# Patient Record
Sex: FEMALE | Race: WHITE | NOT HISPANIC OR LATINO | Employment: FULL TIME | URBAN - METROPOLITAN AREA
[De-identification: names, ages, dates, MRNs, and addresses within clinical notes are randomized per-mention and may not be internally consistent; named-entity substitution may affect disease eponyms.]

---

## 2018-05-22 ENCOUNTER — TELEPHONE (OUTPATIENT)
Dept: CARDIOLOGY CLINIC | Facility: CLINIC | Age: 68
End: 2018-05-22

## 2018-05-22 NOTE — TELEPHONE ENCOUNTER
Called patient while doing prep work, [facility] to get release of information prior to visit

## 2018-05-23 NOTE — TELEPHONE ENCOUNTER
We received records from PCP and scanned to [facility] holding records for appointment

## 2018-05-29 ENCOUNTER — OFFICE VISIT (OUTPATIENT)
Dept: CARDIOLOGY CLINIC | Facility: CLINIC | Age: 68
End: 2018-05-29
Payer: COMMERCIAL

## 2018-05-29 VITALS
SYSTOLIC BLOOD PRESSURE: 132 MMHG | HEIGHT: 66 IN | DIASTOLIC BLOOD PRESSURE: 80 MMHG | OXYGEN SATURATION: 96 % | WEIGHT: 238.8 LBS | BODY MASS INDEX: 38.38 KG/M2 | HEART RATE: 100 BPM

## 2018-05-29 DIAGNOSIS — I10 ESSENTIAL HYPERTENSION: ICD-10-CM

## 2018-05-29 DIAGNOSIS — I25.10 CORONARY ARTERY DISEASE INVOLVING NATIVE CORONARY ARTERY OF NATIVE HEART WITHOUT ANGINA PECTORIS: ICD-10-CM

## 2018-05-29 DIAGNOSIS — R06.02 SHORTNESS OF BREATH: Primary | ICD-10-CM

## 2018-05-29 DIAGNOSIS — Z95.1 S/P CABG X 3: ICD-10-CM

## 2018-05-29 DIAGNOSIS — M79.89 LEG SWELLING: ICD-10-CM

## 2018-05-29 DIAGNOSIS — E78.5 DYSLIPIDEMIA: ICD-10-CM

## 2018-05-29 PROCEDURE — 93000 ELECTROCARDIOGRAM COMPLETE: CPT | Performed by: INTERNAL MEDICINE

## 2018-05-29 PROCEDURE — 99204 OFFICE O/P NEW MOD 45 MIN: CPT | Performed by: INTERNAL MEDICINE

## 2018-05-29 RX ORDER — ATORVASTATIN CALCIUM 10 MG/1
1 TABLET, FILM COATED ORAL DAILY
Refills: 3 | COMMUNITY
Start: 2018-04-11 | End: 2019-05-20 | Stop reason: SDUPTHER

## 2018-05-29 RX ORDER — VALSARTAN AND HYDROCHLOROTHIAZIDE 160; 12.5 MG/1; MG/1
1 TABLET, FILM COATED ORAL DAILY
COMMUNITY
Start: 2008-01-07 | End: 2018-05-29 | Stop reason: ALTCHOICE

## 2018-05-29 RX ORDER — PREDNISONE 10 MG/1
10 TABLET ORAL DAILY
Refills: 3 | COMMUNITY
Start: 2018-04-24 | End: 2019-03-25

## 2018-05-29 RX ORDER — CITALOPRAM 20 MG/1
1 TABLET ORAL DAILY
Refills: 1 | COMMUNITY
Start: 2018-04-11 | End: 2019-05-20 | Stop reason: SDUPTHER

## 2018-05-29 RX ORDER — TRAMADOL HYDROCHLORIDE 50 MG/1
1 TABLET ORAL EVERY 6 HOURS PRN
Refills: 3 | COMMUNITY
Start: 2018-05-21 | End: 2019-03-25 | Stop reason: SDUPTHER

## 2018-05-29 RX ORDER — LISINOPRIL AND HYDROCHLOROTHIAZIDE 12.5; 1 MG/1; MG/1
1 TABLET ORAL DAILY
COMMUNITY
End: 2019-05-20

## 2018-05-29 RX ORDER — ASPIRIN 81 MG/1
81 TABLET ORAL DAILY
Qty: 30 TABLET | Refills: 5
Start: 2018-05-29

## 2018-05-29 NOTE — PROGRESS NOTES
Cardiology   [facility] 79 y o  female MRN: [id number]        Impression:  1  CAD s/p CABG x 3 in 11/17 - doing well  2  Hypertension - controlled  3  Dyslipidemia - on statin  4  Fatigue - unclear etiology  Recommendations:  1  Continue current medications  2  Check echocardiogram to evaluate LV systolic function  May have been slightly reduced at time of surgery  3  Check CMP, CBC, TSH, Lipid profile  4  Follow up in 3 months  HPI: [facility] is a 79y o  year old female with a history of CAD s/p CABG x 3 in November 2017 at [facility], Hypertension, Dyslipidemia, and morbid obesity s/p bariatric surgery, presents for establishment of a new cardiologist   Major complaint is fatigue  Is on chronic prednisone therapy for arthritis  Underwent knee replacement surgery prior to CABG  No chest pain, but some dyspnea on exertion  No palpitations  Review of Systems   Constitutional: Positive for fatigue  HENT: Negative  Eyes: Negative  Respiratory: Negative for chest tightness and shortness of breath  Cardiovascular: Negative for chest pain, palpitations and leg swelling  Gastrointestinal: Negative  Endocrine: Negative  Genitourinary: Negative  Musculoskeletal: Positive for arthralgias  Skin: Negative  Allergic/Immunologic: Negative  Neurological: Negative  Hematological: Negative  Psychiatric/Behavioral: Negative  All other systems reviewed and are negative  History reviewed  No pertinent past medical history  Past Surgical History:   Procedure Laterality Date    BARIATRIC SURGERY  2014    CARPAL TUNNEL RELEASE Bilateral 2002    CORONARY ARTERY BYPASS GRAFT  2017    FOOT SURGERY Right     bone surgery to straighten toe      HIP SURGERY Left 2015    REPLACEMENT TOTAL KNEE Right 04/2017    TOTAL HIP ARTHROPLASTY Right 2017     History   Alcohol Use    Yes     Comment: social      History   Drug Use No     History   Smoking Status  Former Smoker    Years: 7 00    Types: Cigarettes    Quit date: 2017   Smokeless Tobacco    Never Used     Family History   Problem Relation Age of Onset    Hypertension Mother     Heart attack Neg Hx     Stroke Neg Hx     Aneurysm Neg Hx     Clotting disorder Neg Hx     Arrhythmia Neg Hx     Hyperlipidemia Neg Hx      pt unsure        Allergies: Allergies   Allergen Reactions    Iodine Anaphylaxis     Reaction Date: [id number]; Medications:     Current Outpatient Prescriptions:     atorvastatin (LIPITOR) 10 mg tablet, Take 1 tablet by mouth daily, Disp: , Rfl: 3    citalopram (CeleXA) 20 mg tablet, Take 1 tablet by mouth daily, Disp: , Rfl: 1    lisinopril-hydrochlorothiazide (PRINZIDE,ZESTORETIC) 10-12 5 MG per tablet, Take 1 tablet by mouth daily, Disp: , Rfl:     predniSONE 10 mg tablet, Take 10 mg by mouth daily, Disp: , Rfl: 3    traMADol (ULTRAM) 50 mg tablet, Take 1 tablet by mouth every 6 (six) hours as needed, Disp: , Rfl: 3    aspirin (ECOTRIN LOW STRENGTH) 81 mg EC tablet, Take 1 tablet (81 mg total) by mouth daily, Disp: 30 tablet, Rfl: 5      Wt Readings from Last 3 Encounters:   05/29/18 108 kg (238 lb 12 8 oz)   03/06/08 98 kg (216 lb)   02/21/08 97 1 kg (214 lb)     Temp Readings from Last 3 Encounters:   03/06/08 97 5 °F (36 4 °C)   02/21/08 97 9 °F (36 6 °C)   01/07/08 97 7 °F (36 5 °C)     BP Readings from Last 3 Encounters:   05/29/18 132/80   03/06/08 130/90   02/21/08 (!) 160/110     Pulse Readings from Last 3 Encounters:   05/29/18 100   03/06/08 80   02/21/08 64         Physical Exam   Constitutional: She is oriented to person, place, and time  She appears well-developed  HENT:   Head: Atraumatic  Eyes: EOM are normal    Neck: Normal range of motion  Cardiovascular: Normal rate, regular rhythm and normal heart sounds  Exam reveals no gallop and no friction rub  No murmur heard    Pulmonary/Chest: Effort normal and breath sounds normal  No respiratory distress  She has no wheezes  She has no rales  Abdominal: Soft  Musculoskeletal: Normal range of motion  Neurological: She is alert and oriented to person, place, and time  Skin: Skin is warm and dry  Psychiatric: She has a normal mood and affect           Cardiac testing:   EKG reviewed personally: [id number]

## 2018-05-29 NOTE — PATIENT INSTRUCTIONS
Recommendations:  1  Continue current medications  2  Check echocardiogram to evaluate LV systolic function  May have been slightly reduced at time of surgery  3  Check CMP, CBC, TSH, Lipid profile  4  Follow up in 3 months

## 2018-08-08 ENCOUNTER — HOSPITAL ENCOUNTER (OUTPATIENT)
Dept: NON INVASIVE DIAGNOSTICS | Facility: CLINIC | Age: 68
Discharge: HOME | End: 2018-08-08
Payer: COMMERCIAL

## 2018-08-08 DIAGNOSIS — I25.10 CORONARY ARTERY DISEASE INVOLVING NATIVE CORONARY ARTERY OF NATIVE HEART WITHOUT ANGINA PECTORIS: ICD-10-CM

## 2018-08-08 PROCEDURE — 93306 TTE W/DOPPLER COMPLETE: CPT | Performed by: INTERNAL MEDICINE

## 2018-08-08 PROCEDURE — 93306 TTE W/DOPPLER COMPLETE: CPT

## 2018-09-25 ENCOUNTER — TELEPHONE (OUTPATIENT)
Dept: CARDIOLOGY CLINIC | Facility: CLINIC | Age: 68
End: 2018-09-25

## 2018-09-25 NOTE — TELEPHONE ENCOUNTER
Attempted to reach patient many times, but no successful, sent letter out      ----- Message from Maty Blair MD sent at 8/27/2018  8:28 AM EDT -----  Please call patient and let her know her echo looked good  Normal cardiac function  Thanks

## 2018-10-12 ENCOUNTER — TELEPHONE (OUTPATIENT)
Dept: PAIN MEDICINE | Facility: MEDICAL CENTER | Age: 68
End: 2018-10-12

## 2018-10-12 NOTE — TELEPHONE ENCOUNTER
Pt left message in voicemail at 4:44 yesterday afternoon, stating that she wanted to make an appointment with pain management  Attempted to call patient at the number provided, but had to leave a message for her to call back

## 2018-10-16 NOTE — TELEPHONE ENCOUNTER
Pt called back to schedule appt  Pt is self referring for chronic LBP  Verified we are par with her insurance via tax id #  Pt has not seen prior pain management and has not had any recent imaging  Pt is currently taking Tramadol and Prednisone, which are being prescribed by her Rheumatologist  Pt states that she wants to pursue other alternatives besides pain medication  CON scheduled with Dr Lico Henriquez on 10/31 at 10:40 AM  NP paperwork mailed to patient

## 2019-03-14 ENCOUNTER — TRANSCRIBE ORDERS (OUTPATIENT)
Dept: ADMINISTRATIVE | Facility: HOSPITAL | Age: 69
End: 2019-03-14

## 2019-03-14 ENCOUNTER — OFFICE VISIT (OUTPATIENT)
Dept: CARDIOLOGY CLINIC | Facility: CLINIC | Age: 69
End: 2019-03-14
Payer: COMMERCIAL

## 2019-03-14 VITALS
DIASTOLIC BLOOD PRESSURE: 80 MMHG | BODY MASS INDEX: 40.92 KG/M2 | RESPIRATION RATE: 18 BRPM | HEART RATE: 70 BPM | HEIGHT: 66 IN | SYSTOLIC BLOOD PRESSURE: 130 MMHG | WEIGHT: 254.6 LBS

## 2019-03-14 DIAGNOSIS — I10 ESSENTIAL HYPERTENSION: ICD-10-CM

## 2019-03-14 DIAGNOSIS — I25.10 CORONARY ARTERY DISEASE INVOLVING NATIVE CORONARY ARTERY OF NATIVE HEART WITHOUT ANGINA PECTORIS: Primary | ICD-10-CM

## 2019-03-14 DIAGNOSIS — R06.00 DYSPNEA ON EXERTION: ICD-10-CM

## 2019-03-14 DIAGNOSIS — E78.5 DYSLIPIDEMIA: ICD-10-CM

## 2019-03-14 PROBLEM — R06.09 DYSPNEA ON EXERTION: Status: ACTIVE | Noted: 2019-03-14

## 2019-03-14 PROCEDURE — 93000 ELECTROCARDIOGRAM COMPLETE: CPT | Performed by: INTERNAL MEDICINE

## 2019-03-14 PROCEDURE — 99214 OFFICE O/P EST MOD 30 MIN: CPT | Performed by: INTERNAL MEDICINE

## 2019-03-14 NOTE — PATIENT INSTRUCTIONS
The patient will be scheduled for chest x-ray, echocardiogram and nuclear stress test   She will continue on the same regimen of medications

## 2019-03-14 NOTE — ASSESSMENT & PLAN NOTE
Hyperlipidemia  Patient is taking atorvastatin at 10 mg daily  I will arrange for follow-up lipid profile

## 2019-03-14 NOTE — ASSESSMENT & PLAN NOTE
The patient has experienced increasing symptoms of dyspnea on exertion over the past several months  This is not associated with any chest pain  She does however have some leg edema  She denies any orthopnea  The patient will be scheduled for nuclear stress test and echocardiogram to assess left ventricular functions

## 2019-03-14 NOTE — PROGRESS NOTES
Assessment/Plan:    Coronary artery disease involving native coronary artery of native heart without angina pectoris  Coronary artery disease status post coronary bypass surgery x3 in 2017  Patient has no symptoms of angina but is complaining of dyspnea on exertion  I will be arranging for echocardiogram and nuclear stress test     Essential hypertension  Hypertension, adequately controlled  We will continue present medications  Dyslipidemia  Hyperlipidemia  Patient is taking atorvastatin at 10 mg daily  I will arrange for follow-up lipid profile  Dyspnea on exertion  The patient has experienced increasing symptoms of dyspnea on exertion over the past several months  This is not associated with any chest pain  She does however have some leg edema  She denies any orthopnea  The patient will be scheduled for nuclear stress test and echocardiogram to assess left ventricular functions  Diagnoses and all orders for this visit:    Coronary artery disease involving native coronary artery of native heart without angina pectoris  -     POCT ECG  -     POCT ECG  -     Lipid Panel with Direct LDL reflex; Future  -     Echo complete with contrast if indicated; Future  -     XR chest pa & lateral; Future  -     NM myocardial perfusion spect (rx stress); Future    Essential hypertension    Dyslipidemia  -     Lipid Panel with Direct LDL reflex; Future    Dyspnea on exertion  -     Echo complete with contrast if indicated; Future  -     XR chest pa & lateral; Future  -     NM myocardial perfusion spect (rx stress); Future          Subjective:  Dyspnea on exertion  Patient ID: Susie Faust is a 79 y o  female  The patient presented to this office for the purpose of cardiac evaluation and consultation  She has been concerned about symptoms of dyspnea on exertion and even at rest   She has noticed some leg swelling as well  She denies however any associated symptoms of chest pain    She denies any symptoms of palpitation dizziness or lightheadedness the patient had a history of coronary artery disease with symptoms of angina  In 2017 she underwent coronary bypass surgery x3  She has also been treated for hypertension and hyperlipidemia  She has psoriatic arthritis for which she has been on prednisone  Since bypass surgery the patient has gained about 25 lb  She also had hip and knee replacement in 2017 as well  The patient has the ambulation difficulties since her her orthopedic surgeries which may contribute to her limited stamina  The following portions of the patient's history were reviewed and updated as appropriate: allergies, current medications, past family history, past medical history, past social history, past surgical history and problem list     Review of Systems   Respiratory: Positive for shortness of breath  Negative for apnea, cough, chest tightness and wheezing  Cardiovascular: Positive for leg swelling  Negative for chest pain and palpitations  Gastrointestinal: Negative for abdominal pain  Neurological: Negative for dizziness, syncope and light-headedness  Objective:  Stable cardiac-wise at this point  /80 (BP Location: Right arm, Patient Position: Sitting)   Pulse 70   Resp 18   Ht 5' 6" (1 676 m)   Wt 115 kg (254 lb 9 6 oz)   BMI 41 09 kg/m²          Physical Exam   Constitutional: She is oriented to person, place, and time  She appears well-developed and well-nourished  No distress  HENT:   Head: Normocephalic and atraumatic  Neck: Normal range of motion  Neck supple  No JVD present  No thyromegaly present  Cardiovascular: Normal rate, regular rhythm, S1 normal and S2 normal  Exam reveals no gallop and no friction rub  Murmur heard  Systolic murmur is present with a grade of 1/6  Pulmonary/Chest: Effort normal  No respiratory distress  She has no wheezes  She has no rales  She exhibits no tenderness  Abdominal: Soft     Musculoskeletal: She exhibits edema (Mild)  Neurological: She is alert and oriented to person, place, and time  Skin: Skin is warm and dry  She is not diaphoretic  Psychiatric: She has a normal mood and affect  Vitals reviewed

## 2019-03-14 NOTE — ASSESSMENT & PLAN NOTE
Coronary artery disease status post coronary bypass surgery x3 in 2017  Patient has no symptoms of angina but is complaining of dyspnea on exertion    I will be arranging for echocardiogram and nuclear stress test

## 2019-03-14 NOTE — LETTER
March 14, 2019     Marquis Sanaz MD  [address] [facility] [address]    Patient: Nhan Nelson   YOB: 1950   Date of Visit: 3/14/2019       Dear Dr Yulisa Ward: Thank you for referring Nhan Nelson to me for evaluation  Below are my notes for this consultation  If you have questions, please do not hesitate to call me  I look forward to following your patient along with you  Sincerely,        Roselia Davis MD        CC: No Recipients  Roselia Davis MD  3/14/2019 10:46 AM  Sign at close encounter  Assessment/Plan:    Coronary artery disease involving native coronary artery of native heart without angina pectoris  Coronary artery disease status post coronary bypass surgery x3 in 2017  Patient has no symptoms of angina but is complaining of dyspnea on exertion  I will be arranging for echocardiogram and nuclear stress test     Essential hypertension  Hypertension, adequately controlled  We will continue present medications  Dyslipidemia  Hyperlipidemia  Patient is taking atorvastatin at 10 mg daily  I will arrange for follow-up lipid profile  Dyspnea on exertion  The patient has experienced increasing symptoms of dyspnea on exertion over the past several months  This is not associated with any chest pain  She does however have some leg edema  She denies any orthopnea  The patient will be scheduled for nuclear stress test and echocardiogram to assess left ventricular functions  Diagnoses and all orders for this visit:    Coronary artery disease involving native coronary artery of native heart without angina pectoris  -     POCT ECG  -     POCT ECG  -     Lipid Panel with Direct LDL reflex; Future  -     Echo complete with contrast if indicated; Future  -     XR chest pa & lateral; Future  -     NM myocardial perfusion spect (rx stress); Future    Essential hypertension    Dyslipidemia  -     Lipid Panel with Direct LDL reflex;  Future    Dyspnea on exertion  - Echo complete with contrast if indicated; Future  -     XR chest pa & lateral; Future  -     NM myocardial perfusion spect (rx stress); Future          Subjective:  Dyspnea on exertion  Patient ID: Josefina Soares is a 79 y o  female  The patient presented to this office for the purpose of cardiac evaluation and consultation  She has been concerned about symptoms of dyspnea on exertion and even at rest   She has noticed some leg swelling as well  She denies however any associated symptoms of chest pain  She denies any symptoms of palpitation dizziness or lightheadedness the patient had a history of coronary artery disease with symptoms of angina  In 2017 she underwent coronary bypass surgery x3  She has also been treated for hypertension and hyperlipidemia  She has psoriatic arthritis for which she has been on prednisone  Since bypass surgery the patient has gained about 25 lb  She also had hip and knee replacement in 2017 as well  The patient has the ambulation difficulties since her her orthopedic surgeries which may contribute to her limited stamina  The following portions of the patient's history were reviewed and updated as appropriate: allergies, current medications, past family history, past medical history, past social history, past surgical history and problem list     Review of Systems   Respiratory: Positive for shortness of breath  Negative for apnea, cough, chest tightness and wheezing  Cardiovascular: Positive for leg swelling  Negative for chest pain and palpitations  Gastrointestinal: Negative for abdominal pain  Neurological: Negative for dizziness, syncope and light-headedness  Objective:  Stable cardiac-wise at this point  /80 (BP Location: Right arm, Patient Position: Sitting)   Pulse 70   Resp 18   Ht 5' 6" (1 676 m)   Wt 115 kg (254 lb 9 6 oz)   BMI 41 09 kg/m²           Physical Exam   Constitutional: She is oriented to person, place, and time  She appears well-developed and well-nourished  No distress  HENT:   Head: Normocephalic and atraumatic  Neck: Normal range of motion  Neck supple  No JVD present  No thyromegaly present  Cardiovascular: Normal rate, regular rhythm, S1 normal and S2 normal  Exam reveals no gallop and no friction rub  Murmur heard  Systolic murmur is present with a grade of 1/6  Pulmonary/Chest: Effort normal  No respiratory distress  She has no wheezes  She has no rales  She exhibits no tenderness  Abdominal: Soft  Musculoskeletal: She exhibits edema (Mild)  Neurological: She is alert and oriented to person, place, and time  Skin: Skin is warm and dry  She is not diaphoretic  Psychiatric: She has a normal mood and affect  Vitals reviewed

## 2019-03-25 ENCOUNTER — OFFICE VISIT (OUTPATIENT)
Dept: RHEUMATOLOGY | Facility: CLINIC | Age: 69
End: 2019-03-25
Payer: COMMERCIAL

## 2019-03-25 VITALS
HEART RATE: 82 BPM | DIASTOLIC BLOOD PRESSURE: 87 MMHG | WEIGHT: 249.2 LBS | BODY MASS INDEX: 40.05 KG/M2 | SYSTOLIC BLOOD PRESSURE: 128 MMHG | HEIGHT: 66 IN

## 2019-03-25 DIAGNOSIS — L40.9 PSORIASIS: ICD-10-CM

## 2019-03-25 DIAGNOSIS — Z79.52 CURRENT CHRONIC USE OF SYSTEMIC STEROIDS: ICD-10-CM

## 2019-03-25 DIAGNOSIS — L40.50 PSORIATIC ARTHROPATHY (HCC): Primary | ICD-10-CM

## 2019-03-25 PROCEDURE — 99204 OFFICE O/P NEW MOD 45 MIN: CPT | Performed by: INTERNAL MEDICINE

## 2019-03-25 RX ORDER — PREDNISONE 2.5 MG
TABLET ORAL
Qty: 70 TABLET | Refills: 0 | Status: SHIPPED | OUTPATIENT
Start: 2019-03-25 | End: 2021-01-29 | Stop reason: CLARIF

## 2019-03-25 RX ORDER — TRAMADOL HYDROCHLORIDE 50 MG/1
50 TABLET ORAL EVERY 6 HOURS PRN
Qty: 56 TABLET | Refills: 1 | OUTPATIENT
Start: 2019-03-25 | End: 2021-01-29 | Stop reason: CLARIF

## 2019-03-25 NOTE — PROGRESS NOTES
Assessment and Plan:   Ms Andrzej Mckinney is a 17-year-old  female with history significant for psoriasis and possible psoriatic arthritis, who presents for further management  She is currently on prednisone 10 mg daily     - Juan Sylvester presents today for further evaluation of diffuse joint pains which have primarily affected her shoulders, hands, hips and knees, that has previously been diagnosed as psoriatic arthritis  She is currently on prednisone 10 mg daily which has been a stable dose for the past 5 years, as well as tramadol 200 mg daily  She reports with this regimen her joint pains are well controlled, but any time a taper is attempted she will experience recurrence of symptoms  Based on her physical examination today there is presence of soft tissue swelling noted at her right 2nd MCP, but no other discrete synovitis is noted  I advised her at this time due to the chronic use of steroids her joint exam may not be accurate, and it may be difficult to distinguish between arthritic pain arising from an inflammatory arthritis versus osteoarthritis  - To reestablish the diagnosis of psoriatic arthritis, I suggest she taper down on the prednisone to 7 5 mg daily for the next 2 weeks, and then 5 mg daily for the next 2 weeks, following which she should stop the steroids  I will plan to see her back in 6 weeks when she has been off steroids for at least 2 weeks and reassess her joint exam   In the interim she can continue tramadol as needed, but I advised her in the long term if this is a medication she wishes to continue, she will need to have this refilled by her primary care physician  It is possible with prior use of Humira and Enbrel, they may have been efficacious but the result was masked by the use of steroids  At her next office visit if medications need to be started for psoriatic arthritis, we can consider methotrexate which would also help with the psoriasis       - I requested she obtain the below mentioned labs and x-rays prior to her next office visit with me  I will plan to see her back in 6 weeks  Plan:  Diagnoses and all orders for this visit:    Psoriatic arthropathy ([address] )  -     predniSONE 2 5 mg tablet; Take 7 5 mg daily for 2 weeks, and then decrease to 5 mg daily for 2 weeks, and then stop  -     traMADol (ULTRAM) 50 mg tablet; Take 1 tablet (50 mg total) by mouth every 6 (six) hours as needed for moderate pain  -     CBC and differential; Future  -     Comprehensive metabolic panel; Future  -     C-reactive protein; Future  -     Sedimentation rate, automated; Future  -     Chronic Hepatitis Panel; Future  -     Uric acid; Future  -     RF Screen w/ Reflex to Titer; Future  -     Cyclic citrul peptide antibody, IgG; Future  -     Quantiferon TB Gold Plus; Future  -     XR hand 3+ vw right; Future  -     XR hand 3+ vw left; Future  -     XR foot 3+ vw right; Future  -     XR foot 3+ vw left; Future  -     XR spine lumbar minimum 4 views non injury; Future    Psoriasis    Current chronic use of systemic steroids      Activities as tolerated    Diet: low carb/low fat, more greens/vegetables, adequate hydration  Exercise: try to maintain a low impact exercise regimen as much as possible  Walk for 30 minutes a day for at least 3 days a week    Encouraged to maintain good sleep hygiene  Continue other medications as prescribed by PCP and other specialists        RTC in 6 weeks  HPI  Ms Michoacano Myers is a 44-year-old  female with history significant for psoriasis and possible psoriatic arthritis, who presents for further management  She is currently on prednisone 10 mg daily  Patient reports onset of diffuse joint pains more than 10 years ago, and states it has been gradually progressive over time  She reports pain affecting her shoulders, hands, hips and knees    She has undergone joint replacements of bilateral hips and the right knee, but states she continues to experience pain affecting her bilateral hips  More recently she has been dealing with pain affecting her low back region which has more or less been constant, but is aggravated with activities  She has been seen by multiple rheumatologists over the years including Dr Ming Lynne, Dr Keren Genao and another rheumatologist in [place] whose name she cannot recall  At her initial visit with Dr Ming Lynne there were concerns for psoriatic arthritis, and patient states over the years the multiple rheumatologists have agreed with the diagnosis  She was initially started on tramadol which she takes 200 mg once daily and prednisone 10 mg daily which she has been on constantly for the past 5 years  Apart from these medications she has also been on Humira and Enbrel, but states they were both discontinued due to lack of efficacy  She has not been on any additional DMARDs  She reports with a combination of tramadol and prednisone this significantly helps with her joint pains, and she is no longer experiencing joint pain on a daily basis  She notes occasional swelling affecting her hands  She experiences morning stiffness which primarily affects her low back and hands and lasts approximately 1 hour  On occasion she will try taking Tylenol which does not help  Over the years she has tried tapering down on the prednisone from 10 mg to 5 mg daily, but this results in a flare-up of her joint pains and stiffness sensation  In view of this she has been on prednisone for the past 5 years  She reports a diagnosis of psoriasis made in her teenage years, and states with the chronic use of steroids this has significantly helped with the skin rash  She reports an extensive history of psoriasis and possible psoriatic arthritis affecting her parents and 3 siblings  Her brother is on Enbrel, but she is unsure of which medications are being used by her other family members    She denies a history of inflammatory eye disease or inflammatory bowel disease  The following portions of the patient's history were reviewed and updated as appropriate: allergies, current medications, past family history, past medical history, past social history, past surgical history and problem list       Review of Systems  Constitutional: Negative for fevers, chills, night sweats, fatigue  Positive for weight gain  ENT/Mouth: Negative for hearing changes, ear pain, nasal congestion, sinus pain, hoarseness, sore throat, rhinorrhea, swallowing difficulty  Eyes: Negative for pain, redness, discharge, vision changes  Cardiovascular: Negative for chest pain, SOB, palpitations  Respiratory: Negative for cough, sputum, wheezing, dyspnea  Gastrointestinal: Negative for nausea, vomiting, diarrhea, constipation, pain, heartburn  Genitourinary: Negative for dysuria, urinary frequency, hematuria  Musculoskeletal: As per HPI  Skin: Negative for skin rash, color changes  Neuro: Negative for weakness, numbness, tingling, loss of consciousness  Psych: Negative for anxiety, depression  Positive for anxiety  Heme/Lymph: Negative for easy bruising, bleeding, lymphadenopathy  Past Medical History:   Diagnosis Date    CAD (coronary artery disease)     Dyslipidemia     Hyperlipidemia     Hypertension     SOB (shortness of breath)        Past Surgical History:   Procedure Laterality Date    BARIATRIC SURGERY  2014    CARPAL TUNNEL RELEASE Bilateral 2002    CORONARY ARTERY BYPASS GRAFT  2017    FOOT SURGERY Right     bone surgery to straighten toe      HIP SURGERY Left 2015    REPLACEMENT TOTAL KNEE Right 04/2017    TOTAL HIP ARTHROPLASTY Right 2017       Social History     Socioeconomic History    Marital status: /Civil Union     Spouse name: Not on file    Number of children: Not on file    Years of education: Not on file    Highest education level: Not on file   Occupational History    Not on file   Social Needs    Financial resource strain: Not on file    Food insecurity:     Worry: Not on file     Inability: Not on file    Transportation needs:     Medical: Not on file     Non-medical: Not on file   Tobacco Use    Smoking status: Former Smoker     Packs/day: 0 50     Years: 7 00     Pack years: 3 50     Types: Cigarettes     Last attempt to quit: 2017     Years since quittin 2    Smokeless tobacco: Never Used   Substance and Sexual Activity    Alcohol use: Yes     Comment: social     Drug use: No    Sexual activity: Not on file   Lifestyle    Physical activity:     Days per week: Not on file     Minutes per session: Not on file    Stress: Not on file   Relationships    Social connections:     Talks on phone: Not on file     Gets together: Not on file     Attends [employer] service: Not on file     Active member of club or organization: Not on file     Attends meetings of clubs or organizations: Not on file     Relationship status: Not on file    Intimate partner violence:     Fear of current or ex partner: Not on file     Emotionally abused: Not on file     Physically abused: Not on file     Forced sexual activity: Not on file   Other Topics Concern    Not on file   Social History Narrative    Not on file       Family History   Problem Relation Age of Onset    Hypertension Mother     Heart attack Neg Hx     Stroke Neg Hx     Aneurysm Neg Hx     Clotting disorder Neg Hx     Arrhythmia Neg Hx     Hyperlipidemia Neg Hx         pt unsure        Allergies   Allergen Reactions    Iodine Anaphylaxis     Reaction Date: ;        Current Outpatient Medications:     aspirin (ECOTRIN LOW STRENGTH) 81 mg EC tablet, Take 1 tablet (81 mg total) by mouth daily, Disp: 30 tablet, Rfl: 5    atorvastatin (LIPITOR) 10 mg tablet, Take 1 tablet by mouth daily, Disp: , Rfl: 3    citalopram (CeleXA) 20 mg tablet, Take 1 tablet by mouth daily, Disp: , Rfl: 1    lisinopril-hydrochlorothiazide (PRINZIDE,ZESTORETIC) 10-12 5 MG per tablet, Take 1 tablet by mouth daily, Disp: , Rfl:     traMADol (ULTRAM) 50 mg tablet, Take 1 tablet (50 mg total) by mouth every 6 (six) hours as needed for moderate pain, Disp: 56 tablet, Rfl: 1    predniSONE 2 5 mg tablet, Take 7 5 mg daily for 2 weeks, and then decrease to 5 mg daily for 2 weeks, and then stop , Disp: 70 tablet, Rfl: 0      Objective:    Vitals:    03/25/19 1305   BP: 128/87   BP Location: Left arm   Patient Position: Sitting   Cuff Size: Adult   Pulse: 82   Weight: 113 kg (249 lb 3 2 oz)   Height: 5' 6" (1 676 m)       Physical Exam  General: Well appearing, well nourished, in no distress  Oriented x 3, normal mood and affect  Ambulating without difficulty  Skin: Good turgor, no rash, unusual bruising or prominent lesions  Hair: Normal texture and distribution  Nails: Normal color, no deformities  No pitting noted  HEENT:  Head: Normocephalic, atraumatic  Eyes: Conjunctiva clear, sclera non-icteric, EOM intact  Nose: No external lesions, mucosa non-inflamed  Mouth: Mucous membranes moist, no mucosal lesions  Neck: Supple, thyroid non-enlarged and non-tender  No lymphadenopathy  Extremities: No amputations or deformities, cyanosis, edema  Musculoskeletal:   Hands - she has bony enlargement present at her PIP's bilaterally, no discrete soft tissue swelling present but she does have tenderness to palpation bilaterally  She has soft tissue swelling present at her right hand 2nd MCP, but there is no tenderness present  Her DIPs and MCPs are otherwise unremarkable  She is able to make full fists bilaterally  Wrists, elbows and shoulders - unremarkable  Hips - status post bilateral total hip replacement  Knees - right knee status post total knee replacement  Left knee is unremarkable  Ankles and feet - unremarkable with negative MTP squeeze test bilaterally  No enthesitis or dactylitis  Negative fibromyalgia tender points  Neurologic: Alert and oriented   No focal neurological deficits appreciated  Psychiatric: Normal mood and affect  KATIE Neville    Rheumatology

## 2019-04-03 ENCOUNTER — HOSPITAL ENCOUNTER (OUTPATIENT)
Dept: NON INVASIVE DIAGNOSTICS | Facility: CLINIC | Age: 69
Discharge: HOME | End: 2019-04-03

## 2019-04-03 DIAGNOSIS — I25.10 CORONARY ARTERY DISEASE INVOLVING NATIVE CORONARY ARTERY OF NATIVE HEART WITHOUT ANGINA PECTORIS: ICD-10-CM

## 2019-04-10 ENCOUNTER — HOSPITAL ENCOUNTER (OUTPATIENT)
Dept: NON INVASIVE DIAGNOSTICS | Facility: CLINIC | Age: 69
Discharge: HOME | End: 2019-04-10
Payer: COMMERCIAL

## 2019-04-10 PROCEDURE — A9502 TC99M TETROFOSMIN: HCPCS

## 2019-04-10 PROCEDURE — 78452 HT MUSCLE IMAGE SPECT MULT: CPT

## 2019-04-10 PROCEDURE — 93016 CV STRESS TEST SUPVJ ONLY: CPT | Performed by: INTERNAL MEDICINE

## 2019-04-10 PROCEDURE — 93018 CV STRESS TEST I&R ONLY: CPT | Performed by: INTERNAL MEDICINE

## 2019-04-10 PROCEDURE — 78452 HT MUSCLE IMAGE SPECT MULT: CPT | Performed by: INTERNAL MEDICINE

## 2019-04-10 PROCEDURE — 93017 CV STRESS TEST TRACING ONLY: CPT

## 2019-04-10 RX ADMIN — REGADENOSON 0.4 MG: 0.08 INJECTION, SOLUTION INTRAVENOUS at 13:30

## 2019-04-12 ENCOUNTER — TELEPHONE (OUTPATIENT)
Dept: CARDIOLOGY CLINIC | Facility: CLINIC | Age: 69
End: 2019-04-12

## 2019-04-12 LAB
CHEST PAIN STATEMENT: NORMAL
MAX DIASTOLIC BP: 78 MMHG
MAX HEART RATE: 123 BPM
MAX PREDICTED HEART RATE: 153 BPM
MAX. SYSTOLIC BP: 120 MMHG
PROTOCOL NAME: NORMAL
REASON FOR TERMINATION: NORMAL
TARGET HR FORMULA: NORMAL
TEST INDICATION: NORMAL
TIME IN EXERCISE PHASE: NORMAL

## 2019-04-15 ENCOUNTER — TELEPHONE (OUTPATIENT)
Dept: CARDIOLOGY CLINIC | Facility: CLINIC | Age: 69
End: 2019-04-15

## 2019-04-15 DIAGNOSIS — I25.10 CORONARY ARTERY DISEASE INVOLVING NATIVE CORONARY ARTERY OF NATIVE HEART WITHOUT ANGINA PECTORIS: Primary | ICD-10-CM

## 2019-04-16 ENCOUNTER — TELEPHONE (OUTPATIENT)
Dept: CARDIOLOGY CLINIC | Facility: CLINIC | Age: 69
End: 2019-04-16

## 2019-04-16 DIAGNOSIS — I25.10 CORONARY ARTERY DISEASE INVOLVING NATIVE CORONARY ARTERY OF NATIVE HEART WITHOUT ANGINA PECTORIS: Primary | ICD-10-CM

## 2019-04-17 ENCOUNTER — TELEPHONE (OUTPATIENT)
Dept: CARDIOLOGY CLINIC | Facility: CLINIC | Age: 69
End: 2019-04-17

## 2019-04-19 LAB
ALBUMIN SERPL-MCNC: 4.2 G/DL (ref 3.6–4.8)
ALBUMIN/GLOB SERPL: 1.4 {RATIO} (ref 1.2–2.2)
ALP SERPL-CCNC: 104 IU/L (ref 39–117)
ALT SERPL-CCNC: 32 IU/L (ref 0–32)
AST SERPL-CCNC: 29 IU/L (ref 0–40)
BASOPHILS # BLD AUTO: 0 X10E3/UL (ref 0–0.2)
BASOPHILS NFR BLD AUTO: 0 %
BILIRUB SERPL-MCNC: 0.4 MG/DL (ref 0–1.2)
BUN SERPL-MCNC: 23 MG/DL (ref 8–27)
BUN/CREAT SERPL: 22 (ref 12–28)
CALCIUM SERPL-MCNC: 9.4 MG/DL (ref 8.7–10.3)
CHLORIDE SERPL-SCNC: 100 MMOL/L (ref 96–106)
CO2 SERPL-SCNC: 23 MMOL/L (ref 20–29)
CREAT SERPL-MCNC: 1.03 MG/DL (ref 0.57–1)
EOSINOPHIL # BLD AUTO: 0.2 X10E3/UL (ref 0–0.4)
EOSINOPHIL NFR BLD AUTO: 3 %
ERYTHROCYTE [DISTWIDTH] IN BLOOD BY AUTOMATED COUNT: 14.8 % (ref 12.3–15.4)
GLOBULIN SER-MCNC: 2.9 G/DL (ref 1.5–4.5)
GLUCOSE SERPL-MCNC: 114 MG/DL (ref 65–99)
HCT VFR BLD AUTO: 39.3 % (ref 34–46.6)
HGB BLD-MCNC: 12.8 G/DL (ref 11.1–15.9)
IMM GRANULOCYTES # BLD: 0 X10E3/UL (ref 0–0.1)
IMM GRANULOCYTES NFR BLD: 0 %
LABCORP COMMENT: NORMAL
LYMPHOCYTES # BLD AUTO: 1.5 X10E3/UL (ref 0.7–3.1)
LYMPHOCYTES NFR BLD AUTO: 20 %
MCH RBC QN AUTO: 30.4 PG (ref 26.6–33)
MCHC RBC AUTO-ENTMCNC: 32.6 G/DL (ref 31.5–35.7)
MCV RBC AUTO: 93 FL (ref 79–97)
MONOCYTES # BLD AUTO: 0.7 X10E3/UL (ref 0.1–0.9)
MONOCYTES NFR BLD AUTO: 9 %
NEUTROPHILS # BLD AUTO: 5.3 X10E3/UL (ref 1.4–7)
NEUTROPHILS NFR BLD AUTO: 68 %
PLATELET # BLD AUTO: 290 X10E3/UL (ref 150–379)
POTASSIUM SERPL-SCNC: 4.7 MMOL/L (ref 3.5–5.2)
PROT SERPL-MCNC: 7.1 G/DL (ref 6–8.5)
RBC # BLD AUTO: 4.21 X10E6/UL (ref 3.77–5.28)
SL AMB EGFR AFRICAN AMERICAN: 65 ML/MIN/1.73
SL AMB EGFR NON AFRICAN AMERICAN: 56 ML/MIN/1.73
SODIUM SERPL-SCNC: 142 MMOL/L (ref 134–144)
WBC # BLD AUTO: 7.8 X10E3/UL (ref 3.4–10.8)

## 2019-04-19 RX ORDER — ASPIRIN 81 MG/1
324 TABLET, CHEWABLE ORAL ONCE
Status: CANCELLED | OUTPATIENT
Start: 2019-04-19 | End: 2019-04-19

## 2019-04-19 RX ORDER — SODIUM CHLORIDE 9 MG/ML
75 INJECTION, SOLUTION INTRAVENOUS CONTINUOUS
Status: CANCELLED | OUTPATIENT
Start: 2019-04-19

## 2019-04-22 ENCOUNTER — HOSPITAL ENCOUNTER (OUTPATIENT)
Dept: NON INVASIVE DIAGNOSTICS | Facility: HOSPITAL | Age: 69
Discharge: HOME | End: 2019-04-22
Payer: COMMERCIAL

## 2019-04-22 VITALS
SYSTOLIC BLOOD PRESSURE: 110 MMHG | WEIGHT: 244 LBS | DIASTOLIC BLOOD PRESSURE: 74 MMHG | BODY MASS INDEX: 39.21 KG/M2 | RESPIRATION RATE: 18 BRPM | TEMPERATURE: 97.8 F | HEART RATE: 89 BPM | HEIGHT: 66 IN | OXYGEN SATURATION: 95 %

## 2019-04-22 DIAGNOSIS — I25.10 CORONARY ARTERY DISEASE INVOLVING NATIVE CORONARY ARTERY OF NATIVE HEART WITHOUT ANGINA PECTORIS: ICD-10-CM

## 2019-04-22 LAB
ANION GAP SERPL CALCULATED.3IONS-SCNC: 11 MMOL/L (ref 4–13)
ATRIAL RATE: 85 BPM
BASOPHILS # BLD MANUAL: 0 THOUSAND/UL (ref 0–0.1)
BASOPHILS NFR MAR MANUAL: 0 % (ref 0–1)
BUN SERPL-MCNC: 26 MG/DL (ref 5–25)
CALCIUM SERPL-MCNC: 9.2 MG/DL (ref 8.3–10.1)
CHLORIDE SERPL-SCNC: 106 MMOL/L (ref 100–108)
CO2 SERPL-SCNC: 25 MMOL/L (ref 21–32)
CREAT SERPL-MCNC: 1.02 MG/DL (ref 0.6–1.3)
EOSINOPHIL # BLD MANUAL: 0 THOUSAND/UL (ref 0–0.4)
EOSINOPHIL NFR BLD MANUAL: 0 % (ref 0–6)
ERYTHROCYTE [DISTWIDTH] IN BLOOD BY AUTOMATED COUNT: 13.9 % (ref 11.6–15.1)
GFR SERPL CREATININE-BSD FRML MDRD: 57 ML/MIN/1.73SQ M
GLUCOSE P FAST SERPL-MCNC: 110 MG/DL (ref 65–99)
GLUCOSE SERPL-MCNC: 110 MG/DL (ref 65–140)
HCT VFR BLD AUTO: 39.4 % (ref 34.8–46.1)
HGB BLD-MCNC: 12.8 G/DL (ref 11.5–15.4)
INR PPP: 0.98 (ref 0.86–1.17)
LYMPHOCYTES # BLD AUTO: 0.59 THOUSAND/UL (ref 0.6–4.47)
LYMPHOCYTES # BLD AUTO: 4 % (ref 14–44)
MAGNESIUM SERPL-MCNC: 2 MG/DL (ref 1.6–2.6)
MCH RBC QN AUTO: 30.7 PG (ref 26.8–34.3)
MCHC RBC AUTO-ENTMCNC: 32.5 G/DL (ref 31.4–37.4)
MCV RBC AUTO: 95 FL (ref 82–98)
MONOCYTES # BLD AUTO: 1.03 THOUSAND/UL (ref 0–1.22)
MONOCYTES NFR BLD: 7 % (ref 4–12)
NEUTROPHILS # BLD MANUAL: 13.1 THOUSAND/UL (ref 1.85–7.62)
NEUTS BAND NFR BLD MANUAL: 3 % (ref 0–8)
NEUTS SEG NFR BLD AUTO: 86 % (ref 43–75)
NRBC BLD AUTO-RTO: 0 /100 WBCS
P AXIS: 67 DEGREES
PLATELET # BLD AUTO: 292 THOUSANDS/UL (ref 149–390)
PLATELET BLD QL SMEAR: ADEQUATE
PMV BLD AUTO: 10.2 FL (ref 8.9–12.7)
POTASSIUM SERPL-SCNC: 3.8 MMOL/L (ref 3.5–5.3)
PR INTERVAL: 174 MS
PROTHROMBIN TIME: 12.7 SECONDS (ref 11.8–14.2)
QRS AXIS: 9 DEGREES
QRSD INTERVAL: 96 MS
QT INTERVAL: 406 MS
QTC INTERVAL: 483 MS
RBC # BLD AUTO: 4.17 MILLION/UL (ref 3.81–5.12)
SODIUM SERPL-SCNC: 142 MMOL/L (ref 136–145)
T WAVE AXIS: 91 DEGREES
TOTAL CELLS COUNTED SPEC: 100
VENTRICULAR RATE: 85 BPM
WBC # BLD AUTO: 14.72 THOUSAND/UL (ref 4.31–10.16)

## 2019-04-22 PROCEDURE — 99152 MOD SED SAME PHYS/QHP 5/>YRS: CPT | Performed by: INTERNAL MEDICINE

## 2019-04-22 PROCEDURE — 93455 CORONARY ART/GRFT ANGIO S&I: CPT | Performed by: INTERNAL MEDICINE

## 2019-04-22 PROCEDURE — 99153 MOD SED SAME PHYS/QHP EA: CPT | Performed by: INTERNAL MEDICINE

## 2019-04-22 PROCEDURE — C1894 INTRO/SHEATH, NON-LASER: HCPCS | Performed by: INTERNAL MEDICINE

## 2019-04-22 PROCEDURE — 85027 COMPLETE CBC AUTOMATED: CPT | Performed by: NURSE PRACTITIONER

## 2019-04-22 PROCEDURE — 83735 ASSAY OF MAGNESIUM: CPT | Performed by: NURSE PRACTITIONER

## 2019-04-22 PROCEDURE — C1769 GUIDE WIRE: HCPCS | Performed by: INTERNAL MEDICINE

## 2019-04-22 PROCEDURE — C1760 CLOSURE DEV, VASC: HCPCS | Performed by: INTERNAL MEDICINE

## 2019-04-22 PROCEDURE — 85610 PROTHROMBIN TIME: CPT | Performed by: NURSE PRACTITIONER

## 2019-04-22 PROCEDURE — 80048 BASIC METABOLIC PNL TOTAL CA: CPT | Performed by: NURSE PRACTITIONER

## 2019-04-22 PROCEDURE — 93010 ELECTROCARDIOGRAM REPORT: CPT | Performed by: INTERNAL MEDICINE

## 2019-04-22 PROCEDURE — 85007 BL SMEAR W/DIFF WBC COUNT: CPT | Performed by: NURSE PRACTITIONER

## 2019-04-22 PROCEDURE — 93005 ELECTROCARDIOGRAM TRACING: CPT

## 2019-04-22 RX ORDER — SODIUM CHLORIDE 9 MG/ML
100 INJECTION, SOLUTION INTRAVENOUS CONTINUOUS
Status: DISPENSED | OUTPATIENT
Start: 2019-04-22 | End: 2019-04-22

## 2019-04-22 RX ORDER — FENTANYL CITRATE 50 UG/ML
INJECTION, SOLUTION INTRAMUSCULAR; INTRAVENOUS CODE/TRAUMA/SEDATION MEDICATION
Status: COMPLETED | OUTPATIENT
Start: 2019-04-22 | End: 2019-04-22

## 2019-04-22 RX ORDER — SODIUM CHLORIDE 9 MG/ML
75 INJECTION, SOLUTION INTRAVENOUS CONTINUOUS
Status: DISCONTINUED | OUTPATIENT
Start: 2019-04-22 | End: 2019-04-23 | Stop reason: HOSPADM

## 2019-04-22 RX ORDER — ERGOCALCIFEROL 1.25 MG/1
50000 CAPSULE ORAL WEEKLY
COMMUNITY
End: 2021-01-29 | Stop reason: ALTCHOICE

## 2019-04-22 RX ORDER — MIDAZOLAM HYDROCHLORIDE 1 MG/ML
INJECTION INTRAMUSCULAR; INTRAVENOUS CODE/TRAUMA/SEDATION MEDICATION
Status: COMPLETED | OUTPATIENT
Start: 2019-04-22 | End: 2019-04-22

## 2019-04-22 RX ORDER — LIDOCAINE HYDROCHLORIDE 10 MG/ML
INJECTION, SOLUTION INFILTRATION; PERINEURAL CODE/TRAUMA/SEDATION MEDICATION
Status: COMPLETED | OUTPATIENT
Start: 2019-04-22 | End: 2019-04-22

## 2019-04-22 RX ORDER — ASPIRIN 81 MG/1
324 TABLET, CHEWABLE ORAL ONCE
Status: COMPLETED | OUTPATIENT
Start: 2019-04-22 | End: 2019-04-22

## 2019-04-22 RX ADMIN — LIDOCAINE HYDROCHLORIDE ANHYDROUS 10 ML: 10 INJECTION, SOLUTION INFILTRATION at 13:05

## 2019-04-22 RX ADMIN — FENTANYL CITRATE 25 MCG: 50 INJECTION INTRAMUSCULAR; INTRAVENOUS at 13:09

## 2019-04-22 RX ADMIN — FENTANYL CITRATE 50 MCG: 50 INJECTION INTRAMUSCULAR; INTRAVENOUS at 13:00

## 2019-04-22 RX ADMIN — ASPIRIN 81 MG 324 MG: 81 TABLET ORAL at 10:47

## 2019-04-22 RX ADMIN — MIDAZOLAM HYDROCHLORIDE 2 MG: 1 INJECTION, SOLUTION INTRAMUSCULAR; INTRAVENOUS at 13:00

## 2019-04-22 RX ADMIN — IODIXANOL 120 ML: 270 INJECTION, SOLUTION INTRAVASCULAR at 13:21

## 2019-04-22 RX ADMIN — SODIUM CHLORIDE 75 ML/HR: 0.9 INJECTION, SOLUTION INTRAVENOUS at 10:50

## 2019-04-22 RX ADMIN — MIDAZOLAM HYDROCHLORIDE 1 MG: 1 INJECTION, SOLUTION INTRAMUSCULAR; INTRAVENOUS at 13:09

## 2019-04-24 LAB
ALBUMIN SERPL-MCNC: 4.3 G/DL (ref 3.6–4.8)
ALBUMIN/GLOB SERPL: 1.4 {RATIO} (ref 1.2–2.2)
ALP SERPL-CCNC: 103 IU/L (ref 39–117)
ALT SERPL-CCNC: 30 IU/L (ref 0–32)
AST SERPL-CCNC: 28 IU/L (ref 0–40)
BASOPHILS # BLD AUTO: 0 X10E3/UL (ref 0–0.2)
BASOPHILS NFR BLD AUTO: 0 %
BILIRUB SERPL-MCNC: 0.4 MG/DL (ref 0–1.2)
BUN SERPL-MCNC: 23 MG/DL (ref 8–27)
BUN/CREAT SERPL: 23 (ref 12–28)
CALCIUM SERPL-MCNC: 9.5 MG/DL (ref 8.7–10.3)
CCP IGA+IGG SERPL IA-ACNC: 7 UNITS (ref 0–19)
CHLORIDE SERPL-SCNC: 100 MMOL/L (ref 96–106)
CO2 SERPL-SCNC: 22 MMOL/L (ref 20–29)
CREAT SERPL-MCNC: 1.01 MG/DL (ref 0.57–1)
CRP SERPL-MCNC: 5.5 MG/L (ref 0–4.9)
EOSINOPHIL # BLD AUTO: 0.2 X10E3/UL (ref 0–0.4)
EOSINOPHIL NFR BLD AUTO: 3 %
ERYTHROCYTE [DISTWIDTH] IN BLOOD BY AUTOMATED COUNT: 14.8 % (ref 12.3–15.4)
ERYTHROCYTE [SEDIMENTATION RATE] IN BLOOD BY WESTERGREN METHOD: 18 MM/HR (ref 0–40)
GAMMA INTERFERON BACKGROUND BLD IA-ACNC: 0.02 IU/ML
GLOBULIN SER-MCNC: 3 G/DL (ref 1.5–4.5)
GLUCOSE SERPL-MCNC: 114 MG/DL (ref 65–99)
HAV IGM SERPL QL IA: NEGATIVE
HBV CORE IGM SERPL QL IA: NEGATIVE
HBV SURFACE AG SERPL QL IA: NEGATIVE
HCT VFR BLD AUTO: 39.3 % (ref 34–46.6)
HCV AB S/CO SERPL IA: <0.1 S/CO RATIO (ref 0–0.9)
HGB BLD-MCNC: 12.8 G/DL (ref 11.1–15.9)
IMM GRANULOCYTES # BLD: 0 X10E3/UL (ref 0–0.1)
IMM GRANULOCYTES NFR BLD: 0 %
LABCORP COMMENT: NORMAL
LYMPHOCYTES # BLD AUTO: 1.5 X10E3/UL (ref 0.7–3.1)
LYMPHOCYTES NFR BLD AUTO: 19 %
M TB IFN-G CD4+ T-CELLS BLD-ACNC: 0.01 IU/ML
M TB IFN-G CD4+ T-CELLS BLD-ACNC: 0.01 IU/ML
MCH RBC QN AUTO: 30.5 PG (ref 26.6–33)
MCHC RBC AUTO-ENTMCNC: 32.6 G/DL (ref 31.5–35.7)
MCV RBC AUTO: 94 FL (ref 79–97)
MITOGEN IGNF BLD-ACNC: >10 IU/ML
MONOCYTES # BLD AUTO: 0.7 X10E3/UL (ref 0.1–0.9)
MONOCYTES NFR BLD AUTO: 9 %
NEUTROPHILS # BLD AUTO: 5.3 X10E3/UL (ref 1.4–7)
NEUTROPHILS NFR BLD AUTO: 69 %
PLATELET # BLD AUTO: 290 X10E3/UL (ref 150–379)
POTASSIUM SERPL-SCNC: 5 MMOL/L (ref 3.5–5.2)
PROT SERPL-MCNC: 7.3 G/DL (ref 6–8.5)
QUANTIFERON INCUBATION COMMENT: NORMAL
QUANTIFERON-TB GOLD PLUS: NEGATIVE
RBC # BLD AUTO: 4.19 X10E6/UL (ref 3.77–5.28)
RHEUMATOID FACT SERPL-ACNC: <10 IU/ML (ref 0–13.9)
SERVICE CMNT-IMP: NORMAL
SL AMB EGFR AFRICAN AMERICAN: 67 ML/MIN/1.73
SL AMB EGFR NON AFRICAN AMERICAN: 58 ML/MIN/1.73
SODIUM SERPL-SCNC: 142 MMOL/L (ref 134–144)
URATE SERPL-MCNC: 6.1 MG/DL (ref 2.5–7.1)
WBC # BLD AUTO: 7.7 X10E3/UL (ref 3.4–10.8)

## 2019-05-20 ENCOUNTER — OFFICE VISIT (OUTPATIENT)
Dept: CARDIOLOGY CLINIC | Facility: CLINIC | Age: 69
End: 2019-05-20
Payer: COMMERCIAL

## 2019-05-20 VITALS
BODY MASS INDEX: 39.37 KG/M2 | HEIGHT: 66 IN | DIASTOLIC BLOOD PRESSURE: 70 MMHG | HEART RATE: 75 BPM | WEIGHT: 245 LBS | RESPIRATION RATE: 18 BRPM | SYSTOLIC BLOOD PRESSURE: 115 MMHG

## 2019-05-20 DIAGNOSIS — F41.9 ANXIETY: ICD-10-CM

## 2019-05-20 DIAGNOSIS — I25.10 CORONARY ARTERY DISEASE INVOLVING NATIVE CORONARY ARTERY OF NATIVE HEART WITHOUT ANGINA PECTORIS: Primary | ICD-10-CM

## 2019-05-20 DIAGNOSIS — E78.5 DYSLIPIDEMIA: ICD-10-CM

## 2019-05-20 DIAGNOSIS — I10 ESSENTIAL HYPERTENSION: ICD-10-CM

## 2019-05-20 PROCEDURE — 99214 OFFICE O/P EST MOD 30 MIN: CPT | Performed by: INTERNAL MEDICINE

## 2019-05-20 RX ORDER — POTASSIUM CHLORIDE 20 MEQ/1
20 TABLET, EXTENDED RELEASE ORAL 2 TIMES DAILY
Qty: 30 TABLET | Refills: 6 | Status: SHIPPED | OUTPATIENT
Start: 2019-05-20 | End: 2019-06-24 | Stop reason: SDUPTHER

## 2019-05-20 RX ORDER — FUROSEMIDE 40 MG/1
40 TABLET ORAL 2 TIMES DAILY
Qty: 30 TABLET | Refills: 6 | Status: SHIPPED | OUTPATIENT
Start: 2019-05-20 | End: 2019-06-24 | Stop reason: SDUPTHER

## 2019-05-20 RX ORDER — ATORVASTATIN CALCIUM 10 MG/1
10 TABLET, FILM COATED ORAL DAILY
Qty: 90 TABLET | Refills: 3 | Status: SHIPPED | OUTPATIENT
Start: 2019-05-20 | End: 2020-04-14

## 2019-05-20 RX ORDER — CITALOPRAM 20 MG/1
20 TABLET ORAL DAILY
Qty: 90 TABLET | Refills: 3 | Status: SHIPPED | OUTPATIENT
Start: 2019-05-20 | End: 2020-01-23 | Stop reason: SDUPTHER

## 2019-06-24 DIAGNOSIS — I25.10 CORONARY ARTERY DISEASE INVOLVING NATIVE CORONARY ARTERY OF NATIVE HEART WITHOUT ANGINA PECTORIS: ICD-10-CM

## 2019-06-24 RX ORDER — POTASSIUM CHLORIDE 20 MEQ/1
20 TABLET, EXTENDED RELEASE ORAL 2 TIMES DAILY
Qty: 180 TABLET | Refills: 3 | Status: SHIPPED | OUTPATIENT
Start: 2019-06-24 | End: 2021-01-29 | Stop reason: ALTCHOICE

## 2019-06-24 RX ORDER — FUROSEMIDE 40 MG/1
40 TABLET ORAL 2 TIMES DAILY
Qty: 180 TABLET | Refills: 3 | Status: SHIPPED | OUTPATIENT
Start: 2019-06-24 | End: 2020-07-24

## 2020-01-23 DIAGNOSIS — F41.9 ANXIETY: ICD-10-CM

## 2020-01-23 RX ORDER — CITALOPRAM 20 MG/1
20 TABLET ORAL DAILY
Qty: 90 TABLET | Refills: 3 | Status: SHIPPED | OUTPATIENT
Start: 2020-01-23 | End: 2020-11-20

## 2020-04-14 DIAGNOSIS — E78.5 DYSLIPIDEMIA: ICD-10-CM

## 2020-04-14 DIAGNOSIS — I25.10 CORONARY ARTERY DISEASE INVOLVING NATIVE CORONARY ARTERY OF NATIVE HEART WITHOUT ANGINA PECTORIS: ICD-10-CM

## 2020-04-14 RX ORDER — ATORVASTATIN CALCIUM 10 MG/1
10 TABLET, FILM COATED ORAL DAILY
Qty: 90 TABLET | Refills: 3 | Status: SHIPPED | OUTPATIENT
Start: 2020-04-14 | End: 2021-01-26

## 2020-07-23 DIAGNOSIS — I25.10 CORONARY ARTERY DISEASE INVOLVING NATIVE CORONARY ARTERY OF NATIVE HEART WITHOUT ANGINA PECTORIS: ICD-10-CM

## 2020-07-24 RX ORDER — FUROSEMIDE 40 MG/1
TABLET ORAL
Qty: 180 TABLET | Refills: 2 | Status: SHIPPED | OUTPATIENT
Start: 2020-07-24 | End: 2021-01-29 | Stop reason: ALTCHOICE

## 2020-11-18 ENCOUNTER — TELEPHONE (OUTPATIENT)
Dept: FAMILY MEDICINE CLINIC | Facility: CLINIC | Age: 70
End: 2020-11-18

## 2020-11-20 ENCOUNTER — OFFICE VISIT (OUTPATIENT)
Dept: FAMILY MEDICINE CLINIC | Facility: CLINIC | Age: 70
End: 2020-11-20
Payer: COMMERCIAL

## 2020-11-20 VITALS
TEMPERATURE: 97.2 F | RESPIRATION RATE: 16 BRPM | SYSTOLIC BLOOD PRESSURE: 110 MMHG | WEIGHT: 219 LBS | HEART RATE: 80 BPM | HEIGHT: 66 IN | BODY MASS INDEX: 35.2 KG/M2 | OXYGEN SATURATION: 98 % | DIASTOLIC BLOOD PRESSURE: 70 MMHG

## 2020-11-20 DIAGNOSIS — I25.10 CORONARY ARTERY DISEASE INVOLVING NATIVE CORONARY ARTERY OF NATIVE HEART WITHOUT ANGINA PECTORIS: ICD-10-CM

## 2020-11-20 DIAGNOSIS — E55.9 VITAMIN D DEFICIENCY: ICD-10-CM

## 2020-11-20 DIAGNOSIS — F32.A CHRONIC DEPRESSION: Primary | ICD-10-CM

## 2020-11-20 DIAGNOSIS — R53.83 OTHER FATIGUE: ICD-10-CM

## 2020-11-20 DIAGNOSIS — Z23 IMMUNIZATION DUE: ICD-10-CM

## 2020-11-20 DIAGNOSIS — Z78.0 POSTMENOPAUSAL: ICD-10-CM

## 2020-11-20 DIAGNOSIS — I10 ESSENTIAL HYPERTENSION: ICD-10-CM

## 2020-11-20 DIAGNOSIS — Z12.31 ENCOUNTER FOR SCREENING MAMMOGRAM FOR MALIGNANT NEOPLASM OF BREAST: ICD-10-CM

## 2020-11-20 DIAGNOSIS — E78.5 DYSLIPIDEMIA: ICD-10-CM

## 2020-11-20 PROCEDURE — 90732 PPSV23 VACC 2 YRS+ SUBQ/IM: CPT | Performed by: FAMILY MEDICINE

## 2020-11-20 PROCEDURE — 4040F PNEUMOC VAC/ADMIN/RCVD: CPT | Performed by: FAMILY MEDICINE

## 2020-11-20 PROCEDURE — 90662 IIV NO PRSV INCREASED AG IM: CPT | Performed by: FAMILY MEDICINE

## 2020-11-20 PROCEDURE — 3078F DIAST BP <80 MM HG: CPT | Performed by: FAMILY MEDICINE

## 2020-11-20 PROCEDURE — 3725F SCREEN DEPRESSION PERFORMED: CPT | Performed by: FAMILY MEDICINE

## 2020-11-20 PROCEDURE — 1036F TOBACCO NON-USER: CPT | Performed by: FAMILY MEDICINE

## 2020-11-20 PROCEDURE — 3008F BODY MASS INDEX DOCD: CPT | Performed by: FAMILY MEDICINE

## 2020-11-20 PROCEDURE — 90472 IMMUNIZATION ADMIN EACH ADD: CPT | Performed by: FAMILY MEDICINE

## 2020-11-20 PROCEDURE — 90471 IMMUNIZATION ADMIN: CPT | Performed by: FAMILY MEDICINE

## 2020-11-20 PROCEDURE — 3074F SYST BP LT 130 MM HG: CPT | Performed by: FAMILY MEDICINE

## 2020-11-20 PROCEDURE — 99214 OFFICE O/P EST MOD 30 MIN: CPT | Performed by: FAMILY MEDICINE

## 2020-11-20 PROCEDURE — 1160F RVW MEDS BY RX/DR IN RCRD: CPT | Performed by: FAMILY MEDICINE

## 2020-11-20 RX ORDER — TRAMADOL HYDROCHLORIDE 200 MG/1
TABLET, EXTENDED RELEASE ORAL
COMMUNITY
Start: 2020-09-04

## 2020-11-20 RX ORDER — CITALOPRAM 40 MG/1
40 TABLET ORAL DAILY
Qty: 90 TABLET | Refills: 3 | Status: SHIPPED | OUTPATIENT
Start: 2020-11-20 | End: 2021-01-29 | Stop reason: CLARIF

## 2020-11-20 RX ORDER — PREDNISONE 1 MG/1
TABLET ORAL
COMMUNITY
Start: 2020-09-27

## 2020-11-20 RX ORDER — CITALOPRAM 40 MG/1
40 TABLET ORAL DAILY
Qty: 90 TABLET | Refills: 1 | Status: SHIPPED | OUTPATIENT
Start: 2020-11-20 | End: 2020-11-20 | Stop reason: SDUPTHER

## 2020-11-20 RX ORDER — METOPROLOL SUCCINATE 50 MG/1
50 TABLET, EXTENDED RELEASE ORAL DAILY
COMMUNITY
End: 2021-02-08 | Stop reason: SDUPTHER

## 2020-12-22 ENCOUNTER — TELEPHONE (OUTPATIENT)
Dept: FAMILY MEDICINE CLINIC | Facility: CLINIC | Age: 70
End: 2020-12-22

## 2021-01-09 ENCOUNTER — IMMUNIZATIONS (OUTPATIENT)
Dept: FAMILY MEDICINE CLINIC | Facility: HOSPITAL | Age: 71
End: 2021-01-09

## 2021-01-09 DIAGNOSIS — Z23 ENCOUNTER FOR IMMUNIZATION: ICD-10-CM

## 2021-01-09 PROCEDURE — 0001A SARS-COV-2 / COVID-19 MRNA VACCINE (PFIZER-BIONTECH) 30 MCG: CPT

## 2021-01-09 PROCEDURE — 91300 SARS-COV-2 / COVID-19 MRNA VACCINE (PFIZER-BIONTECH) 30 MCG: CPT

## 2021-01-21 DIAGNOSIS — F32.A CHRONIC DEPRESSION: Primary | ICD-10-CM

## 2021-01-21 RX ORDER — CITALOPRAM 20 MG/1
20 TABLET ORAL DAILY
Qty: 30 TABLET | Refills: 6 | Status: SHIPPED | OUTPATIENT
Start: 2021-01-21 | End: 2021-02-25 | Stop reason: SDUPTHER

## 2021-01-25 DIAGNOSIS — I25.10 CORONARY ARTERY DISEASE INVOLVING NATIVE CORONARY ARTERY OF NATIVE HEART WITHOUT ANGINA PECTORIS: ICD-10-CM

## 2021-01-25 DIAGNOSIS — E78.5 DYSLIPIDEMIA: ICD-10-CM

## 2021-01-26 RX ORDER — ATORVASTATIN CALCIUM 10 MG/1
TABLET, FILM COATED ORAL
Qty: 90 TABLET | Refills: 3 | Status: SHIPPED | OUTPATIENT
Start: 2021-01-26 | End: 2021-02-08 | Stop reason: SDUPTHER

## 2021-01-29 ENCOUNTER — TELEMEDICINE (OUTPATIENT)
Dept: FAMILY MEDICINE CLINIC | Facility: CLINIC | Age: 71
End: 2021-01-29
Payer: COMMERCIAL

## 2021-01-29 VITALS — HEIGHT: 66 IN | BODY MASS INDEX: 35.2 KG/M2 | WEIGHT: 219 LBS

## 2021-01-29 DIAGNOSIS — F32.A CHRONIC DEPRESSION: ICD-10-CM

## 2021-01-29 DIAGNOSIS — R41.3 MEMORY IMPAIRMENT: ICD-10-CM

## 2021-01-29 DIAGNOSIS — R41.0 CONFUSION: Primary | ICD-10-CM

## 2021-01-29 DIAGNOSIS — R41.840 POOR CONCENTRATION: ICD-10-CM

## 2021-01-29 PROCEDURE — 99214 OFFICE O/P EST MOD 30 MIN: CPT | Performed by: FAMILY MEDICINE

## 2021-01-29 PROCEDURE — 1036F TOBACCO NON-USER: CPT | Performed by: FAMILY MEDICINE

## 2021-01-29 PROCEDURE — 1101F PT FALLS ASSESS-DOCD LE1/YR: CPT | Performed by: FAMILY MEDICINE

## 2021-01-29 PROCEDURE — 3008F BODY MASS INDEX DOCD: CPT | Performed by: FAMILY MEDICINE

## 2021-01-29 PROCEDURE — 3288F FALL RISK ASSESSMENT DOCD: CPT | Performed by: FAMILY MEDICINE

## 2021-01-29 PROCEDURE — 1160F RVW MEDS BY RX/DR IN RCRD: CPT | Performed by: FAMILY MEDICINE

## 2021-01-29 PROCEDURE — 3725F SCREEN DEPRESSION PERFORMED: CPT | Performed by: FAMILY MEDICINE

## 2021-01-29 RX ORDER — BUPROPION HYDROCHLORIDE 150 MG/1
150 TABLET ORAL EVERY MORNING
Qty: 30 TABLET | Refills: 5 | Status: SHIPPED | OUTPATIENT
Start: 2021-01-29 | End: 2022-07-28

## 2021-01-29 RX ORDER — TOFACITINIB 11 MG/1
TABLET, FILM COATED, EXTENDED RELEASE ORAL
COMMUNITY
Start: 2020-12-29 | End: 2022-07-28

## 2021-01-29 NOTE — ASSESSMENT & PLAN NOTE
Will and wellbutrin and get psych consult follow up 3 weeks start vit d 1-2000 units daily start flax seeds  for omega

## 2021-01-29 NOTE — PROGRESS NOTES
Virtual Regular Visit      Assessment/Plan:    Problem List Items Addressed This Visit        Other    Chronic depression     Will and wellbutrin and get psych consult follow up 3 weeks start vit d 1-2000 units daily start flax seeds  for omega         Relevant Medications    buPROPion (WELLBUTRIN XL) 150 mg 24 hr tablet    Other Relevant Orders    Ambulatory referral to Psychiatry    Poor concentration     Pt concerned about dementia minimnental status is nl may be related to depression         Memory impairment     Check labs minimental status done virtually is nl check labs  To neuro for eval         Relevant Orders    Ambulatory referral to Neurology      Other Visit Diagnoses     Confusion    -  Primary    Relevant Medications    buPROPion (WELLBUTRIN XL) 150 mg 24 hr tablet    Other Relevant Orders    Vitamin B12    Folate               Reason for visit is poor concentration  depression and memory problems  Chief Complaint   Patient presents with    Focus and memory issues    Virtual Regular Visit        Encounter provider Evelin Goddard MD    Provider located at [address] [place] [id number]  [phone]      Recent Visits  No visits were found meeting these conditions  Showing recent visits within past 7 days and meeting all other requirements     Today's Visits  Date Type Provider Dept   01/29/21 Telemedicine Evelin Goddard MD  [facility] today's visits and meeting all other requirements     Future Appointments  No visits were found meeting these conditions  Showing future appointments within next 150 days and meeting all other requirements        The patient was identified by name and date of birth  Christelle Prom was informed that this is a telemedicine visit and that the visit is being conducted through [facility] and patient was informed that this is a secure, HIPAA-compliant platform   She agrees to proceed     My office door was closed  No one else was in the room  She acknowledged consent and understanding of privacy and security of the video platform  The patient has agreed to participate and understands they can discontinue the visit at any time  Patient is aware this is a billable service  Subjective pt concerned about inability to focus or concentrate and having problems with memory for 1 yr now worsening  Pt cant remember deadlines appts conversations from one minute to the next  No dementia in the family  Pt feels her depression is "terribel" did nt tolerate  celexa at the 40 mg dose  So back on 20mg   Family says she cant sit still and cant finish  projects  It is affecting her at work pt thinks she has ADD   states she has always been there but worsened sinc eopen heart surgery      HPI     Past Medical History:   Diagnosis Date    CAD (coronary artery disease)     Dyslipidemia     Hyperlipidemia     Hypertension     SOB (shortness of breath)        Past Surgical History:   Procedure Laterality Date    BARIATRIC SURGERY  2014    BILE DUCT EXPLORATION      replacement per [address] Bilateral 2002    CORONARY ARTERY BYPASS GRAFT  2017    FOOT SURGERY Right     bone surgery to straighten toe      HIP SURGERY Left 2015    REPLACEMENT TOTAL KNEE Right 04/2017    TOTAL HIP ARTHROPLASTY Right 2017       Current Outpatient Medications   Medication Sig Dispense Refill    aspirin (ECOTRIN LOW STRENGTH) 81 mg EC tablet Take 1 tablet (81 mg total) by mouth daily 30 tablet 5    atorvastatin (LIPITOR) 10 mg tablet TAKE 1 TABLET BY MOUTH  DAILY 90 tablet 3    citalopram (CeleXA) 20 mg tablet Take 1 tablet (20 mg total) by mouth daily 30 tablet 6    metoprolol succinate (TOPROL-XL) 50 mg 24 hr tablet Take 50 mg by mouth daily      predniSONE 1 mg tablet Taking 3 mg daily      traMADol (ULTRAM-ER) 200 MG 24 hr tablet TK 1 T PO D PRN P      buPROPion [facility] XL) 150 mg 24 hr tablet Take 1 tablet (150 mg total) by mouth every morning 30 tablet 5    Xeljanz XR 11 MG TB24        No current facility-administered medications for this visit  Allergies   Allergen Reactions    Iodine Anaphylaxis     Reaction Date: [id number];     Shellfish-Derived Products Anaphylaxis       Review of Systems   Psychiatric/Behavioral: Positive for decreased concentration, dysphoric mood and sleep disturbance  The patient is nervous/anxious  Video Exam    Vitals:    01/29/21 1439   Weight: 99 3 kg (219 lb)   Height: 5' 6" (1 676 m)       Physical Exam  Constitutional:       General: She is not in acute distress  Appearance: Normal appearance  She is well-developed  She is not ill-appearing  Neck:      Musculoskeletal: Normal range of motion  Thyroid: No thyromegaly  Cardiovascular:      Rate and Rhythm: Normal rate  Pulmonary:      Effort: Pulmonary effort is normal  No respiratory distress  Breath sounds: Normal breath sounds  Neurological:      General: No focal deficit present  Mental Status: She is alert and oriented to person, place, and time  Mental status is at baseline  Psychiatric:         Behavior: Behavior normal          Thought Content: Thought content normal           I spent 30 minutes directly with the patient during this visit  minimental status done scored [facility] acknowledges that she has consented to an online visit or consultation  She understands that the online visit is based solely on information provided by her, and that, in the absence of a face-to-face physical evaluation by the physician, the diagnosis she receives is both limited and provisional in terms of accuracy and completeness  This is not intended to replace a full medical face-to-face evaluation by the physician  [facility] understands and accepts these terms

## 2021-02-03 ENCOUNTER — TELEPHONE (OUTPATIENT)
Dept: PSYCHIATRY | Facility: CLINIC | Age: 71
End: 2021-02-03

## 2021-02-03 ENCOUNTER — IMMUNIZATIONS (OUTPATIENT)
Dept: FAMILY MEDICINE CLINIC | Facility: HOSPITAL | Age: 71
End: 2021-02-03

## 2021-02-03 DIAGNOSIS — Z23 ENCOUNTER FOR IMMUNIZATION: Primary | ICD-10-CM

## 2021-02-03 PROCEDURE — 0002A SARS-COV-2 / COVID-19 MRNA VACCINE (PFIZER-BIONTECH) 30 MCG: CPT

## 2021-02-03 PROCEDURE — 91300 SARS-COV-2 / COVID-19 MRNA VACCINE (PFIZER-BIONTECH) 30 MCG: CPT

## 2021-02-03 NOTE — TELEPHONE ENCOUNTER
Spoke with patient about therapy options, [facility] [place]  Patient was advised to ask PCP if they have any therapists that work out of their office, or to contact insurance to see who else may be covered in her area  Patient was advised to call Psych if she needs to schedule for med management, as we have availability in the [place] office  Patient refused care at this time and will contact office to schedule if needed

## 2021-02-08 ENCOUNTER — OFFICE VISIT (OUTPATIENT)
Dept: CARDIOLOGY CLINIC | Facility: CLINIC | Age: 71
End: 2021-02-08
Payer: COMMERCIAL

## 2021-02-08 VITALS
SYSTOLIC BLOOD PRESSURE: 140 MMHG | WEIGHT: 222 LBS | HEART RATE: 82 BPM | HEIGHT: 66 IN | DIASTOLIC BLOOD PRESSURE: 90 MMHG | BODY MASS INDEX: 35.68 KG/M2

## 2021-02-08 DIAGNOSIS — E78.5 DYSLIPIDEMIA: ICD-10-CM

## 2021-02-08 DIAGNOSIS — Z95.1 S/P CABG X 3: ICD-10-CM

## 2021-02-08 DIAGNOSIS — R06.00 DYSPNEA ON EXERTION: ICD-10-CM

## 2021-02-08 DIAGNOSIS — I25.10 CORONARY ARTERY DISEASE INVOLVING NATIVE CORONARY ARTERY OF NATIVE HEART WITHOUT ANGINA PECTORIS: Primary | ICD-10-CM

## 2021-02-08 DIAGNOSIS — I10 ESSENTIAL HYPERTENSION: ICD-10-CM

## 2021-02-08 PROCEDURE — 1036F TOBACCO NON-USER: CPT | Performed by: INTERNAL MEDICINE

## 2021-02-08 PROCEDURE — 99214 OFFICE O/P EST MOD 30 MIN: CPT | Performed by: INTERNAL MEDICINE

## 2021-02-08 PROCEDURE — 1160F RVW MEDS BY RX/DR IN RCRD: CPT | Performed by: INTERNAL MEDICINE

## 2021-02-08 PROCEDURE — 3080F DIAST BP >= 90 MM HG: CPT | Performed by: INTERNAL MEDICINE

## 2021-02-08 PROCEDURE — 3077F SYST BP >= 140 MM HG: CPT | Performed by: INTERNAL MEDICINE

## 2021-02-08 PROCEDURE — 3008F BODY MASS INDEX DOCD: CPT | Performed by: INTERNAL MEDICINE

## 2021-02-08 RX ORDER — METOPROLOL SUCCINATE 50 MG/1
50 TABLET, EXTENDED RELEASE ORAL DAILY
Qty: 90 TABLET | Refills: 3 | Status: SHIPPED | OUTPATIENT
Start: 2021-02-08 | End: 2022-04-08

## 2021-02-08 RX ORDER — ATORVASTATIN CALCIUM 10 MG/1
10 TABLET, FILM COATED ORAL DAILY
Qty: 90 TABLET | Refills: 3 | Status: SHIPPED | OUTPATIENT
Start: 2021-02-08 | End: 2022-02-26 | Stop reason: SDUPTHER

## 2021-02-08 NOTE — ASSESSMENT & PLAN NOTE
Coronary artery disease, stable status post coronary bypass surgery  The patient no symptoms angina  There are no signs of heart failure  She does however experience some dyspnea exertion  We will continue present medications

## 2021-02-08 NOTE — PROGRESS NOTES
Assessment/Plan:    Coronary artery disease involving native coronary artery of native heart without angina pectoris    Coronary artery disease, stable status post coronary bypass surgery  The patient no symptoms angina  There are no signs of heart failure  She does however experience some dyspnea exertion  We will continue present medications  Essential hypertension    Hypertension, stable and adequately controlled  Dyspnea on exertion    Hyperlipidemia, stable  The patient will continue atorvastatin at 10 mg daily  Diagnoses and all orders for this visit:    Coronary artery disease involving native coronary artery of native heart without angina pectoris  -     atorvastatin (LIPITOR) 10 mg tablet; Take 1 tablet (10 mg total) by mouth daily  -     metoprolol succinate (TOPROL-XL) 50 mg 24 hr tablet; Take 1 tablet (50 mg total) by mouth daily    Essential hypertension  -     metoprolol succinate (TOPROL-XL) 50 mg 24 hr tablet; Take 1 tablet (50 mg total) by mouth daily    S/P CABG x 3    Dyspnea on exertion    Dyslipidemia  -     atorvastatin (LIPITOR) 10 mg tablet; Take 1 tablet (10 mg total) by mouth daily          Subjective:  Some dyspnea on exertion  Patient ID: Jocelyn Swartz is a 78 y o  female  The patient presented to this office for the purpose of cardiac follow-up  She has a known history of coronary artery disease status post coronary bypass surgery 3 in 2017  The patient has some dyspnea on exertion but denies any symptoms of chest pain  She has no significant leg edema  He denies any symptoms of palpitation, dizziness or syncope  The following portions of the patient's history were reviewed and updated as appropriate: allergies, current medications, past family history, past medical history, past social history, past surgical history and problem list     Review of Systems   Respiratory: Positive for shortness of breath (  Mild)   Negative for apnea, cough, chest tightness and wheezing  Cardiovascular: Negative for chest pain, palpitations and leg swelling  Gastrointestinal: Negative for abdominal pain  Neurological: Negative for dizziness and light-headedness  Psychiatric/Behavioral: Negative  Objective:  Stable cardiac-wise  /90 (BP Location: Left arm, Patient Position: Sitting, Cuff Size: Large)   Pulse 82   Ht 5' 6" (1 676 m)   Wt 101 kg (222 lb)   BMI 35 83 kg/m²          Physical Exam  Vitals signs reviewed  Constitutional:       General: She is not in acute distress  Appearance: She is well-developed  She is not diaphoretic  HENT:      Head: Normocephalic and atraumatic  Neck:      Musculoskeletal: Normal range of motion and neck supple  Thyroid: No thyromegaly  Vascular: No JVD  Cardiovascular:      Rate and Rhythm: Normal rate and regular rhythm  Heart sounds: S1 normal and S2 normal  No murmur  No friction rub  No gallop  Pulmonary:      Effort: Pulmonary effort is normal  No respiratory distress  Breath sounds: No wheezing or rales  Chest:      Chest wall: No tenderness  Abdominal:      Palpations: Abdomen is soft  Neurological:      Mental Status: She is oriented to person, place, and time     Psychiatric:         Mood and Affect: Mood normal          Behavior: Behavior normal

## 2021-02-08 NOTE — LETTER
February 8, 2021     Referral [address] [place] [id number]    Patient: Hari Irwin   YOB: 1950   Date of Visit: 2/8/2021       Dear Dr Banegas Field:    Thank you for referring Hari Irwin to me for evaluation  Below are my notes for this consultation  If you have questions, please do not hesitate to call me  I look forward to following your patient along with you  Sincerely,        Katherene Duverney, MD        CC: Mylo Felts, MD Katherene Duverney, MD  2/8/2021  3:16 PM  Sign when Signing Visit  Assessment/Plan:    Coronary artery disease involving native coronary artery of native heart without angina pectoris    Coronary artery disease, stable status post coronary bypass surgery  The patient no symptoms angina  There are no signs of heart failure  She does however experience some dyspnea exertion  We will continue present medications  Essential hypertension    Hypertension, stable and adequately controlled  Dyspnea on exertion    Hyperlipidemia, stable  The patient will continue atorvastatin at 10 mg daily  Diagnoses and all orders for this visit:    Coronary artery disease involving native coronary artery of native heart without angina pectoris  -     atorvastatin (LIPITOR) 10 mg tablet; Take 1 tablet (10 mg total) by mouth daily  -     metoprolol succinate (TOPROL-XL) 50 mg 24 hr tablet; Take 1 tablet (50 mg total) by mouth daily    Essential hypertension  -     metoprolol succinate (TOPROL-XL) 50 mg 24 hr tablet; Take 1 tablet (50 mg total) by mouth daily    S/P CABG x 3    Dyspnea on exertion    Dyslipidemia  -     atorvastatin (LIPITOR) 10 mg tablet; Take 1 tablet (10 mg total) by mouth daily          Subjective:  Some dyspnea on exertion  Patient ID: Hari Irwin is a 78 y o  female  The patient presented to this office for the purpose of cardiac follow-up  She has a known history of coronary artery disease status post coronary bypass surgery 3 in 2017    The patient has some dyspnea on exertion but denies any symptoms of chest pain  She has no significant leg edema  He denies any symptoms of palpitation, dizziness or syncope  The following portions of the patient's history were reviewed and updated as appropriate: allergies, current medications, past family history, past medical history, past social history, past surgical history and problem list     Review of Systems   Respiratory: Positive for shortness of breath (  Mild)  Negative for apnea, cough, chest tightness and wheezing  Cardiovascular: Negative for chest pain, palpitations and leg swelling  Gastrointestinal: Negative for abdominal pain  Neurological: Negative for dizziness and light-headedness  Psychiatric/Behavioral: Negative  Objective:  Stable cardiac-wise  /90 (BP Location: Left arm, Patient Position: Sitting, Cuff Size: Large)   Pulse 82   Ht 5' 6" (1 676 m)   Wt 101 kg (222 lb)   BMI 35 83 kg/m²          Physical Exam  Vitals signs reviewed  Constitutional:       General: She is not in acute distress  Appearance: She is well-developed  She is not diaphoretic  HENT:      Head: Normocephalic and atraumatic  Neck:      Musculoskeletal: Normal range of motion and neck supple  Thyroid: No thyromegaly  Vascular: No JVD  Cardiovascular:      Rate and Rhythm: Normal rate and regular rhythm  Heart sounds: S1 normal and S2 normal  No murmur  No friction rub  No gallop  Pulmonary:      Effort: Pulmonary effort is normal  No respiratory distress  Breath sounds: No wheezing or rales  Chest:      Chest wall: No tenderness  Abdominal:      Palpations: Abdomen is soft  Neurological:      Mental Status: She is oriented to person, place, and time     Psychiatric:         Mood and Affect: Mood normal          Behavior: Behavior normal

## 2021-02-25 DIAGNOSIS — F32.A CHRONIC DEPRESSION: ICD-10-CM

## 2021-02-25 DIAGNOSIS — E55.9 VITAMIN D DEFICIENCY: Primary | ICD-10-CM

## 2021-02-25 RX ORDER — ERGOCALCIFEROL 1.25 MG/1
50000 CAPSULE ORAL WEEKLY
Qty: 12 CAPSULE | Refills: 0 | Status: SHIPPED | OUTPATIENT
Start: 2021-02-25 | End: 2022-03-25 | Stop reason: SDUPTHER

## 2021-02-25 RX ORDER — CITALOPRAM 20 MG/1
20 TABLET ORAL DAILY
Qty: 90 TABLET | Refills: 2 | Status: SHIPPED | OUTPATIENT
Start: 2021-02-25 | End: 2021-10-18

## 2021-03-06 LAB
25(OH)D3+25(OH)D2 SERPL-MCNC: 21.3 NG/ML (ref 30–100)
ALBUMIN SERPL-MCNC: 4.1 G/DL (ref 3.8–4.8)
ALBUMIN/GLOB SERPL: 1.3 {RATIO} (ref 1.2–2.2)
ALP SERPL-CCNC: 100 IU/L (ref 39–117)
ALT SERPL-CCNC: 14 IU/L (ref 0–32)
AST SERPL-CCNC: 16 IU/L (ref 0–40)
BASOPHILS # BLD AUTO: 0 X10E3/UL (ref 0–0.2)
BASOPHILS NFR BLD AUTO: 0 %
BILIRUB SERPL-MCNC: 0.4 MG/DL (ref 0–1.2)
BUN SERPL-MCNC: 16 MG/DL (ref 8–27)
BUN/CREAT SERPL: 20 (ref 12–28)
CALCIUM SERPL-MCNC: 9.2 MG/DL (ref 8.7–10.3)
CHLORIDE SERPL-SCNC: 104 MMOL/L (ref 96–106)
CHOLEST SERPL-MCNC: 190 MG/DL (ref 100–199)
CO2 SERPL-SCNC: 24 MMOL/L (ref 20–29)
CREAT SERPL-MCNC: 0.82 MG/DL (ref 0.57–1)
EOSINOPHIL # BLD AUTO: 0.2 X10E3/UL (ref 0–0.4)
EOSINOPHIL NFR BLD AUTO: 4 %
ERYTHROCYTE [DISTWIDTH] IN BLOOD BY AUTOMATED COUNT: 12.9 % (ref 11.7–15.4)
FOLATE SERPL-MCNC: 14 NG/ML
GLOBULIN SER-MCNC: 3.1 G/DL (ref 1.5–4.5)
GLUCOSE SERPL-MCNC: 96 MG/DL (ref 65–99)
HCT VFR BLD AUTO: 37.7 % (ref 34–46.6)
HDLC SERPL-MCNC: 67 MG/DL
HGB BLD-MCNC: 12.7 G/DL (ref 11.1–15.9)
IMM GRANULOCYTES # BLD: 0 X10E3/UL (ref 0–0.1)
IMM GRANULOCYTES NFR BLD: 0 %
LDLC SERPL CALC-MCNC: 98 MG/DL (ref 0–99)
LYMPHOCYTES # BLD AUTO: 1.3 X10E3/UL (ref 0.7–3.1)
LYMPHOCYTES NFR BLD AUTO: 19 %
MCH RBC QN AUTO: 31.4 PG (ref 26.6–33)
MCHC RBC AUTO-ENTMCNC: 33.7 G/DL (ref 31.5–35.7)
MCV RBC AUTO: 93 FL (ref 79–97)
MONOCYTES # BLD AUTO: 0.4 X10E3/UL (ref 0.1–0.9)
MONOCYTES NFR BLD AUTO: 6 %
NEUTROPHILS # BLD AUTO: 4.8 X10E3/UL (ref 1.4–7)
NEUTROPHILS NFR BLD AUTO: 71 %
PLATELET # BLD AUTO: 253 X10E3/UL (ref 150–450)
POTASSIUM SERPL-SCNC: 4.5 MMOL/L (ref 3.5–5.2)
PROT SERPL-MCNC: 7.2 G/DL (ref 6–8.5)
RBC # BLD AUTO: 4.05 X10E6/UL (ref 3.77–5.28)
SL AMB EGFR AFRICAN AMERICAN: 85 ML/MIN/1.73
SL AMB EGFR NON AFRICAN AMERICAN: 74 ML/MIN/1.73
SL AMB VLDL CHOLESTEROL CALC: 25 MG/DL (ref 5–40)
SODIUM SERPL-SCNC: 141 MMOL/L (ref 134–144)
TRIGL SERPL-MCNC: 148 MG/DL (ref 0–149)
TSH SERPL DL<=0.005 MIU/L-ACNC: 1.62 UIU/ML (ref 0.45–4.5)
VIT B12 SERPL-MCNC: 348 PG/ML (ref 232–1245)
WBC # BLD AUTO: 6.7 X10E3/UL (ref 3.4–10.8)

## 2021-03-08 DIAGNOSIS — E55.9 VITAMIN D DEFICIENCY: Primary | ICD-10-CM

## 2021-04-26 ENCOUNTER — TELEPHONE (OUTPATIENT)
Dept: BARIATRICS | Facility: CLINIC | Age: 71
End: 2021-04-26

## 2021-05-08 DIAGNOSIS — E55.9 VITAMIN D DEFICIENCY: ICD-10-CM

## 2021-05-08 RX ORDER — ERGOCALCIFEROL 1.25 MG/1
CAPSULE ORAL
Qty: 12 CAPSULE | Refills: 0 | OUTPATIENT
Start: 2021-05-08

## 2021-05-13 PROBLEM — Z48.815 ENCOUNTER FOR SURGICAL AFTERCARE FOLLOWING SURGERY OF DIGESTIVE SYSTEM: Status: ACTIVE | Noted: 2021-05-13

## 2021-05-13 PROBLEM — K91.2 POSTSURGICAL MALABSORPTION: Status: ACTIVE | Noted: 2021-05-13

## 2021-05-24 ENCOUNTER — TELEPHONE (OUTPATIENT)
Dept: FAMILY MEDICINE CLINIC | Facility: CLINIC | Age: 71
End: 2021-05-24

## 2021-05-24 NOTE — TELEPHONE ENCOUNTER
Just FYI:  Regarding Vitamin D 50,000 units, will be done with them this Wed  IS going for repeat labs

## 2021-05-25 ENCOUNTER — TELEPHONE (OUTPATIENT)
Dept: NEUROLOGY | Facility: CLINIC | Age: 71
End: 2021-05-25

## 2021-05-25 NOTE — TELEPHONE ENCOUNTER
[id number] patient to offer an appointment based upon referral  Advised to callback to schedule

## 2021-06-09 ENCOUNTER — TELEPHONE (OUTPATIENT)
Dept: CARDIOLOGY CLINIC | Facility: CLINIC | Age: 71
End: 2021-06-09

## 2021-06-09 NOTE — TELEPHONE ENCOUNTER
When I called the patient back, she then said she has an ov scheduled with a family practice physician in [place], Dr Joaquin Dc, who will prescribe a weight loss med called phendimetrazine  Starts with that drug, then will switch to Phentermine  Xidianne Peña said she would need an approval to start those meds due to cardiac history, so won't keep the appt with Dr Josué Chavez if you think it's unsafe to take these meds  She asked me to bring these meds to your attention  Please advise

## 2021-06-09 NOTE — TELEPHONE ENCOUNTER
Juan Sylvester called, with a question about a new weight loss drug  She saw it advertised on tv  Brand name of med is Wygovy  It is a once a week injectable  She said she has really been struggling with her weight, which has caused problems with her joints, etc   She thinks she could use this extra help for weight loss  She would ask a PCP to prescribe, but would not do so unless ok with you due to cardiac history  Please advise

## 2021-06-09 NOTE — TELEPHONE ENCOUNTER
Not familiar with med  Consider referral to [facility] loss clinic at [facility]

## 2021-06-15 NOTE — TELEPHONE ENCOUNTER
I spoke with Lissett Hicks today and gave her the response from Dr Rick Fermin  She verbalized understanding

## 2021-06-23 ENCOUNTER — TELEPHONE (OUTPATIENT)
Dept: FAMILY MEDICINE CLINIC | Facility: CLINIC | Age: 71
End: 2021-06-23

## 2021-06-23 NOTE — TELEPHONE ENCOUNTER
There is a new weight loss medication she is very interested in starting called "Wegovy injection"  It was just approved early June by FDA & she's hoping for your "support & approval" to start it

## 2021-07-08 ENCOUNTER — OFFICE VISIT (OUTPATIENT)
Dept: BARIATRICS | Facility: CLINIC | Age: 71
End: 2021-07-08
Payer: COMMERCIAL

## 2021-07-08 VITALS
HEART RATE: 84 BPM | TEMPERATURE: 98.4 F | WEIGHT: 225.6 LBS | HEIGHT: 64 IN | RESPIRATION RATE: 12 BRPM | BODY MASS INDEX: 38.51 KG/M2 | SYSTOLIC BLOOD PRESSURE: 110 MMHG | DIASTOLIC BLOOD PRESSURE: 70 MMHG

## 2021-07-08 DIAGNOSIS — I10 ESSENTIAL HYPERTENSION: ICD-10-CM

## 2021-07-08 DIAGNOSIS — Z98.84 S/P LAPAROSCOPIC SLEEVE GASTRECTOMY: Primary | ICD-10-CM

## 2021-07-08 DIAGNOSIS — K91.2 POSTSURGICAL MALABSORPTION: ICD-10-CM

## 2021-07-08 DIAGNOSIS — F32.A CHRONIC DEPRESSION: ICD-10-CM

## 2021-07-08 DIAGNOSIS — E78.5 DYSLIPIDEMIA: ICD-10-CM

## 2021-07-08 PROCEDURE — 3008F BODY MASS INDEX DOCD: CPT | Performed by: SURGERY

## 2021-07-08 PROCEDURE — 99204 OFFICE O/P NEW MOD 45 MIN: CPT | Performed by: SURGERY

## 2021-07-08 PROCEDURE — 3074F SYST BP LT 130 MM HG: CPT | Performed by: SURGERY

## 2021-07-08 PROCEDURE — 1036F TOBACCO NON-USER: CPT | Performed by: SURGERY

## 2021-07-08 PROCEDURE — 3078F DIAST BP <80 MM HG: CPT | Performed by: SURGERY

## 2021-07-08 RX ORDER — FUROSEMIDE 40 MG/1
40 TABLET ORAL DAILY
COMMUNITY
End: 2021-09-01

## 2021-07-08 NOTE — LETTER
July 8, 2021     Yvette Garcia MD  [address]  [address] [facility]  [id number] [place] [address]    Patient: Marvin Murphy   YOB: 1950   Date of Visit: 7/8/2021       Dear Dr Olga Wood: Thank you for referring Marvin Murphy to me for evaluation  Below are my notes for this consultation  If you have questions, please do not hesitate to call me on my cell phone at [phone]  I look forward to following your patient along with you  Sincerely,    Jodie Rojas MD, [address], [facility]  Metabolic & Bariatric Surgery Director  [facility]   7/8/2021  10:28 AM      CC: No Recipients  Tyra Mcgee MD  7/8/2021 10:27 AM  Sign when Signing Visit      [address] [place] [address]  73 y o  female MRN: [id number]  Unit/Bed#:  Encounter: [id number]      HPI:  Marvin Murphy is a very pleasant 73 y o  female who presents s/p robotic SG with ROBBIE by Dr Pratibha Martinez at [facility] on 12/31/2012  Originally 240# with angelic of 175# and currently 225#  She had health related interventions after the sleeve gastrectomy including a CABG, bilateral hip replacements and a knee replacement  She has had difficulty with mobilization since  She also attributes some weight regain to mindless eating or grazing throughout the day  Here today to discuss weight loss options  She is a  for [employer]  Body mass index is 38 72 kg/m²  ++Suffers from HLD, depression, anxiety, HTN, RA  S/p robotic SG, CABG, b/l THR, R TKR, resection of biliary tumor with choledocho-J in 1986 at [facility]    Visit type: consultation     Symptoms:       Review of Systems   Constitutional: Negative  Respiratory: Negative  Cardiovascular: Negative  Gastrointestinal: Negative  Musculoskeletal: Negative  Neurological: Negative  All other systems reviewed and are negative        Historical Information   Past Medical History:   Diagnosis Date    CAD (coronary artery disease)     Dyslipidemia     Hyperlipidemia     Hypertension     Obesity (BMI 30-39  9)     SOB (shortness of breath)      Past Surgical History:   Procedure Laterality Date    BARIATRIC SURGERY  2014    BILE DUCT EXPLORATION      replacement per Steffanie    CARPAL TUNNEL RELEASE Bilateral 2002    CORONARY ARTERY BYPASS GRAFT  2017    FOOT SURGERY Right     bone surgery to straighten toe   HIP SURGERY Left 2015    REPLACEMENT TOTAL KNEE Right 2017    SLEEVE GASTROPLASTY      TOTAL HIP ARTHROPLASTY Right 2017     Social History   Social History     Substance and Sexual Activity   Alcohol Use Yes    Comment: social      Social History     Substance and Sexual Activity   Drug Use No     Social History     Tobacco Use   Smoking Status Former Smoker    Packs/day: 0 50    Years: 7 00    Pack years: 3 50    Types: Cigarettes    Quit date:     Years since quittin 5   Smokeless Tobacco Never Used     Family History: non-contributory    Meds/Allergies   all medications and allergies reviewed  Allergies   Allergen Reactions    Iodine - Food Allergy Anaphylaxis     Reaction Date: ;     Shellfish-Derived Products - Food Allergy Anaphylaxis       Objective       Current Vitals:   /70   Pulse 84   Temp 98 4 °F (36 9 °C)   Resp 12   Ht 5' 4" (1 626 m)   Wt 102 kg (225 lb 9 6 oz)   BMI 38 72 kg/m²       Physical Exam  Vitals reviewed  Constitutional:       Appearance: She is well-developed  HENT:      Head: Normocephalic  Eyes:      Extraocular Movements: Extraocular movements intact  Cardiovascular:      Rate and Rhythm: Normal rate  Pulmonary:      Effort: Pulmonary effort is normal    Abdominal:      General: There is no distension  Musculoskeletal:         General: Normal range of motion  Cervical back: Normal range of motion  Neurological:      Mental Status: She is alert and oriented to person, place, and time     Psychiatric:         Mood and Affect: Mood normal          Behavior: Behavior normal          Thought Content: Thought content normal          Judgment: Judgment normal          Lab Results: I have personally reviewed pertinent lab results  Imaging: I have personally reviewed pertinent reports  EKG, Pathology, and Other Studies: I have personally reviewed pertinent reports  Assessment/PLAN:    73 y o  yo female who presents s/p robotic SG with ROBBIE by Dr Rosa Elena Peter at [facility] on 12/31/2012  Originally 240# with angelic of 175# and currently 225#  She had health related interventions after the sleeve gastrectomy including a CABG, bilateral hip replacements and a knee replacement  She has had difficulty with mobilization since  She also attributes some weight regain to mindless eating or grazing throughout the day as she is constantly hungry  She is interested in the Medical weight management     -She will obtain an UGI to assess her sleeve anatomy  -Recommended increasing her hydration throughout the day and increasing her physical therapy and physical activity  - She will proceed with transfer evals with RD & LCSW    I have spent over 45 minutes with her face to face in the office today discussing her options  Over 50% of this was coordinating care  She was given the opportunity to ask questions and I have answered all of them  I have discussed and educated the patient with regards to the components of our multidisciplinary program and the importance of compliance and follow up in the post operative period  The patient was also instructed with regards to the importance of behavior modification, nutritional counseling, support meeting attendance and lifestyle changes that are important to ensure success         Jennifer Licea MD, FACS, [facility]  7/8/2021  10:20 AM

## 2021-07-08 NOTE — PROGRESS NOTES
BARIATRIC TRANSFER CONSULT - BARIATRIC SURGERY    Garcia Bernabe 73 y o  female MRN: [id number]  Unit/Bed#:  Encounter: [id number]      HPI:  Garcia Bernabe is a very pleasant 73 y o  female who presents s/p robotic SG with ROBBIE by Dr Adam Leon at [facility] on 12/31/2012  Originally 240# with angelic of 175# and currently 225#  She had health related interventions after the sleeve gastrectomy including a CABG, bilateral hip replacements and a knee replacement  She has had difficulty with mobilization since  She also attributes some weight regain to mindless eating or grazing throughout the day  Here today to discuss weight loss options  She is a  for [employer]  Body mass index is 38 72 kg/m²  ++Suffers from HLD, depression, anxiety, HTN, RA  S/p robotic SG, CABG, b/l THR, R TKR, resection of biliary tumor with choledocho-J in 1986 at [place]    Visit type: consultation     Symptoms:       Review of Systems   Constitutional: Negative  Respiratory: Negative  Cardiovascular: Negative  Gastrointestinal: Negative  Musculoskeletal: Negative  Neurological: Negative  All other systems reviewed and are negative  Historical Information   Past Medical History:   Diagnosis Date    CAD (coronary artery disease)     Dyslipidemia     Hyperlipidemia     Hypertension     Obesity (BMI 30-39  9)     SOB (shortness of breath)      Past Surgical History:   Procedure Laterality Date    BARIATRIC SURGERY  2014    BILE DUCT EXPLORATION      replacement per English    CARPAL TUNNEL RELEASE Bilateral 2002    CORONARY ARTERY BYPASS GRAFT  2017    FOOT SURGERY Right     bone surgery to straighten toe      HIP SURGERY Left 2015    REPLACEMENT TOTAL KNEE Right 04/2017    SLEEVE GASTROPLASTY      TOTAL HIP ARTHROPLASTY Right 2017     Social History   Social History     Substance and Sexual Activity   Alcohol Use Yes    Comment: social      Social History     Substance and Sexual Activity   Drug Use No     Social History     Tobacco Use   Smoking Status Former Smoker    Packs/day: 0 50    Years: 7 00    Pack years: 3 50    Types: Cigarettes    Quit date:     Years since quittin 5   Smokeless Tobacco Never Used     Family History: non-contributory    Meds/Allergies   all medications and allergies reviewed  Allergies   Allergen Reactions    Iodine - Food Allergy Anaphylaxis     Reaction Date: ;     Shellfish-Derived Products - Food Allergy Anaphylaxis       Objective       Current Vitals:   /70   Pulse 84   Temp 98 4 °F (36 9 °C)   Resp 12   Ht 5' 4" (1 626 m)   Wt 102 kg (225 lb 9 6 oz)   BMI 38 72 kg/m²       Physical Exam  Vitals reviewed  Constitutional:       Appearance: She is well-developed  HENT:      Head: Normocephalic  Eyes:      Extraocular Movements: Extraocular movements intact  Cardiovascular:      Rate and Rhythm: Normal rate  Pulmonary:      Effort: Pulmonary effort is normal    Abdominal:      General: There is no distension  Musculoskeletal:         General: Normal range of motion  Cervical back: Normal range of motion  Neurological:      Mental Status: She is alert and oriented to person, place, and time  Psychiatric:         Mood and Affect: Mood normal          Behavior: Behavior normal          Thought Content: Thought content normal          Judgment: Judgment normal          Lab Results: I have personally reviewed pertinent lab results  Imaging: I have personally reviewed pertinent reports  EKG, Pathology, and Other Studies: I have personally reviewed pertinent reports  Assessment/PLAN:    73 y o  yo female who presents s/p robotic SG with ROBBIE by Dr Shannan Flanagan at [facility] on 2012  Originally 240# with angelic of 175# and currently 225#  She had health related interventions after the sleeve gastrectomy including a CABG, bilateral hip replacements and a knee replacement   She has had difficulty with mobilization since  She also attributes some weight regain to mindless eating or grazing throughout the day as she is constantly hungry  She is interested in the Medical weight management     -She will obtain an UGI to assess her sleeve anatomy  -Recommended increasing her hydration throughout the day and increasing her physical therapy and physical activity  - She will proceed with transfer evals with RD & LCSW    I have spent over 45 minutes with her face to face in the office today discussing her options  Over 50% of this was coordinating care  She was given the opportunity to ask questions and I have answered all of them  I have discussed and educated the patient with regards to the components of our multidisciplinary program and the importance of compliance and follow up in the post operative period  The patient was also instructed with regards to the importance of behavior modification, nutritional counseling, support meeting attendance and lifestyle changes that are important to ensure success         Kenya Alberto MD, FACS, [facility]  7/8/2021  10:20 AM

## 2021-08-05 ENCOUNTER — OFFICE VISIT (OUTPATIENT)
Dept: BARIATRICS | Facility: CLINIC | Age: 71
End: 2021-08-05
Payer: COMMERCIAL

## 2021-08-05 VITALS
BODY MASS INDEX: 38.24 KG/M2 | WEIGHT: 224 LBS | SYSTOLIC BLOOD PRESSURE: 126 MMHG | DIASTOLIC BLOOD PRESSURE: 82 MMHG | HEART RATE: 88 BPM | TEMPERATURE: 96 F | HEIGHT: 64 IN

## 2021-08-05 DIAGNOSIS — K91.2 POSTSURGICAL MALABSORPTION: ICD-10-CM

## 2021-08-05 DIAGNOSIS — E66.9 OBESITY, CLASS II, BMI 35-39.9: Primary | ICD-10-CM

## 2021-08-05 DIAGNOSIS — I10 ESSENTIAL HYPERTENSION: ICD-10-CM

## 2021-08-05 DIAGNOSIS — E78.5 DYSLIPIDEMIA: ICD-10-CM

## 2021-08-05 DIAGNOSIS — F32.A CHRONIC DEPRESSION: ICD-10-CM

## 2021-08-05 DIAGNOSIS — Z91.89 AT RISK FOR SLEEP APNEA: ICD-10-CM

## 2021-08-05 DIAGNOSIS — Z98.84 STATUS POST BARIATRIC SURGERY: ICD-10-CM

## 2021-08-05 PROCEDURE — 99214 OFFICE O/P EST MOD 30 MIN: CPT | Performed by: PHYSICIAN ASSISTANT

## 2021-08-05 PROCEDURE — 3079F DIAST BP 80-89 MM HG: CPT | Performed by: PHYSICIAN ASSISTANT

## 2021-08-05 PROCEDURE — 1160F RVW MEDS BY RX/DR IN RCRD: CPT | Performed by: PHYSICIAN ASSISTANT

## 2021-08-05 PROCEDURE — 3074F SYST BP LT 130 MM HG: CPT | Performed by: PHYSICIAN ASSISTANT

## 2021-08-05 PROCEDURE — 1036F TOBACCO NON-USER: CPT | Performed by: PHYSICIAN ASSISTANT

## 2021-08-05 PROCEDURE — 3008F BODY MASS INDEX DOCD: CPT | Performed by: PHYSICIAN ASSISTANT

## 2021-08-05 NOTE — ASSESSMENT & PLAN NOTE
s/p robotic SG with ROBBIE by Dr Sandoval Jha at [facility] on 12/31/2012   Originally 240 lbs with angelic of 175 lbs

## 2021-08-05 NOTE — PROGRESS NOTES
Assessment/Plan:    Obesity, Class II, BMI 35-39 9  -Discussed options of HealthyCORE-Intensive Lifestyle Intervention Program, Very Low Calorie Diet-VLCD and Conservative Program and the role of weight loss medications   -Initial weight loss goal of 5-10% weight loss for improved health  -Screening labs  Recommend checking lab coverage before having labs drawn   -lipid, tsh, cmp reviewed from 21 all within acceptable limits  - STOP BANG-  -Patient is interested in pursuing Conservative Program  -not a phentermine candidate due to CAD  -not a saxenda/wegovy candidate due to pancreatitis hx  -used wellbutrin in past with no weight loss   -metformin vs topamax-will await labs     Goals:  Food log (ie ) www myfitnesspal com,sparkpeople  com,loseit com,calorieking  com,etc  baritastic-weigh and measure  No sugary beverages  At least 64oz of water daily  -measure creamer and only 1 diet soda per day   Increase physical activity by 10 minutes daily  Gradually increase physical activity to a goal of 5 days per week for 30 minutes of MODERATE intensity PLUS 2 days per week of FULL BODY resistance training-walk or upper body exercises 4 days a week for 15-20 minutes -resistant band hand out given   0358-9416 calories   Get labs done for metformin       Status post bariatric surgery  s/p robotic SG with ROBBIE by Dr Charla Yoder at [facility] on 2012   Originally 240 lbs with angelic of 175 lbs     Dyslipidemia  Taking lipitor  -should improve with weight loss, dietary, and lifestyle changes  -continue management with prescribing provider      Chronic depression  Taking celexa   -continue management with prescribing provider      Essential hypertension  Taking toprol  -should improve with weight loss, dietary, and lifestyle changes  -continue management with prescribing provider      Postsurgical malabsorption  Needs vitamin labs     At risk for sleep apnea  STOP BAN/8-will discuss at next appt   - Sleep medicine referral placed OR Patient declined sleep medicine referral  - will recheck in 3-4 months after weight loss has occured  -Discussed risks of untreated sleep apnea such as sudden cardiac death by arrhythmia, uncontrolled hypertension, difficulty with weight loss, decreased quality sleep, increased insulin resistance, and stroke  -Should improve with dietary and lifestyle changes            Follow up in approximately 2 months with Non-Surgical Physician/Advanced Practitioner  Diagnoses and all orders for this visit:    Obesity, Class II, BMI 35-39 9  -     HEMOGLOBIN A1C W/ EAG ESTIMATION; Future  -     Insulin, fasting; Future    Status post bariatric surgery  -     HEMOGLOBIN A1C W/ EAG ESTIMATION; Future  -     Insulin, fasting; Future    Dyslipidemia  -     HEMOGLOBIN A1C W/ EAG ESTIMATION; Future  -     Insulin, fasting; Future    Chronic depression  -     HEMOGLOBIN A1C W/ EAG ESTIMATION; Future  -     Insulin, fasting; Future    Essential hypertension  -     HEMOGLOBIN A1C W/ EAG ESTIMATION; Future  -     Insulin, fasting; Future    Postsurgical malabsorption  -     HEMOGLOBIN A1C W/ EAG ESTIMATION; Future  -     Insulin, fasting; Future    At risk for sleep apnea          Subjective:   Chief Complaint   Patient presents with    Consult     MWM Consult        Patient ID: Marii Umanzor  is a 73 y o  female with excess weight/obesity here to pursue weight managment  Patient is a post op weight regain patient  HPI  Patient started to regain in 2-19 after hip and knee replacement and CABG  Patient has not seen surgical rd/sw  Currently doing nothing for weight loss  Not doing exercise due to back pain- a few days per week does try and walk for 15-45 minutes  Not food logging  1 water bottle, 1-2 cans of diet soda, 1 cup of coffee with splenda and creamer  3-4 cups of skim milk per week  3-4 glasses of wine per month  Doesn't 30/60 rule       Colonoscopy-Completed 12/06/2012      The following portions of the patient's history were reviewed and updated as appropriate: allergies, current medications, past family history, past medical history, past social history, past surgical history and problem list     Review of Systems   HENT: Negative for sore throat  Respiratory: Negative for cough and shortness of breath  Cardiovascular: Negative for chest pain and palpitations  Gastrointestinal: Negative for abdominal pain, constipation, diarrhea, nausea and vomiting  Denies GERD   Endocrine: Positive for heat intolerance  Negative for cold intolerance  Genitourinary: Negative for dysuria  Musculoskeletal: Positive for back pain  Negative for arthralgias  Skin: Negative for rash  Neurological: Negative for headaches  Psychiatric/Behavioral: Negative for suicidal ideas (denies HI)  + Depression and anxiety-controlled        Objective:    /82 (BP Location: Left arm, Patient Position: Sitting, Cuff Size: Standard)   Pulse 88   Temp (!) 96 °F (35 6 °C) (Tympanic)   Ht 5' 4" (1 626 m)   Wt 102 kg (224 lb)   BMI 38 45 kg/m²      Physical Exam  Vitals and nursing note reviewed  Constitutional   General appearance: Abnormal   well developed and morbidly obese  Eyes No conjunctival pallor  Pulmonary   Respiratory effort: No increased work of breathing or signs of respiratory distress  Abdomen   Abdomen: Abnormal   The abdomen was obese      Musculoskeletal   Gait and station: Normal     Psychiatric   Orientation to person, place and time: Normal     Affect: appropriate

## 2021-08-05 NOTE — ASSESSMENT & PLAN NOTE
STOP BAN/8-will discuss at next appt   - Sleep medicine referral placed OR Patient declined sleep medicine referral  - will recheck in 3-4 months after weight loss has occured  -Discussed risks of untreated sleep apnea such as sudden cardiac death by arrhythmia, uncontrolled hypertension, difficulty with weight loss, decreased quality sleep, increased insulin resistance, and stroke    -Should improve with dietary and lifestyle changes

## 2021-08-05 NOTE — ASSESSMENT & PLAN NOTE
-Discussed options of HealthyCORE-Intensive Lifestyle Intervention Program, Very Low Calorie Diet-VLCD and Conservative Program and the role of weight loss medications   -Initial weight loss goal of 5-10% weight loss for improved health  -Screening labs  Recommend checking lab coverage before having labs drawn   -lipid, tsh, cmp reviewed from 2/24/21 all within acceptable limits  - STOP BANG-4/8  -Patient is interested in pursuing Conservative Program  -not a phentermine candidate due to CAD  -not a saxenda/wegovy candidate due to pancreatitis hx  -used wellbutrin in past with no weight loss   -metformin vs topamax-will await labs     Goals:  Food log (ie ) www myfitnesspal com,sparkpeople  com,loseit com,calorieking  com,etc  baritastic-weigh and measure  No sugary beverages  At least 64oz of water daily  -measure creamer and only 1 diet soda per day   Increase physical activity by 10 minutes daily   Gradually increase physical activity to a goal of 5 days per week for 30 minutes of MODERATE intensity PLUS 2 days per week of FULL BODY resistance training-walk or upper body exercises 4 days a week for 15-20 minutes -resistant band hand out given   4034-0650 calories   Get labs done for metformin

## 2021-09-01 DIAGNOSIS — I10 ESSENTIAL HYPERTENSION: Primary | ICD-10-CM

## 2021-09-01 RX ORDER — FUROSEMIDE 40 MG/1
TABLET ORAL
Qty: 180 TABLET | Refills: 2 | Status: SHIPPED | OUTPATIENT
Start: 2021-09-01 | End: 2022-04-08 | Stop reason: SDUPTHER

## 2021-10-11 ENCOUNTER — OFFICE VISIT (OUTPATIENT)
Dept: OBGYN CLINIC | Facility: CLINIC | Age: 71
End: 2021-10-11
Payer: COMMERCIAL

## 2021-10-11 ENCOUNTER — APPOINTMENT (OUTPATIENT)
Dept: RADIOLOGY | Facility: AMBULARY SURGERY CENTER | Age: 71
End: 2021-10-11
Payer: COMMERCIAL

## 2021-10-11 VITALS
WEIGHT: 225 LBS | SYSTOLIC BLOOD PRESSURE: 139 MMHG | BODY MASS INDEX: 38.41 KG/M2 | HEIGHT: 64 IN | HEART RATE: 81 BPM | DIASTOLIC BLOOD PRESSURE: 70 MMHG

## 2021-10-11 DIAGNOSIS — M54.50 LOW BACK PAIN, UNSPECIFIED BACK PAIN LATERALITY, UNSPECIFIED CHRONICITY, UNSPECIFIED WHETHER SCIATICA PRESENT: ICD-10-CM

## 2021-10-11 DIAGNOSIS — M47.816 FACET HYPERTROPHY OF LUMBAR REGION: ICD-10-CM

## 2021-10-11 DIAGNOSIS — G89.29 CHRONIC BILATERAL LOW BACK PAIN WITHOUT SCIATICA: ICD-10-CM

## 2021-10-11 DIAGNOSIS — M54.50 CHRONIC BILATERAL LOW BACK PAIN WITHOUT SCIATICA: ICD-10-CM

## 2021-10-11 DIAGNOSIS — M51.36 LUMBAR DEGENERATIVE DISC DISEASE: Primary | ICD-10-CM

## 2021-10-11 PROCEDURE — 99204 OFFICE O/P NEW MOD 45 MIN: CPT | Performed by: PHYSICAL MEDICINE & REHABILITATION

## 2021-10-11 PROCEDURE — 3078F DIAST BP <80 MM HG: CPT | Performed by: PHYSICAL MEDICINE & REHABILITATION

## 2021-10-11 PROCEDURE — 3075F SYST BP GE 130 - 139MM HG: CPT | Performed by: PHYSICAL MEDICINE & REHABILITATION

## 2021-10-11 PROCEDURE — 3008F BODY MASS INDEX DOCD: CPT | Performed by: PHYSICAL MEDICINE & REHABILITATION

## 2021-10-11 PROCEDURE — 1160F RVW MEDS BY RX/DR IN RCRD: CPT | Performed by: PHYSICAL MEDICINE & REHABILITATION

## 2021-10-11 PROCEDURE — 1036F TOBACCO NON-USER: CPT | Performed by: PHYSICAL MEDICINE & REHABILITATION

## 2021-10-11 PROCEDURE — 72100 X-RAY EXAM L-S SPINE 2/3 VWS: CPT

## 2021-10-15 DIAGNOSIS — F32.A CHRONIC DEPRESSION: ICD-10-CM

## 2021-10-18 RX ORDER — CITALOPRAM 20 MG/1
TABLET ORAL
Qty: 90 TABLET | Refills: 3 | Status: SHIPPED | OUTPATIENT
Start: 2021-10-18

## 2021-10-26 ENCOUNTER — EVALUATION (OUTPATIENT)
Dept: PHYSICAL THERAPY | Facility: CLINIC | Age: 71
End: 2021-10-26
Payer: COMMERCIAL

## 2021-10-26 DIAGNOSIS — M54.50 CHRONIC BILATERAL LOW BACK PAIN WITHOUT SCIATICA: ICD-10-CM

## 2021-10-26 DIAGNOSIS — M51.36 LUMBAR DEGENERATIVE DISC DISEASE: ICD-10-CM

## 2021-10-26 DIAGNOSIS — M47.816 FACET HYPERTROPHY OF LUMBAR REGION: ICD-10-CM

## 2021-10-26 DIAGNOSIS — G89.29 CHRONIC BILATERAL LOW BACK PAIN WITHOUT SCIATICA: ICD-10-CM

## 2021-10-26 PROCEDURE — 97161 PT EVAL LOW COMPLEX 20 MIN: CPT | Performed by: PHYSICAL THERAPIST

## 2021-10-26 PROCEDURE — 97112 NEUROMUSCULAR REEDUCATION: CPT | Performed by: PHYSICAL THERAPIST

## 2021-11-02 ENCOUNTER — OFFICE VISIT (OUTPATIENT)
Dept: PHYSICAL THERAPY | Facility: CLINIC | Age: 71
End: 2021-11-02
Payer: COMMERCIAL

## 2021-11-02 DIAGNOSIS — M51.36 LUMBAR DEGENERATIVE DISC DISEASE: ICD-10-CM

## 2021-11-02 DIAGNOSIS — G89.29 CHRONIC BILATERAL LOW BACK PAIN WITHOUT SCIATICA: Primary | ICD-10-CM

## 2021-11-02 DIAGNOSIS — M47.816 FACET HYPERTROPHY OF LUMBAR REGION: ICD-10-CM

## 2021-11-02 DIAGNOSIS — M54.50 CHRONIC BILATERAL LOW BACK PAIN WITHOUT SCIATICA: Primary | ICD-10-CM

## 2021-11-02 PROCEDURE — 97110 THERAPEUTIC EXERCISES: CPT

## 2021-11-02 PROCEDURE — 97112 NEUROMUSCULAR REEDUCATION: CPT

## 2021-11-04 ENCOUNTER — OFFICE VISIT (OUTPATIENT)
Dept: PHYSICAL THERAPY | Facility: CLINIC | Age: 71
End: 2021-11-04
Payer: COMMERCIAL

## 2021-11-04 DIAGNOSIS — G89.29 CHRONIC BILATERAL LOW BACK PAIN WITHOUT SCIATICA: Primary | ICD-10-CM

## 2021-11-04 DIAGNOSIS — M54.50 CHRONIC BILATERAL LOW BACK PAIN WITHOUT SCIATICA: Primary | ICD-10-CM

## 2021-11-04 DIAGNOSIS — M51.36 LUMBAR DEGENERATIVE DISC DISEASE: ICD-10-CM

## 2021-11-04 DIAGNOSIS — M47.816 FACET HYPERTROPHY OF LUMBAR REGION: ICD-10-CM

## 2021-11-04 PROCEDURE — 97110 THERAPEUTIC EXERCISES: CPT

## 2021-11-04 PROCEDURE — 97112 NEUROMUSCULAR REEDUCATION: CPT

## 2021-11-09 ENCOUNTER — APPOINTMENT (OUTPATIENT)
Dept: PHYSICAL THERAPY | Facility: CLINIC | Age: 71
End: 2021-11-09
Payer: COMMERCIAL

## 2021-11-11 ENCOUNTER — APPOINTMENT (OUTPATIENT)
Dept: PHYSICAL THERAPY | Facility: CLINIC | Age: 71
End: 2021-11-11
Payer: COMMERCIAL

## 2021-11-16 ENCOUNTER — OFFICE VISIT (OUTPATIENT)
Dept: PHYSICAL THERAPY | Facility: CLINIC | Age: 71
End: 2021-11-16
Payer: COMMERCIAL

## 2021-11-16 DIAGNOSIS — M47.816 FACET HYPERTROPHY OF LUMBAR REGION: ICD-10-CM

## 2021-11-16 DIAGNOSIS — G89.29 CHRONIC BILATERAL LOW BACK PAIN WITHOUT SCIATICA: ICD-10-CM

## 2021-11-16 DIAGNOSIS — M54.50 CHRONIC BILATERAL LOW BACK PAIN WITHOUT SCIATICA: ICD-10-CM

## 2021-11-16 DIAGNOSIS — M51.36 LUMBAR DEGENERATIVE DISC DISEASE: Primary | ICD-10-CM

## 2021-11-16 PROCEDURE — 97110 THERAPEUTIC EXERCISES: CPT | Performed by: PHYSICAL THERAPIST

## 2021-11-18 ENCOUNTER — EVALUATION (OUTPATIENT)
Dept: PHYSICAL THERAPY | Facility: CLINIC | Age: 71
End: 2021-11-18
Payer: COMMERCIAL

## 2021-11-18 DIAGNOSIS — M51.36 LUMBAR DEGENERATIVE DISC DISEASE: Primary | ICD-10-CM

## 2021-11-18 PROCEDURE — 97110 THERAPEUTIC EXERCISES: CPT | Performed by: PHYSICAL THERAPIST

## 2021-11-23 ENCOUNTER — APPOINTMENT (OUTPATIENT)
Dept: PHYSICAL THERAPY | Facility: CLINIC | Age: 71
End: 2021-11-23
Payer: COMMERCIAL

## 2021-11-30 ENCOUNTER — APPOINTMENT (OUTPATIENT)
Dept: PHYSICAL THERAPY | Facility: CLINIC | Age: 71
End: 2021-11-30
Payer: COMMERCIAL

## 2021-12-21 ENCOUNTER — IMMUNIZATIONS (OUTPATIENT)
Dept: FAMILY MEDICINE CLINIC | Facility: HOSPITAL | Age: 71
End: 2021-12-21

## 2021-12-21 DIAGNOSIS — Z23 ENCOUNTER FOR IMMUNIZATION: Primary | ICD-10-CM

## 2021-12-21 PROCEDURE — 0001A COVID-19 PFIZER VACC 0.3 ML: CPT

## 2021-12-21 PROCEDURE — 91300 COVID-19 PFIZER VACC 0.3 ML: CPT

## 2022-01-24 ENCOUNTER — TELEPHONE (OUTPATIENT)
Dept: OBGYN CLINIC | Facility: OTHER | Age: 72
End: 2022-01-24

## 2022-01-24 NOTE — TELEPHONE ENCOUNTER
Patient called in she was looking to get a second opinion, she has hip replacements and last one she believes was back in 2017  He is going to work on getting her records collected and obtained so we can review them    She asked for a female doctor, I so thought Dr Ritu Wing, she then said no cause she can only go to [facility]       She will keep in touch about the records    C/b # [id number]

## 2022-02-01 NOTE — TELEPHONE ENCOUNTER
Patient called back  She forgot that she was supposed to work on getting her records  Patient will have her records faxed to the [facility] office

## 2022-02-02 NOTE — TELEPHONE ENCOUNTER
Patient calls in stating that she is having her records faxed  There will be 3 surgeries  She said they were done at [facility]  She is wondering if someone can keep an eye out for the fax  She sates she is going to call back to see if we received the records

## 2022-02-02 NOTE — TELEPHONE ENCOUNTER
FYI if we get the records  Providers that may see patient for second opinion, Dr Tavon Rowland, Dr Liza Reynoso, maybe Dr Smooth Pichardo does not do revisions

## 2022-02-03 NOTE — TELEPHONE ENCOUNTER
Patient calling back to check on the status of the records, I did not see anything in Media  She is looking for a call back once received       # [phone]

## 2022-02-08 NOTE — TELEPHONE ENCOUNTER
Patient called to f/u on receipt of medical records  Nothing in media and patient will call again to ask if sent    Thank you

## 2022-02-10 NOTE — TELEPHONE ENCOUNTER
Sx Records rcvd for Rght Hip, left Hip & rght knee - scanned to chart    From the notes - the pt only wants to come to [place] location, will Dr Cheryl Bañuelos see pt

## 2022-02-26 DIAGNOSIS — I25.10 CORONARY ARTERY DISEASE INVOLVING NATIVE CORONARY ARTERY OF NATIVE HEART WITHOUT ANGINA PECTORIS: ICD-10-CM

## 2022-02-26 DIAGNOSIS — E78.5 DYSLIPIDEMIA: ICD-10-CM

## 2022-02-27 RX ORDER — ATORVASTATIN CALCIUM 10 MG/1
10 TABLET, FILM COATED ORAL DAILY
Qty: 90 TABLET | Refills: 0 | Status: SHIPPED | OUTPATIENT
Start: 2022-02-27 | End: 2022-02-28

## 2022-02-28 DIAGNOSIS — I25.10 CORONARY ARTERY DISEASE INVOLVING NATIVE CORONARY ARTERY OF NATIVE HEART WITHOUT ANGINA PECTORIS: ICD-10-CM

## 2022-02-28 DIAGNOSIS — E78.5 DYSLIPIDEMIA: ICD-10-CM

## 2022-02-28 RX ORDER — ATORVASTATIN CALCIUM 10 MG/1
10 TABLET, FILM COATED ORAL DAILY
Qty: 90 TABLET | Refills: 2 | Status: SHIPPED | OUTPATIENT
Start: 2022-02-28 | End: 2022-04-08 | Stop reason: SDUPTHER

## 2022-03-14 ENCOUNTER — OFFICE VISIT (OUTPATIENT)
Dept: FAMILY MEDICINE CLINIC | Facility: CLINIC | Age: 72
End: 2022-03-14
Payer: COMMERCIAL

## 2022-03-14 VITALS
TEMPERATURE: 97.1 F | HEART RATE: 93 BPM | WEIGHT: 225 LBS | SYSTOLIC BLOOD PRESSURE: 120 MMHG | RESPIRATION RATE: 16 BRPM | BODY MASS INDEX: 38.41 KG/M2 | HEIGHT: 64 IN | DIASTOLIC BLOOD PRESSURE: 70 MMHG | OXYGEN SATURATION: 96 %

## 2022-03-14 DIAGNOSIS — Z00.00 ANNUAL PHYSICAL EXAM: ICD-10-CM

## 2022-03-14 DIAGNOSIS — I10 ESSENTIAL HYPERTENSION: ICD-10-CM

## 2022-03-14 DIAGNOSIS — Z23 IMMUNIZATION DUE: ICD-10-CM

## 2022-03-14 DIAGNOSIS — Z12.31 BREAST CANCER SCREENING BY MAMMOGRAM: Primary | ICD-10-CM

## 2022-03-14 DIAGNOSIS — B37.2 CANDIDA INFECTION OF FLEXURAL SKIN: ICD-10-CM

## 2022-03-14 DIAGNOSIS — I25.10 CORONARY ARTERY DISEASE INVOLVING NATIVE CORONARY ARTERY OF NATIVE HEART WITHOUT ANGINA PECTORIS: ICD-10-CM

## 2022-03-14 DIAGNOSIS — F32.A CHRONIC DEPRESSION: Chronic | ICD-10-CM

## 2022-03-14 DIAGNOSIS — E55.9 VITAMIN D DEFICIENCY: ICD-10-CM

## 2022-03-14 DIAGNOSIS — Z78.0 POSTMENOPAUSAL: ICD-10-CM

## 2022-03-14 DIAGNOSIS — Z12.11 COLON CANCER SCREENING: ICD-10-CM

## 2022-03-14 DIAGNOSIS — E66.01 CLASS 2 SEVERE OBESITY DUE TO EXCESS CALORIES WITH SERIOUS COMORBIDITY AND BODY MASS INDEX (BMI) OF 38.0 TO 38.9 IN ADULT (HCC): ICD-10-CM

## 2022-03-14 PROBLEM — E66.812 CLASS 2 SEVERE OBESITY DUE TO EXCESS CALORIES WITH SERIOUS COMORBIDITY AND BODY MASS INDEX (BMI) OF 38.0 TO 38.9 IN ADULT (HCC): Status: ACTIVE | Noted: 2022-03-14

## 2022-03-14 PROBLEM — Z48.815 ENCOUNTER FOR SURGICAL AFTERCARE FOLLOWING SURGERY OF DIGESTIVE SYSTEM: Status: RESOLVED | Noted: 2021-05-13 | Resolved: 2022-03-14

## 2022-03-14 PROBLEM — R41.840 POOR CONCENTRATION: Status: RESOLVED | Noted: 2021-01-29 | Resolved: 2022-03-14

## 2022-03-14 PROBLEM — R41.3 MEMORY IMPAIRMENT: Status: RESOLVED | Noted: 2021-01-29 | Resolved: 2022-03-14

## 2022-03-14 PROCEDURE — 99397 PER PM REEVAL EST PAT 65+ YR: CPT | Performed by: FAMILY MEDICINE

## 2022-03-14 PROCEDURE — 3725F SCREEN DEPRESSION PERFORMED: CPT | Performed by: FAMILY MEDICINE

## 2022-03-14 PROCEDURE — 1160F RVW MEDS BY RX/DR IN RCRD: CPT | Performed by: FAMILY MEDICINE

## 2022-03-14 PROCEDURE — 3074F SYST BP LT 130 MM HG: CPT | Performed by: FAMILY MEDICINE

## 2022-03-14 PROCEDURE — 90662 IIV NO PRSV INCREASED AG IM: CPT | Performed by: FAMILY MEDICINE

## 2022-03-14 PROCEDURE — 3008F BODY MASS INDEX DOCD: CPT | Performed by: FAMILY MEDICINE

## 2022-03-14 PROCEDURE — 90471 IMMUNIZATION ADMIN: CPT | Performed by: FAMILY MEDICINE

## 2022-03-14 PROCEDURE — 1036F TOBACCO NON-USER: CPT | Performed by: FAMILY MEDICINE

## 2022-03-14 PROCEDURE — 3078F DIAST BP <80 MM HG: CPT | Performed by: FAMILY MEDICINE

## 2022-03-14 RX ORDER — NYSTATIN 100000 U/G
CREAM TOPICAL 2 TIMES DAILY
Qty: 30 G | Refills: 0 | Status: SHIPPED | OUTPATIENT
Start: 2022-03-14 | End: 2022-04-05 | Stop reason: SDUPTHER

## 2022-03-14 RX ORDER — NYSTATIN 100000 [USP'U]/G
POWDER TOPICAL 2 TIMES DAILY
Qty: 60 G | Refills: 0 | Status: SHIPPED | OUTPATIENT
Start: 2022-03-14 | End: 2022-04-05 | Stop reason: SDUPTHER

## 2022-03-14 NOTE — PROGRESS NOTES
Assessment/Plan:         Problem List Items Addressed This Visit        Cardiovascular and Mediastinum    Coronary artery disease involving native coronary artery of native heart without angina pectoris     No chest pain saw cardiologist 6 months ago         Essential hypertension     Well controlled on current therapy continue with current medications and will reassess next visit              Musculoskeletal and Integument    Candida infection of flexural skin     Will use both nystatin cream and powder         Relevant Medications    nystatin (MYCOSTATIN) powder    nystatin (MYCOSTATIN) cream       Other    Chronic depression (Chronic)     Ok on citalopram alone         Vitamin D deficiency     Check level         Class 2 severe obesity due to excess calories with serious comorbidity and body mass index (BMI) of 38 0 to 38 9 in adult [facility])     Discussion on diet and exercise guidelines for weight loss and  health reviewed with pt              Other Visit Diagnoses     Breast cancer screening by mammogram    -  Primary    Relevant Orders    Mammo screening bilateral w 3d & cad    Mammo screening bilateral w 3d & cad    Immunization due        Relevant Orders    influenza vaccine, high-dose, PF 0 7 mL (FLUZONE HIGH-DOSE)    Postmenopausal        Relevant Orders    DXA bone density spine hip and pelvis    Colon cancer screening        Relevant Orders    Ambulatory referral for colonoscopy    Annual physical exam        Relevant Orders    CBC and differential    Comprehensive metabolic panel    Lipid panel    Vitamin D 25 hydroxy    Urinalysis with microscopic            Subjective: pt here for annual physical and interval visit has bad painful itchy rash under breasts and groin pt needs labs and mammo a nd colonoscopy     Patient ID: Kimberley Livingston is a 73 y o  female      HPI    The following portions of the patient's history were reviewed and updated as appropriate:   Past Medical History:  She has a past medical history of CAD (coronary artery disease), Dyslipidemia, Hyperlipidemia, Hypertension, Obesity (BMI 30-39 9), and SOB (shortness of breath)  ,  _______________________________________________________________________  Medical Problems:  does not have any pertinent problems on file ,  _______________________________________________________________________  Past Surgical History:   has a past surgical history that includes Coronary artery bypass graft (2017); Bariatric Surgery (2014); Hip surgery (Left, 2015); Replacement total knee (Right, 04/2017); Total hip arthroplasty (Right, 2017); Foot surgery (Right); Carpal tunnel release (Bilateral, 2002); Bile duct exploration; and Sleeve Gastroplasty  ,  _______________________________________________________________________  Family History:  family history includes Diabetes in her mother; Heart disease in her father; Hypertension in her mother ,  _______________________________________________________________________  Social History:   reports that she quit smoking about 5 years ago  Her smoking use included cigarettes  She has a 3 50 pack-year smoking history  She has never used smokeless tobacco  She reports current alcohol use  She reports that she does not use drugs  ,  _______________________________________________________________________  Allergies:  is allergic to iodine - food allergy and shellfish-derived products - food allergy     _______________________________________________________________________  Current Outpatient Medications   Medication Sig Dispense Refill    aspirin (ECOTRIN LOW STRENGTH) 81 mg EC tablet Take 1 tablet (81 mg total) by mouth daily 30 tablet 5    atorvastatin (LIPITOR) 10 mg tablet Take 1 tablet (10 mg total) by mouth daily 90 tablet 2    citalopram (CeleXA) 20 mg tablet TAKE 1 TABLET BY MOUTH  DAILY 90 tablet 3    furosemide (LASIX) 40 mg tablet TAKE ONE TABLET BY MOUTH TWICE A  tablet 2    metoprolol succinate (TOPROL-XL) 50 mg 24 hr tablet Take 1 tablet (50 mg total) by mouth daily 90 tablet 3    predniSONE 1 mg tablet Taking 3 mg daily      traMADol (ULTRAM-ER) 200 MG 24 hr tablet TK 1 T PO D PRN P      buPROPion (WELLBUTRIN XL) 150 mg 24 hr tablet Take 1 tablet (150 mg total) by mouth every morning (Patient not taking: Reported on 7/8/2021) 30 tablet 5    ergocalciferol (VITAMIN D2) 50,000 units Take 1 capsule (50,000 Units total) by mouth once a week (Patient not taking: Reported on 7/8/2021) 12 capsule 0    nystatin (MYCOSTATIN) cream Apply topically 2 (two) times a day 30 g 0    nystatin (MYCOSTATIN) powder Apply topically 2 (two) times a day 60 g 0    Xeljanz XR 11 MG TB24  (Patient not taking: Reported on 7/8/2021)       No current facility-administered medications for this visit      _______________________________________________________________________  Review of Systems   Constitutional: Negative for appetite change, chills, fatigue and fever  Respiratory: Negative for cough, chest tightness and shortness of breath  Cardiovascular: Negative for chest pain, palpitations and leg swelling  Gastrointestinal: Negative for abdominal pain, constipation, diarrhea, nausea and vomiting  Genitourinary: Negative for difficulty urinating and frequency  Musculoskeletal: Negative for arthralgias, back pain and neck pain  Skin: Positive for rash (itchy and burning under breasts and groin)  Neurological: Negative for dizziness, weakness, light-headedness, numbness and headaches  Hematological: Does not bruise/bleed easily  Psychiatric/Behavioral: Negative for dysphoric mood and sleep disturbance  The patient is not nervous/anxious            Objective:  Vitals:    03/14/22 1627   BP: 120/70   BP Location: Left arm   Patient Position: Sitting   Cuff Size: Standard   Pulse: 93   Resp: 16   Temp: (!) 97 1 °F (36 2 °C)   TempSrc: Temporal   SpO2: 96%   Weight: 102 kg (225 lb)   Height: 5' 4" (1 626 m)     Body mass index is 38 62 kg/m²  Physical Exam  Vitals reviewed  Constitutional:       General: She is not in acute distress  Appearance: Normal appearance  She is well-developed  She is obese  HENT:      Head: Normocephalic  Right Ear: Tympanic membrane, ear canal and external ear normal       Left Ear: Tympanic membrane, ear canal and external ear normal       Nose: Nose normal       Mouth/Throat:      Pharynx: No oropharyngeal exudate  Eyes:      General: Lids are normal       Extraocular Movements: Extraocular movements intact  Conjunctiva/sclera: Conjunctivae normal       Pupils: Pupils are equal, round, and reactive to light  Neck:      Thyroid: No thyromegaly  Vascular: No carotid bruit  Cardiovascular:      Rate and Rhythm: Normal rate and regular rhythm  Pulses: Normal pulses  Heart sounds: Normal heart sounds  No murmur heard  No friction rub  Pulmonary:      Effort: Pulmonary effort is normal  No respiratory distress  Breath sounds: Normal breath sounds  No stridor  No wheezing or rales  Chest:   Breasts: Breasts are symmetrical       Right: Normal  No swelling, bleeding, inverted nipple, mass, nipple discharge, skin change or tenderness  Left: Normal  No swelling, bleeding, inverted nipple, mass, nipple discharge, skin change or tenderness  Abdominal:      General: Bowel sounds are normal  There is no distension  Palpations: Abdomen is soft  There is no mass  Tenderness: There is no abdominal tenderness  There is no guarding  Hernia: No hernia is present  Musculoskeletal:         General: Normal range of motion  Cervical back: Full passive range of motion without pain, normal range of motion and neck supple  Lymphadenopathy:      Cervical: No cervical adenopathy  Skin:     General: Skin is warm and dry  Findings: Rash (red undr breasts and grpin with satellite lesions) present        Comments: Nl appearing moles   Neurological: General: No focal deficit present  Mental Status: She is alert and oriented to person, place, and time  Mental status is at baseline  Cranial Nerves: No cranial nerve deficit  Sensory: No sensory deficit  Motor: No abnormal muscle tone  Coordination: Coordination normal       Gait: Gait normal       Deep Tendon Reflexes: Reflexes normal  Babinski sign absent on the right side  Psychiatric:         Mood and Affect: Mood normal          Speech: Speech normal          Behavior: Behavior normal          Thought Content: Thought content normal          Judgment: Judgment normal        BMI Counseling: Body mass index is 38 62 kg/m²  The BMI is above normal  Nutrition recommendations include 3-5 servings of fruits/vegetables daily, reducing fast food intake, consuming healthier snacks, decreasing soda and/or juice intake, moderation in carbohydrate intake and increasing intake of lean protein  Exercise recommendations include exercising 3-5 times per week and strength training exercises

## 2022-03-22 LAB
25(OH)D3+25(OH)D2 SERPL-MCNC: 23.9 NG/ML (ref 30–100)
ALBUMIN SERPL-MCNC: 4 G/DL (ref 3.8–4.8)
ALBUMIN/GLOB SERPL: 1.3 {RATIO} (ref 1.2–2.2)
ALP SERPL-CCNC: 105 IU/L (ref 44–121)
ALT SERPL-CCNC: 18 IU/L (ref 0–32)
APPEARANCE UR: CLEAR
AST SERPL-CCNC: 20 IU/L (ref 0–40)
BACTERIA URNS QL MICRO: NORMAL
BASOPHILS # BLD AUTO: 0 X10E3/UL (ref 0–0.2)
BASOPHILS NFR BLD AUTO: 0 %
BILIRUB SERPL-MCNC: 0.5 MG/DL (ref 0–1.2)
BILIRUB UR QL STRIP: NEGATIVE
BUN SERPL-MCNC: 23 MG/DL (ref 8–27)
BUN/CREAT SERPL: 26 (ref 12–28)
CALCIUM SERPL-MCNC: 9 MG/DL (ref 8.7–10.3)
CASTS URNS QL MICRO: NORMAL /LPF
CHLORIDE SERPL-SCNC: 101 MMOL/L (ref 96–106)
CHOLEST SERPL-MCNC: 178 MG/DL (ref 100–199)
CO2 SERPL-SCNC: 24 MMOL/L (ref 20–29)
COLOR UR: YELLOW
CREAT SERPL-MCNC: 0.88 MG/DL (ref 0.57–1)
EGFR: 71 ML/MIN/1.73
EOSINOPHIL # BLD AUTO: 0.2 X10E3/UL (ref 0–0.4)
EOSINOPHIL NFR BLD AUTO: 4 %
EPI CELLS #/AREA URNS HPF: NORMAL /HPF (ref 0–10)
ERYTHROCYTE [DISTWIDTH] IN BLOOD BY AUTOMATED COUNT: 12.8 % (ref 11.7–15.4)
GLOBULIN SER-MCNC: 3.2 G/DL (ref 1.5–4.5)
GLUCOSE SERPL-MCNC: 93 MG/DL (ref 65–99)
GLUCOSE UR QL: NEGATIVE
HCT VFR BLD AUTO: 38.2 % (ref 34–46.6)
HDLC SERPL-MCNC: 54 MG/DL
HGB BLD-MCNC: 12.8 G/DL (ref 11.1–15.9)
HGB UR QL STRIP: NEGATIVE
IMM GRANULOCYTES # BLD: 0 X10E3/UL (ref 0–0.1)
IMM GRANULOCYTES NFR BLD: 0 %
KETONES UR QL STRIP: NEGATIVE
LDLC SERPL CALC-MCNC: 100 MG/DL (ref 0–99)
LEUKOCYTE ESTERASE UR QL STRIP: ABNORMAL
LYMPHOCYTES # BLD AUTO: 1.4 X10E3/UL (ref 0.7–3.1)
LYMPHOCYTES NFR BLD AUTO: 23 %
MCH RBC QN AUTO: 30.9 PG (ref 26.6–33)
MCHC RBC AUTO-ENTMCNC: 33.5 G/DL (ref 31.5–35.7)
MCV RBC AUTO: 92 FL (ref 79–97)
MICRO URNS: ABNORMAL
MONOCYTES # BLD AUTO: 0.5 X10E3/UL (ref 0.1–0.9)
MONOCYTES NFR BLD AUTO: 7 %
NEUTROPHILS # BLD AUTO: 4 X10E3/UL (ref 1.4–7)
NEUTROPHILS NFR BLD AUTO: 66 %
NITRITE UR QL STRIP: POSITIVE
PH UR STRIP: 5.5 [PH] (ref 5–7.5)
PLATELET # BLD AUTO: 264 X10E3/UL (ref 150–450)
POTASSIUM SERPL-SCNC: 4.4 MMOL/L (ref 3.5–5.2)
PROT SERPL-MCNC: 7.2 G/DL (ref 6–8.5)
PROT UR QL STRIP: NEGATIVE
RBC # BLD AUTO: 4.14 X10E6/UL (ref 3.77–5.28)
RBC #/AREA URNS HPF: NORMAL /HPF (ref 0–2)
SL AMB VLDL CHOLESTEROL CALC: 24 MG/DL (ref 5–40)
SODIUM SERPL-SCNC: 140 MMOL/L (ref 134–144)
SP GR UR: 1.02 (ref 1–1.03)
TRIGL SERPL-MCNC: 139 MG/DL (ref 0–149)
UROBILINOGEN UR STRIP-ACNC: 0.2 MG/DL (ref 0.2–1)
WBC # BLD AUTO: 6.1 X10E3/UL (ref 3.4–10.8)
WBC #/AREA URNS HPF: NORMAL /HPF (ref 0–5)

## 2022-03-25 DIAGNOSIS — E55.9 VITAMIN D DEFICIENCY: ICD-10-CM

## 2022-03-25 RX ORDER — ERGOCALCIFEROL 1.25 MG/1
50000 CAPSULE ORAL WEEKLY
Qty: 12 CAPSULE | Refills: 0 | Status: SHIPPED | OUTPATIENT
Start: 2022-03-25 | End: 2022-04-04

## 2022-03-29 ENCOUNTER — TELEPHONE (OUTPATIENT)
Dept: OBGYN CLINIC | Facility: HOSPITAL | Age: 72
End: 2022-03-29

## 2022-03-29 NOTE — TELEPHONE ENCOUNTER
Patient called asking if she can get an order to have  a low back injection        C/B #[phone] or [phone]

## 2022-04-04 DIAGNOSIS — E55.9 VITAMIN D DEFICIENCY: ICD-10-CM

## 2022-04-04 RX ORDER — ERGOCALCIFEROL 1.25 MG/1
CAPSULE ORAL
Qty: 12 CAPSULE | Refills: 0 | Status: SHIPPED | OUTPATIENT
Start: 2022-04-04 | End: 2022-07-28

## 2022-04-05 DIAGNOSIS — B37.2 CANDIDA INFECTION OF FLEXURAL SKIN: ICD-10-CM

## 2022-04-05 RX ORDER — NYSTATIN 100000 [USP'U]/G
POWDER TOPICAL 2 TIMES DAILY
Qty: 60 G | Refills: 0 | Status: SHIPPED | OUTPATIENT
Start: 2022-04-05 | End: 2022-04-08 | Stop reason: SDUPTHER

## 2022-04-05 RX ORDER — NYSTATIN 100000 U/G
CREAM TOPICAL 2 TIMES DAILY
Qty: 30 G | Refills: 0 | Status: SHIPPED | OUTPATIENT
Start: 2022-04-05 | End: 2022-04-08 | Stop reason: SDUPTHER

## 2022-04-07 ENCOUNTER — TELEPHONE (OUTPATIENT)
Dept: OTHER | Facility: OTHER | Age: 72
End: 2022-04-07

## 2022-04-07 NOTE — TELEPHONE ENCOUNTER
Pt calling about refill for    - nystatin (MYCOSTATIN) powder   - nystatin (MYCOSTATIN) cream  Stated was sent to wrong place patient would like medication to be sent to   [facility] [id number], [address],[place] [address]

## 2022-04-08 ENCOUNTER — CLINICAL SUPPORT (OUTPATIENT)
Dept: FAMILY MEDICINE CLINIC | Facility: CLINIC | Age: 72
End: 2022-04-08

## 2022-04-08 ENCOUNTER — TELEPHONE (OUTPATIENT)
Dept: FAMILY MEDICINE CLINIC | Facility: CLINIC | Age: 72
End: 2022-04-08

## 2022-04-08 DIAGNOSIS — I25.10 CORONARY ARTERY DISEASE INVOLVING NATIVE CORONARY ARTERY OF NATIVE HEART WITHOUT ANGINA PECTORIS: ICD-10-CM

## 2022-04-08 DIAGNOSIS — B37.2 CANDIDA INFECTION OF FLEXURAL SKIN: ICD-10-CM

## 2022-04-08 DIAGNOSIS — Z11.52 ENCOUNTER FOR SCREENING FOR COVID-19: Primary | ICD-10-CM

## 2022-04-08 DIAGNOSIS — I10 ESSENTIAL HYPERTENSION: ICD-10-CM

## 2022-04-08 DIAGNOSIS — E78.5 DYSLIPIDEMIA: ICD-10-CM

## 2022-04-08 LAB — SARS-COV-2 RNA RESP QL NAA+PROBE: NEGATIVE

## 2022-04-08 PROCEDURE — U0003 INFECTIOUS AGENT DETECTION BY NUCLEIC ACID (DNA OR RNA); SEVERE ACUTE RESPIRATORY SYNDROME CORONAVIRUS 2 (SARS-COV-2) (CORONAVIRUS DISEASE [COVID-19]), AMPLIFIED PROBE TECHNIQUE, MAKING USE OF HIGH THROUGHPUT TECHNOLOGIES AS DESCRIBED BY CMS-2020-01-R: HCPCS | Performed by: FAMILY MEDICINE

## 2022-04-08 PROCEDURE — U0005 INFEC AGEN DETEC AMPLI PROBE: HCPCS | Performed by: FAMILY MEDICINE

## 2022-04-08 RX ORDER — NYSTATIN 100000 [USP'U]/G
POWDER TOPICAL 2 TIMES DAILY
Qty: 60 G | Refills: 0 | Status: SHIPPED | OUTPATIENT
Start: 2022-04-08

## 2022-04-08 RX ORDER — METOPROLOL SUCCINATE 50 MG/1
50 TABLET, EXTENDED RELEASE ORAL DAILY
Qty: 90 TABLET | Refills: 1 | Status: SHIPPED | OUTPATIENT
Start: 2022-04-08

## 2022-04-08 RX ORDER — NYSTATIN 100000 U/G
CREAM TOPICAL 2 TIMES DAILY
Qty: 30 G | Refills: 0 | Status: SHIPPED | OUTPATIENT
Start: 2022-04-08

## 2022-04-08 RX ORDER — ATORVASTATIN CALCIUM 10 MG/1
10 TABLET, FILM COATED ORAL DAILY
Qty: 90 TABLET | Refills: 1 | Status: SHIPPED | OUTPATIENT
Start: 2022-04-08 | End: 2022-06-08 | Stop reason: SDUPTHER

## 2022-04-08 RX ORDER — FUROSEMIDE 40 MG/1
40 TABLET ORAL 2 TIMES DAILY
Qty: 180 TABLET | Refills: 0 | Status: SHIPPED | OUTPATIENT
Start: 2022-04-08 | End: 2022-07-26

## 2022-06-08 DIAGNOSIS — E78.5 DYSLIPIDEMIA: ICD-10-CM

## 2022-06-08 DIAGNOSIS — I25.10 CORONARY ARTERY DISEASE INVOLVING NATIVE CORONARY ARTERY OF NATIVE HEART WITHOUT ANGINA PECTORIS: ICD-10-CM

## 2022-06-09 RX ORDER — ATORVASTATIN CALCIUM 10 MG/1
10 TABLET, FILM COATED ORAL DAILY
Qty: 90 TABLET | Refills: 1 | Status: SHIPPED | OUTPATIENT
Start: 2022-06-09

## 2022-07-26 DIAGNOSIS — I10 ESSENTIAL HYPERTENSION: ICD-10-CM

## 2022-07-26 RX ORDER — FUROSEMIDE 40 MG/1
40 TABLET ORAL 2 TIMES DAILY
Qty: 180 TABLET | Refills: 0 | Status: SHIPPED | OUTPATIENT
Start: 2022-07-26 | End: 2022-10-18

## 2022-07-28 ENCOUNTER — APPOINTMENT (OUTPATIENT)
Dept: RADIOLOGY | Facility: CLINIC | Age: 72
End: 2022-07-28
Payer: COMMERCIAL

## 2022-07-28 ENCOUNTER — OFFICE VISIT (OUTPATIENT)
Dept: OBGYN CLINIC | Facility: CLINIC | Age: 72
End: 2022-07-28
Payer: COMMERCIAL

## 2022-07-28 VITALS
WEIGHT: 222.2 LBS | DIASTOLIC BLOOD PRESSURE: 80 MMHG | HEART RATE: 76 BPM | HEIGHT: 65 IN | SYSTOLIC BLOOD PRESSURE: 133 MMHG | BODY MASS INDEX: 37.02 KG/M2

## 2022-07-28 DIAGNOSIS — M25.562 LEFT KNEE PAIN, UNSPECIFIED CHRONICITY: ICD-10-CM

## 2022-07-28 DIAGNOSIS — Z88.3: ICD-10-CM

## 2022-07-28 DIAGNOSIS — M17.12 PRIMARY OSTEOARTHRITIS OF LEFT KNEE: Primary | ICD-10-CM

## 2022-07-28 DIAGNOSIS — R29.898 LEG WEAKNESS, BILATERAL: ICD-10-CM

## 2022-07-28 DIAGNOSIS — Z01.89 ENCOUNTER FOR OTHER SPECIFIED SPECIAL EXAMINATIONS: ICD-10-CM

## 2022-07-28 DIAGNOSIS — R26.9 GAIT ABNORMALITY: ICD-10-CM

## 2022-07-28 PROCEDURE — 20610 DRAIN/INJ JOINT/BURSA W/O US: CPT | Performed by: ORTHOPAEDIC SURGERY

## 2022-07-28 PROCEDURE — 3075F SYST BP GE 130 - 139MM HG: CPT | Performed by: ORTHOPAEDIC SURGERY

## 2022-07-28 PROCEDURE — 73562 X-RAY EXAM OF KNEE 3: CPT

## 2022-07-28 PROCEDURE — 1160F RVW MEDS BY RX/DR IN RCRD: CPT | Performed by: ORTHOPAEDIC SURGERY

## 2022-07-28 PROCEDURE — 73560 X-RAY EXAM OF KNEE 1 OR 2: CPT

## 2022-07-28 PROCEDURE — 3079F DIAST BP 80-89 MM HG: CPT | Performed by: ORTHOPAEDIC SURGERY

## 2022-07-28 PROCEDURE — 99204 OFFICE O/P NEW MOD 45 MIN: CPT | Performed by: ORTHOPAEDIC SURGERY

## 2022-07-28 RX ORDER — BUPIVACAINE HYDROCHLORIDE 5 MG/ML
6 INJECTION, SOLUTION EPIDURAL; INTRACAUDAL
Status: COMPLETED | OUTPATIENT
Start: 2022-07-28 | End: 2022-07-28

## 2022-07-28 RX ORDER — TRIAMCINOLONE ACETONIDE 40 MG/ML
40 INJECTION, SUSPENSION INTRA-ARTICULAR; INTRAMUSCULAR
Status: COMPLETED | OUTPATIENT
Start: 2022-07-28 | End: 2022-07-28

## 2022-07-28 RX ADMIN — BUPIVACAINE HYDROCHLORIDE 6 ML: 5 INJECTION, SOLUTION EPIDURAL; INTRACAUDAL at 10:28

## 2022-07-28 RX ADMIN — TRIAMCINOLONE ACETONIDE 40 MG: 40 INJECTION, SUSPENSION INTRA-ARTICULAR; INTRAMUSCULAR at 10:28

## 2022-07-28 NOTE — PROGRESS NOTES
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     3  Left knee pain, unspecified chronicity  XR knee 3 vw left non injury   4  Gait abnormality  Ambulatory Referral to Physical Therapy   5  Leg weakness, bilateral  Ambulatory Referral to Physical Therapy     Scribe Attestation    I,:  Jonel Valdez am acting as a scribe while in the presence of the attending physician :       I,:  Emerita Patiño, DO personally performed the services described in this documentation    as scribed in my presence :         Lupe Negro upon examination and review the x-rays of the left knee demonstrates signs and symptoms consistent with severe tricompartmental osteoarthritis  I do believe that the osteoarthritis of her knee is contributing to her pain and overall loss of function of the left lower extremity  I did remark that the degenerative changes does result in the musculature surrounding the knee to work harder to move the knee to his range of motion  I did note that she could be also experiencing fatigue that can be attributed to her degenerative scoliosis in her lumbar spine  I did note that the pain response of the arthritis can also contribute to weakness into the lower extremity  With this in mind, I do find it to be reasonable to offer her a steroid injection of the knee  She was amenable to undergoing steroid injection today  She does have a severe allergy to povidone iodine that does result in anaphylaxis  With that in mind, alcohol was only used for antisepsis  She tolerated the injection well with no complications  Post injection instructions were provided  She was also provided a prescription to physical therapy to focus on left lower extremity strengthening as well as gait training    Note that she could undergo a steroid injection every 3 months if this does provide her with symptomatic relief  There is a likelihood that she will undergo a total knee arthroplasty in the future  However, I would like to exhaust all non operative measures treatment this point  Evgeny Yen verbalized understanding and  was amenable to treatment plan presented to her today  I will see her back in 3 months for repeat clinical evaluation  Large joint arthrocentesis: L knee  Universal Protocol:  Consent: Verbal consent not obtained  Risks and benefits: risks, benefits and alternatives were discussed  Consent given by: patient  Time out: Immediately prior to procedure a "time out" was called to verify the correct patient, procedure, equipment, support staff and site/side marked as required  Timeout called at: 7/28/2022 10:27 AM   Patient understanding: patient states understanding of the procedure being performed  Site marked: the operative site was marked  Radiology Images displayed and confirmed  If images not available, report reviewed: imaging studies available  Patient identity confirmed: verbally with patient    Supporting Documentation  Indications: pain   Procedure Details  Location: knee - L knee  Preparation: Patient was prepped and draped in the usual sterile fashion  Needle size: 20 G  Ultrasound guidance: no  Approach: anterolateral  Medications administered: 40 mg triamcinolone acetonide 40 mg/mL; 6 mL bupivacaine (PF) 0 5 %    Patient tolerance: patient tolerated the procedure well with no immediate complications  Dressing:  Sterile dressing applied            Subjective:  New patient left knee    Patient ID: Osmar Espana is a pleasant 81 y o  female presenting today for initial evaluation of her left knee  She does have an extensive surgical history of a left total hip arthroplasty performed on 12/8/2014 by Dr Felisha Rivera  She also underwent a right total knee arthroplasty on 4/11/2017 and a subsequent right total hip arthroplasty on 9/12/2017 all performed by Dr Felisha Rivera   She also underwent open heart surgery in November 2017  She states currently that she is not experiencing any significant pain  However, is complaining of weakness into her left lower extremity  She also remarks on lower extremity weakness into the right side as well  She is unsure of the origin of this weakness if it is associated with the arthritis of her knee or the degenerative scoliosis of her lumbar spine  She did partake in physical therapy in November and December of 2021 for her back and states that this did not provide her with symptomatic relief in regards to the weakness into her lower extremities  She describes sensations of tension and mild sensation loss into the lower extremities when she lays down at night  She states that her greatest complaint is ascending and descending stairs or traversing any landscape where the elevation changes  However, denies any gross instability  She does not remark on any swelling of the knee or the lower extremities that are consistent with her previous cardiac history  Today she denies any distal paresthesias  Review of Systems   Constitutional: Negative for chills, fever and unexpected weight change  HENT: Negative for hearing loss, nosebleeds and sore throat  Eyes: Negative for pain, redness and visual disturbance  Respiratory: Negative for cough, shortness of breath and wheezing  Cardiovascular: Negative for chest pain, palpitations and leg swelling  Gastrointestinal: Negative for abdominal pain, nausea and vomiting  Endocrine: Negative for polydipsia and polyuria  Genitourinary: Negative for dysuria and hematuria  Musculoskeletal: Positive for arthralgias and myalgias  See HPI   Skin: Negative for rash and wound  Neurological: Negative for dizziness, numbness and headaches  Psychiatric/Behavioral: Negative for decreased concentration and suicidal ideas  The patient is not nervous/anxious            Past Medical History:   Diagnosis Date    CAD (coronary artery disease)     Dyslipidemia     Hyperlipidemia     Hypertension     Obesity (BMI 30-39  9)     SOB (shortness of breath)        Past Surgical History:   Procedure Laterality Date    BARIATRIC SURGERY  2014    BILE DUCT EXPLORATION      replacement per [place]    CARPAL TUNNEL RELEASE Bilateral 2002    CORONARY ARTERY BYPASS GRAFT  2017    FOOT SURGERY Right     bone surgery to straighten toe      HIP SURGERY Left 2015    REPLACEMENT TOTAL KNEE Right 2017    SLEEVE GASTROPLASTY      TOTAL HIP ARTHROPLASTY Right 2017       Family History   Problem Relation Age of Onset    Hypertension Mother     Diabetes Mother     Heart disease Father         CABG x5    Heart attack Neg Hx     Stroke Neg Hx     Aneurysm Neg Hx     Clotting disorder Neg Hx     Arrhythmia Neg Hx     Hyperlipidemia Neg Hx         pt unsure     Thyroid disease Neg Hx        Social History     Occupational History    Not on file   Tobacco Use    Smoking status: Former Smoker     Packs/day: 0 50     Years: 7 00     Pack years: 3 50     Types: Cigarettes     Quit date:      Years since quittin 5    Smokeless tobacco: Never Used   Vaping Use    Vaping Use: Never used   Substance and Sexual Activity    Alcohol use: Yes     Comment: social     Drug use: No    Sexual activity: Not Currently         Current Outpatient Medications:     aspirin (ECOTRIN LOW STRENGTH) 81 mg EC tablet, Take 1 tablet (81 mg total) by mouth daily, Disp: 30 tablet, Rfl: 5    atorvastatin (LIPITOR) 10 mg tablet, Take 1 tablet (10 mg total) by mouth daily, Disp: 90 tablet, Rfl: 1    citalopram (CeleXA) 20 mg tablet, TAKE 1 TABLET BY MOUTH  DAILY, Disp: 90 tablet, Rfl: 3    furosemide (LASIX) 40 mg tablet, TAKE 1 TABLET (40 MG TOTAL) BY MOUTH 2 (TWO) TIMES A DAY, Disp: 180 tablet, Rfl: 0    metoprolol succinate (TOPROL-XL) 50 mg 24 hr tablet, Take 1 tablet (50 mg total) by mouth daily, Disp: 90 tablet, Rfl: 1    nystatin (MYCOSTATIN) cream, Apply topically 2 (two) times a day, Disp: 30 g, Rfl: 0    nystatin (MYCOSTATIN) powder, Apply topically 2 (two) times a day, Disp: 60 g, Rfl: 0    predniSONE 1 mg tablet, Taking 3 mg daily, Disp: , Rfl:     traMADol (ULTRAM-ER) 200 MG 24 hr tablet, TK 1 T PO D PRN P, Disp: , Rfl:     Allergies   Allergen Reactions    Iodine - Food Allergy Anaphylaxis     Reaction Date: [id number];     Povidone Iodine Anaphylaxis    Shellfish-Derived Products - Food Allergy Anaphylaxis       Objective:  Vitals:    07/28/22 0913   BP: 133/80   Pulse: 76       Body mass index is 37 55 kg/m²  Left Knee Exam     Tenderness   The patient is experiencing no tenderness  Range of Motion   Extension: 0   Flexion: 120     Tests   Varus: negative Valgus: negative  Drawer:  Anterior - negative     Posterior - negative    Other   Erythema: absent  Scars: absent  Sensation: normal  Pulse: present  Swelling: none  Effusion: no effusion present    Comments:  No pitting edema  varicosities     Hip flexion: 5/5  Knee Extension: 5/5  Knee flexion: 5/5          Observations   Left Knee   Negative for effusion  Physical Exam  Vitals and nursing note reviewed  HENT:      Head: Normocephalic and atraumatic  Eyes:      General:         Right eye: No discharge  Left eye: No discharge  Extraocular Movements: Extraocular movements intact  Conjunctiva/sclera: Conjunctivae normal    Cardiovascular:      Rate and Rhythm: Normal rate  Pulmonary:      Effort: Pulmonary effort is normal  No respiratory distress  Musculoskeletal:      Cervical back: Normal range of motion and neck supple  Left knee: No effusion  Comments: See ortho exam   Skin:     General: Skin is warm and dry  Neurological:      Mental Status: She is alert and oriented to person, place, and time  I have personally reviewed pertinent films in PACS       X-rays of the left knee demonstrates severe tricompartmental osteoarthritis most severe at the medial and lateral comparments  Moderate to severe patellofemoral compartmental osteoarthritis  Subchondral sclerosis and osteophyte formation for all 3 compartments  No acute fractures   No obvious lytic to blastic lesions

## 2022-09-04 DIAGNOSIS — F32.A CHRONIC DEPRESSION: ICD-10-CM

## 2022-09-06 RX ORDER — CITALOPRAM 20 MG/1
TABLET ORAL
Qty: 90 TABLET | Refills: 3 | Status: SHIPPED | OUTPATIENT
Start: 2022-09-06

## 2022-09-27 DIAGNOSIS — I25.10 CORONARY ARTERY DISEASE INVOLVING NATIVE CORONARY ARTERY OF NATIVE HEART WITHOUT ANGINA PECTORIS: ICD-10-CM

## 2022-09-27 DIAGNOSIS — I10 ESSENTIAL HYPERTENSION: ICD-10-CM

## 2022-09-27 RX ORDER — METOPROLOL SUCCINATE 50 MG/1
50 TABLET, EXTENDED RELEASE ORAL DAILY
Qty: 90 TABLET | Refills: 1 | Status: SHIPPED | OUTPATIENT
Start: 2022-09-27

## 2022-10-18 DIAGNOSIS — I10 ESSENTIAL HYPERTENSION: ICD-10-CM

## 2022-10-18 RX ORDER — FUROSEMIDE 40 MG/1
40 TABLET ORAL 2 TIMES DAILY
Qty: 180 TABLET | Refills: 0 | Status: SHIPPED | OUTPATIENT
Start: 2022-10-18

## 2022-10-19 DIAGNOSIS — I25.10 CORONARY ARTERY DISEASE INVOLVING NATIVE CORONARY ARTERY OF NATIVE HEART WITHOUT ANGINA PECTORIS: ICD-10-CM

## 2022-10-19 DIAGNOSIS — E78.5 DYSLIPIDEMIA: ICD-10-CM

## 2022-10-20 RX ORDER — ATORVASTATIN CALCIUM 10 MG/1
TABLET, FILM COATED ORAL
Qty: 90 TABLET | Refills: 3 | Status: SHIPPED | OUTPATIENT
Start: 2022-10-20

## 2022-10-20 NOTE — TELEPHONE ENCOUNTER
Requested medication(s) are due for refill today: Yes  Patient has already received a courtesy refill: No  Other reason request has been forwarded to provider: Lew beckham

## 2023-02-23 ENCOUNTER — APPOINTMENT (OUTPATIENT)
Dept: RADIOLOGY | Facility: CLINIC | Age: 73
End: 2023-02-23

## 2023-02-23 DIAGNOSIS — M79.674 PAIN IN TOE OF RIGHT FOOT: ICD-10-CM

## 2023-02-23 DIAGNOSIS — M79.675 PAIN IN TOE OF LEFT FOOT: ICD-10-CM

## 2023-03-22 ENCOUNTER — OFFICE VISIT (OUTPATIENT)
Dept: FAMILY MEDICINE CLINIC | Facility: CLINIC | Age: 73
End: 2023-03-22

## 2023-03-22 VITALS
WEIGHT: 221.2 LBS | SYSTOLIC BLOOD PRESSURE: 126 MMHG | DIASTOLIC BLOOD PRESSURE: 74 MMHG | TEMPERATURE: 97.7 F | OXYGEN SATURATION: 96 % | BODY MASS INDEX: 37.38 KG/M2

## 2023-03-22 DIAGNOSIS — M25.511 ACUTE PAIN OF RIGHT SHOULDER: Primary | ICD-10-CM

## 2023-03-22 DIAGNOSIS — B37.2 CANDIDA INFECTION OF FLEXURAL SKIN: ICD-10-CM

## 2023-03-22 RX ORDER — NYSTATIN 100000 U/G
CREAM TOPICAL 2 TIMES DAILY
Qty: 30 G | Refills: 0 | Status: SHIPPED | OUTPATIENT
Start: 2023-03-22

## 2023-03-22 RX ORDER — NYSTATIN 100000 [USP'U]/G
POWDER TOPICAL 2 TIMES DAILY
Qty: 60 G | Refills: 0 | Status: SHIPPED | OUTPATIENT
Start: 2023-03-22

## 2023-03-22 NOTE — ASSESSMENT & PLAN NOTE
S/p fall 2 weeks ago, has increased pain and reduced ROM  X-ray ordered and recommend ice/heat 15-20 min as tolerated, educated on shoulder exercises and physical therapy referral  She will f/u for PT and continue with tramadol 200 mg ER as prescribed as she would prefer not to take additional pain medication

## 2023-03-22 NOTE — PROGRESS NOTES
Name: Jemima Robbins      : 1950      MRN: [id number]  Encounter Provider: JOCELYN Recinos  Encounter Date: 3/22/2023   Encounter department: [address] [place] [address] MEDICINE    Assessment & Plan     1  Acute pain of right shoulder  Assessment & Plan:  S/p fall 2 weeks ago, has increased pain and reduced ROM  X-ray ordered and recommend ice/heat 15-20 min as tolerated, educated on shoulder exercises and physical therapy referral  She will f/u for PT and continue with tramadol 200 mg ER as prescribed as she would prefer not to take additional pain medication  Orders:  -     XR shoulder 2+ vw right; Future; Expected date: 2023    2  Candida infection of flexural skin  Assessment & Plan:  Continue nystatin cream and powder as prescribed  Orders:  -     nystatin (MYCOSTATIN) cream; Apply topically 2 (two) times a day  -     nystatin (MYCOSTATIN) powder; Apply topically 2 (two) times a day    BMI Counseling: Body mass index is 37 38 kg/m²  The BMI is above normal  Nutrition recommendations include consuming healthier snacks  Exercise recommendations include exercising 3-5 times per week  Rationale for BMI follow-up plan is due to patient being overweight or obese  Falls Plan of Care: balance, strength, and gait training instructions were provided  Subjective      Carterjed Rogers a few weeks ago and hurt her R shoulder  She has been having increased pain on movement  She has been using Hemp cream without success she is also taking daily tramadol 200 mg for arthritic pain  Has crunching sound on movement  BP elevated on arrival 144/77 - recheck 126/74    Requesting refill for antifungal cream and powder    She is due for routine physical and screenings she will schedule f/u appointment at a later date to complete as she is on her way to work  Review of Systems   Constitutional: Negative for chills, fatigue and fever  HENT: Negative for ear pain and sore throat      Eyes: Negative for pain and visual disturbance  Respiratory: Negative for cough and shortness of breath  Cardiovascular: Negative for chest pain and palpitations  Gastrointestinal: Negative for abdominal pain, diarrhea and vomiting  Genitourinary: Negative for dysuria and hematuria  Musculoskeletal: Positive for arthralgias and gait problem (ambulates with cane)  Negative for back pain, joint swelling, myalgias, neck pain and neck stiffness  Skin: Positive for rash  Negative for color change  Allergic/Immunologic: Negative for environmental allergies  Neurological: Negative for dizziness, seizures, syncope, light-headedness and headaches  Psychiatric/Behavioral: Negative for agitation, decreased concentration, self-injury, sleep disturbance and suicidal ideas  The patient is not nervous/anxious  All other systems reviewed and are negative        Current Outpatient Medications on File Prior to Visit   Medication Sig   • aspirin (ECOTRIN LOW STRENGTH) 81 mg EC tablet Take 1 tablet (81 mg total) by mouth daily   • atorvastatin (LIPITOR) 10 mg tablet TAKE 1 TABLET BY MOUTH  DAILY   • citalopram (CeleXA) 20 mg tablet TAKE 1 TABLET BY MOUTH  DAILY   • furosemide (LASIX) 40 mg tablet TAKE 1 TABLET (40 MG TOTAL) BY MOUTH 2 (TWO) TIMES A DAY (Patient taking differently: Take 40 mg by mouth daily)   • metoprolol succinate (TOPROL-XL) 50 mg 24 hr tablet TAKE 1 TABLET (50 MG TOTAL) BY MOUTH DAILY   • predniSONE 1 mg tablet Taking 3 mg daily   • traMADol (ULTRAM-ER) 200 MG 24 hr tablet TK 1 T PO D PRN P   • [DISCONTINUED] nystatin (MYCOSTATIN) cream Apply topically 2 (two) times a day   • [DISCONTINUED] nystatin (MYCOSTATIN) powder Apply topically 2 (two) times a day       Objective     /74 (BP Location: Right arm, Patient Position: Sitting, Cuff Size: Standard)   Temp 97 7 °F (36 5 °C) (Tympanic)   Wt 100 kg (221 lb 3 2 oz)   SpO2 96%   BMI 37 38 kg/m²     Physical Exam  Vitals and nursing note reviewed  Constitutional:       General: She is not in acute distress  Appearance: Normal appearance  She is obese  She is not ill-appearing or toxic-appearing  HENT:      Head: Normocephalic and atraumatic  Right Ear: Tympanic membrane, ear canal and external ear normal  There is no impacted cerumen  Left Ear: Tympanic membrane, ear canal and external ear normal  There is no impacted cerumen  Nose: Nose normal  No congestion or rhinorrhea  Mouth/Throat:      Mouth: Mucous membranes are moist       Pharynx: No oropharyngeal exudate or posterior oropharyngeal erythema  Eyes:      General:         Right eye: No discharge  Left eye: No discharge  Extraocular Movements: Extraocular movements intact  Conjunctiva/sclera: Conjunctivae normal       Pupils: Pupils are equal, round, and reactive to light  Neck:      Vascular: No carotid bruit  Cardiovascular:      Rate and Rhythm: Normal rate and regular rhythm  Pulses: Normal pulses  Heart sounds: Normal heart sounds  No murmur heard  Pulmonary:      Effort: Pulmonary effort is normal  No respiratory distress  Breath sounds: Normal breath sounds  No wheezing  Chest:      Chest wall: No tenderness  Abdominal:      General: Bowel sounds are normal  There is no distension  Palpations: Abdomen is soft  There is no mass  Tenderness: There is no abdominal tenderness  There is no right CVA tenderness or left CVA tenderness  Musculoskeletal:         General: No swelling or tenderness  Right shoulder: Bony tenderness and crepitus present  No swelling, deformity, effusion or laceration  Decreased range of motion  Normal strength  Normal pulse  Left shoulder: Normal       Cervical back: Normal range of motion  No rigidity or tenderness  Right lower leg: No edema  Left lower leg: No edema  Lymphadenopathy:      Cervical: No cervical adenopathy  Skin:     General: Skin is warm  Capillary Refill: Capillary refill takes less than 2 seconds  Coloration: Skin is not jaundiced  Findings: Rash present  No bruising or erythema  Rash is macular and scaling  Neurological:      General: No focal deficit present  Mental Status: She is alert and oriented to person, place, and time  Cranial Nerves: No cranial nerve deficit  Sensory: No sensory deficit  Coordination: Coordination normal    Psychiatric:         Mood and Affect: Mood normal          Behavior: Behavior normal          Thought Content:  Thought content normal        JOCELYN Sahu

## 2023-03-23 ENCOUNTER — HOSPITAL ENCOUNTER (OUTPATIENT)
Dept: RADIOLOGY | Facility: HOSPITAL | Age: 73
Discharge: HOME | End: 2023-03-23

## 2023-03-23 DIAGNOSIS — M25.511 ACUTE PAIN OF RIGHT SHOULDER: ICD-10-CM

## 2023-03-27 ENCOUNTER — TELEPHONE (OUTPATIENT)
Dept: FAMILY MEDICINE CLINIC | Facility: CLINIC | Age: 73
End: 2023-03-27

## 2023-03-27 NOTE — TELEPHONE ENCOUNTER
Spoke to patient and let her know we did not get results but I will be calling radiology to have them read

## 2023-07-07 ENCOUNTER — OFFICE VISIT (OUTPATIENT)
Dept: FAMILY MEDICINE CLINIC | Facility: CLINIC | Age: 73
End: 2023-07-07
Payer: COMMERCIAL

## 2023-07-07 VITALS
BODY MASS INDEX: 35.32 KG/M2 | HEIGHT: 65 IN | OXYGEN SATURATION: 94 % | HEART RATE: 87 BPM | SYSTOLIC BLOOD PRESSURE: 122 MMHG | WEIGHT: 212 LBS | DIASTOLIC BLOOD PRESSURE: 70 MMHG | TEMPERATURE: 98.4 F

## 2023-07-07 DIAGNOSIS — I25.10 CORONARY ARTERY DISEASE INVOLVING NATIVE CORONARY ARTERY OF NATIVE HEART WITHOUT ANGINA PECTORIS: ICD-10-CM

## 2023-07-07 DIAGNOSIS — I10 ESSENTIAL HYPERTENSION: Primary | ICD-10-CM

## 2023-07-07 DIAGNOSIS — R53.83 OTHER FATIGUE: ICD-10-CM

## 2023-07-07 DIAGNOSIS — M05.79 RHEUMATOID ARTHRITIS INVOLVING MULTIPLE SITES WITH POSITIVE RHEUMATOID FACTOR (HCC): ICD-10-CM

## 2023-07-07 DIAGNOSIS — Z12.31 ENCOUNTER FOR SCREENING MAMMOGRAM FOR MALIGNANT NEOPLASM OF BREAST: ICD-10-CM

## 2023-07-07 DIAGNOSIS — E78.5 DYSLIPIDEMIA: ICD-10-CM

## 2023-07-07 DIAGNOSIS — E55.9 VITAMIN D DEFICIENCY: ICD-10-CM

## 2023-07-07 DIAGNOSIS — Z78.0 POSTMENOPAUSAL: ICD-10-CM

## 2023-07-07 DIAGNOSIS — F32.A CHRONIC DEPRESSION: Chronic | ICD-10-CM

## 2023-07-07 DIAGNOSIS — E66.01 CLASS 2 SEVERE OBESITY DUE TO EXCESS CALORIES WITH SERIOUS COMORBIDITY AND BODY MASS INDEX (BMI) OF 35.0 TO 35.9 IN ADULT (HCC): ICD-10-CM

## 2023-07-07 DIAGNOSIS — Z00.00 ANNUAL PHYSICAL EXAM: ICD-10-CM

## 2023-07-07 DIAGNOSIS — N63.25 MASS OVERLAPPING MULTIPLE QUADRANTS OF LEFT BREAST: ICD-10-CM

## 2023-07-07 DIAGNOSIS — Z12.11 COLON CANCER SCREENING: ICD-10-CM

## 2023-07-07 PROCEDURE — 99397 PER PM REEVAL EST PAT 65+ YR: CPT | Performed by: FAMILY MEDICINE

## 2023-07-07 PROCEDURE — 99214 OFFICE O/P EST MOD 30 MIN: CPT | Performed by: FAMILY MEDICINE

## 2023-07-07 NOTE — PROGRESS NOTES
V Name: Delmar Stapleton      : 1950      MRN: [id number]  Encounter Provider: Everett Davis MD  Encounter Date: 2023   Encounter department: [facility] MEDICINE    Assessment & Plan     1. Essential hypertension  Assessment & Plan:  Well controlled on current therapy continue with current medications and will reassess next visit        2. Coronary artery disease involving native coronary artery of native heart without angina pectoris  Assessment & Plan:  Pt has not seen cardiologist since  encouraged to follow up  Chest pain free    Orders:  -     Ambulatory Referral to Cardiology; Future    3. Dyslipidemia    4. Chronic depression    5. Class 2 severe obesity due to excess calories with serious comorbidity and body mass index (BMI) of 35.0 to 35.9 in adult [facility])  Assessment & Plan:  Discussion on diet and exercise guidelines for weight loss and  health reviewed with pt         6. Colon cancer screening  Assessment & Plan:  Send for colonscopy    Orders:  -     Ambulatory Referral to Gastroenterology; Future    7. Encounter for screening mammogram for malignant neoplasm of breast  Assessment & Plan:  Send again for mammo      8. Postmenopausal  Assessment & Plan:  Check dexa    Orders:  -     DXA bone density spine hip and pelvis; Future; Expected date: 2023    9. Vitamin D deficiency  Assessment & Plan:  Check level    Orders:  -     Vitamin D 25 hydroxy; Future    10. Annual physical exam  Assessment & Plan:  Check routine labs      Orders:  -     CBC and differential; Future  -     Comprehensive metabolic panel; Future; Expected date: 2023  -     Lipid panel; Future; Expected date: 2023  -     Urinalysis with microscopic    11. Rheumatoid arthritis involving multiple sites with positive rheumatoid factor (HCC)  Assessment & Plan:  Sees rheumatology      12.  Other fatigue  Assessment & Plan:  Fatigue when she wakes up and until about 1-2 hours later then feels ok sleeps well     Orders:  -     TSH, 3rd generation; Future    13. Mass overlapping multiple quadrants of left breast  Assessment & Plan:  Diagnostic mammo and  Breast US    Orders:  -     Mammo diagnostic bilateral w cad; Future; Expected date: 07/07/2023  -     US breast left limited (diagnostic); Future; Expected date: 07/07/2023  -     US breast right limited (diagnostic); Future; Expected date: 07/07/2023           Subjective      HPI Patient here for annual physical/interval visit    Review of Systems   Constitutional: Negative for appetite change, chills, fatigue and fever. Respiratory: Negative for cough, chest tightness and shortness of breath. Cardiovascular: Negative for chest pain, palpitations and leg swelling. Gastrointestinal: Negative for abdominal pain, constipation, diarrhea, nausea and vomiting. Genitourinary: Negative for difficulty urinating and frequency. Musculoskeletal: Negative for arthralgias, back pain, gait problem and neck pain. Skin: Negative for rash. Neurological: Negative for dizziness, weakness, light-headedness, numbness and headaches. Hematological: Does not bruise/bleed easily. Psychiatric/Behavioral: Negative for dysphoric mood and sleep disturbance. The patient is not nervous/anxious.         Current Outpatient Medications on File Prior to Visit   Medication Sig   • Apremilast 30 MG TABS Take 30 mg by mouth 2 (two) times a day   • aspirin (ECOTRIN LOW STRENGTH) 81 mg EC tablet Take 1 tablet (81 mg total) by mouth daily   • atorvastatin (LIPITOR) 10 mg tablet TAKE 1 TABLET BY MOUTH  DAILY   • citalopram (CeleXA) 20 mg tablet TAKE 1 TABLET BY MOUTH  DAILY   • furosemide (LASIX) 40 mg tablet TAKE 1 TABLET (40 MG TOTAL) BY MOUTH 2 (TWO) TIMES A DAY (Patient taking differently: Take 40 mg by mouth daily)   • metoprolol succinate (TOPROL-XL) 50 mg 24 hr tablet TAKE 1 TABLET (50 MG TOTAL) BY MOUTH DAILY   • nystatin (MYCOSTATIN) cream Apply topically 2 (two) times a day   • nystatin (MYCOSTATIN) powder Apply topically 2 (two) times a day   • predniSONE 1 mg tablet Taking 3 mg daily   • traMADol (ULTRAM-ER) 200 MG 24 hr tablet TK 1 T PO D PRN P       Objective     /70 (BP Location: Left arm, Patient Position: Sitting, Cuff Size: Large)   Pulse 87   Temp 98.4 °F (36.9 °C)   Ht 5' 5" (1.651 m)   Wt 96.2 kg (212 lb)   SpO2 94%   BMI 35.28 kg/m²     Physical Exam  Vitals reviewed. Constitutional:       General: She is not in acute distress. Appearance: Normal appearance. She is well-developed. HENT:      Head: Normocephalic. Right Ear: Tympanic membrane, ear canal and external ear normal.      Left Ear: Tympanic membrane, ear canal and external ear normal.      Nose: Nose normal.      Mouth/Throat:      Pharynx: No oropharyngeal exudate. Eyes:      General: Lids are normal.      Extraocular Movements: Extraocular movements intact. Conjunctiva/sclera: Conjunctivae normal.      Pupils: Pupils are equal, round, and reactive to light. Neck:      Thyroid: No thyromegaly. Vascular: No carotid bruit. Cardiovascular:      Rate and Rhythm: Normal rate and regular rhythm. Pulses: Normal pulses. Heart sounds: Normal heart sounds. No murmur heard. No friction rub. Pulmonary:      Effort: Pulmonary effort is normal. No respiratory distress. Breath sounds: Normal breath sounds. No stridor. No wheezing or rales. Chest:   Breasts:     Breasts are symmetrical.      Right: Normal. No swelling, bleeding, inverted nipple, mass, nipple discharge, skin change or tenderness. Left: Normal. No swelling, bleeding, inverted nipple, mass, nipple discharge, skin change or tenderness. Comments: 3-4 cm mass left inferior mid to outer breast   Abdominal:      General: Bowel sounds are normal. There is no distension. Palpations: Abdomen is soft. There is no mass. Tenderness: There is no abdominal tenderness. There is no guarding. Hernia: No hernia is present. Musculoskeletal:         General: Normal range of motion. Cervical back: Full passive range of motion without pain, normal range of motion and neck supple. Lymphadenopathy:      Cervical: No cervical adenopathy. Skin:     General: Skin is warm and dry. Findings: No rash. Comments: Nl appearing moles   Neurological:      General: No focal deficit present. Mental Status: She is alert and oriented to person, place, and time. Mental status is at baseline. Cranial Nerves: No cranial nerve deficit. Sensory: No sensory deficit. Motor: No abnormal muscle tone. Coordination: Coordination normal.      Gait: Gait normal.      Deep Tendon Reflexes: Reflexes normal. Babinski sign absent on the right side. Psychiatric:         Mood and Affect: Mood normal.         Speech: Speech normal.         Behavior: Behavior normal.         Thought Content:  Thought content normal.         Judgment: Judgment normal.       Juanis Sepulveda MD

## 2023-08-05 DIAGNOSIS — F32.A CHRONIC DEPRESSION: ICD-10-CM

## 2023-08-07 RX ORDER — CITALOPRAM 20 MG/1
TABLET ORAL
Qty: 90 TABLET | Refills: 3 | Status: SHIPPED | OUTPATIENT
Start: 2023-08-07

## 2023-08-29 ENCOUNTER — OFFICE VISIT (OUTPATIENT)
Dept: CARDIOLOGY CLINIC | Facility: CLINIC | Age: 73
End: 2023-08-29
Payer: COMMERCIAL

## 2023-08-29 VITALS
WEIGHT: 200 LBS | OXYGEN SATURATION: 96 % | BODY MASS INDEX: 33.32 KG/M2 | HEIGHT: 65 IN | SYSTOLIC BLOOD PRESSURE: 124 MMHG | DIASTOLIC BLOOD PRESSURE: 76 MMHG | HEART RATE: 79 BPM

## 2023-08-29 DIAGNOSIS — I10 ESSENTIAL HYPERTENSION: ICD-10-CM

## 2023-08-29 DIAGNOSIS — R00.2 PALPITATION: ICD-10-CM

## 2023-08-29 DIAGNOSIS — E78.5 DYSLIPIDEMIA: ICD-10-CM

## 2023-08-29 DIAGNOSIS — Z95.1 S/P CABG X 3: ICD-10-CM

## 2023-08-29 DIAGNOSIS — I25.10 CORONARY ARTERY DISEASE INVOLVING NATIVE CORONARY ARTERY OF NATIVE HEART WITHOUT ANGINA PECTORIS: Primary | ICD-10-CM

## 2023-08-29 PROCEDURE — 93000 ELECTROCARDIOGRAM COMPLETE: CPT | Performed by: INTERNAL MEDICINE

## 2023-08-29 PROCEDURE — 99213 OFFICE O/P EST LOW 20 MIN: CPT | Performed by: INTERNAL MEDICINE

## 2023-08-29 RX ORDER — METOPROLOL SUCCINATE 50 MG/1
50 TABLET, EXTENDED RELEASE ORAL DAILY
Qty: 90 TABLET | Refills: 3 | Status: SHIPPED | OUTPATIENT
Start: 2023-08-29

## 2023-08-29 RX ORDER — ATORVASTATIN CALCIUM 10 MG/1
10 TABLET, FILM COATED ORAL DAILY
Qty: 90 TABLET | Refills: 3 | Status: SHIPPED | OUTPATIENT
Start: 2023-08-29

## 2023-08-29 NOTE — LETTER
August 29, 2023     [place] [place], [address]    Patient: Yonatan Horvath   YOB: 1950   Date of Visit: 8/29/2023       Dear Dr. Giancarlo Pena: Thank you for referring Yonatan Horvath to me for evaluation. Below are my notes for this consultation. If you have questions, please do not hesitate to call me. I look forward to following your patient along with you. Sincerely,        Al Sheehan MD        CC: No Recipients    Al Sheehan MD  8/29/2023 11:26 AM  Sign when Signing Visit  Assessment/Plan:    Coronary artery disease involving native coronary artery of native heart without angina pectoris  Coronary artery disease, stable. No symptoms of angina. No signs of heart failure. Essential hypertension  Hypertension, stable and adequately controlled. Dyslipidemia  Hyperlipidemia, stable. The patient is due for follow-up lab. Palpitation  The patient describes symptoms of mild palpitation not associated with any symptoms of dizziness or syncope. Diagnoses and all orders for this visit:    Coronary artery disease involving native coronary artery of native heart without angina pectoris  -     POCT ECG  -     Echo complete w/ contrast if indicated; Future  -     Stress test only, exercise; Future  -     metoprolol succinate (TOPROL-XL) 50 mg 24 hr tablet; Take 1 tablet (50 mg total) by mouth daily  -     atorvastatin (LIPITOR) 10 mg tablet; Take 1 tablet (10 mg total) by mouth daily    Essential hypertension  -     metoprolol succinate (TOPROL-XL) 50 mg 24 hr tablet; Take 1 tablet (50 mg total) by mouth daily    Dyslipidemia  -     atorvastatin (LIPITOR) 10 mg tablet; Take 1 tablet (10 mg total) by mouth daily    S/P CABG x 3  -     Echo complete w/ contrast if indicated; Future  -     Stress test only, exercise; Future    Palpitation  -     Stress test only, exercise; Future         Subjective: Feels well.     Patient ID: Yonatan Horvath is a 72 y.o. female. The patient presented to this office for the purpose of cardiac follow-up. She is known to have a history of coronary artery disease status post coronary bypass surgery x3 in 2019. The patient is also known to have a history of hypertension and hyperlipidemia. The patient is feeling well. She denies any symptoms of chest pain, shortness of breath, dizziness or lightheadedness. She does experience occasional symptoms of palpitation without associating symptoms of syncope or near syncope. She has no leg edema. The following portions of the patient's history were reviewed and updated as appropriate: allergies, current medications, past family history, past medical history, past social history, past surgical history and problem list.    Review of Systems   Respiratory: Negative for apnea, cough, chest tightness, shortness of breath and wheezing. Cardiovascular: Negative for chest pain, palpitations and leg swelling. Gastrointestinal: Negative for abdominal pain. Neurological: Negative for dizziness and light-headedness. Psychiatric/Behavioral: Negative. Objective: Stable cardiac wise. /76 (BP Location: Left arm, Patient Position: Sitting, Cuff Size: Standard)   Pulse 79   Ht 5' 5" (1.651 m)   Wt 90.7 kg (200 lb)   SpO2 96%   BMI 33.28 kg/m²         Physical Exam  Vitals reviewed. Constitutional:       General: She is not in acute distress. Appearance: She is well-developed. She is not diaphoretic. HENT:      Head: Normocephalic and atraumatic. Neck:      Thyroid: No thyromegaly. Vascular: No JVD. Cardiovascular:      Rate and Rhythm: Normal rate and regular rhythm. Heart sounds: S1 normal and S2 normal. No murmur heard. No friction rub. No gallop. Pulmonary:      Effort: Pulmonary effort is normal. No respiratory distress. Breath sounds: No wheezing or rales. Chest:      Chest wall: No tenderness.    Abdominal: Palpations: Abdomen is soft. Musculoskeletal:      Cervical back: Normal range of motion and neck supple. Right lower leg: No edema. Left lower leg: No edema. Skin:     General: Skin is warm and dry. Neurological:      Mental Status: She is oriented to person, place, and time.    Psychiatric:         Mood and Affect: Mood normal.         Behavior: Behavior normal.

## 2023-08-29 NOTE — PROGRESS NOTES
Assessment/Plan:    Coronary artery disease involving native coronary artery of native heart without angina pectoris  Coronary artery disease, stable. No symptoms of angina. No signs of heart failure. Essential hypertension  Hypertension, stable and adequately controlled. Dyslipidemia  Hyperlipidemia, stable. The patient is due for follow-up lab. Palpitation  The patient describes symptoms of mild palpitation not associated with any symptoms of dizziness or syncope. Diagnoses and all orders for this visit:    Coronary artery disease involving native coronary artery of native heart without angina pectoris  -     POCT ECG  -     Echo complete w/ contrast if indicated; Future  -     Stress test only, exercise; Future  -     metoprolol succinate (TOPROL-XL) 50 mg 24 hr tablet; Take 1 tablet (50 mg total) by mouth daily  -     atorvastatin (LIPITOR) 10 mg tablet; Take 1 tablet (10 mg total) by mouth daily    Essential hypertension  -     metoprolol succinate (TOPROL-XL) 50 mg 24 hr tablet; Take 1 tablet (50 mg total) by mouth daily    Dyslipidemia  -     atorvastatin (LIPITOR) 10 mg tablet; Take 1 tablet (10 mg total) by mouth daily    S/P CABG x 3  -     Echo complete w/ contrast if indicated; Future  -     Stress test only, exercise; Future    Palpitation  -     Stress test only, exercise; Future          Subjective: Feels well. Patient ID: Bethany Jiang is a 72 y.o. female. The patient presented to this office for the purpose of cardiac follow-up. She is known to have a history of coronary artery disease status post coronary bypass surgery x3 in 2019. The patient is also known to have a history of hypertension and hyperlipidemia. The patient is feeling well. She denies any symptoms of chest pain, shortness of breath, dizziness or lightheadedness. She does experience occasional symptoms of palpitation without associating symptoms of syncope or near syncope. She has no leg edema.       The following portions of the patient's history were reviewed and updated as appropriate: allergies, current medications, past family history, past medical history, past social history, past surgical history and problem list.    Review of Systems   Respiratory: Negative for apnea, cough, chest tightness, shortness of breath and wheezing. Cardiovascular: Negative for chest pain, palpitations and leg swelling. Gastrointestinal: Negative for abdominal pain. Neurological: Negative for dizziness and light-headedness. Psychiatric/Behavioral: Negative. Objective: Stable cardiac wise. /76 (BP Location: Left arm, Patient Position: Sitting, Cuff Size: Standard)   Pulse 79   Ht 5' 5" (1.651 m)   Wt 90.7 kg (200 lb)   SpO2 96%   BMI 33.28 kg/m²          Physical Exam  Vitals reviewed. Constitutional:       General: She is not in acute distress. Appearance: She is well-developed. She is not diaphoretic. HENT:      Head: Normocephalic and atraumatic. Neck:      Thyroid: No thyromegaly. Vascular: No JVD. Cardiovascular:      Rate and Rhythm: Normal rate and regular rhythm. Heart sounds: S1 normal and S2 normal. No murmur heard. No friction rub. No gallop. Pulmonary:      Effort: Pulmonary effort is normal. No respiratory distress. Breath sounds: No wheezing or rales. Chest:      Chest wall: No tenderness. Abdominal:      Palpations: Abdomen is soft. Musculoskeletal:      Cervical back: Normal range of motion and neck supple. Right lower leg: No edema. Left lower leg: No edema. Skin:     General: Skin is warm and dry. Neurological:      Mental Status: She is oriented to person, place, and time.    Psychiatric:         Mood and Affect: Mood normal.         Behavior: Behavior normal.

## 2023-08-29 NOTE — ASSESSMENT & PLAN NOTE
The patient describes symptoms of mild palpitation not associated with any symptoms of dizziness or syncope.

## 2023-08-29 NOTE — PATIENT INSTRUCTIONS
The patient will be scheduled for regular exercise stress test and echocardiogram.  She will continue present regimen of medications.

## 2023-08-30 ENCOUNTER — HOSPITAL ENCOUNTER (OUTPATIENT)
Dept: MAMMOGRAPHY | Facility: CLINIC | Age: 73
Discharge: HOME | End: 2023-08-30
Payer: COMMERCIAL

## 2023-08-30 ENCOUNTER — HOSPITAL ENCOUNTER (OUTPATIENT)
Dept: ULTRASOUND IMAGING | Facility: CLINIC | Age: 73
Discharge: HOME | End: 2023-08-30
Payer: COMMERCIAL

## 2023-08-30 VITALS — HEIGHT: 60 IN | BODY MASS INDEX: 39.27 KG/M2 | WEIGHT: 200 LBS

## 2023-08-30 DIAGNOSIS — N63.25 MASS OVERLAPPING MULTIPLE QUADRANTS OF LEFT BREAST: ICD-10-CM

## 2023-08-30 DIAGNOSIS — N63.25 BREAST LUMP ON LEFT SIDE AT 6 O'CLOCK POSITION: ICD-10-CM

## 2023-08-30 PROCEDURE — G0279 TOMOSYNTHESIS, MAMMO: HCPCS

## 2023-08-30 PROCEDURE — 77066 DX MAMMO INCL CAD BI: CPT

## 2023-08-30 PROCEDURE — 76642 ULTRASOUND BREAST LIMITED: CPT

## 2023-08-30 NOTE — PROGRESS NOTES
Met with patient and Dr. Oleksandr Hammer  regarding recommendation for;    _____ RIGHT ___X___LEFT      __X___Ultrasound guided  ______Stereotactic breast biopsy. __X___Verbalized understanding.       Blood thinners:  No: _____ Yes: ___X___ What: aspirin 81 mg                Biopsy teaching sheet given:  Yes: ___X___ No: ________    Pt given contact information and adv to call with any questions/needs

## 2023-09-05 PROBLEM — Z12.11 COLON CANCER SCREENING: Status: RESOLVED | Noted: 2023-07-07 | Resolved: 2023-09-05

## 2023-09-19 ENCOUNTER — HOSPITAL ENCOUNTER (OUTPATIENT)
Dept: MAMMOGRAPHY | Facility: CLINIC | Age: 73
Discharge: HOME | End: 2023-09-19

## 2023-09-19 ENCOUNTER — HOSPITAL ENCOUNTER (OUTPATIENT)
Dept: ULTRASOUND IMAGING | Facility: CLINIC | Age: 73
Discharge: HOME | End: 2023-09-19
Admitting: RADIOLOGY
Payer: COMMERCIAL

## 2023-09-19 VITALS — SYSTOLIC BLOOD PRESSURE: 127 MMHG | DIASTOLIC BLOOD PRESSURE: 75 MMHG | HEART RATE: 58 BPM

## 2023-09-19 DIAGNOSIS — I10 ESSENTIAL HYPERTENSION: ICD-10-CM

## 2023-09-19 DIAGNOSIS — R92.8 ABNORMAL MAMMOGRAM: ICD-10-CM

## 2023-09-19 PROCEDURE — 88360 TUMOR IMMUNOHISTOCHEM/MANUAL: CPT | Performed by: PATHOLOGY

## 2023-09-19 PROCEDURE — A4648 IMPLANTABLE TISSUE MARKER: HCPCS

## 2023-09-19 PROCEDURE — 88342 IMHCHEM/IMCYTCHM 1ST ANTB: CPT | Performed by: PATHOLOGY

## 2023-09-19 PROCEDURE — 88305 TISSUE EXAM BY PATHOLOGIST: CPT | Performed by: PATHOLOGY

## 2023-09-19 PROCEDURE — 88341 IMHCHEM/IMCYTCHM EA ADD ANTB: CPT | Performed by: PATHOLOGY

## 2023-09-19 PROCEDURE — 19083 BX BREAST 1ST LESION US IMAG: CPT

## 2023-09-19 RX ORDER — LIDOCAINE HYDROCHLORIDE 10 MG/ML
5 INJECTION, SOLUTION EPIDURAL; INFILTRATION; INTRACAUDAL; PERINEURAL ONCE
Status: COMPLETED | OUTPATIENT
Start: 2023-09-19 | End: 2023-09-19

## 2023-09-19 RX ORDER — FUROSEMIDE 40 MG/1
40 TABLET ORAL 2 TIMES DAILY
Qty: 180 TABLET | Refills: 0 | Status: SHIPPED | OUTPATIENT
Start: 2023-09-19

## 2023-09-19 RX ADMIN — LIDOCAINE HYDROCHLORIDE 5 ML: 10 INJECTION, SOLUTION EPIDURAL; INFILTRATION; INTRACAUDAL; PERINEURAL at 09:26

## 2023-09-19 NOTE — PROGRESS NOTES
Ice pack given:    __x___yes _____no    Discharge instructions signed by patient:    _____yes __x___no    Discharge instructions given to patient:    __x___yes _____no    Discharged via:    __x___ambulatory    _____wheelchair    _____stretcher    Stable on discharge:    ___x__yes ____no  Patient would like results over the phone. Ok to leave a message.

## 2023-09-19 NOTE — PROGRESS NOTES
Procedure type:    __x___ultrasound guided _____stereotactic    Breast:    __x___Left _____Right    Location: 4 o'clock 2 cmfn     Needle: 12 gauge shanice    # of passes: 3    Clip: VERENICE      Performed by: Dr. Pratibha Jones held for 5 minutes by: Corey Madsen     Steri Strips:    __x___yes _____no    Band aid:    __x___yes_____no    Tape and guaze:    _____yes ___x__no    Tolerated procedure:    ___x__yes _____no

## 2023-09-21 PROCEDURE — 88305 TISSUE EXAM BY PATHOLOGIST: CPT | Performed by: PATHOLOGY

## 2023-09-21 PROCEDURE — 88341 IMHCHEM/IMCYTCHM EA ADD ANTB: CPT | Performed by: PATHOLOGY

## 2023-09-21 PROCEDURE — 88342 IMHCHEM/IMCYTCHM 1ST ANTB: CPT | Performed by: PATHOLOGY

## 2023-09-22 ENCOUNTER — DOCUMENTATION (OUTPATIENT)
Dept: HEMATOLOGY ONCOLOGY | Facility: CLINIC | Age: 73
End: 2023-09-22

## 2023-09-22 ENCOUNTER — TELEPHONE (OUTPATIENT)
Dept: MAMMOGRAPHY | Facility: CLINIC | Age: 73
End: 2023-09-22

## 2023-09-22 NOTE — PROGRESS NOTES
Post procedure call completed    Bleeding: _____yes __X___no, pt denies    Pain: _____yes ___X___no, pt denies , used ice pack as directed    Redness/Swelling: ______yes ___X___no, pt denies    Band aid removed: ___X__yes ____no     Steri-Strips intact: ___X___yes _____no (discussed with patient to remove steri strips on 9/24 if they have not come off on their own)    Pt with no questions at this time, adv will call when results available, adv to call with any questions or concerns, has name/# for contact

## 2023-09-22 NOTE — PROGRESS NOTES
Intake received/ Chart reviewed for services completed outside of [facility]    Pathology completed: Yes, completed on 9-    Imaging completed: [address] [place] [address] done on 8-  All records needed are in patients chart. No records retrieval needed at this time.

## 2023-09-22 NOTE — TELEPHONE ENCOUNTER
[facility] Referral    Diagnosis: Invasive Mammary cancer    Is this diagnosis cancer (Y/N):Yes    Biopsy Date: 9/19/2023    Does the patient have another biopsy pending: No  If so, when:    Preferred provider: Dr. David Irizarry    Preferred location: [facility]    Any requests for dates/times: first available    Any additional information:  Pt has VERENICE  placed, please call patient cell phone to make appt, [phone].     Please advise when appointment is made

## 2023-09-25 ENCOUNTER — TELEPHONE (OUTPATIENT)
Age: 73
End: 2023-09-25

## 2023-09-25 ENCOUNTER — PATIENT OUTREACH (OUTPATIENT)
Dept: HEMATOLOGY ONCOLOGY | Facility: CLINIC | Age: 73
End: 2023-09-25

## 2023-09-25 DIAGNOSIS — Z17.0 MALIGNANT NEOPLASM OF LEFT BREAST IN FEMALE, ESTROGEN RECEPTOR POSITIVE, UNSPECIFIED SITE OF BREAST: Primary | ICD-10-CM

## 2023-09-25 DIAGNOSIS — C50.919 MALIGNANT NEOPLASM OF FEMALE BREAST, UNSPECIFIED ESTROGEN RECEPTOR STATUS, UNSPECIFIED LATERALITY, UNSPECIFIED SITE OF BREAST (HCC): Primary | ICD-10-CM

## 2023-09-25 DIAGNOSIS — C50.912 MALIGNANT NEOPLASM OF LEFT BREAST IN FEMALE, ESTROGEN RECEPTOR POSITIVE, UNSPECIFIED SITE OF BREAST: Primary | ICD-10-CM

## 2023-09-25 NOTE — TELEPHONE ENCOUNTER
Atul Mcnamara from the [facility]. Feroz Marcum had biopsy and it was recommended by radiologist that patient will need Breast MRI. Will Dr. Lori Barros order this?     Please call Huang Steele and let her know [phone]

## 2023-09-26 ENCOUNTER — PATIENT OUTREACH (OUTPATIENT)
Dept: HEMATOLOGY ONCOLOGY | Facility: CLINIC | Age: 73
End: 2023-09-26

## 2023-09-26 ENCOUNTER — TELEPHONE (OUTPATIENT)
Dept: HEMATOLOGY ONCOLOGY | Facility: CLINIC | Age: 73
End: 2023-09-26

## 2023-09-26 ENCOUNTER — PATIENT OUTREACH (OUTPATIENT)
Dept: CASE MANAGEMENT | Facility: OTHER | Age: 73
End: 2023-09-26

## 2023-09-26 NOTE — TELEPHONE ENCOUNTER
Appointment Confirmation   Who are you speaking with? Patient   If it is not the patient, are they listed on an active communication consent form? N/A   Which provider is the appointment scheduled with? Dr. Theodora Jerome   When is the appointment scheduled? Please list date and time 10/4/23 @ 3pom   At which location is the appointment scheduled to take place? [facility]   Did caller verbalize understanding of appointment details?  yes

## 2023-09-26 NOTE — PROGRESS NOTES
OSW received SW referral. Pt has her consult with Dr. Roge Dawn on 10/4. OSW will place outreach TC after her consult to complete the distress thermometer and psychosocial assessment.

## 2023-09-28 ENCOUNTER — PATIENT OUTREACH (OUTPATIENT)
Dept: HEMATOLOGY ONCOLOGY | Facility: CLINIC | Age: 73
End: 2023-09-28

## 2023-09-28 ENCOUNTER — HOSPITAL ENCOUNTER (OUTPATIENT)
Dept: MRI IMAGING | Facility: HOSPITAL | Age: 73
End: 2023-09-28
Payer: COMMERCIAL

## 2023-09-28 DIAGNOSIS — C50.919 MALIGNANT NEOPLASM OF FEMALE BREAST, UNSPECIFIED ESTROGEN RECEPTOR STATUS, UNSPECIFIED LATERALITY, UNSPECIFIED SITE OF BREAST (HCC): ICD-10-CM

## 2023-09-28 PROCEDURE — G1004 CDSM NDSC: HCPCS

## 2023-09-28 PROCEDURE — C8908 MRI W/O FOL W/CONT, BREAST,: HCPCS

## 2023-09-28 PROCEDURE — A9585 GADOBUTROL INJECTION: HCPCS | Performed by: FAMILY MEDICINE

## 2023-09-28 PROCEDURE — C8937 CAD BREAST MRI: HCPCS

## 2023-09-28 PROCEDURE — 77049 MRI BREAST C-+ W/CAD BI: CPT

## 2023-09-28 RX ORDER — GADOBUTROL 604.72 MG/ML
9 INJECTION INTRAVENOUS
Status: COMPLETED | OUTPATIENT
Start: 2023-09-28 | End: 2023-09-28

## 2023-09-28 RX ADMIN — GADOBUTROL 9 ML: 604.72 INJECTION INTRAVENOUS at 14:19

## 2023-09-28 NOTE — PROGRESS NOTES
Breast Oncology Nurse Navigator    Called patient for initial outreach from nurse navigator. Introduced myself and my role. Briefly discussed diagnosis. Patient states she is coming to terms with the diagnosis but does not yet know what questions to ask. Patient has breast MRI this afternoon. Explained the positioning for the procedure and what to expect. Confirmed upcoming appointment with Dr Jennifer Blandon for next week, including directions once on the [employer]. She will be accompanied by her  or her daughter. Patient lives with her supportive  and sister-in-law. 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 Patient also works full time. She has a large support system that includes her three children, family and close friends. Denies issues with transportation. Referral already placed to OSW. Patient denies any known family history of cancer. Offered genetic testing. Patient will discuss with her family. She will call me if she is interested. No referral placed to oncology genetics at this time. Patient has psoriatic arthritis and scoliosis. She denies pain at this time, stating she is well controlled on medication. Discussed the [address] [place] [address] Road and some of the programs and services offered, including virtual options. Patient has my contact information and knows she can reach out with questions. General assessment completed.

## 2023-09-29 PROBLEM — Z17.0 MALIGNANT NEOPLASM OF LOWER-OUTER QUADRANT OF LEFT BREAST OF FEMALE, ESTROGEN RECEPTOR POSITIVE: Status: ACTIVE | Noted: 2023-09-29

## 2023-09-29 PROBLEM — Z12.31 ENCOUNTER FOR SCREENING MAMMOGRAM FOR MALIGNANT NEOPLASM OF BREAST: Status: RESOLVED | Noted: 2023-07-07 | Resolved: 2023-09-29

## 2023-09-29 PROBLEM — C50.512 MALIGNANT NEOPLASM OF LOWER-OUTER QUADRANT OF LEFT BREAST OF FEMALE, ESTROGEN RECEPTOR POSITIVE: Status: ACTIVE | Noted: 2023-09-29

## 2023-09-29 PROBLEM — Z00.00 ANNUAL PHYSICAL EXAM: Status: RESOLVED | Noted: 2023-07-07 | Resolved: 2023-09-29

## 2023-10-01 ENCOUNTER — APPOINTMENT (OUTPATIENT)
Dept: LAB | Facility: CLINIC | Age: 73
End: 2023-10-01
Payer: COMMERCIAL

## 2023-10-01 DIAGNOSIS — E55.9 VITAMIN D DEFICIENCY: ICD-10-CM

## 2023-10-01 DIAGNOSIS — R53.83 OTHER FATIGUE: ICD-10-CM

## 2023-10-01 DIAGNOSIS — Z00.00 ANNUAL PHYSICAL EXAM: ICD-10-CM

## 2023-10-01 LAB
25(OH)D3 SERPL-MCNC: 22.4 NG/ML (ref 30–100)
ALBUMIN SERPL BCP-MCNC: 3.8 G/DL (ref 3.5–5)
ALP SERPL-CCNC: 80 U/L (ref 34–104)
ALT SERPL W P-5'-P-CCNC: 19 U/L (ref 7–52)
ANION GAP SERPL CALCULATED.3IONS-SCNC: 4 MMOL/L
AST SERPL W P-5'-P-CCNC: 19 U/L (ref 13–39)
BASOPHILS # BLD AUTO: 0.02 THOUSANDS/ÂΜL (ref 0–0.1)
BASOPHILS NFR BLD AUTO: 0 % (ref 0–1)
BILIRUB SERPL-MCNC: 0.71 MG/DL (ref 0.2–1)
BUN SERPL-MCNC: 23 MG/DL (ref 5–25)
CALCIUM SERPL-MCNC: 8.8 MG/DL (ref 8.4–10.2)
CHLORIDE SERPL-SCNC: 104 MMOL/L (ref 96–108)
CHOLEST SERPL-MCNC: 193 MG/DL
CO2 SERPL-SCNC: 32 MMOL/L (ref 21–32)
CREAT SERPL-MCNC: 0.9 MG/DL (ref 0.6–1.3)
EOSINOPHIL # BLD AUTO: 0.35 THOUSAND/ÂΜL (ref 0–0.61)
EOSINOPHIL NFR BLD AUTO: 6 % (ref 0–6)
ERYTHROCYTE [DISTWIDTH] IN BLOOD BY AUTOMATED COUNT: 14.2 % (ref 11.6–15.1)
GFR SERPL CREATININE-BSD FRML MDRD: 64 ML/MIN/1.73SQ M
GLUCOSE P FAST SERPL-MCNC: 87 MG/DL (ref 65–99)
HCT VFR BLD AUTO: 38.8 % (ref 34.8–46.1)
HDLC SERPL-MCNC: 58 MG/DL
HGB BLD-MCNC: 12.2 G/DL (ref 11.5–15.4)
IMM GRANULOCYTES # BLD AUTO: 0.01 THOUSAND/UL (ref 0–0.2)
IMM GRANULOCYTES NFR BLD AUTO: 0 % (ref 0–2)
LDLC SERPL CALC-MCNC: 105 MG/DL (ref 0–100)
LYMPHOCYTES # BLD AUTO: 1.51 THOUSANDS/ÂΜL (ref 0.6–4.47)
LYMPHOCYTES NFR BLD AUTO: 27 % (ref 14–44)
MCH RBC QN AUTO: 31.9 PG (ref 26.8–34.3)
MCHC RBC AUTO-ENTMCNC: 31.4 G/DL (ref 31.4–37.4)
MCV RBC AUTO: 102 FL (ref 82–98)
MONOCYTES # BLD AUTO: 0.59 THOUSAND/ÂΜL (ref 0.17–1.22)
MONOCYTES NFR BLD AUTO: 10 % (ref 4–12)
NEUTROPHILS # BLD AUTO: 3.18 THOUSANDS/ÂΜL (ref 1.85–7.62)
NEUTS SEG NFR BLD AUTO: 57 % (ref 43–75)
NONHDLC SERPL-MCNC: 135 MG/DL
NRBC BLD AUTO-RTO: 0 /100 WBCS
PLATELET # BLD AUTO: 225 THOUSANDS/UL (ref 149–390)
PMV BLD AUTO: 10.9 FL (ref 8.9–12.7)
POTASSIUM SERPL-SCNC: 4.4 MMOL/L (ref 3.5–5.3)
PROT SERPL-MCNC: 7.4 G/DL (ref 6.4–8.4)
RBC # BLD AUTO: 3.82 MILLION/UL (ref 3.81–5.12)
SODIUM SERPL-SCNC: 140 MMOL/L (ref 135–147)
TRIGL SERPL-MCNC: 149 MG/DL
TSH SERPL DL<=0.05 MIU/L-ACNC: 2.53 UIU/ML (ref 0.45–4.5)
WBC # BLD AUTO: 5.66 THOUSAND/UL (ref 4.31–10.16)

## 2023-10-01 PROCEDURE — 85025 COMPLETE CBC W/AUTO DIFF WBC: CPT

## 2023-10-01 PROCEDURE — 82306 VITAMIN D 25 HYDROXY: CPT

## 2023-10-01 PROCEDURE — 84443 ASSAY THYROID STIM HORMONE: CPT

## 2023-10-01 PROCEDURE — 80053 COMPREHEN METABOLIC PANEL: CPT

## 2023-10-01 PROCEDURE — 80061 LIPID PANEL: CPT

## 2023-10-01 PROCEDURE — 36415 COLL VENOUS BLD VENIPUNCTURE: CPT

## 2023-10-03 ENCOUNTER — TELEPHONE (OUTPATIENT)
Age: 73
End: 2023-10-03

## 2023-10-03 PROBLEM — N63.25 MASS OVERLAPPING MULTIPLE QUADRANTS OF LEFT BREAST: Status: RESOLVED | Noted: 2023-07-07 | Resolved: 2023-10-03

## 2023-10-03 NOTE — TELEPHONE ENCOUNTER
Patient returned call and message relayed as noted in chart. Patient verbalized understanding, and had no further questions.

## 2023-10-04 ENCOUNTER — CONSULT (OUTPATIENT)
Dept: SURGICAL ONCOLOGY | Facility: CLINIC | Age: 73
End: 2023-10-04
Payer: COMMERCIAL

## 2023-10-04 VITALS
BODY MASS INDEX: 42.01 KG/M2 | DIASTOLIC BLOOD PRESSURE: 78 MMHG | RESPIRATION RATE: 18 BRPM | HEART RATE: 75 BPM | TEMPERATURE: 96.3 F | SYSTOLIC BLOOD PRESSURE: 130 MMHG | WEIGHT: 214 LBS | OXYGEN SATURATION: 96 % | HEIGHT: 60 IN

## 2023-10-04 DIAGNOSIS — C50.512 MALIGNANT NEOPLASM OF LOWER-OUTER QUADRANT OF LEFT BREAST OF FEMALE, ESTROGEN RECEPTOR POSITIVE: Primary | ICD-10-CM

## 2023-10-04 DIAGNOSIS — Z17.0 MALIGNANT NEOPLASM OF LOWER-OUTER QUADRANT OF LEFT BREAST OF FEMALE, ESTROGEN RECEPTOR POSITIVE: Primary | ICD-10-CM

## 2023-10-04 PROCEDURE — 99245 OFF/OP CONSLTJ NEW/EST HI 55: CPT | Performed by: SURGERY

## 2023-10-04 PROCEDURE — 99205 OFFICE O/P NEW HI 60 MIN: CPT | Performed by: SURGERY

## 2023-10-04 NOTE — PROGRESS NOTES
Surgical Oncology Consult Note       [address] [facility]  CANCER [facility] SURGICAL ONCOLOGY [place]  [address] [place] [address]    Daphney Aleman  1950  [id number]  [address] [facility] SURGICAL ONCOLOGY [facility] [address]      Chief Complaint:     Chief Complaint   Patient presents with   • Consult   • Breast Cancer     New Diagnosis       Assessment and Plan:   Assessment/Plan   Patient presents with a new diagnosis of left breast invasive ductal carcinoma measuring between 3.1 and 4.5 cm. This is a unifocal lesion grade 2 strongly ER/MS positive and HER2 negative. Her axillary ultrasound was negative. The patient is undecided regarding breast conserving surgery versus unilateral or bilateral mastectomy. Recommended she see a plastic surgeon to talk about various options regarding reconstruction. The patient is interested in genetic testing. We will see her back following her consult with plastic surgery for definitive surgical planning. Oncology History:     Oncology History   Malignant neoplasm of lower-outer quadrant of left breast of female, estrogen receptor positive ([address])   9/19/2023 Biopsy    Left breast ultrasound-guided biopsy  4 o'clock, 2 cm from nipple (VERENICE)  Invasive breast carcinoma of no special type (ductal NST)  Grade 2  ER 90, MS 90, HER2 1+\  Lymphovascular invasion not definitively identified    Concordant. Malignancy appears unifocal; however, oval circumscribed mass in the upper outer left breast is not accounted for. Bilateral breast MRI is recommended. Biopsy-proven carcinoma measured 3.1 cm on US. Left axilla US negative. Right breast clear. 9/28/2023 Observation    Bilateral breast MRI: Unifocal carcinoma in the lower outer left breast with possible skin involvement.  There are no suspicious enhancing masses or suspicious areas of non-mass enhancement in right breast. There is no axillary or internal mammary adenopathy. History of Present Illness: This is a 28-year-old woman who appreciated a mass in the lower outer quadrant of her left breast.  She had a diagnostic work-up included a mammogram and ultrasound which demonstrated a unifocal lesion at the 4 o'clock position 2 cm from the nipple. This was biopsied and shown to be invasive ductal carcinoma grade 2 ER 90% ME 90% HER2/shannan 1+/negative. This was concordant. There was a questionable circumscribed mass in the upper outer quadrant not accounted for an MRI was recommended. The MRI was performed which demonstrated a unifocal mass measuring approximate 4.5 cm with questionable skin involvement but no adenopathy. Her axillary ultrasound also demonstrated no adenopathy. Patient has no family history of breast cancer. She presents now with her family for an opinion regarding further management. Review of Systems:   Review of Systems   Constitutional: Negative for chills and fever. HENT: Negative for ear pain and sore throat. Eyes: Negative for pain and visual disturbance. Respiratory: Negative for cough and shortness of breath. Cardiovascular: Negative for chest pain and palpitations. Gastrointestinal: Negative for abdominal pain and vomiting. Genitourinary: Negative for dysuria and hematuria. Musculoskeletal: Negative for arthralgias and back pain. Skin: Negative for color change and rash. Neurological: Negative for seizures and syncope. All other systems reviewed and are negative.       Past Medical History:      Patient Active Problem List   Diagnosis   • Coronary artery disease involving native coronary artery of native heart without angina pectoris   • Essential hypertension   • Dyslipidemia   • S/P CABG x 3   • Dyspnea on exertion   • Chronic depression   • Vitamin D deficiency   • Postsurgical malabsorption   • Status post bariatric surgery   • At risk for sleep apnea   • Chronic bilateral low back pain without sciatica   • Candida infection of flexural skin   • Class 2 severe obesity due to excess calories with serious comorbidity and body mass index (BMI) of 35.0 to 35.9 in adult    • Acute pain of right shoulder   • Postmenopausal   • Rheumatoid arthritis involving multiple sites with positive rheumatoid factor (HCC)   • Other fatigue   • Palpitation   • Malignant neoplasm of lower-outer quadrant of left breast of female, estrogen receptor positive ([address])        Past Medical History:   Diagnosis Date   • CAD (coronary artery disease)    • Dyslipidemia    • Hyperlipidemia    • Hypertension    • Obesity (BMI 30-39. 9)    • SOB (shortness of breath)         Past Surgical History:   Procedure Laterality Date   • BARIATRIC SURGERY     • BILE DUCT EXPLORATION      replacement per Steffanie   • CARPAL TUNNEL RELEASE Bilateral    • CORONARY ARTERY BYPASS GRAFT     • FOOT SURGERY Right     bone surgery to straighten toe.    • HIP SURGERY Left    • REPLACEMENT TOTAL KNEE Right 2017   • SLEEVE GASTROPLASTY     • TOTAL HIP ARTHROPLASTY Right 2017   • US GUIDED BREAST BIOPSY LEFT COMPLETE Left 2023        Family History   Problem Relation Age of Onset   • Hypertension Mother    • Diabetes Mother    • Heart disease Father         CABG x5   • No Known Problems Sister    • No Known Problems Sister    • No Known Problems Son    • No Known Problems Daughter    • No Known Problems Daughter         Social History     Socioeconomic History   • Marital status: /Civil Union     Spouse name: Not on file   • Number of children: Not on file   • Years of education: Not on file   • Highest education level: Not on file   Occupational History   • Not on file   Tobacco Use   • Smoking status: Former     Packs/day: 0.50     Years: 7.00     Total pack years: 3.50     Types: Cigarettes     Quit date:      Years since quittin.7   • Smokeless tobacco: Never   Vaping Use   • Vaping Use: Never used Substance and Sexual Activity   • Alcohol use: Yes     Comment: social    • Drug use: No   • Sexual activity: Not Currently   Other Topics Concern   • Not on file   Social History Narrative   • Not on file     Social Determinants of Health     Financial Resource Strain: Not on file   Food Insecurity: Not on file   Transportation Needs: Not on file   Physical Activity: Not on file   Stress: Not on file   Social Connections: Not on file   Intimate Partner Violence: Not on file   Housing Stability: Not on file        Current Outpatient Medications:   •  Apremilast 30 MG TABS, Take 30 mg by mouth 2 (two) times a day, Disp: , Rfl:   •  aspirin (ECOTRIN LOW STRENGTH) 81 mg EC tablet, Take 1 tablet (81 mg total) by mouth daily, Disp: 30 tablet, Rfl: 5  •  atorvastatin (LIPITOR) 10 mg tablet, Take 1 tablet (10 mg total) by mouth daily, Disp: 90 tablet, Rfl: 3  •  furosemide (LASIX) 40 mg tablet, TAKE 1 TABLET (40 MG TOTAL) BY MOUTH 2 (TWO) TIMES A DAY, Disp: 180 tablet, Rfl: 0  •  metoprolol succinate (TOPROL-XL) 50 mg 24 hr tablet, Take 1 tablet (50 mg total) by mouth daily, Disp: 90 tablet, Rfl: 3  •  nystatin (MYCOSTATIN) cream, Apply topically 2 (two) times a day (Patient taking differently: Apply topically 2 (two) times a day As needed), Disp: 30 g, Rfl: 0  •  nystatin (MYCOSTATIN) powder, Apply topically 2 (two) times a day (Patient taking differently: Apply topically 2 (two) times a day As needed), Disp: 60 g, Rfl: 0  •  predniSONE 1 mg tablet, Taking 3 mg daily, Disp: , Rfl:   •  traMADol (ULTRAM-ER) 200 MG 24 hr tablet, daily Takes daily for arthritis pain., Disp: , Rfl:   •  citalopram (CeleXA) 20 mg tablet, TAKE 1 TABLET BY MOUTH  DAILY (Patient not taking: Reported on 10/4/2023), Disp: 90 tablet, Rfl: 3     Allergies   Allergen Reactions   • Iodine - Food Allergy Anaphylaxis     Reaction Date: [id number];    • Povidone Iodine Anaphylaxis   • Shellfish-Derived Products - Food Allergy Anaphylaxis       Physical Exam:     Vitals:    10/04/23 1518   BP: 130/78   Pulse: 75   Resp: 18   Temp: (!) 96.3 °F (35.7 °C)   SpO2: 96%     Physical Exam  Vitals reviewed. Constitutional:       Appearance: She is well-developed. HENT:      Head: Normocephalic and atraumatic. Eyes:      Pupils: Pupils are equal, round, and reactive to light. Neck:      Thyroid: No thyromegaly. Vascular: No JVD. Trachea: No tracheal deviation. Cardiovascular:      Rate and Rhythm: Normal rate and regular rhythm. Heart sounds: Normal heart sounds. No murmur heard. No friction rub. No gallop. Pulmonary:      Effort: Pulmonary effort is normal. No respiratory distress. Breath sounds: Normal breath sounds. No wheezing or rales. Chest:          Comments: The right breast was examined in the sitting and supine position. There are no worrisome skin changes, tenderness, inverted nipple, nipple discharge, swelling, bleeding or evidence of a mass in any quadrant. Conrad survey demonstrated no evidence of any clinically suspicious axillary, pectoral or paraclavicular lymph nodes. Examination of the left breast demonstrates a dominant mass in the lower outer quadrant. It is mobile it is consistent with approximately 4 to 5 cm in size. Even the large size of the patient's breast I do think she could undergo a lumpectomy with curative intent. There is no clear involvement of the skin though there may be areas where could be potentially close to the skin on examination. Abdominal:      General: There is no distension. Palpations: Abdomen is soft. There is no hepatomegaly or mass. Tenderness: There is no abdominal tenderness. There is no guarding or rebound. Musculoskeletal:         General: No tenderness. Normal range of motion. Cervical back: Normal range of motion and neck supple. Lymphadenopathy:      Cervical: No cervical adenopathy. Skin:     General: Skin is warm and dry.       Findings: No erythema or rash. Neurological:      Mental Status: She is alert and oriented to person, place, and time. Cranial Nerves: No cranial nerve deficit. Psychiatric:         Behavior: Behavior normal.         Results:   I reviewed the patient's mammogram and MRI. This is a unifocal lesion. Based on her clinical exam I think she could accommodate a generous lumpectomy. Discussion/Summary:   The patient and I as well as her family talked about breast conserving surgery to include radiation and antihormonal therapy plus or minus chemotherapy. I did explain that the adjuvant systemic therapies would be the same regardless of breast conserving surgery versus mastectomy plus or minus reconstruction. We talked about bilateral versus unilateral mastectomy talked about various types of reconstruction. The patient is undecided whether she wants breast conserving surgery, unilateral mastectomy or bilateral mastectomy. She is interested in genetic testing. I recommended she see a plastic surgeon at consider all of the options that we discussed. We also reviewed the technique of sentinel lymph node biopsy and possible axillary dissection which would be the same regardless of breast surgery choice. Patient questions were answered to their satisfaction. We will see her back following plastic surgery consult. Advance Care Planning/Advance Directives:  I discussed the disease status, treatment plans and follow-up with the patient.

## 2023-10-05 ENCOUNTER — TELEPHONE (OUTPATIENT)
Dept: GENETICS | Facility: CLINIC | Age: 73
End: 2023-10-05

## 2023-10-05 ENCOUNTER — TELEPHONE (OUTPATIENT)
Dept: PLASTIC SURGERY | Facility: CLINIC | Age: 73
End: 2023-10-05

## 2023-10-05 ENCOUNTER — TELEPHONE (OUTPATIENT)
Dept: HEMATOLOGY ONCOLOGY | Facility: CLINIC | Age: 73
End: 2023-10-05

## 2023-10-05 NOTE — TELEPHONE ENCOUNTER
Spoke to the patient. Explained that once our surgery scheduler spoke to the plastic surgeon's office, the patient would receive a phone call from them directly to coordinate a consult. Informed patient that she would get a phone call to schedule her follow up appointment with Dr. Lopez Medora by early next at the latest. The patient verbalized understanding and was appreciative of the phone call.

## 2023-10-05 NOTE — TELEPHONE ENCOUNTER
Call Transfer   Who are you speaking with? Patient   If it is not the patient, are they listed on an active communication consent form? N/A   Who is the patients HemOnc/SurgOnc provider? Dr. Tomás Diaz   What is the reason for this call? Patient calling in wanting the number for the plastic surgeon's office. Person/Department that the call was transferred to? Time that call was transferred? Emelyn @ 10:47AM   Your call will be transferred now. If you receive a voicemail, please leave a detailed message and a member of the team will return your call as soon as possible. Did you relay this information to the caller?   Yes

## 2023-10-06 ENCOUNTER — TELEPHONE (OUTPATIENT)
Dept: GENETICS | Facility: CLINIC | Age: 73
End: 2023-10-06

## 2023-10-06 NOTE — TELEPHONE ENCOUNTER
I called the patient attempting to initiate STAT genetic testing process. Patient stated that she is unable to speak at the moment, but will call my direct number when she is ready to discuss testing.

## 2023-10-06 NOTE — TELEPHONE ENCOUNTER
Patient called back to schedule appt with Dr. Mike Ramirez on 10/9 at 4, made pt aware of time. Am unable to schedule (dont have access) made juan manuel aware.

## 2023-10-09 ENCOUNTER — CONSULT (OUTPATIENT)
Dept: PLASTIC SURGERY | Facility: CLINIC | Age: 73
End: 2023-10-09
Payer: COMMERCIAL

## 2023-10-09 ENCOUNTER — PATIENT OUTREACH (OUTPATIENT)
Dept: CASE MANAGEMENT | Facility: OTHER | Age: 73
End: 2023-10-09

## 2023-10-09 VITALS
WEIGHT: 214 LBS | SYSTOLIC BLOOD PRESSURE: 126 MMHG | BODY MASS INDEX: 42.01 KG/M2 | HEART RATE: 68 BPM | DIASTOLIC BLOOD PRESSURE: 81 MMHG | HEIGHT: 60 IN | TEMPERATURE: 98 F

## 2023-10-09 DIAGNOSIS — C50.512 MALIGNANT NEOPLASM OF LOWER-OUTER QUADRANT OF LEFT BREAST OF FEMALE, ESTROGEN RECEPTOR POSITIVE: ICD-10-CM

## 2023-10-09 DIAGNOSIS — E66.9 CLASS 2 OBESITY WITH BODY MASS INDEX (BMI) OF 39.0 TO 39.9 IN ADULT, UNSPECIFIED OBESITY TYPE, UNSPECIFIED WHETHER SERIOUS COMORBIDITY PRESENT: Primary | ICD-10-CM

## 2023-10-09 DIAGNOSIS — Z17.0 MALIGNANT NEOPLASM OF LOWER-OUTER QUADRANT OF LEFT BREAST OF FEMALE, ESTROGEN RECEPTOR POSITIVE: ICD-10-CM

## 2023-10-09 PROCEDURE — 99245 OFF/OP CONSLTJ NEW/EST HI 55: CPT | Performed by: STUDENT IN AN ORGANIZED HEALTH CARE EDUCATION/TRAINING PROGRAM

## 2023-10-09 PROCEDURE — 99205 OFFICE O/P NEW HI 60 MIN: CPT | Performed by: STUDENT IN AN ORGANIZED HEALTH CARE EDUCATION/TRAINING PROGRAM

## 2023-10-09 NOTE — PROGRESS NOTES
Biopsychosocial and Barriers Assessment    Cancer Diagnosis: Breast  Home/Cell Phone: [phone]  Emergency Contact: [phone]  Marital Status:   Interpretation concerns, speaks another language, preferred language: English  Cultural concerns: none  Ability to read or write: yes    Caregiver/Support: Strong support from children, family and friends  Children: 2 daughters and 1 son  Child/Elder care: Yes-cares for ESTUARDO    Housing: Two story  Home Setup: 8 steps to enter  Lives With: spouse and ESTUARDO  Daily Living Activities: independent  Durable Medical Equipment: none  Ambulation: independent    Preferred Pharmacy: [facility] co-pays with insurance: [email or web address]  High co-pays with medication coverage: none  No medication coverage: n/a    Primary Care Provider: Dr. Guillermina Ruvalcaba  Hx of [facility] [address]: none  Hx of Short term rehab: none  Mental Health Hx: worry/anxiety, depression-takes medication  Substance Abuse Hx: none  Employment: [employer] Status/Location: n/a  Ability to pay bills: yes  POA/LW/AD: not addressed  Transportation Plan/Concerns: Self      What do you know about your Cancer Diagnosis    What has your doctor told you about your cancer diagnosis: Breat Cancer. What has your doctor told you about your cancer treatment: Pt has an appointment with plastics later today and states that she will decide today regarding a mastectomy vs a lumpectomy. What specific concerns do you have about your diagnosis and treatment: Pt is expectedly overwhelmed at times with her dx. Have you been made aware of any hair loss associated with treatment: Not at this time. Additional Comments:  OSW placed outreach TC to pt this morning. Pt is a very pleasant woman with a dx of breast cancer. She meets with plastics today and will make a decision what type of surgery she would like to have.  Pts 2 daughters will be accompanying her to the appointment. Pt states she is well supported by her family and friends. She works full time and states she has [employer]. She states she has cancer coverage, which is very beneficial. Pt completed a Dt, where she scored a 8/10. She reports feeling fatigued, memory/concentration, worry/anxiety and depression. She expressed that she takes medication, which is helpful. She also states that she has Ativan as needed. She is eager to have a plan in place and is hopeful she doesn't have to wait too long for a surgery date. Pt expressed feeling like her mind is scattered. OSW provided reassurance that this is completely normal and she was glad to hear this. OSW educated on the [facility] and the cancer hope network. She states that she was provided with the [facility] information and was also interested in the cancer hope network. OSW will place in the mail today. OSW offered to reach out in a month and check in on her and she was agreeable. OSW provided contact information and encouraged her to call with any needs.

## 2023-10-10 ENCOUNTER — TELEPHONE (OUTPATIENT)
Dept: SURGICAL ONCOLOGY | Facility: CLINIC | Age: 73
End: 2023-10-10

## 2023-10-10 ENCOUNTER — TELEPHONE (OUTPATIENT)
Dept: HEMATOLOGY ONCOLOGY | Facility: CLINIC | Age: 73
End: 2023-10-10

## 2023-10-10 ENCOUNTER — TELEPHONE (OUTPATIENT)
Dept: GENETICS | Facility: CLINIC | Age: 73
End: 2023-10-10

## 2023-10-10 NOTE — TELEPHONE ENCOUNTER
I spoke with Carolyn Davis to schedule their consultation with Cancer Risk and Genetics. Scheduling Outcome: Patient is scheduled for an appointment on 10/12/23  at 9 with [facility]    Personal/Family History Related to Appointment:     Personal History of Cancer: Patient reports personal history of Breast CA    Family History of Cancer: Patient reports no family history of cancer    Is patient of [address] [place] [address],  [address]?: No    History of Genetic Testing:  Personal History of Genetic Testing: Patient report no personal history of Genetic Testing. Family History of Genetic Testing: Patient reports that no family members have had Genetic Testing. Progeny:  Is patient able to complete our family history questionnaire on a computer?:  Yes    Patient's preferred e-mail address: [email or web address]

## 2023-10-10 NOTE — TELEPHONE ENCOUNTER
Called the patient to discuss her surgery planning. The patient reports that she met with Dr. Emmy Duran yesterday and wants to move forward with bilateral reconstruction. The patient states, however, that she wants one mastectomy. Explained that the next available surgery date with both surgeons is Monday, 11/20 at the [address] The patient was provided with the address for that hospital location. She was scheduled for a pre-op appointment with Dr. Paige Brothers on 11/06 at 11:00 and a post-op on 12/04 at 1:30; the patient verbalized understanding of appointment details. All additional surgery details will be reviewed at the patient's pre-op visit. The patient denies any questions at this time.

## 2023-10-10 NOTE — PROGRESS NOTES
Plastic Surgery Consult    Reason for visit: left breast cancer, presents for reconstructive discussion    HPI on 10/10/23  Patient is a 73 y/o female who presents with left breast invasive ductal carcinoma. After discussion with Dr Theodora Jerome, patient has opted for unilateral left breast mastectomy with SLNB and is awaiting genetic testing. She is interested in reconstructive options. She states that she is interested in faster recovery and less complex reconstructive surgery and mentioned that she is more interested in implant reconstruction rather than autologous tissue reconstruction. Of note, patient has history of bariatric surgery, CABG and has significantly elevated BMI of 41.7. She currently wears DD and ideally would like to be around  "C" cup after completion of reconstruction. Patient states that she is sensitive to anesthesia. ROS: 12 pt ROS negative, except as otherwise noted in HPI    PMH: heart disease, hypoglycemia  Past Medical History:   Diagnosis Date   • CAD (coronary artery disease)    • Dyslipidemia    • Hyperlipidemia    • Hypertension    • Obesity (BMI 30-39. 9)    • SOB (shortness of breath)      Family History   Problem Relation Age of Onset   • Hypertension Mother    • Diabetes Mother    • Heart disease Father         CABG x5   • No Known Problems Sister    • No Known Problems Sister    • No Known Problems Son    • No Known Problems Daughter    • No Known Problems Daughter        Past Surgical History:   Procedure Laterality Date   • BARIATRIC SURGERY  2014   • BILE DUCT EXPLORATION      replacement per Steffanie   • CARPAL TUNNEL RELEASE Bilateral 2002   • CORONARY ARTERY BYPASS GRAFT  2017   • FOOT SURGERY Right     bone surgery to straighten toe.    • HIP SURGERY Left 2015   • REPLACEMENT TOTAL KNEE Right 04/2017   • SLEEVE GASTROPLASTY     • TOTAL HIP ARTHROPLASTY Right 2017   • US GUIDED BREAST BIOPSY LEFT COMPLETE Left 9/19/2023     SocHx: no tobacco, +social ETOH  Meds: currently not on baby aspirin, Prednisone (3 mg/day, but has stopped for past 2 weeks)  Allergies   Allergen Reactions   • Iodine - Food Allergy Anaphylaxis     Reaction Date: [id number];    • Povidone Iodine Anaphylaxis   • Shellfish-Derived Products - Food Allergy Anaphylaxis         PE:  Vitals:    10/09/23 1307   BP: 126/81   Pulse: 68   Temp: 98 °F (36.7 °C)       General: NC/AT, breathing comfortably on RA  Neuro: CN II-XII grossly intact, symmetric reflexes  HEENT: PERRLA, EOMI, external ears normal, no lesions or deformities, neck supple, trachea midline  Respiratory: CTAB, normal respiratory effort  Cardio: RRR, normal S1, S2, no murmur, rubs, gallops  GI: soft, non-tender, non-distended  Extremities/MSK: normal alignment, mobility, gait, no edema  Skin: no rashes, lesions, subcutaneous nodules    BMI 41.79  Breast exam:  Bilateral large ptotic breast  Tender mass in the left breast, no nipple retraction, discharge  Right breast soft, nontender    Measurements:    L  SN-N  40 cm    N-IMF  15 cm  BW  13.5 cm    A/P: 71 y/o female with invasive ductal carcinoma of the left breast, genetic testing pending, with plan for left breast mastectomy with SLNB by Dr Pratik Ware.    -I discussed in-depth the various reconstructive options, both implant and autologous reconstructive options. I discussed that both of these methods would require multiple surgeries. -A detailed conversation was had regarding the patient’s options for breast reconstruction.  Five main points, which are explained to all breast reconstruction patients, were discussed.     1. Breast reconstruction is an optional process.  In addition, breast reconstruction can be performed in an immediate and delayed fashion.  Downy if a patient does not opt for reconstruction now, it can performed at a later time.   2. Breast reconstruction is a multi-stage process which involves multiple surgeries spaced several months apart.  The entire process can take over one year. Scar Maria patient can stop within this process and/or resume it again at any time. 3. The major goal of breast reconstruction is to have the patient look normal in clothing.  When naked, there will always be scars and other stigmata of the breast reconstruction process. 4. Asymmetries are often present during the reconstruction process.  Several operations may be needed, including surgery to the non-cancerous breast, to achieve satisfactory results. 5. No matter the reconstructive method, there are ways that the reconstruction can fail and a secondary reconstructive plan would need to be created.     For each of the reconstruction method involving expander/implant recon and autologous tissue (CARLOS ENRIQUE, latissimus dorsi), the risks, benefits, alternatives, scarring, and recovery time were discussed in great detail.  Specific risks detailed included bleeding, infection, hematoma, seroma, scarring, pain, wound healing complications, flap loss, fat necrosis, capsular contracture, need for implant removal, donor site complications, bulge, hernia, umbilical necrosis, need for urgent reoperation, and need for dressing changes were discussed.     -After discussion of all surgical options, patient would like to pursue expander/implant reconstruction and NOT autologous free flap or pedicled flap reconstruction.   -I discussed that the first-step of the reconstructive process would be to place immediate tissue expander at the time of mastectomy (this would be placed in the prepectoral plane) to expand and preserve the skin envelope. This is beneficial because this preserves the skin envelope in it's expanded state if the patient should need radiation therapy. I discussed that radiation as well as chemotherapy increases the risk of all complications (infection, seroma, abscess, cap con, implant extrusion, delayed wound healing, wound dehiscence) and can prolong the reconstructive process.  After the patient is expanded to her desired size, patient can subsequently undergo implant exchange for permanent prothesis. However, if radiation is needed, it will prolong reconstructive process. If implant reconstruction were to fail, some form of autologous breast reconstruction could be performed (CARLOS ENRIQUE, latissimus flaps). Patient acknowledged.   -I discussed the procedure (including use of drains and admission overnight) and risks of tissue expander placement including infection, bleeding, scarring, leakage, rupture, capsular contracture, animation deformity, need for further surgery. Patient acknowledged.  -I also discussed asymmetric breast reconstruction. The goal of asymmetric breast reconstruction is to match volume with the native contralateral side in a bra. Out of a bra, there will be notable differences as one side has an implant and the other native side will droop naturally. Once the left breast is reconstructed, the right side can undergo reduction to improve symmetry. Patient acknowledged.  -I discussed that, at any time, the reconstructive process can be paused, stopped, and continued based on patient's desire.  -All questions answered, concerns addressed, photos taken. -Genetic testing pending  -Patient to see Dr Meka Espinoza finalizing surgical oncologic plan  -I instructed patient to make follow-up appointment with me after Dr Harjeet Elizabeth finalizes surgical oncologic plan at next visit.     -Of note, patient has history of prednisone (3 mg/day) but has stopped it for 2 weeks, will verify to remain off if possible due to wound healing issues. She has history of coronary artery disease, needs cardiac clearance. Informed her that her elevated BMI of 41 significantly increases her risk of complications. Patient acknowledged.     -Spent 65 minutes in consultation with patient.  Greater than 50% of the total time was spent obtaining history, evaluation, performing exam, discussion of management options including post-operative care, answering patient's questions and concerns, chart reviewing, and documentation        Aicha Angelo MD   [facility] Plastic and Reconstructive Surgery   [facility]   Office: [phone]

## 2023-10-10 NOTE — TELEPHONE ENCOUNTER
I introduced myself to Robb Millan and let her know that her breast surgeon reached out to the cancer risk and genetics program on her behalf. I reviewed the following with Robb Millan:    • While the majority of cancer occurs by chance, approximately 5-10% of breast cancer has an underlying genetic cause. Genetic testing is available which can determine if there is an underlying genetic cause to your cancer. Understanding if there is an underlying genetic cause can:  o Provide your surgeon with additional information to help with surgical decisions, treatment decisions and eligibility for clinical trials. o It can determine if you have an increased risk for any additional cancers. o Help family members understand their cancer risk. • We work closely with the [facility] breast surgeons and are reaching out to see if you have interest in genetic testing. The reason we are reaching out at this time is that this result may help your surgeon determine the appropriate type of surgery (i.e. lumpectomy vs mastectomy). This test is not a requirement but can take 5-10 days to complete so we would like to start the process as soon as possible so the results are ready for your appointment with your surgeon. • If you are interested in genetic testing, I can collect your family history and initiate genetic testing for you. •   Patient elected to pursue testing     • Diagnosis Details:  o Invasive Ductal Carcinoma  o Left  o ER/CO positive HER2 negative  • Personal History:  o Do you have a personal history of any other cancer? No  - If yes type/age of diagnosis:   • Family history:   o Do you have Ashkenazi [employer]ism ancestry? No  - If yes, maternal, paternal, or both?    o Do you have any children? Yes  - How many sons? 1  - How many daughters? 2  - Do any of your children have a history of cancer?  No  - If yes type/age of diagnosis:   Daughter - Pre Cancerous cells of the Breast age 55    o Do you have any brothers or sisters? Yes  - How many brothers? 1  - How many sisters? 2  - Are they from the same parents? Yes  - If no how maternal/paternal half-siblings:  - Do any of your brothers or sisters have a history of cancer? No  - If yes who and the type/age of diagnosis:           Do you have nieces or nephews? Yes  - Do any of them have a history of cancer? No  - If yes type/age of diagnosis:    o Does your mother have a history of cancer? No  - If yes, cancer type and age of diagnosis:   - Is your mother still living? No  - Age/Age of death: 80    o Thinking about your mother's family (aunts, uncles, cousins, grandparents) is there anyone with a history of cancer? No  - If yes, list relationship, cancer type and age of diagnosis:    o Does your father have a history of cancer? No  - If yes, cancer type and age of diagnosis:  - Is your father still living? No  - Age/age of death: 80    o Thinking about your father's family (aunts, uncles, cousins, grandparents) is there anyone with a history of cancer? Yes  - If yes, list relationship, cancer type and age of diagnosis:   Paternal Uncle - Unknown Cancer ()  Paternal Cousin - Unknown Cancer ()     • Types of Results - Positive, Negative, VUS  o Positive result- may explain personal diagnosis/family history. Can give surgeon information on treatment plan, inform future screening/management or tell a person about other possible risks. Positive results can initiate testing for other family members who may be at risk (children, siblings, etc)  o Negative result- does not give an explanation. Surgical/treatment plan will be based on clinical presentation and will be part of discussion with surgeon. Negative result cannot be passed down to children, but they are still at elevated risk. o Uncertain result- common, but treated like a negative result clinically. 90% are downgraded over time.      • [facility]s billing policy   o Most insurance plans cover the cost of genetic testing. The out-of-pocket cost varies due to the differences in deductibles, co-payments and co-insurance defined by individual plans but 90% of people pay $100 or less for a genetic test     A blood kit will be mailed to you overnight. Please take the blood kit along with packet of paperwork to any [facility]'s lab to have your blood drawn. We have genetic counselors available, if you have any additional questions or would like to speak with them we can schedule you a 15 minute appointment. Plan:  A blood kit was mailed out on 10/10/2023 along with information on genetic testing and the lab's billing policy. Genetic Testing Preformed: [employer] Breast Cancer STAT Panel (9 genes): HUYEN, BRCA1, BRCA2, CDH1, CHEK2, PALB2, PTEN, STK11, and TP53 with reflex to [employer] Common Hereditary Cancers Panel+RNA (47 genes): APC, HUYEN, AXIN2, BARD1, BMPR1A, BRCA1, BRCA2, BRIP1, CDH1, CDK4, CDKN2A, CHEK2, CTNNA1, DICER1, EPCAM, GREM1, HOXB13, KIT, MEN1, MLH1, MSH2, MSH3, MSH6, MUTYH, NBN, NF1, NTHL1, PALB2, PDGFRA, PMS2, POLD1, POLE, PTEN, RAD50, RAD51C, RAD51D, SDHA, SDHB, SDHC, SDHD, SMAD4, SMARCA4, STK11, TP53, TSC1, TSC2, VHL    Result Call Information:  I confirmed the patient's home number on file as the best number to call with results  I confirmed with the patient that we can leave a voicemail on the provided numbers    Initial results will take approximately 5-12 days to return     Additional results may take up to 2-3 weeks to complete once test is started. Patient does not have any further questions, declined meeting with genetic counselor    When your results are ready, someone from the genetics team will call you, review the results, and contact your breast surgeon. You will be contacted with any type of result- positive, negative, or uncertain.

## 2023-10-12 ENCOUNTER — OFFICE VISIT (OUTPATIENT)
Dept: FAMILY MEDICINE CLINIC | Facility: CLINIC | Age: 73
End: 2023-10-12
Payer: COMMERCIAL

## 2023-10-12 ENCOUNTER — TELEPHONE (OUTPATIENT)
Age: 73
End: 2023-10-12

## 2023-10-12 VITALS
HEART RATE: 74 BPM | WEIGHT: 215 LBS | TEMPERATURE: 98.4 F | SYSTOLIC BLOOD PRESSURE: 130 MMHG | RESPIRATION RATE: 16 BRPM | OXYGEN SATURATION: 97 % | BODY MASS INDEX: 42.21 KG/M2 | HEIGHT: 60 IN | DIASTOLIC BLOOD PRESSURE: 80 MMHG

## 2023-10-12 DIAGNOSIS — F41.9 ANXIETY: ICD-10-CM

## 2023-10-12 DIAGNOSIS — R19.4 CHANGE IN BOWEL HABITS: Primary | ICD-10-CM

## 2023-10-12 PROCEDURE — 99213 OFFICE O/P EST LOW 20 MIN: CPT | Performed by: FAMILY MEDICINE

## 2023-10-12 RX ORDER — LORAZEPAM 0.5 MG/1
0.5 TABLET ORAL EVERY 8 HOURS PRN
Qty: 20 TABLET | Refills: 0 | Status: SHIPPED | OUTPATIENT
Start: 2023-10-12

## 2023-10-12 NOTE — PROGRESS NOTES
Assessment/Plan:         Problem List Items Addressed This Visit        Other    Change in bowel habits - Primary     Patient has had it for months and getting worse. No abdominal pain, nausea, or fever. Had labs recently and were ok. Could be IBS vs colitis. Will refer to Gastroenterology for evaluation. Patient can try pepto or imodium as needed. Call if worsens. Relevant Orders    Ambulatory Referral to Gastroenterology    Anxiety     Has had increased symptoms due to recent diagnosis of Breast Cancer. Will give ativan for patient to take as needed. Relevant Medications    LORazepam (Ativan) 0.5 mg tablet           Subjective:      Patient ID: Stephanie Richard is a 72 y.o. female. Patient here for loose bowel movements for past few months and getting worse. No fever. No nausea or vomiting. No abdominal pain. Gets watery bms several times per day. Not taking any OTC medications. Not related to certain foods or meals. Hasn't had colonoscopy since 2012,         The following portions of the patient's history were reviewed and updated as appropriate:   Past Medical History:  She has a past medical history of CAD (coronary artery disease), Dyslipidemia, Hyperlipidemia, Hypertension, Obesity (BMI 30-39.9), and SOB (shortness of breath). ,  _______________________________________________________________________  Medical Problems:  does not have any pertinent problems on file.,  _______________________________________________________________________  Past Surgical History:   has a past surgical history that includes Coronary artery bypass graft (2017); Bariatric Surgery (2014); Hip surgery (Left, 2015); Replacement total knee (Right, 04/2017); Total hip arthroplasty (Right, 2017); Foot surgery (Right); Carpal tunnel release (Bilateral, 2002);  Bile duct exploration; Sleeve Gastroplasty; and US guided breast biopsy left complete (Left, 9/19/2023). ,  _______________________________________________________________________  Family History:  family history includes Diabetes in her mother; Heart disease in her father; Hypertension in her mother; No Known Problems in her daughter, daughter, sister, sister, and son.,  _______________________________________________________________________  Social History:   reports that she quit smoking about 6 years ago. Her smoking use included cigarettes. She has a 3.50 pack-year smoking history. She has never used smokeless tobacco. She reports current alcohol use. She reports that she does not use drugs. ,  _______________________________________________________________________  Allergies:  is allergic to iodine - food allergy, povidone iodine, and shellfish-derived products - food allergy. .  _______________________________________________________________________  Current Outpatient Medications   Medication Sig Dispense Refill   • aspirin (ECOTRIN LOW STRENGTH) 81 mg EC tablet Take 1 tablet (81 mg total) by mouth daily 30 tablet 5   • atorvastatin (LIPITOR) 10 mg tablet Take 1 tablet (10 mg total) by mouth daily 90 tablet 3   • citalopram (CeleXA) 20 mg tablet TAKE 1 TABLET BY MOUTH  DAILY 90 tablet 3   • furosemide (LASIX) 40 mg tablet TAKE 1 TABLET (40 MG TOTAL) BY MOUTH 2 (TWO) TIMES A  tablet 0   • LORazepam (Ativan) 0.5 mg tablet Take 1 tablet (0.5 mg total) by mouth every 8 (eight) hours as needed for anxiety 20 tablet 0   • metoprolol succinate (TOPROL-XL) 50 mg 24 hr tablet Take 1 tablet (50 mg total) by mouth daily 90 tablet 3   • nystatin (MYCOSTATIN) cream Apply topically 2 (two) times a day (Patient taking differently: Apply topically 2 (two) times a day As needed) 30 g 0   • nystatin (MYCOSTATIN) powder Apply topically 2 (two) times a day (Patient taking differently: Apply topically 2 (two) times a day As needed) 60 g 0   • traMADol (ULTRAM-ER) 200 MG 24 hr tablet daily Takes daily for arthritis pain.     • Apremilast 30 MG TABS Take 30 mg by mouth 2 (two) times a day (Patient not taking: Reported on 10/12/2023)     • predniSONE 1 mg tablet Taking 3 mg daily (Patient not taking: Reported on 10/12/2023)       No current facility-administered medications for this visit.     _______________________________________________________________________  Review of Systems   Constitutional:  Negative for fatigue and unexpected weight change. Respiratory:  Negative for cough, chest tightness and shortness of breath. Cardiovascular:  Negative for chest pain and palpitations. Gastrointestinal:  Positive for diarrhea. Negative for abdominal pain, constipation, nausea and vomiting. Genitourinary:  Negative for difficulty urinating. Musculoskeletal:  Negative for arthralgias. Skin:  Negative for rash. Neurological:  Negative for dizziness and headaches. Objective:  Vitals:    10/12/23 1102   BP: 130/80   BP Location: Left arm   Patient Position: Sitting   Cuff Size: Large   Pulse: 74   Resp: 16   Temp: 98.4 °F (36.9 °C)   TempSrc: Tympanic   SpO2: 97%   Weight: 97.5 kg (215 lb)   Height: 5' (1.524 m)     Body mass index is 41.99 kg/m². Physical Exam  Vitals and nursing note reviewed. Constitutional:       Appearance: Normal appearance. She is well-developed. She is obese. HENT:      Head: Normocephalic and atraumatic. Cardiovascular:      Rate and Rhythm: Normal rate and regular rhythm. Heart sounds: Normal heart sounds. No murmur heard. Pulmonary:      Effort: Pulmonary effort is normal. No respiratory distress. Breath sounds: Normal breath sounds. No wheezing. Abdominal:      General: Abdomen is flat. Palpations: Abdomen is soft. Tenderness: There is no abdominal tenderness. Musculoskeletal:      Cervical back: Normal range of motion and neck supple. Right lower leg: No edema. Left lower leg: No edema.    Lymphadenopathy:      Cervical: No cervical adenopathy. Neurological:      Mental Status: She is alert and oriented to person, place, and time. Psychiatric:         Mood and Affect: Mood normal.         Behavior: Behavior normal.         Thought Content:  Thought content normal.         Judgment: Judgment normal.

## 2023-10-12 NOTE — TELEPHONE ENCOUNTER
Patient called stating she came in today to see Chelita Dunn. she states that she is supposed to have a colonoscopy done. Would like to know if she should have this done ASAP. She states she does not see she was not given any orders. She would like clarification from Chelita Dunn if she is supposed to have the colonoscopy, or if she is just supposed to go to [place] (she is scheduled for an appointment on 11/7/23). Please advise.

## 2023-10-12 NOTE — ASSESSMENT & PLAN NOTE
Has had increased symptoms due to recent diagnosis of Breast Cancer. Will give ativan for patient to take as needed.

## 2023-10-12 NOTE — TELEPHONE ENCOUNTER
I told her that she would need to see Gastroenterology in the office first and they will schedule her for colonoscopy. We are not allowed to schedule colonoscopies directly.  Patients have to see Gastroenterology first.

## 2023-10-12 NOTE — ASSESSMENT & PLAN NOTE
Patient has had it for months and getting worse. No abdominal pain, nausea, or fever. Had labs recently and were ok. Could be IBS vs colitis. Will refer to Gastroenterology for evaluation. Patient can try pepto or imodium as needed. Call if worsens.

## 2023-10-12 NOTE — TELEPHONE ENCOUNTER
Spoke with patient and is aware in regards of your note. Patient stated she can't see doctor Kelly until Nov 7 and only wants to see him. I told her if she only wants to see him and that's the only apt they have she is going to just have to keep the apt.

## 2023-10-13 ENCOUNTER — TELEPHONE (OUTPATIENT)
Dept: GENETICS | Facility: CLINIC | Age: 73
End: 2023-10-13

## 2023-10-13 NOTE — TELEPHONE ENCOUNTER
I called and spoke to the patient as a reminder to have her sample collected for genetic testing. Patient states she will try to have this done today. I advised the patient to call with any questions or concerns.

## 2023-10-16 ENCOUNTER — TELEPHONE (OUTPATIENT)
Dept: PLASTIC SURGERY | Facility: CLINIC | Age: 73
End: 2023-10-16

## 2023-10-16 NOTE — PROGRESS NOTES
[facility] - Outpatient Consultation  [facility] 72 y.o. female MRN: [id number]  Encounter: [id number]          ASSESSMENT AND PLAN:      74-year-old with history of recent diagnosis of breast cancer with upcoming surgery, CABG who presents for change in bowel habits. Loose stools with incontinence  Reports symptoms over the several few months. She has been having associated incontinence overnight. BMs more often loose. No blood in the stool. Weight stable. No abdominal pain. She took Imodium and now has not had a bowel movement. Concern for underlying microscopic colitis, appears less likely inflammatory, doubt infectious.    -Schedule colonoscopy, recommend biopsies for microscopic colitis.   -Discussed the risks of procedure including bleeding, infection and perforation.  - MiraLAX bowel prep. -Recommend starting fiber supplement such as Metamucil or Benefiber 1 tablespoon daily.  - Advise she can use Imodium as needed when leaving the house for special events. Advised to use this sparingly due to side effect of constipation.    -Advised patient to reach out if diarrhea reoccurs to consider obtaining stool studies. 2. History of gastric sleeve  Patient with gastric sleeve in 2014. She denies any reflux symptoms however has been taking Tums recently at bedtime due to late night eating.    -Recommend EGD along with colonoscopy in the setting of gastric sleeve with no EGD to assess for Marcum's esophagus.  -Continue to avoid late night eating and can use Tums or Pepcid as needed. Patient was recommended to follow up after procedures. Patient was recommended to reach out via [employer] with any questions or concerns in the meantime.    ______________________________________________________________________    HPI:      [facility] is a pleasant 72 y.o. female with CAD, hypertension, rheumatoid arthritis, history of CABG x3, recent diagnosis of breast cancer who presents the office as a new patient for change in bowel habits. Patient reports over the past for few months she has been having loose stools with incontinence. She reports having around 3-4 loose bowel movements a day. Denies any blood. She reports waking up with stool incontinence. She reports sometimes is a small amount however also reports it can be up to 1 cup. Ocassionally formed. No specific association with eating. She reports her weight, appetite are normal.  She has been on chronic low-dose prednisone and tramadol for many years. She reports recently stopping these which significantly worsened her bowel movements. When she restarted they improved. She took an Imodium a few days ago and has not had a bowel movement since. She has no associated abdominal pain. Denies any chronic reflux symptoms. She reports she has recently started to need to use tums due to late night eating. History of gastric sleeve in 2014. No significant GI family history. Previous colonoscopy in 2012 was normal and repeat recommended in 10 years. REVIEW OF SYSTEMS:    CONSTITUTIONAL: Denies any fever, chills, rigors, and weight loss. HEENT: No earache or tinnitus. Denies hearing loss or visual disturbances. CARDIOVASCULAR: No chest pain or palpitations. RESPIRATORY: Denies any cough, hemoptysis, shortness of breath or dyspnea on exertion. GASTROINTESTINAL: As noted in the History of Present Illness. GENITOURINARY: No problems with urination. Denies any hematuria or dysuria. NEUROLOGIC: No dizziness or vertigo, denies headaches. MUSCULOSKELETAL: Denies any muscle or joint pain. SKIN: Denies skin rashes or itching. ENDOCRINE: Denies excessive thirst. Denies intolerance to heat or cold. PSYCHOSOCIAL: Denies depression or anxiety. Denies any recent memory loss.        Historical Information   Past Medical History:   Diagnosis Date    Breast cancer ([facility]) 09/2023    CAD (coronary artery disease) Dyslipidemia     Hyperlipidemia     Hypertension     Obesity (BMI 30-39. 9)     SOB (shortness of breath)      Past Surgical History:   Procedure Laterality Date    BARIATRIC SURGERY  2014    BILE DUCT EXPLORATION      replacement per [place]    CARPAL TUNNEL RELEASE Bilateral 2002    CORONARY ARTERY BYPASS GRAFT  2017    FOOT SURGERY Right     bone surgery to straighten toe. HIP SURGERY Left 2015    REPLACEMENT TOTAL KNEE Right 2017    SLEEVE GASTROPLASTY      TOTAL HIP ARTHROPLASTY Right 2017    US GUIDED BREAST BIOPSY LEFT COMPLETE Left 2023     Social History   Social History     Substance and Sexual Activity   Alcohol Use Yes    Comment: social      Social History     Substance and Sexual Activity   Drug Use No     Social History     Tobacco Use   Smoking Status Former    Packs/day: 0.50    Years: 7.00    Total pack years: 3.50    Types: Cigarettes    Quit date:     Years since quittin.7   Smokeless Tobacco Never     Family History   Problem Relation Age of Onset    Hypertension Mother     Diabetes Mother     Heart disease Father         CABG x5    No Known Problems Sister     No Known Problems Sister     No Known Problems Son     No Known Problems Daughter     No Known Problems Daughter        Meds/Allergies       Current Outpatient Medications:     Apremilast 30 MG TABS    aspirin (ECOTRIN LOW STRENGTH) 81 mg EC tablet    atorvastatin (LIPITOR) 10 mg tablet    citalopram (CeleXA) 20 mg tablet    furosemide (LASIX) 40 mg tablet    LORazepam (Ativan) 0.5 mg tablet    metoprolol succinate (TOPROL-XL) 50 mg 24 hr tablet    nystatin (MYCOSTATIN) cream    nystatin (MYCOSTATIN) powder    traMADol (ULTRAM-ER) 200 MG 24 hr tablet    predniSONE 1 mg tablet    Allergies   Allergen Reactions    Iodine - Food Allergy Anaphylaxis     Reaction Date: ;      Povidone Iodine Anaphylaxis    Shellfish-Derived Products - Food Allergy Anaphylaxis           Objective     Blood pressure 138/78, pulse 67, height 5' 6" (1.676 m), weight 98 kg (216 lb), SpO2 97 %. Body mass index is 34.86 kg/m². PHYSICAL EXAM:      General Appearance:   Alert, cooperative, no distress   HEENT:   Normocephalic, atraumatic, anicteric. Neck:  Supple, symmetrical, trachea midline   Lungs:   Clear to auscultation bilaterally; no rales, rhonchi or wheezing; respirations unlabored    Heart[de-identified]   Regular rate and rhythm; no murmur, rub, or gallop. Abdomen:   Soft, non-tender, non-distended; normal bowel sounds; no masses, no organomegaly. Benign abdomen. Genitalia:   Deferred    Rectal:   Deferred    Extremities:  No cyanosis, clubbing or edema    Pulses:  2+ and symmetric    Skin:  No jaundice, rashes, or lesions    Lymph nodes:  No palpable cervical lymphadenopathy        Lab Results:   No visits with results within 1 Day(s) from this visit.    Latest known visit with results is:   Appointment on 10/01/2023   Component Date Value    WBC 10/01/2023 5.66     RBC 10/01/2023 3.82     Hemoglobin 10/01/2023 12.2     Hematocrit 10/01/2023 38.8     MCV 10/01/2023 102 (H)     MCH 10/01/2023 31.9     MCHC 10/01/2023 31.4     RDW 10/01/2023 14.2     MPV 10/01/2023 10.9     Platelets 11/69/4074 225     nRBC 10/01/2023 0     Neutrophils Relative 10/01/2023 57     Immat GRANS % 10/01/2023 0     Lymphocytes Relative 10/01/2023 27     Monocytes Relative 10/01/2023 10     Eosinophils Relative 10/01/2023 6     Basophils Relative 10/01/2023 0     Neutrophils Absolute 10/01/2023 3.18     Immature Grans Absolute 10/01/2023 0.01     Lymphocytes Absolute 10/01/2023 1.51     Monocytes Absolute 10/01/2023 0.59     Eosinophils Absolute 10/01/2023 0.35     Basophils Absolute 10/01/2023 0.02     Sodium 10/01/2023 140     Potassium 10/01/2023 4.4     Chloride 10/01/2023 104     CO2 10/01/2023 32     ANION GAP 10/01/2023 4     BUN 10/01/2023 23     Creatinine 10/01/2023 0.90     Glucose, Fasting 10/01/2023 87     Calcium 10/01/2023 8.8     AST 10/01/2023 19 ALT 10/01/2023 19     Alkaline Phosphatase 10/01/2023 80     Total Protein 10/01/2023 7.4     Albumin 10/01/2023 3.8     Total Bilirubin 10/01/2023 0.71     eGFR 10/01/2023 64     Cholesterol 10/01/2023 193     Triglycerides 10/01/2023 149     HDL, Direct 10/01/2023 58     LDL Calculated 10/01/2023 105 (H)     Non-HDL-Chol (CHOL-HDL) 10/01/2023 135     Vit D, 25-Hydroxy 10/01/2023 22.4 (L)     TSH 3RD GENERATON 10/01/2023 2.534          Radiology Results:   MRI breast bilateral w and wo contrast w cad    Result Date: 10/2/2023  Narrative: DIAGNOSIS: Malignant neoplasm of female breast, unspecified estrogen receptor status, unspecified laterality, unspecified site of breast ([address]) TECHNIQUE: MRI of both breasts was performed in axial planes utilizing a combination of localizer, axial T2 STIR, Axial T1 FSPGR in phase, and axial dynamic 3D fat suppressed gradient-echo T1 sequences (VIBRANT) before and after contrast administration at multiple time points. Subtraction images were generated. This exam was read in conjunction with newMentor software. MEDICATIONS ADMINISTERED: Gadobutrol injection (SINGLE-DOSE) SOLN 9 mL, Total Given: 9 mL (1 dose) Intravenous contrast was administered at 2cc/sec, without documented contrast reaction. COMPARISONS: 8/30/2023 and 9/19/2023. RELEVANT HISTORY: Family Breast Cancer History: No known family history of breast cancer. Family Medical History: No known relevant family medical history. Personal History: No known relevant hormone history. No known relevant surgical history. No known relevant medical history. RISK ASSESSMENT: 5 Year Tyrer-Cuzick: 0.73 % 10 Year Tyrer-Cuzick: 1.57 % Lifetime Tyrer-Cuzick: 2.31 %  TISSUE DENSITY: FGT: The breasts are almost entirely fatty. BPE: The background parenchymal enhancement is minimal. INDICATION: Lynn Santos is a 72 y.o. female presenting for recently diagnosed left breast cancer.  FINDINGS: LEFT MASS [A]: There is a 4.5 cm x 3 cm x 2.3 cm irregularly shaped mass with irregular margins seen in the lower inner quadrant of the left breast at 4 o'clock in the middle depth, 2 cm from the nipple. This is the biopsy-proven carcinoma. Enhancement abuts the skin surface. There is skin thickening in this region. See screening captures. Oval circumscribed mass in the upper outer breast demonstrates increased T2 signal intensity and plateau kinetics. This likely represents a benign entity. RIGHT There are no suspicious enhancing masses or suspicious areas of non-mass enhancement. There is no axillary or internal mammary adenopathy. OTHER There is a moderate-sized hiatal hernia. Impression:  Unifocal carcinoma in the lower outer left breast with possible skin involvement. Moderate-sized hiatal hernia. Clinical management is recommended. ASSESSMENT/BI-RADS CATEGORY: Left: 6 - Known Biopsy-Proven Malignancy Right: 1 - Negative Overall: 6 - Known Biopsy-Proven Malignancy RECOMMENDATION:      - Surgical consultation for the left breast.      - Continue annual screening mammography for the right breast. Workstation ID: [id number]     US guided breast biopsy left complete, Mammo post biopsy left    Addendum Date: 9/22/2023 Addendum:   ADDENDUM: PATHOLOGY RESULTS: Final Diagnosis A. Left breast, 4:00, 2 cm from nipple (ultrasound-guided 12-gauge marquee core biopsy, 3 passes): -Invasive breast carcinoma of no special type (ductal NST/invasive ductal carcinoma). --Tumor present in 3 of 3 tissue cores with largest measurable size of 15 mm. --mBR Grade:  Grade 2 of 3 -Duct formation:      Score 3 of 3 -Nuclear grade:       Score 2 of 3 -Mitotic rate:            Score 1 of 3 -In situ component:  Favor focal DCIS, approximately 10% of tumor, grade 1 of 3 -Lymph-vascular invasion:  Not definitively identified   Comment:  Block A1 forwarded for ER/GA/HER2 analysis with the results to be given in a supplemental report.  In review of the patient’s recent imaging, the left malignancy appears unifocal; however, oval circumscribed mass in the upper outer left breast is not accounted for. Bilateral breast MRI is recommended. The biopsy-proven carcinoma measured 3.1 cm on ultrasound. Ultrasound of the left axilla was performed on 8/30/2023 and showed no suspicious adenopathy. Recent imaging of the contralateral right breast dated 8/30/2023 was reviewed and shows no suspicious findings. The findings and recommendations were discussed with the patient on 9/22/2023 at 1:10 PM by Dr. Keith Bartholomew via telephone. RECOMMENDATION:      - Surgical Consultation for the left breast.      - Breast MRI for both breasts. END ADDE    Result Date: 9/22/2023  Narrative: DIAGNOSIS: Abnormal mammogram INDICATION: Libby Morocho is a 72 y.o. female presenting for ultrasound-guided core needle biopsy. Prior to the procedure, previous imaging was reviewed. The procedure was explained to the patient in detail. All questions were answered. Written and verbal informed consent was obtained. FINDINGS: LEFT MASS [A]: Images of the left breast mass in the lower inner quadrant at 4 o'clock in the middle portion, 2 cm from the nipple were obtained. The patient was placed supine on the biopsy table. A core needle biopsy was performed under ultrasound guidance using a lateral approach. The skin was prepped in the usual fashion. Anesthesia was administered using lidocaine 1%. A 12 G device was advanced in multiple passes. Preoperative ultrasound-guided Estella  radiofrequency reflector localization was performed. A Estella  radiofrequency reflector was inserted into the target area. Operation of the reflector was confirmed using the  check device. Post-placement ultrasound and digital mammographic imaging demonstrate the radiofrequency reflector was at the site. The patient tolerated the procedure well. There were no immediate complications.      Impression:  Technically successful ultrasound-guided core needle biopsy of a hypoechoic mass in the 4 o'clock position of the left breast, 2 cm from the nipple with preoperative ultrasound-guided Estella  radiofrequency reflector localization.  RECOMMENDATION:      - Waiting for pathology for the left breast. Workstation ID: [id number]

## 2023-10-17 ENCOUNTER — APPOINTMENT (OUTPATIENT)
Dept: LAB | Facility: CLINIC | Age: 73
End: 2023-10-17
Payer: COMMERCIAL

## 2023-10-17 ENCOUNTER — CONSULT (OUTPATIENT)
Dept: GASTROENTEROLOGY | Facility: AMBULARY SURGERY CENTER | Age: 73
End: 2023-10-17
Payer: COMMERCIAL

## 2023-10-17 VITALS
DIASTOLIC BLOOD PRESSURE: 78 MMHG | HEIGHT: 66 IN | WEIGHT: 216 LBS | OXYGEN SATURATION: 97 % | BODY MASS INDEX: 34.72 KG/M2 | SYSTOLIC BLOOD PRESSURE: 138 MMHG | HEART RATE: 67 BPM

## 2023-10-17 DIAGNOSIS — R19.4 CHANGE IN BOWEL HABITS: ICD-10-CM

## 2023-10-17 DIAGNOSIS — Z90.3 H/O GASTRIC SLEEVE: ICD-10-CM

## 2023-10-17 DIAGNOSIS — R19.7 DIARRHEA, UNSPECIFIED TYPE: Primary | ICD-10-CM

## 2023-10-17 DIAGNOSIS — C50.512 MALIGNANT NEOPLASM OF LOWER-OUTER QUADRANT OF LEFT FEMALE BREAST, UNSPECIFIED ESTROGEN RECEPTOR STATUS (HCC): ICD-10-CM

## 2023-10-17 LAB
BACTERIA UR QL AUTO: ABNORMAL /HPF
BILIRUB UR QL STRIP: NEGATIVE
CLARITY UR: ABNORMAL
COLOR UR: YELLOW
GLUCOSE UR STRIP-MCNC: NEGATIVE MG/DL
HGB UR QL STRIP.AUTO: NEGATIVE
KETONES UR STRIP-MCNC: ABNORMAL MG/DL
LEUKOCYTE ESTERASE UR QL STRIP: ABNORMAL
MUCOUS THREADS UR QL AUTO: ABNORMAL
NITRITE UR QL STRIP: POSITIVE
NON-SQ EPI CELLS URNS QL MICRO: ABNORMAL /HPF
PH UR STRIP.AUTO: 5.5 [PH]
PROT UR STRIP-MCNC: ABNORMAL MG/DL
RBC #/AREA URNS AUTO: ABNORMAL /HPF
SP GR UR STRIP.AUTO: 1.03 (ref 1–1.03)
UROBILINOGEN UR STRIP-ACNC: <2 MG/DL
WBC #/AREA URNS AUTO: ABNORMAL /HPF

## 2023-10-17 PROCEDURE — 99204 OFFICE O/P NEW MOD 45 MIN: CPT | Performed by: PHYSICIAN ASSISTANT

## 2023-10-17 PROCEDURE — 36415 COLL VENOUS BLD VENIPUNCTURE: CPT

## 2023-10-17 NOTE — PATIENT INSTRUCTIONS
I recommend starting a fiber supplement such as Metamucil, Benefiber 1 tablespoon daily.         Scheduled date of EGD/colonoscopy (as of today):10/30/2023  Physician performing EGD/colonoscopy:    Location of EGD/colonoscopy: [address] [place] [address],[address]   Desired bowel prep reviewed with patient: Miralax/Dulcolax   Instructions reviewed with patient by: jenny   Clearances: none

## 2023-10-18 ENCOUNTER — ANESTHESIA (OUTPATIENT)
Dept: ANESTHESIOLOGY | Facility: HOSPITAL | Age: 73
End: 2023-10-18

## 2023-10-18 ENCOUNTER — ANESTHESIA EVENT (OUTPATIENT)
Dept: ANESTHESIOLOGY | Facility: HOSPITAL | Age: 73
End: 2023-10-18

## 2023-10-18 DIAGNOSIS — N39.0 URINARY TRACT INFECTION WITHOUT HEMATURIA, SITE UNSPECIFIED: Primary | ICD-10-CM

## 2023-10-18 DIAGNOSIS — C50.512 MALIGNANT NEOPLASM OF LOWER-OUTER QUADRANT OF LEFT BREAST OF FEMALE, ESTROGEN RECEPTOR POSITIVE: Primary | ICD-10-CM

## 2023-10-18 DIAGNOSIS — Z17.0 MALIGNANT NEOPLASM OF LOWER-OUTER QUADRANT OF LEFT BREAST OF FEMALE, ESTROGEN RECEPTOR POSITIVE: Primary | ICD-10-CM

## 2023-10-18 RX ORDER — NITROFURANTOIN 25; 75 MG/1; MG/1
100 CAPSULE ORAL 2 TIMES DAILY
Qty: 14 CAPSULE | Refills: 0 | Status: SHIPPED | OUTPATIENT
Start: 2023-10-18 | End: 2023-10-25

## 2023-10-19 LAB — MISCELLANEOUS LAB TEST RESULT: NORMAL

## 2023-10-20 ENCOUNTER — TELEPHONE (OUTPATIENT)
Dept: ANESTHESIOLOGY | Facility: CLINIC | Age: 73
End: 2023-10-20

## 2023-10-25 ENCOUNTER — TELEPHONE (OUTPATIENT)
Dept: GENETICS | Facility: CLINIC | Age: 73
End: 2023-10-25

## 2023-10-25 NOTE — TELEPHONE ENCOUNTER
Stat Genetic Test Result    Summary:    I left a message with Sangita Davidson to review the result of her STAT genetic test.  I encouraged her to call our office back at [phone] to discuss this result further. Negative - No Clinically Significant Variants Detected      Test Performed: [facility] Breast Cancer STAT Panel (9 genes): HUYEN, BRCA1, BRCA2, CDH1, CHEK2, PALB2, PTEN, STK11, and TP53      Assessment:     Negative   A negative result significantly reduces the likelihood that Sangita Davidson has a hereditary cancer syndrome related to the high-risk breast cancer genes listed above. This result does not have surgical implications and surgical options should be discussed with her healthcare provider. Additional Testing: The [facility] Common Hereditary Cancers Panel+RNA (47 genes): APC, HUYEN, AXIN2, BARD1, BMPR1A, BRCA1, BRCA2, BRIP1, CDH1, CDK4, CDKN2A, CHEK2, CTNNA1, DICER1, EPCAM, GREM1, HOXB13, KIT, MEN1, MLH1, MSH2, MSH3, MSH6, MUTYH, NBN, NF1, NTHL1, PALB2, PDGFRA, PMS2, POLD1, POLE, PTEN, RAD50, RAD51C, RAD51D, SDHA, SDHB, SDHC, SDHD, SMAD4, SMARCA4, STK11, TP53, TSC1, TSC2, VHL test is pending. We will contact Sangita Davidson once results are available. Additional recommendations for surveillance/medical management will be made upon final genetic test result. [facility] surgeon Dr. Martita Seals was made aware of this test result.

## 2023-10-26 ENCOUNTER — TELEPHONE (OUTPATIENT)
Age: 73
End: 2023-10-26

## 2023-10-30 ENCOUNTER — ANESTHESIA EVENT (OUTPATIENT)
Dept: GASTROENTEROLOGY | Facility: AMBULARY SURGERY CENTER | Age: 73
End: 2023-10-30

## 2023-10-30 ENCOUNTER — ANESTHESIA (OUTPATIENT)
Dept: GASTROENTEROLOGY | Facility: AMBULARY SURGERY CENTER | Age: 73
End: 2023-10-30

## 2023-10-30 ENCOUNTER — HOSPITAL ENCOUNTER (OUTPATIENT)
Dept: GASTROENTEROLOGY | Facility: AMBULARY SURGERY CENTER | Age: 73
Setting detail: OUTPATIENT SURGERY
Discharge: HOME | End: 2023-10-30
Payer: COMMERCIAL

## 2023-10-30 VITALS
TEMPERATURE: 97.3 F | SYSTOLIC BLOOD PRESSURE: 130 MMHG | DIASTOLIC BLOOD PRESSURE: 68 MMHG | HEIGHT: 66 IN | WEIGHT: 209 LBS | BODY MASS INDEX: 33.59 KG/M2 | HEART RATE: 63 BPM | RESPIRATION RATE: 20 BRPM | OXYGEN SATURATION: 97 %

## 2023-10-30 DIAGNOSIS — Z90.3 H/O GASTRIC SLEEVE: ICD-10-CM

## 2023-10-30 DIAGNOSIS — R19.7 DIARRHEA, UNSPECIFIED TYPE: ICD-10-CM

## 2023-10-30 PROCEDURE — 88305 TISSUE EXAM BY PATHOLOGIST: CPT | Performed by: PATHOLOGY

## 2023-10-30 PROCEDURE — 45380 COLONOSCOPY AND BIOPSY: CPT | Performed by: INTERNAL MEDICINE

## 2023-10-30 PROCEDURE — 43239 EGD BIOPSY SINGLE/MULTIPLE: CPT | Performed by: INTERNAL MEDICINE

## 2023-10-30 RX ORDER — SODIUM CHLORIDE, SODIUM LACTATE, POTASSIUM CHLORIDE, CALCIUM CHLORIDE 600; 310; 30; 20 MG/100ML; MG/100ML; MG/100ML; MG/100ML
INJECTION, SOLUTION INTRAVENOUS CONTINUOUS PRN
Status: DISCONTINUED | OUTPATIENT
Start: 2023-10-30 | End: 2023-10-30

## 2023-10-30 RX ORDER — PROPOFOL 10 MG/ML
INJECTION, EMULSION INTRAVENOUS AS NEEDED
Status: DISCONTINUED | OUTPATIENT
Start: 2023-10-30 | End: 2023-10-30

## 2023-10-30 RX ORDER — FENTANYL CITRATE 50 UG/ML
INJECTION, SOLUTION INTRAMUSCULAR; INTRAVENOUS AS NEEDED
Status: DISCONTINUED | OUTPATIENT
Start: 2023-10-30 | End: 2023-10-30

## 2023-10-30 RX ADMIN — PROPOFOL 50 MG: 10 INJECTION, EMULSION INTRAVENOUS at 11:18

## 2023-10-30 RX ADMIN — PROPOFOL 100 MG: 10 INJECTION, EMULSION INTRAVENOUS at 11:06

## 2023-10-30 RX ADMIN — PROPOFOL 50 MG: 10 INJECTION, EMULSION INTRAVENOUS at 11:09

## 2023-10-30 RX ADMIN — FENTANYL CITRATE 50 MCG: 50 INJECTION INTRAMUSCULAR; INTRAVENOUS at 11:06

## 2023-10-30 RX ADMIN — FENTANYL CITRATE 50 MCG: 50 INJECTION INTRAMUSCULAR; INTRAVENOUS at 11:09

## 2023-10-30 RX ADMIN — PROPOFOL 30 MG: 10 INJECTION, EMULSION INTRAVENOUS at 11:22

## 2023-10-30 RX ADMIN — SODIUM CHLORIDE, SODIUM LACTATE, POTASSIUM CHLORIDE, AND CALCIUM CHLORIDE: .6; .31; .03; .02 INJECTION, SOLUTION INTRAVENOUS at 11:03

## 2023-10-30 RX ADMIN — PROPOFOL 50 MG: 10 INJECTION, EMULSION INTRAVENOUS at 11:16

## 2023-10-30 RX ADMIN — PROPOFOL 50 MG: 10 INJECTION, EMULSION INTRAVENOUS at 11:12

## 2023-10-30 NOTE — H&P
History and Physical -  [facility]  Refugio Seaman 72 y.o. female MRN: [id number]    HPI: Refugio Seaman is a 72y.o. year old female who presents with hx of sleeve gastrectomy, diarrhea. Review of Systems    Historical Information   Past Medical History:   Diagnosis Date    Breast cancer ([address]) 2023    CAD (coronary artery disease)     Dyslipidemia     Hyperlipidemia     Hypertension     Obesity (BMI 30-39. 9)     SOB (shortness of breath)      Past Surgical History:   Procedure Laterality Date    BARIATRIC SURGERY  2014    BILE DUCT EXPLORATION      replacement per [place]    CARPAL TUNNEL RELEASE Bilateral 2002    CORONARY ARTERY BYPASS GRAFT  2017    FOOT SURGERY Right     bone surgery to straighten toe. HIP SURGERY Left 2015    REPLACEMENT TOTAL KNEE Right 2017    SLEEVE GASTROPLASTY      TONSILLECTOMY      TOTAL HIP ARTHROPLASTY Right 2017    US GUIDED BREAST BIOPSY LEFT COMPLETE Left 2023     Social History   Social History     Substance and Sexual Activity   Alcohol Use Yes    Comment: social      Social History     Substance and Sexual Activity   Drug Use No     Social History     Tobacco Use   Smoking Status Former    Packs/day: 0.50    Years: 7.00    Total pack years: 3.50    Types: Cigarettes    Quit date:     Years since quittin.8   Smokeless Tobacco Never     Family History   Problem Relation Age of Onset    Hypertension Mother     Diabetes Mother     Heart disease Father         CABG x5    No Known Problems Sister     No Known Problems Sister     No Known Problems Son     No Known Problems Daughter     No Known Problems Daughter        Meds/Allergies     (Not in a hospital admission)      Allergies   Allergen Reactions    Iodine - Food Allergy Anaphylaxis     Reaction Date: 2008;      Povidone Iodine Anaphylaxis    Shellfish-Derived Products - Food Allergy Anaphylaxis       Objective     /77   Pulse 72   Temp (!) 97.4 °F (36.3 °C) (Temporal) Resp 16   Ht 5' 6" (1.676 m)   Wt 94.8 kg (209 lb)   SpO2 97%   BMI 33.73 kg/m²       PHYSICAL EXAM    Gen: NAD  CV: RRR  CHEST: Clear  ABD: soft, NT/ND  EXT: no edema  Neuro: AAO      ASSESSMENT/PLAN:  This is a 72y.o. year old female here for hx of sleeve gastrectomy, diarrhea.      PLAN:   Procedure: egd/colonoscopy

## 2023-10-30 NOTE — ANESTHESIA POSTPROCEDURE EVALUATION
Post-Op Assessment Note    CV Status:  Stable  Pain Score: 0    Pain management: adequate     Mental Status:  Alert and awake   Hydration Status:  Euvolemic   PONV Controlled:  Controlled   Airway Patency:  Patent      Post Op Vitals Reviewed: Yes      Staff: Anesthesiologist, CRNA         There were no known notable events for this encounter.     BP 96/66 (10/30/23 1138)    Temp (!) 97.3 °F (36.3 °C) (10/30/23 1138)    Pulse 73 (10/30/23 1138)   Resp (!) 10 (10/30/23 1138)    SpO2 96 % (10/30/23 1138)

## 2023-10-30 NOTE — ANESTHESIA PREPROCEDURE EVALUATION
Procedure:  COLONOSCOPY  EGD    Relevant Problems   CARDIO   (+) Coronary artery disease involving native coronary artery of native heart without angina pectoris   (+) Dyspnea on exertion   (+) Essential hypertension      GYN   (+) Malignant neoplasm of lower-outer quadrant of left breast of female, estrogen receptor positive ([address])      MUSCULOSKELETAL   (+) Chronic bilateral low back pain without sciatica   (+) Rheumatoid arthritis involving multiple sites with positive rheumatoid factor (HCC)      NEURO/PSYCH   (+) Anxiety   (+) Chronic bilateral low back pain without sciatica   (+) Chronic depression      PULMONARY   (+) Dyspnea on exertion        Physical Exam    Airway    Mallampati score: II  TM Distance: >3 FB  Neck ROM: full     Dental   No notable dental hx     Cardiovascular  Rhythm: regular, Rate: normal    Pulmonary   Breath sounds clear to auscultation    Other Findings        Anesthesia Plan  ASA Score- 3     Anesthesia Type- IV sedation with anesthesia with ASA Monitors. Additional Monitors:     Airway Plan:            Plan Factors-Exercise tolerance (METS): >4 METS. Chart reviewed. Existing labs reviewed. Patient summary reviewed. Patient is not a current smoker. Obstructive sleep apnea risk education given perioperatively. Induction- intravenous. Postoperative Plan-     Informed Consent- Anesthetic plan and risks discussed with patient. I personally reviewed this patient with the CRNA. Discussed and agreed on the Anesthesia Plan with the CRNA. Zahira Hein

## 2023-10-31 ENCOUNTER — TELEPHONE (OUTPATIENT)
Dept: GENETICS | Facility: CLINIC | Age: 73
End: 2023-10-31

## 2023-10-31 NOTE — TELEPHONE ENCOUNTER
Genetic Test Result Note:    Summary:    Result Disclosure: Today I left a voicemail for Yulissa Riley reviewing the results of her genetic test for hereditary cancer. She underwent genetic testing through our program on 10/10/2023 due to her recent diagnosis of breast cancer. I encouraged her to call [phone] to discuss this result further. A copy of this consult note and test result will be shared with the patient. Her results will also be sent to her breast surgeon Jignesh Spann MD.    A separate report of her STAT genetic test results were disclosed to her on 10/25/2023. This result includes new genes and does not change her initial genetic test result. Test Performed: [facility] Breast Cancer STAT Panel (9 genes): HUYEN, BRCA1, BRCA2, CDH1, CHEK2, PALB2, PTEN, STK11, and TP53 with reflex to [facility] Common Hereditary Cancers Panel+RNA (47 genes): APC, HUYEN, AXIN2, BARD1, BMPR1A, BRCA1, BRCA2, BRIP1, CDH1, CDK4, CDKN2A, CHEK2, CTNNA1, DICER1, EPCAM, GREM1, HOXB13, KIT, MEN1, MLH1, MSH2, MSH3, MSH6, MUTYH, NBN, NF1, NTHL1, PALB2, PDGFRA, PMS2, POLD1, POLE, PTEN, RAD50, RAD51C, RAD51D, SDHA, SDHB, SDHC, SDHD, SMAD4, SMARCA4, STK11, TP53, TSC1, TSC2, VHL     Result: Negative - No Clinically Significant Variants Detected     Assessment:   A negative result significantly reduces the likelihood that Yulissa Riley has a hereditary cancer syndrome. However, this testing is unable to completely rule out the presence of hereditary cancer. It remains possible that:  There is a variant in an area of a gene which was not tested or there is a variant not detectable due to technical limitations of this test.     There is a variant in another gene that was not included in this test or in a gene not known to be linked to cancer or tumors. A family member has a genetic variant that the patient did not inherit. The cancer in the family is sporadic and is related to non-hereditary factors.      Risks and Testing for Family Members:  Yulissa Riley was made aware that all of her first-degree relatives are at increased risk to develop breast cancer based on her recent diagnosis. We recommend that her first-degree relatives make their healthcare providers aware of the family history and discuss their options for screening and risk-reduction. At this time we do not recommend testing for Sangita Davidson 's children based on her negative test result. Sangita Hahns children still need to consider the history of cancer on the other side of their family when determining their risks. If Sangita Davidsno has any affected family members with a cancer diagnosis, especially at a young age, they may still consider genetic testing. Relatives who wish to pursue genetic testing can reach out to the [facility] at [phone] to schedule an appointment or visit [email or web address] to identify a local genetic counselor. Plan:     Negative Result: This result does not have surgical implications and surgical options should be discussed with her healthcare provider.  [facility] surgeon, Dr. Martita Seals will be made aware of her results

## 2023-11-01 ENCOUNTER — TELEPHONE (OUTPATIENT)
Age: 73
End: 2023-11-01

## 2023-11-01 NOTE — TELEPHONE ENCOUNTER
Called patient and went straight to voicemail, left a detailed message of renato doctor  - valdemar davis LV[facility]

## 2023-11-01 NOTE — TELEPHONE ENCOUNTER
Patient called requesting a referral for a second opinion for breast cancer surgery please call back patient at [phone].   Enrique Miller

## 2023-11-02 PROCEDURE — 88305 TISSUE EXAM BY PATHOLOGIST: CPT | Performed by: PATHOLOGY

## 2023-11-03 ENCOUNTER — PATIENT OUTREACH (OUTPATIENT)
Dept: HEMATOLOGY ONCOLOGY | Facility: CLINIC | Age: 73
End: 2023-11-03

## 2023-11-03 ENCOUNTER — PATIENT OUTREACH (OUTPATIENT)
Dept: CASE MANAGEMENT | Facility: OTHER | Age: 73
End: 2023-11-03

## 2023-11-03 NOTE — PROGRESS NOTES
Breast Oncology Nurse Navigator    Received a message from Edna to call patient, as she has some questions. Called patient on her work phone number as instructed. Patient states she has had a few questions over the past few days. She is now considering getting a second opinion for breast cancer surgery. She called [facility] but then decided she did not want to travel to [place] for the consultation. She is considering [facility]. Asking who Dr Pratik Ware recommends. Patient scheduled to see Dr Pratik Ware on 11/6/23. Advised to discuss with Dr Pratik Ware at that appointment. Reviewed all appointments for that date, including addresses and times. Also gave her address and instructions for her upcoming echo and stress test on 11/10/23. Patient knows to reach out with further questions.

## 2023-11-03 NOTE — PROGRESS NOTES
OSW received a VM from pt this afternoon. OSW returned the TC. Pt has a few questions and could not remember who she spoke to in the past. OSW asked if it was Tri Root, her nurse navigator. She stated yes. OSW offered to send a message to Tri Root and ask her to call the patient. She was appreciative.

## 2023-11-06 ENCOUNTER — OFFICE VISIT (OUTPATIENT)
Dept: PLASTIC SURGERY | Facility: CLINIC | Age: 73
End: 2023-11-06
Payer: COMMERCIAL

## 2023-11-06 ENCOUNTER — OFFICE VISIT (OUTPATIENT)
Dept: SURGICAL ONCOLOGY | Facility: CLINIC | Age: 73
End: 2023-11-06
Payer: COMMERCIAL

## 2023-11-06 ENCOUNTER — CONSULT (OUTPATIENT)
Dept: ANESTHESIOLOGY | Facility: CLINIC | Age: 73
End: 2023-11-06
Payer: COMMERCIAL

## 2023-11-06 VITALS
SYSTOLIC BLOOD PRESSURE: 124 MMHG | BODY MASS INDEX: 34.23 KG/M2 | OXYGEN SATURATION: 94 % | DIASTOLIC BLOOD PRESSURE: 76 MMHG | RESPIRATION RATE: 15 BRPM | HEART RATE: 71 BPM | TEMPERATURE: 97.8 F | HEIGHT: 66 IN | WEIGHT: 213 LBS

## 2023-11-06 VITALS
HEIGHT: 66 IN | BODY MASS INDEX: 34.23 KG/M2 | HEART RATE: 76 BPM | SYSTOLIC BLOOD PRESSURE: 126 MMHG | WEIGHT: 213 LBS | TEMPERATURE: 97.2 F | DIASTOLIC BLOOD PRESSURE: 82 MMHG

## 2023-11-06 VITALS
RESPIRATION RATE: 20 BRPM | TEMPERATURE: 97.6 F | HEART RATE: 74 BPM | SYSTOLIC BLOOD PRESSURE: 118 MMHG | DIASTOLIC BLOOD PRESSURE: 70 MMHG | OXYGEN SATURATION: 96 %

## 2023-11-06 DIAGNOSIS — M05.79 RHEUMATOID ARTHRITIS INVOLVING MULTIPLE SITES WITH POSITIVE RHEUMATOID FACTOR (HCC): ICD-10-CM

## 2023-11-06 DIAGNOSIS — C50.512 MALIGNANT NEOPLASM OF LOWER-OUTER QUADRANT OF LEFT BREAST OF FEMALE, ESTROGEN RECEPTOR POSITIVE: Primary | ICD-10-CM

## 2023-11-06 DIAGNOSIS — Z01.818 PRE-OP EXAMINATION: Primary | ICD-10-CM

## 2023-11-06 DIAGNOSIS — C50.512 MALIGNANT NEOPLASM OF LOWER-OUTER QUADRANT OF LEFT BREAST OF FEMALE, ESTROGEN RECEPTOR POSITIVE: ICD-10-CM

## 2023-11-06 DIAGNOSIS — E66.01 CLASS 2 SEVERE OBESITY DUE TO EXCESS CALORIES WITH SERIOUS COMORBIDITY AND BODY MASS INDEX (BMI) OF 35.0 TO 35.9 IN ADULT: ICD-10-CM

## 2023-11-06 DIAGNOSIS — Z95.1 S/P CABG X 3: ICD-10-CM

## 2023-11-06 DIAGNOSIS — F41.9 ANXIETY: ICD-10-CM

## 2023-11-06 DIAGNOSIS — Z17.0 MALIGNANT NEOPLASM OF LOWER-OUTER QUADRANT OF LEFT BREAST OF FEMALE, ESTROGEN RECEPTOR POSITIVE: ICD-10-CM

## 2023-11-06 DIAGNOSIS — I10 ESSENTIAL HYPERTENSION: ICD-10-CM

## 2023-11-06 DIAGNOSIS — Z98.84 STATUS POST BARIATRIC SURGERY: ICD-10-CM

## 2023-11-06 DIAGNOSIS — Z01.89 ENCOUNTER FOR GERIATRIC ASSESSMENT: ICD-10-CM

## 2023-11-06 DIAGNOSIS — F32.A CHRONIC DEPRESSION: Chronic | ICD-10-CM

## 2023-11-06 DIAGNOSIS — I25.10 CORONARY ARTERY DISEASE INVOLVING NATIVE CORONARY ARTERY OF NATIVE HEART WITHOUT ANGINA PECTORIS: Primary | ICD-10-CM

## 2023-11-06 DIAGNOSIS — Z17.0 MALIGNANT NEOPLASM OF LOWER-OUTER QUADRANT OF LEFT BREAST OF FEMALE, ESTROGEN RECEPTOR POSITIVE: Primary | ICD-10-CM

## 2023-11-06 PROCEDURE — 99215 OFFICE O/P EST HI 40 MIN: CPT | Performed by: SURGERY

## 2023-11-06 PROCEDURE — 99243 OFF/OP CNSLTJ NEW/EST LOW 30: CPT | Performed by: NURSE PRACTITIONER

## 2023-11-06 PROCEDURE — 99215 OFFICE O/P EST HI 40 MIN: CPT | Performed by: STUDENT IN AN ORGANIZED HEALTH CARE EDUCATION/TRAINING PROGRAM

## 2023-11-06 RX ORDER — MULTIVITAMIN
1 TABLET ORAL DAILY
COMMUNITY

## 2023-11-06 RX ORDER — CEFAZOLIN SODIUM 2 G/50ML
2000 SOLUTION INTRAVENOUS ONCE
OUTPATIENT
Start: 2023-11-20 | End: 2023-11-06

## 2023-11-06 NOTE — PROGRESS NOTES
Surgical Oncology Follow Up       [address] [facility] SURGICAL ONCOLOGY [place] [address] [address]  [facility] [address]    Sherlean Dakins  1950  [id number]  [address] [facility] SURGICAL ONCOLOGY [facility] [address]    Chief Complaint   Patient presents with    Follow-up          Assessment & Plan:   Patient presents for follow-up visit. She has met with Dr. Lillian Keene and has decided she would like a unilateral left mastectomy with reconstruction. Her MRI demonstrates the tumor is close or has some skin thickening over this. This is mostly at the 5 to 6 o'clock position closer to the nipple areolar complex. I would recommend removing the skin in this area to minimize the chance of a positive margin. Her axillary ultrasound was negative. We would perform a sentinel lymph node biopsy and possible axillary dissection. Went through the process of informed consent. All questions were answered to the patient's satisfaction. Cancer History:     Oncology History   Malignant neoplasm of lower-outer quadrant of left breast of female, estrogen receptor positive ([address])   9/19/2023 Biopsy    Left breast ultrasound-guided biopsy  4 o'clock, 2 cm from nipple (VERENICE)  Invasive breast carcinoma of no special type (ductal NST)  Grade 2  ER 90, OR 90, HER2 1+\  Lymphovascular invasion not definitively identified    Concordant. Malignancy appears unifocal; however, oval circumscribed mass in the upper outer left breast is not accounted for. Bilateral breast MRI is recommended. Biopsy-proven carcinoma measured 3.1 cm on US. Left axilla US negative. Right breast clear. 9/28/2023 Observation    Bilateral breast MRI: Unifocal carcinoma in the lower outer left breast with possible skin involvement.  There are no suspicious enhancing masses or suspicious areas of non-mass enhancement in right breast. There is no axillary or internal mammary adenopathy. 10/17/2023 Genetic Testing    A total of 47 genes were evaluated, including: HUYEN, BRCA1, BRCA2, CDH1, CHEK2, PALB2, PTEN, STK11, TP53  Negative result. No pathogenic sequence variants identified  [employer]           Interval History:   Patient is met with Dr. Liberty Hawley and wants unilateral mastectomy. Review of Systems:   Review of Systems   Constitutional:  Negative for chills and fever. HENT:  Negative for ear pain and sore throat. Eyes:  Negative for pain and visual disturbance. Respiratory:  Negative for cough and shortness of breath. Cardiovascular:  Negative for chest pain and palpitations. Gastrointestinal:  Negative for abdominal pain and vomiting. Genitourinary:  Negative for dysuria and hematuria. Musculoskeletal:  Negative for arthralgias and back pain. Skin:  Negative for color change and rash. Neurological:  Negative for seizures and syncope. All other systems reviewed and are negative.       Past Medical History     Patient Active Problem List   Diagnosis    Coronary artery disease involving native coronary artery of native heart without angina pectoris    Essential hypertension    Dyslipidemia    S/P CABG x 3    Dyspnea on exertion    Chronic depression    Vitamin D deficiency    Postsurgical malabsorption    Status post bariatric surgery    At risk for sleep apnea    Chronic bilateral low back pain without sciatica    Candida infection of flexural skin    Class 2 severe obesity due to excess calories with serious comorbidity and body mass index (BMI) of 35.0 to 35.9 in adult     Acute pain of right shoulder    Postmenopausal    Rheumatoid arthritis involving multiple sites with positive rheumatoid factor (HCC)    Other fatigue    Palpitation    Malignant neoplasm of lower-outer quadrant of left breast of female, estrogen receptor positive ([address])    Change in bowel habits    Anxiety     Past Medical History:   Diagnosis Date    Breast cancer ([address]) 09/2023    CAD (coronary artery disease)     Dyslipidemia     Hyperlipidemia     Hypertension     Obesity (BMI 30-39. 9)     SOB (shortness of breath)      Past Surgical History:   Procedure Laterality Date    BARIATRIC SURGERY  2014    BILE DUCT EXPLORATION      replacement per Steffanie    CARPAL TUNNEL RELEASE Bilateral 2002    CORONARY ARTERY BYPASS GRAFT  2017    FOOT SURGERY Right     bone surgery to straighten toe.     HIP SURGERY Left 2015    REPLACEMENT TOTAL KNEE Right 2017    SLEEVE GASTROPLASTY      TONSILLECTOMY      TOTAL HIP ARTHROPLASTY Right 2017    US GUIDED BREAST BIOPSY LEFT COMPLETE Left 2023     Family History   Problem Relation Age of Onset    Hypertension Mother     Diabetes Mother     Heart disease Father         CABG x5    No Known Problems Sister     No Known Problems Sister     No Known Problems Son     No Known Problems Daughter     No Known Problems Daughter      Social History     Socioeconomic History    Marital status: /Civil Union     Spouse name: Not on file    Number of children: Not on file    Years of education: Not on file    Highest education level: Not on file   Occupational History    Not on file   Tobacco Use    Smoking status: Former     Packs/day: 0.50     Years: 7.00     Total pack years: 3.50     Types: Cigarettes     Quit date:      Years since quittin.8    Smokeless tobacco: Never   Vaping Use    Vaping Use: Never used   Substance and Sexual Activity    Alcohol use: Yes     Comment: social     Drug use: No    Sexual activity: Not Currently   Other Topics Concern    Not on file   Social History Narrative    Not on file     Social Determinants of Health     Financial Resource Strain: Not on file   Food Insecurity: Not on file   Transportation Needs: Not on file   Physical Activity: Not on file   Stress: Not on file   Social Connections: Not on file   Intimate Partner Violence: Not on file   Housing Stability: Not on file       Current Outpatient Medications: Apremilast 30 MG TABS, Take 30 mg by mouth 2 (two) times a day, Disp: , Rfl:     atorvastatin (LIPITOR) 10 mg tablet, Take 1 tablet (10 mg total) by mouth daily, Disp: 90 tablet, Rfl: 3    furosemide (LASIX) 40 mg tablet, TAKE 1 TABLET (40 MG TOTAL) BY MOUTH 2 (TWO) TIMES A DAY, Disp: 180 tablet, Rfl: 0    LORazepam (Ativan) 0.5 mg tablet, Take 1 tablet (0.5 mg total) by mouth every 8 (eight) hours as needed for anxiety, Disp: 20 tablet, Rfl: 0    metoprolol succinate (TOPROL-XL) 50 mg 24 hr tablet, Take 1 tablet (50 mg total) by mouth daily, Disp: 90 tablet, Rfl: 3    nystatin (MYCOSTATIN) cream, Apply topically 2 (two) times a day (Patient taking differently: Apply topically 2 (two) times a day As needed), Disp: 30 g, Rfl: 0    nystatin (MYCOSTATIN) powder, Apply topically 2 (two) times a day (Patient taking differently: Apply topically 2 (two) times a day As needed), Disp: 60 g, Rfl: 0    traMADol (ULTRAM-ER) 200 MG 24 hr tablet, daily Takes daily for arthritis pain., Disp: , Rfl:     aspirin (ECOTRIN LOW STRENGTH) 81 mg EC tablet, Take 1 tablet (81 mg total) by mouth daily (Patient not taking: Reported on 11/6/2023), Disp: 30 tablet, Rfl: 5  Allergies   Allergen Reactions    Iodine - Food Allergy Anaphylaxis     Reaction Date: [id number]; Povidone Iodine Anaphylaxis    Shellfish-Derived Products - Food Allergy Anaphylaxis       Physical Exam:     Vitals:    11/06/23 1109   BP: 124/76   Pulse: 71   Resp: 15   Temp: 97.8 °F (36.6 °C)   SpO2: 94%     Physical Exam  Vitals reviewed. Constitutional:       Appearance: She is well-developed. HENT:      Head: Normocephalic and atraumatic. Eyes:      Pupils: Pupils are equal, round, and reactive to light. Neck:      Thyroid: No thyromegaly. Vascular: No JVD. Trachea: No tracheal deviation. Cardiovascular:      Rate and Rhythm: Normal rate and regular rhythm. Heart sounds: Normal heart sounds. No murmur heard. No friction rub. No gallop. Pulmonary:      Effort: Pulmonary effort is normal. No respiratory distress. Breath sounds: Normal breath sounds. No wheezing or rales. Chest:          Comments: Examination of the left breast demonstrates a dominant mass as outlined on the diagram.  The skin appears to be normal except as it approaches the areolar region where it seems to be somewhat tethered but not thickened. It is a palpable mass under this as outlined in my previous diagram as well as this 1. Abdominal:      General: There is no distension. Palpations: Abdomen is soft. There is no hepatomegaly or mass. Tenderness: There is no abdominal tenderness. There is no guarding or rebound. Musculoskeletal:         General: No tenderness. Normal range of motion. Cervical back: Normal range of motion and neck supple. Lymphadenopathy:      Cervical: No cervical adenopathy. Skin:     General: Skin is warm and dry. Findings: No erythema or rash. Neurological:      Mental Status: She is alert and oriented to person, place, and time. Cranial Nerves: No cranial nerve deficit. Psychiatric:         Behavior: Behavior normal.           Results & Discussion:   The patient prefers a unilateral mastectomy. She would need a sentinel lymph node biopsy and possible axillary dissection. We reviewed the surgery as well as the reconstruction process. We reviewed potential adjuvant therapies including radiation therapy antihormonal therapy as well as chemotherapy. All questions were answered the patient's satisfaction. The patient and I underwent the process of consent for [facility] directed mastectomy, intraoperative lymphatic mapping with blue and radioactive dye, sentinel lymph node biopsy, possible axillary dissection.   The complications outlined on the consent including relatively minor problems (wound infection, wound healing problems, hematomas, scarring, chronic discomfort/pain), moderate problems (injury to nerves or blood vessels, allergic reactions) and major complications (extensive blood loss requiring transfusions or possible addtional surgeries, cardiac arrest, stroke and possible death). We also reviewed specific complications as outlined on the consent form including small cancer recurrence, arm swelling need for adjuvant therapies or surgeries. All questions were answered to the patient's satisfaction. We will coordinate the surgery at our next mutual convience. Advance Care Planning/Advance Directives:  I discussed the disease status, treatment plans and follow-up with the patient.

## 2023-11-06 NOTE — PROGRESS NOTES
[address] [facility])  CONSULT: GERIATRIC SURGERY   Assessment/Plan:  Mariah Guajardo is a 44-year-old female who was referred to [facility] for presurgery optimization  Consult concerns include:  Patient requires surgical optimization for mastectomy with immediate breast reconstruction with Dr. Johanne Medel & Dr. Coles Sat on 11/20/23. She is scheduled on 11.20.23  No problem-specific Assessment & Plan notes found for this encounter.     LAST ANESTHESIA   DIFFICULT TO WAKE   Was confused and delirious after CABG for up to 5 days   Recommend inpatient delirium precautions post-op        Problem List Items Addressed This Visit          Cardiovascular and Mediastinum    Coronary artery disease involving native coronary artery of native heart without angina pectoris - Primary    Essential hypertension  STABLE   118/70  Stress test on Friday 11.10.23- await results   Await final CC        Musculoskeletal and Integument    Rheumatoid arthritis   STABLE   Diagnosed 3 years ago   Prednione on hold   Tamadol for pain   No concerns        Other    Chronic depression (Chronic)  Anxiety  Stable today   Depression screen today level 1   No active concerns         S/P CABG x 3  CP went to ER was admitted and had CABG   NOV 2019  Stable since  Follows CARDS   Needs stress test 11.10.23  Needs final CC         Status post bariatric surgery    Class 2 severe obesity due to excess calories with serious comorbidity and body mass index (BMI) of 35.0 to 35.9 in adult   History of gastric bypass  Stable  No concerns       Malignant neoplasm of lower-outer quadrant of left breast of female, estrogen receptor positive ([address])  Seen for surgical optimization  Seen for geriatric assessment  METS 9.25  PAT's reviewed- stable   Needs CHEST XRAY   NEEDS stress test   Needs final CC   Started on best protocol    At risk for post-op LAURO - no   At risk for post-op SSI - no   BEST   Breathing- instructed to exercise lungs prior to surgery via IS  Eat- discussed increasing protein intake prior to surgery   Sleep/stress- encouraged 8-10 sleep @ night, stress reduction, avoid sick contacts and handwashing   Train- encouraged to remain active                Encounter for geriatric assessment  Recommend delirium precautions on admit   HX of post-op delirium     High risk for post-op delirium RT age, inpatient surgery  Mini cognitive screen level 4- no active concerns   Delirium precautions on admit    High risk for post- op pneumonia RT age, inpatient surgery  Incentive spirometer taught to the patient and given  Instructed to start lung exercises tonight  Non smoker     High fall risk RT age, inpatient surgery  Fall precautions on admit  Standard PT eval after surgery for safe discharge  Today lower extremity exercises were taught to patient for muscle strengthening and balance  Exercise routine developed, patient instructed to start tonight, all to be done sitting down only    Pre- frail, Frail RT age, inpatient surgery  Nutritional supplements- increase protein prior to surgery   On admission aspiration precautions, skin precautions, delirium precautions, fall precautions    Be your BEST pathway   Breath, eat, sleep/stress relief, and tracking exercise           Subjective:      Patient ID: Jv Durant is a 72 y.o. female who was referred to [facility] for presurgery geriatric screening secondary to advanced age, having surgery,  and receiving anesthesia. Patient explains on her most recent mammogram they identified an area of concern. She is now electing for:  Patient requires surgical optimization for mastectomy with immediate breast reconstruction with Dr. Tomás Diaz & Dr. Ebenezer Falcon on 11/20/23. I met patient in [facility]. She arrived with her daughter. She walked independently. No walker and/or no cane use. Gait was steady. Lives at home. Independent with all ADLs. Her METS is 9. Admits to being good health today. 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 METS.  9 She does have a cardiac history. She had CABG in 2019 she follows with cardiology. She is due for stress test this Friday. And then final cardiac clearance. Await results for any further needs    As always we discussed having your BEST surgery, and BEST recovery. Surgery goals reviewed today. Breathing exercises   Patient was encouraged to begin lung exercises today. This could be accomplished through deep breathing and cough exercises. Patient was taught how to use an incentive spirometer. Return demonstration provided. Eating/nutrition   Encouraged patient to increase oral protein intake prior to surgery. This can be accomplished by consuming chicken, fish, tuna fish, cottage cheese, cheese, eggs, [place] yogurt, and protein shakes as needed. I encouraged use of protein shakes such ENLIVE. I also recommended making your own protein shakes with protein powder. Sleep/Stress management  Patient was encouraged to rest their body prior to surgery. Encouraged attempting to get 8 hours of sleep at night. Avoid stress. Avoid sick contacts. Encouraged to find a relaxing hobby such as reading, meditation, listening to music. Training exercises  Patient was encouraged to remain active as possible. Today bilateral lower extremity generic exercises were taught for muscle strengthening and balance. All exercises to be done sitting down. The following portions of the patient's history were reviewed and updated as appropriate: allergies, current medications, past family history, past medical history, past social history, past surgical history, and problem list.    Review of Systems   Constitutional:  Negative for chills and fever. HENT:  Negative for sore throat. Gastrointestinal:  Negative for diarrhea, nausea and vomiting. Genitourinary:  Negative for difficulty urinating and dyspareunia. Skin:  Negative for color change, pallor, rash and wound.    Hematological:  Negative for adenopathy. Does not bruise/bleed easily. Psychiatric/Behavioral:  Negative for agitation, dysphoric mood and hallucinations.           Objective:      /70 (BP Location: Right arm, Patient Position: Sitting)   Pulse 74   Temp 97.6 °F (36.4 °C)   Resp 20   SpO2 96%          Physical Exam

## 2023-11-06 NOTE — PROGRESS NOTES
See Geriatric Assessment below. .. Cognitive Assessment:   CAM:   TUG <15 sec: Yes  Falls (last 6 months):  4-5 - trips, no injuries or hospital visits with any  Hand  score: 18.67 kgs  -Attempt 1: 18  -Attempt 2: 18  -Attempt 3: 20  Onesimo Total Score:  20  PHQ- 9 Depression Scale: 1  Nutrition Assessment Score: 12  METS: 9.25  Health goals:  -What are your overall health goals? Increase mobility, balance, strength    -What brings you strength? Friends, family    -What activities are important to you?  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

## 2023-11-08 ENCOUNTER — PATIENT OUTREACH (OUTPATIENT)
Dept: HEMATOLOGY ONCOLOGY | Facility: CLINIC | Age: 73
End: 2023-11-08

## 2023-11-08 NOTE — PROGRESS NOTES
Plastic Surgery Consult     Reason for visit: left breast cancer, presents for reconstructive discussion and preop    HPI on 11/6/23  Patient presents for preop for discussion for left breast reconstruction. Her BMI was erroneously entered at least visit to be 41.7, but is actually 34.3. She currently wears DD and would like to be around "C" cup after completion of reconstruction. Of note, she does have significant history of bariatric surgery and CABG. She also has history of rheumatoid arthritis for which she takes tramadol, but has been off of all steroids. She will require cardiac clearance. BMI 41.79  Breast exam:  Bilateral large ptotic breast  Tender mass in the left breast, no nipple retraction, discharge  Right breast soft, nontender     Measurements:                          L  SN-N               40 cm                N-IMF              15 cm  BW                  13.5 cm    We discussed indepth again the procedure for immediate left breast reconstruction with tissue expander placement with ADM. Discussed drains and postoperative admission overnight for pain control. Discussed that radiation and chemotherapy can prolong the reconstructive process and at any time, the reconstructive process can be stopped, paused, and resumed. Patient acknowledged. Again reiterated that asymmetric breast reconstruction is challenging and that the goal is to match volume with the contralateral side when in a bra. Discussed that patient is quite large on her right breast and will require a breast reduction at the time of implant exchange for permanent implant for symmetry. Patient acknowledged. All questions answered, concerns addressed. Will order exparel  Cardiac clearance pending  Will coordinate marking  Implants and ADM ordered  -Spent 40 minutes in consultation with patient.  Greater than 50% of the total time was spent obtaining history, evaluation, performing exam, discussion of management options including post-operative care, answering patient's questions and concerns, chart reviewing, and documentation      Damian Mason MD   [facility] Plastic and Reconstructive Surgery   [facility], [address]   Office: [phone]       HPI on 10/10/23  Patient is a 73 y/o female who presents with left breast invasive ductal carcinoma. After discussion with Dr Roz Esposito, patient has opted for unilateral left breast mastectomy with SLNB and is awaiting genetic testing. She is interested in reconstructive options. She states that she is interested in faster recovery and less complex reconstructive surgery and mentioned that she is more interested in implant reconstruction rather than autologous tissue reconstruction. Of note, patient has history of bariatric surgery, CABG and has significantly elevated BMI of 41.7. She currently wears DD and ideally would like to be around  "C" cup after completion of reconstruction. Patient states that she is sensitive to anesthesia. ROS: 12 pt ROS negative, except as otherwise noted in HPI     PMH: heart disease, hypoglycemia  Medical History        Past Medical History:   Diagnosis Date    CAD (coronary artery disease)      Dyslipidemia      Hyperlipidemia      Hypertension      Obesity (BMI 30-39. 9)      SOB (shortness of breath)           Family History         Family History   Problem Relation Age of Onset    Hypertension Mother      Diabetes Mother      Heart disease Father           CABG x5    No Known Problems Sister      No Known Problems Sister      No Known Problems Son      No Known Problems Daughter      No Known Problems Daughter              Surgical History         Past Surgical History:   Procedure Laterality Date    BARIATRIC SURGERY   2014    BILE DUCT EXPLORATION         replacement per Steffanie    CARPAL TUNNEL RELEASE Bilateral 2002    CORONARY ARTERY BYPASS GRAFT   2017    FOOT SURGERY Right       bone surgery to straighten toe. HIP SURGERY Left 2015    REPLACEMENT TOTAL KNEE Right 04/2017    SLEEVE GASTROPLASTY        TOTAL HIP ARTHROPLASTY Right 2017    US GUIDED BREAST BIOPSY LEFT COMPLETE Left 9/19/2023         SocHx: no tobacco, +social ETOH  Meds: currently not on baby aspirin, Prednisone (3 mg/day, but has stopped for past 2 weeks)        Allergies   Allergen Reactions    Iodine - Food Allergy Anaphylaxis       Reaction Date: [id number];      Povidone Iodine Anaphylaxis    Shellfish-Derived Products - Food Allergy Anaphylaxis                PE:  Vitals:     10/09/23 1307   BP: 126/81   Pulse: 68   Temp: 98 °F (36.7 °C)         General: NC/AT, breathing comfortably on RA  Neuro: CN II-XII grossly intact, symmetric reflexes  HEENT: PERRLA, EOMI, external ears normal, no lesions or deformities, neck supple, trachea midline  Respiratory: CTAB, normal respiratory effort  Cardio: RRR, normal S1, S2, no murmur, rubs, gallops  GI: soft, non-tender, non-distended  Extremities/MSK: normal alignment, mobility, gait, no edema  Skin: no rashes, lesions, subcutaneous nodules     BMI 41.79  Breast exam:  Bilateral large ptotic breast  Tender mass in the left breast, no nipple retraction, discharge  Right breast soft, nontender     Measurements:                          L  SN-N               40 cm                N-IMF              15 cm  BW                  13.5 cm

## 2023-11-08 NOTE — PROGRESS NOTES
Oncology Care Coordinator attempted to outreach patient to assess any questions or concerns regarding scheduling (Medical Oncology Consult). A voicemail was left stating a reason for my call and my contact information was provided . I requested a return call at patient's earliest convenience.

## 2023-11-09 ENCOUNTER — PATIENT OUTREACH (OUTPATIENT)
Dept: HEMATOLOGY ONCOLOGY | Facility: CLINIC | Age: 73
End: 2023-11-09

## 2023-11-09 ENCOUNTER — PATIENT OUTREACH (OUTPATIENT)
Dept: CASE MANAGEMENT | Facility: OTHER | Age: 73
End: 2023-11-09

## 2023-11-09 DIAGNOSIS — Z17.0 MALIGNANT NEOPLASM OF LOWER-OUTER QUADRANT OF LEFT BREAST OF FEMALE, ESTROGEN RECEPTOR POSITIVE: Primary | ICD-10-CM

## 2023-11-09 DIAGNOSIS — C50.512 MALIGNANT NEOPLASM OF LOWER-OUTER QUADRANT OF LEFT BREAST OF FEMALE, ESTROGEN RECEPTOR POSITIVE: Primary | ICD-10-CM

## 2023-11-09 NOTE — PROGRESS NOTES
Oncology Care Coordinator reached out to patient at the direction of provider staff to schedule medical oncology. Physician - Dr Irma Somers scheduled - 12-18-23 @ 3:20  Location - [facility]    I did outreach Pt two times to schedule Medical Oncology Consult so I went ahead and scheduled her appointment and left a message to call and confirm with me the date and time.

## 2023-11-09 NOTE — PROGRESS NOTES
OSW received  referral for [facility]. Pt is scheduled for her surgery on 11/20. OSW will continue to follow for acceptance of services.

## 2023-11-10 ENCOUNTER — HOSPITAL ENCOUNTER (OUTPATIENT)
Dept: NON INVASIVE DIAGNOSTICS | Facility: CLINIC | Age: 73
Discharge: HOME | End: 2023-11-10
Payer: COMMERCIAL

## 2023-11-10 ENCOUNTER — PATIENT OUTREACH (OUTPATIENT)
Dept: HEMATOLOGY ONCOLOGY | Facility: CLINIC | Age: 73
End: 2023-11-10

## 2023-11-10 VITALS — BODY MASS INDEX: 34.23 KG/M2 | WEIGHT: 213 LBS | HEIGHT: 66 IN

## 2023-11-10 VITALS
WEIGHT: 213 LBS | HEART RATE: 76 BPM | HEIGHT: 66 IN | BODY MASS INDEX: 34.23 KG/M2 | DIASTOLIC BLOOD PRESSURE: 62 MMHG | SYSTOLIC BLOOD PRESSURE: 126 MMHG

## 2023-11-10 DIAGNOSIS — R00.2 PALPITATION: ICD-10-CM

## 2023-11-10 DIAGNOSIS — I25.10 CORONARY ARTERY DISEASE INVOLVING NATIVE CORONARY ARTERY OF NATIVE HEART WITHOUT ANGINA PECTORIS: ICD-10-CM

## 2023-11-10 DIAGNOSIS — Z95.1 S/P CABG X 3: ICD-10-CM

## 2023-11-10 LAB
AORTIC ROOT: 3.1 CM
APICAL FOUR CHAMBER EJECTION FRACTION: 59 %
ASCENDING AORTA: 4 CM
E WAVE DECELERATION TIME: 189 MS
E/A RATIO: 0.49
FRACTIONAL SHORTENING: 39 (ref 28–44)
INTERVENTRICULAR SEPTUM IN DIASTOLE (PARASTERNAL SHORT AXIS VIEW): 1 CM
INTERVENTRICULAR SEPTUM: 1 CM (ref 0.6–1.1)
LAAS-AP2: 18.2 CM2
LAAS-AP4: 17 CM2
LEFT ATRIUM SIZE: 3.3 CM
LEFT ATRIUM VOLUME (MOD BIPLANE): 54 ML
LEFT ATRIUM VOLUME INDEX (MOD BIPLANE): 26.3 ML/M2
LEFT INTERNAL DIMENSION IN SYSTOLE: 2.3 CM (ref 2.1–4)
LEFT VENTRICULAR INTERNAL DIMENSION IN DIASTOLE: 3.8 CM (ref 3.5–6)
LEFT VENTRICULAR POSTERIOR WALL IN END DIASTOLE: 1 CM
LEFT VENTRICULAR STROKE VOLUME: 45 ML
LVSV (TEICH): 45 ML
MAX HR PERCENT: 87 %
MAX HR: 130 BPM
MV E'TISSUE VEL-LAT: 10 CM/S
MV E'TISSUE VEL-SEP: 6 CM/S
MV PEAK A VEL: 0.99 M/S
MV PEAK E VEL: 49 CM/S
MV STENOSIS PRESSURE HALF TIME: 55 MS
MV VALVE AREA P 1/2 METHOD: 4
RATE PRESSURE PRODUCT: NORMAL
RIGHT ATRIUM AREA SYSTOLE A4C: 13.4 CM2
SL CV LEFT ATRIUM LENGTH A2C: 5.6 CM
SL CV LV EF: 60
SL CV PED ECHO LEFT VENTRICLE DIASTOLIC VOLUME (MOD BIPLANE) 2D: 64 ML
SL CV PED ECHO LEFT VENTRICLE SYSTOLIC VOLUME (MOD BIPLANE) 2D: 18 ML
SL CV STRESS RECOVERY BP: NORMAL MMHG
SL CV STRESS RECOVERY HR: 82 BPM
SL CV STRESS RECOVERY O2 SAT: 100 %
SL CV STRESS STAGE REACHED: 1
STRESS ANGINA INDEX: 0
STRESS BASELINE BP: NORMAL MMHG
STRESS BASELINE HR: 79 BPM
STRESS DUKE TREADMILL SCORE: 3
STRESS O2 SAT REST: 98 %
STRESS PEAK HR: 130 BPM
STRESS POST ESTIMATED WORKLOAD: 4.6 METS
STRESS POST EXERCISE DUR MIN: 3 MIN
STRESS POST EXERCISE DUR SEC: 26 SEC
STRESS POST O2 SAT PEAK: 96 %
STRESS POST PEAK BP: 162 MMHG
STRESS ST ELEVATION: 0 MM
TR MAX PG: 17 MMHG
TR PEAK VELOCITY: 2.1 M/S
TRICUSPID ANNULAR PLANE SYSTOLIC EXCURSION: 1.7 CM
TRICUSPID VALVE PEAK REGURGITATION VELOCITY: 2.06 M/S

## 2023-11-10 PROCEDURE — 93016 CV STRESS TEST SUPVJ ONLY: CPT | Performed by: INTERNAL MEDICINE

## 2023-11-10 PROCEDURE — 93017 CV STRESS TEST TRACING ONLY: CPT

## 2023-11-10 PROCEDURE — 93306 TTE W/DOPPLER COMPLETE: CPT | Performed by: INTERNAL MEDICINE

## 2023-11-10 PROCEDURE — 93018 CV STRESS TEST I&R ONLY: CPT | Performed by: INTERNAL MEDICINE

## 2023-11-10 PROCEDURE — 93306 TTE W/DOPPLER COMPLETE: CPT

## 2023-11-10 NOTE — PROGRESS NOTES
I did outreach the Pt to verify that she received my message on 11-9-23. Pt did verify the date and time of her Medical Oncology Consult on 12/18/23 @ 3:20 with Dr. Princess Walker.  Pt had no further questions or concerns at this time. I did encourage her to call if any were to arise.

## 2023-11-13 LAB
CHEST PAIN STATEMENT: NORMAL
MAX DIASTOLIC BP: 80 MMHG
MAX PREDICTED HEART RATE: 149 BPM
PROTOCOL NAME: NORMAL
REASON FOR TERMINATION: NORMAL
STRESS POST EXERCISE DUR MIN: 3 MIN
STRESS POST EXERCISE DUR SEC: 26 SEC
STRESS POST PEAK HR: 130 BPM
STRESS POST PEAK SYSTOLIC BP: 204 MMHG
TARGET HR FORMULA: NORMAL
TEST INDICATION: NORMAL

## 2023-11-15 ENCOUNTER — HOME HEALTH ADMISSION (OUTPATIENT)
Dept: HOME HEALTH SERVICES | Facility: HOME HEALTHCARE | Age: 73
End: 2023-11-15
Payer: COMMERCIAL

## 2023-11-15 ENCOUNTER — HOSPITAL ENCOUNTER (OUTPATIENT)
Dept: RADIOLOGY | Facility: HOSPITAL | Age: 73
Discharge: HOME | End: 2023-11-15
Payer: COMMERCIAL

## 2023-11-15 DIAGNOSIS — Z17.0 MALIGNANT NEOPLASM OF LOWER-OUTER QUADRANT OF LEFT BREAST OF FEMALE, ESTROGEN RECEPTOR POSITIVE: ICD-10-CM

## 2023-11-15 DIAGNOSIS — C50.512 MALIGNANT NEOPLASM OF LOWER-OUTER QUADRANT OF LEFT BREAST OF FEMALE, ESTROGEN RECEPTOR POSITIVE: ICD-10-CM

## 2023-11-15 PROCEDURE — 71046 X-RAY EXAM CHEST 2 VIEWS: CPT

## 2023-11-16 ENCOUNTER — PATIENT OUTREACH (OUTPATIENT)
Dept: CASE MANAGEMENT | Facility: OTHER | Age: 73
End: 2023-11-16

## 2023-11-16 NOTE — PROGRESS NOTES
OSW placed outreach call to [facility] to verify acceptance of patient. Pt has been accepted and SOC will be on 11/22. Email sent to team informing above information.

## 2023-11-17 ENCOUNTER — PATIENT OUTREACH (OUTPATIENT)
Dept: HEMATOLOGY ONCOLOGY | Facility: CLINIC | Age: 73
End: 2023-11-17

## 2023-11-17 NOTE — PROGRESS NOTES
Pt did call me and needed to verify upcoming appointments. Pt is scheduled for Post Op with  Dr Brandon Lawton on 12/4/23 @ 1:30 and Dr Mark Gonzalez on 12/18/23 @ 3:20    Pt is aware and has no further questions or concerns at this time. I did encourage her to call if any were to arise.

## 2023-11-17 NOTE — PRE-PROCEDURE INSTRUCTIONS
Pre-Surgery Instructions:   Medication Instructions    Apremilast 30 MG TABS Hold day of surgery. atorvastatin (LIPITOR) 10 mg tablet Take day of surgery. furosemide (LASIX) 40 mg tablet Hold day of surgery. LORazepam (Ativan) 0.5 mg tablet Uses PRN- OK to take day of surgery    metoprolol succinate (TOPROL-XL) 50 mg 24 hr tablet Take day of surgery. Multiple Vitamin (multivitamin) tablet Stop taking 3 days prior to surgery. nystatin (MYCOSTATIN) cream Hold day of surgery. traMADol (ULTRAM-ER) 200 MG 24 hr tablet Uses PRN- OK to take day of surgery   Medication instructions for day surgery reviewed. Please use only a sip of water to take your instructed medications. Avoid all over the counter vitamins, supplements and NSAIDS for one week prior to surgery per anesthesia guidelines. Tylenol is ok to take as needed. You will receive a call one business day prior to surgery with an arrival time and hospital directions. If your surgery is scheduled on a Monday, the hospital will be calling you on the Friday prior to your surgery. If you have not heard from anyone by 8pm, please call the hospital supervisor through the hospital  at [phone]. [facility] Every [phone]). Do not eat or drink anything after midnight the night before your surgery, including candy, mints, lifesavers, or chewing gum. Do not drink alcohol 24hrs before your surgery. Try not to smoke at least 24hrs before your surgery. Follow the pre surgery showering instructions as listed in the [facility] Surgical Experience Booklet” or otherwise provided by your surgeon's office. Do not use a blade to shave the surgical area 1 week before surgery. It is okay to use a clean electric clippers up to 24 hours before surgery. Do not apply any lotions, creams, including makeup, cologne, deodorant, or perfumes after showering on the day of your surgery. Do not use dry shampoo, hair spray, hair gel, or any type of hair products.      No contact lenses, eye make-up, or artificial eyelashes. Remove nail polish, including gel polish, and any artificial, gel, or acrylic nails if possible. Remove all jewelry including rings and body piercing jewelry. Wear causal clothing that is easy to take on and off. Consider your type of surgery. Keep any valuables, jewelry, piercings at home. Please bring any specially ordered equipment (sling, braces) if indicated. Arrange for a responsible person to drive you to and from the hospital on the day of your surgery. Visitor Guidelines discussed. Call the surgeon's office with any new illnesses, exposures, or additional questions prior to surgery. Please reference your [facility] Surgical Experience Booklet” for additional information to prepare for your upcoming surgery.

## 2023-11-20 ENCOUNTER — HOSPITAL ENCOUNTER (OUTPATIENT)
Facility: HOSPITAL | Age: 73
Setting detail: OUTPATIENT SURGERY
Discharge: HOME | End: 2023-11-22
Attending: SURGERY | Admitting: STUDENT IN AN ORGANIZED HEALTH CARE EDUCATION/TRAINING PROGRAM
Payer: COMMERCIAL

## 2023-11-20 ENCOUNTER — ANESTHESIA (OUTPATIENT)
Dept: PERIOP | Facility: HOSPITAL | Age: 73
End: 2023-11-20
Payer: COMMERCIAL

## 2023-11-20 ENCOUNTER — ANESTHESIA EVENT (OUTPATIENT)
Dept: PERIOP | Facility: HOSPITAL | Age: 73
End: 2023-11-20
Payer: COMMERCIAL

## 2023-11-20 ENCOUNTER — HOSPITAL ENCOUNTER (OUTPATIENT)
Dept: NUCLEAR MEDICINE | Facility: HOSPITAL | Age: 73
Discharge: HOME | End: 2023-11-20
Attending: SURGERY
Payer: COMMERCIAL

## 2023-11-20 DIAGNOSIS — Z42.1 ADMISSION FOR BREAST RECONSTRUCTION FOLLOWING MASTECTOMY: ICD-10-CM

## 2023-11-20 DIAGNOSIS — C50.512 MALIGNANT NEOPLASM OF LOWER-OUTER QUADRANT OF LEFT BREAST OF FEMALE, ESTROGEN RECEPTOR POSITIVE: Primary | ICD-10-CM

## 2023-11-20 DIAGNOSIS — Z17.0 MALIGNANT NEOPLASM OF LOWER-OUTER QUADRANT OF LEFT BREAST OF FEMALE, ESTROGEN RECEPTOR POSITIVE: ICD-10-CM

## 2023-11-20 DIAGNOSIS — C50.512 MALIGNANT NEOPLASM OF LOWER-OUTER QUADRANT OF LEFT BREAST OF FEMALE, ESTROGEN RECEPTOR POSITIVE: ICD-10-CM

## 2023-11-20 DIAGNOSIS — Z17.0 MALIGNANT NEOPLASM OF LOWER-OUTER QUADRANT OF LEFT BREAST OF FEMALE, ESTROGEN RECEPTOR POSITIVE: Primary | ICD-10-CM

## 2023-11-20 LAB
PLATELET # BLD AUTO: 201 THOUSANDS/UL (ref 149–390)
PMV BLD AUTO: 10.5 FL (ref 8.9–12.7)

## 2023-11-20 PROCEDURE — 85049 AUTOMATED PLATELET COUNT: CPT

## 2023-11-20 PROCEDURE — 88307 TISSUE EXAM BY PATHOLOGIST: CPT | Performed by: PATHOLOGY

## 2023-11-20 PROCEDURE — 99024 POSTOP FOLLOW-UP VISIT: CPT | Performed by: PHYSICIAN ASSISTANT

## 2023-11-20 PROCEDURE — 38900 IO MAP OF SENT LYMPH NODE: CPT | Performed by: SURGERY

## 2023-11-20 PROCEDURE — 38792 RA TRACER ID OF SENTINL NODE: CPT | Performed by: SURGERY

## 2023-11-20 PROCEDURE — 15777 ACELLULAR DERM MATRIX IMPLT: CPT | Performed by: STUDENT IN AN ORGANIZED HEALTH CARE EDUCATION/TRAINING PROGRAM

## 2023-11-20 PROCEDURE — 19307 MAST MOD RAD: CPT

## 2023-11-20 PROCEDURE — 19357 TISS XPNDR PLMT BRST RCNSTJ: CPT | Performed by: STUDENT IN AN ORGANIZED HEALTH CARE EDUCATION/TRAINING PROGRAM

## 2023-11-20 PROCEDURE — 88333 PATH CONSLTJ SURG CYTO XM 1: CPT | Performed by: PATHOLOGY

## 2023-11-20 PROCEDURE — 99024 POSTOP FOLLOW-UP VISIT: CPT | Performed by: STUDENT IN AN ORGANIZED HEALTH CARE EDUCATION/TRAINING PROGRAM

## 2023-11-20 PROCEDURE — C1789 PROSTHESIS, BREAST, IMP: HCPCS | Performed by: SURGERY

## 2023-11-20 PROCEDURE — A9541 TC99M SULFUR COLLOID: HCPCS

## 2023-11-20 PROCEDURE — 14302 TIS TRNFR ADDL 30 SQ CM: CPT | Performed by: STUDENT IN AN ORGANIZED HEALTH CARE EDUCATION/TRAINING PROGRAM

## 2023-11-20 PROCEDURE — 90662 IIV NO PRSV INCREASED AG IM: CPT | Performed by: SURGERY

## 2023-11-20 PROCEDURE — 14301 TIS TRNFR ANY 30.1-60 SQ CM: CPT | Performed by: STUDENT IN AN ORGANIZED HEALTH CARE EDUCATION/TRAINING PROGRAM

## 2023-11-20 PROCEDURE — 38792 RA TRACER ID OF SENTINL NODE: CPT

## 2023-11-20 PROCEDURE — 88342 IMHCHEM/IMCYTCHM 1ST ANTB: CPT | Performed by: PATHOLOGY

## 2023-11-20 PROCEDURE — 90471 IMMUNIZATION ADMIN: CPT | Performed by: SURGERY

## 2023-11-20 PROCEDURE — 88341 IMHCHEM/IMCYTCHM EA ADD ANTB: CPT | Performed by: PATHOLOGY

## 2023-11-20 PROCEDURE — C9290 INJ, BUPIVACAINE LIPOSOME: HCPCS | Performed by: STUDENT IN AN ORGANIZED HEALTH CARE EDUCATION/TRAINING PROGRAM

## 2023-11-20 PROCEDURE — 19307 MAST MOD RAD: CPT | Performed by: SURGERY

## 2023-11-20 DEVICE — IMPLANTABLE DEVICE: Type: IMPLANTABLE DEVICE | Site: BREAST | Status: FUNCTIONAL

## 2023-11-20 DEVICE — SILTEX TALL HEIGHT TISSUE EXPANDER STYLE 9300, 650CC
Type: IMPLANTABLE DEVICE | Site: BREAST | Status: FUNCTIONAL
Brand: MENTOR CPX 4 BREAST TISSUE EXPANDER WITH SUTURE TABS

## 2023-11-20 RX ORDER — ACETAMINOPHEN 500 MG
500 TABLET ORAL EVERY 4 HOURS PRN
Qty: 30 TABLET | Refills: 2 | Status: SHIPPED | OUTPATIENT
Start: 2023-11-20

## 2023-11-20 RX ORDER — METOPROLOL SUCCINATE 50 MG/1
50 TABLET, EXTENDED RELEASE ORAL DAILY
Status: DISCONTINUED | OUTPATIENT
Start: 2023-11-21 | End: 2023-11-22 | Stop reason: HOSPADM

## 2023-11-20 RX ORDER — ONDANSETRON 2 MG/ML
INJECTION INTRAMUSCULAR; INTRAVENOUS AS NEEDED
Status: DISCONTINUED | OUTPATIENT
Start: 2023-11-20 | End: 2023-11-20

## 2023-11-20 RX ORDER — DEXAMETHASONE SODIUM PHOSPHATE 10 MG/ML
INJECTION, SOLUTION INTRAMUSCULAR; INTRAVENOUS AS NEEDED
Status: DISCONTINUED | OUTPATIENT
Start: 2023-11-20 | End: 2023-11-20

## 2023-11-20 RX ORDER — FUROSEMIDE 40 MG/1
40 TABLET ORAL 2 TIMES DAILY
Status: DISCONTINUED | OUTPATIENT
Start: 2023-11-20 | End: 2023-11-22 | Stop reason: HOSPADM

## 2023-11-20 RX ORDER — HYDROMORPHONE HCL/PF 1 MG/ML
0.5 SYRINGE (ML) INJECTION
Status: DISCONTINUED | OUTPATIENT
Start: 2023-11-20 | End: 2023-11-20 | Stop reason: HOSPADM

## 2023-11-20 RX ORDER — ONDANSETRON 2 MG/ML
4 INJECTION INTRAMUSCULAR; INTRAVENOUS EVERY 6 HOURS PRN
Status: DISCONTINUED | OUTPATIENT
Start: 2023-11-20 | End: 2023-11-20

## 2023-11-20 RX ORDER — CEFAZOLIN SODIUM 2 G/50ML
2000 SOLUTION INTRAVENOUS ONCE
Status: COMPLETED | OUTPATIENT
Start: 2023-11-20 | End: 2023-11-20

## 2023-11-20 RX ORDER — OXYCODONE HYDROCHLORIDE 5 MG/1
5 TABLET ORAL EVERY 4 HOURS PRN
Qty: 30 TABLET | Refills: 0 | Status: SHIPPED | OUTPATIENT
Start: 2023-11-20 | End: 2023-11-30

## 2023-11-20 RX ORDER — SENNOSIDES 8.6 MG
1 TABLET ORAL DAILY
Status: DISCONTINUED | OUTPATIENT
Start: 2023-11-20 | End: 2023-11-22 | Stop reason: HOSPADM

## 2023-11-20 RX ORDER — ATORVASTATIN CALCIUM 10 MG/1
10 TABLET, FILM COATED ORAL DAILY
Status: DISCONTINUED | OUTPATIENT
Start: 2023-11-21 | End: 2023-11-22 | Stop reason: HOSPADM

## 2023-11-20 RX ORDER — CALCIUM CARBONATE 500 MG/1
1000 TABLET, CHEWABLE ORAL DAILY PRN
Status: DISCONTINUED | OUTPATIENT
Start: 2023-11-20 | End: 2023-11-21

## 2023-11-20 RX ORDER — PROMETHAZINE HYDROCHLORIDE 25 MG/ML
25 INJECTION, SOLUTION INTRAMUSCULAR; INTRAVENOUS EVERY 6 HOURS PRN
Status: DISCONTINUED | OUTPATIENT
Start: 2023-11-20 | End: 2023-11-22 | Stop reason: HOSPADM

## 2023-11-20 RX ORDER — OXYCODONE HYDROCHLORIDE 5 MG/1
5 TABLET ORAL EVERY 6 HOURS PRN
Status: DISCONTINUED | OUTPATIENT
Start: 2023-11-20 | End: 2023-11-22 | Stop reason: HOSPADM

## 2023-11-20 RX ORDER — FENTANYL CITRATE 50 UG/ML
INJECTION, SOLUTION INTRAMUSCULAR; INTRAVENOUS AS NEEDED
Status: DISCONTINUED | OUTPATIENT
Start: 2023-11-20 | End: 2023-11-20

## 2023-11-20 RX ORDER — LIDOCAINE HYDROCHLORIDE 10 MG/ML
INJECTION, SOLUTION EPIDURAL; INFILTRATION; INTRACAUDAL; PERINEURAL AS NEEDED
Status: DISCONTINUED | OUTPATIENT
Start: 2023-11-20 | End: 2023-11-20

## 2023-11-20 RX ORDER — GABAPENTIN 300 MG/1
300 CAPSULE ORAL ONCE
Status: COMPLETED | OUTPATIENT
Start: 2023-11-20 | End: 2023-11-20

## 2023-11-20 RX ORDER — ONDANSETRON 4 MG/1
4 TABLET, FILM COATED ORAL EVERY 8 HOURS PRN
Qty: 20 TABLET | Refills: 0 | Status: SHIPPED | OUTPATIENT
Start: 2023-11-20

## 2023-11-20 RX ORDER — OXYCODONE HYDROCHLORIDE 10 MG/1
10 TABLET ORAL EVERY 6 HOURS PRN
Status: DISCONTINUED | OUTPATIENT
Start: 2023-11-20 | End: 2023-11-22 | Stop reason: HOSPADM

## 2023-11-20 RX ORDER — LORAZEPAM 0.5 MG/1
0.5 TABLET ORAL EVERY 8 HOURS PRN
Status: DISCONTINUED | OUTPATIENT
Start: 2023-11-20 | End: 2023-11-22 | Stop reason: HOSPADM

## 2023-11-20 RX ORDER — METHOCARBAMOL 500 MG/1
500 TABLET, FILM COATED ORAL 3 TIMES DAILY PRN
Qty: 15 TABLET | Refills: 1 | Status: SHIPPED | OUTPATIENT
Start: 2023-11-20

## 2023-11-20 RX ORDER — CEFADROXIL 500 MG/1
500 CAPSULE ORAL EVERY 12 HOURS SCHEDULED
Qty: 28 CAPSULE | Refills: 0 | Status: SHIPPED | OUTPATIENT
Start: 2023-11-20 | End: 2023-12-04

## 2023-11-20 RX ORDER — ACETAMINOPHEN 325 MG/1
975 TABLET ORAL ONCE
Status: COMPLETED | OUTPATIENT
Start: 2023-11-20 | End: 2023-11-20

## 2023-11-20 RX ORDER — HEPARIN SODIUM 5000 [USP'U]/ML
5000 INJECTION, SOLUTION INTRAVENOUS; SUBCUTANEOUS EVERY 8 HOURS SCHEDULED
Status: DISCONTINUED | OUTPATIENT
Start: 2023-11-21 | End: 2023-11-22 | Stop reason: HOSPADM

## 2023-11-20 RX ORDER — HYDROMORPHONE HCL/PF 1 MG/ML
SYRINGE (ML) INJECTION AS NEEDED
Status: DISCONTINUED | OUTPATIENT
Start: 2023-11-20 | End: 2023-11-20

## 2023-11-20 RX ORDER — SODIUM CHLORIDE, SODIUM LACTATE, POTASSIUM CHLORIDE, CALCIUM CHLORIDE 600; 310; 30; 20 MG/100ML; MG/100ML; MG/100ML; MG/100ML
125 INJECTION, SOLUTION INTRAVENOUS CONTINUOUS
Status: DISCONTINUED | OUTPATIENT
Start: 2023-11-20 | End: 2023-11-21

## 2023-11-20 RX ORDER — CEFAZOLIN SODIUM 1 G/50ML
1000 SOLUTION INTRAVENOUS EVERY 8 HOURS
Status: COMPLETED | OUTPATIENT
Start: 2023-11-20 | End: 2023-11-21

## 2023-11-20 RX ORDER — ONDANSETRON 2 MG/ML
4 INJECTION INTRAMUSCULAR; INTRAVENOUS ONCE AS NEEDED
Status: DISCONTINUED | OUTPATIENT
Start: 2023-11-20 | End: 2023-11-20 | Stop reason: HOSPADM

## 2023-11-20 RX ORDER — HYDROMORPHONE HCL/PF 1 MG/ML
0.5 SYRINGE (ML) INJECTION EVERY 4 HOURS PRN
Status: DISCONTINUED | OUTPATIENT
Start: 2023-11-20 | End: 2023-11-22 | Stop reason: HOSPADM

## 2023-11-20 RX ORDER — DOCUSATE SODIUM 100 MG/1
100 CAPSULE, LIQUID FILLED ORAL 2 TIMES DAILY PRN
Qty: 20 CAPSULE | Refills: 2 | Status: SHIPPED | OUTPATIENT
Start: 2023-11-20

## 2023-11-20 RX ORDER — ROCURONIUM BROMIDE 10 MG/ML
INJECTION, SOLUTION INTRAVENOUS AS NEEDED
Status: DISCONTINUED | OUTPATIENT
Start: 2023-11-20 | End: 2023-11-20

## 2023-11-20 RX ORDER — PROPOFOL 10 MG/ML
INJECTION, EMULSION INTRAVENOUS AS NEEDED
Status: DISCONTINUED | OUTPATIENT
Start: 2023-11-20 | End: 2023-11-20

## 2023-11-20 RX ORDER — ACETAMINOPHEN 325 MG/1
650 TABLET ORAL EVERY 8 HOURS SCHEDULED
Status: DISCONTINUED | OUTPATIENT
Start: 2023-11-20 | End: 2023-11-22 | Stop reason: HOSPADM

## 2023-11-20 RX ORDER — MAGNESIUM HYDROXIDE 1200 MG/15ML
LIQUID ORAL AS NEEDED
Status: DISCONTINUED | OUTPATIENT
Start: 2023-11-20 | End: 2023-11-20 | Stop reason: HOSPADM

## 2023-11-20 RX ADMIN — ROCURONIUM BROMIDE 10 MG: 10 INJECTION, SOLUTION INTRAVENOUS at 13:35

## 2023-11-20 RX ADMIN — GABAPENTIN 300 MG: 300 CAPSULE ORAL at 10:40

## 2023-11-20 RX ADMIN — ROCURONIUM BROMIDE 50 MG: 10 INJECTION, SOLUTION INTRAVENOUS at 11:59

## 2023-11-20 RX ADMIN — FUROSEMIDE 40 MG: 40 TABLET ORAL at 17:03

## 2023-11-20 RX ADMIN — ROCURONIUM BROMIDE 10 MG: 10 INJECTION, SOLUTION INTRAVENOUS at 12:40

## 2023-11-20 RX ADMIN — SODIUM CHLORIDE, SODIUM LACTATE, POTASSIUM CHLORIDE, AND CALCIUM CHLORIDE: .6; .31; .03; .02 INJECTION, SOLUTION INTRAVENOUS at 13:37

## 2023-11-20 RX ADMIN — PROPOFOL 150 MG: 10 INJECTION, EMULSION INTRAVENOUS at 11:59

## 2023-11-20 RX ADMIN — FENTANYL CITRATE 100 MCG: 50 INJECTION INTRAMUSCULAR; INTRAVENOUS at 11:59

## 2023-11-20 RX ADMIN — ACETAMINOPHEN 975 MG: 325 TABLET, FILM COATED ORAL at 10:41

## 2023-11-20 RX ADMIN — HYDROMORPHONE HYDROCHLORIDE 0.5 MG: 1 INJECTION, SOLUTION INTRAMUSCULAR; INTRAVENOUS; SUBCUTANEOUS at 13:44

## 2023-11-20 RX ADMIN — B-COMPLEX W/ C & FOLIC ACID TAB 1 TABLET: TAB at 17:03

## 2023-11-20 RX ADMIN — ACETAMINOPHEN 650 MG: 325 TABLET, FILM COATED ORAL at 17:03

## 2023-11-20 RX ADMIN — ONDANSETRON 4 MG: 2 INJECTION INTRAMUSCULAR; INTRAVENOUS at 11:59

## 2023-11-20 RX ADMIN — CEFAZOLIN SODIUM 1000 MG: 1 SOLUTION INTRAVENOUS at 20:05

## 2023-11-20 RX ADMIN — SENNOSIDES 8.6 MG: 8.6 TABLET, FILM COATED ORAL at 17:03

## 2023-11-20 RX ADMIN — LIDOCAINE HYDROCHLORIDE 50 MG: 10 INJECTION, SOLUTION EPIDURAL; INFILTRATION; INTRACAUDAL; PERINEURAL at 11:59

## 2023-11-20 RX ADMIN — CALCIUM CARBONATE (ANTACID) CHEW TAB 500 MG 1000 MG: 500 CHEW TAB at 20:54

## 2023-11-20 RX ADMIN — INFLUENZA A VIRUS A/VICTORIA/4897/2022 IVR-238 (H1N1) ANTIGEN (FORMALDEHYDE INACTIVATED), INFLUENZA A VIRUS A/DARWIN/9/2021 SAN-010 (H3N2) ANTIGEN (FORMALDEHYDE INACTIVATED), INFLUENZA B VIRUS B/PHUKET/3073/2013 ANTIGEN (FORMALDEHYDE INACTIVATED), AND INFLUENZA B VIRUS B/MICHIGAN/01/2021 ANTIGEN (FORMALDEHYDE INACTIVATED) 0.7 ML: 60; 60; 60; 60 INJECTION, SUSPENSION INTRAMUSCULAR at 17:03

## 2023-11-20 RX ADMIN — DEXAMETHASONE SODIUM PHOSPHATE 10 MG: 10 INJECTION, SOLUTION INTRAMUSCULAR; INTRAVENOUS at 11:59

## 2023-11-20 RX ADMIN — CEFAZOLIN SODIUM 2000 MG: 2 SOLUTION INTRAVENOUS at 12:05

## 2023-11-20 RX ADMIN — SUGAMMADEX 200 MG: 100 INJECTION, SOLUTION INTRAVENOUS at 15:18

## 2023-11-20 RX ADMIN — SODIUM CHLORIDE, SODIUM LACTATE, POTASSIUM CHLORIDE, AND CALCIUM CHLORIDE: .6; .31; .03; .02 INJECTION, SOLUTION INTRAVENOUS at 11:30

## 2023-11-20 RX ADMIN — OXYCODONE HYDROCHLORIDE 10 MG: 10 TABLET ORAL at 20:39

## 2023-11-20 NOTE — ANESTHESIA POSTPROCEDURE EVALUATION
Post-Op Assessment Note    CV Status:  Stable  Pain Score: 0    Pain management: adequate       Mental Status:  Alert and awake   Hydration Status:  Euvolemic   PONV Controlled:  Controlled   Airway Patency:  Patent     Post Op Vitals Reviewed: Yes    No anethesia notable event occurred.     Staff: Anesthesiologist, CRNA               BP   145/75   Temp   98.3   Pulse  71   Resp   16   SpO2   98

## 2023-11-20 NOTE — ANESTHESIA PREPROCEDURE EVALUATION
Procedure:  LEFT BREAST VERENICE  DIRECTED MASTECTOMY (Left: Breast)  LEFT LYMPHATIC MAPPING WITH BLUE AND RADIOACTIVE DYES, SENTINEL LYMPH NODE BIOPSY (Left: Axilla)  POSSIBLE AXILLARY DISSECTION (Left: Axilla)  IMMEDIATE LEFT BREAST RECONSTRUCTION WITH TISSUE EXPANDER AND ADM (Left: Breast)    Relevant Problems   CARDIO   (+) Coronary artery disease involving native coronary artery of native heart without angina pectoris   (+) Dyspnea on exertion   (+) Essential hypertension      GYN   (+) Malignant neoplasm of lower-outer quadrant of left breast of female, estrogen receptor positive (HCC)      MUSCULOSKELETAL   (+) Chronic bilateral low back pain without sciatica   (+) Rheumatoid arthritis involving multiple sites with positive rheumatoid factor (HCC)      NEURO/PSYCH   (+) Anxiety   (+) Chronic bilateral low back pain without sciatica   (+) Chronic depression      PULMONARY   (+) Dyspnea on exertion        Physical Exam    Airway    Mallampati score: II  TM Distance: >3 FB  Neck ROM: full     Dental   No notable dental hx     Cardiovascular      Pulmonary      Other Findings  post-pubertal.      Anesthesia Plan  ASA Score- 3     Anesthesia Type- general with ASA Monitors. Additional Monitors:     Airway Plan: ETT. Plan Factors-    Chart reviewed. EKG reviewed. Existing labs reviewed. Patient summary reviewed. Patient is not a current smoker. Induction- intravenous. Postoperative Plan- Plan for postoperative opioid use. Informed Consent- Anesthetic plan and risks discussed with patient. I personally reviewed this patient with the CRNA. Discussed and agreed on the Anesthesia Plan with the CRNA. Raheem Speaker

## 2023-11-20 NOTE — DISCHARGE INSTR - AVS FIRST PAGE
Discharge instructions    -Take tylenol 500 mg as scheduled for baseline mild to moderate pain control. For severe breakthrough pain, can use oxycodone.  -Do not drive or operate heavy machinery when taking narcotic pain medicine (oxycodone) as it can cause drowsiness.  -For muscle spasms can take robaxin as needed, up to three-times-per day. -Do not get incisions wet for 3 days. After 3 days, can remove all dressings and shower with drains. Be careful with drains, do not let them hang and pull on skin.  -No submerging incisions (no baths, pools, hottubs). Pat dry incision and dress with gauze.  -Wear supportive bra at all times. Compression sports bra is okay. No underwire.  -No strenuous activity, no heavy lifting (nothing over 5 lbs), no reaching above head, no hard pushing or pulling with arms. -Record and empty drains at least 3 times per day, more if bulb is filled more quickly. Bring record log to clinic as this determines when drains can be removed. -Take all medicines as prescribed.   -Take antibiotics as prescribed until completion. -STOP taking tramadol, START taking oxycodone. DO NOT TAKE BOTH because both are narcotics. -Resume regular diet and home medications  -Call Plastic Surgery office to schedule post-op follow in 7 days.

## 2023-11-20 NOTE — OP NOTE
OPERATIVE REPORT  PATIENT NAME: Awa Arshad    :  1950  MRN: [id number]  Pt Location: [facility] OR ROOM 04    SURGERY DATE: 2023    Surgeon(s) and Role:  Panel 1:     * Diana Landrum MD - Primary     * Charisse Grant MD - Co-surgeon  Panel 2:     * Charisse Grant MD - Primary     * Michael Easton MD - Assisting    Preop Diagnosis:  Malignant neoplasm of lower-outer quadrant of left breast of female, estrogen receptor positive  [C50.512, Z17.0]    Post-Op Diagnosis Codes:     * Malignant neoplasm of lower-outer quadrant of left breast of female, estrogen receptor positive  [C50.512, Z17.0]    Procedure(s):  Immediate left breast reconstruction with submuscular tissue expander with acellular dermal matrix (flexHD)  Local Denzel flap, adjacent tissue transfer to left chest (15x7 cm, total 105 cm2)  Left chest intercostal nerve block with Exparel     Specimen(s):  ID Type Source Tests Collected by Time Destination   1 : left axillary sentinel lymp node for touch prep Tissue Lymph Node, [place] TISSUE EXAM Diana Landrum MD 2023 12:46 PM    2 : Short superior, long lateral, medium medial Tissue Breast, Left TISSUE EXAM Diana Landrum MD 2023  1:06 PM        Estimated Blood Loss:   Minimal    Drains:  Closed/Suction Drain Left; Anterior LUQ Bulb 15 Fr. (Active)   Number of days: 0       Closed/Suction Drain Left LLQ Bulb 15 Fr. (Active)   Number of days: 0       Urethral Catheter Asept;Latex (Active)   Number of days: 0       Anesthesia Type:   General    Operative Indications:  Malignant neoplasm of lower-outer quadrant of left breast of female, estrogen receptor positive  [C50.512, Z17.0]      Operative Findings:  [place] Textured expander 650 cc. Lot: [id number].  Model: [id number]  FlexHD Serial No: [id number]  Exparel 20 cc  Intraoperatively filled to 150 cc NS of possible 650 cc expander    Complications:   None    Procedure and Technique:  Patient was already intubated in the operating room as she had undergone left breast mastectomy by Dr Hayden Choudhury. Upon his completion of the procedure, the patient's chest was re- prepped and draped in the normal sterile fashion. A time-out was performed for my portion of the case. I first began by irrigating the chest operative fields and obtaining hemostasis. Next, I performed a left chest intercostal nerve block with exparel (20 cc). Next, the pectoralis major was released at the origin and the subpectoral plane was developed to fit the the breast footprint. FlexHD acellular dermal matrix was prepared and irrigated with antibiotic solution and then sutured to the preoperatively marked inframammary fold with 0-vicyrl sutures in interrupted fashion to create the inferior sling of the subpectoral pocket. It was noted that there was tightness on the closure due to lack of skin of the lateral pocket and a Denzel flap was raised and advanced cranially and the IMF was recreated and secured with 0-vicryl sutures. Next, two 15 Micron[place] drains were placed in the pocket (anterior drain was superficial, posterior drain submuscular) and secured with 3-0 nylon sutures. Next, the pockets were irrigated with Iricept and double antibiotic solution. The pocket was measured and utilizing minimal touch technique, 650 cc textured round mentor tissue expanders (BW14) was  placed into the left chest pocket and secured with 0-vicryl along the tabs. Prior to placement of expander, hemostasis was achieved with electrocautery. After the implant was secured, the FlexHD was sutured to the distal edge of the pectoralis major with running 3-0 stratafix to secure the subpectoral pocket. The pockets were irrigated again with antibiotic solution and hemostasis with electrocautery. Layered closure was performed with 3-0 vicryl for the subcutaneous tissue and dermis and 3-0 stratafix for the skin. Prineo and skin glue and tape were applied overlying the incision.       The left chest expander was filled to 150 cc intraoperatively. This concluded the procedure. Patient tolerated the procedure well without complications. At the end of the case, all sponge, needle, and instrument counts were correct. Patient was awakened from anesthesia and taken to the PACU in stable condition. I was present for the entire procedure, A qualified resident physician was available and A physician assistant was required during the procedure for retraction, tissue handling, dissection and suturing        Patient Disposition:  PACU      I was present for the entire procedure.     Patient Disposition:  PACU     SIGNATURE: Ludwin Costa MD  DATE: November 20, 2023  TIME: 3:22 PM

## 2023-11-20 NOTE — PLAN OF CARE
Problem: PAIN - ADULT  Goal: Verbalizes/displays adequate comfort level or baseline comfort level  Description: Interventions:  - Encourage patient to monitor pain and request assistance  - Assess pain using appropriate pain scale  - Administer analgesics based on type and severity of pain and evaluate response  - Implement non-pharmacological measures as appropriate and evaluate response  - Consider cultural and social influences on pain and pain management  - Notify physician/advanced practitioner if interventions unsuccessful or patient reports new pain  Outcome: Progressing     Problem: INFECTION - ADULT  Goal: Absence or prevention of progression during hospitalization  Description: INTERVENTIONS:  - Assess and monitor for signs and symptoms of infection  - Monitor lab/diagnostic results  - Monitor all insertion sites, i.e. indwelling lines, tubes, and drains  - Monitor endotracheal if appropriate and nasal secretions for changes in amount and color  - [place] appropriate cooling/warming therapies per order  - Administer medications as ordered  - Instruct and encourage patient and family to use good hand hygiene technique  - Identify and instruct in appropriate isolation precautions for identified infection/condition  Outcome: Progressing  Goal: Absence of fever/infection during neutropenic period  Description: INTERVENTIONS:  - Monitor WBC    Outcome: Progressing     Problem: SAFETY ADULT  Goal: Patient will remain free of falls  Description: INTERVENTIONS:  - Educate patient/family on patient safety including physical limitations  - Instruct patient to call for assistance with activity   - Consult OT/PT to assist with strengthening/mobility   - Keep Call bell within reach  - Keep bed low and locked with side rails adjusted as appropriate  - Keep care items and personal belongings within reach  - Initiate and maintain comfort rounds  - Make Fall Risk Sign visible to staff  - Offer Toileting every 2 Hours, in advance of need  - Initiate/Maintain alarm  - Obtain necessary fall risk management equipment  - Apply yellow socks and bracelet for high fall risk patients  - Consider moving patient to room near nurses station  Outcome: Progressing  Goal: Maintain or return to baseline ADL function  Description: INTERVENTIONS:  -  Assess patient's ability to carry out ADLs; assess patient's baseline for ADL function and identify physical deficits which impact ability to perform ADLs (bathing, care of mouth/teeth, toileting, grooming, dressing, etc.)  - Assess/evaluate cause of self-care deficits   - Assess range of motion  - Assess patient's mobility; develop plan if impaired  - Assess patient's need for assistive devices and provide as appropriate  - Encourage maximum independence but intervene and supervise when necessary  - Involve family in performance of ADLs  - Assess for home care needs following discharge   - Consider OT consult to assist with ADL evaluation and planning for discharge  - Provide patient education as appropriate  Outcome: Progressing  Goal: Maintains/Returns to pre admission functional level  Description: INTERVENTIONS:  - Perform AM-PAC 6 Click Basic Mobility/ Daily Activity assessment daily.  - Set and communicate daily mobility goal to care team and patient/family/caregiver. - Collaborate with rehabilitation services on mobility goals if consulted  - Perform Range of Motion 3 times a day. - Reposition patient every 3 hours.   - Dangle patient 3 times a day  - Stand patient 3 times a day  - Ambulate patient 3 times a day  - Out of bed to chair 3 times a day   - Out of bed for meals 3 times a day  - Out of bed for toileting  - Record patient progress and toleration of activity level   Outcome: Progressing     Problem: DISCHARGE PLANNING  Goal: Discharge to home or other facility with appropriate resources  Description: INTERVENTIONS:  - Identify barriers to discharge w/patient and caregiver  - Arrange for needed discharge resources and transportation as appropriate  - Identify discharge learning needs (meds, wound care, etc.)  - Arrange for interpretive services to assist at discharge as needed  - Refer to Case Management Department for coordinating discharge planning if the patient needs post-hospital services based on physician/advanced practitioner order or complex needs related to functional status, cognitive ability, or social support system  Outcome: Progressing     Problem: Knowledge Deficit  Goal: Patient/family/caregiver demonstrates understanding of disease process, treatment plan, medications, and discharge instructions  Description: Complete learning assessment and assess knowledge base.   Interventions:  - Provide teaching at level of understanding  - Provide teaching via preferred learning methods  Outcome: Progressing

## 2023-11-20 NOTE — H&P
Plastic Surgery Attending    H&P reviewed, no new changes. Millicent Moya MD   [facility]astic [facility] Re[facility]   [facility] [address]   Office: [phone]        Plastic Surgery Consult     Reason for visit: left breast cancer, presents for reconstructive discussion and preop     HPI on 11/6/23  Patient presents for preop for discussion for left breast reconstruction. Her BMI was erroneously entered at least visit to be 41.7, but is actually 34.3. She currently wears DD and would like to be around "C" cup after completion of reconstruction. Of note, she does have significant history of bariatric surgery and CABG. She also has history of rheumatoid arthritis for which she takes tramadol, but has been off of all steroids. She will require cardiac clearance. BMI 34.7  Breast exam:  Bilateral large ptotic breast  Tender mass in the left breast, no nipple retraction, discharge  Right breast soft, nontender     Measurements:                          L  SN-N               40 cm                N-IMF              15 cm  BW                  13.5 cm     We discussed indepth again the procedure for immediate left breast reconstruction with tissue expander placement with ADM. Discussed drains and postoperative admission overnight for pain control. Discussed that radiation and chemotherapy can prolong the reconstructive process and at any time, the reconstructive process can be stopped, paused, and resumed. Patient acknowledged. Again reiterated that asymmetric breast reconstruction is challenging and that the goal is to match volume with the contralateral side when in a bra. Discussed that patient is quite large on her right breast and will require a breast reduction at the time of implant exchange for permanent implant for symmetry. Patient acknowledged. All questions answered, concerns addressed.    Will order exparel  Cardiac clearance pending  Will coordinate marking  Implants and ADM ordered  -Spent 40 minutes in consultation with patient. Greater than 50% of the total time was spent obtaining history, evaluation, performing exam, discussion of management options including post-operative care, answering patient's questions and concerns, chart reviewing, and documentation        García Langley MD   [address] [address] and Reconstructive Surgery   [facility], [address] [facility]   Office: [phone]        HPI on 10/10/23  Patient is a 73 y/o female who presents with left breast invasive ductal carcinoma. After discussion with Dr Linnea Taveras, patient has opted for unilateral left breast mastectomy with SLNB and is awaiting genetic testing. She is interested in reconstructive options. She states that she is interested in faster recovery and less complex reconstructive surgery and mentioned that she is more interested in implant reconstruction rather than autologous tissue reconstruction. Of note, patient has history of bariatric surgery, CABG and has significantly elevated BMI of 41.7. She currently wears DD and ideally would like to be around  "C" cup after completion of reconstruction. Patient states that she is sensitive to anesthesia. ROS: 12 pt ROS negative, except as otherwise noted in HPI     PMH: heart disease, hypoglycemia  Medical History           Past Medical History:   Diagnosis Date    CAD (coronary artery disease)      Dyslipidemia      Hyperlipidemia      Hypertension      Obesity (BMI 30-39. 9)      SOB (shortness of breath)           Family History             Family History   Problem Relation Age of Onset    Hypertension Mother      Diabetes Mother      Heart disease Father           CABG x5    No Known Problems Sister      No Known Problems Sister      No Known Problems Son      No Known Problems Daughter      No Known Problems Daughter                  Surgical History              Past Surgical History:   Procedure Laterality Date    BARIATRIC SURGERY   2014    BILE DUCT EXPLORATION         replacement per Steffanie    CARPAL TUNNEL RELEASE Bilateral 2002    CORONARY ARTERY BYPASS GRAFT   2017    FOOT SURGERY Right       bone surgery to straighten toe. HIP SURGERY Left 2015    REPLACEMENT TOTAL KNEE Right 04/2017    SLEEVE GASTROPLASTY        TOTAL HIP ARTHROPLASTY Right 2017    US GUIDED BREAST BIOPSY LEFT COMPLETE Left 9/19/2023         SocHx: no tobacco, +social ETOH  Meds: currently not on baby aspirin, Prednisone (3 mg/day, but has stopped for past 2 weeks)            Allergies   Allergen Reactions    Iodine - Food Allergy Anaphylaxis       Reaction Date: [id number];      Povidone Iodine Anaphylaxis    Shellfish-Derived Products - Food Allergy Anaphylaxis                  PE:  Vitals:     10/09/23 1307   BP: 126/81   Pulse: 68   Temp: 98 °F (36.7 °C)         General: NC/AT, breathing comfortably on RA  Neuro: CN II-XII grossly intact, symmetric reflexes  HEENT: PERRLA, EOMI, external ears normal, no lesions or deformities, neck supple, trachea midline  Respiratory: CTAB, normal respiratory effort  Cardio: RRR, normal S1, S2, no murmur, rubs, gallops  GI: soft, non-tender, non-distended  Extremities/MSK: normal alignment, mobility, gait, no edema  Skin: no rashes, lesions, subcutaneous nodules     BMI 34.7  Breast exam:  Bilateral large ptotic breast  Tender mass in the left breast, no nipple retraction, discharge  Right breast soft, nontender     Measurements:                          L  SN-N               40 cm                N-IMF              15 cm  BW                  13.5 cm

## 2023-11-20 NOTE — OP NOTE
OPERATIVE REPORT  PATIENT NAME: Demian Poon    :  1950  MRN: [id number]  Pt Location: [facility] OR ROOM 04    SURGERY DATE: 2023    Surgeon(s) and Role:  Panel 1:     * Bernabe Prince MD - Primary     * Chana Bence, MD - Co-surgeon  Panel 2:     * Chana Bence, MD - Primary     * Dwayne Diaz MD - Assisting    Preop Diagnosis:  Malignant neoplasm of lower-outer quadrant of left breast of female, estrogen receptor positive  [C50.512, Z17.0]    Post-Op Diagnosis Codes:     * Malignant neoplasm of lower-outer quadrant of left breast of female, estrogen receptor positive  [C50.512, Z17.0]    Procedure(s):  Left - LEFT BREAST VERENICE  DIRECTED MASTECTOMY  Left - LEFT LYMPHATIC MAPPING WITH BLUE AND RADIOACTIVE DYES. SENTINEL LYMPH NODE BIOPSY  Left - POSSIBLE AXILLARY DISSECTION  Left - IMMEDIATE LEFT BREAST RECONSTRUCTION WITH TISSUE EXPANDER AND ADM    Specimen(s):  ID Type Source Tests Collected by Time Destination   1 : left axillary sentinel lymp node for touch prep Tissue Lymph Node, [place] TISSUE EXAM Bernabe Prince MD 2023 1246    2 : Short superior, long lateral, medium medial Tissue Breast, Left TISSUE EXAM Bernabe Prince MD 2023 1306        Estimated Blood Loss:   Minimal    Drains:  Urethral Catheter Asept;Latex (Active)   Number of days: 0       Anesthesia Type:   General    Operative Indications:  Malignant neoplasm of lower-outer quadrant of left breast of female, estrogen receptor positive  [C50.512, Z17.0]      Operative Findings:  SLN blue and radioactive, negative on touch prep. VERENICE clip 1 cm deep to resected margin. Complications:   None    Procedure and Technique:  The patient was brought to the operation room and placed under general anesthesia. Attention was paid to ensure appropriate padding and correct positioning. The Left  breast was injected intradermally with 0.3cc of methylene blue followed by technetium and then prepped and draped in a sterile fashion.   I initiated a time-out, identifying the patient, the correct side and the above procedure. All parties agreed with the time out. The planned incision was then injected with 0.25% Marcaine and epinephrine for local anesthesia. The incision was sharply incised. Thin flaps were then elevated using electrocautery starting superiorly and then continuing medially, inferiorly and finally laterally. The tail of Fabian Friar was then dissected away from the axilla proper leaving the breast tethered to the underlying musculature. The VERENICE clip was identified about 1 cm deep. Anteriorly skin was covering the mass. The sentinel lymph node was identified and removed through the mastectomy incision. The node was blue and radioactive and grossly normal.  There were no other radioactive, blue or enlarged lymph nodes in the axilla. The sentinel lymph node was sent to pathology and there was no microscopic evidence of metastasis. The breast was then removed from the muscles in a sub-facial plane. The breast was oriented with sutures (short=superior; medium=medial; long=lateral). The breast was sent to pathology in formalin for permanent pathologic evaluation. Meticulous hemostasis was maintained with electrocautery. The wound was extensively irrigated and packed with a single lap sponge. Dr. Shaina Escoto entered the room to initiate reconstruction. The counts were correct x2. I was present for the entire procedure., A qualified resident physician was not available. , and A physician assistant was required during the procedure for retraction, tissue handling, dissection and suturing. Patient Disposition:  PACU     [employer] Node Biopsy for Breast Cancer - Left  Operation performed with curative intent. Yes   Tracer(s) used to identify sentinel nodes in the upfront surgery (non-neoadjuvant) setting (select all that apply).  Dye and Radioactive tracer   Tracer(s) used to identify sentinel nodes in the neoadjuvant setting (select all that apply). N/A   All nodes (colored or non-colored) present at the end of a dye-filled lymphatic channel were removed. Yes   All significantly radioactive nodes were removed. Yes   All palpably suspicious nodes were removed. Yes   Biopsy-proven positive nodes marked with clips prior to chemotherapy were identified and removed.  N/A            SIGNATURE: Mary Ellen Santiago MD  DATE: November 20, 2023  TIME: 1:28 PM

## 2023-11-21 ENCOUNTER — TELEPHONE (OUTPATIENT)
Age: 73
End: 2023-11-21

## 2023-11-21 LAB
ANION GAP SERPL CALCULATED.3IONS-SCNC: 6 MMOL/L
BASOPHILS # BLD AUTO: 0.01 THOUSANDS/ÂΜL (ref 0–0.1)
BASOPHILS NFR BLD AUTO: 0 % (ref 0–1)
BUN SERPL-MCNC: 16 MG/DL (ref 5–25)
CALCIUM SERPL-MCNC: 8.4 MG/DL (ref 8.4–10.2)
CHLORIDE SERPL-SCNC: 104 MMOL/L (ref 96–108)
CO2 SERPL-SCNC: 27 MMOL/L (ref 21–32)
CREAT SERPL-MCNC: 0.86 MG/DL (ref 0.6–1.3)
EOSINOPHIL # BLD AUTO: 0 THOUSAND/ÂΜL (ref 0–0.61)
EOSINOPHIL NFR BLD AUTO: 0 % (ref 0–6)
ERYTHROCYTE [DISTWIDTH] IN BLOOD BY AUTOMATED COUNT: 13.2 % (ref 11.6–15.1)
GFR SERPL CREATININE-BSD FRML MDRD: 68 ML/MIN/1.73SQ M
GLUCOSE P FAST SERPL-MCNC: 135 MG/DL (ref 65–99)
GLUCOSE SERPL-MCNC: 135 MG/DL (ref 65–140)
HCT VFR BLD AUTO: 33.9 % (ref 34.8–46.1)
HGB BLD-MCNC: 10.7 G/DL (ref 11.5–15.4)
IMM GRANULOCYTES # BLD AUTO: 0.05 THOUSAND/UL (ref 0–0.2)
IMM GRANULOCYTES NFR BLD AUTO: 1 % (ref 0–2)
LYMPHOCYTES # BLD AUTO: 0.84 THOUSANDS/ÂΜL (ref 0.6–4.47)
LYMPHOCYTES NFR BLD AUTO: 8 % (ref 14–44)
MAGNESIUM SERPL-MCNC: 2 MG/DL (ref 1.9–2.7)
MCH RBC QN AUTO: 31.1 PG (ref 26.8–34.3)
MCHC RBC AUTO-ENTMCNC: 31.6 G/DL (ref 31.4–37.4)
MCV RBC AUTO: 99 FL (ref 82–98)
MONOCYTES # BLD AUTO: 0.72 THOUSAND/ÂΜL (ref 0.17–1.22)
MONOCYTES NFR BLD AUTO: 7 % (ref 4–12)
NEUTROPHILS # BLD AUTO: 8.56 THOUSANDS/ÂΜL (ref 1.85–7.62)
NEUTS SEG NFR BLD AUTO: 84 % (ref 43–75)
NRBC BLD AUTO-RTO: 0 /100 WBCS
PHOSPHATE SERPL-MCNC: 2.8 MG/DL (ref 2.3–4.1)
PLATELET # BLD AUTO: 197 THOUSANDS/UL (ref 149–390)
PMV BLD AUTO: 10.7 FL (ref 8.9–12.7)
POTASSIUM SERPL-SCNC: 3.9 MMOL/L (ref 3.5–5.3)
RBC # BLD AUTO: 3.44 MILLION/UL (ref 3.81–5.12)
SODIUM SERPL-SCNC: 137 MMOL/L (ref 135–147)
WBC # BLD AUTO: 10.18 THOUSAND/UL (ref 4.31–10.16)

## 2023-11-21 PROCEDURE — 80048 BASIC METABOLIC PNL TOTAL CA: CPT | Performed by: SURGERY

## 2023-11-21 PROCEDURE — 84100 ASSAY OF PHOSPHORUS: CPT | Performed by: SURGERY

## 2023-11-21 PROCEDURE — 83735 ASSAY OF MAGNESIUM: CPT | Performed by: SURGERY

## 2023-11-21 PROCEDURE — 99024 POSTOP FOLLOW-UP VISIT: CPT | Performed by: PHYSICIAN ASSISTANT

## 2023-11-21 PROCEDURE — 85025 COMPLETE CBC W/AUTO DIFF WBC: CPT | Performed by: SURGERY

## 2023-11-21 RX ORDER — CEPHALEXIN 250 MG/1
500 CAPSULE ORAL EVERY 6 HOURS SCHEDULED
Status: DISCONTINUED | OUTPATIENT
Start: 2023-11-21 | End: 2023-11-22 | Stop reason: HOSPADM

## 2023-11-21 RX ORDER — CALCIUM CARBONATE 500 MG/1
1000 TABLET, CHEWABLE ORAL 3 TIMES DAILY PRN
Status: DISCONTINUED | OUTPATIENT
Start: 2023-11-21 | End: 2023-11-22 | Stop reason: HOSPADM

## 2023-11-21 RX ADMIN — LORAZEPAM 0.5 MG: 0.5 TABLET ORAL at 23:06

## 2023-11-21 RX ADMIN — CEPHALEXIN 500 MG: 250 CAPSULE ORAL at 23:06

## 2023-11-21 RX ADMIN — HYDROMORPHONE HYDROCHLORIDE 0.5 MG: 1 INJECTION, SOLUTION INTRAMUSCULAR; INTRAVENOUS; SUBCUTANEOUS at 01:12

## 2023-11-21 RX ADMIN — ACETAMINOPHEN 650 MG: 325 TABLET, FILM COATED ORAL at 05:03

## 2023-11-21 RX ADMIN — CALCIUM CARBONATE (ANTACID) CHEW TAB 500 MG 1000 MG: 500 CHEW TAB at 09:39

## 2023-11-21 RX ADMIN — SODIUM CHLORIDE, SODIUM LACTATE, POTASSIUM CHLORIDE, AND CALCIUM CHLORIDE 125 ML/HR: .6; .31; .03; .02 INJECTION, SOLUTION INTRAVENOUS at 01:12

## 2023-11-21 RX ADMIN — CEPHALEXIN 500 MG: 250 CAPSULE ORAL at 18:28

## 2023-11-21 RX ADMIN — OXYCODONE HYDROCHLORIDE 5 MG: 5 TABLET ORAL at 16:50

## 2023-11-21 RX ADMIN — HEPARIN SODIUM 5000 UNITS: 5000 INJECTION INTRAVENOUS; SUBCUTANEOUS at 21:34

## 2023-11-21 RX ADMIN — OXYCODONE HYDROCHLORIDE 5 MG: 5 TABLET ORAL at 10:27

## 2023-11-21 RX ADMIN — B-COMPLEX W/ C & FOLIC ACID TAB 1 TABLET: TAB at 10:20

## 2023-11-21 RX ADMIN — CALCIUM CARBONATE (ANTACID) CHEW TAB 500 MG 1000 MG: 500 CHEW TAB at 16:50

## 2023-11-21 RX ADMIN — OXYCODONE HYDROCHLORIDE 5 MG: 5 TABLET ORAL at 23:06

## 2023-11-21 RX ADMIN — FUROSEMIDE 40 MG: 40 TABLET ORAL at 18:28

## 2023-11-21 RX ADMIN — METOPROLOL SUCCINATE 50 MG: 50 TABLET, EXTENDED RELEASE ORAL at 10:21

## 2023-11-21 RX ADMIN — ACETAMINOPHEN 650 MG: 325 TABLET, FILM COATED ORAL at 14:32

## 2023-11-21 RX ADMIN — CEPHALEXIN 500 MG: 250 CAPSULE ORAL at 14:30

## 2023-11-21 RX ADMIN — ACETAMINOPHEN 650 MG: 325 TABLET, FILM COATED ORAL at 21:34

## 2023-11-21 RX ADMIN — ATORVASTATIN CALCIUM 10 MG: 10 TABLET, FILM COATED ORAL at 10:21

## 2023-11-21 RX ADMIN — HEPARIN SODIUM 5000 UNITS: 5000 INJECTION INTRAVENOUS; SUBCUTANEOUS at 14:32

## 2023-11-21 RX ADMIN — ASPIRIN 81 MG: 81 TABLET, COATED ORAL at 10:20

## 2023-11-21 RX ADMIN — FUROSEMIDE 40 MG: 40 TABLET ORAL at 10:21

## 2023-11-21 RX ADMIN — SENNOSIDES 8.6 MG: 8.6 TABLET, FILM COATED ORAL at 10:20

## 2023-11-21 RX ADMIN — CEFAZOLIN SODIUM 1000 MG: 1 SOLUTION INTRAVENOUS at 04:45

## 2023-11-21 RX ADMIN — HEPARIN SODIUM 5000 UNITS: 5000 INJECTION INTRAVENOUS; SUBCUTANEOUS at 05:03

## 2023-11-21 NOTE — PROGRESS NOTES
Patient:    MRN:  [id number]    [place] Request ID:  [id number]    Level of care reserved:  5 Conor Phelan    Partner Reserved:  [facility]UM[facility], [place],  [facility] [phone]    Clinical needs requested:    Geography searched:  [phone]    Start of Service:    Request sent:  10:18am EST on 11/21/2023 by Caridad Choudhary    Partner reserved:  10:23am EST on 11/21/2023 by Caridad Choudhary    Choice list shared:  10:22am EST on 11/21/2023 by Caridad Choudhary

## 2023-11-21 NOTE — PROGRESS NOTES
Post op Check      S: Doing well. Pain well controlled. No acute complaints. O: /73   Pulse 73   Temp 97.9 °F (36.6 °C)   Resp 16   Ht 5' 6" (1.676 m)   Wt 97.6 kg (215 lb 3.2 oz)   SpO2 97%   BMI 34.73 kg/m²     Physical Exam:  General: AAOx3, no acute distress  Heart: regular rate  Lungs: normal work of breathing, no acute respiratory distress  Skin: warm, well perfused  - left breast incision CDI. No ecchymosis.  SUMIT drain x2 with dark s/s drainage  Extremities: no tenderness or edema      Assessment:  72 y.o. female POD#0 s/p Procedure(s) (LRB):  LEFT BREAST VERENICE  DIRECTED MASTECTOMY (Left)  LEFT LYMPHATIC MAPPING WITH BLUE AND RADIOACTIVE DYES, SENTINEL LYMPH NODE BIOPSY (Left)  POSSIBLE AXILLARY DISSECTION (Left)  IMMEDIATE LEFT BREAST RECONSTRUCTION WITH TISSUE EXPANDER AND ADM (Left)  AVSS        Plan:  - diet as tolerated  - prn pain control, antiemetic regimen  - dvt ppx  - encourage oob, ambulation  - encourage deep breathing, IS  - likely d/c tomorrow am  - VNA: per care mgmt note on 11/16, pt accepted with [facility] and scheduled for visit on 11/22      Robert Duran PA-C  11/20/2023 8:10 PM

## 2023-11-21 NOTE — CASE MANAGEMENT
Case Management Assessment & Discharge Planning Note    Patient name Vicente Bacon  Location /-56 MRN [id number]  : 1950 Date 2023       Current Admission Date: 2023  Current Admission Diagnosis:Malignant neoplasm of lower-outer quadrant of left breast of female, estrogen receptor positive    Patient Active Problem List    Diagnosis Date Noted    Encounter for geriatric assessment 2023    Change in bowel habits 10/12/2023    Anxiety 10/12/2023    Malignant neoplasm of lower-outer quadrant of left breast of female, estrogen receptor positive ([address]) 2023    Palpitation 2023    Postmenopausal 2023    Rheumatoid arthritis involving multiple sites with positive rheumatoid factor ([address]) 2023    Other fatigue 2023    Acute pain of right shoulder 2023    Candida infection of flexural skin 2022    Class 2 severe obesity due to excess calories with serious comorbidity and body mass index (BMI) of 35.0 to 35.9 in adult  2022    Chronic bilateral low back pain without sciatica 10/11/2021    Status post bariatric surgery 2021    At risk for sleep apnea 2021    Postsurgical malabsorption 2021    Chronic depression 2020    Vitamin D deficiency 2020    Dyspnea on exertion 2019    Coronary artery disease involving native coronary artery of native heart without angina pectoris 2018    Essential hypertension 2018    Dyslipidemia 2018    S/P CABG x 3 2018      LOS (days): 0  Geometric Mean LOS (GMLOS) (days):   Days to GMLOS:     OBJECTIVE:              Current admission status: Outpatient Surgery       Preferred Pharmacy:   [facility] ([facility]) [address]  [address] [place] [address]  Phone: [phone] Fax: [address] [facility] [address]  Phone: [phone] Fax: [phone]    [address] [facility], [address]  [address] [facility] [place]  Phone: [phone] Fax: [phone]    Primary Care Provider: Atul Dos Santos MD    Primary Insurance: [facility][note 3]  Secondary Insurance:     ASSESSMENT:  [place] Proxies    There are no active Health Care Proxies on file. Advance Directives  Does patient have a [address]?: No  Was patient offered paperwork?: Yes (declined)  Does patient currently have a Health Care decision maker?: Yes, please see Health Care Proxy section  Does patient have Advance Directives?: No  Was patient offered paperwork?: Yes (declined)  Primary Contact: , Yolie Laurent         Readmission Root Cause  30 Day Readmission: No    Patient Information  Admitted from[de-identified] Home  Mental Status: Alert  During Assessment patient was accompanied by: Not accompanied during assessment  Assessment information provided by[de-identified] Patient  Primary Caregiver: Self  Support Systems: Self, Spouse/significant other  [place] of Residence: [address] do you live in?: [facility] entry access options.  Select all that apply.: Stairs  Number of steps to enter home.: 6  Do the steps have railings?: Yes  Type of Current Residence: Bi-level  Living Arrangements: Lives w/ Spouse/significant other    Activities of Daily Living Prior to Admission  Functional Status: Independent  Completes ADLs independently?: Yes  Ambulates independently?: Yes  Does patient use assisted devices?: Yes  Assisted Devices (DME) used: Straight Cane  Does patient currently own DME?: Yes  What DME does the patient currently own?: Straight Cane  Does patient have a history of Outpatient Therapy (PT/OT)?: Yes  Does the patient have a history of Short-Term Rehab?: Yes  Does patient have a history of HHC?: Yes  Does patient currently have [facility]?: No         Patient Information Continued  Income Source: Employed  Does patient have prescription coverage?: Yes  Does patient receive dialysis treatments?: No  Does patient have a history of substance abuse?: No  Does patient have a history of Mental Health Diagnosis?: Yes (Anxiety)  Is patient receiving treatment for mental health?: Yes  Has patient received inpatient treatment related to mental health in the last 2 years?: No         Means of Transportation  Means of Transport to [facility][de-identified] [address]: Low Risk  (11/21/2023)    Housing Stability Vital Sign     Unable to Pay for Housing in the Last Year: No     Number of [address] in the Last Year: 1     Unstable Housing in the Last Year: No   Food Insecurity: No Food Insecurity (11/21/2023)    Hunger Vital Sign     Worried About Running Out of Food in the Last Year: Never true     Ran Out of Food in the Last Year: Never true   Transportation Needs: No Transportation Needs (11/21/2023)    PRAPARE - Transportation     Lack of Transportation (Medical): No     Lack of Transportation (Non-Medical):  No   Utilities: Not At Risk (11/21/2023)    [facility] Utilities     Threatened with loss of utilities: No       DISCHARGE DETAILS:    Discharge planning discussed with[de-identified] Pt  Freedom of Choice: Yes  Comments - Freedom of Choice: Pt agreeable to home health  CM contacted family/caregiver?: No- see comments (Pt is alert and oriented)  Were Treatment Team discharge recommendations reviewed with patient/caregiver?: Yes  Did patient/caregiver verbalize understanding of patient care needs?: Yes  Were patient/caregiver advised of the risks associated with not following Treatment Team discharge recommendations?: Yes         Requested [address] [facility]         Is the patient interested in [facility] [address] at discharge?: Yes  [facility] requested[de-identified] Nursing, Occupational Therapy, Physical [address] [facility] Name[de-identified] [place] Provider[de-identified] PCP  Home Health Services Needed[de-identified] Wound/Ostomy Care, Strengthening/Theraputic Exercises to Improve Function, Evaluate Functional Status and Safety, Gait/ADL Training, Post-Op Care and Assessment, Other (comment) (NADIR drain care)  Homebound Criteria Met[de-identified] Uses an Assist Device (i.e. cane, walker, etc)  Supporting Clincal Findings[de-identified] Limited Endurance    DME Referral Provided  Referral made for DME?: No    Other Referral/Resources/Interventions Provided:  Interventions: [facility]         Treatment Team Recommendation: Home with [facility]  Discharge Destination Plan[de-identified] Home with [address] [place] [address] at Discharge : Family                                      Additional Comments: Met with pt to discuss role of CM and any needs prior to discharge. Pt resides w/ spouse in Bi-level home w/ 6 [address]. Indp PTA. Pt owns/uses a cane. Pt drives self and is employed. Pt prefers to use [employer]. Hx STR, OP PT, & HH but unable to recall names of agencies. Pt has anxiety for which she is treated with meds. No IP psych stays in past 2 years. No hx DA.  or dtr will transport at discharge. Home Health was recommended to care for nadir drains and PT/OT and arranged with [facility].

## 2023-11-21 NOTE — PROGRESS NOTES
Progress Note - Sofiya Michael 72 y.o. female MRN: [id number]    Unit/Bed#: -01 Encounter: [id number]      Assessment/Plan:  POD #1 s/p left mastectomy with SLNB and immediate reconstruction with TE  -OOB, ambulate  -IS  -mary PO  -pt with anxiety   -drain 210cc, serousang  -VSS, afeb  -will start oral keflex for infection prevention post TE placement  -case management for VNA drain care  -plan for d/c tomorrow  -discussed with Dr. Laurie Gordon  -lipitor    HTN  -toprol    Subjective:   I feel wiped out. Objective:     Vitals: Blood pressure 101/62, pulse 65, temperature 98.1 °F (36.7 °C), resp. rate 16, height 5' 6" (1.676 m), weight 102 kg (223 lb 15.8 oz), SpO2 95 %. ,Body mass index is 36.15 kg/m². Intake/Output Summary (Last 24 hours) at 11/21/2023 0857  Last data filed at 11/21/2023 7776  Gross per 24 hour   Intake 1220 ml   Output 310 ml   Net 910 ml       Physical Exam: General appearance: alert and oriented, in no acute distress  Lungs: clear to auscultation bilaterally  Heart: regular rate and rhythm, S1, S2 normal, no murmur, click, rub or gallop  Abdomen: soft, non-tender; bowel sounds normal; no masses,  no organomegaly  Left breast flaps are viable, incision is clean, dry and intact. Drain is serousang. Invasive Devices       Peripheral Intravenous Line  Duration             Peripheral IV 11/20/23 Dorsal (posterior); Right Hand <1 day              Drain  Duration             Closed/Suction Drain Left LLQ Bulb 15 Fr. <1 day    Closed/Suction Drain Left; Anterior LUQ Bulb 15 Fr. <1 day                    Lab, Imaging and other studies: I have personally reviewed pertinent reports.     VTE Pharmacologic Prophylaxis: Heparin  VTE Mechanical Prophylaxis: sequential compression device

## 2023-11-21 NOTE — PLAN OF CARE
Problem: PAIN - ADULT  Goal: Verbalizes/displays adequate comfort level or baseline comfort level  Description: Interventions:  - Encourage patient to monitor pain and request assistance  - Assess pain using appropriate pain scale  - Administer analgesics based on type and severity of pain and evaluate response  - Implement non-pharmacological measures as appropriate and evaluate response  - Consider cultural and social influences on pain and pain management  - Notify physician/advanced practitioner if interventions unsuccessful or patient reports new pain  Outcome: Progressing     Problem: INFECTION - ADULT  Goal: Absence or prevention of progression during hospitalization  Description: INTERVENTIONS:  - Assess and monitor for signs and symptoms of infection  - Monitor lab/diagnostic results  - Monitor all insertion sites, i.e. indwelling lines, tubes, and drains  - Monitor endotracheal if appropriate and nasal secretions for changes in amount and color  - [place] appropriate cooling/warming therapies per order  - Administer medications as ordered  - Instruct and encourage patient and family to use good hand hygiene technique  - Identify and instruct in appropriate isolation precautions for identified infection/condition  Outcome: Progressing  Goal: Absence of fever/infection during neutropenic period  Description: INTERVENTIONS:  - Monitor WBC    Outcome: Progressing     Problem: SAFETY ADULT  Goal: Patient will remain free of falls  Description: INTERVENTIONS:  - Educate patient/family on patient safety including physical limitations  - Instruct patient to call for assistance with activity   - Consult OT/PT to assist with strengthening/mobility   - Keep Call bell within reach  - Keep bed low and locked with side rails adjusted as appropriate  - Keep care items and personal belongings within reach  - Initiate and maintain comfort rounds  - Make Fall Risk Sign visible to staff  - Offer Toileting every  Hours, in advance of need  - Initiate/Maintain alarm  - Obtain necessary fall risk management equipment:   - Apply yellow socks and bracelet for high fall risk patients  - Consider moving patient to room near nurses station  Outcome: Progressing  Goal: Maintain or return to baseline ADL function  Description: INTERVENTIONS:  -  Assess patient's ability to carry out ADLs; assess patient's baseline for ADL function and identify physical deficits which impact ability to perform ADLs (bathing, care of mouth/teeth, toileting, grooming, dressing, etc.)  - Assess/evaluate cause of self-care deficits   - Assess range of motion  - Assess patient's mobility; develop plan if impaired  - Assess patient's need for assistive devices and provide as appropriate  - Encourage maximum independence but intervene and supervise when necessary  - Involve family in performance of ADLs  - Assess for home care needs following discharge   - Consider OT consult to assist with ADL evaluation and planning for discharge  - Provide patient education as appropriate  Outcome: Progressing  Goal: Maintains/Returns to pre admission functional level  Description: INTERVENTIONS:  - Perform AM-PAC 6 Click Basic Mobility/ Daily Activity assessment daily.  - Set and communicate daily mobility goal to care team and patient/family/caregiver. - Collaborate with rehabilitation services on mobility goals if consulted  - Perform Range of Motion  times a day. - Reposition patient every  hours.   - Dangle patient  times a day  - Stand patient  times a day  - Ambulate patient  times a day  - Out of bed to chair  times a day   - Out of bed for meals times a day  - Out of bed for toileting  - Record patient progress and toleration of activity level   Outcome: Progressing     Problem: DISCHARGE PLANNING  Goal: Discharge to home or other facility with appropriate resources  Description: INTERVENTIONS:  - Identify barriers to discharge w/patient and caregiver  - Arrange for needed discharge resources and transportation as appropriate  - Identify discharge learning needs (meds, wound care, etc.)  - Arrange for interpretive services to assist at discharge as needed  - Refer to Case Management Department for coordinating discharge planning if the patient needs post-hospital services based on physician/advanced practitioner order or complex needs related to functional status, cognitive ability, or social support system  Outcome: Progressing     Problem: Knowledge Deficit  Goal: Patient/family/caregiver demonstrates understanding of disease process, treatment plan, medications, and discharge instructions  Description: Complete learning assessment and assess knowledge base.   Interventions:  - Provide teaching at level of understanding  - Provide teaching via preferred learning methods  Outcome: Progressing

## 2023-11-21 NOTE — TELEPHONE ENCOUNTER
Pt daughter called in to state she does not think her mom is ready to go home due to her being in a lot of pain. PT daughter does not feel mom is ready and pt is very uncomfortable. Daughter wanted to make provider aware.

## 2023-11-21 NOTE — PLAN OF CARE
Problem: PAIN - ADULT  Goal: Verbalizes/displays adequate comfort level or baseline comfort level  Description: Interventions:  - Encourage patient to monitor pain and request assistance  - Assess pain using appropriate pain scale  - Administer analgesics based on type and severity of pain and evaluate response  - Implement non-pharmacological measures as appropriate and evaluate response  - Consider cultural and social influences on pain and pain management  - Notify physician/advanced practitioner if interventions unsuccessful or patient reports new pain  Outcome: Progressing     Problem: INFECTION - ADULT  Goal: Absence or prevention of progression during hospitalization  Description: INTERVENTIONS:  - Assess and monitor for signs and symptoms of infection  - Monitor lab/diagnostic results  - Monitor all insertion sites, i.e. indwelling lines, tubes, and drains  - Monitor endotracheal if appropriate and nasal secretions for changes in amount and color  - [place] appropriate cooling/warming therapies per order  - Administer medications as ordered  - Instruct and encourage patient and family to use good hand hygiene technique  - Identify and instruct in appropriate isolation precautions for identified infection/condition  Outcome: Progressing     Problem: DISCHARGE PLANNING  Goal: Discharge to home or other facility with appropriate resources  Description: INTERVENTIONS:  - Identify barriers to discharge w/patient and caregiver  - Arrange for needed discharge resources and transportation as appropriate  - Identify discharge learning needs (meds, wound care, etc.)  - Arrange for interpretive services to assist at discharge as needed  - Refer to Case Management Department for coordinating discharge planning if the patient needs post-hospital services based on physician/advanced practitioner order or complex needs related to functional status, cognitive ability, or social support system  Outcome: Progressing

## 2023-11-22 ENCOUNTER — DOCUMENTATION (OUTPATIENT)
Dept: HEMATOLOGY ONCOLOGY | Facility: CLINIC | Age: 73
End: 2023-11-22

## 2023-11-22 VITALS
HEIGHT: 66 IN | OXYGEN SATURATION: 94 % | DIASTOLIC BLOOD PRESSURE: 63 MMHG | WEIGHT: 231.48 LBS | RESPIRATION RATE: 16 BRPM | HEART RATE: 70 BPM | BODY MASS INDEX: 37.2 KG/M2 | TEMPERATURE: 98.1 F | SYSTOLIC BLOOD PRESSURE: 118 MMHG

## 2023-11-22 LAB
ANION GAP SERPL CALCULATED.3IONS-SCNC: 6 MMOL/L
BASOPHILS # BLD AUTO: 0.02 THOUSANDS/ÂΜL (ref 0–0.1)
BASOPHILS NFR BLD AUTO: 0 % (ref 0–1)
BUN SERPL-MCNC: 20 MG/DL (ref 5–25)
CALCIUM SERPL-MCNC: 8 MG/DL (ref 8.4–10.2)
CHLORIDE SERPL-SCNC: 108 MMOL/L (ref 96–108)
CO2 SERPL-SCNC: 28 MMOL/L (ref 21–32)
CREAT SERPL-MCNC: 1.01 MG/DL (ref 0.6–1.3)
EOSINOPHIL # BLD AUTO: 0.11 THOUSAND/ÂΜL (ref 0–0.61)
EOSINOPHIL NFR BLD AUTO: 1 % (ref 0–6)
ERYTHROCYTE [DISTWIDTH] IN BLOOD BY AUTOMATED COUNT: 13.4 % (ref 11.6–15.1)
GFR SERPL CREATININE-BSD FRML MDRD: 56 ML/MIN/1.73SQ M
GLUCOSE SERPL-MCNC: 108 MG/DL (ref 65–140)
HCT VFR BLD AUTO: 30.7 % (ref 34.8–46.1)
HGB BLD-MCNC: 9.6 G/DL (ref 11.5–15.4)
IMM GRANULOCYTES # BLD AUTO: 0.03 THOUSAND/UL (ref 0–0.2)
IMM GRANULOCYTES NFR BLD AUTO: 0 % (ref 0–2)
LYMPHOCYTES # BLD AUTO: 2.37 THOUSANDS/ÂΜL (ref 0.6–4.47)
LYMPHOCYTES NFR BLD AUTO: 27 % (ref 14–44)
MCH RBC QN AUTO: 31.1 PG (ref 26.8–34.3)
MCHC RBC AUTO-ENTMCNC: 31.3 G/DL (ref 31.4–37.4)
MCV RBC AUTO: 99 FL (ref 82–98)
MONOCYTES # BLD AUTO: 0.78 THOUSAND/ÂΜL (ref 0.17–1.22)
MONOCYTES NFR BLD AUTO: 9 % (ref 4–12)
NEUTROPHILS # BLD AUTO: 5.6 THOUSANDS/ÂΜL (ref 1.85–7.62)
NEUTS SEG NFR BLD AUTO: 63 % (ref 43–75)
NRBC BLD AUTO-RTO: 0 /100 WBCS
PLATELET # BLD AUTO: 198 THOUSANDS/UL (ref 149–390)
PMV BLD AUTO: 10.6 FL (ref 8.9–12.7)
POTASSIUM SERPL-SCNC: 3.4 MMOL/L (ref 3.5–5.3)
RBC # BLD AUTO: 3.09 MILLION/UL (ref 3.81–5.12)
SODIUM SERPL-SCNC: 142 MMOL/L (ref 135–147)
WBC # BLD AUTO: 8.91 THOUSAND/UL (ref 4.31–10.16)

## 2023-11-22 PROCEDURE — 99024 POSTOP FOLLOW-UP VISIT: CPT | Performed by: PHYSICIAN ASSISTANT

## 2023-11-22 PROCEDURE — 80048 BASIC METABOLIC PNL TOTAL CA: CPT | Performed by: SURGERY

## 2023-11-22 PROCEDURE — 85025 COMPLETE CBC W/AUTO DIFF WBC: CPT | Performed by: SURGERY

## 2023-11-22 RX ADMIN — ACETAMINOPHEN 650 MG: 325 TABLET, FILM COATED ORAL at 06:15

## 2023-11-22 RX ADMIN — SENNOSIDES 8.6 MG: 8.6 TABLET, FILM COATED ORAL at 09:36

## 2023-11-22 RX ADMIN — ATORVASTATIN CALCIUM 10 MG: 10 TABLET, FILM COATED ORAL at 09:36

## 2023-11-22 RX ADMIN — ASPIRIN 81 MG: 81 TABLET, COATED ORAL at 09:36

## 2023-11-22 RX ADMIN — B-COMPLEX W/ C & FOLIC ACID TAB 1 TABLET: TAB at 09:36

## 2023-11-22 RX ADMIN — CEPHALEXIN 500 MG: 250 CAPSULE ORAL at 06:15

## 2023-11-22 RX ADMIN — FUROSEMIDE 40 MG: 40 TABLET ORAL at 09:36

## 2023-11-22 RX ADMIN — HEPARIN SODIUM 5000 UNITS: 5000 INJECTION INTRAVENOUS; SUBCUTANEOUS at 06:15

## 2023-11-22 RX ADMIN — OXYCODONE HYDROCHLORIDE 5 MG: 5 TABLET ORAL at 10:44

## 2023-11-22 RX ADMIN — METOPROLOL SUCCINATE 50 MG: 50 TABLET, EXTENDED RELEASE ORAL at 09:36

## 2023-11-22 NOTE — PLAN OF CARE
Problem: PAIN - ADULT  Goal: Verbalizes/displays adequate comfort level or baseline comfort level  Description: Interventions:  - Encourage patient to monitor pain and request assistance  - Assess pain using appropriate pain scale  - Administer analgesics based on type and severity of pain and evaluate response  - Implement non-pharmacological measures as appropriate and evaluate response  - Consider cultural and social influences on pain and pain management  - Notify physician/advanced practitioner if interventions unsuccessful or patient reports new pain  Outcome: Progressing     Problem: INFECTION - ADULT  Goal: Absence or prevention of progression during hospitalization  Description: INTERVENTIONS:  - Assess and monitor for signs and symptoms of infection  - Monitor lab/diagnostic results  - Monitor all insertion sites, i.e. indwelling lines, tubes, and drains  - Monitor endotracheal if appropriate and nasal secretions for changes in amount and color  - [place] appropriate cooling/warming therapies per order  - Administer medications as ordered  - Instruct and encourage patient and family to use good hand hygiene technique  - Identify and instruct in appropriate isolation precautions for identified infection/condition  Outcome: Progressing  Goal: Absence of fever/infection during neutropenic period  Description: INTERVENTIONS:  - Monitor WBC    Outcome: Progressing     Problem: SAFETY ADULT  Goal: Maintain or return to baseline ADL function  Description: INTERVENTIONS:  -  Assess patient's ability to carry out ADLs; assess patient's baseline for ADL function and identify physical deficits which impact ability to perform ADLs (bathing, care of mouth/teeth, toileting, grooming, dressing, etc.)  - Assess/evaluate cause of self-care deficits   - Assess range of motion  - Assess patient's mobility; develop plan if impaired  - Assess patient's need for assistive devices and provide as appropriate  - Encourage maximum independence but intervene and supervise when necessary  - Involve family in performance of ADLs  - Assess for home care needs following discharge   - Consider OT consult to assist with ADL evaluation and planning for discharge  - Provide patient education as appropriate  Outcome: Progressing     Problem: DISCHARGE PLANNING  Goal: Discharge to home or other facility with appropriate resources  Description: INTERVENTIONS:  - Identify barriers to discharge w/patient and caregiver  - Arrange for needed discharge resources and transportation as appropriate  - Identify discharge learning needs (meds, wound care, etc.)  - Arrange for interpretive services to assist at discharge as needed  - Refer to Case Management Department for coordinating discharge planning if the patient needs post-hospital services based on physician/advanced practitioner order or complex needs related to functional status, cognitive ability, or social support system  Outcome: Progressing     Problem: Knowledge Deficit  Goal: Patient/family/caregiver demonstrates understanding of disease process, treatment plan, medications, and discharge instructions  Description: Complete learning assessment and assess knowledge base.   Interventions:  - Provide teaching at level of understanding  - Provide teaching via preferred learning methods  Outcome: Progressing

## 2023-11-22 NOTE — PROGRESS NOTES
Progress Note - Awa Arshad 72 y.o. female MRN: [id number]    Unit/Bed#: -01 Encounter: [id number]      Assessment/Plan:  POD # 2 s/p left mastectomy with SLNB and immediate reconstruction with TE  -OOB, ambulate  -IS  -mary PO  -pt with anxiety   -Drain 110, output noted. Cont drain on discharge  -VSS, afeb  -will start oral keflex for infection prevention post TE placement  -case management for [facility] drain care  -plan for d/c today  -discussed with Dr. Moris Arora  -lipitor    HTN  -toprol    Subjective:   I feel wiped out. Objective:     Vitals: Blood pressure 115/63, pulse 70, temperature (!) 97 °F (36.1 °C), resp. rate 16, height 5' 6" (1.676 m), weight 105 kg (231 lb 7.7 oz), SpO2 93 %. ,Body mass index is 37.36 kg/m². Intake/Output Summary (Last 24 hours) at 11/22/2023 0813  Last data filed at 11/22/2023 0001  Gross per 24 hour   Intake 270 ml   Output 710 ml   Net -440 ml         Physical Exam:  General Appearance: NAD, cooperative, alert  Eyes: Anicteric, conjunctiva clear  HENT:  Normocephalic, atraumatic, normal mucosa. external ears normal, external nose normal, no drainage  Left breast flaps are viable, incision is c/d/i  Neck:    Supple, symmetrical, trachea midline  Resp:  Clear to auscultation bilaterally; no rales, rhonchi or wheezing; respirations unlabored   CV:  S1 S2, Regular rate and rhythm; no murmur, rub, or gallop. GI:  Soft, non-tender, non-distended  Musculoskeletal: No cyanosis, clubbing or edema. Normal ROM. Skin:   No jaundice, rashes, or lesions       Invasive Devices       Peripheral Intravenous Line  Duration             Peripheral IV 11/20/23 Dorsal (posterior); Right Hand 1 day              Drain  Duration             Closed/Suction Drain Left LLQ Bulb 15 Fr. 1 day    Closed/Suction Drain Left; Anterior LUQ Bulb 15 Fr. 1 day                    Lab, Imaging and other studies: I have personally reviewed pertinent reports.     VTE Pharmacologic Prophylaxis: Heparin  VTE Mechanical Prophylaxis: sequential compression device

## 2023-11-23 ENCOUNTER — HOME CARE VISIT (OUTPATIENT)
Dept: HOME HEALTH SERVICES | Facility: HOME HEALTHCARE | Age: 73
End: 2023-11-23
Payer: COMMERCIAL

## 2023-11-23 VITALS
SYSTOLIC BLOOD PRESSURE: 110 MMHG | DIASTOLIC BLOOD PRESSURE: 78 MMHG | RESPIRATION RATE: 18 BRPM | TEMPERATURE: 97.1 F | HEART RATE: 70 BPM | OXYGEN SATURATION: 95 %

## 2023-11-23 PROCEDURE — G0299 HHS/HOSPICE OF RN EA 15 MIN: HCPCS

## 2023-11-23 PROCEDURE — 400013 VN SOC

## 2023-11-23 PROCEDURE — 10330081 VN NO-PAY CLAIM PROCEDURE

## 2023-11-27 ENCOUNTER — PATIENT OUTREACH (OUTPATIENT)
Dept: CASE MANAGEMENT | Facility: OTHER | Age: 73
End: 2023-11-27

## 2023-11-27 NOTE — PROGRESS NOTES
OSW placed outreach TC to pt this afternoon. She states that her surgery went well. She states that every day she feels different, as well as a little bit better. She expressed that she has spasms of pain, but if she is resting she is comfortable. She has been getting up periodically to move around. She is feeling very fatigued. We spoke about her surgery and how it will take time for her to heal and get some of her energy back. She has her post op appointment with Dr. Marion Das on 12/4. She shared that her  is keeping a close eye on her, as well as her family have been visiting frequently. She has had [facility] in one time to complete her intake. They will be returning tomorrow. She will be out of work until [employer]. She is hoping that she will be retiring in the near future. She thanked this writer for checking in on her. OSW offered to outreach in another month and she was agreeable.

## 2023-11-28 ENCOUNTER — HOME CARE VISIT (OUTPATIENT)
Dept: HOME HEALTH SERVICES | Facility: HOME HEALTHCARE | Age: 73
End: 2023-11-28
Payer: COMMERCIAL

## 2023-11-28 VITALS
DIASTOLIC BLOOD PRESSURE: 72 MMHG | SYSTOLIC BLOOD PRESSURE: 110 MMHG | TEMPERATURE: 98.4 F | HEART RATE: 80 BPM | RESPIRATION RATE: 18 BRPM

## 2023-11-28 PROCEDURE — G0299 HHS/HOSPICE OF RN EA 15 MIN: HCPCS

## 2023-11-28 PROCEDURE — 88333 PATH CONSLTJ SURG CYTO XM 1: CPT | Performed by: PATHOLOGY

## 2023-11-28 PROCEDURE — 88307 TISSUE EXAM BY PATHOLOGIST: CPT | Performed by: PATHOLOGY

## 2023-11-28 PROCEDURE — 88341 IMHCHEM/IMCYTCHM EA ADD ANTB: CPT | Performed by: PATHOLOGY

## 2023-11-28 PROCEDURE — 88342 IMHCHEM/IMCYTCHM 1ST ANTB: CPT | Performed by: PATHOLOGY

## 2023-11-28 NOTE — DISCHARGE SUMMARY
Discharge Summary - Kristine Cooper 72 y.o. female MRN: [id number]    Unit/Bed#: -01 Encounter: [id number]    Admission Date:     Admitting Diagnosis: Malignant neoplasm of lower-outer quadrant of left breast of female, estrogen receptor positive  [C50.512, Z17.0]    HPI: Patient is a 81yo male who has hx of breast invasive ductal carcinoma who presented to the hospital for mastectomy and then reconstruction with plastic surgery. Procedures Performed: L breast manuel  directed mastectomy, LN biopsy and axillary dissection. L breast recon with submuscular tissue expander with acellular dermal matrix, local judy flap, adjacent tissue transfer to L chest, nerve block      Summary of Hospital Course: Patient was admitted overnight for pain control. She did well POD 1. POD 2, her pain was controlled. She was given drain teaching and discharge instructions and discharged home. Significant Findings, Care, Treatment and Services Provided:     Complications: none    Discharge Diagnosis: as above    Medical Problems       Resolved Problems  Date Reviewed: 10/12/2023   None         Condition at Discharge: good         Discharge instructions/Information to patient and family:   See after visit summary for information provided to patient and family. Provisions for Follow-Up Care:  See after visit summary for information related to follow-up care and any pertinent home health orders. PCP: Snehal Umanzor MD    Disposition: Home    Planned Readmission: No      Discharge Statement   I spent 15 minutes discharging the patient. This time was spent on the day of discharge. I had direct contact with the patient on the day of discharge. Additional documentation is required if more than 30 minutes were spent on discharge. Discharge Medications:  See after visit summary for reconciled discharge medications provided to patient and family.

## 2023-11-29 ENCOUNTER — OFFICE VISIT (OUTPATIENT)
Dept: PLASTIC SURGERY | Facility: CLINIC | Age: 73
End: 2023-11-29

## 2023-11-29 ENCOUNTER — HOME CARE VISIT (OUTPATIENT)
Dept: HOME HEALTH SERVICES | Facility: HOME HEALTHCARE | Age: 73
End: 2023-11-29
Payer: COMMERCIAL

## 2023-11-29 DIAGNOSIS — Z17.0 MALIGNANT NEOPLASM OF LOWER-OUTER QUADRANT OF LEFT BREAST OF FEMALE, ESTROGEN RECEPTOR POSITIVE: Primary | ICD-10-CM

## 2023-11-29 DIAGNOSIS — C50.512 MALIGNANT NEOPLASM OF LOWER-OUTER QUADRANT OF LEFT BREAST OF FEMALE, ESTROGEN RECEPTOR POSITIVE: Primary | ICD-10-CM

## 2023-11-29 PROCEDURE — 99024 POSTOP FOLLOW-UP VISIT: CPT | Performed by: SURGERY

## 2023-11-30 ENCOUNTER — HOME CARE VISIT (OUTPATIENT)
Dept: HOME HEALTH SERVICES | Facility: HOME HEALTHCARE | Age: 73
End: 2023-11-30
Payer: COMMERCIAL

## 2023-11-30 NOTE — PROGRESS NOTES
Assessment/Plan:     Status post left breast reconstruction with tissue expander placement and ADM with Dr. Gee Richards on 11/20/2023 in conjunction with Dr. Yuki Urena. Please see HPI. Patient returns to the office today for a first post operative check and drain check. Patient did not have her drain logs today but reports that both drains have had greater than 30cc daily. Patient will continue to record drain output and return to the office in 1 week for a drain check and likely first TE fill. Chemo and radiation have not yet been determined. Diagnoses and all orders for this visit:    Malignant neoplasm of lower-outer quadrant of left breast of female, estrogen receptor positive ([address])          Subjective:     Patient ID: Yariel Parikh is a 72 y.o. female. HPI    Patient reports no issues or concerns postoperatively. Review of Systems    See HPI     Objective:     Physical Exam      Incision is clean, dry and intact. TE is palpable and in place. Drains are serous.

## 2023-12-04 ENCOUNTER — OFFICE VISIT (OUTPATIENT)
Dept: SURGICAL ONCOLOGY | Facility: CLINIC | Age: 73
End: 2023-12-04

## 2023-12-04 ENCOUNTER — TELEPHONE (OUTPATIENT)
Dept: HEMATOLOGY ONCOLOGY | Facility: CLINIC | Age: 73
End: 2023-12-04

## 2023-12-04 VITALS
OXYGEN SATURATION: 95 % | WEIGHT: 213 LBS | SYSTOLIC BLOOD PRESSURE: 106 MMHG | HEART RATE: 69 BPM | BODY MASS INDEX: 34.23 KG/M2 | DIASTOLIC BLOOD PRESSURE: 68 MMHG | HEIGHT: 66 IN | TEMPERATURE: 97.6 F | RESPIRATION RATE: 15 BRPM

## 2023-12-04 DIAGNOSIS — Z17.0 MALIGNANT NEOPLASM OF LOWER-OUTER QUADRANT OF LEFT BREAST OF FEMALE, ESTROGEN RECEPTOR POSITIVE: ICD-10-CM

## 2023-12-04 DIAGNOSIS — C50.512 MALIGNANT NEOPLASM OF LOWER-OUTER QUADRANT OF LEFT BREAST OF FEMALE, ESTROGEN RECEPTOR POSITIVE: ICD-10-CM

## 2023-12-04 DIAGNOSIS — Z90.12 STATUS POST LEFT MASTECTOMY: ICD-10-CM

## 2023-12-04 DIAGNOSIS — Z71.89 OTHER SPECIFIED COUNSELING: Primary | ICD-10-CM

## 2023-12-04 PROCEDURE — 99024 POSTOP FOLLOW-UP VISIT: CPT | Performed by: SURGERY

## 2023-12-04 NOTE — TELEPHONE ENCOUNTER
Called patient and left VM regarding scheduling 3 mo FU with Jori Quinones.  Advised to call hope line to schedule

## 2023-12-04 NOTE — PROGRESS NOTES
Surgical Oncology Breast Post-Op       [address] [facility] [place]  [address] [facility] [place] [address]    [place]  1950  [id number]  [address] [facility]Y [facility] [address]    Chief Complaint:   Clare Nieto is seen for a post-operative visit of her recent breast surgery. Oncology History:     Oncology History   Malignant neoplasm of lower-outer quadrant of left breast of female, estrogen receptor positive ([address])   9/19/2023 Biopsy    Left breast ultrasound-guided biopsy  4 o'clock, 2 cm from nipple (VERENICE)  Invasive breast carcinoma of no special type (ductal NST)  Grade 2  ER 90, DE 90, HER2 1+\  Lymphovascular invasion not definitively identified    Concordant. Malignancy appears unifocal; however, oval circumscribed mass in the upper outer left breast is not accounted for. Bilateral breast MRI is recommended. Biopsy-proven carcinoma measured 3.1 cm on US. Left axilla US negative. Right breast clear. 9/28/2023 Observation    Bilateral breast MRI: Unifocal carcinoma in the lower outer left breast with possible skin involvement. There are no suspicious enhancing masses or suspicious areas of non-mass enhancement in right breast. There is no axillary or internal mammary adenopathy. 10/17/2023 Genetic Testing    A total of 47 genes were evaluated, including: HUYEN, BRCA1, BRCA2, CDH1, CHEK2, PALB2, PTEN, STK11, TP53  Negative result. No pathogenic sequence variants identified  [employer]     11/20/2023 Surgery    Left VERENICE  directed mastectomy with SLN biopsy   Invasive carcinoma of no special type (ductal)  Grade 2  3.6 cm   Margins negative  0/1 Lymph node  Anatomic Stage IIA  Prognostic Stage IA    Immediate reconstruction with tissue expander and ADM (Dr. Darcy George)         Assessment & Plan:   Assessment/Plan    Patient presents for postoperative visit.   Her pathology for left breast cancer demonstrated that she has a well-healed mastectomy incision she has not tissue expander in. Her margins were negative for tumor was approximately 31 mm which was smaller than anticipated based on her MRI. She is ER/WV positive HER2 negative. Her genetics was negative she has a consult with Dr. Josue Kitchen on the 18th. Her wounds are healing well she will follow-up with Dr. Anahi Vizcaino.  We will see her back in 3 months. She is agreeable to see our nurse practitioner with the understanding that I would be available if there are any unanswered questions or concerns. History of Present Illness:   See Oncology History    Interval History:   See Assessment & Plan  Patient has no complaints referable to her surgery. Review of Systems:   Review of Systems   All other systems reviewed and are negative.       Past Medical History:     Patient Active Problem List   Diagnosis    Coronary artery disease involving native coronary artery of native heart without angina pectoris    Essential hypertension    Dyslipidemia    S/P CABG x 3    Dyspnea on exertion    Chronic depression    Vitamin D deficiency    Postsurgical malabsorption    Status post bariatric surgery    At risk for sleep apnea    Chronic bilateral low back pain without sciatica    Candida infection of flexural skin    Class 2 severe obesity due to excess calories with serious comorbidity and body mass index (BMI) of 35.0 to 35.9 in adult     Acute pain of right shoulder    Postmenopausal    Rheumatoid arthritis involving multiple sites with positive rheumatoid factor (HCC)    Other fatigue    Palpitation    Malignant neoplasm of lower-outer quadrant of left breast of female, estrogen receptor positive ([address])    Change in bowel habits    Anxiety    Encounter for geriatric assessment     Past Medical History:   Diagnosis Date    Anxiety     Breast cancer ([address]) 09/2023    CAD (coronary artery disease)     Dyslipidemia     Hiatal hernia     Hyperlipidemia Hypertension     Obesity (BMI 30-39. 9)     Psoriatic arthritis ([address])     SOB (shortness of breath)      Past Surgical History:   Procedure Laterality Date    BARIATRIC SURGERY  2014    BILE DUCT EXPLORATION      replacement per Gordon    CARPAL TUNNEL RELEASE Bilateral 2002    CORONARY ARTERY BYPASS GRAFT  2017    FOOT SURGERY Right     bone surgery to straighten toe. HIP SURGERY Left 2015    LYMPH NODE BIOPSY Left 11/20/2023    Procedure: LEFT LYMPHATIC MAPPING WITH BLUE AND RADIOACTIVE DYES, SENTINEL LYMPH NODE BIOPSY;  Surgeon: Jose A Vickers MD;  Location: [facility];  Service: Surgical Oncology    LYMPH NODE DISSECTION Left 11/20/2023    Procedure: POSSIBLE AXILLARY DISSECTION;  Surgeon: Jose A Vickers MD;  Location: [facility];  Service: Surgical Oncology    VT TISSUE EXPANDER PLACEMENT BREAST RECONSTRUCTION Left 11/20/2023    Procedure: IMMEDIATE LEFT BREAST RECONSTRUCTION WITH TISSUE EXPANDER AND ADM;   Surgeon: Enmanuel Das MD;  Location: [facility];  Service: Plastics    REPLACEMENT TOTAL KNEE Right 04/2017    SIMPLE MASTECTOMY Left 11/20/2023    Procedure: LEFT BREAST VERENICE  DIRECTED MASTECTOMY;  Surgeon: Jose A Vickers MD;  Location: [facility];  Service: Surgical Oncology    SLEEVE GASTROPLASTY      TONSILLECTOMY      TOTAL HIP ARTHROPLASTY Right 2017    US GUIDED BREAST BIOPSY LEFT COMPLETE Left 09/19/2023     Family History   Problem Relation Age of Onset    Hypertension Mother     Diabetes Mother     Heart disease Father         CABG x5    No Known Problems Sister     No Known Problems Sister     No Known Problems Son     No Known Problems Daughter     No Known Problems Daughter      Social History     Socioeconomic History    Marital status: /Civil Union     Spouse name: Not on file    Number of children: Not on file    Years of education: Not on file    Highest education level: Not on file   Occupational History    Not on file   Tobacco Use    Smoking status: Former     Packs/day: 0.50 Years: 7.00     Total pack years: 3.50     Types: Cigarettes     Quit date: 2017     Years since quittin.9    Smokeless tobacco: Never   Vaping Use    Vaping Use: Never used   Substance and Sexual Activity    Alcohol use: Yes     Comment: social     Drug use: No    Sexual activity: Not Currently   Other Topics Concern    Not on file   Social History Narrative    Not on file     Social Determinants of Health     Financial Resource Strain: Not on file   Food Insecurity: No Food Insecurity (2023)    Hunger Vital Sign     Worried About Running Out of Food in the Last Year: Never true     Ran Out of Food in the Last Year: Never true   Transportation Needs: No Transportation Needs (2023)    PRAPARE - Transportation     Lack of Transportation (Medical): No     Lack of Transportation (Non-Medical):  No   Physical Activity: Not on file   Stress: Not on file   Social Connections: Not on file   Intimate Partner Violence: Not on file   Housing Stability: Low Risk  (2023)    Housing Stability Vital Sign     Unable to Pay for Housing in the Last Year: No     Number of Places Lived in the Last Year: 1     Unstable Housing in the Last Year: No       Current Outpatient Medications:     acetaminophen (TYLENOL) 500 mg tablet, Take 1 tablet (500 mg total) by mouth every 4 (four) hours as needed for mild pain or moderate pain, Disp: 30 tablet, Rfl: 2    Apremilast 30 MG TABS, Take 30 mg by mouth 2 (two) times a day, Disp: , Rfl:     atorvastatin (LIPITOR) 10 mg tablet, Take 1 tablet (10 mg total) by mouth daily, Disp: 90 tablet, Rfl: 3    cefadroxil (DURICEF) 500 mg capsule, Take 1 capsule (500 mg total) by mouth every 12 (twelve) hours for 14 days, Disp: 28 capsule, Rfl: 0    docusate sodium (COLACE) 100 mg capsule, Take 1 capsule (100 mg total) by mouth 2 (two) times a day as needed for constipation, Disp: 20 capsule, Rfl: 2    furosemide (LASIX) 40 mg tablet, TAKE 1 TABLET (40 MG TOTAL) BY MOUTH 2 (TWO) TIMES A DAY (Patient taking differently: Take 40 mg by mouth 2 (two) times a day Once daily), Disp: 180 tablet, Rfl: 0    LORazepam (Ativan) 0.5 mg tablet, Take 1 tablet (0.5 mg total) by mouth every 8 (eight) hours as needed for anxiety, Disp: 20 tablet, Rfl: 0    methocarbamol (ROBAXIN) 500 mg tablet, Take 1 tablet (500 mg total) by mouth 3 (three) times a day as needed for muscle spasms, Disp: 15 tablet, Rfl: 1    metoprolol succinate (TOPROL-XL) 50 mg 24 hr tablet, Take 1 tablet (50 mg total) by mouth daily, Disp: 90 tablet, Rfl: 3    Multiple Vitamin (multivitamin) tablet, Take 1 tablet by mouth daily, Disp: , Rfl:     nystatin (MYCOSTATIN) cream, Apply topically 2 (two) times a day (Patient taking differently: Apply topically 2 (two) times a day As needed), Disp: 30 g, Rfl: 0    nystatin (MYCOSTATIN) powder, Apply topically 2 (two) times a day (Patient taking differently: Apply topically 2 (two) times a day As needed), Disp: 60 g, Rfl: 0    ondansetron (ZOFRAN) 4 mg tablet, Take 1 tablet (4 mg total) by mouth every 8 (eight) hours as needed for nausea or vomiting, Disp: 20 tablet, Rfl: 0    traMADol (ULTRAM-ER) 200 MG 24 hr tablet, Take 200 mg by mouth daily. Indications: Pain, Disp: , Rfl:   Allergies   Allergen Reactions    Iodine - Food Allergy Anaphylaxis     Reaction Date: [id number]; Povidone Iodine Anaphylaxis    Shellfish-Derived Products - Food Allergy Anaphylaxis       Physical Exams:     Vitals:    12/04/23 1332   BP: 106/68   Pulse: 69   Resp: 15   Temp: 97.6 °F (36.4 °C)   SpO2: 95%     Physical Exam  Chest:      Comments: Examination of the left mastectomy site demonstrates possibly some very mild erythema. She sees Dr. Papo Zayas on Wednesday. Her drain is functional and putting out small amounts of serous fluid. Results & Discussion:   Patient has an appointment already scheduled with medical oncology. Will see her back in 3 months as outlined above. She will follow-up with Dr. Papo Zayas as well. Questions were answered to the patient's satisfaction. I reviewed her pathology with her in detail. All questions were answered to her satisfaction.

## 2023-12-06 ENCOUNTER — OFFICE VISIT (OUTPATIENT)
Dept: PLASTIC SURGERY | Facility: CLINIC | Age: 73
End: 2023-12-06

## 2023-12-06 DIAGNOSIS — L03.90 CELLULITIS, UNSPECIFIED CELLULITIS SITE: Primary | ICD-10-CM

## 2023-12-06 PROCEDURE — 99024 POSTOP FOLLOW-UP VISIT: CPT | Performed by: STUDENT IN AN ORGANIZED HEALTH CARE EDUCATION/TRAINING PROGRAM

## 2023-12-06 RX ORDER — CEFADROXIL 500 MG/1
500 CAPSULE ORAL EVERY 12 HOURS SCHEDULED
Qty: 14 CAPSULE | Refills: 0 | Status: SHIPPED | OUTPATIENT
Start: 2023-12-06 | End: 2023-12-13

## 2023-12-06 NOTE — PROGRESS NOTES
PRS Note    Follow-up 2 weeks s/p immediate left breast reconstruction with submuscular tissue expander with acellular dermal matrix    Doing well. Patient was intraoperatively filled to 150 cc and has not had any further fills as waiting for diminished drain output for drain removal.     Incision is well healed. Patient has been doing well. No issues. Her drain output in SUMIT #1 is low enough for removal.    JP1 removed. Left chest expander filled with 250 cc, now filled to 400cc of possible 650 cc expander. Plan:  -Filled today in clinic to total of 400 cc of possible 650 cc expander. This will help diminish dead space within breast pocket and hopefully allow for removal of JP2 next week. -Adam Hinton removed without issues. -Refilled antibiotic, duricef  -Patient has scheduled follow-up for next Wed with Connie. At that time, will assess for likely JP2 drain removal at that time. Hold on fills at that time as patient may be happy with size and expander is relatively expanded. Patient to followup with PA one week after on a day that Dr Anahi Vizcaino is present to assess size.     Josie Grijalva MD   [facility]   [facility] [place]   Office: [phone]

## 2023-12-07 ENCOUNTER — HOME CARE VISIT (OUTPATIENT)
Dept: HOME HEALTH SERVICES | Facility: HOME HEALTHCARE | Age: 73
End: 2023-12-07
Payer: COMMERCIAL

## 2023-12-07 VITALS
RESPIRATION RATE: 18 BRPM | DIASTOLIC BLOOD PRESSURE: 64 MMHG | OXYGEN SATURATION: 97 % | TEMPERATURE: 96 F | HEART RATE: 66 BPM | SYSTOLIC BLOOD PRESSURE: 94 MMHG

## 2023-12-07 DIAGNOSIS — M79.89 LEG SWELLING: Primary | ICD-10-CM

## 2023-12-07 PROCEDURE — G0299 HHS/HOSPICE OF RN EA 15 MIN: HCPCS

## 2023-12-07 RX ORDER — FUROSEMIDE 40 MG/1
40 TABLET ORAL DAILY
Qty: 180 TABLET | Refills: 0 | Status: SHIPPED | OUTPATIENT
Start: 2023-12-07

## 2023-12-11 ENCOUNTER — OFFICE VISIT (OUTPATIENT)
Dept: PLASTIC SURGERY | Facility: CLINIC | Age: 73
End: 2023-12-11

## 2023-12-11 DIAGNOSIS — C50.512 MALIGNANT NEOPLASM OF LOWER-OUTER QUADRANT OF LEFT BREAST OF FEMALE, ESTROGEN RECEPTOR POSITIVE: Primary | ICD-10-CM

## 2023-12-11 DIAGNOSIS — Z17.0 MALIGNANT NEOPLASM OF LOWER-OUTER QUADRANT OF LEFT BREAST OF FEMALE, ESTROGEN RECEPTOR POSITIVE: Primary | ICD-10-CM

## 2023-12-11 NOTE — PROGRESS NOTES
Assessment/Plan:     Diagnoses and all orders for this visit:    Malignant neoplasm of lower-outer quadrant of left breast of female, estrogen receptor positive ([address])  See HPI. Drain was removed given no drainage present. Using pressure as much drainage was expressed as possible. TE was filled with 180cc for a total volume of 580cc, of a possible 650cc. An ace wrap was then applied. I recommended wearing the wrap until we see her back in 3 days. Given volume of drainage at that time we can further expander her vs washout in OR & new drain placement. Dr. Alexandrea Armendariz also saw the pt & is agreement with the plan. Subjective:      Patient ID: Martina Huff is a 72 y.o. female. HPI    Pt presents for a post-op visit, stating for the past 2 days she has been leaking around the drain. She underwent left breast mastectomy with immediate reconstruction with submuscular tissue expander with acellular dermal matrix, local Denzel flap, adjacent tissue transfer to left chest, left chest intercostal nerve block with Exparel on 11/20 by Jaki Robb. She states 2 days ago she noticed her shirt was wet & there was minimal to no drainage in the bulb.      Patient Active Problem List   Diagnosis    Coronary artery disease involving native coronary artery of native heart without angina pectoris    Essential hypertension    Dyslipidemia    S/P CABG x 3    Dyspnea on exertion    Chronic depression    Vitamin D deficiency    Postsurgical malabsorption    Status post bariatric surgery    At risk for sleep apnea    Chronic bilateral low back pain without sciatica    Candida infection of flexural skin    Class 2 severe obesity due to excess calories with serious comorbidity and body mass index (BMI) of 35.0 to 35.9 in adult     Acute pain of right shoulder    Postmenopausal    Rheumatoid arthritis involving multiple sites with positive rheumatoid factor (HCC)    Other fatigue    Palpitation    Malignant neoplasm of lower-outer quadrant of left breast of female, estrogen receptor positive ([address])    Change in bowel habits    Anxiety    Encounter for geriatric assessment     Allergies   Allergen Reactions    Iodine - Food Allergy Anaphylaxis     Reaction Date: [id number]; Povidone Iodine Anaphylaxis    Shellfish-Derived Products - Food Allergy Anaphylaxis     Current Outpatient Medications on File Prior to Visit   Medication Sig    acetaminophen (TYLENOL) 500 mg tablet Take 1 tablet (500 mg total) by mouth every 4 (four) hours as needed for mild pain or moderate pain    Apremilast 30 MG TABS Take 30 mg by mouth 2 (two) times a day    atorvastatin (LIPITOR) 10 mg tablet Take 1 tablet (10 mg total) by mouth daily    cefadroxil (DURICEF) 500 mg capsule Take 1 capsule (500 mg total) by mouth every 12 (twelve) hours for 7 days    docusate sodium (COLACE) 100 mg capsule Take 1 capsule (100 mg total) by mouth 2 (two) times a day as needed for constipation    furosemide (LASIX) 40 mg tablet Take 1 tablet (40 mg total) by mouth daily    LORazepam (Ativan) 0.5 mg tablet Take 1 tablet (0.5 mg total) by mouth every 8 (eight) hours as needed for anxiety    methocarbamol (ROBAXIN) 500 mg tablet Take 1 tablet (500 mg total) by mouth 3 (three) times a day as needed for muscle spasms    metoprolol succinate (TOPROL-XL) 50 mg 24 hr tablet Take 1 tablet (50 mg total) by mouth daily    Multiple Vitamin (multivitamin) tablet Take 1 tablet by mouth daily    nystatin (MYCOSTATIN) cream Apply topically 2 (two) times a day (Patient taking differently: Apply topically 2 (two) times a day As needed)    nystatin (MYCOSTATIN) powder Apply topically 2 (two) times a day (Patient taking differently: Apply topically 2 (two) times a day As needed)    ondansetron (ZOFRAN) 4 mg tablet Take 1 tablet (4 mg total) by mouth every 8 (eight) hours as needed for nausea or vomiting    traMADol (ULTRAM-ER) 200 MG 24 hr tablet Take 200 mg by mouth daily.  Indications: Pain     No current facility-administered medications on file prior to visit. Family History   Problem Relation Age of Onset    Hypertension Mother     Diabetes Mother     Heart disease Father         CABG x5    No Known Problems Sister     No Known Problems Sister     No Known Problems Son     No Known Problems Daughter     No Known Problems Daughter      Past Medical History:   Diagnosis Date    Anxiety     Breast cancer ([address]) 2023    CAD (coronary artery disease)     Dyslipidemia     Hiatal hernia     Hyperlipidemia     Hypertension     Obesity (BMI 30-39. 9)     Psoriatic arthritis (HCC)     SOB (shortness of breath)      Social History     Socioeconomic History    Marital status: /Civil Union     Spouse name: Not on file    Number of children: Not on file    Years of education: Not on file    Highest education level: Not on file   Occupational History    Not on file   Tobacco Use    Smoking status: Former     Packs/day: 0.50     Years: 7.00     Total pack years: 3.50     Types: Cigarettes     Quit date:      Years since quittin.9    Smokeless tobacco: Never   Vaping Use    Vaping Use: Never used   Substance and Sexual Activity    Alcohol use: Yes     Comment: social     Drug use: No    Sexual activity: Not Currently   Other Topics Concern    Not on file   Social History Narrative    Not on file     Social Determinants of Health     Financial Resource Strain: Not on file   Food Insecurity: No Food Insecurity (2023)    Hunger Vital Sign     Worried About Running Out of Food in the Last Year: Never true     Ran Out of Food in the Last Year: Never true   Transportation Needs: No Transportation Needs (2023)    PRAPARE - Transportation     Lack of Transportation (Medical): No     Lack of Transportation (Non-Medical):  No   Physical Activity: Not on file   Stress: Not on file   Social Connections: Not on file   Intimate Partner Violence: Not on file   Housing Stability: Low Risk  (2023)    Housing Stability Vital Sign     Unable to Pay for Housing in the Last Year: No     Number of Places Lived in the Last Year: 1     Unstable Housing in the Last Year: No     Past Surgical History:   Procedure Laterality Date    BARIATRIC SURGERY  2014    BILE DUCT EXPLORATION      replacement per [address] [facility] [address] Bilateral 2002    CORONARY ARTERY BYPASS GRAFT  2017    FOOT SURGERY Right     bone surgery to straighten toe. HIP SURGERY Left 2015    LYMPH NODE BIOPSY Left 11/20/2023    Procedure: LEFT LYMPHATIC MAPPING WITH BLUE AND RADIOACTIVE DYES, SENTINEL LYMPH NODE BIOPSY;  Surgeon: Santy Holt MD;  Location:  MAIN OR;  Service: Surgical Oncology    LYMPH NODE DISSECTION Left 11/20/2023    Procedure: POSSIBLE AXILLARY DISSECTION;  Surgeon: Santy Holt MD;  Location: [facility] OR;  Service: Surgical Oncology    NV TISSUE EXPANDER PLACEMENT BREAST RECONSTRUCTION Left 11/20/2023    Procedure: IMMEDIATE LEFT BREAST RECONSTRUCTION WITH TISSUE EXPANDER AND ADM; Surgeon: Lenore Sue MD;  Location:  MAIN OR;  Service: Plastics    REPLACEMENT TOTAL KNEE Right 04/2017    SIMPLE MASTECTOMY Left 11/20/2023    Procedure: LEFT BREAST VERENICE  DIRECTED MASTECTOMY;  Surgeon: Santy Holt MD;  Location:  MAIN OR;  Service: Surgical Oncology    SLEEVE GASTROPLASTY      TONSILLECTOMY      TOTAL HIP ARTHROPLASTY Right 2017    US GUIDED BREAST BIOPSY LEFT COMPLETE Left 09/19/2023         Review of Systems   All other systems reviewed and are negative. Objective: There were no vitals taken for this visit. Physical Exam  Constitutional:       Appearance: Normal appearance. She is well-developed. HENT:      Head: Normocephalic and atraumatic. Eyes:      Conjunctiva/sclera: Conjunctivae normal.   Pulmonary:      Effort: Pulmonary effort is normal.      Comments: Left breast incision is C/D/I. No drainage in the bulb. Drain removed. Serous drainage present.  Pressure was applied to express as much drainage as possible. ABD applied. TE was then filled with 180cc. Ace wrap applied. Musculoskeletal:         General: Normal range of motion. Cervical back: Normal range of motion. Skin:     General: Skin is warm and dry. Neurological:      Mental Status: She is alert and oriented to person, place, and time.    Psychiatric:         Mood and Affect: Mood normal.         Behavior: Behavior normal.

## 2023-12-12 ENCOUNTER — TELEPHONE (OUTPATIENT)
Dept: SURGICAL ONCOLOGY | Facility: CLINIC | Age: 73
End: 2023-12-12

## 2023-12-12 NOTE — TELEPHONE ENCOUNTER
I called and spoke with patient to schedule three month follow up appointment. Patient agreed to 3/6/24 with JOCELYN Mckinney.

## 2023-12-15 ENCOUNTER — OFFICE VISIT (OUTPATIENT)
Dept: PLASTIC SURGERY | Facility: CLINIC | Age: 73
End: 2023-12-15

## 2023-12-15 DIAGNOSIS — C50.512 MALIGNANT NEOPLASM OF LOWER-OUTER QUADRANT OF LEFT BREAST OF FEMALE, ESTROGEN RECEPTOR POSITIVE: Primary | ICD-10-CM

## 2023-12-15 DIAGNOSIS — Z17.0 MALIGNANT NEOPLASM OF LOWER-OUTER QUADRANT OF LEFT BREAST OF FEMALE, ESTROGEN RECEPTOR POSITIVE: Primary | ICD-10-CM

## 2023-12-15 PROCEDURE — 99024 POSTOP FOLLOW-UP VISIT: CPT | Performed by: STUDENT IN AN ORGANIZED HEALTH CARE EDUCATION/TRAINING PROGRAM

## 2023-12-15 NOTE — PROGRESS NOTES
PRS Note    Patient is s/p immediate left breast reconstruction with submuscular tissue expander with acellular dermal matrix on 11/20    She had her last drain removed last week as it was non-functioning and draining from around the cutaneous entry site. She was also expanded with 180 cc to total of 580 cc of possible 650 cc expander. She currently is filled to 580 cc of possible 650 cc expander. She has not noted any further drainage from the prior drain site. She has not noted any increase in size of the breast, no erythema, or pain. All incisions are c/d/i    Patient does not need radiation.     Plan:  -Patient is scheduled to see oncology next Tuesday to determine if she will need chemotherapy.  -Patient has scheduled followup with us next Thurday for wound check, and depending on if patient will require chemotherapy, will determine next step for breast reconstruction.  -I instructed patient to be cautious in her monitoring of her left chest. If it becomes more swollen, erythematous, has fevers, or more pain, to immediately contact plastic surgery as this could mean accumulation of seroma within the breast pocket and this would need to be treated sooner rather than later, possibly operatively  -PA that sees her that day can contact Dr Max Mitchell to notify him if any issues with wound and whether patient will require chemotherapy.  -Patient to follow-up in plastics clinic next Thursday    Millicent Moya, [address] [facility]   [facility], [address]   Office: [phone]

## 2023-12-16 NOTE — PROGRESS NOTES
Pretty Aguila  1950  [address] [facility] [place]  [address] [facility]  [place] [id number]    Chief Complaint   Patient presents with    Consult     Assessment/plan:   1.  Stage IIA (pT2 pN0(sn) cM0) left breast invasive ductal carcinoma.  Grade 2.  ER positive (%), IL positive (%), HER2 negative (score 1+) by IHC.  Diagnosed November 2023.  2.  BRCA negative     Pretty Aguila is 73-year-old postmenopausal female who initially noted a mass in the lower outer quadrant of her left breast.  She underwent diagnostic mammogram and ultrasound which demonstrated a unifocal lesion at the 4 o'clock position 2 cm from the nipple.  This was biopsied and shown to be invasive ductal carcinoma, grade 2, ER 90%, IL 90%, HER2/shannan negative (1+).  She underwent an MRI which demonstrated unifocal mass measuring approximately 4.5 cm with questionable slight skin involvement and no adenopathy.  Her axillary ultrasound demonstrated no adenopathy. She underwent left mastectomy with SLN biopsy.  Final pathology was consistent with an invasive carcinoma, negative margins, 0/1 lymph node, grade 2.  She underwent immediate left breast reconstruction with submuscular tissue expander with acellular dermal matrix on 11/20/2023.  Her final stage is IIA (pT2 pN0 cM0) left breast invasive ductal carcinoma, grade 2, ER positive, IL positive HER2 negative.    I met with the patient and reviewed her diagnosis, prognosis and treatment options.  Recommendations consistent with NCCN guidelines.  Patient with favorable risk breast cancer.  Will send Oncotype DX recurrence score due to size of tumor to assess candidacy of chemotherapy.  We discussed adjuvant endocrine therapy with anastrozole if Oncotype DX recurrence score is low.  She will continue to follow with breast surgery.  She will start treatment with anastrozole after her Oncotype resulted and follow-up in 3 months.    We reviewed the  side effects of aromatase inhibitors including, but not limited to hot flashes, vaginal dryness, mood swings, weight gain, difficulty sleeping, decreased sexual interest, arthralgias/myalgias and worsening of osteopenia/osteoporosis.  She will obtain a baseline DEXA scan.  I recommended she take calcium and vitamin D on a regular basis.  Patient signed informed consent after all of her questions were answered to her satisfaction.  She had a teaching session with RN.    3. Psoriatric arthritis/osteoarthritis/fibromyalgia   Management per PCP    Oncology history:   Primary Diagnosis:  1.  Stage IIA (pT2 pN0(sn) cM0) left breast invasive ductal carcinoma.  Grade 2.  ER positive (%), NC positive (%), HER2 negative (score 1+) by IHC.  Diagnosed November 2023.  2.  BRCA negative      Previous Hematologic/ Oncologic Treatment:   S/p left mastectomy with SLN biopsy     Current Hematologic/ Oncologic Treatment:    Plan endocrine therapy with anastrozole.    Disease Status:      Test Results:  Pathology:  9/19/2023: Left breast ultrasound-guided biopsy  4 o'clock, 2 cm from nipple (VERENICE)  Invasive breast carcinoma of no special type (ductal NST)  Grade 2  ER 90, NC 90, HER2 1+\  Lymphovascular invasion not definitively identified    11/20/2023: Left VERENICE  directed mastectomy with SLN biopsy   Invasive carcinoma of no special type (ductal)  Grade 2  3.6 cm   Margins negative  0/1 Lymph node  Anatomic Stage IIA  Prognostic Stage IA     Radiology:  8/30/2023: Left diagnostic mammogram:  LEFT  A) MASS  Mammo diagnostic bilateral w 3d & cad: There is an irregularly shaped mass with spiculated margins seen in the lower inner quadrant of the left breast in the middle depth. The mass correlates with the palpable mass reported by the patient.   Oval circumscribed mass in the upper outer left breast, posterior third is noted.  US breast left limited (diagnostic): There is a 27 mm x 23 mm x 31 mm irregularly shaped,  heterogeneous mass with indistinct margins seen in the left breast at 4 o'clock, 2 cm from the nipple. The mass correlates with the palpable mass reported by the patient.  No left axillary adenopathy is identified.  Note correlate for the oval circumscribed mass in the upper outer left breast is identified.  Right  Mammo diagnostic bilateral w 3d & cad  There are no suspicious masses, grouped microcalcifications or areas of unexplained architectural distortion. The skin and nipple areolar complex are unremarkable.     9/28/2023: MRI breast bilateral:  LEFT  MASS [A]: There is a 4.5 cm x 3 cm x 2.3 cm irregularly shaped mass with irregular margins seen in the lower inner quadrant of the left breast at 4 o'clock in the middle depth, 2 cm from the nipple.  This is the biopsy-proven carcinoma.  Enhancement abuts the skin surface.  There is skin thickening in this region.  See screening captures.   Oval circumscribed mass in the upper outer breast demonstrates increased T2 signal intensity and plateau kinetics.  This likely represents a benign entity.   RIGHT  There are no suspicious enhancing masses or suspicious areas of non-mass enhancement. There is no axillary or internal mammary adenopathy.    11/15/2023: CXR: Clear lungs.    Laboratory:  11/22/2023: WBC: 8.91, H/H 9.6/30.7, MCV: 99, platelets: 198.  Creatinine: 1.01    History of present illness:   Pretty Aguila is 73-year-old postmenopausal female who initially noted a mass in the lower outer quadrant of her left breast.  She underwent diagnostic mammogram and ultrasound which demonstrated a unifocal lesion at the 4 o'clock position 2 cm from the nipple.  This was biopsied and shown to be invasive ductal carcinoma, grade 2, ER 90%, RI 90%, HER2/shannan negative (1+).  She underwent an MRI which demonstrated unifocal mass measuring approximately 4.5 cm with questionable slight skin involvement and no adenopathy.  Her axillary ultrasound demonstrated no adenopathy.  She  was evaluated by Dr. Gabriel from breast surgery.  She underwent left mastectomy with SLN biopsy.  Final pathology was consistent with an invasive carcinoma, negative margins, 0/1 lymph node, grade 2.  She underwent immediate left breast reconstruction with submuscular tissue expander with acellular dermal matrix on 11/20/2023.  Patient presents for initial oncology evaluation.    She is following with plastic surgery for seroma after breast reconstruction with tissue expander.  She has been referred to IR for aspiration and drain placement and possible culture of fluid. She notes chronic back pain due to spinal canal stenosis and is to see orthopedics.     She has no family history of breast cancer.  Menarche: 17 year old   Menopause: early 60's   Age at first pregnancy: 1972  OCPs: denies  HRT: denies    Review of systems:   Review of Systems   Constitutional:  Positive for fatigue. Negative for chills and fever.   Eyes:  Negative for pain and visual disturbance.   Respiratory:  Negative for cough and shortness of breath.    Cardiovascular:  Negative for chest pain and palpitations.   Gastrointestinal:  Negative for abdominal pain and vomiting.   Genitourinary:  Negative for dysuria and hematuria.   Musculoskeletal:  Positive for back pain. Negative for arthralgias.   Skin:  Negative for color change and rash.   Neurological:  Negative for seizures and syncope.   All other systems reviewed and are negative.      Patient Active Problem List   Diagnosis    Coronary artery disease involving native coronary artery of native heart without angina pectoris    Essential hypertension    Dyslipidemia    S/P CABG x 3    Dyspnea on exertion    Chronic depression    Vitamin D deficiency    Postsurgical malabsorption    Status post bariatric surgery    At risk for sleep apnea    Chronic bilateral low back pain without sciatica    Candida infection of flexural skin    Class 2 severe obesity due to excess calories with serious  comorbidity and body mass index (BMI) of 35.0 to 35.9 in adult     Acute pain of right shoulder    Postmenopausal    Rheumatoid arthritis involving multiple sites with positive rheumatoid factor (HCC)    Other fatigue    Palpitation    Malignant neoplasm of lower-outer quadrant of left breast of female, estrogen receptor positive (HCC)    Change in bowel habits    Anxiety    Encounter for geriatric assessment     Past Medical History:   Diagnosis Date    Anxiety     Breast cancer (HCC) 09/2023    CAD (coronary artery disease)     Dyslipidemia     Hiatal hernia     Hyperlipidemia     Hypertension     Obesity (BMI 30-39.9)     Psoriatic arthritis (HCC)     SOB (shortness of breath)      Past Surgical History:   Procedure Laterality Date    BARIATRIC SURGERY  2014    BILE DUCT EXPLORATION      replacement per Steffanie    CARPAL TUNNEL RELEASE Bilateral 2002    CORONARY ARTERY BYPASS GRAFT  2017    FOOT SURGERY Right     bone surgery to straighten toe.    HIP SURGERY Left 2015    IR DRAINAGE TUBE PLACEMENT  12/19/2023    LYMPH NODE BIOPSY Left 11/20/2023    Procedure: LEFT LYMPHATIC MAPPING WITH BLUE AND RADIOACTIVE DYES, SENTINEL LYMPH NODE BIOPSY;  Surgeon: Adrien Gabriel MD;  Location:  MAIN OR;  Service: Surgical Oncology    LYMPH NODE DISSECTION Left 11/20/2023    Procedure: POSSIBLE AXILLARY DISSECTION;  Surgeon: Adrien Gabriel MD;  Location: [facility] MAIN OR;  Service: Surgical Oncology    TX TISSUE EXPANDER PLACEMENT BREAST RECONSTRUCTION Left 11/20/2023    Procedure: IMMEDIATE LEFT BREAST RECONSTRUCTION WITH TISSUE EXPANDER AND ADM;  Surgeon: Molina Jimenez MD;  Location: [facility] MAIN OR;  Service: Plastics    REPLACEMENT TOTAL KNEE Right 04/2017    SIMPLE MASTECTOMY Left 11/20/2023    Procedure: LEFT BREAST VERENICE  DIRECTED MASTECTOMY;  Surgeon: Adrien Gabriel MD;  Location:  MAIN OR;  Service: Surgical Oncology    SLEEVE GASTROPLASTY      TONSILLECTOMY      TOTAL HIP ARTHROPLASTY Right 2017    US GUIDED BREAST BIOPSY LEFT  COMPLETE Left 2023     Family History   Problem Relation Age of Onset    Hypertension Mother     Diabetes Mother     Heart disease Father         CABG x5    No Known Problems Sister     No Known Problems Sister     No Known Problems Son     No Known Problems Daughter     No Known Problems Daughter      Social History     Socioeconomic History    Marital status: /Civil Union     Spouse name: Not on file    Number of children: Not on file    Years of education: Not on file    Highest education level: Not on file   Occupational History    Not on file   Tobacco Use    Smoking status: Former     Current packs/day: 0.00     Average packs/day: 0.5 packs/day for 7.0 years (3.5 ttl pk-yrs)     Types: Cigarettes     Start date:      Quit date: 2017     Years since quittin.9    Smokeless tobacco: Never   Vaping Use    Vaping status: Never Used   Substance and Sexual Activity    Alcohol use: Yes     Comment: social     Drug use: No    Sexual activity: Not Currently   Other Topics Concern    Not on file   Social History Narrative    Not on file     Social Determinants of Health     Financial Resource Strain: Not on file   Food Insecurity: No Food Insecurity (2023)    Hunger Vital Sign     Worried About Running Out of Food in the Last Year: Never true     Ran Out of Food in the Last Year: Never true   Transportation Needs: No Transportation Needs (2023)    PRAPARE - Transportation     Lack of Transportation (Medical): No     Lack of Transportation (Non-Medical): No   Physical Activity: Not on file   Stress: Not on file   Social Connections: Not on file   Intimate Partner Violence: Not on file   Housing Stability: Low Risk  (2023)    Housing Stability Vital Sign     Unable to Pay for Housing in the Last Year: No     Number of Places Lived in the Last Year: 1     Unstable Housing in the Last Year: No       Current Outpatient Medications:     acetaminophen (TYLENOL) 500 mg tablet, Take 1 tablet  (500 mg total) by mouth every 4 (four) hours as needed for mild pain or moderate pain, Disp: 30 tablet, Rfl: 2    Apremilast 30 MG TABS, Take 30 mg by mouth 2 (two) times a day, Disp: , Rfl:     atorvastatin (LIPITOR) 10 mg tablet, Take 1 tablet (10 mg total) by mouth daily, Disp: 90 tablet, Rfl: 3    docusate sodium (COLACE) 100 mg capsule, Take 1 capsule (100 mg total) by mouth 2 (two) times a day as needed for constipation, Disp: 20 capsule, Rfl: 2    furosemide (LASIX) 40 mg tablet, Take 1 tablet (40 mg total) by mouth daily, Disp: 180 tablet, Rfl: 0    LORazepam (Ativan) 0.5 mg tablet, Take 1 tablet (0.5 mg total) by mouth every 8 (eight) hours as needed for anxiety, Disp: 20 tablet, Rfl: 0    methocarbamol (ROBAXIN) 500 mg tablet, Take 1 tablet (500 mg total) by mouth 3 (three) times a day as needed for muscle spasms, Disp: 15 tablet, Rfl: 1    metoprolol succinate (TOPROL-XL) 50 mg 24 hr tablet, Take 1 tablet (50 mg total) by mouth daily, Disp: 90 tablet, Rfl: 3    Multiple Vitamin (multivitamin) tablet, Take 1 tablet by mouth daily, Disp: , Rfl:     nystatin (MYCOSTATIN) cream, Apply topically 2 (two) times a day (Patient taking differently: Apply topically 2 (two) times a day As needed), Disp: 30 g, Rfl: 0    nystatin (MYCOSTATIN) powder, Apply topically 2 (two) times a day (Patient taking differently: Apply topically 2 (two) times a day As needed), Disp: 60 g, Rfl: 0    ondansetron (ZOFRAN) 4 mg tablet, Take 1 tablet (4 mg total) by mouth every 8 (eight) hours as needed for nausea or vomiting, Disp: 20 tablet, Rfl: 0    traMADol (ULTRAM-ER) 200 MG 24 hr tablet, Take 200 mg by mouth daily. Indications: Pain, Disp: , Rfl:     anastrozole (ARIMIDEX) 1 mg tablet, Take 1 tablet (1 mg total) by mouth daily, Disp: 90 tablet, Rfl: 1  Allergies   Allergen Reactions    Iodine - Food Allergy Anaphylaxis     Reaction Date: 07Jan2008;     Povidone Iodine Anaphylaxis    Shellfish-Derived Products - Food Allergy  Anaphylaxis     Vitals:    12/18/23 1503   BP: 114/62   Pulse: 83   Resp: 18   Temp: (!) 97.3 °F (36.3 °C)   SpO2: 96%       Wt Readings from Last 3 Encounters:   12/18/23 93.4 kg (206 lb)   12/04/23 96.6 kg (213 lb)   11/22/23 105 kg (231 lb 7.7 oz)     Recommend status: ECOG PS:  Physical Exam  Vitals reviewed.   Constitutional:       General: She is not in acute distress.     Appearance: Normal appearance.   HENT:      Head: Normocephalic and atraumatic.      Mouth/Throat:      Mouth: Mucous membranes are moist.   Eyes:      Extraocular Movements: Extraocular movements intact.      Conjunctiva/sclera: Conjunctivae normal.   Cardiovascular:      Rate and Rhythm: Normal rate.   Pulmonary:      Effort: Pulmonary effort is normal. No respiratory distress.      Breath sounds: No wheezing, rhonchi or rales.   Chest:          Comments: Left breast: S/p mastectomy, reconstruction with tissue expander.  No palpable chest wall lesions.  No palpable supra/infraclavicular send axillary LAD.    Right breast: Negative exam  Abdominal:      General: Abdomen is flat. There is no distension.      Palpations: Abdomen is soft.   Musculoskeletal:      Cervical back: Normal range of motion and neck supple.      Right lower leg: No edema.      Left lower leg: No edema.   Skin:     General: Skin is warm.      Coloration: Skin is not pale.   Neurological:      Mental Status: She is alert and oriented to person, place, and time. Mental status is at baseline.      Cranial Nerves: No cranial nerve deficit.   Psychiatric:         Thought Content: Thought content normal.       Labs:  10/1/2023: WBC 5.6, H/H: 12.2/38.9, platelets: 225  11/22/2023: WBC: 8.91, H/H: 9.6/30.7, platelets: 198    Return in 3 months (on 3/18/2024) for Office Visit.

## 2023-12-18 ENCOUNTER — TELEPHONE (OUTPATIENT)
Age: 73
End: 2023-12-18

## 2023-12-18 ENCOUNTER — OFFICE VISIT (OUTPATIENT)
Dept: PLASTIC SURGERY | Facility: CLINIC | Age: 73
End: 2023-12-18

## 2023-12-18 ENCOUNTER — CONSULT (OUTPATIENT)
Dept: HEMATOLOGY ONCOLOGY | Facility: CLINIC | Age: 73
End: 2023-12-18
Payer: COMMERCIAL

## 2023-12-18 VITALS
BODY MASS INDEX: 33.11 KG/M2 | RESPIRATION RATE: 18 BRPM | WEIGHT: 206 LBS | OXYGEN SATURATION: 96 % | TEMPERATURE: 97.3 F | HEIGHT: 66 IN | HEART RATE: 83 BPM | DIASTOLIC BLOOD PRESSURE: 62 MMHG | SYSTOLIC BLOOD PRESSURE: 114 MMHG

## 2023-12-18 DIAGNOSIS — Z17.0 MALIGNANT NEOPLASM OF LOWER-OUTER QUADRANT OF LEFT BREAST OF FEMALE, ESTROGEN RECEPTOR POSITIVE: Primary | ICD-10-CM

## 2023-12-18 DIAGNOSIS — C50.512 MALIGNANT NEOPLASM OF LOWER-OUTER QUADRANT OF LEFT BREAST OF FEMALE, ESTROGEN RECEPTOR POSITIVE: Primary | ICD-10-CM

## 2023-12-18 DIAGNOSIS — Z17.0 MALIGNANT NEOPLASM OF LOWER-OUTER QUADRANT OF LEFT BREAST OF FEMALE, ESTROGEN RECEPTOR POSITIVE: ICD-10-CM

## 2023-12-18 DIAGNOSIS — C50.512 MALIGNANT NEOPLASM OF LOWER-OUTER QUADRANT OF LEFT BREAST OF FEMALE, ESTROGEN RECEPTOR POSITIVE: ICD-10-CM

## 2023-12-18 PROCEDURE — 99245 OFF/OP CONSLTJ NEW/EST HI 55: CPT | Performed by: INTERNAL MEDICINE

## 2023-12-18 PROCEDURE — 99024 POSTOP FOLLOW-UP VISIT: CPT | Performed by: STUDENT IN AN ORGANIZED HEALTH CARE EDUCATION/TRAINING PROGRAM

## 2023-12-18 NOTE — TELEPHONE ENCOUNTER
Lvm to inform patient that the IR order was placed and if she is not contacted within 48 hours to give our office a call for us to follow.

## 2023-12-18 NOTE — TELEPHONE ENCOUNTER
Left side swelling and uncomfortable Provider told pt to let him aware if this happens because he would like her to be seen. PT is going to office now.

## 2023-12-18 NOTE — PROGRESS NOTES
PRS Note     Patient is s/p immediate left breast reconstruction with submuscular tissue expander with acellular dermal matrix on 11/20    She was last seen on Friday and was instructed to follow-up sooner if she felt that her left chest was significantly more swollen. She presents today due to increased swelling.     Of note, she had her last drain removed last week as it was non-functioning and draining from around the cutaneous entry site. She was also expanded with 180 cc to total of 580 cc of possible 650 cc expander.      She currently is filled to 580 cc of possible 650 cc expander. The expander is located in the submuscular plane.     She has not noted any further drainage from the prior drain site.     All incisions c/d/i  No cellulitis or erythema  Palpable fluid wave     Patient does not need radiation.     A/P: Patient has seroma after immediate left breast reconstruction with tissue expander and ADM in submuscular pocket.   -I informed her that I will consult IR to aspirate and place drain as well as possible culture if fluid looks suspicious.  -I instructed her to continue compression and to complete her course of antibiotics.  -I instructed her to contact me if she has increasing pain, fever, chills, nausea, vomiting, wound dehiscence, or drainage from the incision site.  -Patient is scheduled to see oncology today to determine if she will need chemotherapy. I instructed her to inform me if she would need chemotherapy as well as start date.       Molina Jimenez MD   [facility]   [address] [place] [address], [address]   [place] [address]   Office: [phone]

## 2023-12-19 ENCOUNTER — TELEPHONE (OUTPATIENT)
Age: 73
End: 2023-12-19

## 2023-12-19 ENCOUNTER — HOSPITAL ENCOUNTER (OUTPATIENT)
Dept: INTERVENTIONAL RADIOLOGY/VASCULAR | Facility: HOSPITAL | Age: 73
Discharge: HOME | End: 2023-12-19
Admitting: RADIOLOGY
Payer: COMMERCIAL

## 2023-12-19 VITALS
HEART RATE: 65 BPM | RESPIRATION RATE: 17 BRPM | SYSTOLIC BLOOD PRESSURE: 105 MMHG | DIASTOLIC BLOOD PRESSURE: 65 MMHG | OXYGEN SATURATION: 96 %

## 2023-12-19 DIAGNOSIS — C50.912 MALIGNANT NEOPLASM OF LEFT BREAST IN FEMALE, ESTROGEN RECEPTOR POSITIVE, UNSPECIFIED SITE OF BREAST: Primary | ICD-10-CM

## 2023-12-19 DIAGNOSIS — C50.512 MALIGNANT NEOPLASM OF LOWER-OUTER QUADRANT OF LEFT BREAST OF FEMALE, ESTROGEN RECEPTOR POSITIVE: ICD-10-CM

## 2023-12-19 DIAGNOSIS — Z17.0 MALIGNANT NEOPLASM OF LEFT BREAST IN FEMALE, ESTROGEN RECEPTOR POSITIVE, UNSPECIFIED SITE OF BREAST: Primary | ICD-10-CM

## 2023-12-19 DIAGNOSIS — Z17.0 MALIGNANT NEOPLASM OF LOWER-OUTER QUADRANT OF LEFT BREAST OF FEMALE, ESTROGEN RECEPTOR POSITIVE: ICD-10-CM

## 2023-12-19 PROCEDURE — 10030 IMG GID FLU COLL DRG SFT TIS: CPT | Performed by: RADIOLOGY

## 2023-12-19 PROCEDURE — 10030 IMG GID FLU COLL DRG SFT TIS: CPT

## 2023-12-19 PROCEDURE — 87075 CULTR BACTERIA EXCEPT BLOOD: CPT | Performed by: STUDENT IN AN ORGANIZED HEALTH CARE EDUCATION/TRAINING PROGRAM

## 2023-12-19 PROCEDURE — 87077 CULTURE AEROBIC IDENTIFY: CPT | Performed by: STUDENT IN AN ORGANIZED HEALTH CARE EDUCATION/TRAINING PROGRAM

## 2023-12-19 PROCEDURE — 87205 SMEAR GRAM STAIN: CPT | Performed by: STUDENT IN AN ORGANIZED HEALTH CARE EDUCATION/TRAINING PROGRAM

## 2023-12-19 PROCEDURE — C1729 CATH, DRAINAGE: HCPCS

## 2023-12-19 PROCEDURE — C1769 GUIDE WIRE: HCPCS

## 2023-12-19 PROCEDURE — 87070 CULTURE OTHR SPECIMN AEROBIC: CPT | Performed by: STUDENT IN AN ORGANIZED HEALTH CARE EDUCATION/TRAINING PROGRAM

## 2023-12-19 RX ORDER — ANASTROZOLE 1 MG/1
1 TABLET ORAL DAILY
Qty: 90 TABLET | Refills: 1 | Status: SHIPPED | OUTPATIENT
Start: 2023-12-19

## 2023-12-19 RX ORDER — LIDOCAINE HYDROCHLORIDE 10 MG/ML
INJECTION, SOLUTION EPIDURAL; INFILTRATION; INTRACAUDAL; PERINEURAL AS NEEDED
Status: COMPLETED | OUTPATIENT
Start: 2023-12-19 | End: 2023-12-19

## 2023-12-19 RX ADMIN — LIDOCAINE HYDROCHLORIDE 10 ML: 10 INJECTION, SOLUTION EPIDURAL; INFILTRATION; INTRACAUDAL at 15:23

## 2023-12-19 NOTE — BRIEF OP NOTE (RAD/CATH)
INTERVENTIONAL RADIOLOGY PROCEDURE NOTE    Date: 12/19/2023    Procedure:   Procedure Summary       Date: 12/19/23 Room / Location: [facility] Interventional Radiology    Anesthesia Start:  Anesthesia Stop:     Procedure: IR DRAINAGE TUBE PLACEMENT Diagnosis:       Malignant neoplasm of lower-outer quadrant of left breast of female, estrogen receptor positive       (breast seroma after tissue expander placement)    Scheduled Providers:  Responsible Provider:     Anesthesia Type: Not recorded ASA Status: Not recorded            Preoperative diagnosis:   1. Malignant neoplasm of lower-outer quadrant of left breast of female, estrogen receptor positive (HCC)         Postoperative diagnosis: Same.    Surgeon: Tye Woods MD     Assistant: None. No qualified resident was available.    Blood loss: Minimal    Specimens: Cloudy yellow fluid sent for cultures.    Findings:  Successful placement of an 8 Occitan Matthews-Choi drainage catheter to a left breast seroma surrounding the tissue expander.    300 mL of fluid drained.    Complications: None immediate.    Anesthesia: local

## 2023-12-19 NOTE — DISCHARGE INSTRUCTIONS
"     TUBE CARE INSTRUCTIONS    Care after your procedure:    Resume your normal diet. Small sips of flat soda will help with nausea.    1.     2. The drainage bag/bulb may be emptied as necessary. Keep a record of the amount of fluid you drain from your tube. This should be done with clean technique as well.     3. A fresh dressing should be applied daily over the tube insertion site.     4. As the tube is secured to the skin with only a suture,try not to pull on your tube. Tub baths are not permitted. Showers are permitted if the patient's skin entry site is prevented from getting wet. Similarly, washcloth \"baths\" are acceptable.     Contact Interventional Radiology at [phone] (ARMENTA PATIENTS: Contact Interventional Radiology at [phone]) (SHEILA PATIENTS: Contact Interventional Radiology at [phone]) if:    1. Leakage or large amounts of liquid around the catheter.    2. Fever of 101 degrees lasting several hours without other obvious cause (such as sore throat, flu, etc).    3. Persistent nausea or vomiting.    4. Diminished drainage, which may be associated with pressure or pain. Or when the     drainage from your tube is less than 10mls for 48 hours.    5. Catheter pulled back or falls out.      "

## 2023-12-19 NOTE — SEDATION DOCUMENTATION
Pt tolerated breast seroma aspiration and drainage tube placement. 400ml clear yellow. Vitals stable   
yellow

## 2023-12-20 ENCOUNTER — HOSPITAL ENCOUNTER (OUTPATIENT)
Dept: RADIOLOGY | Facility: MEDICAL CENTER | Age: 73
Discharge: HOME | End: 2023-12-20
Payer: COMMERCIAL

## 2023-12-20 DIAGNOSIS — Z78.0 POSTMENOPAUSAL: ICD-10-CM

## 2023-12-20 DIAGNOSIS — Z13.820 SCREENING FOR OSTEOPOROSIS: ICD-10-CM

## 2023-12-20 PROCEDURE — 77080 DXA BONE DENSITY AXIAL: CPT

## 2023-12-22 LAB
BACTERIA SPEC ANAEROBE CULT: ABNORMAL
BACTERIA SPEC ANAEROBE CULT: ABNORMAL

## 2023-12-23 ENCOUNTER — TELEMEDICINE (OUTPATIENT)
Dept: INTERVENTIONAL RADIOLOGY/VASCULAR | Facility: CLINIC | Age: 73
End: 2023-12-23
Payer: COMMERCIAL

## 2023-12-23 DIAGNOSIS — N61.1 BREAST ABSCESS: Primary | ICD-10-CM

## 2023-12-23 LAB
BACTERIA SPEC BFLD CULT: NORMAL
GRAM STN SPEC: NORMAL
GRAM STN SPEC: NORMAL

## 2023-12-23 PROCEDURE — 99213 OFFICE O/P EST LOW 20 MIN: CPT | Performed by: RADIOLOGY

## 2023-12-23 NOTE — PROGRESS NOTES
Interventional Radiology Follow Up    Pretty Aguila  1950    Assessment/Plan   There are no diagnoses linked to this encounter.    Interval History: Patient underwent a left breast abscess drain placement on 12/19 at [facility] for 300 cc of cloudy fluid.  Her SUMIT bulb is no longer staying under suction.  It sucks air immediately.  I explained to the patient that there is likely a leak either around the tube site or at the incision site.  I asked her to place the tube back to bulb drainage without suction which will be gravity drainage and we can see her this coming week.  She should be scheduled to return in 1 week after her tube placement so should already be scheduled to return on 12/27.    Patient Active Problem List   Diagnosis    Coronary artery disease involving native coronary artery of native heart without angina pectoris    Essential hypertension    Dyslipidemia    S/P CABG x 3    Dyspnea on exertion    Chronic depression    Vitamin D deficiency    Postsurgical malabsorption    Status post bariatric surgery    At risk for sleep apnea    Chronic bilateral low back pain without sciatica    Candida infection of flexural skin    Class 2 severe obesity due to excess calories with serious comorbidity and body mass index (BMI) of 35.0 to 35.9 in adult     Acute pain of right shoulder    Postmenopausal    Rheumatoid arthritis involving multiple sites with positive rheumatoid factor (HCC)    Other fatigue    Palpitation    Malignant neoplasm of lower-outer quadrant of left breast of female, estrogen receptor positive (HCC)    Change in bowel habits    Anxiety    Encounter for geriatric assessment     Past Medical History:   Diagnosis Date    Anxiety     Breast cancer (HCC) 09/2023    CAD (coronary artery disease)     Dyslipidemia     Hiatal hernia     Hyperlipidemia     Hypertension     Obesity (BMI 30-39.9)     Psoriatic arthritis (HCC)     SOB (shortness of  breath)      Social History     Socioeconomic History    Marital status: /Civil Union     Spouse name: Not on file    Number of children: Not on file    Years of education: Not on file    Highest education level: Not on file   Occupational History    Not on file   Tobacco Use    Smoking status: Former     Current packs/day: 0.00     Average packs/day: 0.5 packs/day for 7.0 years (3.5 ttl pk-yrs)     Types: Cigarettes     Start date:      Quit date: 2017     Years since quittin.9    Smokeless tobacco: Never   Vaping Use    Vaping status: Never Used   Substance and Sexual Activity    Alcohol use: Yes     Comment: social     Drug use: No    Sexual activity: Not Currently   Other Topics Concern    Not on file   Social History Narrative    Not on file     Social Determinants of Health     Financial Resource Strain: Not on file   Food Insecurity: No Food Insecurity (2023)    Hunger Vital Sign     Worried About Running Out of Food in the Last Year: Never true     Ran Out of Food in the Last Year: Never true   Transportation Needs: No Transportation Needs (2023)    PRAPARE - Transportation     Lack of Transportation (Medical): No     Lack of Transportation (Non-Medical): No   Physical Activity: Not on file   Stress: Not on file   Social Connections: Not on file   Intimate Partner Violence: Not on file   Housing Stability: Low Risk  (2023)    Housing Stability Vital Sign     Unable to Pay for Housing in the Last Year: No     Number of Places Lived in the Last Year: 1     Unstable Housing in the Last Year: No      Family History   Problem Relation Age of Onset    Hypertension Mother     Diabetes Mother     Heart disease Father         CABG x5    No Known Problems Sister     No Known Problems Sister     No Known Problems Son     No Known Problems Daughter     No Known Problems Daughter      Past Surgical History:   Procedure Laterality Date    BARIATRIC SURGERY  2014    BILE DUCT EXPLORATION       replacement per [place]    CARPAL TUNNEL RELEASE Bilateral 2002    CORONARY ARTERY BYPASS GRAFT  2017    FOOT SURGERY Right     bone surgery to straighten toe.    HIP SURGERY Left 2015    IR DRAINAGE TUBE PLACEMENT  12/19/2023    LYMPH NODE BIOPSY Left 11/20/2023    Procedure: LEFT LYMPHATIC MAPPING WITH BLUE AND RADIOACTIVE DYES, SENTINEL LYMPH NODE BIOPSY;  Surgeon: Adrien Gabriel MD;  Location: [facility];  Service: Surgical Oncology    LYMPH NODE DISSECTION Left 11/20/2023    Procedure: POSSIBLE AXILLARY DISSECTION;  Surgeon: Adrien Gabriel MD;  Location: [facility];  Service: Surgical Oncology    KS TISSUE EXPANDER PLACEMENT BREAST RECONSTRUCTION Left 11/20/2023    Procedure: IMMEDIATE LEFT BREAST RECONSTRUCTION WITH TISSUE EXPANDER AND ADM;  Surgeon: Molina Jimenez MD;  Location: [facility];  Service: Plastics    REPLACEMENT TOTAL KNEE Right 04/2017    SIMPLE MASTECTOMY Left 11/20/2023    Procedure: LEFT BREAST VERENICE  DIRECTED MASTECTOMY;  Surgeon: Adrien Gabriel MD;  Location: [facility];  Service: Surgical Oncology    SLEEVE GASTROPLASTY      TONSILLECTOMY      TOTAL HIP ARTHROPLASTY Right 2017    US GUIDED BREAST BIOPSY LEFT COMPLETE Left 09/19/2023       Current Outpatient Medications:     acetaminophen (TYLENOL) 500 mg tablet, Take 1 tablet (500 mg total) by mouth every 4 (four) hours as needed for mild pain or moderate pain, Disp: 30 tablet, Rfl: 2    anastrozole (ARIMIDEX) 1 mg tablet, Take 1 tablet (1 mg total) by mouth daily, Disp: 90 tablet, Rfl: 1    Apremilast 30 MG TABS, Take 30 mg by mouth 2 (two) times a day, Disp: , Rfl:     atorvastatin (LIPITOR) 10 mg tablet, Take 1 tablet (10 mg total) by mouth daily, Disp: 90 tablet, Rfl: 3    docusate sodium (COLACE) 100 mg capsule, Take 1 capsule (100 mg total) by mouth 2 (two) times a day as needed for constipation, Disp: 20 capsule, Rfl: 2    furosemide (LASIX) 40 mg tablet, Take 1 tablet (40 mg total) by mouth daily, Disp: 180 tablet, Rfl: 0    LORazepam  (Ativan) 0.5 mg tablet, Take 1 tablet (0.5 mg total) by mouth every 8 (eight) hours as needed for anxiety, Disp: 20 tablet, Rfl: 0    methocarbamol (ROBAXIN) 500 mg tablet, Take 1 tablet (500 mg total) by mouth 3 (three) times a day as needed for muscle spasms, Disp: 15 tablet, Rfl: 1    metoprolol succinate (TOPROL-XL) 50 mg 24 hr tablet, Take 1 tablet (50 mg total) by mouth daily, Disp: 90 tablet, Rfl: 3    Multiple Vitamin (multivitamin) tablet, Take 1 tablet by mouth daily, Disp: , Rfl:     nystatin (MYCOSTATIN) cream, Apply topically 2 (two) times a day (Patient taking differently: Apply topically 2 (two) times a day As needed), Disp: 30 g, Rfl: 0    nystatin (MYCOSTATIN) powder, Apply topically 2 (two) times a day (Patient taking differently: Apply topically 2 (two) times a day As needed), Disp: 60 g, Rfl: 0    ondansetron (ZOFRAN) 4 mg tablet, Take 1 tablet (4 mg total) by mouth every 8 (eight) hours as needed for nausea or vomiting, Disp: 20 tablet, Rfl: 0    traMADol (ULTRAM-ER) 200 MG 24 hr tablet, Take 200 mg by mouth daily. Indications: Pain, Disp: , Rfl:   Allergies   Allergen Reactions    Iodine - Food Allergy Anaphylaxis     Reaction Date: [id number];     Povidone Iodine Anaphylaxis    Shellfish-Derived Products - Food Allergy Anaphylaxis       Labs:  Lab Results   Component Value Date    SODIUM 142 11/22/2023    SODIUM 140 03/21/2022    K 3.4 (L) 11/22/2023    K 4.4 03/21/2022     11/22/2023     03/21/2022    CO2 28 11/22/2023    CO2 24 03/21/2022    AGAP 6 11/22/2023    CREATININE 1.01 11/22/2023    BUN 20 11/22/2023    BUN 23 03/21/2022    GLUC 108 11/22/2023    GLUC 93 03/21/2022    CALCIUM 8.0 (L) 11/22/2023    AST 19 10/01/2023    AST 20 03/21/2022    ALT 19 10/01/2023    ALT 18 03/21/2022    ALKPHOS 80 10/01/2023    TP 7.4 10/01/2023    TP 7.2 03/21/2022    TBILI 0.71 10/01/2023    TBILI 0.5 03/21/2022    EGFR 56 11/22/2023     Lab Results   Component Value Date    WBC 8.91  11/22/2023    HGB 9.6 (L) 11/22/2023    HCT 30.7 (L) 11/22/2023    MCV 99 (H) 11/22/2023     11/22/2023       Imaging: DXA bone density spine hip and pelvis    Result Date: 12/22/2023  Narrative: DXA SCAN CLINICAL HISTORY: 73 years postmenopausal female. OTHER RISK FACTORS: Rheumatoid arthritis, gastric bypass surgery, limited mobility, Lasix therapy, Arimidex therapy. PHARMACOLOGIC THERAPY FOR OSTEOPOROSIS:  None. TECHNIQUE: Bone densitometry was performed using a HoloStylefie Horizon A bone densitometer.  Regions of interest appear properly placed. COMPARISON: There are no prior DXA studies performed on this unit for comparison. RESULTS: LUMBAR SPINE Level: L1-L4 : BMD: 1.134 gm/cm2 T-score: 0.8 HIPS: Not measured because of  prior bilateral arthroplasties. LEFT  FOREARM: 33% RADIUS BMD: 0.635 gm/cm2 T-score: -1.0     Impression: 1. Normal bone density. 2.  The future fracture risk, as calculated by the [employer]/WHO fracture risk assessment tool (FRAX), cannot be determined since FRAX requires valid hip BMD measurements. 3.  The current NOF guidelines recommend treating patients with a T-score of -2.5 or less in the lumbar spine or hips, or in post-menopausal women and men over the age of 50 with low bone mass (osteopenia) and a FRAX 10 year risk score of >3% for hip fracture and/or >20% for major osteoporotic fracture. 4.  The NOF recommends follow-up DXA in 1-2 years after initiating therapy for osteoporosis and every 2 years thereafter. More frequent evaluation is appropriate for patients with conditions associated with rapid bone loss, such as glucocorticoid therapy. The interval between DXA screenings may be longer for individuals without major risk factors and initial T-score in the normal or upper low bone mass range. WHO CLASSIFICATION: Normal (a T-score of -1.0 or higher) Low bone mineral density (a T-score of less than -1.0 but higher than -2.5) Osteoporosis (a T-score of -2.5 or less)  Severe osteoporosis (a T-score of -2.5 or less with a fragility fracture) Workstation performed: [id number]     IR drainage tube placement    Result Date: 12/19/2023  Narrative: IR ultrasound guided abscess drainage PROCEDURE SUMMARY: 1.  Sonographic evaluation of the left breast 2.  Placement of a 8 Kuwaiti Matthews-Choi pigtail drainage catheter under ultrasound guidance CLINICAL HISTORY: C50.512: Malignant neoplasm of lower-outer quadrant of left female breast Z17.0: Estrogen receptor positive status (ER+) Patient developed a fluid collection surrounding the left breast tissue expander after surgical drain removal a week ago. COMPLICATIONS: No immediate complications CONTRAST: None FLUOROSCOPY TIME/DOSE: None 0 mGy Number of Images: Ultrasound images ANESTHESIA/SEDATION Level of anesthesia: Local Anesthesia administered by: Not applicable Duration of anesthesia/sedation: 0 TECHNIQUE: The risks, benefits and alternatives of the procedure were fully discussed. All questions were answered. Informed consent for the procedure was obtained and time-out was performed prior to the procedure. The patient was placed prone on the table.  Sonographic evaluation of the site was performed.  Multiple ultrasound images were saved.  The site was prepared and draped using all elements of maximal sterile barrier technique including sterile gloves, sterile gown, cap, mask, large sterile sheet, hand hygiene and cutaneous antisepsis with 2% chlorhexidine. Local anesthesia was administered.  Under real-time sonographic guidance, a 5 Kuwaiti Yueh needle catheter was advanced percutaneously into the fluid collection.  Ultrasound images were saved.  Small amount of fluid  was aspirated and sent for cultures.  A wire was placed through the needle and the needle was removed.   After tract dilatation, an 8 Kuwaiti Matthews-Choi catheter was advanced over the wire until the loop was formed within the fluid collection, confirmed by ultrasound.  The catheter was then locked in place. The catheter was sutured to the skin. The tube was then connected to gravity bag, and dressed sterilely. FINDINGS: Targeted ultrasound evaluation of the left breast demonstrated fluid surrounding a tissue expander. Interval ultrasound demonstrated needle tip within the fluid. Completion ultrasound demonstrated fluid evacuation after aspiration of 300 mL of slightly cloudy yellow fluid.     Impression: Technically successful placement of an 8 Telugu Matthews-Choi pigtail drainage catheter into a fluid collection surrounding the left breast tissue expander. Specimen  sent for cultures. PLAN: Patient was instructed not to flush the catheter and leave it for SUMIT suction drainage. She is to return to interventional radiology in 1 week for catheter check and possible removal. Workstation performed: [id number]       Review of Systems:  Review of Systems    Physical Exam:  Physical Exam    Discussion/Summary:    Total time spent today was 25 minutes. Greater than 50% of total time was spent face to face with the patient and / or family counseling and / or coordination of care.    Virtual Brief Visit    This Visit is being completed by telephone. The Patient is located at Home and in the following state in which I hold an active license [place]    The patient was identified by name and date of birth. Pretty Aguila was informed that this is a telemedicine visit and that the visit is being conducted through Telephone.  My office door was closed. No one else was in the room.  She acknowledged consent and understanding of privacy and security of the video platform. The patient has agreed to participate and understands they can discontinue the visit at any time.    Patient is aware this is a billable service.       Assessment/Plan:    Problem List Items Addressed This Visit    None      Recent Visits  No visits were found meeting these conditions.  Showing recent visits within past 7 days and  meeting all other requirements  Today's Visits  Date Type Provider Dept   12/23/23 Telemedicine MD Abdullahi Urias   Showing today's visits and meeting all other requirements  Future Appointments  No visits were found meeting these conditions.  Showing future appointments within next 150 days and meeting all other requirements         Visit Time  Total Visit Duration: 25 minutes

## 2023-12-27 ENCOUNTER — PATIENT OUTREACH (OUTPATIENT)
Dept: CASE MANAGEMENT | Facility: OTHER | Age: 73
End: 2023-12-27

## 2023-12-27 ENCOUNTER — HOSPITAL ENCOUNTER (OUTPATIENT)
Dept: RADIOLOGY | Facility: HOSPITAL | Age: 73
Discharge: HOME | End: 2023-12-27
Attending: RADIOLOGY
Payer: COMMERCIAL

## 2023-12-27 DIAGNOSIS — Z17.0 MALIGNANT NEOPLASM OF LOWER-OUTER QUADRANT OF LEFT BREAST OF FEMALE, ESTROGEN RECEPTOR POSITIVE: ICD-10-CM

## 2023-12-27 DIAGNOSIS — C50.512 MALIGNANT NEOPLASM OF LOWER-OUTER QUADRANT OF LEFT BREAST OF FEMALE, ESTROGEN RECEPTOR POSITIVE: ICD-10-CM

## 2023-12-27 PROCEDURE — 76642 ULTRASOUND BREAST LIMITED: CPT | Performed by: RADIOLOGY

## 2023-12-27 NOTE — DISCHARGE INSTRUCTIONS
Drainage Tube Removal    Your drainage tube was removed today.    What you need know at home:   Keep a clean dry dressing at the tube site until the small opening closes. It will take twenty four to forty eight hours. Keep the site dry until it heals. A small amount of drainage on your dressing is normal. Resume your normal diet. Small sips of flat soda will help with any nausea.     Contact Interventional Radiology for any of the following:    You have pain, fever greater than 101, shaking chills.  If you have increased redness or swelling at the site.   I the drainage from your site does not stop.  If the site drains pus or has a bad odor.     Contact Interventional Radiology at [phone]   (KATHI PATIENTS: Contact Interventional Radiology at [phone]) ([facility] PATIENTS: Contact Interventional Radiology at [phone]) if:

## 2023-12-27 NOTE — SEDATION DOCUMENTATION
Patient arrived to IR today for a left breast seroma drain check. However, drain noted to be dislodged from the patient already. Ultrasound of left breast taken by Dr. Avitia and dry dressing applied to site. Patient educated and discharged safely from IR.    Strong peripheral pulses/Capillary refill less/equal to 2 seconds

## 2023-12-27 NOTE — PROGRESS NOTES
OSW placed outreach TC to pt this afternoon. She states that she was having an issue with her drain. She states that it wasn't working properly and it dislodged itself. She states that she was unaware that it had come out. She states that she had an appointment and thankfully when they checked the fluid there was very little, therefore she was able to keep it out. She is very happy that she was able to have it removed. She states that she has an appointment with Dr. Jimenez tomorrow.  OSW asked how her holiday was. She has some family already and more will come tomorrow. She shared that the whole family will then get together, which is very nice to have everyone together. She will be hosting. OSW expressed that it wonderful that they can all get together.  Overall she is doing well. OSW offered to outreach in another month and she was appreciative.

## 2023-12-28 ENCOUNTER — OFFICE VISIT (OUTPATIENT)
Dept: PLASTIC SURGERY | Facility: CLINIC | Age: 73
End: 2023-12-28

## 2023-12-28 DIAGNOSIS — Z17.0 MALIGNANT NEOPLASM OF LOWER-OUTER QUADRANT OF LEFT BREAST OF FEMALE, ESTROGEN RECEPTOR POSITIVE: Primary | ICD-10-CM

## 2023-12-28 DIAGNOSIS — C50.512 MALIGNANT NEOPLASM OF LOWER-OUTER QUADRANT OF LEFT BREAST OF FEMALE, ESTROGEN RECEPTOR POSITIVE: Primary | ICD-10-CM

## 2023-12-28 NOTE — PROGRESS NOTES
Assessment/Plan:     Status post left breast reconstruction with tissue expander placement and ADM with Dr. Jimenez on 11/20/2023 in conjunction with Dr. Gabriel.  Patient's postoperative course was complicated by an infected seroma with IR drain placement, now resolved with drain removed.  Please see HPI.     Patient returns to the office today for routine postoperative check.  She currently has 560 cc in her 650 cc capacity submuscular tissue expander.  Patient requests no further fills.  Of note, patient will not require chemo or radiation.    We will plan for tissue expander to implant exchange and right breast reduction in approximately 6 weeks post last fill (12/11/23).  Patient will return to the office approximately 2 weeks prior for surgical planning.  Dr. Jimenez did discuss the need for free nipple grafting on the right; patient states she may no elect for nipple reconstruction on the right as she does not have a nipple on the left.      There are no diagnoses linked to this encounter.      Subjective:     Patient ID: Pretty Aguila is a 73 y.o. female.    HPI    Patient reports no issues or concerns. No further fever, chills, nausea, vomiting.     Review of Systems    See HPI     Objective:     Physical Exam      All incisions are clean, dry and fully healed.  TE is palpable and in place.

## 2023-12-29 ENCOUNTER — LAB REQUISITION (OUTPATIENT)
Dept: LAB | Facility: HOSPITAL | Age: 73
End: 2023-12-29

## 2023-12-29 ENCOUNTER — PREP FOR PROCEDURE (OUTPATIENT)
Dept: PLASTIC SURGERY | Facility: CLINIC | Age: 73
End: 2023-12-29

## 2023-12-29 DIAGNOSIS — C50.912 MALIGNANT NEOPLASM OF UNSPECIFIED SITE OF LEFT FEMALE BREAST (HCC): ICD-10-CM

## 2023-12-29 DIAGNOSIS — R92.8 OTHER ABNORMAL AND INCONCLUSIVE FINDINGS ON DIAGNOSTIC IMAGING OF BREAST: ICD-10-CM

## 2023-12-29 DIAGNOSIS — Z85.3 PERSONAL HISTORY OF BREAST CANCER: ICD-10-CM

## 2023-12-29 DIAGNOSIS — Z17.0 MALIGNANT NEOPLASM OF LOWER-OUTER QUADRANT OF LEFT BREAST OF FEMALE, ESTROGEN RECEPTOR POSITIVE: Primary | ICD-10-CM

## 2023-12-29 DIAGNOSIS — C50.512 MALIGNANT NEOPLASM OF LOWER-OUTER QUADRANT OF LEFT BREAST OF FEMALE, ESTROGEN RECEPTOR POSITIVE: Primary | ICD-10-CM

## 2024-01-04 LAB — SCAN RESULT: NORMAL

## 2024-01-05 ENCOUNTER — HOSPITAL ENCOUNTER (OUTPATIENT)
Dept: RADIOLOGY | Facility: HOSPITAL | Age: 74
Discharge: HOME | End: 2024-01-05
Attending: ORTHOPAEDIC SURGERY
Payer: COMMERCIAL

## 2024-01-05 ENCOUNTER — OFFICE VISIT (OUTPATIENT)
Dept: OBGYN CLINIC | Facility: HOSPITAL | Age: 74
End: 2024-01-05
Payer: COMMERCIAL

## 2024-01-05 VITALS
WEIGHT: 205.91 LBS | HEART RATE: 79 BPM | HEIGHT: 66 IN | DIASTOLIC BLOOD PRESSURE: 65 MMHG | SYSTOLIC BLOOD PRESSURE: 98 MMHG | BODY MASS INDEX: 33.09 KG/M2

## 2024-01-05 DIAGNOSIS — R52 PAIN: ICD-10-CM

## 2024-01-05 DIAGNOSIS — M48.062 SPINAL STENOSIS OF LUMBAR REGION WITH NEUROGENIC CLAUDICATION: Primary | ICD-10-CM

## 2024-01-05 PROCEDURE — 99214 OFFICE O/P EST MOD 30 MIN: CPT | Performed by: ORTHOPAEDIC SURGERY

## 2024-01-05 PROCEDURE — 72110 X-RAY EXAM L-2 SPINE 4/>VWS: CPT

## 2024-01-05 NOTE — PROGRESS NOTES
Assessment & Plan/Medical Decision Makin y.o. female with Neurogenic Claudication and imaging findings most notable for lumbar spondylosis  and degenerative scoliosis        The clinical, physical and imaging findings were reviewed with the patient.  Pretty  has a constellation of findings consistent with Lumbar Myofascial Pain and  Neurogenic Claudication in the setting of lumbar degenerative disease and degenerative lumbar scoliosis.      Fortunately patient remains neurologically intact and functional. Physical exam showing mild pain in the low back but no significant weakness.  We discussed the treatment options including physical therapy, at home exercises, activity modifications, chiropractic medicine, oral medications, interventional spine procedures.  At this time recommend continued conservative treatments.    Referral placed for COMPREHENSIVE SPINE PHYSICAL THERAPY to work on core strengthening, lumbar ROM, strengthening, and stretching exercises. and Referral to PAIN MANAGEMENT for evaluation and treatment. Discussed potential role of steroid injection at or near the source of pain to provide targeted relief.     Patient instructed to return to office/ER sooner if symptoms are not improving, getting worse, or new worrisome/neurologic symptoms arise.  Patient will follow up as needed for re-evaluation after further conservative treatments.       Subjective:      Chief Complaint: Back Pain    HPI:  Pretty Aguila is a 73 y.o. female presenting for initial visit with chief complaint of low back pain and bilateral leg weakness.  Pain began several years ago and has worsened in the last few months.     Describes pain as dull in nature.  Located in the low back.  positive numbness . negative burning.  Back pain 90%, Leg pain 10%.  Equal bilaterally. Pain is worse with walking and improves with rest.  She gets some relief when pushing a shopping cart    Denies any meera trauma. Denies fever or chills, no  night sweats. Denies any bladder or bowel changes.      Conservative therapy includes the following:   Medications: Tylenol    Injections: none     Physical Therapy: none currently but previously  Chiropractic Medicine: Attempted years ago  Accupunture/Massage Therapy: has not attempted   These therapeutic modalities were ineffective at providing sustained pain relief/functional improvement.     Nicotine dependent: none  Occupation: Office work  Living situation: Lives with family   ADLs: patient is able to perform     Objective:     Family History   Problem Relation Age of Onset    Hypertension Mother     Diabetes Mother     Heart disease Father         CABG x5    No Known Problems Sister     No Known Problems Sister     No Known Problems Son     No Known Problems Daughter     No Known Problems Daughter        Past Medical History:   Diagnosis Date    Anxiety     Breast cancer (HCC) 09/2023    CAD (coronary artery disease)     Dyslipidemia     Hiatal hernia     Hyperlipidemia     Hypertension     Obesity (BMI 30-39.9)     Psoriatic arthritis (HCC)     SOB (shortness of breath)        Current Outpatient Medications   Medication Sig Dispense Refill    acetaminophen (TYLENOL) 500 mg tablet Take 1 tablet (500 mg total) by mouth every 4 (four) hours as needed for mild pain or moderate pain 30 tablet 2    anastrozole (ARIMIDEX) 1 mg tablet Take 1 tablet (1 mg total) by mouth daily 90 tablet 1    Apremilast 30 MG TABS Take 30 mg by mouth 2 (two) times a day      atorvastatin (LIPITOR) 10 mg tablet Take 1 tablet (10 mg total) by mouth daily 90 tablet 3    docusate sodium (COLACE) 100 mg capsule Take 1 capsule (100 mg total) by mouth 2 (two) times a day as needed for constipation 20 capsule 2    furosemide (LASIX) 40 mg tablet Take 1 tablet (40 mg total) by mouth daily 180 tablet 0    LORazepam (Ativan) 0.5 mg tablet Take 1 tablet (0.5 mg total) by mouth every 8 (eight) hours as needed for anxiety 20 tablet 0     methocarbamol (ROBAXIN) 500 mg tablet Take 1 tablet (500 mg total) by mouth 3 (three) times a day as needed for muscle spasms 15 tablet 1    metoprolol succinate (TOPROL-XL) 50 mg 24 hr tablet Take 1 tablet (50 mg total) by mouth daily 90 tablet 3    Multiple Vitamin (multivitamin) tablet Take 1 tablet by mouth daily      nystatin (MYCOSTATIN) cream Apply topically 2 (two) times a day (Patient taking differently: Apply topically 2 (two) times a day As needed) 30 g 0    nystatin (MYCOSTATIN) powder Apply topically 2 (two) times a day (Patient taking differently: Apply topically 2 (two) times a day As needed) 60 g 0    ondansetron (ZOFRAN) 4 mg tablet Take 1 tablet (4 mg total) by mouth every 8 (eight) hours as needed for nausea or vomiting 20 tablet 0    traMADol (ULTRAM-ER) 200 MG 24 hr tablet Take 200 mg by mouth daily. Indications: Pain       No current facility-administered medications for this visit.       Past Surgical History:   Procedure Laterality Date    BARIATRIC SURGERY  2014    BILE DUCT EXPLORATION      replacement per Steffanie    CARPAL TUNNEL RELEASE Bilateral 2002    CORONARY ARTERY BYPASS GRAFT  2017    FOOT SURGERY Right     bone surgery to straighten toe.    HIP SURGERY Left 2015    IR DRAINAGE TUBE PLACEMENT  12/19/2023    LYMPH NODE BIOPSY Left 11/20/2023    Procedure: LEFT LYMPHATIC MAPPING WITH BLUE AND RADIOACTIVE DYES, SENTINEL LYMPH NODE BIOPSY;  Surgeon: Adrien Gabriel MD;  Location:  MAIN OR;  Service: Surgical Oncology    LYMPH NODE DISSECTION Left 11/20/2023    Procedure: POSSIBLE AXILLARY DISSECTION;  Surgeon: Adrien Gabriel MD;  Location:  MAIN OR;  Service: Surgical Oncology    MI TISSUE EXPANDER PLACEMENT BREAST RECONSTRUCTION Left 11/20/2023    Procedure: IMMEDIATE LEFT BREAST RECONSTRUCTION WITH TISSUE EXPANDER AND ADM;  Surgeon: Molina Jimenez MD;  Location:  MAIN OR;  Service: Plastics    REPLACEMENT TOTAL KNEE Right 04/2017    SIMPLE MASTECTOMY Left 11/20/2023    Procedure: LEFT  BREAST VERENICE  DIRECTED MASTECTOMY;  Surgeon: Adrien Gabriel MD;  Location:  MAIN OR;  Service: Surgical Oncology    SLEEVE GASTROPLASTY      TONSILLECTOMY      TOTAL HIP ARTHROPLASTY Right 2017    US GUIDED BREAST BIOPSY LEFT COMPLETE Left 2023       Social History     Socioeconomic History    Marital status: /Civil Union     Spouse name: Not on file    Number of children: Not on file    Years of education: Not on file    Highest education level: Not on file   Occupational History    Not on file   Tobacco Use    Smoking status: Former     Current packs/day: 0.00     Average packs/day: 0.5 packs/day for 7.0 years (3.5 ttl pk-yrs)     Types: Cigarettes     Start date:      Quit date:      Years since quittin.0    Smokeless tobacco: Never   Vaping Use    Vaping status: Never Used   Substance and Sexual Activity    Alcohol use: Yes     Comment: social     Drug use: No    Sexual activity: Not Currently   Other Topics Concern    Not on file   Social History Narrative    Not on file     Social Determinants of Health     Financial Resource Strain: Not on file   Food Insecurity: No Food Insecurity (2023)    Hunger Vital Sign     Worried About Running Out of Food in the Last Year: Never true     Ran Out of Food in the Last Year: Never true   Transportation Needs: No Transportation Needs (2023)    PRAPARE - Transportation     Lack of Transportation (Medical): No     Lack of Transportation (Non-Medical): No   Physical Activity: Not on file   Stress: Not on file   Social Connections: Not on file   Intimate Partner Violence: Not on file   Housing Stability: Low Risk  (2023)    Housing Stability Vital Sign     Unable to Pay for Housing in the Last Year: No     Number of Places Lived in the Last Year: 1     Unstable Housing in the Last Year: No       Allergies   Allergen Reactions    Iodine - Food Allergy Anaphylaxis     Reaction Date: 2008;     Povidone Iodine Anaphylaxis     Shellfish-Derived Products - Food Allergy Anaphylaxis       Review of Systems  General- denies fever/chills  HEENT- denies hearing loss or sore throat  Eyes- denies eye pain or visual disturbances, denies red eyes  Respiratory- denies cough or SOB  Cardio- denies chest pain or palpitations  GI- denies abdominal pain  Endocrine- denies urinary frequency  Urinary- denies pain with urination  Musculoskeletal- Negative except noted above  Skin- denies rashes or wounds  Neurological- denies dizziness or headache  Psychiatric- denies anxiety or difficulty concentrating    Physical Exam  There were no vitals taken for this visit.    General/Constitutional: No apparent distress: well-nourished and well developed.  Lymphatic: No appreciable lymphadenopathy  Respiratory: Non-labored breathing  Vascular: No edema, swelling or tenderness, except as noted in detailed exam.  Integumentary: No impressive skin lesions present, except as noted in detailed exam.  Psych: Normal mood and affect, oriented to person, place and time.  MSK: normal other than stated in HPI and exam  Gait & balance: no evidence of myelopathic gait, ambulates with a Cane    Lumbar spine range of motion:  -Forward flexion to 50  -Extension to neutral  -Lateral bend 15 right, 15 left  -Rotation 10 right, 10 left  There is mild tenderness with palpation along lumbar paraspinal musculature, no midline tenderness     Neurologic:    Lower Extremity Motor Function    Right  Left    Iliopsoas  5/5  5/5    Quadriceps 5/5 5/5   Tibialis anterior  5/5  5/5    EHL  5/5  5/5    Gastroc. muscle  5/5  5/5    Heel rise  5/5  5/5    Toe rise  5/5  5/5      Sensory: light touch is intact to bilateral upper and lower extremities     Reflexes:    Right Left   Patellar 1+ 1+   Achilles 1+ 1+   Babinski neg neg     Other tests:  Straight Leg Raise: negative  Ha SI: negative  ROCIO SI: negative  Greater troch: no tenderness  Internal/external hip ROM: intact, no pain  "  Flexion/extension knee ROM: intact, no pain   Vascular: WWP extremities, 2+DP bilateral      Diagnostic Tests   IMAGING: I have personally reviewed the images and these are my findings:  Lumbar Spine X-rays from 1/5/24: multi level lumbar spondylosis with loss of disc height, osteophyte formation and facet hypertrophy, no apparent spondylolisthesis, no appreciated lytic/blastic lesions, no obvious instability, and degenerative lumbar scoliosis with a right sided 20 degree curve.      Electronic Medical Records were reviewed including Lumbar MRI from 10/17/22 demonstrating multilevel spondylosis, spinal stenosis at L4-5, and foraminal stenosis at right L4-5, left L3-4, and bilateral L5-S1.    Procedures, if performed today     None performed       Portions of the record may have been created with voice recognition software.  Occasional wrong word or \"sound a like\" substitutions may have occurred due to the inherent limitations of voice recognition software.  Read the chart carefully and recognize, using context, where substitutions have occurred.    "

## 2024-01-08 NOTE — PRE-PROCEDURE INSTRUCTIONS
Pre-Surgery Instructions:   Medication Instructions    acetaminophen (TYLENOL) 500 mg tablet Uses PRN- OK to take day of surgery    anastrozole (ARIMIDEX) 1 mg tablet Instructions provided by MD    Apremilast 30 MG TABS Take day of surgery.    atorvastatin (LIPITOR) 10 mg tablet Take night before surgery    furosemide (LASIX) 40 mg tablet Hold day of surgery.    LORazepam (Ativan) 0.5 mg tablet Uses PRN- OK to take day of surgery    methocarbamol (ROBAXIN) 500 mg tablet Uses PRN- OK to take day of surgery    metoprolol succinate (TOPROL-XL) 50 mg 24 hr tablet Take day of surgery.    Multiple Vitamin (multivitamin) tablet Hold day of surgery.    ondansetron (ZOFRAN) 4 mg tablet Uses PRN- OK to take day of surgery    traMADol (ULTRAM-ER) 200 MG 24 hr tablet Uses PRN- OK to take day of surgery    Medication instructions for day surgery reviewed. Please use only a sip of water to take your instructed medications. Avoid all over the counter vitamins, supplements and NSAIDS for one week prior to surgery per anesthesia guidelines. Tylenol is ok to take as needed.     You will receive a call one business day prior to surgery with an arrival time and hospital directions. If your surgery is scheduled on a Monday, the hospital will be calling you on the Friday prior to your surgery. If you have not heard from anyone by 8pm, please call the hospital supervisor through the hospital  at [phone]. (Samy [phone]).    Do not eat or drink anything after midnight the night before your surgery, including candy, mints, lifesavers, or chewing gum. Do not drink alcohol 24hrs before your surgery. Try not to smoke at least 24hrs before your surgery.       Follow the pre surgery showering instructions as listed in the “My Surgical Experience Booklet” or otherwise provided by your surgeon's office. Do not use a blade to shave the surgical area 1 week before surgery. It is okay to use a clean electric clippers up to 24  hours before surgery. Do not apply any lotions, creams, including makeup, cologne, deodorant, or perfumes after showering on the day of your surgery. Do not use dry shampoo, hair spray, hair gel, or any type of hair products.     No contact lenses, eye make-up, or artificial eyelashes. Remove nail polish, including gel polish, and any artificial, gel, or acrylic nails if possible. Remove all jewelry including rings and body piercing jewelry.     Wear causal clothing that is easy to take on and off. Consider your type of surgery.    Keep any valuables, jewelry, piercings at home. Please bring any specially ordered equipment (sling, braces) if indicated.    Arrange for a responsible person to drive you to and from the hospital on the day of your surgery. Visitor Guidelines discussed.     Call the surgeon's office with any new illnesses, exposures, or additional questions prior to surgery.    Please reference your “My Surgical Experience Booklet” for additional information to prepare for your upcoming surgery.

## 2024-01-09 ENCOUNTER — TELEPHONE (OUTPATIENT)
Dept: HEMATOLOGY ONCOLOGY | Facility: CLINIC | Age: 74
End: 2024-01-09

## 2024-01-09 ENCOUNTER — TELEPHONE (OUTPATIENT)
Age: 74
End: 2024-01-09

## 2024-01-09 DIAGNOSIS — J01.00 ACUTE MAXILLARY SINUSITIS, RECURRENCE NOT SPECIFIED: Primary | ICD-10-CM

## 2024-01-09 NOTE — TELEPHONE ENCOUNTER
Called and reviewed patient's Oncotype DX results with the patient. Patient made aware that her score was low, and that she would benefit from the Anastrozole. Patient made aware that Dr. Williamson recommends patient start her Anastrozole today. Patient verbally understood.

## 2024-01-09 NOTE — TELEPHONE ENCOUNTER
Juan from Plastic surgery, Dr. Jimenez's office called to see if Pretty is scheduled for her clearance. Added Pre-op Clearance notes onto existing appointment on 1/11. Call was transferred to Korin at the office to see if we received the pre-op clearance forms that he sent over.

## 2024-01-09 NOTE — TELEPHONE ENCOUNTER
"Patient has a dry cough for about 1 week.  She has no other sxs.  Feels like she is constantly clearing her throat, describes it as a \"tickle in her throat\".  She is nervous because she is having her final breast cancer surgery on 01/16/24 and she does not want it to turn into anything that will delay her surgery.    She is not taking anything OTC.    "

## 2024-01-09 NOTE — TELEPHONE ENCOUNTER
Patient reports a possible sinus infection, she is unable to get in to see her PCP prior to her surgery on 1/16/24. She is asking if the doctor can prescribe her the antibiotic that he prescribed for her in the past. She would like to relieve her sinus infection prior to it worsening before her upcoming surgery. Please send script to the [facility] and call patient with an update. Thank you!

## 2024-01-10 RX ORDER — CEFADROXIL 500 MG/1
500 CAPSULE ORAL EVERY 12 HOURS SCHEDULED
Qty: 14 CAPSULE | Refills: 0 | Status: SHIPPED | OUTPATIENT
Start: 2024-01-10 | End: 2024-01-14

## 2024-01-10 NOTE — TELEPHONE ENCOUNTER
Patient called once more, advised that Dr. Jimenez is not in and will not address till he is back in the office tomorrow. Patient asking is one of the other providers can prescribe the antibiotic, she is afraid that her sinus infection will get to her lungs and cause her to have to reschedule her surgery on 1/16. And she has an appt with Dr. Jimenez tomorrow.

## 2024-01-11 ENCOUNTER — OFFICE VISIT (OUTPATIENT)
Dept: FAMILY MEDICINE CLINIC | Facility: CLINIC | Age: 74
End: 2024-01-11
Payer: COMMERCIAL

## 2024-01-11 ENCOUNTER — TELEPHONE (OUTPATIENT)
Age: 74
End: 2024-01-11

## 2024-01-11 ENCOUNTER — OFFICE VISIT (OUTPATIENT)
Dept: PLASTIC SURGERY | Facility: CLINIC | Age: 74
End: 2024-01-11
Payer: COMMERCIAL

## 2024-01-11 VITALS
SYSTOLIC BLOOD PRESSURE: 110 MMHG | HEIGHT: 66 IN | TEMPERATURE: 97.1 F | BODY MASS INDEX: 33.11 KG/M2 | WEIGHT: 206 LBS | HEART RATE: 93 BPM | DIASTOLIC BLOOD PRESSURE: 76 MMHG | OXYGEN SATURATION: 94 %

## 2024-01-11 VITALS
BODY MASS INDEX: 32.95 KG/M2 | SYSTOLIC BLOOD PRESSURE: 100 MMHG | WEIGHT: 205 LBS | TEMPERATURE: 98.2 F | HEIGHT: 66 IN | HEART RATE: 81 BPM | DIASTOLIC BLOOD PRESSURE: 61 MMHG

## 2024-01-11 DIAGNOSIS — I10 ESSENTIAL HYPERTENSION: ICD-10-CM

## 2024-01-11 DIAGNOSIS — M05.79 RHEUMATOID ARTHRITIS INVOLVING MULTIPLE SITES WITH POSITIVE RHEUMATOID FACTOR (HCC): ICD-10-CM

## 2024-01-11 DIAGNOSIS — C50.512 MALIGNANT NEOPLASM OF LOWER-OUTER QUADRANT OF LEFT BREAST OF FEMALE, ESTROGEN RECEPTOR POSITIVE: Primary | ICD-10-CM

## 2024-01-11 DIAGNOSIS — J06.9 ACUTE URI: ICD-10-CM

## 2024-01-11 DIAGNOSIS — I25.10 CORONARY ARTERY DISEASE INVOLVING NATIVE CORONARY ARTERY OF NATIVE HEART WITHOUT ANGINA PECTORIS: ICD-10-CM

## 2024-01-11 DIAGNOSIS — Z17.0 MALIGNANT NEOPLASM OF LOWER-OUTER QUADRANT OF LEFT BREAST OF FEMALE, ESTROGEN RECEPTOR POSITIVE: ICD-10-CM

## 2024-01-11 DIAGNOSIS — C50.512 MALIGNANT NEOPLASM OF LOWER-OUTER QUADRANT OF LEFT BREAST OF FEMALE, ESTROGEN RECEPTOR POSITIVE: ICD-10-CM

## 2024-01-11 DIAGNOSIS — E78.5 DYSLIPIDEMIA: ICD-10-CM

## 2024-01-11 DIAGNOSIS — Z17.0 MALIGNANT NEOPLASM OF LOWER-OUTER QUADRANT OF LEFT BREAST OF FEMALE, ESTROGEN RECEPTOR POSITIVE: Primary | ICD-10-CM

## 2024-01-11 DIAGNOSIS — Z01.818 PREOPERATIVE EXAMINATION: Primary | ICD-10-CM

## 2024-01-11 DIAGNOSIS — F41.9 ANXIETY: ICD-10-CM

## 2024-01-11 PROBLEM — E66.01 CLASS 2 SEVERE OBESITY DUE TO EXCESS CALORIES WITH SERIOUS COMORBIDITY AND BODY MASS INDEX (BMI) OF 35.0 TO 35.9 IN ADULT: Status: RESOLVED | Noted: 2022-03-14 | Resolved: 2024-01-11

## 2024-01-11 PROBLEM — E66.812 CLASS 2 SEVERE OBESITY DUE TO EXCESS CALORIES WITH SERIOUS COMORBIDITY AND BODY MASS INDEX (BMI) OF 35.0 TO 35.9 IN ADULT (HCC): Status: RESOLVED | Noted: 2022-03-14 | Resolved: 2024-01-11

## 2024-01-11 PROBLEM — R06.09 DYSPNEA ON EXERTION: Status: RESOLVED | Noted: 2019-03-14 | Resolved: 2024-01-11

## 2024-01-11 PROCEDURE — 99244 OFF/OP CNSLTJ NEW/EST MOD 40: CPT | Performed by: FAMILY MEDICINE

## 2024-01-11 PROCEDURE — 99215 OFFICE O/P EST HI 40 MIN: CPT | Performed by: STUDENT IN AN ORGANIZED HEALTH CARE EDUCATION/TRAINING PROGRAM

## 2024-01-11 RX ORDER — LORAZEPAM 0.5 MG/1
0.5 TABLET ORAL EVERY 8 HOURS PRN
Qty: 20 TABLET | Refills: 0 | Status: SHIPPED | OUTPATIENT
Start: 2024-01-11

## 2024-01-11 NOTE — TELEPHONE ENCOUNTER
Patient called asking  for how long she will need to stay in the hospital during after her breast surgery RIGHT BREAST REDUCTION AND LEFT BREAST EXPANDER EXCHANGE FOR PERMANENT IMPLANT. REVISION OF RECONSTRUCTED LEFT BREAST as per office I informed her that she can go home the same day .   Patient understood does not have more questions for now .

## 2024-01-11 NOTE — ASSESSMENT & PLAN NOTE
No recent chest pain or shortness of breath. Had stress test and echocardiogram in November 2023 and were ok.

## 2024-01-11 NOTE — PROGRESS NOTES
Assessment/Plan:         Problem List Items Addressed This Visit        Respiratory    Acute URI     Patient has had it for past few days and on antibiotic now. Doing better. Patient to call if worsens.             Cardiovascular and Mediastinum    Essential hypertension     Blood pressure good. Continue current medications.          Coronary artery disease involving native coronary artery of native heart without angina pectoris     No recent chest pain or shortness of breath. Had stress test and echocardiogram in November 2023 and were ok.             Musculoskeletal and Integument    Rheumatoid arthritis involving multiple sites with positive rheumatoid factor (HCC)     Stable and patient sees Rheumatology.             Other    Preoperative examination - Primary     CPE done. Patient had stress test and echocardiogram in November 2023 and were ok. Blood pressure ok today. Medications, allergies, and social history reviewed with patient. Patient has mild URI but improving with antibiotics. Patient is medically cleared for upcoming surgery.          Malignant neoplasm of lower-outer quadrant of left breast of female, estrogen receptor positive (HCC)     Stable. Patient for revision of reconstructed left breast on 1/16/24 by Dr Jimenez.         Dyslipidemia      in October 2023. Continue atorvastatin 10 mg daily at bedtime.          Anxiety    Relevant Medications    LORazepam (Ativan) 0.5 mg tablet         Subjective:      Patient ID: Pretty Aguila is a 73 y.o. female.    Patient here for preoperative exam for revision of reconstructed left breast on 1/16 by Dr Jimenez. Patient has had cough and congestion for past few days and was given antibiotic. Taking cefadroxil twice a day. No fever or chills. No aches. No sore throat or headache. No nausea, vomiting, or diarrhea. Had stress test and echocardiogram in November and were ok.     Cough  Pertinent negatives include no chest pain, chills, ear pain, fever,  headaches, postnasal drip, rash, rhinorrhea, sore throat, shortness of breath or wheezing.       The following portions of the patient's history were reviewed and updated as appropriate:   Past Medical History:  She has a past medical history of Anxiety, Breast cancer (HCC) (09/2023), CAD (coronary artery disease), Dyslipidemia, Hiatal hernia, Hyperlipidemia, Hypertension, Obesity (BMI 30-39.9), Psoriatic arthritis (HCC), and SOB (shortness of breath).,  _______________________________________________________________________  Medical Problems:  does not have any pertinent problems on file.,  _______________________________________________________________________  Past Surgical History:   has a past surgical history that includes Coronary artery bypass graft (2017); Bariatric Surgery (2014); Hip surgery (Left, 2015); Replacement total knee (Right, 04/2017); Total hip arthroplasty (Right, 2017); Foot surgery (Right); Carpal tunnel release (Bilateral, 2002); Bile duct exploration; Sleeve Gastroplasty; US guided breast biopsy left complete (Left, 09/19/2023); Tonsillectomy; pr tissue expander placement breast reconstruction (Left, 11/20/2023); Simple mastectomy (Left, 11/20/2023); Lymph node biopsy (Left, 11/20/2023); Lymph node dissection (Left, 11/20/2023); and IR drainage tube placement (12/19/2023).,  _______________________________________________________________________  Family History:  family history includes Diabetes in her mother; Heart disease in her father; Hypertension in her mother; No Known Problems in her daughter, daughter, sister, sister, and son.,  _______________________________________________________________________  Social History:   reports that she quit smoking about 7 years ago. Her smoking use included cigarettes. She started smoking about 14 years ago. She has a 3.5 pack-year smoking history. She has never used smokeless tobacco. She reports current alcohol use. She reports that she does not  use drugs.,  _______________________________________________________________________  Allergies:  is allergic to iodine - food allergy, povidone iodine, and shellfish-derived products - food allergy..  _______________________________________________________________________  Current Outpatient Medications   Medication Sig Dispense Refill   • acetaminophen (TYLENOL) 500 mg tablet Take 1 tablet (500 mg total) by mouth every 4 (four) hours as needed for mild pain or moderate pain 30 tablet 2   • anastrozole (ARIMIDEX) 1 mg tablet Take 1 tablet (1 mg total) by mouth daily 90 tablet 1   • Apremilast 30 MG TABS Take 30 mg by mouth 2 (two) times a day     • atorvastatin (LIPITOR) 10 mg tablet Take 1 tablet (10 mg total) by mouth daily 90 tablet 3   • cefadroxil (DURICEF) 500 mg capsule Take 1 capsule (500 mg total) by mouth every 12 (twelve) hours for 7 days 14 capsule 0   • docusate sodium (COLACE) 100 mg capsule Take 1 capsule (100 mg total) by mouth 2 (two) times a day as needed for constipation 20 capsule 2   • furosemide (LASIX) 40 mg tablet Take 1 tablet (40 mg total) by mouth daily 180 tablet 0   • LORazepam (Ativan) 0.5 mg tablet Take 1 tablet (0.5 mg total) by mouth every 8 (eight) hours as needed for anxiety 20 tablet 0   • methocarbamol (ROBAXIN) 500 mg tablet Take 1 tablet (500 mg total) by mouth 3 (three) times a day as needed for muscle spasms 15 tablet 1   • metoprolol succinate (TOPROL-XL) 50 mg 24 hr tablet Take 1 tablet (50 mg total) by mouth daily 90 tablet 3   • Multiple Vitamin (multivitamin) tablet Take 1 tablet by mouth daily     • nystatin (MYCOSTATIN) cream Apply topically 2 (two) times a day (Patient taking differently: Apply topically 2 (two) times a day As needed) 30 g 0   • nystatin (MYCOSTATIN) powder Apply topically 2 (two) times a day (Patient taking differently: Apply topically 2 (two) times a day As needed) 60 g 0   • traMADol (ULTRAM-ER) 200 MG 24 hr tablet Take 200 mg by mouth daily.  "Indications: Pain     • ondansetron (ZOFRAN) 4 mg tablet Take 1 tablet (4 mg total) by mouth every 8 (eight) hours as needed for nausea or vomiting (Patient not taking: Reported on 1/11/2024) 20 tablet 0     No current facility-administered medications for this visit.     _______________________________________________________________________  Review of Systems   Constitutional:  Negative for chills, fatigue, fever and unexpected weight change.   HENT:  Positive for congestion. Negative for ear pain, postnasal drip, rhinorrhea, sinus pressure, sinus pain, sore throat and trouble swallowing.    Respiratory:  Positive for cough. Negative for chest tightness, shortness of breath and wheezing.    Cardiovascular:  Negative for chest pain and palpitations.   Gastrointestinal:  Negative for abdominal pain, constipation, diarrhea, nausea and vomiting.   Genitourinary:  Negative for difficulty urinating.   Musculoskeletal:  Negative for arthralgias.   Skin:  Negative for rash.   Neurological:  Negative for dizziness and headaches.         Objective:  Vitals:    01/11/24 1531 01/11/24 1554   BP: 112/90 110/76   BP Location: Left arm Left arm   Patient Position: Sitting Sitting   Cuff Size: Standard Standard   Pulse: 93    Temp: (!) 97.1 °F (36.2 °C)    TempSrc: Tympanic    SpO2: 94%    Weight: 93.4 kg (206 lb)    Height: 5' 6\" (1.676 m)      Body mass index is 33.25 kg/m².     Physical Exam  Vitals and nursing note reviewed.   Constitutional:       General: She is not in acute distress.     Appearance: Normal appearance. She is well-developed. She is not ill-appearing or toxic-appearing.   HENT:      Head: Normocephalic and atraumatic.      Right Ear: Tympanic membrane and ear canal normal.      Left Ear: Tympanic membrane and ear canal normal.      Nose: Congestion present. No rhinorrhea.      Mouth/Throat:      Mouth: Mucous membranes are moist.      Pharynx: Oropharynx is clear. No oropharyngeal exudate or posterior " oropharyngeal erythema.   Cardiovascular:      Rate and Rhythm: Normal rate and regular rhythm.      Heart sounds: Normal heart sounds. No murmur heard.  Pulmonary:      Effort: Pulmonary effort is normal. No respiratory distress.      Breath sounds: Normal breath sounds. No wheezing.   Musculoskeletal:      Cervical back: Normal range of motion and neck supple.      Right lower leg: No edema.      Left lower leg: No edema.   Lymphadenopathy:      Cervical: No cervical adenopathy.   Neurological:      Mental Status: She is alert and oriented to person, place, and time.   Psychiatric:         Mood and Affect: Mood normal.         Behavior: Behavior normal.         Thought Content: Thought content normal.         Judgment: Judgment normal.

## 2024-01-11 NOTE — PROGRESS NOTES
PRS Note    Patient presents for discussion and next steps for breast reconstruction.    Patient underwent immediate left breast reconstruction on 11/20/23 whose course was complicated by seroma that resolved with IR drainage. She has been expanded to 560 cc of possible 650 cc expander. She does not want further fills.    She does not require radiation or chemotherapy.    Vitals:    01/11/24 1123   BP: 100/61   Pulse: 81   Temp: 98.2 °F (36.8 °C)     BE 33.1    Right breast large severely ptotic breast with grade III ptosis  Left chest expander intact expanded to 580 cc of possible 650 cc expander  Incision well healed    No history of chemotherapy or radiation    BW 14-15    Breast measurements:      R  SN-N  40 cm  N-IMF  17 cm    Plan:  -Discussed right breast reduction with left chest tissue expander exchange for permanent implant with revision of reconstructed breast. Patient would like to be smaller than her left chest expander is currently. She would like to be significantly smaller on the right native breast.   -I discussed the risks of breat reduction including but not limited to infection, bleeding scarring, changes in nipple sensation, fat necrosis. I discussed high risk of free nipple graft due to her SN-N distance, I discussed that she would lose nipple sensation.   -I discussed the risks of expander exchange for permanent implant including infection, bleeding, scarring, implant malposition, need for further surgery, implant leak, rupture, capsular contracture. Patient acknowledged.  -All questions answered, concerns addressed.  -Consent obtained, implants ordered  -Prescriptions sent to pharmacy  -Medical clearance obtained  -Spent 45 minutes in consultation with patient. Greater than 50% of the total time was spent obtaining history, evaluation, performing exam, discussion of management options including post-operative care, answering patient's questions and concerns, chart reviewing, and  documentation    Molina Jimenez MD   [facility]   [address], [address]   [place] [address]   Office: [phone]

## 2024-01-11 NOTE — ASSESSMENT & PLAN NOTE
Patient has had it for past few days and on antibiotic now. Doing better. Patient to call if worsens.

## 2024-01-11 NOTE — ASSESSMENT & PLAN NOTE
CPE done. Patient had stress test and echocardiogram in November 2023 and were ok. Blood pressure ok today. Medications, allergies, and social history reviewed with patient. Patient has mild URI but improving with antibiotics. Patient is medically cleared for upcoming surgery.

## 2024-01-12 ENCOUNTER — ANESTHESIA EVENT (OUTPATIENT)
Dept: PERIOP | Facility: HOSPITAL | Age: 74
End: 2024-01-12
Payer: COMMERCIAL

## 2024-01-12 NOTE — TELEPHONE ENCOUNTER
Patient called back and said she has concerns about her procedure coming up since it is done at outpatient. She feels she should be admitted at [facility] one night to make sure everything is fine and would like to speak with Dr. Jimenez.    I warm transferred to office staff for better advise.

## 2024-01-14 RX ORDER — ONDANSETRON 4 MG/1
4 TABLET, FILM COATED ORAL EVERY 8 HOURS PRN
Qty: 20 TABLET | Refills: 0 | Status: SHIPPED | OUTPATIENT
Start: 2024-01-14

## 2024-01-14 RX ORDER — NALOXONE HYDROCHLORIDE 4 MG/.1ML
SPRAY NASAL
Qty: 1 EACH | Refills: 1 | Status: SHIPPED | OUTPATIENT
Start: 2024-01-14 | End: 2025-01-13

## 2024-01-14 RX ORDER — CEFADROXIL 500 MG/1
500 CAPSULE ORAL EVERY 12 HOURS SCHEDULED
Qty: 20 CAPSULE | Refills: 0 | Status: SHIPPED | OUTPATIENT
Start: 2024-01-14 | End: 2024-01-17

## 2024-01-14 RX ORDER — DOCUSATE SODIUM 100 MG/1
100 CAPSULE, LIQUID FILLED ORAL 2 TIMES DAILY PRN
Qty: 20 CAPSULE | Refills: 1 | Status: SHIPPED | OUTPATIENT
Start: 2024-01-14

## 2024-01-14 RX ORDER — OXYCODONE HYDROCHLORIDE 5 MG/1
5 TABLET ORAL EVERY 4 HOURS PRN
Qty: 30 TABLET | Refills: 0 | Status: SHIPPED | OUTPATIENT
Start: 2024-01-14

## 2024-01-14 RX ORDER — ACETAMINOPHEN 500 MG
500 TABLET ORAL EVERY 4 HOURS PRN
Qty: 30 TABLET | Refills: 2 | Status: SHIPPED | OUTPATIENT
Start: 2024-01-14 | End: 2024-01-17

## 2024-01-15 PROCEDURE — NC001 PR NO CHARGE: Performed by: PHYSICIAN ASSISTANT

## 2024-01-15 NOTE — H&P
H&P - Plastic Surgery   Pretty Aguila 73 y.o. female MRN: [id number]  Unit/Bed#:  Encounter: [id number]           Procedure: RIGHT BREAST REDUCTION AND LEFT BREAST EXPANDER EXCHANGE FOR PERMANENT IMPLANT. REVISION OF RECONSTRUCTED LEFT BREAST. (Bilateral: Breast)   Anesthesia type: General   Diagnosis:      Malignant neoplasm of lower-outer quadrant of left breast of female, estrogen receptor positive  [C50.512, Z17.0]      Personal history of breast cancer           HPI:   Pretty Aguila is a 73 y.o. year old female who is status post left breast reconstruction with tissue expander placement and ADM with Dr. Jimenez on 11/20/2023 in conjunction with Dr. Gabriel.  Patient's postoperative course was complicated by an infected seroma with IR drain placement, now resolved with drain removed.  Please see HPI.      Patient returns to the office today for routine postoperative check.  She currently has 560 cc in her 650 cc capacity submuscular tissue expander.  Patient requests no further fills.  Of note, patient will not require chemo or radiation.     We will plan for tissue expander to implant exchange and right breast reduction        REVIEW OF SYSTEMS    GENERAL/CONSTITUTIONAL: The patient denies fever, fatigue, weakness, weight gain or weight loss.  HEAD, EYES, EARS, NOSE AND THROAT: Eyes - The patient denies pain, redness, loss of vision, double or blurred vision and denies wearing glasses. The patient denies ringing in the ears, nosebleeds sinusitis, post nasal drip. Also denies frequent sore throats, hoarseness, painful swallowing.  CARDIOVASCULAR: The patient denies chest pain, irregular heartbeats, palpitations, shortness of breath, heart murmurs, high blood pressure, cramps in his legs with walking, pain in his feet or toes at night or varicose veins.  RESPIRATORY: The patient denies chronic cough, wheezing or night sweats.  GASTROINTESTINAL: The patient denies decreased appetite, nausea, vomiting, diarrhea,  constipation, blood in the stools.  GENITOURINARY: The patient denies difficult urination, pain or burning with urination, blood in the urine.  MUSCULOSKELETAL: The patient denies arm, thigh or calf cramps. No joint or muscle pain. No muscle weakness or tenderness. No joint swelling, neck pain, back pain or major orthopedic injuries.  SKIN AND BREASTS: see hpi The patient denies easy bruising, skin redness, skin rash, hives.  NEUROLOGIC: The patient denies headache, dizziness, fainting, memory loss.  PSYCHIATRIC: The patient denies depression anxiety.  ENDOCRINE: The patient denies intolerance to hot or cold temperature, flushing, fingernail changes, increased thirst, increased salt intake or decreased sexual desire.  HEMATOLOGIC/LYMPHATIC: The patient denies anemia, bleeding tendency or clotting tendency.  ALLERGIC/IMMUNOLOGIC: The patient denies rhinitis, asthma, skin sensitivity, latex allergies or sensitivity.      Historical Information   Past Medical History:   Diagnosis Date    Anxiety     Breast cancer (HCC) 09/2023    CAD (coronary artery disease)     Dyslipidemia     Hiatal hernia     Hyperlipidemia     Hypertension     Obesity (BMI 30-39.9)     Psoriatic arthritis (HCC)     SOB (shortness of breath)      Past Surgical History:   Procedure Laterality Date    BARIATRIC SURGERY  2014    BILE DUCT EXPLORATION      replacement per Steffanie    CARPAL TUNNEL RELEASE Bilateral 2002    CORONARY ARTERY BYPASS GRAFT  2017    FOOT SURGERY Right     bone surgery to straighten toe.    HIP SURGERY Left 2015    IR DRAINAGE TUBE PLACEMENT  12/19/2023    LYMPH NODE BIOPSY Left 11/20/2023    Procedure: LEFT LYMPHATIC MAPPING WITH BLUE AND RADIOACTIVE DYES, SENTINEL LYMPH NODE BIOPSY;  Surgeon: Adrien Gabriel MD;  Location:  MAIN OR;  Service: Surgical Oncology    LYMPH NODE DISSECTION Left 11/20/2023    Procedure: POSSIBLE AXILLARY DISSECTION;  Surgeon: Adrien Gabriel MD;  Location:  MAIN OR;  Service: Surgical Oncology    [place]  TISSUE EXPANDER PLACEMENT BREAST RECONSTRUCTION Left 2023    Procedure: IMMEDIATE LEFT BREAST RECONSTRUCTION WITH TISSUE EXPANDER AND ADM;  Surgeon: Molina Jimenez MD;  Location: [facility] MAIN OR;  Service: Plastics    REPLACEMENT TOTAL KNEE Right 2017    SIMPLE MASTECTOMY Left 2023    Procedure: LEFT BREAST VERENICE  DIRECTED MASTECTOMY;  Surgeon: Adrien Gabriel MD;  Location: [facility] MAIN OR;  Service: Surgical Oncology    SLEEVE GASTROPLASTY      TONSILLECTOMY      TOTAL HIP ARTHROPLASTY Right     US GUIDED BREAST BIOPSY LEFT COMPLETE Left 2023     Social History   Social History     Substance and Sexual Activity   Alcohol Use Yes    Comment: social      Social History     Substance and Sexual Activity   Drug Use No     Social History     Tobacco Use   Smoking Status Former    Current packs/day: 0.00    Average packs/day: 0.5 packs/day for 7.0 years (3.5 ttl pk-yrs)    Types: Cigarettes    Start date:     Quit date:     Years since quittin.0   Smokeless Tobacco Never     Family History:   Family History   Problem Relation Age of Onset    Hypertension Mother     Diabetes Mother     Heart disease Father         CABG x5    No Known Problems Sister     No Known Problems Sister     No Known Problems Son     No Known Problems Daughter     No Known Problems Daughter        Meds/Allergies     Current Facility-Administered Medications:     [START ON 2024] lidocaine (PF) (XYLOCAINE-MPF) 1 % 50 mL, EPINEPHrine PF (ADRENALIN) 2 mg in sodium chloride 0.9 % 1,000 mL OR irrigation, , Irrigation, Once, Molina Jimenez MD    Current Outpatient Medications:     anastrozole (ARIMIDEX) 1 mg tablet, Take 1 tablet (1 mg total) by mouth daily, Disp: 90 tablet, Rfl: 1    Apremilast 30 MG TABS, Take 30 mg by mouth 2 (two) times a day, Disp: , Rfl:     atorvastatin (LIPITOR) 10 mg tablet, Take 1 tablet (10 mg total) by mouth daily, Disp: 90 tablet, Rfl: 3    furosemide (LASIX) 40 mg tablet, Take 1 tablet (40  mg total) by mouth daily, Disp: 180 tablet, Rfl: 0    methocarbamol (ROBAXIN) 500 mg tablet, Take 1 tablet (500 mg total) by mouth 3 (three) times a day as needed for muscle spasms, Disp: 15 tablet, Rfl: 1    metoprolol succinate (TOPROL-XL) 50 mg 24 hr tablet, Take 1 tablet (50 mg total) by mouth daily, Disp: 90 tablet, Rfl: 3    Multiple Vitamin (multivitamin) tablet, Take 1 tablet by mouth daily, Disp: , Rfl:     traMADol (ULTRAM-ER) 200 MG 24 hr tablet, Take 200 mg by mouth daily. Indications: Pain, Disp: , Rfl:     acetaminophen (TYLENOL) 500 mg tablet, Take 1 tablet (500 mg total) by mouth every 4 (four) hours as needed for mild pain or moderate pain, Disp: 30 tablet, Rfl: 2    cefadroxil (DURICEF) 500 mg capsule, Take 1 capsule (500 mg total) by mouth every 12 (twelve) hours for 10 days, Disp: 20 capsule, Rfl: 0    docusate sodium (COLACE) 100 mg capsule, Take 1 capsule (100 mg total) by mouth 2 (two) times a day as needed for constipation, Disp: 20 capsule, Rfl: 1    LORazepam (Ativan) 0.5 mg tablet, Take 1 tablet (0.5 mg total) by mouth every 8 (eight) hours as needed for anxiety, Disp: 20 tablet, Rfl: 0    naloxone (NARCAN) 4 mg/0.1 mL nasal spray, Administer 1 spray into a nostril. If no response after 2-3 minutes, give another dose in the other nostril using a new spray., Disp: 1 each, Rfl: 1    nystatin (MYCOSTATIN) cream, Apply topically 2 (two) times a day (Patient taking differently: Apply topically 2 (two) times a day As needed), Disp: 30 g, Rfl: 0    nystatin (MYCOSTATIN) powder, Apply topically 2 (two) times a day (Patient taking differently: Apply topically 2 (two) times a day As needed), Disp: 60 g, Rfl: 0    ondansetron (ZOFRAN) 4 mg tablet, Take 1 tablet (4 mg total) by mouth every 8 (eight) hours as needed for nausea or vomiting, Disp: 20 tablet, Rfl: 0    oxyCODONE (Roxicodone) 5 immediate release tablet, Take 1 tablet (5 mg total) by mouth every 4 (four) hours as needed for severe pain Max  "Daily Amount: 30 mg, Disp: 30 tablet, Rfl: 0       Objective   BP: 100/61   Pulse: 81   Temp: 98.2 °F (36.8 °C)      BE 33.1  General appearance: alert and oriented, in no acute distress  Head: Normocephalic, without obvious abnormality, atraumatic  Eyes: positive findings: conjunctiva: clear  Lungs: clear to auscultation bilaterally  Breasts: Right breast large severely ptotic breast with grade III ptosis  Heart: regular rate and rhythm  Abdomen: soft, non-tender; bowel sounds normal; no masses,  no organomegaly  Extremities: extremities normal, warm and well-perfused; no cyanosis, clubbing, or edema  Skin: Skin color, texture, turgor normal. No rashes or lesions  Neurologic: Grossly normal  Left chest expander intact expanded to 580 cc of possible 650 cc expander  Incision well healed     No history of chemotherapy or radiation     BW 14-15     Breast measurements:                             R  SN-N               40 cm  N-IMF              17 cm  Lab Results:   Lab Results   Component Value Date    WBC 8.91 11/22/2023    HGB 9.6 (L) 11/22/2023    HCT 30.7 (L) 11/22/2023    MCV 99 (H) 11/22/2023     11/22/2023          No results found for: \"TISSUECULT\", \"WOUNDCULT\"      Imaging Studies:   No results found.    EKG, Pathology, and Other Studies:   Lab Results   Component Value Date/Time    FINALDX  11/20/2023 12:46 PM     A. Lymph Node, [place], left axillary sentinel lymp node for touch prep, lymphadenectomy:    -Single lymph node, negative for metastatic carcinoma by leveled H& E and Pankeratin (AE1/AE3) stained slides (0/1).      B. Breast, Left, Short superior, long lateral, medium medial, mastectomy  -Invasive mammary carcinoma, NOS, 2.5  x 3.6  x 2.5 cm   The tumor arise in background of dense fibrotic stroma  -Few foci of ductal carcinoma in situ , intermediate nuclear grade, cribriform, solid  < 10% of total tumor  Margins for Invasive carcinoma      -The nearest surgical margin ( skin margin) is " negative for tumor by 1 mm.     -The posterior margin is negative for tumor by 15 mm  -Microcalcification associated with tumor  -Changes consistent with previous biopsy site   -Benign overlying skin with Seborrheic keratosis         No intake or output data in the 24 hours ending 01/15/24 2153    Invasive Devices       None                   VTE Prophylaxis: Sequential compression device (Venodyne)

## 2024-01-16 ENCOUNTER — ANESTHESIA (OUTPATIENT)
Dept: PERIOP | Facility: HOSPITAL | Age: 74
End: 2024-01-16
Payer: COMMERCIAL

## 2024-01-16 ENCOUNTER — HOSPITAL ENCOUNTER (OUTPATIENT)
Facility: HOSPITAL | Age: 74
Setting detail: OUTPATIENT SURGERY
Discharge: HOME | End: 2024-01-17
Attending: STUDENT IN AN ORGANIZED HEALTH CARE EDUCATION/TRAINING PROGRAM | Admitting: STUDENT IN AN ORGANIZED HEALTH CARE EDUCATION/TRAINING PROGRAM
Payer: COMMERCIAL

## 2024-01-16 DIAGNOSIS — C50.512 MALIGNANT NEOPLASM OF LOWER-OUTER QUADRANT OF LEFT BREAST OF FEMALE, ESTROGEN RECEPTOR POSITIVE: ICD-10-CM

## 2024-01-16 DIAGNOSIS — Z17.0 MALIGNANT NEOPLASM OF LOWER-OUTER QUADRANT OF LEFT BREAST OF FEMALE, ESTROGEN RECEPTOR POSITIVE: ICD-10-CM

## 2024-01-16 DIAGNOSIS — Z85.3 PERSONAL HISTORY OF BREAST CANCER: ICD-10-CM

## 2024-01-16 LAB
ANION GAP SERPL CALCULATED.3IONS-SCNC: 8 MMOL/L
BASOPHILS # BLD AUTO: 0.02 THOUSANDS/ÂΜL (ref 0–0.1)
BASOPHILS NFR BLD AUTO: 0 % (ref 0–1)
BUN SERPL-MCNC: 20 MG/DL (ref 5–25)
CALCIUM SERPL-MCNC: 8.7 MG/DL (ref 8.4–10.2)
CHLORIDE SERPL-SCNC: 104 MMOL/L (ref 96–108)
CO2 SERPL-SCNC: 27 MMOL/L (ref 21–32)
CREAT SERPL-MCNC: 0.9 MG/DL (ref 0.6–1.3)
EOSINOPHIL # BLD AUTO: 0.01 THOUSAND/ÂΜL (ref 0–0.61)
EOSINOPHIL NFR BLD AUTO: 0 % (ref 0–6)
ERYTHROCYTE [DISTWIDTH] IN BLOOD BY AUTOMATED COUNT: 13 % (ref 11.6–15.1)
GFR SERPL CREATININE-BSD FRML MDRD: 64 ML/MIN/1.73SQ M
GLUCOSE P FAST SERPL-MCNC: 143 MG/DL (ref 65–99)
GLUCOSE SERPL-MCNC: 143 MG/DL (ref 65–140)
HCT VFR BLD AUTO: 37 % (ref 34.8–46.1)
HGB BLD-MCNC: 11.7 G/DL (ref 11.5–15.4)
IMM GRANULOCYTES # BLD AUTO: 0.05 THOUSAND/UL (ref 0–0.2)
IMM GRANULOCYTES NFR BLD AUTO: 0 % (ref 0–2)
LYMPHOCYTES # BLD AUTO: 0.74 THOUSANDS/ÂΜL (ref 0.6–4.47)
LYMPHOCYTES NFR BLD AUTO: 6 % (ref 14–44)
MCH RBC QN AUTO: 30.6 PG (ref 26.8–34.3)
MCHC RBC AUTO-ENTMCNC: 31.6 G/DL (ref 31.4–37.4)
MCV RBC AUTO: 97 FL (ref 82–98)
MONOCYTES # BLD AUTO: 0.24 THOUSAND/ÂΜL (ref 0.17–1.22)
MONOCYTES NFR BLD AUTO: 2 % (ref 4–12)
NEUTROPHILS # BLD AUTO: 10.54 THOUSANDS/ÂΜL (ref 1.85–7.62)
NEUTS SEG NFR BLD AUTO: 92 % (ref 43–75)
NRBC BLD AUTO-RTO: 0 /100 WBCS
PLATELET # BLD AUTO: 242 THOUSANDS/UL (ref 149–390)
PMV BLD AUTO: 10.6 FL (ref 8.9–12.7)
POTASSIUM SERPL-SCNC: 3.7 MMOL/L (ref 3.5–5.3)
RBC # BLD AUTO: 3.82 MILLION/UL (ref 3.81–5.12)
SODIUM SERPL-SCNC: 139 MMOL/L (ref 135–147)
WBC # BLD AUTO: 11.6 THOUSAND/UL (ref 4.31–10.16)

## 2024-01-16 PROCEDURE — 11970 RPLCMT TISS XPNDR PERM IMPLT: CPT | Performed by: PHYSICIAN ASSISTANT

## 2024-01-16 PROCEDURE — 85025 COMPLETE CBC W/AUTO DIFF WBC: CPT | Performed by: STUDENT IN AN ORGANIZED HEALTH CARE EDUCATION/TRAINING PROGRAM

## 2024-01-16 PROCEDURE — 11970 RPLCMT TISS XPNDR PERM IMPLT: CPT | Performed by: STUDENT IN AN ORGANIZED HEALTH CARE EDUCATION/TRAINING PROGRAM

## 2024-01-16 PROCEDURE — 99024 POSTOP FOLLOW-UP VISIT: CPT

## 2024-01-16 PROCEDURE — 19370 REVJ PERI-IMPLT CAPSULE BRST: CPT | Performed by: STUDENT IN AN ORGANIZED HEALTH CARE EDUCATION/TRAINING PROGRAM

## 2024-01-16 PROCEDURE — 93005 ELECTROCARDIOGRAM TRACING: CPT

## 2024-01-16 PROCEDURE — 19318 BREAST REDUCTION: CPT | Performed by: STUDENT IN AN ORGANIZED HEALTH CARE EDUCATION/TRAINING PROGRAM

## 2024-01-16 PROCEDURE — 19318 BREAST REDUCTION: CPT | Performed by: PHYSICIAN ASSISTANT

## 2024-01-16 PROCEDURE — L8600 IMPLANT BREAST SILICONE/EQ: HCPCS | Performed by: STUDENT IN AN ORGANIZED HEALTH CARE EDUCATION/TRAINING PROGRAM

## 2024-01-16 PROCEDURE — 80048 BASIC METABOLIC PNL TOTAL CA: CPT | Performed by: STUDENT IN AN ORGANIZED HEALTH CARE EDUCATION/TRAINING PROGRAM

## 2024-01-16 PROCEDURE — C9290 INJ, BUPIVACAINE LIPOSOME: HCPCS | Performed by: STUDENT IN AN ORGANIZED HEALTH CARE EDUCATION/TRAINING PROGRAM

## 2024-01-16 PROCEDURE — 19370 REVJ PERI-IMPLT CAPSULE BRST: CPT | Performed by: PHYSICIAN ASSISTANT

## 2024-01-16 PROCEDURE — 88305 TISSUE EXAM BY PATHOLOGIST: CPT | Performed by: PATHOLOGY

## 2024-01-16 RX ORDER — MINERAL OIL
OIL (ML) MISCELLANEOUS AS NEEDED
Status: DISCONTINUED | OUTPATIENT
Start: 2024-01-16 | End: 2024-01-16 | Stop reason: HOSPADM

## 2024-01-16 RX ORDER — OXYCODONE HYDROCHLORIDE 5 MG/1
5 TABLET ORAL EVERY 4 HOURS PRN
Status: DISCONTINUED | OUTPATIENT
Start: 2024-01-16 | End: 2024-01-16

## 2024-01-16 RX ORDER — OXYCODONE HYDROCHLORIDE 10 MG/1
10 TABLET ORAL EVERY 4 HOURS PRN
Status: DISCONTINUED | OUTPATIENT
Start: 2024-01-16 | End: 2024-01-17 | Stop reason: HOSPADM

## 2024-01-16 RX ORDER — MAGNESIUM HYDROXIDE/ALUMINUM HYDROXICE/SIMETHICONE 120; 1200; 1200 MG/30ML; MG/30ML; MG/30ML
30 SUSPENSION ORAL EVERY 6 HOURS PRN
Status: DISCONTINUED | OUTPATIENT
Start: 2024-01-16 | End: 2024-01-17 | Stop reason: HOSPADM

## 2024-01-16 RX ORDER — ANASTROZOLE 1 MG/1
1 TABLET ORAL DAILY
Status: DISCONTINUED | OUTPATIENT
Start: 2024-01-17 | End: 2024-01-17 | Stop reason: HOSPADM

## 2024-01-16 RX ORDER — METHOCARBAMOL 500 MG/1
500 TABLET, FILM COATED ORAL 3 TIMES DAILY PRN
Status: DISCONTINUED | OUTPATIENT
Start: 2024-01-16 | End: 2024-01-17 | Stop reason: HOSPADM

## 2024-01-16 RX ORDER — ROCURONIUM BROMIDE 10 MG/ML
INJECTION, SOLUTION INTRAVENOUS AS NEEDED
Status: DISCONTINUED | OUTPATIENT
Start: 2024-01-16 | End: 2024-01-16

## 2024-01-16 RX ORDER — ACETAMINOPHEN 325 MG/1
650 TABLET ORAL EVERY 6 HOURS SCHEDULED
Status: DISCONTINUED | OUTPATIENT
Start: 2024-01-16 | End: 2024-01-17 | Stop reason: HOSPADM

## 2024-01-16 RX ORDER — CEFADROXIL 500 MG/1
500 CAPSULE ORAL EVERY 12 HOURS SCHEDULED
Status: DISCONTINUED | OUTPATIENT
Start: 2024-01-16 | End: 2024-01-17 | Stop reason: HOSPADM

## 2024-01-16 RX ORDER — ENOXAPARIN SODIUM 100 MG/ML
40 INJECTION SUBCUTANEOUS DAILY
Status: DISCONTINUED | OUTPATIENT
Start: 2024-01-17 | End: 2024-01-17 | Stop reason: HOSPADM

## 2024-01-16 RX ORDER — FENTANYL CITRATE/PF 50 MCG/ML
25 SYRINGE (ML) INJECTION
Status: DISCONTINUED | OUTPATIENT
Start: 2024-01-16 | End: 2024-01-16 | Stop reason: HOSPADM

## 2024-01-16 RX ORDER — SODIUM CHLORIDE, SODIUM LACTATE, POTASSIUM CHLORIDE, CALCIUM CHLORIDE 600; 310; 30; 20 MG/100ML; MG/100ML; MG/100ML; MG/100ML
INJECTION, SOLUTION INTRAVENOUS CONTINUOUS PRN
Status: DISCONTINUED | OUTPATIENT
Start: 2024-01-16 | End: 2024-01-16

## 2024-01-16 RX ORDER — SODIUM CHLORIDE, SODIUM LACTATE, POTASSIUM CHLORIDE, CALCIUM CHLORIDE 600; 310; 30; 20 MG/100ML; MG/100ML; MG/100ML; MG/100ML
100 INJECTION, SOLUTION INTRAVENOUS CONTINUOUS
Status: DISCONTINUED | OUTPATIENT
Start: 2024-01-16 | End: 2024-01-17 | Stop reason: HOSPADM

## 2024-01-16 RX ORDER — HYDROMORPHONE HCL/PF 1 MG/ML
0.5 SYRINGE (ML) INJECTION EVERY 4 HOURS PRN
Status: DISCONTINUED | OUTPATIENT
Start: 2024-01-16 | End: 2024-01-17 | Stop reason: HOSPADM

## 2024-01-16 RX ORDER — ONDANSETRON 2 MG/ML
INJECTION INTRAMUSCULAR; INTRAVENOUS AS NEEDED
Status: DISCONTINUED | OUTPATIENT
Start: 2024-01-16 | End: 2024-01-16

## 2024-01-16 RX ORDER — GABAPENTIN 300 MG/1
300 CAPSULE ORAL ONCE
Status: COMPLETED | OUTPATIENT
Start: 2024-01-16 | End: 2024-01-16

## 2024-01-16 RX ORDER — DOCUSATE SODIUM 100 MG/1
100 CAPSULE, LIQUID FILLED ORAL 2 TIMES DAILY PRN
Status: DISCONTINUED | OUTPATIENT
Start: 2024-01-16 | End: 2024-01-17 | Stop reason: HOSPADM

## 2024-01-16 RX ORDER — CEFAZOLIN SODIUM 2 G/50ML
2000 SOLUTION INTRAVENOUS ONCE
Status: COMPLETED | OUTPATIENT
Start: 2024-01-16 | End: 2024-01-16

## 2024-01-16 RX ORDER — PROPOFOL 10 MG/ML
INJECTION, EMULSION INTRAVENOUS AS NEEDED
Status: DISCONTINUED | OUTPATIENT
Start: 2024-01-16 | End: 2024-01-16

## 2024-01-16 RX ORDER — ONDANSETRON 4 MG/1
4 TABLET, ORALLY DISINTEGRATING ORAL EVERY 6 HOURS PRN
Status: DISCONTINUED | OUTPATIENT
Start: 2024-01-16 | End: 2024-01-16

## 2024-01-16 RX ORDER — METOPROLOL SUCCINATE 50 MG/1
50 TABLET, EXTENDED RELEASE ORAL DAILY
Status: DISCONTINUED | OUTPATIENT
Start: 2024-01-17 | End: 2024-01-17 | Stop reason: HOSPADM

## 2024-01-16 RX ORDER — ATORVASTATIN CALCIUM 10 MG/1
10 TABLET, FILM COATED ORAL
Status: DISCONTINUED | OUTPATIENT
Start: 2024-01-17 | End: 2024-01-17 | Stop reason: HOSPADM

## 2024-01-16 RX ORDER — EPHEDRINE SULFATE 50 MG/ML
INJECTION INTRAVENOUS AS NEEDED
Status: DISCONTINUED | OUTPATIENT
Start: 2024-01-16 | End: 2024-01-16

## 2024-01-16 RX ORDER — SODIUM CHLORIDE, SODIUM LACTATE, POTASSIUM CHLORIDE, CALCIUM CHLORIDE 600; 310; 30; 20 MG/100ML; MG/100ML; MG/100ML; MG/100ML
125 INJECTION, SOLUTION INTRAVENOUS CONTINUOUS
Status: DISCONTINUED | OUTPATIENT
Start: 2024-01-16 | End: 2024-01-16 | Stop reason: SDUPTHER

## 2024-01-16 RX ORDER — ONDANSETRON 2 MG/ML
4 INJECTION INTRAMUSCULAR; INTRAVENOUS EVERY 6 HOURS PRN
Status: DISCONTINUED | OUTPATIENT
Start: 2024-01-16 | End: 2024-01-16

## 2024-01-16 RX ORDER — ONDANSETRON 2 MG/ML
4 INJECTION INTRAMUSCULAR; INTRAVENOUS ONCE AS NEEDED
Status: DISCONTINUED | OUTPATIENT
Start: 2024-01-16 | End: 2024-01-16 | Stop reason: HOSPADM

## 2024-01-16 RX ORDER — FENTANYL CITRATE 50 UG/ML
INJECTION, SOLUTION INTRAMUSCULAR; INTRAVENOUS AS NEEDED
Status: DISCONTINUED | OUTPATIENT
Start: 2024-01-16 | End: 2024-01-16

## 2024-01-16 RX ORDER — OMEGA-3S/DHA/EPA/FISH OIL/D3 300MG-1000
400 CAPSULE ORAL DAILY
COMMUNITY

## 2024-01-16 RX ORDER — OXYCODONE HYDROCHLORIDE 5 MG/1
5 TABLET ORAL EVERY 4 HOURS PRN
Status: DISCONTINUED | OUTPATIENT
Start: 2024-01-16 | End: 2024-01-17 | Stop reason: HOSPADM

## 2024-01-16 RX ORDER — MIDAZOLAM HYDROCHLORIDE 2 MG/2ML
INJECTION, SOLUTION INTRAMUSCULAR; INTRAVENOUS AS NEEDED
Status: DISCONTINUED | OUTPATIENT
Start: 2024-01-16 | End: 2024-01-16

## 2024-01-16 RX ORDER — FUROSEMIDE 40 MG/1
40 TABLET ORAL DAILY
Status: DISCONTINUED | OUTPATIENT
Start: 2024-01-17 | End: 2024-01-17 | Stop reason: HOSPADM

## 2024-01-16 RX ORDER — BUPIVACAINE HYDROCHLORIDE 2.5 MG/ML
INJECTION, SOLUTION EPIDURAL; INFILTRATION; INTRACAUDAL AS NEEDED
Status: DISCONTINUED | OUTPATIENT
Start: 2024-01-16 | End: 2024-01-16 | Stop reason: HOSPADM

## 2024-01-16 RX ORDER — OMEGA-3S/DHA/EPA/FISH OIL/D3 300MG-1000
400 CAPSULE ORAL DAILY
Status: DISCONTINUED | OUTPATIENT
Start: 2024-01-17 | End: 2024-01-17 | Stop reason: HOSPADM

## 2024-01-16 RX ORDER — DEXAMETHASONE SODIUM PHOSPHATE 10 MG/ML
INJECTION, SOLUTION INTRAMUSCULAR; INTRAVENOUS AS NEEDED
Status: DISCONTINUED | OUTPATIENT
Start: 2024-01-16 | End: 2024-01-16

## 2024-01-16 RX ORDER — ACETAMINOPHEN 325 MG/1
975 TABLET ORAL ONCE
Status: COMPLETED | OUTPATIENT
Start: 2024-01-16 | End: 2024-01-16

## 2024-01-16 RX ORDER — LIDOCAINE HYDROCHLORIDE 20 MG/ML
INJECTION, SOLUTION EPIDURAL; INFILTRATION; INTRACAUDAL; PERINEURAL AS NEEDED
Status: DISCONTINUED | OUTPATIENT
Start: 2024-01-16 | End: 2024-01-16

## 2024-01-16 RX ORDER — ONDANSETRON 4 MG/1
4 TABLET, ORALLY DISINTEGRATING ORAL EVERY 6 HOURS PRN
Status: DISCONTINUED | OUTPATIENT
Start: 2024-01-16 | End: 2024-01-17 | Stop reason: HOSPADM

## 2024-01-16 RX ORDER — ONDANSETRON 2 MG/ML
4 INJECTION INTRAMUSCULAR; INTRAVENOUS EVERY 6 HOURS PRN
Status: DISCONTINUED | OUTPATIENT
Start: 2024-01-16 | End: 2024-01-17 | Stop reason: HOSPADM

## 2024-01-16 RX ORDER — HYDROMORPHONE HCL/PF 1 MG/ML
0.25 SYRINGE (ML) INJECTION
Status: DISCONTINUED | OUTPATIENT
Start: 2024-01-16 | End: 2024-01-16 | Stop reason: HOSPADM

## 2024-01-16 RX ORDER — DIPHENHYDRAMINE HYDROCHLORIDE 50 MG/ML
25 INJECTION INTRAMUSCULAR; INTRAVENOUS EVERY 6 HOURS PRN
Status: DISCONTINUED | OUTPATIENT
Start: 2024-01-16 | End: 2024-01-17 | Stop reason: HOSPADM

## 2024-01-16 RX ORDER — DOCUSATE SODIUM 100 MG/1
100 CAPSULE, LIQUID FILLED ORAL 2 TIMES DAILY
Status: DISCONTINUED | OUTPATIENT
Start: 2024-01-16 | End: 2024-01-17 | Stop reason: HOSPADM

## 2024-01-16 RX ORDER — LORAZEPAM 0.5 MG/1
0.5 TABLET ORAL EVERY 8 HOURS PRN
Status: DISCONTINUED | OUTPATIENT
Start: 2024-01-16 | End: 2024-01-17 | Stop reason: HOSPADM

## 2024-01-16 RX ORDER — PHENYLEPHRINE HCL IN 0.9% NACL 1 MG/10 ML
SYRINGE (ML) INTRAVENOUS AS NEEDED
Status: DISCONTINUED | OUTPATIENT
Start: 2024-01-16 | End: 2024-01-16

## 2024-01-16 RX ORDER — TRAMADOL HYDROCHLORIDE 50 MG/1
50 TABLET ORAL EVERY 6 HOURS PRN
Status: DISCONTINUED | OUTPATIENT
Start: 2024-01-16 | End: 2024-01-16

## 2024-01-16 RX ORDER — GLYCOPYRROLATE 0.2 MG/ML
INJECTION INTRAMUSCULAR; INTRAVENOUS AS NEEDED
Status: DISCONTINUED | OUTPATIENT
Start: 2024-01-16 | End: 2024-01-16

## 2024-01-16 RX ADMIN — CEFADROXIL 500 MG: 500 CAPSULE ORAL at 22:21

## 2024-01-16 RX ADMIN — FENTANYL CITRATE 50 MCG: 50 INJECTION, SOLUTION INTRAMUSCULAR; INTRAVENOUS at 13:41

## 2024-01-16 RX ADMIN — ACETAMINOPHEN 325MG 975 MG: 325 TABLET ORAL at 12:03

## 2024-01-16 RX ADMIN — Medication 200 MCG: at 14:55

## 2024-01-16 RX ADMIN — GLYCOPYRROLATE 0.2 MCG: 0.2 INJECTION INTRAMUSCULAR; INTRAVENOUS at 14:15

## 2024-01-16 RX ADMIN — Medication 100 MCG: at 17:00

## 2024-01-16 RX ADMIN — SUGAMMADEX 200 MG: 100 INJECTION, SOLUTION INTRAVENOUS at 17:11

## 2024-01-16 RX ADMIN — PROPOFOL 150 MG: 10 INJECTION, EMULSION INTRAVENOUS at 13:41

## 2024-01-16 RX ADMIN — SODIUM CHLORIDE, SODIUM LACTATE, POTASSIUM CHLORIDE, AND CALCIUM CHLORIDE 100 ML/HR: .6; .31; .03; .02 INJECTION, SOLUTION INTRAVENOUS at 18:46

## 2024-01-16 RX ADMIN — EPHEDRINE SULFATE 10 MG: 50 INJECTION INTRAVENOUS at 16:28

## 2024-01-16 RX ADMIN — ONDANSETRON 4 MG: 2 INJECTION INTRAMUSCULAR; INTRAVENOUS at 13:41

## 2024-01-16 RX ADMIN — FENTANYL CITRATE 50 MCG: 50 INJECTION, SOLUTION INTRAMUSCULAR; INTRAVENOUS at 15:43

## 2024-01-16 RX ADMIN — DEXAMETHASONE SODIUM PHOSPHATE 10 MG: 10 INJECTION, SOLUTION INTRAMUSCULAR; INTRAVENOUS at 13:41

## 2024-01-16 RX ADMIN — EPHEDRINE SULFATE 10 MG: 50 INJECTION INTRAVENOUS at 16:37

## 2024-01-16 RX ADMIN — OXYCODONE HYDROCHLORIDE 5 MG: 5 TABLET ORAL at 19:32

## 2024-01-16 RX ADMIN — Medication 200 MCG: at 14:16

## 2024-01-16 RX ADMIN — SODIUM CHLORIDE, SODIUM LACTATE, POTASSIUM CHLORIDE, AND CALCIUM CHLORIDE: .6; .31; .03; .02 INJECTION, SOLUTION INTRAVENOUS at 13:38

## 2024-01-16 RX ADMIN — GABAPENTIN 300 MG: 300 CAPSULE ORAL at 12:03

## 2024-01-16 RX ADMIN — ROCURONIUM BROMIDE 50 MG: 10 INJECTION, SOLUTION INTRAVENOUS at 13:41

## 2024-01-16 RX ADMIN — LORAZEPAM 0.5 MG: 0.5 TABLET ORAL at 22:27

## 2024-01-16 RX ADMIN — CEFAZOLIN SODIUM 2000 MG: 2 SOLUTION INTRAVENOUS at 13:38

## 2024-01-16 RX ADMIN — MIDAZOLAM 2 MG: 1 INJECTION INTRAMUSCULAR; INTRAVENOUS at 13:35

## 2024-01-16 RX ADMIN — EPHEDRINE SULFATE 10 MG: 50 INJECTION INTRAVENOUS at 15:29

## 2024-01-16 RX ADMIN — ACETAMINOPHEN 325MG 650 MG: 325 TABLET ORAL at 19:32

## 2024-01-16 RX ADMIN — CEFAZOLIN SODIUM 2000 MG: 2 SOLUTION INTRAVENOUS at 17:27

## 2024-01-16 RX ADMIN — MIDAZOLAM 2 MG: 1 INJECTION INTRAMUSCULAR; INTRAVENOUS at 11:35

## 2024-01-16 RX ADMIN — ACETAMINOPHEN 325MG 650 MG: 325 TABLET ORAL at 23:12

## 2024-01-16 RX ADMIN — LIDOCAINE HYDROCHLORIDE 100 MG: 20 INJECTION, SOLUTION EPIDURAL; INFILTRATION; INTRACAUDAL; PERINEURAL at 13:41

## 2024-01-16 RX ADMIN — SODIUM CHLORIDE, SODIUM LACTATE, POTASSIUM CHLORIDE, AND CALCIUM CHLORIDE: .6; .31; .03; .02 INJECTION, SOLUTION INTRAVENOUS at 15:37

## 2024-01-16 NOTE — OP NOTE
OPERATIVE REPORT  PATIENT NAME: Pretty Aguila    :  1950  MRN: [id number]  Pt Location: [facility] OR ROOM 01    SURGERY DATE: 2024    Surgeons and Role:     * Molina Jimenez MD - Primary     * Mack Méndez PA-C - Assisting    Preop Diagnosis:  Malignant neoplasm of lower-outer quadrant of left breast of female, estrogen receptor positive  [C50.512, Z17.0]  Personal history of breast cancer [Z85.3]    Post-Op Diagnosis Codes:     * Malignant neoplasm of lower-outer quadrant of left breast of female, estrogen receptor positive  [C50.512, Z17.0]     * Personal history of breast cancer [Z85.3]    Procedure(s):  Left breast tissue expander exchange for silicone permanent implant (545 cc)  Left breast revision of reconstructed breast (medial and superior capsulotomies, lateral capsulorrhaphy, excess skin excision)  Liposuction of left lateral chest  Right breast reduction  Bilateral multilevel intercostal nerve block with 20 cc exparel mixed with 20 cc of 0.25% marcaine plain    Specimen(s):  * No specimens in log *    Estimated Blood Loss:   Minimal    Drains:  Urethral Catheter Latex 16 Fr. (Active)   Number of days: 0       Anesthesia Type:   General    Operative Indications:  Malignant neoplasm of lower-outer quadrant of left breast of female, estrogen receptor positive  [C50.512, Z17.0]  Personal history of breast cancer [Z85.3]    Operative Findings:  Right breast resection weight: 724 g  Left chest implant: [id number]. Lot [id number]. SN [id number].  Multilevel intercostal nerve block performed with 20 cc exparel with 20 cc 0.25% marcaine plain    Complications:   None    Procedure and Technique:  Patient was brought to the operating room, transferred to the operating table in supine fashion. After undergoing general anesthesia, a timeout was performed at which point all patient identifiers were deemed to be correct. The chest was prepped and draped in the normal sterile fashion. I first began on the left  chest. Incision was made through prior mastectomy incision and extended through skin, subcutaneous tissue, until entering capsule. The intact tissue expander was removed. There was noted to be a double capsule with non-incorporated ADM surrounding the expander. The non-incorporated ADM was excised. I then proceeded with medial and superior capsulotomies and suture lateral capsulorrhaphy with 1-0 stratafix. I then placed sizers and the patient was sat upright. A 545 cc silicone moderate high profile implant was selected. The left chest cavity was irrigated with double antibiotic solution and iricept. The permanent implant was irrigated with double antibiotic solution and using minimal touch technique, the permanent implant was placed into the cavity. The capsule was reapproximated with 3-0 stratafix. Next, the skin was tailortacked and the excess skin was marked and excised. There was a small area of lateral chest fullness which was infiltrated with 50 cc of tumescent solution and liposuctioned to satisfactory contour. The remainder of the incision was closed with 3-0 vicryl for dermis and 3-0 stratafix for skin. Next, I directed my attention to the right native breast. The pre-operative wise-pattern incisions were re-inforced. A 10 cm inferior pedicle was marked. A 42 mm NAC sizer was used to resize the new NAC and incised. The pedicle was dissected and defined first. Next, the breast flap was dissected starting at 2 cm in thickness and gradually thicker with superior dissection.  Resection of the excess breast tissue was performed. After resection, patient was tailortacked and sat upright to compare size and symmetry with the contralateral breast. After satisfactory size and symmetry were obtained, the new NAC was positioned and closure of the wound occurred. 3-0 vicryl was used along the horizontal and vertical limbs for dermis. 3-0 stratafix for the skin along the horizontal limb, 4-0 monocryl for the skin for  the vertical limb. The NAC was brought at the new position and secured with 3-0 monocryl for dermis and 4-0 nylon for skin. Prior to closure, bilateral multilevel intercostal nerve blocks were performed with mixture of 20 cc of exparel and 20 cc of 0.25% marcaine plain. After this was completed, prineo wound closure system was performed along the incisions. Xeroform was placed over the NAC. The breasts were dressed with gauze, ABD, and bra. It was noted at the end of the case that bilateral nipples were viable. The nipples were viable without venous congestion at the end of the case.     This concluded the procedure. Patient tolerated the procedure well without complications. At the end of the case, all sponge, needle, and instrument counts were correct. Patient was awakened from anesthesia and taken to the PACU in stable condition.     I was present for the entire procedure, A qualified resident physician was not available and a physician assistant was required during the procedure for retraction, tissue handling, dissection and suturing.    Patient Disposition:  PACU         SIGNATURE: Molina Jimenez MD  DATE: January 16, 2024  TIME: 5:19 PM

## 2024-01-16 NOTE — DISCHARGE INSTR - AVS FIRST PAGE
Discharge instructions    -Take tylenol 500 mg as scheduled for baseline mild to moderate pain control. For severe breakthrough pain, can use oxycodone.  -Do not drive or operate heavy machinery when taking narcotic pain medicine (oxycodone) as it can cause drowsiness.  -Do not get incisions wet for 2 days. After 2 days, can remove all dressings and shower. After shower, pat incisions dry and can replace gauze for comfort and place in bra.  -No submerging incisions (no baths, pools, hottubs). Pat dry incision and dress with gauze.  -Wear supportive bra at all times. Compression sports bra is okay. No underwire.  -No strenuous activity, no heavy lifting (nothing over 5 lbs), no reaching above head, no hard pushing or pulling with arms.   -Take all medicines as prescribed.   -Take antibiotics as prescribed until completion.  -Resume regular diet and home medications  -Call Plastic Surgery office to schedule post-op follow in 7 days.

## 2024-01-16 NOTE — ANESTHESIA POSTPROCEDURE EVALUATION
Post-Op Assessment Note    CV Status:  Stable  Pain Score: 0    Pain management: adequate       Mental Status:  Alert and awake   Hydration Status:  Euvolemic   PONV Controlled:  Controlled   Airway Patency:  Patent     Post Op Vitals Reviewed: Yes      Staff: Anesthesiologist, CRNA               BP   139/79   Temp   98.5   Pulse  89   Resp   16   SpO2   98%

## 2024-01-16 NOTE — ANESTHESIA PREPROCEDURE EVALUATION
Procedure:  RIGHT BREAST REDUCTION AND LEFT BREAST EXPANDER EXCHANGE FOR PERMANENT IMPLANT. REVISION OF RECONSTRUCTED LEFT BREAST. (Bilateral: Breast)    Relevant Problems   CARDIO   (+) Coronary artery disease involving native coronary artery of native heart without angina pectoris   (+) Essential hypertension      GYN   (+) Malignant neoplasm of lower-outer quadrant of left breast of female, estrogen receptor positive (HCC)      MUSCULOSKELETAL   (+) Chronic bilateral low back pain without sciatica   (+) Rheumatoid arthritis involving multiple sites with positive rheumatoid factor (HCC)      NEURO/PSYCH   (+) Anxiety   (+) Chronic bilateral low back pain without sciatica   (+) Chronic depression      PULMONARY   (+) Acute URI      Other   (+) Dyslipidemia   (+) S/P CABG x 3     Lab Results   Component Value Date    WBC 8.91 11/22/2023    HGB 9.6 (L) 11/22/2023     11/22/2023     Lab Results   Component Value Date    SODIUM 142 11/22/2023    K 3.4 (L) 11/22/2023    BUN 20 11/22/2023    CREATININE 1.01 11/22/2023    EGFR 56 11/22/2023     Physical Exam    Airway    Mallampati score: I  TM Distance: >3 FB  Neck ROM: full     Dental    upper dentures and lower dentures    Cardiovascular  Cardiovascular exam normal    Pulmonary  Pulmonary exam normal     Other Findings  post-pubertal.      Anesthesia Plan  ASA Score- 3     Anesthesia Type- general with ASA Monitors.         Additional Monitors:     Airway Plan: ETT and LMA.    Comment: I discussed risks (reviewed with patient on the anesthesia consent form), benefits and alternatives for General Anesthesia.  These risks did include breathing tube remaining in place if not strong enough, PONV, damage to lips and teeth, sore throat, eye injury or blindness, risk of heart attack or stroke that may lead to death.  .       Plan Factors-Exercise tolerance (METS): >4 METS.    Chart reviewed. EKG reviewed.  Existing labs reviewed. Patient summary reviewed.                   Induction- intravenous.    Postoperative Plan- Plan for postoperative opioid use.     Informed Consent- Anesthetic plan and risks discussed with patient and sibling.

## 2024-01-17 VITALS
BODY MASS INDEX: 34.22 KG/M2 | HEART RATE: 79 BPM | TEMPERATURE: 98.2 F | DIASTOLIC BLOOD PRESSURE: 55 MMHG | HEIGHT: 65 IN | SYSTOLIC BLOOD PRESSURE: 100 MMHG | OXYGEN SATURATION: 96 % | WEIGHT: 205.4 LBS | RESPIRATION RATE: 20 BRPM

## 2024-01-17 LAB
ANION GAP SERPL CALCULATED.3IONS-SCNC: 8 MMOL/L
ATRIAL RATE: 76 BPM
BUN SERPL-MCNC: 21 MG/DL (ref 5–25)
CALCIUM SERPL-MCNC: 8.6 MG/DL (ref 8.4–10.2)
CHLORIDE SERPL-SCNC: 104 MMOL/L (ref 96–108)
CO2 SERPL-SCNC: 24 MMOL/L (ref 21–32)
CREAT SERPL-MCNC: 0.98 MG/DL (ref 0.6–1.3)
ERYTHROCYTE [DISTWIDTH] IN BLOOD BY AUTOMATED COUNT: 13.1 % (ref 11.6–15.1)
GFR SERPL CREATININE-BSD FRML MDRD: 58 ML/MIN/1.73SQ M
GLUCOSE SERPL-MCNC: 246 MG/DL (ref 65–140)
HCT VFR BLD AUTO: 35.6 % (ref 34.8–46.1)
HGB BLD-MCNC: 11.4 G/DL (ref 11.5–15.4)
MAGNESIUM SERPL-MCNC: 1.9 MG/DL (ref 1.9–2.7)
MCH RBC QN AUTO: 31.1 PG (ref 26.8–34.3)
MCHC RBC AUTO-ENTMCNC: 32 G/DL (ref 31.4–37.4)
MCV RBC AUTO: 97 FL (ref 82–98)
P AXIS: 68 DEGREES
PLATELET # BLD AUTO: 228 THOUSANDS/UL (ref 149–390)
PMV BLD AUTO: 10.7 FL (ref 8.9–12.7)
POTASSIUM SERPL-SCNC: 4.5 MMOL/L (ref 3.5–5.3)
PR INTERVAL: 206 MS
QRS AXIS: 58 DEGREES
QRSD INTERVAL: 100 MS
QT INTERVAL: 440 MS
QTC INTERVAL: 495 MS
RBC # BLD AUTO: 3.66 MILLION/UL (ref 3.81–5.12)
SODIUM SERPL-SCNC: 136 MMOL/L (ref 135–147)
T WAVE AXIS: 86 DEGREES
VENTRICULAR RATE: 76 BPM
WBC # BLD AUTO: 15.06 THOUSAND/UL (ref 4.31–10.16)

## 2024-01-17 PROCEDURE — 83735 ASSAY OF MAGNESIUM: CPT | Performed by: STUDENT IN AN ORGANIZED HEALTH CARE EDUCATION/TRAINING PROGRAM

## 2024-01-17 PROCEDURE — 99024 POSTOP FOLLOW-UP VISIT: CPT | Performed by: PHYSICIAN ASSISTANT

## 2024-01-17 PROCEDURE — 80048 BASIC METABOLIC PNL TOTAL CA: CPT | Performed by: STUDENT IN AN ORGANIZED HEALTH CARE EDUCATION/TRAINING PROGRAM

## 2024-01-17 PROCEDURE — 85027 COMPLETE CBC AUTOMATED: CPT | Performed by: STUDENT IN AN ORGANIZED HEALTH CARE EDUCATION/TRAINING PROGRAM

## 2024-01-17 RX ORDER — OXYCODONE HYDROCHLORIDE 5 MG/1
5 TABLET ORAL EVERY 6 HOURS PRN
Qty: 8 TABLET | Refills: 0 | Status: SHIPPED | OUTPATIENT
Start: 2024-01-17 | End: 2024-01-27

## 2024-01-17 RX ORDER — ACETAMINOPHEN 500 MG
500 TABLET ORAL EVERY 4 HOURS PRN
Qty: 30 TABLET | Refills: 0 | Status: SHIPPED | OUTPATIENT
Start: 2024-01-17 | End: 2024-01-27

## 2024-01-17 RX ORDER — CEFADROXIL 500 MG/1
500 CAPSULE ORAL EVERY 12 HOURS SCHEDULED
Qty: 10 CAPSULE | Refills: 0 | Status: SHIPPED | OUTPATIENT
Start: 2024-01-17 | End: 2024-01-22

## 2024-01-17 RX ADMIN — CEFADROXIL 500 MG: 500 CAPSULE ORAL at 08:51

## 2024-01-17 RX ADMIN — ENOXAPARIN SODIUM 40 MG: 100 INJECTION SUBCUTANEOUS at 08:51

## 2024-01-17 RX ADMIN — FUROSEMIDE 40 MG: 40 TABLET ORAL at 08:52

## 2024-01-17 RX ADMIN — ANASTROZOLE 1 MG: 1 TABLET, COATED ORAL at 08:51

## 2024-01-17 RX ADMIN — CHOLECALCIFEROL TAB 10 MCG (400 UNIT) 400 UNITS: 10 TAB at 08:52

## 2024-01-17 RX ADMIN — DOCUSATE SODIUM 100 MG: 100 CAPSULE, LIQUID FILLED ORAL at 08:52

## 2024-01-17 RX ADMIN — ACETAMINOPHEN 325MG 650 MG: 325 TABLET ORAL at 05:25

## 2024-01-17 RX ADMIN — MULTIPLE VITAMINS W/ MINERALS TAB 1 TABLET: TAB ORAL at 08:51

## 2024-01-17 NOTE — PLAN OF CARE
Problem: PAIN - ADULT  Goal: Verbalizes/displays adequate comfort level or baseline comfort level  Description: Interventions:  - Encourage patient to monitor pain and request assistance  - Assess pain using appropriate pain scale  - Administer analgesics based on type and severity of pain and evaluate response  - Implement non-pharmacological measures as appropriate and evaluate response  - Consider cultural and social influences on pain and pain management  - Notify physician/advanced practitioner if interventions unsuccessful or patient reports new pain  Outcome: Progressing     Problem: INFECTION - ADULT  Goal: Absence or prevention of progression during hospitalization  Description: INTERVENTIONS:  - Assess and monitor for signs and symptoms of infection  - Monitor lab/diagnostic results  - Monitor all insertion sites, i.e. indwelling lines, tubes, and drains  - Monitor endotracheal if appropriate and nasal secretions for changes in amount and color  - [place] appropriate cooling/warming therapies per order  - Administer medications as ordered  - Instruct and encourage patient and family to use good hand hygiene technique  - Identify and instruct in appropriate isolation precautions for identified infection/condition  Outcome: Progressing  Goal: Absence of fever/infection during neutropenic period  Description: INTERVENTIONS:  - Monitor WBC    Outcome: Progressing     Problem: SAFETY ADULT  Goal: Patient will remain free of falls  Description: INTERVENTIONS:  - Educate patient/family on patient safety including physical limitations  - Instruct patient to call for assistance with activity   - Consult OT/PT to assist with strengthening/mobility   - Keep Call bell within reach  - Keep bed low and locked with side rails adjusted as appropriate  - Keep care items and personal belongings within reach  - Initiate and maintain comfort rounds  - Make Fall Risk Sign visible to staff  - Offer Toileting every 2 Hours,  in advance of need  - Obtain necessary fall risk management equipment  - Apply yellow socks and bracelet for high fall risk patients  - Consider moving patient to room near nurses station  Outcome: Progressing  Goal: Maintain or return to baseline ADL function  Description: INTERVENTIONS:  -  Assess patient's ability to carry out ADLs; assess patient's baseline for ADL function and identify physical deficits which impact ability to perform ADLs (bathing, care of mouth/teeth, toileting, grooming, dressing, etc.)  - Assess/evaluate cause of self-care deficits   - Assess range of motion  - Assess patient's mobility; develop plan if impaired  - Assess patient's need for assistive devices and provide as appropriate  - Encourage maximum independence but intervene and supervise when necessary  - Involve family in performance of ADLs  - Assess for home care needs following discharge   - Consider OT consult to assist with ADL evaluation and planning for discharge  - Provide patient education as appropriate  Outcome: Progressing  Goal: Maintains/Returns to pre admission functional level  Description: INTERVENTIONS:  - Perform AM-PAC 6 Click Basic Mobility/ Daily Activity assessment daily.  - Set and communicate daily mobility goal to care team and patient/family/caregiver.   - Collaborate with rehabilitation services on mobility goals if consulted  - Perform Range of Motion 3 times a day.  - Reposition patient every 2 hours.  - Dangle patient 3 times a day  - Stand patient 3 times a day  - Ambulate patient 3 times a day  - Out of bed to chair 3 times a day   - Out of bed for meals 3 times a day  - Out of bed for toileting  - Record patient progress and toleration of activity level   Outcome: Progressing     Problem: DISCHARGE PLANNING  Goal: Discharge to home or other facility with appropriate resources  Description: INTERVENTIONS:  - Identify barriers to discharge w/patient and caregiver  - Arrange for needed discharge  resources and transportation as appropriate  - Identify discharge learning needs (meds, wound care, etc.)  - Arrange for interpretive services to assist at discharge as needed  - Refer to Case Management Department for coordinating discharge planning if the patient needs post-hospital services based on physician/advanced practitioner order or complex needs related to functional status, cognitive ability, or social support system  Outcome: Progressing     Problem: Knowledge Deficit  Goal: Patient/family/caregiver demonstrates understanding of disease process, treatment plan, medications, and discharge instructions  Description: Complete learning assessment and assess knowledge base.  Interventions:  - Provide teaching at level of understanding  - Provide teaching via preferred learning methods  Outcome: Progressing

## 2024-01-17 NOTE — PROGRESS NOTES
"Progress Note - Pretty Aguila 73 y.o. female MRN: [id number]    Unit/Bed#: -[facility] Encounter: [id number]      Assessment / Plan:     POD 0 left reconstructive breast revision with liposuction of chest and right breast reduction   Pain well controlled with around the clock Tylenol, Oxycodone, Robaxin, and Dilaudid for breakthrough.   Regular diet   Can d/c fluids when tolerating PO   Continue to trend vitals and labs   Incentive spirometry   OOB and ambulation   Encouraged to wear supportive bra at all times  Ice to affected area     Subjective:     Patient seen and examined at bedside. Patient states she has been sleepy since her surgery, however now feels more awake. Rates her pain a 6/10, more so radiating from the left side. Denies fever, N/V. Denies passing flatus or abdominal pain. Understands to use her incentive spirometer and to be up and out of bed tomorrow. Denies any further questions or complaints.     Objective:     Vitals: Blood pressure 134/82, pulse 79, temperature 97.7 °F (36.5 °C), resp. rate 16, height 5' 5\" (1.651 m), weight 93.2 kg (205 lb 6.4 oz), SpO2 95%.,Body mass index is 34.18 kg/m².      Intake/Output Summary (Last 24 hours) at 1/16/2024 1950  Last data filed at 1/16/2024 1727  Gross per 24 hour   Intake 2000 ml   Output 450 ml   Net 1550 ml       Physical Exam: /75   Pulse 76   Temp 98.1 °F (36.7 °C)   Resp 16   Ht 5' 5\" (1.651 m)   Wt 93.2 kg (205 lb 6.4 oz)   SpO2 94%   BMI 34.18 kg/m²   General appearance: alert and oriented, in no acute distress  Head: Normocephalic, without obvious abnormality, atraumatic  Breasts: normal appearance, no masses or tenderness, incision C/D/I. No areas of strikethrough. Skin flap viable. No palpable fluid collection or area of fluctuance surrounding incision.   Heart: regular rate and rhythm and S1, S2 normal  Abdomen: soft, non-tender; bowel sounds normal; no masses,  no organomegaly  Extremities: extremities normal, warm and " well-perfused; no cyanosis, clubbing, or edema  Skin: Skin color, texture, turgor normal. No rashes or lesions  Nursing note and vital signs reviewed      Invasive Devices       Peripheral Intravenous Line  Duration             Peripheral IV 01/16/24 Dorsal (posterior);Right Hand <1 day                    Lab, Imaging and other studies: I have personally reviewed pertinent reports.    VTE Pharmacologic Prophylaxis: Reason for no pharmacologic prophylaxis POD 0  VTE Mechanical Prophylaxis: sequential compression device and foot pump applied

## 2024-01-17 NOTE — PROGRESS NOTES
"Progress Note - Pretty Aguila 73 y.o. female MRN: [id number]    Unit/Bed#: -01 Encounter: [id number]      Assessment/Plan:  POD #1 s/p left breast expander to implant exchange and right breast reduction for symmetry and lipo  -pain controlled  -mary diet, no n/v  -plan to d/c home today, f/u with plastics  -plan discussed with Dr Jimenez    Subjective:   I feel great and I am ready to go home    Objective:     Vitals: Blood pressure 100/55, pulse 79, temperature 98.2 °F (36.8 °C), temperature source Temporal, resp. rate 20, height 5' 5\" (1.651 m), weight 93.2 kg (205 lb 6.4 oz), SpO2 96%.,Body mass index is 34.18 kg/m².      Intake/Output Summary (Last 24 hours) at 1/17/2024 0822  Last data filed at 1/16/2024 1727  Gross per 24 hour   Intake 2000 ml   Output 450 ml   Net 1550 ml       Physical Exam: General appearance: alert and oriented, in no acute distress  Lungs: clear to auscultation bilaterally  Heart: regular rate and rhythm, S1, S2 normal, no murmur, click, rub or gallop  Bilateral breast incisions are clean, dry and intact.       Invasive Devices       Peripheral Intravenous Line  Duration             Peripheral IV 01/16/24 Dorsal (posterior);Right Hand <1 day                    Lab, Imaging and other studies: I have personally reviewed pertinent reports.    VTE Pharmacologic Prophylaxis: Sequential compression device (Venodyne)   VTE Mechanical Prophylaxis: sequential compression device   "

## 2024-01-17 NOTE — PLAN OF CARE
Problem: PAIN - ADULT  Goal: Verbalizes/displays adequate comfort level or baseline comfort level  Description: Interventions:  - Encourage patient to monitor pain and request assistance  - Assess pain using appropriate pain scale  - Administer analgesics based on type and severity of pain and evaluate response  - Implement non-pharmacological measures as appropriate and evaluate response  - Consider cultural and social influences on pain and pain management  - Notify physician/advanced practitioner if interventions unsuccessful or patient reports new pain  Outcome: Progressing     Problem: DISCHARGE PLANNING  Goal: Discharge to home or other facility with appropriate resources  Description: INTERVENTIONS:  - Identify barriers to discharge w/patient and caregiver  - Arrange for needed discharge resources and transportation as appropriate  - Identify discharge learning needs (meds, wound care, etc.)  - Arrange for interpretive services to assist at discharge as needed  - Refer to Case Management Department for coordinating discharge planning if the patient needs post-hospital services based on physician/advanced practitioner order or complex needs related to functional status, cognitive ability, or social support system  Outcome: Progressing     Problem: Knowledge Deficit  Goal: Patient/family/caregiver demonstrates understanding of disease process, treatment plan, medications, and discharge instructions  Description: Complete learning assessment and assess knowledge base.  Interventions:  - Provide teaching at level of understanding  - Provide teaching via preferred learning methods  Outcome: Progressing

## 2024-01-17 NOTE — CASE MANAGEMENT
Case Management Progress Note    Patient name Pretty Aguila  Location /-01 MRN [id number]  : 1950 Date 2024       LOS (days): 0  Geometric Mean LOS (GMLOS) (days):   Days to GMLOS:        OBJECTIVE:        Current admission status: Outpatient Surgery  Preferred Pharmacy:   [facility] - [place] [address] [facility] [address]  [facility] [address]  Phone: [phone] Fax: [phone]    [facility], [place], [place] -  [facility]   [facility] [address]  Phone: [phone] Fax: [phone]    [facility] - [place], [place] - [address]  [address]  [facility] [[email or web address] [address]  [facility] [address]  Phone: [phone] Fax: [phone]    Primary Care Provider: Lauren Dumont MD    Primary Insurance: [employer]  Secondary Insurance:     PROGRESS NOTE:    Met with pt to discuss dc plan. Pt reports being indpt with no use of DME. Pt's family to transport home.   No CM needs identified.

## 2024-01-17 NOTE — UTILIZATION REVIEW
"Initial Clinical Review    Elective outpatient  surgical procedure      Age/Sex: 73 y.o. female  Surgery Date: 1/16/24   Procedure: Left breast tissue expander exchange for silicone permanent implant (545 cc)  Left breast revision of reconstructed breast (medial and superior capsulotomies, lateral capsulorrhaphy, excess skin excision)  Liposuction of left lateral chest  Right breast reduction  Bilateral multilevel intercostal nerve block with 20 cc exparel mixed with 20 cc of 0.25% marcaine plain  Anesthesia: general   Operative Findings: Right breast resection weight: 724 g  Left chest implant: [id number]. Lot [id number].  [id number].  Multilevel intercostal nerve block performed with 20 cc exparel with 20 cc 0.25% marcaine plain    POD#1 Progress Note: 1/17/24     Admission Orders: Date/Time/Statement: 1/16/24 1805 Outpatient no charge bed  No orders of the defined types were placed in this encounter.    Vital Signs: /75   Pulse 76   Temp 98.1 °F (36.7 °C)   Resp 16   Ht 5' 5\" (1.651 m)   Wt 93.2 kg (205 lb 6.4 oz)   SpO2 91%   BMI 34.18 kg/m²   01/17/24 0646 -- -- -- -- -- 91 % 2 L/min Nasal cannula --   01/16/24 2316 -- -- -- -- -- 91 % -- None (Room air) --   01/16/24 20:24:17 98.1 °F (36.7 °C) 76 -- 123/75 91 94 % -- -- --   01/16/24 2015 -- 78 -- 124/75 91 95 % -- -- --   01/16/24 1945 -- 77 -- 119/74 89 91 % -- -- --   01/16/24 1915 -- 78 -- 120/76 91 91 % -- -- --   01/16/24 18:32:10 97.7 °F (36.5 °C) 79 16 134/82 99 95 %        Pertinent Labs/Diagnostic Test Results:   No orders to display       Results from last 7 days   Lab Units 01/17/24  0212 01/16/24 1957   WBC Thousand/uL 15.06* 11.60*   HEMOGLOBIN g/dL 11.4* 11.7   HEMATOCRIT % 35.6 37.0   PLATELETS Thousands/uL 228 242   NEUTROS ABS Thousands/µL  --  10.54*     Results from last 7 days   Lab Units 01/17/24  0212 01/16/24 1957   SODIUM mmol/L 136 139   POTASSIUM mmol/L 4.5 3.7   CHLORIDE mmol/L 104 104   CO2 mmol/L 24 27   ANION GAP " mmol/L 8 8   BUN mg/dL 21 20   CREATININE mg/dL 0.98 0.90   EGFR ml/min/1.73sq m 58 64   CALCIUM mg/dL 8.6 8.7   MAGNESIUM mg/dL 1.9  --      Results from last 7 days   Lab Units 01/17/24 0212 01/16/24 1957   GLUCOSE RANDOM mg/dL 246* 143*     Diet: Regular  Mobility: ambulatory   DVT Prophylaxis: Bilateral SCDs    Medications/Pain Control:   Scheduled Medications:  acetaminophen, 650 mg, Oral, Q6H SHRADDHA  anastrozole, 1 mg, Oral, Daily  Apremilast, 30 mg, Oral, BID  atorvastatin, 10 mg, Oral, After Dinner  cefadroxil, 500 mg, Oral, Q12H SHRADDHA  cholecalciferol, 400 Units, Oral, Daily  docusate sodium, 100 mg, Oral, BID  enoxaparin, 40 mg, Subcutaneous, Daily  furosemide, 40 mg, Oral, Daily  metoprolol succinate, 50 mg, Oral, Daily  multivitamin-minerals, 1 tablet, Oral, Daily    bupivacaine liposomal (EXPAREL) 1.3 % injection 20 mL  Dose: 20 mL  Freq: Once Route: INFILTRATION  Indications of Use: LOCAL ANESTHESIA  Start: 01/16/24 1015 End: 01/16/24 1651   bupivacaine (PF) (MARCAINE) 0.25 % injection  Start: 01/16/24 1651 End: 01/16/24 1747     ceFAZolin (ANCEF) IVPB (premix in dextrose) 2,000 mg 50 mL  Dose: 2,000 mg  Freq: Once Route: IV  Start: 01/16/24 1015 End: 01/16/24 1727     Continuous IV Infusions:  lactated ringers, 100 mL/hr, Intravenous, Continuous      PRN Meds:  aluminum-magnesium hydroxide-simethicone, 30 mL, Oral, Q6H PRN  diphenhydrAMINE, 25 mg, Intravenous, Q6H PRN  docusate sodium, 100 mg, Oral, BID PRN  HYDROmorphone, 0.5 mg, Intravenous, Q4H PRN  LORazepam, 0.5 mg, Oral, Q8H PRN  methocarbamol, 500 mg, Oral, TID PRN  ondansetron, 4 mg, Oral, Q6H PRN   Or  ondansetron, 4 mg, Intravenous, Q6H PRN  oxyCODONE, 10 mg, Oral, Q4H PRN  oxyCODONE, 5 mg, Oral, Q4H PRN x 1 1/16        Network Utilization Review Department  ATTENTION: Please call with any questions or concerns to [phone] and carefully listen to the prompts so that you are directed to the right person. All voicemails are confidential.    For Discharge needs, contact Care Management DC Support Team at [phone] opt. [phone]  Send all requests for admission clinical reviews, approved or denied determinations and any other requests to dedicated fax number below belonging to the campus where the patient is receiving treatment. List of dedicated fax numbers for the Facilities:  FACILITY NAME UR FAX NUMBER   ADMISSION DENIALS (Administrative/Medical Necessity) [phone]   DISCHARGE SUPPORT TEAM (NETWORK) [phone]   PARENT CHILD HEALTH (Maternity/NICU/Pediatrics) [phone]   [facility] [phone]   [facility] [phone]   [facility] [phone]   [facility] [phone]   [facility] [phone]   [facility] [phone]   [facility] [phone]   [facility] [phone]   [facility] [phone]   [facility] [phone]   [facility] [phone]

## 2024-01-19 PROCEDURE — 88305 TISSUE EXAM BY PATHOLOGIST: CPT | Performed by: PATHOLOGY

## 2024-01-24 ENCOUNTER — PATIENT OUTREACH (OUTPATIENT)
Dept: CASE MANAGEMENT | Facility: OTHER | Age: 74
End: 2024-01-24

## 2024-01-25 ENCOUNTER — OFFICE VISIT (OUTPATIENT)
Dept: PLASTIC SURGERY | Facility: CLINIC | Age: 74
End: 2024-01-25

## 2024-01-25 DIAGNOSIS — C50.512 MALIGNANT NEOPLASM OF LOWER-OUTER QUADRANT OF LEFT BREAST OF FEMALE, ESTROGEN RECEPTOR POSITIVE: Primary | ICD-10-CM

## 2024-01-25 DIAGNOSIS — Z17.0 MALIGNANT NEOPLASM OF LOWER-OUTER QUADRANT OF LEFT BREAST OF FEMALE, ESTROGEN RECEPTOR POSITIVE: Primary | ICD-10-CM

## 2024-01-25 PROCEDURE — 99024 POSTOP FOLLOW-UP VISIT: CPT | Performed by: PHYSICIAN ASSISTANT

## 2024-01-25 NOTE — PROGRESS NOTES
POST-OP EVALUATION  1/25/2024    Subjective   Pretty Aguila is a 73 y.o. female is here today for routine post-operative follow up.  01/16/24 1338         Procedures:      RIGHT BREAST REDUCTION AND LEFT BREAST EXPANDER EXCHANGE FOR PERMANENT IMPLANT. REVISION OF RECONSTRUCTED LEFT BREAST. (Bilateral: Breast)      LIPOSUCTION BREAST (Bilateral: Breast)     Past Medical History:   Diagnosis Date    Anxiety     Breast cancer (HCC) 09/2023    CAD (coronary artery disease)     Dyslipidemia     Hiatal hernia     Hyperlipidemia     Hypertension     Obesity (BMI 30-39.9)     Psoriatic arthritis (HCC)     SOB (shortness of breath)      Past Surgical History:   Procedure Laterality Date    BARIATRIC SURGERY  2014    BILE DUCT EXPLORATION      replacement per [facility]    CARPAL TUNNEL RELEASE Bilateral 2002    CORONARY ARTERY BYPASS GRAFT  2017    FOOT SURGERY Right     bone surgery to straighten toe.    HIP SURGERY Left 2015    IR DRAINAGE TUBE PLACEMENT  12/19/2023    LYMPH NODE BIOPSY Left 11/20/2023    Procedure: LEFT LYMPHATIC MAPPING WITH BLUE AND RADIOACTIVE DYES, SENTINEL LYMPH NODE BIOPSY;  Surgeon: Adrien Gabriel MD;  Location:  MAIN OR;  Service: Surgical Oncology    LYMPH NODE DISSECTION Left 11/20/2023    Procedure: POSSIBLE AXILLARY DISSECTION;  Surgeon: Adrien Gabriel MD;  Location:  MAIN OR;  Service: Surgical Oncology    HI BREAST REDUCTION Bilateral 1/16/2024    Procedure: RIGHT BREAST REDUCTION AND LEFT BREAST EXPANDER EXCHANGE FOR PERMANENT IMPLANT. REVISION OF RECONSTRUCTED LEFT BREAST.;  Surgeon: Molina Jimenez MD;  Location:  MAIN OR;  Service: Plastics    HI SUCTION ASSISTED LIPECTOMY TRUNK Bilateral 1/16/2024    Procedure: LIPOSUCTION BREAST;  Surgeon: Molina Jimenez MD;  Location:  MAIN OR;  Service: Plastics    HI TISSUE EXPANDER PLACEMENT BREAST RECONSTRUCTION Left 11/20/2023    Procedure: IMMEDIATE LEFT BREAST RECONSTRUCTION WITH TISSUE EXPANDER AND ADM;  Surgeon: Molina Jimenez MD;  Location:   MAIN OR;  Service: Plastics    REPLACEMENT TOTAL KNEE Right 04/2017    SIMPLE MASTECTOMY Left 11/20/2023    Procedure: LEFT BREAST VERENICE  DIRECTED MASTECTOMY;  Surgeon: Adrien Gabriel MD;  Location:  MAIN OR;  Service: Surgical Oncology    SLEEVE GASTROPLASTY      TONSILLECTOMY      TOTAL HIP ARTHROPLASTY Right 2017    US GUIDED BREAST BIOPSY LEFT COMPLETE Left 09/19/2023        Current Outpatient Medications:     acetaminophen (TYLENOL) 500 mg tablet, Take 1 tablet (500 mg total) by mouth every 4 (four) hours as needed for mild pain or moderate pain for up to 10 days, Disp: 30 tablet, Rfl: 0    anastrozole (ARIMIDEX) 1 mg tablet, Take 1 tablet (1 mg total) by mouth daily, Disp: 90 tablet, Rfl: 1    Apremilast 30 MG TABS, Take 30 mg by mouth 2 (two) times a day, Disp: , Rfl:     atorvastatin (LIPITOR) 10 mg tablet, Take 1 tablet (10 mg total) by mouth daily, Disp: 90 tablet, Rfl: 3    cholecalciferol (VITAMIN D3) 400 units tablet, Take 400 Units by mouth daily, Disp: , Rfl:     docusate sodium (COLACE) 100 mg capsule, Take 1 capsule (100 mg total) by mouth 2 (two) times a day as needed for constipation, Disp: 20 capsule, Rfl: 1    furosemide (LASIX) 40 mg tablet, Take 1 tablet (40 mg total) by mouth daily, Disp: 180 tablet, Rfl: 0    LORazepam (Ativan) 0.5 mg tablet, Take 1 tablet (0.5 mg total) by mouth every 8 (eight) hours as needed for anxiety, Disp: 20 tablet, Rfl: 0    methocarbamol (ROBAXIN) 500 mg tablet, Take 1 tablet (500 mg total) by mouth 3 (three) times a day as needed for muscle spasms, Disp: 15 tablet, Rfl: 1    metoprolol succinate (TOPROL-XL) 50 mg 24 hr tablet, Take 1 tablet (50 mg total) by mouth daily, Disp: 90 tablet, Rfl: 3    Multiple Vitamin (multivitamin) tablet, Take 1 tablet by mouth daily, Disp: , Rfl:     naloxone (NARCAN) 4 mg/0.1 mL nasal spray, Administer 1 spray into a nostril. If no response after 2-3 minutes, give another dose in the other nostril using a new spray., Disp: 1  each, Rfl: 1    nystatin (MYCOSTATIN) cream, Apply topically 2 (two) times a day (Patient taking differently: Apply topically 2 (two) times a day As needed), Disp: 30 g, Rfl: 0    nystatin (MYCOSTATIN) powder, Apply topically 2 (two) times a day (Patient taking differently: Apply topically 2 (two) times a day As needed), Disp: 60 g, Rfl: 0    ondansetron (ZOFRAN) 4 mg tablet, Take 1 tablet (4 mg total) by mouth every 8 (eight) hours as needed for nausea or vomiting, Disp: 20 tablet, Rfl: 0    oxyCODONE (Roxicodone) 5 immediate release tablet, Take 1 tablet (5 mg total) by mouth every 4 (four) hours as needed for severe pain Max Daily Amount: 30 mg, Disp: 30 tablet, Rfl: 0    oxyCODONE (ROXICODONE) 5 immediate release tablet, Take 1 tablet (5 mg total) by mouth every 6 (six) hours as needed for moderate pain for up to 10 days Max Daily Amount: 20 mg, Disp: 8 tablet, Rfl: 0  Allergies: Iodine - food allergy, Povidone iodine, and Shellfish-derived products - food allergy    Objective      Incisions clean, dry, intact.  Implant appropriately placed.  Right breast with some ecchymosis.  Both breasts are soft, without evidence of fluid collections.  Areola viable.      Assessment/Plan   Diagnoses and all orders for this visit:    Malignant neoplasm of lower-outer quadrant of left breast of female, estrogen receptor positive (HCC)    Continue with surgical bra.  Steristrips on areaola edge.    Followup in one week.    Mack Méndez PA-C

## 2024-02-05 ENCOUNTER — TELEPHONE (OUTPATIENT)
Age: 74
End: 2024-02-05

## 2024-02-05 NOTE — TELEPHONE ENCOUNTER
Patient had her sx on 1/16 and a Post op on 1/25. She is questioning if the second Post Op on 2/22 was made too far out by mistake or is it normal to go a month after only 1 post op. I told the patient I would contact the office and have someone give her a call to confirm that a month out is good or r/s if she needed to be seen sooner.

## 2024-02-05 NOTE — TELEPHONE ENCOUNTER
Called pt's cell and LVM asking to return call to office. We will want to have pt scheduled sooner than 2/22. Pls schedule post op caden/ Mack or Connie at patient's earliest convenience.

## 2024-02-06 NOTE — TELEPHONE ENCOUNTER
Called patient and offered an appointment for this week 2/08/2024 with Víctor Avery for 1:30pm in [facility].

## 2024-02-07 ENCOUNTER — OFFICE VISIT (OUTPATIENT)
Dept: FAMILY MEDICINE CLINIC | Facility: CLINIC | Age: 74
End: 2024-02-07
Payer: COMMERCIAL

## 2024-02-07 VITALS
OXYGEN SATURATION: 99 % | BODY MASS INDEX: 33.15 KG/M2 | WEIGHT: 199 LBS | HEART RATE: 90 BPM | RESPIRATION RATE: 16 BRPM | SYSTOLIC BLOOD PRESSURE: 118 MMHG | DIASTOLIC BLOOD PRESSURE: 72 MMHG | HEIGHT: 65 IN | TEMPERATURE: 98 F

## 2024-02-07 DIAGNOSIS — F41.9 ANXIETY AND DEPRESSION: ICD-10-CM

## 2024-02-07 DIAGNOSIS — I25.10 CORONARY ARTERY DISEASE INVOLVING NATIVE CORONARY ARTERY OF NATIVE HEART WITHOUT ANGINA PECTORIS: ICD-10-CM

## 2024-02-07 DIAGNOSIS — J41.0 SIMPLE CHRONIC BRONCHITIS (HCC): ICD-10-CM

## 2024-02-07 DIAGNOSIS — G25.81 RESTLESS LEG SYNDROME: ICD-10-CM

## 2024-02-07 DIAGNOSIS — F33.1 MODERATE EPISODE OF RECURRENT MAJOR DEPRESSIVE DISORDER (HCC): ICD-10-CM

## 2024-02-07 DIAGNOSIS — F32.A ANXIETY AND DEPRESSION: ICD-10-CM

## 2024-02-07 DIAGNOSIS — F11.20 CONTINUOUS OPIOID DEPENDENCE (HCC): ICD-10-CM

## 2024-02-07 DIAGNOSIS — I10 ESSENTIAL HYPERTENSION: Primary | ICD-10-CM

## 2024-02-07 PROBLEM — Z01.89 ENCOUNTER FOR GERIATRIC ASSESSMENT: Status: RESOLVED | Noted: 2023-11-06 | Resolved: 2024-02-07

## 2024-02-07 PROBLEM — R00.2 PALPITATION: Status: RESOLVED | Noted: 2023-08-29 | Resolved: 2024-02-07

## 2024-02-07 PROBLEM — Z01.818 PREOPERATIVE EXAMINATION: Status: RESOLVED | Noted: 2024-01-11 | Resolved: 2024-02-07

## 2024-02-07 PROBLEM — M25.511 ACUTE PAIN OF RIGHT SHOULDER: Status: RESOLVED | Noted: 2023-03-22 | Resolved: 2024-02-07

## 2024-02-07 PROBLEM — J06.9 ACUTE URI: Status: RESOLVED | Noted: 2024-01-11 | Resolved: 2024-02-07

## 2024-02-07 PROCEDURE — 99214 OFFICE O/P EST MOD 30 MIN: CPT | Performed by: FAMILY MEDICINE

## 2024-02-07 RX ORDER — CITALOPRAM 20 MG/1
20 TABLET ORAL DAILY
Qty: 30 TABLET | Refills: 6 | Status: SHIPPED | OUTPATIENT
Start: 2024-02-07

## 2024-02-07 RX ORDER — CITALOPRAM 20 MG/1
20 TABLET ORAL DAILY
COMMUNITY
End: 2024-02-07 | Stop reason: SDUPTHER

## 2024-02-07 RX ORDER — BUPROPION HYDROCHLORIDE 150 MG/1
150 TABLET ORAL EVERY MORNING
Qty: 30 TABLET | Refills: 6 | Status: SHIPPED | OUTPATIENT
Start: 2024-02-07 | End: 2024-08-05

## 2024-02-07 NOTE — ASSESSMENT & PLAN NOTE
Will add wellbutrin 150mg po xl qd and follow up 4 weeks  start omega 3  flaxseed ( allergic to seafood)

## 2024-02-07 NOTE — PROGRESS NOTES
Name: Pretty Aguila      : 1950      MRN: [id number]  Encounter Provider: Lauren Dumont MD  Encounter Date: 2024   Encounter department: [facility] MEDICINE    Assessment & Plan     1. Essential hypertension  Assessment & Plan:  Well controlled on current therapy continue with current medications and will reassess next visit        2. Anxiety and depression  Assessment & Plan:  Will add wellbutrin 150mg po xl qd and follow up 4 weeks  start omega 3  flaxseed ( allergic to seafood)     Orders:  -     buPROPion (WELLBUTRIN XL) 150 mg 24 hr tablet; Take 1 tablet (150 mg total) by mouth every morning  -     citalopram (CeleXA) 20 mg tablet; Take 1 tablet (20 mg total) by mouth daily    3. Restless leg syndrome  Assessment & Plan:  Check iron panel  start magnesium citrate otc  would add meds in future if needed       4. Moderate episode of recurrent major depressive disorder (HCC)  Assessment & Plan:  Add wellbutrin to celexa  follow up 1 month      5. Simple chronic bronchitis (HCC)    6. Continuous opioid dependence (HCC)  Assessment & Plan:  Post op       7. Coronary artery disease involving native coronary artery of native heart without angina pectoris  Assessment & Plan:  Chest pain free cont followup with cardiology              Subjective      HPI  Review of Systems   Psychiatric/Behavioral:  Positive for dysphoric mood, self-injury and suicidal ideas. The patient is nervous/anxious.        Current Outpatient Medications on File Prior to Visit   Medication Sig   • anastrozole (ARIMIDEX) 1 mg tablet Take 1 tablet (1 mg total) by mouth daily   • Apremilast 30 MG TABS Take 30 mg by mouth 2 (two) times a day   • atorvastatin (LIPITOR) 10 mg tablet Take 1 tablet (10 mg total) by mouth daily   • cholecalciferol (VITAMIN D3) 400 units tablet Take 400 Units by mouth daily   • docusate sodium (COLACE) 100 mg capsule Take 1 capsule (100 mg total) by mouth 2 (two) times a day as needed for  "constipation   • furosemide (LASIX) 40 mg tablet Take 1 tablet (40 mg total) by mouth daily   • LORazepam (Ativan) 0.5 mg tablet Take 1 tablet (0.5 mg total) by mouth every 8 (eight) hours as needed for anxiety   • methocarbamol (ROBAXIN) 500 mg tablet Take 1 tablet (500 mg total) by mouth 3 (three) times a day as needed for muscle spasms   • metoprolol succinate (TOPROL-XL) 50 mg 24 hr tablet Take 1 tablet (50 mg total) by mouth daily   • Multiple Vitamin (multivitamin) tablet Take 1 tablet by mouth daily   • naloxone (NARCAN) 4 mg/0.1 mL nasal spray Administer 1 spray into a nostril. If no response after 2-3 minutes, give another dose in the other nostril using a new spray.   • nystatin (MYCOSTATIN) cream Apply topically 2 (two) times a day (Patient taking differently: Apply topically 2 (two) times a day As needed)   • nystatin (MYCOSTATIN) powder Apply topically 2 (two) times a day (Patient taking differently: Apply topically 2 (two) times a day As needed)   • ondansetron (ZOFRAN) 4 mg tablet Take 1 tablet (4 mg total) by mouth every 8 (eight) hours as needed for nausea or vomiting   • oxyCODONE (Roxicodone) 5 immediate release tablet Take 1 tablet (5 mg total) by mouth every 4 (four) hours as needed for severe pain Max Daily Amount: 30 mg   • [DISCONTINUED] citalopram (CeleXA) 20 mg tablet Take 20 mg by mouth daily       Objective     /72 (BP Location: Left arm, Patient Position: Sitting, Cuff Size: Standard)   Pulse 90   Temp 98 °F (36.7 °C) (Tympanic)   Resp 16   Ht 5' 5\" (1.651 m)   Wt 90.3 kg (199 lb)   SpO2 99%   BMI 33.12 kg/m²     Physical Exam  Constitutional:       General: She is not in acute distress.     Appearance: Normal appearance. She is well-developed. She is not ill-appearing.   Eyes:      Extraocular Movements: Extraocular movements intact.   Neck:      Thyroid: No thyromegaly.   Cardiovascular:      Rate and Rhythm: Normal rate.   Pulmonary:      Effort: Pulmonary effort is " normal. No respiratory distress.      Breath sounds: Normal breath sounds.   Musculoskeletal:      Cervical back: Normal range of motion.   Neurological:      General: No focal deficit present.      Mental Status: She is alert and oriented to person, place, and time. Mental status is at baseline.   Psychiatric:         Mood and Affect: Mood normal.         Behavior: Behavior normal.       Lauren Dumont MD

## 2024-02-08 ENCOUNTER — OFFICE VISIT (OUTPATIENT)
Dept: PLASTIC SURGERY | Facility: CLINIC | Age: 74
End: 2024-02-08

## 2024-02-08 DIAGNOSIS — Z17.0 MALIGNANT NEOPLASM OF LOWER-OUTER QUADRANT OF LEFT BREAST OF FEMALE, ESTROGEN RECEPTOR POSITIVE: Primary | ICD-10-CM

## 2024-02-08 DIAGNOSIS — C50.512 MALIGNANT NEOPLASM OF LOWER-OUTER QUADRANT OF LEFT BREAST OF FEMALE, ESTROGEN RECEPTOR POSITIVE: Primary | ICD-10-CM

## 2024-02-08 NOTE — PROGRESS NOTES
POST-OP EVALUATION  2/8/2024    Subjective   Pretty Aguila is a 73 y.o. female is here today for routine post-operative follow up.  01/16/24 1338               Procedures:      RIGHT BREAST REDUCTION AND LEFT BREAST EXPANDER EXCHANGE FOR PERMANENT IMPLANT. REVISION OF RECONSTRUCTED LEFT BREAST. (Bilateral: Breast)      LIPOSUCTION BREAST (Bilateral: Breast)     Pretty feels well. No concerns.    Past Medical History:   Diagnosis Date    Anxiety     Breast cancer (HCC) 09/2023    CAD (coronary artery disease)     Dyslipidemia     Hiatal hernia     Hyperlipidemia     Hypertension     Obesity (BMI 30-39.9)     Psoriatic arthritis (HCC)     SOB (shortness of breath)      Past Surgical History:   Procedure Laterality Date    BARIATRIC SURGERY  2014    BILE DUCT EXPLORATION      replacement per [place]    CARPAL TUNNEL RELEASE Bilateral 2002    CORONARY ARTERY BYPASS GRAFT  2017    FOOT SURGERY Right     bone surgery to straighten toe.    HIP SURGERY Left 2015    IR DRAINAGE TUBE PLACEMENT  12/19/2023    LYMPH NODE BIOPSY Left 11/20/2023    Procedure: LEFT LYMPHATIC MAPPING WITH BLUE AND RADIOACTIVE DYES, SENTINEL LYMPH NODE BIOPSY;  Surgeon: Adrien Gabriel MD;  Location:  MAIN OR;  Service: Surgical Oncology    LYMPH NODE DISSECTION Left 11/20/2023    Procedure: POSSIBLE AXILLARY DISSECTION;  Surgeon: Adrien Gabriel MD;  Location:  MAIN OR;  Service: Surgical Oncology    DC BREAST REDUCTION Bilateral 1/16/2024    Procedure: RIGHT BREAST REDUCTION AND LEFT BREAST EXPANDER EXCHANGE FOR PERMANENT IMPLANT. REVISION OF RECONSTRUCTED LEFT BREAST.;  Surgeon: Molina Jimenez MD;  Location:  MAIN OR;  Service: Plastics    DC SUCTION ASSISTED LIPECTOMY TRUNK Bilateral 1/16/2024    Procedure: LIPOSUCTION BREAST;  Surgeon: Molina Jimenez MD;  Location:  MAIN OR;  Service: Plastics    DC TISSUE EXPANDER PLACEMENT BREAST RECONSTRUCTION Left 11/20/2023    Procedure: IMMEDIATE LEFT BREAST RECONSTRUCTION WITH TISSUE EXPANDER AND ADM;   Surgeon: Molina Jimenez MD;  Location:  MAIN OR;  Service: Plastics    REPLACEMENT TOTAL KNEE Right 04/2017    SIMPLE MASTECTOMY Left 11/20/2023    Procedure: LEFT BREAST VERENICE  DIRECTED MASTECTOMY;  Surgeon: Adrien Gabriel MD;  Location:  MAIN OR;  Service: Surgical Oncology    SLEEVE GASTROPLASTY      TONSILLECTOMY      TOTAL HIP ARTHROPLASTY Right 2017    US GUIDED BREAST BIOPSY LEFT COMPLETE Left 09/19/2023        Current Outpatient Medications:     anastrozole (ARIMIDEX) 1 mg tablet, Take 1 tablet (1 mg total) by mouth daily, Disp: 90 tablet, Rfl: 1    Apremilast 30 MG TABS, Take 30 mg by mouth 2 (two) times a day, Disp: , Rfl:     atorvastatin (LIPITOR) 10 mg tablet, Take 1 tablet (10 mg total) by mouth daily, Disp: 90 tablet, Rfl: 3    buPROPion (WELLBUTRIN XL) 150 mg 24 hr tablet, Take 1 tablet (150 mg total) by mouth every morning, Disp: 30 tablet, Rfl: 6    cholecalciferol (VITAMIN D3) 400 units tablet, Take 400 Units by mouth daily, Disp: , Rfl:     citalopram (CeleXA) 20 mg tablet, Take 1 tablet (20 mg total) by mouth daily, Disp: 30 tablet, Rfl: 6    docusate sodium (COLACE) 100 mg capsule, Take 1 capsule (100 mg total) by mouth 2 (two) times a day as needed for constipation, Disp: 20 capsule, Rfl: 1    furosemide (LASIX) 40 mg tablet, Take 1 tablet (40 mg total) by mouth daily, Disp: 180 tablet, Rfl: 0    LORazepam (Ativan) 0.5 mg tablet, Take 1 tablet (0.5 mg total) by mouth every 8 (eight) hours as needed for anxiety, Disp: 20 tablet, Rfl: 0    methocarbamol (ROBAXIN) 500 mg tablet, Take 1 tablet (500 mg total) by mouth 3 (three) times a day as needed for muscle spasms, Disp: 15 tablet, Rfl: 1    metoprolol succinate (TOPROL-XL) 50 mg 24 hr tablet, Take 1 tablet (50 mg total) by mouth daily, Disp: 90 tablet, Rfl: 3    Multiple Vitamin (multivitamin) tablet, Take 1 tablet by mouth daily, Disp: , Rfl:     naloxone (NARCAN) 4 mg/0.1 mL nasal spray, Administer 1 spray into a nostril. If no response  after 2-3 minutes, give another dose in the other nostril using a new spray., Disp: 1 each, Rfl: 1    nystatin (MYCOSTATIN) cream, Apply topically 2 (two) times a day (Patient taking differently: Apply topically 2 (two) times a day As needed), Disp: 30 g, Rfl: 0    nystatin (MYCOSTATIN) powder, Apply topically 2 (two) times a day (Patient taking differently: Apply topically 2 (two) times a day As needed), Disp: 60 g, Rfl: 0    ondansetron (ZOFRAN) 4 mg tablet, Take 1 tablet (4 mg total) by mouth every 8 (eight) hours as needed for nausea or vomiting, Disp: 20 tablet, Rfl: 0    oxyCODONE (Roxicodone) 5 immediate release tablet, Take 1 tablet (5 mg total) by mouth every 4 (four) hours as needed for severe pain Max Daily Amount: 30 mg, Disp: 30 tablet, Rfl: 0  Allergies: Iodine - food allergy, Povidone iodine, and Shellfish-derived products - food allergy    Objective    Incisions intact.  Small opening under right breast (suture cut from this location.)  Breasts soft. Good symmetry.    Assessment/Plan   Diagnoses and all orders for this visit:    Malignant neoplasm of lower-outer quadrant of left breast of female, estrogen receptor positive (HCC)    Aquaphor to incisions.  Continue with compressive bra.  Followup in 2 weeks. (Would like to discuss facelift with Dr. Jimenez)    Mack Méndez PA-C

## 2024-02-14 DIAGNOSIS — M79.89 LEG SWELLING: ICD-10-CM

## 2024-02-14 RX ORDER — FUROSEMIDE 40 MG/1
TABLET ORAL
Qty: 180 TABLET | Refills: 0 | Status: SHIPPED | OUTPATIENT
Start: 2024-02-14

## 2024-02-19 ENCOUNTER — COSMETIC (OUTPATIENT)
Dept: PLASTIC SURGERY | Facility: CLINIC | Age: 74
End: 2024-02-19

## 2024-02-19 ENCOUNTER — OFFICE VISIT (OUTPATIENT)
Dept: PLASTIC SURGERY | Facility: CLINIC | Age: 74
End: 2024-02-19

## 2024-02-19 VITALS
HEIGHT: 65 IN | WEIGHT: 201.5 LBS | BODY MASS INDEX: 33.57 KG/M2 | SYSTOLIC BLOOD PRESSURE: 129 MMHG | HEART RATE: 75 BPM | DIASTOLIC BLOOD PRESSURE: 89 MMHG | TEMPERATURE: 97.2 F

## 2024-02-19 VITALS
HEART RATE: 75 BPM | DIASTOLIC BLOOD PRESSURE: 89 MMHG | WEIGHT: 201.5 LBS | BODY MASS INDEX: 33.57 KG/M2 | HEIGHT: 65 IN | SYSTOLIC BLOOD PRESSURE: 129 MMHG | TEMPERATURE: 97.2 F

## 2024-02-19 DIAGNOSIS — Z17.0 MALIGNANT NEOPLASM OF LOWER-OUTER QUADRANT OF LEFT BREAST OF FEMALE, ESTROGEN RECEPTOR POSITIVE: Primary | ICD-10-CM

## 2024-02-19 DIAGNOSIS — Z41.1 ENCOUNTER FOR COSMETIC SURGERY: Primary | ICD-10-CM

## 2024-02-19 DIAGNOSIS — C50.512 MALIGNANT NEOPLASM OF LOWER-OUTER QUADRANT OF LEFT BREAST OF FEMALE, ESTROGEN RECEPTOR POSITIVE: Primary | ICD-10-CM

## 2024-02-19 PROCEDURE — 99024 POSTOP FOLLOW-UP VISIT: CPT | Performed by: STUDENT IN AN ORGANIZED HEALTH CARE EDUCATION/TRAINING PROGRAM

## 2024-02-19 PROCEDURE — RECHECK: Performed by: STUDENT IN AN ORGANIZED HEALTH CARE EDUCATION/TRAINING PROGRAM

## 2024-02-19 NOTE — PROGRESS NOTES
Plastic Surgery Consult    Reason for visit: interested in facial rejuvenation    HPI from 2/19/2024  Patient interested in facial rejuvenation with facelift. Patient has never had facial surgery in the past. She is unhappy with ptotic excess skin in the lower face and diminished volume.     ROS: 12 pt ROS negative, except as otherwise noted in HPI    Past Medical History:   Diagnosis Date    Anxiety     Breast cancer (HCC) 09/2023    CAD (coronary artery disease)     Coronary artery disease [id number]    Dyslipidemia     Hiatal hernia     Hyperlipidemia     Hypertension     Obesity (BMI 30-39.9)     Psoriatic arthritis (HCC)     Rheumatoid arthritis (HCC)     SOB (shortness of breath)        FamHx: non-contrib  Past Surgical History:   Procedure Laterality Date    BARIATRIC SURGERY  2014    BILE DUCT EXPLORATION      replacement per [place]    BREAST BIOPSY  9/23    CARPAL TUNNEL RELEASE Bilateral 2002    CORONARY ARTERY BYPASS GRAFT  2017    FOOT SURGERY Right     bone surgery to straighten toe.    HIP SURGERY Left 2015    IR DRAINAGE TUBE PLACEMENT  12/19/2023    LYMPH NODE BIOPSY Left 11/20/2023    Procedure: LEFT LYMPHATIC MAPPING WITH BLUE AND RADIOACTIVE DYES, SENTINEL LYMPH NODE BIOPSY;  Surgeon: Adrien Gabriel MD;  Location:  MAIN OR;  Service: Surgical Oncology    LYMPH NODE DISSECTION Left 11/20/2023    Procedure: POSSIBLE AXILLARY DISSECTION;  Surgeon: Adrien Gabriel MD;  Location:  MAIN OR;  Service: Surgical Oncology    [place] BREAST REDUCTION Bilateral 01/16/2024    Procedure: RIGHT BREAST REDUCTION AND LEFT BREAST EXPANDER EXCHANGE FOR PERMANENT IMPLANT. REVISION OF RECONSTRUCTED LEFT BREAST.;  Surgeon: Molina Jimenez MD;  Location:  MAIN OR;  Service: Plastics    [place] SUCTION ASSISTED LIPECTOMY TRUNK Bilateral 01/16/2024    Procedure: LIPOSUCTION BREAST;  Surgeon: Molina Jimenez MD;  Location:  MAIN OR;  Service: Plastics    [place] TISSUE EXPANDER PLACEMENT BREAST RECONSTRUCTION Left 11/20/2023    Procedure:  IMMEDIATE LEFT BREAST RECONSTRUCTION WITH TISSUE EXPANDER AND ADM;  Surgeon: Molina Jimenez MD;  Location: [facility];  Service: Plastics    REPLACEMENT TOTAL KNEE Right 2017    SIMPLE MASTECTOMY Left 2023    Procedure: LEFT BREAST VERENICE  DIRECTED MASTECTOMY;  Surgeon: Adrien Gabriel MD;  Location:  MAIN OR;  Service: Surgical Oncology    SLEEVE GASTROPLASTY      TONSILLECTOMY      TOTAL HIP ARTHROPLASTY Right 2017    US GUIDED BREAST BIOPSY LEFT COMPLETE Left 2023       Social History     Socioeconomic History    Marital status: /Civil Union     Spouse name: Not on file    Number of children: Not on file    Years of education: Not on file    Highest education level: Not on file   Occupational History    Not on file   Tobacco Use    Smoking status: Former     Current packs/day: 0.00     Average packs/day: 0.5 packs/day for 7.0 years (3.5 ttl pk-yrs)     Types: Cigarettes     Start date:      Quit date:      Years since quittin.1    Smokeless tobacco: Never   Vaping Use    Vaping status: Never Used   Substance and Sexual Activity    Alcohol use: Yes     Alcohol/week: 3.0 standard drinks of alcohol     Types: 3 Glasses of wine per week     Comment: social     Drug use: No    Sexual activity: Not Currently     Partners: Male     Birth control/protection: Female Sterilization, None   Other Topics Concern    Not on file   Social History Narrative    Not on file     Social Determinants of Health     Financial Resource Strain: Not on file   Food Insecurity: No Food Insecurity (2024)    Hunger Vital Sign     Worried About Running Out of Food in the Last Year: Never true     Ran Out of Food in the Last Year: Never true   Transportation Needs: No Transportation Needs (2024)    PRAPARE - Transportation     Lack of Transportation (Medical): No     Lack of Transportation (Non-Medical): No   Physical Activity: Not on file   Stress: Not on file   Social Connections: Not on file    Intimate Partner Violence: Not on file   Housing Stability: Low Risk  (1/17/2024)    Housing Stability Vital Sign     Unable to Pay for Housing in the Last Year: No     Number of Places Lived in the Last Year: 1     Unstable Housing in the Last Year: No       Current Outpatient Medications on File Prior to Visit   Medication Sig Dispense Refill    anastrozole (ARIMIDEX) 1 mg tablet Take 1 tablet (1 mg total) by mouth daily 90 tablet 1    Apremilast 30 MG TABS Take 30 mg by mouth 2 (two) times a day      atorvastatin (LIPITOR) 10 mg tablet Take 1 tablet (10 mg total) by mouth daily 90 tablet 3    buPROPion (WELLBUTRIN XL) 150 mg 24 hr tablet Take 1 tablet (150 mg total) by mouth every morning 30 tablet 6    cholecalciferol (VITAMIN D3) 400 units tablet Take 400 Units by mouth daily      citalopram (CeleXA) 20 mg tablet Take 1 tablet (20 mg total) by mouth daily 30 tablet 6    docusate sodium (COLACE) 100 mg capsule Take 1 capsule (100 mg total) by mouth 2 (two) times a day as needed for constipation 20 capsule 1    furosemide (LASIX) 40 mg tablet TAKE 1 TABLET (40 MG TOTAL) BY MOUTH 2 (TWO) TIMES A  tablet 0    LORazepam (Ativan) 0.5 mg tablet Take 1 tablet (0.5 mg total) by mouth every 8 (eight) hours as needed for anxiety 20 tablet 0    methocarbamol (ROBAXIN) 500 mg tablet Take 1 tablet (500 mg total) by mouth 3 (three) times a day as needed for muscle spasms 15 tablet 1    metoprolol succinate (TOPROL-XL) 50 mg 24 hr tablet Take 1 tablet (50 mg total) by mouth daily 90 tablet 3    Multiple Vitamin (multivitamin) tablet Take 1 tablet by mouth daily      naloxone (NARCAN) 4 mg/0.1 mL nasal spray Administer 1 spray into a nostril. If no response after 2-3 minutes, give another dose in the other nostril using a new spray. 1 each 1    nystatin (MYCOSTATIN) cream Apply topically 2 (two) times a day (Patient taking differently: Apply topically 2 (two) times a day As needed) 30 g 0    nystatin (MYCOSTATIN)  powder Apply topically 2 (two) times a day (Patient taking differently: Apply topically 2 (two) times a day As needed) 60 g 0    ondansetron (ZOFRAN) 4 mg tablet Take 1 tablet (4 mg total) by mouth every 8 (eight) hours as needed for nausea or vomiting 20 tablet 0    oxyCODONE (Roxicodone) 5 immediate release tablet Take 1 tablet (5 mg total) by mouth every 4 (four) hours as needed for severe pain Max Daily Amount: 30 mg 30 tablet 0     No current facility-administered medications on file prior to visit.       Allergies   Allergen Reactions    Iodine - Food Allergy Anaphylaxis     Reaction Date: 07Jan2008;     Povidone Iodine Anaphylaxis    Shellfish-Derived Products - Food Allergy Anaphylaxis         PE:    Vitals:    02/19/24 0945   BP: 129/89   Pulse: 75   Temp: (!) 97.2 °F (36.2 °C)       General: NC/AT, breathing comfortably on RA  Neuro: CN II-XII grossly intact, symmetric reflexes  HEENT: PERRLA, EOMI, external ears normal, no lesions or deformities, neck supple, trachea midline  Respiratory: CTAB, normal respiratory effort  Cardio: RRR, normal S1, S2, no murmur, rubs, gallops  GI: soft, non-tender, non-distended  Extremities/MSK: normal alignment, mobility, gait, no edema  Skin: no rashes, lesions, subcutaneous nodules    Bilateral excess ptotic skin of face  Deep nasolabial folds  Diminished volume in malar prominences  Dermatochalasis and fat herniation of bilateral lower eyelids    A/P: 72 y/o female interested in surgical facial rejuvenation.  -Discussed facelift with fat grafting to face and bilateral lower blepharoplasties  -Discussed scarring and usage of fat grafting to augment facial volume. Discussed lower blepharoplasties as this will be more noticeable if not treated as well.  -Will verify medical history at preop if scheduled  -Will email rita Jimenez MD   [facility]   [address] [place] [address], [address]   [place] [address]   Office: [phone]

## 2024-02-19 NOTE — PROGRESS NOTES
PRS Note    Patient is 1 month s/p left breast tissue expander exchange for permanent implant and right breast reduction    Doing well, very happy with aesthetic results    Right breast with good shape and size, slightly larger than left reconstructed breast  Incisions all healed except for some mild fibrinous debris at 6'o clock position of areola  Left breast soft, incisions well healed    Plan: Doing well, no issues. Interested in cosmetic surgery  -Follow-up in 2 months for 3 month followup, sooner if issues    Molina Jimenez MD   [facility]   [address] [place] [address], [address]   [place] [address]   Office: [phone]

## 2024-02-21 ENCOUNTER — PATIENT OUTREACH (OUTPATIENT)
Dept: CASE MANAGEMENT | Facility: OTHER | Age: 74
End: 2024-02-21

## 2024-02-21 NOTE — PROGRESS NOTES
OSW placed outreach TC to pt this morning. Pts VM was full, therfore this writer was unable to leave a message. OSW will call her at a later time.

## 2024-02-27 ENCOUNTER — OFFICE VISIT (OUTPATIENT)
Dept: GASTROENTEROLOGY | Facility: AMBULARY SURGERY CENTER | Age: 74
End: 2024-02-27
Payer: COMMERCIAL

## 2024-02-27 VITALS
BODY MASS INDEX: 33.45 KG/M2 | WEIGHT: 200.8 LBS | SYSTOLIC BLOOD PRESSURE: 124 MMHG | HEART RATE: 75 BPM | DIASTOLIC BLOOD PRESSURE: 74 MMHG | HEIGHT: 65 IN | OXYGEN SATURATION: 97 %

## 2024-02-27 DIAGNOSIS — R10.13 DYSPEPSIA: ICD-10-CM

## 2024-02-27 DIAGNOSIS — K92.89 GAS BLOAT SYNDROME: ICD-10-CM

## 2024-02-27 DIAGNOSIS — R19.5 LOOSE STOOLS: Primary | ICD-10-CM

## 2024-02-27 DIAGNOSIS — R15.9 FULL INCONTINENCE OF FECES: ICD-10-CM

## 2024-02-27 PROCEDURE — 99214 OFFICE O/P EST MOD 30 MIN: CPT | Performed by: PHYSICIAN ASSISTANT

## 2024-02-27 NOTE — PROGRESS NOTES
Assessment and Plan    #1. Loose stools with incontinence, bloating and gas: symptoms persisting, EGD and colonoscopy unremarkable for source of symptoms, will pass stool in her underwear without notice some days. Forgot about trying the fiber and just started this a few days ago with possibly some benefit  -can trial fiber supplement for next week or two, can increase to BID as well if helping  -if no benefit with fiber, recommend SIBO testing due to bloating, gas, and loose stools  -consider questran in the future for symptoms if SIBO negative  -also consider anal manometry if incontinence is not improving with above     #2. Dyspepsia: occasional, uses tums as needed  -can use tums or pepcid as needed  -no indication for daily treatment at this time  -EGD without any chronic GERD changes   --------------------------------------------------------------------------------------------------------------------    Chief Complaint: f/u EGD/colonoscopy    HPI: Pretty Aguila is a 73 y.o. female with a history of sleeve gastrectomy, CAD s/p CABG, RA, anxiety, recent diagnosis of breast cancer s/p mastectomy who presents today for follow up. She was seen in October for loose stools and incontinence that had been going on for quite some time before that. She underwent EGD and colonoscopy. EGD was notable for gastritis, small hiatal hernia, and sleeve gastrectomy with normal duodenal and stomach bx. Colonoscopy was normal s/p random bx, had some focal inflammation with no chronic changes or signs of microscopic colitis.     She was advised to try a daily fiber supplement for the loose stools and incontinence but forgot about this until a few days ago at which time she started to take 2 heaping teaspoons of metamucil daily. She may have seen some benefit. She reports daily issues with soft stools and often passes stools in her underwear with no warning. She has associated gas and bloating as well. Denies abdominal pain. No blood  in stool or black stools. Weight stable. No significant upper GI symptoms, occasional heartburn at night for which she uses tums. Denies any floating or greasy stools.     Review of Systems:   General: negative for fatigue, fever, night sweats or unexpected weight loss  Psychological: negative for anxiety or depression  Ophthalmic: negative for blurry vision or scleral icterus  ENT: negative for headaches, oral lesions, sore throat, vocal changes or dysphagia  Hematological and Lymphatic: negative for pallor or swollen lymph nodes  Respiratory: negative for cough, shortness of breath or wheezing  Cardiovascular: negative for chest pain, edema or murmur  Gastrointestinal: as mentioned in HPI  Genito-Urinary: negative for dysuria or incontinence  Musculoskeletal: negative for joint pain, joint stiffness or joint swelling  Dermatological: negative for pruritus, rash, or jaundice    Current Medications  Current Outpatient Medications   Medication Sig Dispense Refill    anastrozole (ARIMIDEX) 1 mg tablet Take 1 tablet (1 mg total) by mouth daily 90 tablet 1    Apremilast 30 MG TABS Take 30 mg by mouth 2 (two) times a day      atorvastatin (LIPITOR) 10 mg tablet Take 1 tablet (10 mg total) by mouth daily 90 tablet 3    buPROPion (WELLBUTRIN XL) 150 mg 24 hr tablet Take 1 tablet (150 mg total) by mouth every morning 30 tablet 6    cholecalciferol (VITAMIN D3) 400 units tablet Take 400 Units by mouth daily      citalopram (CeleXA) 20 mg tablet Take 1 tablet (20 mg total) by mouth daily 30 tablet 6    docusate sodium (COLACE) 100 mg capsule Take 1 capsule (100 mg total) by mouth 2 (two) times a day as needed for constipation 20 capsule 1    furosemide (LASIX) 40 mg tablet TAKE 1 TABLET (40 MG TOTAL) BY MOUTH 2 (TWO) TIMES A  tablet 0    LORazepam (Ativan) 0.5 mg tablet Take 1 tablet (0.5 mg total) by mouth every 8 (eight) hours as needed for anxiety 20 tablet 0    methocarbamol (ROBAXIN) 500 mg tablet Take 1 tablet  (500 mg total) by mouth 3 (three) times a day as needed for muscle spasms 15 tablet 1    metoprolol succinate (TOPROL-XL) 50 mg 24 hr tablet Take 1 tablet (50 mg total) by mouth daily 90 tablet 3    Multiple Vitamin (multivitamin) tablet Take 1 tablet by mouth daily      naloxone (NARCAN) 4 mg/0.1 mL nasal spray Administer 1 spray into a nostril. If no response after 2-3 minutes, give another dose in the other nostril using a new spray. 1 each 1    nystatin (MYCOSTATIN) cream Apply topically 2 (two) times a day (Patient taking differently: Apply topically 2 (two) times a day As needed) 30 g 0    nystatin (MYCOSTATIN) powder Apply topically 2 (two) times a day (Patient taking differently: Apply topically 2 (two) times a day As needed) 60 g 0    ondansetron (ZOFRAN) 4 mg tablet Take 1 tablet (4 mg total) by mouth every 8 (eight) hours as needed for nausea or vomiting 20 tablet 0    oxyCODONE (Roxicodone) 5 immediate release tablet Take 1 tablet (5 mg total) by mouth every 4 (four) hours as needed for severe pain Max Daily Amount: 30 mg 30 tablet 0     No current facility-administered medications for this visit.       Past Medical History  Past Medical History:   Diagnosis Date    Anxiety     Breast cancer (HCC) 09/2023    CAD (coronary artery disease)     Coronary artery disease [id number]    Dyslipidemia     Hiatal hernia     Hyperlipidemia     Hypertension     Obesity (BMI 30-39.9)     Psoriatic arthritis (HCC)     Rheumatoid arthritis (HCC)     SOB (shortness of breath)        Past Surgical History  Past Surgical History:   Procedure Laterality Date    BARIATRIC SURGERY  2014    BILE DUCT EXPLORATION      replacement per [place]    BREAST BIOPSY  9/23    CARPAL TUNNEL RELEASE Bilateral 2002    CORONARY ARTERY BYPASS GRAFT  2017    FOOT SURGERY Right     bone surgery to straighten toe.    HIP SURGERY Left 2015    IR DRAINAGE TUBE PLACEMENT  12/19/2023    LYMPH NODE BIOPSY Left 11/20/2023    Procedure: LEFT LYMPHATIC  MAPPING WITH BLUE AND RADIOACTIVE DYES, SENTINEL LYMPH NODE BIOPSY;  Surgeon: Adrien Gabriel MD;  Location: [facility];  Service: Surgical Oncology    LYMPH NODE DISSECTION Left 2023    Procedure: POSSIBLE AXILLARY DISSECTION;  Surgeon: Adrien Gabriel MD;  Location: [facility];  Service: Surgical Oncology    [place] BREAST REDUCTION Bilateral 2024    Procedure: RIGHT BREAST REDUCTION AND LEFT BREAST EXPANDER EXCHANGE FOR PERMANENT IMPLANT. REVISION OF RECONSTRUCTED LEFT BREAST.;  Surgeon: Molina Jimenez MD;  Location: [facility];  Service: Plastics    [place] SUCTION ASSISTED LIPECTOMY TRUNK Bilateral 2024    Procedure: LIPOSUCTION BREAST;  Surgeon: Molina Jimenez MD;  Location: [facility];  Service: Plastics    [place] TISSUE EXPANDER PLACEMENT BREAST RECONSTRUCTION Left 2023    Procedure: IMMEDIATE LEFT BREAST RECONSTRUCTION WITH TISSUE EXPANDER AND ADM;  Surgeon: Molina Jimenez MD;  Location: [facility];  Service: Plastics    REPLACEMENT TOTAL KNEE Right 2017    SIMPLE MASTECTOMY Left 2023    Procedure: LEFT BREAST VERENICE  DIRECTED MASTECTOMY;  Surgeon: Adrien Gabriel MD;  Location: [facility];  Service: Surgical Oncology    SLEEVE GASTROPLASTY      TONSILLECTOMY      TOTAL HIP ARTHROPLASTY Right 2017    US GUIDED BREAST BIOPSY LEFT COMPLETE Left 2023       Past Social History   Social History     Socioeconomic History    Marital status: /Civil Union     Spouse name: Not on file    Number of children: Not on file    Years of education: Not on file    Highest education level: Not on file   Occupational History    Not on file   Tobacco Use    Smoking status: Former     Current packs/day: 0.00     Average packs/day: 0.5 packs/day for 7.0 years (3.5 ttl pk-yrs)     Types: Cigarettes     Start date:      Quit date: 2017     Years since quittin.1    Smokeless tobacco: Never   Vaping Use    Vaping status: Never Used   Substance and Sexual Activity    Alcohol use: Yes     Alcohol/week:  "3.0 standard drinks of alcohol     Types: 3 Glasses of wine per week     Comment: social     Drug use: No    Sexual activity: Not Currently     Partners: Male     Birth control/protection: Female Sterilization, None   Other Topics Concern    Not on file   Social History Narrative    Not on file     Social Determinants of Health     Financial Resource Strain: Not on file   Food Insecurity: No Food Insecurity (1/17/2024)    Hunger Vital Sign     Worried About Running Out of Food in the Last Year: Never true     Ran Out of Food in the Last Year: Never true   Transportation Needs: No Transportation Needs (1/17/2024)    PRAPARE - Transportation     Lack of Transportation (Medical): No     Lack of Transportation (Non-Medical): No   Physical Activity: Not on file   Stress: Not on file   Social Connections: Not on file   Intimate Partner Violence: Not on file   Housing Stability: Low Risk  (1/17/2024)    Housing Stability Vital Sign     Unable to Pay for Housing in the Last Year: No     Number of Places Lived in the Last Year: 1     Unstable Housing in the Last Year: No       The following portions of the patient's history were reviewed and updated as appropriate: allergies, current medications, past family history, past medical history, past social history, past surgical history, and problem list.    Vital Signs  /74   BP Location Left arm   Patient Position Sitting   Cuff Size Standard   Pulse 75   SpO2 97 %   Weight 91.1 kg (200 lb 12.8 oz)   Height 5' 5\" (1.651 m)       Physical Exam:  General appearance: alert, cooperative, no distress  HEENT: normocephalic, anicteric, no eye erythema or discharge, no oropharyngeal thrush  Neck: supple, trachea midline, no adenopathy  Lungs: CTA b/l, no rales, rhonchi, or wheezing, unlabored respirations  Heart: RRR, no murmur, rubs, or gallops  Abdomen: soft, non-tender, non-distended, normal bowel sounds, no masses or organomegaly  Rectal: deferred  Extremities: no cyanosis, " clubbing, or edema  Musculoskeletal: normal gait  Skin: color and texture normal, no jaundice, no rashes or lesions  Psychiatric: alert and oriented, normal affect and behavior

## 2024-02-28 ENCOUNTER — TELEPHONE (OUTPATIENT)
Dept: HEMATOLOGY ONCOLOGY | Facility: CLINIC | Age: 74
End: 2024-02-28

## 2024-02-28 NOTE — TELEPHONE ENCOUNTER
SOO  DR allison ANTHONY   Who are you speaking with? Patient   If it is not the patient, are they listed on an active communication consent form? Yes   Is this a SOO or DR allison ATNHONY SOO   Which provider is patient currently scheduled or established with? Dr. Williamson   What is the original appointment date and time? 3/18/24   At which location is the appointment scheduled to take place? [facility]   Which provider is the patient transitioning care to? Dr Geno Carranza   What is the new appointment date and time? 3/28/24   At which location is the new appointment scheduled to take place? [facility]   What is the reason for this change? Provider location

## 2024-02-29 ENCOUNTER — CONSULT (OUTPATIENT)
Dept: PAIN MEDICINE | Facility: CLINIC | Age: 74
End: 2024-02-29
Payer: COMMERCIAL

## 2024-02-29 VITALS
SYSTOLIC BLOOD PRESSURE: 102 MMHG | HEART RATE: 74 BPM | WEIGHT: 202 LBS | BODY MASS INDEX: 33.66 KG/M2 | HEIGHT: 65 IN | DIASTOLIC BLOOD PRESSURE: 66 MMHG | RESPIRATION RATE: 16 BRPM

## 2024-02-29 DIAGNOSIS — M54.59 LUMBAR FACET JOINT PAIN: Primary | ICD-10-CM

## 2024-02-29 DIAGNOSIS — M48.062 SPINAL STENOSIS OF LUMBAR REGION WITH NEUROGENIC CLAUDICATION: ICD-10-CM

## 2024-02-29 DIAGNOSIS — M47.816 LUMBAR SPONDYLOSIS: ICD-10-CM

## 2024-02-29 PROCEDURE — 99244 OFF/OP CNSLTJ NEW/EST MOD 40: CPT | Performed by: PHYSICAL MEDICINE & REHABILITATION

## 2024-02-29 NOTE — PROGRESS NOTES
Assessment  1. Lumbar facet joint pain    2. Spinal stenosis of lumbar region with neurogenic claudication    3. Lumbar spondylosis        Plan  Ms. Aguila is a pleasant 73-year-old female significant past medical history of CAD status post CABG x 3, RA, bariatric surgery presents to [facility] for initial evaluation and referral from Dr. Colón regarding ongoing chronic low back pain with a previous history of lumbar spinal stenosis with neurogenic claudication.  During today's evaluation she is demonstrating isolated low back pain with both clinical and diagnostic evidence of facet mediated low back pain.  She has exhausted conservative measures with home exercises, medication management and therapy with minimal relief.  At this time  The patient has been experiencing moderate to severe axial spine pain that is causing functional deficit.  The pain has been present for at least 3 months and is not improving with conservative care.  Currently the patient is not experiencing any radicular features nor neurogenic claudication.  Nonfacet pathology has been ruled out on clinical evaluation.    We will schedule the patient for bilateral L2, L3, L4 medial branch nerve blocks with intention of moving forward towards radiofrequency ablation if there is an appropriate diagnostic response.  The initial blocks will be performed with 0.25% bupivacaine and if an appropriate response is obtained upon review of the patient's pain diary, a confirmatory block will be scheduled with 0.75% bupivacaine.     In the office today, we reviewed the nature of facet joint pathology in depth using a spine model. We discussed the approach we would use for the injections and provided literature for home review. The patient understands the risks associated with the procedure including bleeding, infection, tissue injury, and allergic reaction and provided verbal informed consent in the office today.      My impressions and  treatment recommendations were discussed in detail with the patient who verbalized understanding and had no further questions.  Discharge instructions were provided. I personally saw and examined the patient and I agree with the above discussed plan of care.    Orders Placed This Encounter   Procedures    FL spine and pain procedure     Standing Status:   Future     Standing Expiration Date:   2/29/2028     Order Specific Question:   Reason for Exam:     Answer:   Bilateral L2, L3, L4 MBB #1     Order Specific Question:   Anticoagulant hold needed?     Answer:   No     No orders of the defined types were placed in this encounter.      History of Present Illness    Pretty Aguila is a 73 y.o. female presents to [facility] for initial evaluation regarding several months duration of low back, bilateral hip and left knee pain.  Denies any significant inciting event or recent trauma.  Today reports severe pain rated 5-10 out of 10 and interfere with daily activities.  States symptoms are nearly constant 60 to 95% of the time that is present throughout the day and night.  Describes symptoms as pressure-like, dull aching, throbbing pain.  Also reports lower extremity weakness but denies any falls.  Requires a cane for ambulation.  Symptoms are worse with standing, walking, exercise.  Reports excellent relief with previous physical therapy and moderate relief with chiropractic manipulation.  Denies smoking, marijuana use.  Admits to social alcohol use.  Previously tried tramadol which did provide relief.  Presents today for initial evaluation.    I have personally reviewed and/or updated the patient's past medical history, past surgical history, family history, social history, current medications, allergies, and vital signs today.     Review of Systems   Constitutional:  Negative for fever and unexpected weight change.   HENT:  Negative for trouble swallowing.    Eyes:  Negative for visual disturbance.    Respiratory:  Negative for shortness of breath and wheezing.    Cardiovascular:  Negative for chest pain and palpitations.   Gastrointestinal:  Negative for constipation, diarrhea, nausea and vomiting.   Endocrine: Negative for cold intolerance, heat intolerance and polydipsia.   Genitourinary:  Negative for difficulty urinating and frequency.   Musculoskeletal:  Positive for back pain, gait problem and joint swelling. Negative for arthralgias and myalgias.        B/L Hip Pain   Skin:  Negative for rash.   Neurological:  Negative for dizziness, seizures, syncope, weakness and headaches.   Hematological:  Does not bruise/bleed easily.   Psychiatric/Behavioral:  Negative for dysphoric mood.    All other systems reviewed and are negative.      Patient Active Problem List   Diagnosis    Coronary artery disease involving native coronary artery of native heart without angina pectoris    Essential hypertension    Dyslipidemia    S/P CABG x 3    Vitamin D deficiency    Postsurgical malabsorption    Status post bariatric surgery    At risk for sleep apnea    Chronic bilateral low back pain without sciatica    Candida infection of flexural skin    Postmenopausal    Rheumatoid arthritis involving multiple sites with positive rheumatoid factor (HCC)    Other fatigue    Malignant neoplasm of lower-outer quadrant of left breast of female, estrogen receptor positive (HCC)    Change in bowel habits    Anxiety    Moderate episode of recurrent major depressive disorder (HCC)    Anxiety and depression    Restless leg syndrome    Simple chronic bronchitis (HCC)    Continuous opioid dependence (HCC)       Past Medical History:   Diagnosis Date    Anxiety     Breast cancer (HCC) 09/2023    CAD (coronary artery disease)     Coronary artery disease [id number]    Dyslipidemia     Hiatal hernia     Hyperlipidemia     Hypertension     Obesity (BMI 30-39.9)     Psoriatic arthritis (HCC)     Rheumatoid arthritis (HCC)     SOB (shortness of  breath)        Past Surgical History:   Procedure Laterality Date    BARIATRIC SURGERY  2014    BILE DUCT EXPLORATION      replacement per Steffanie    BREAST BIOPSY  9/23    CARPAL TUNNEL RELEASE Bilateral 2002    CORONARY ARTERY BYPASS GRAFT  2017    FOOT SURGERY Right     bone surgery to straighten toe.    HIP SURGERY Left 2015    IR DRAINAGE TUBE PLACEMENT  12/19/2023    LYMPH NODE BIOPSY Left 11/20/2023    Procedure: LEFT LYMPHATIC MAPPING WITH BLUE AND RADIOACTIVE DYES, SENTINEL LYMPH NODE BIOPSY;  Surgeon: Adrien Gabriel MD;  Location:  MAIN OR;  Service: Surgical Oncology    LYMPH NODE DISSECTION Left 11/20/2023    Procedure: POSSIBLE AXILLARY DISSECTION;  Surgeon: Adrien Gabriel MD;  Location: [facility] MAIN OR;  Service: Surgical Oncology    NV BREAST REDUCTION Bilateral 01/16/2024    Procedure: RIGHT BREAST REDUCTION AND LEFT BREAST EXPANDER EXCHANGE FOR PERMANENT IMPLANT. REVISION OF RECONSTRUCTED LEFT BREAST.;  Surgeon: Molina Jimenez MD;  Location:  MAIN OR;  Service: Plastics    NV SUCTION ASSISTED LIPECTOMY TRUNK Bilateral 01/16/2024    Procedure: LIPOSUCTION BREAST;  Surgeon: Molina Jimenez MD;  Location:  MAIN OR;  Service: Plastics    NV TISSUE EXPANDER PLACEMENT BREAST RECONSTRUCTION Left 11/20/2023    Procedure: IMMEDIATE LEFT BREAST RECONSTRUCTION WITH TISSUE EXPANDER AND ADM;  Surgeon: Molina Jimenez MD;  Location: [facility]IN OR;  Service: Plastics    REPLACEMENT TOTAL KNEE Right 04/2017    SIMPLE MASTECTOMY Left 11/20/2023    Procedure: LEFT BREAST VERENICE  DIRECTED MASTECTOMY;  Surgeon: Adrien Gabriel MD;  Location:  MAIN OR;  Service: Surgical Oncology    SLEEVE GASTROPLASTY      TONSILLECTOMY      TOTAL HIP ARTHROPLASTY Right 2017    US GUIDED BREAST BIOPSY LEFT COMPLETE Left 09/19/2023       Family History   Problem Relation Age of Onset    Hypertension Mother     Diabetes Mother     Heart disease Father         CABG x5    No Known Problems Sister     No Known Problems Sister     No Known Problems  Son     No Known Problems Daughter     No Known Problems Daughter        Social History     Occupational History    Not on file   Tobacco Use    Smoking status: Former     Current packs/day: 0.00     Average packs/day: 0.5 packs/day for 7.0 years (3.5 ttl pk-yrs)     Types: Cigarettes     Start date:      Quit date: 2017     Years since quittin.1    Smokeless tobacco: Never   Vaping Use    Vaping status: Never Used   Substance and Sexual Activity    Alcohol use: Yes     Alcohol/week: 3.0 standard drinks of alcohol     Types: 3 Glasses of wine per week     Comment: social     Drug use: No    Sexual activity: Not Currently     Partners: Male     Birth control/protection: Female Sterilization, None       Current Outpatient Medications on File Prior to Visit   Medication Sig    anastrozole (ARIMIDEX) 1 mg tablet Take 1 tablet (1 mg total) by mouth daily    Apremilast 30 MG TABS Take 30 mg by mouth 2 (two) times a day    atorvastatin (LIPITOR) 10 mg tablet Take 1 tablet (10 mg total) by mouth daily    buPROPion (WELLBUTRIN XL) 150 mg 24 hr tablet Take 1 tablet (150 mg total) by mouth every morning    cholecalciferol (VITAMIN D3) 400 units tablet Take 400 Units by mouth daily    citalopram (CeleXA) 20 mg tablet Take 1 tablet (20 mg total) by mouth daily    docusate sodium (COLACE) 100 mg capsule Take 1 capsule (100 mg total) by mouth 2 (two) times a day as needed for constipation    furosemide (LASIX) 40 mg tablet TAKE 1 TABLET (40 MG TOTAL) BY MOUTH 2 (TWO) TIMES A DAY    LORazepam (Ativan) 0.5 mg tablet Take 1 tablet (0.5 mg total) by mouth every 8 (eight) hours as needed for anxiety    methocarbamol (ROBAXIN) 500 mg tablet Take 1 tablet (500 mg total) by mouth 3 (three) times a day as needed for muscle spasms    metoprolol succinate (TOPROL-XL) 50 mg 24 hr tablet Take 1 tablet (50 mg total) by mouth daily    Multiple Vitamin (multivitamin) tablet Take 1 tablet by mouth daily    naloxone (NARCAN) 4 mg/0.1 mL  "nasal spray Administer 1 spray into a nostril. If no response after 2-3 minutes, give another dose in the other nostril using a new spray.    nystatin (MYCOSTATIN) cream Apply topically 2 (two) times a day (Patient taking differently: Apply topically 2 (two) times a day As needed)    nystatin (MYCOSTATIN) powder Apply topically 2 (two) times a day (Patient taking differently: Apply topically 2 (two) times a day As needed)    ondansetron (ZOFRAN) 4 mg tablet Take 1 tablet (4 mg total) by mouth every 8 (eight) hours as needed for nausea or vomiting    oxyCODONE (Roxicodone) 5 immediate release tablet Take 1 tablet (5 mg total) by mouth every 4 (four) hours as needed for severe pain Max Daily Amount: 30 mg     No current facility-administered medications on file prior to visit.       Allergies   Allergen Reactions    Iodine - Food Allergy Anaphylaxis     Reaction Date: 07Jan2008;     Povidone Iodine Anaphylaxis    Shellfish-Derived Products - Food Allergy Anaphylaxis       Physical Exam    /66   Pulse 74   Resp 16   Ht 5' 5\" (1.651 m)   Wt 91.6 kg (202 lb)   BMI 33.61 kg/m²     Constitutional: normal, well developed, well nourished, alert, in no distress and non-toxic and no overt pain behavior.  Eyes: anicteric  HEENT: grossly intact  Neck: supple, symmetric, trachea midline and no masses   Pulmonary:even and unlabored  Cardiovascular:No edema or pitting edema present  Skin:Normal without rashes or lesions and well hydrated  Psychiatric:Mood and affect appropriate  Neurologic:Cranial Nerves II-XII grossly intact  Musculoskeletal:antalgic and ambulates with cane, tenderness to palpation bilateral lumbar paraspinals, decreased active and passive range of motion with lumbar flexion and extension limited by pain, MMT 5 out of 5 bilateral lower extremities, sensation grossly tact light touch, negative straight leg raise bilaterally, positive pain to palpation bilateral lumbar facets with axial loading and " rotational forces to the right and left, DTRs hyporeflexic bilateral patellar and Achilles reflexes    Imaging    LUMBAR SPINE     INDICATION:   Pain, unspecified.      COMPARISON: 10/11/2021     VIEWS:  XR SPINE LUMBAR MINIMUM 4 VIEWS NON INJURY  Images: 4     FINDINGS:     There are 5 non rib bearing lumbar vertebral bodies.      There is no evidence of acute fracture or destructive osseous lesion.     Stable dextro lumbar scoliosis.     Stable severe degenerative change in the setting of diffuse osteopenia.     The pedicles appear intact.     There are atherosclerotic calcifications. Soft tissues are otherwise unremarkable.     IMPRESSION:        No acute osseous abnormality.       Degenerative changes as described.     Electronically signed: 01/17/2024 02:21 PM Sushil Washington, MPH,MD

## 2024-03-06 ENCOUNTER — PATIENT OUTREACH (OUTPATIENT)
Dept: CASE MANAGEMENT | Facility: OTHER | Age: 74
End: 2024-03-06

## 2024-03-06 ENCOUNTER — OFFICE VISIT (OUTPATIENT)
Dept: FAMILY MEDICINE CLINIC | Facility: CLINIC | Age: 74
End: 2024-03-06
Payer: COMMERCIAL

## 2024-03-06 VITALS
BODY MASS INDEX: 33.99 KG/M2 | DIASTOLIC BLOOD PRESSURE: 70 MMHG | HEIGHT: 65 IN | TEMPERATURE: 97.2 F | WEIGHT: 204 LBS | SYSTOLIC BLOOD PRESSURE: 120 MMHG | HEART RATE: 76 BPM | OXYGEN SATURATION: 96 % | RESPIRATION RATE: 16 BRPM

## 2024-03-06 DIAGNOSIS — F32.A ANXIETY AND DEPRESSION: Primary | ICD-10-CM

## 2024-03-06 DIAGNOSIS — F41.9 ANXIETY AND DEPRESSION: Primary | ICD-10-CM

## 2024-03-06 DIAGNOSIS — Z23 ENCOUNTER FOR IMMUNIZATION: ICD-10-CM

## 2024-03-06 DIAGNOSIS — Z23 NEED FOR COVID-19 VACCINE: ICD-10-CM

## 2024-03-06 DIAGNOSIS — I10 ESSENTIAL HYPERTENSION: ICD-10-CM

## 2024-03-06 DIAGNOSIS — F33.1 MODERATE EPISODE OF RECURRENT MAJOR DEPRESSIVE DISORDER (HCC): ICD-10-CM

## 2024-03-06 PROCEDURE — 91320 SARSCV2 VAC 30MCG TRS-SUC IM: CPT | Performed by: FAMILY MEDICINE

## 2024-03-06 PROCEDURE — 90471 IMMUNIZATION ADMIN: CPT

## 2024-03-06 PROCEDURE — 99214 OFFICE O/P EST MOD 30 MIN: CPT | Performed by: FAMILY MEDICINE

## 2024-03-06 PROCEDURE — 90480 ADMN SARSCOV2 VAC 1/ONLY CMP: CPT | Performed by: FAMILY MEDICINE

## 2024-03-06 PROCEDURE — 90677 PCV20 VACCINE IM: CPT

## 2024-03-06 RX ORDER — BUPROPION HYDROCHLORIDE 300 MG/1
300 TABLET ORAL EVERY MORNING
Qty: 90 TABLET | Refills: 3 | Status: SHIPPED | OUTPATIENT
Start: 2024-03-06 | End: 2025-03-01

## 2024-03-06 NOTE — PROGRESS NOTES
OSW placed outreach TC to pt this morning. She was on another call and then states that she has an appointment. Pt states that later today she will be available. OSW will place another TC this afternoon.     ADDENDUM:  OSW placed TC this afternoon to the pt. OSW received her VM. Detailed VM was provided.

## 2024-03-06 NOTE — ASSESSMENT & PLAN NOTE
Uncontrolled Only mild improvement with addition of Wellbutrin 150 mg daily in addition to her Celexa. Will increase to Wellbutrin 300 mg daily and follow up 3 to 4 weeks.

## 2024-03-06 NOTE — ASSESSMENT & PLAN NOTE
Pt only mild improvement on wellbutrin and celexa will increase wellbutrin to 300Xl qd and follow up 3-4 weeks pt unable to go back to work  will give FMLA 2 months  3/6/24-5/5/24 rtw 5/6

## 2024-03-06 NOTE — PROGRESS NOTES
Name: Pretty Aguila      : 1950      MRN: [id number]  Encounter Provider: Lauren Dumont MD  Encounter Date: 3/6/2024   Encounter department: [facility]    Assessment & Plan     1. Anxiety and depression  Assessment & Plan:  Pt only mild improvement on wellbutrin and celexa will increase wellbutrin to 300Xl qd and follow up 3-4 weeks pt unable to go back to work  will give FMLA 2 months  3/6/24-24 rtw      Orders:  -     buPROPion (WELLBUTRIN XL) 300 mg 24 hr tablet; Take 1 tablet (300 mg total) by mouth every morning    2. Essential hypertension  Assessment & Plan:  Well controlled on current therapy continue with current medications and will reassess next visit        3. Moderate episode of recurrent major depressive disorder (HCC)  Assessment & Plan:  Uncontrolled Only mild improvement with addition of Wellbutrin 150 mg daily in addition to her Celexa. Will increase to Wellbutrin 300 mg daily and follow up 3 to 4 weeks.             Subjective      Pt here to discuss Anxiety and depression, only mild improvement with addition of Wellbutrin to celexa not suicidal  at this time       Review of Systems   Psychiatric/Behavioral:  Positive for dysphoric mood and sleep disturbance. Negative for self-injury and suicidal ideas. The patient is nervous/anxious.        Current Outpatient Medications on File Prior to Visit   Medication Sig   • anastrozole (ARIMIDEX) 1 mg tablet Take 1 tablet (1 mg total) by mouth daily   • Apremilast 30 MG TABS Take 30 mg by mouth 2 (two) times a day   • atorvastatin (LIPITOR) 10 mg tablet Take 1 tablet (10 mg total) by mouth daily   • cholecalciferol (VITAMIN D3) 400 units tablet Take 400 Units by mouth daily   • citalopram (CeleXA) 20 mg tablet Take 1 tablet (20 mg total) by mouth daily   • docusate sodium (COLACE) 100 mg capsule Take 1 capsule (100 mg total) by mouth 2 (two) times a day as needed for constipation   • furosemide (LASIX) 40 mg  "tablet TAKE 1 TABLET (40 MG TOTAL) BY MOUTH 2 (TWO) TIMES A DAY   • LORazepam (Ativan) 0.5 mg tablet Take 1 tablet (0.5 mg total) by mouth every 8 (eight) hours as needed for anxiety   • methocarbamol (ROBAXIN) 500 mg tablet Take 1 tablet (500 mg total) by mouth 3 (three) times a day as needed for muscle spasms   • metoprolol succinate (TOPROL-XL) 50 mg 24 hr tablet Take 1 tablet (50 mg total) by mouth daily   • Multiple Vitamin (multivitamin) tablet Take 1 tablet by mouth daily   • naloxone (NARCAN) 4 mg/0.1 mL nasal spray Administer 1 spray into a nostril. If no response after 2-3 minutes, give another dose in the other nostril using a new spray.   • nystatin (MYCOSTATIN) cream Apply topically 2 (two) times a day (Patient taking differently: Apply topically 2 (two) times a day As needed)   • nystatin (MYCOSTATIN) powder Apply topically 2 (two) times a day (Patient taking differently: Apply topically 2 (two) times a day As needed)   • ondansetron (ZOFRAN) 4 mg tablet Take 1 tablet (4 mg total) by mouth every 8 (eight) hours as needed for nausea or vomiting   • oxyCODONE (Roxicodone) 5 immediate release tablet Take 1 tablet (5 mg total) by mouth every 4 (four) hours as needed for severe pain Max Daily Amount: 30 mg   • [DISCONTINUED] buPROPion (WELLBUTRIN XL) 150 mg 24 hr tablet Take 1 tablet (150 mg total) by mouth every morning       Objective     /70   Pulse 76   Temp (!) 97.2 °F (36.2 °C)   Resp 16   Ht 5' 5\" (1.651 m)   Wt 92.5 kg (204 lb)   SpO2 96%   BMI 33.95 kg/m²     Physical Exam  Constitutional:       General: She is not in acute distress.     Appearance: Normal appearance. She is well-developed. She is not ill-appearing.   Eyes:      Extraocular Movements: Extraocular movements intact.   Neck:      Thyroid: No thyromegaly.   Cardiovascular:      Rate and Rhythm: Normal rate.   Pulmonary:      Effort: Pulmonary effort is normal. No respiratory distress.      Breath sounds: Normal breath " sounds.   Musculoskeletal:      Cervical back: Normal range of motion.   Neurological:      General: No focal deficit present.      Mental Status: She is alert and oriented to person, place, and time. Mental status is at baseline.   Psychiatric:         Behavior: Behavior normal.      Comments: Mild blunted affect        Lauren Dumont MD

## 2024-03-08 ENCOUNTER — TELEPHONE (OUTPATIENT)
Dept: HEMATOLOGY ONCOLOGY | Facility: CLINIC | Age: 74
End: 2024-03-08

## 2024-03-08 NOTE — TELEPHONE ENCOUNTER
Spoke to pt in regards to upcoming appointment needing to be rescheduled to 3/25 at 2:00p.m with Tere in the [facility]. Pt verbalized understanding.

## 2024-03-11 ENCOUNTER — TELEPHONE (OUTPATIENT)
Age: 74
End: 2024-03-11

## 2024-03-11 NOTE — TELEPHONE ENCOUNTER
Patient thought she was going to be starting a new med, based on office visit with Dr. Dumont 03/06/24. She was informed that notes from that visit mention dosage increase of bupropion from 150mg to 300mg. She said she picked that up and isn't sure if that was the only change. Or should she also be expecting a new med?    Please call patient back at #[phone]

## 2024-03-25 ENCOUNTER — TELEPHONE (OUTPATIENT)
Dept: HEMATOLOGY ONCOLOGY | Facility: CLINIC | Age: 74
End: 2024-03-25

## 2024-03-25 NOTE — TELEPHONE ENCOUNTER
Called pt in regards to their missed appt with Dr. Carranza at 2:00 on 3/25. Pt did not answer so advised the pt to r/s with the [employer]. Provided the [employer] phone number [phone].

## 2024-03-27 ENCOUNTER — OFFICE VISIT (OUTPATIENT)
Dept: FAMILY MEDICINE CLINIC | Facility: CLINIC | Age: 74
End: 2024-03-27
Payer: COMMERCIAL

## 2024-03-27 VITALS
HEART RATE: 74 BPM | BODY MASS INDEX: 33.32 KG/M2 | RESPIRATION RATE: 16 BRPM | OXYGEN SATURATION: 97 % | WEIGHT: 200 LBS | DIASTOLIC BLOOD PRESSURE: 72 MMHG | SYSTOLIC BLOOD PRESSURE: 112 MMHG | TEMPERATURE: 97.3 F | HEIGHT: 65 IN

## 2024-03-27 DIAGNOSIS — F32.A ANXIETY AND DEPRESSION: Primary | ICD-10-CM

## 2024-03-27 DIAGNOSIS — R73.09 ABNORMAL GLUCOSE: ICD-10-CM

## 2024-03-27 DIAGNOSIS — R73.03 PREDIABETES: ICD-10-CM

## 2024-03-27 DIAGNOSIS — F41.9 ANXIETY AND DEPRESSION: Primary | ICD-10-CM

## 2024-03-27 LAB — SL AMB POCT HEMOGLOBIN AIC: 5.7 (ref ?–6.5)

## 2024-03-27 PROCEDURE — 99214 OFFICE O/P EST MOD 30 MIN: CPT | Performed by: FAMILY MEDICINE

## 2024-03-27 PROCEDURE — 83036 HEMOGLOBIN GLYCOSYLATED A1C: CPT | Performed by: FAMILY MEDICINE

## 2024-03-27 RX ORDER — TRAMADOL HYDROCHLORIDE 100 MG/1
100 TABLET, EXTENDED RELEASE ORAL DAILY
COMMUNITY
Start: 2024-03-12

## 2024-03-27 RX ORDER — PREDNISONE 1 MG/1
TABLET ORAL
COMMUNITY
Start: 2024-03-20

## 2024-03-27 NOTE — ASSESSMENT & PLAN NOTE
Is Doing better on  meds hopes she may be able to return to work before May date will reassess in 2 weeks

## 2024-03-27 NOTE — PROGRESS NOTES
Name: Pretty Aguila      : 1950      MRN: [id number]  Encounter Provider: Lauren Dumont MD  Encounter Date: 3/27/2024   Encounter department: [facility]    Assessment & Plan     1. Anxiety and depression  Assessment & Plan:  Is Doing better on  meds hopes she may be able to return to work before May date will reassess in 2 weeks       2. Abnormal glucose  Assessment & Plan:  Check HGA1c prediabetes      3. Prediabetes  Assessment & Plan:  Hga1c 5.7  avoid sweets starches and increase  exercise     Orders:  -     POCT hemoglobin A1c           Subjective      Patient here for reevaluation of anxiety and depression on medication. Patient states she is feeling better at this time and hopes  she may be able to return  sooner than May Patient also had recent lab work and had elevated glucose.      Review of Systems   Respiratory:  Negative for cough, chest tightness and shortness of breath.    Cardiovascular:  Negative for chest pain, palpitations and leg swelling.   Neurological:  Negative for dizziness, weakness, light-headedness and headaches.   Psychiatric/Behavioral:  Positive for dysphoric mood (improved only mild now). Negative for self-injury and suicidal ideas. The patient is not nervous/anxious.        Current Outpatient Medications on File Prior to Visit   Medication Sig   • anastrozole (ARIMIDEX) 1 mg tablet Take 1 tablet (1 mg total) by mouth daily   • Apremilast 30 MG TABS Take 30 mg by mouth 2 (two) times a day   • atorvastatin (LIPITOR) 10 mg tablet Take 1 tablet (10 mg total) by mouth daily   • buPROPion (WELLBUTRIN XL) 300 mg 24 hr tablet Take 1 tablet (300 mg total) by mouth every morning   • cholecalciferol (VITAMIN D3) 400 units tablet Take 400 Units by mouth daily   • citalopram (CeleXA) 20 mg tablet Take 1 tablet (20 mg total) by mouth daily   • docusate sodium (COLACE) 100 mg capsule Take 1 capsule (100 mg total) by mouth 2 (two) times a day as needed for  "constipation   • furosemide (LASIX) 40 mg tablet TAKE 1 TABLET (40 MG TOTAL) BY MOUTH 2 (TWO) TIMES A DAY   • LORazepam (Ativan) 0.5 mg tablet Take 1 tablet (0.5 mg total) by mouth every 8 (eight) hours as needed for anxiety   • methocarbamol (ROBAXIN) 500 mg tablet Take 1 tablet (500 mg total) by mouth 3 (three) times a day as needed for muscle spasms   • metoprolol succinate (TOPROL-XL) 50 mg 24 hr tablet Take 1 tablet (50 mg total) by mouth daily   • Multiple Vitamin (multivitamin) tablet Take 1 tablet by mouth daily   • naloxone (NARCAN) 4 mg/0.1 mL nasal spray Administer 1 spray into a nostril. If no response after 2-3 minutes, give another dose in the other nostril using a new spray.   • nystatin (MYCOSTATIN) cream Apply topically 2 (two) times a day (Patient taking differently: Apply topically 2 (two) times a day As needed)   • nystatin (MYCOSTATIN) powder Apply topically 2 (two) times a day (Patient taking differently: Apply topically 2 (two) times a day As needed)   • ondansetron (ZOFRAN) 4 mg tablet Take 1 tablet (4 mg total) by mouth every 8 (eight) hours as needed for nausea or vomiting   • predniSONE 1 mg tablet TAKE 1 TABLET (1 MG TOTAL) BY MOUTH 3 (THREE) TIMES A DAY.   • traMADol (ULTRAM-ER) 100 mg 24 hr tablet Take 100 mg by mouth daily   • [DISCONTINUED] oxyCODONE (Roxicodone) 5 immediate release tablet Take 1 tablet (5 mg total) by mouth every 4 (four) hours as needed for severe pain Max Daily Amount: 30 mg       Objective     /72 (BP Location: Left arm, Patient Position: Sitting, Cuff Size: Standard)   Pulse 74   Temp (!) 97.3 °F (36.3 °C) (Tympanic)   Resp 16   Ht 5' 5\" (1.651 m)   Wt 90.7 kg (200 lb)   SpO2 97%   BMI 33.28 kg/m²     Physical Exam  Constitutional:       General: She is not in acute distress.     Appearance: Normal appearance. She is well-developed. She is not ill-appearing.   Eyes:      Extraocular Movements: Extraocular movements intact.   Neck:      Thyroid: No " thyromegaly.   Cardiovascular:      Rate and Rhythm: Normal rate.   Pulmonary:      Effort: Pulmonary effort is normal. No respiratory distress.      Breath sounds: Normal breath sounds.   Musculoskeletal:      Cervical back: Normal range of motion.   Neurological:      General: No focal deficit present.      Mental Status: She is alert and oriented to person, place, and time. Mental status is at baseline.   Psychiatric:         Mood and Affect: Mood normal.         Behavior: Behavior normal.     Depression Screening Follow-up Plan: Patient's depression screening was positive with a PHQ-2 score of . Their PHQ-9 score was 8. Patient declines further evaluation by mental health professional and/or medications. They have no active suicidal ideations. Brief counseling provided and recommend additional follow-up/re-evaluation at next office visit. Patient advised to follow-up with PCP for further management.  Lauren Dumont MD

## 2024-03-28 ENCOUNTER — OFFICE VISIT (OUTPATIENT)
Dept: SURGICAL ONCOLOGY | Facility: CLINIC | Age: 74
End: 2024-03-28
Payer: COMMERCIAL

## 2024-03-28 VITALS
BODY MASS INDEX: 33.32 KG/M2 | SYSTOLIC BLOOD PRESSURE: 116 MMHG | TEMPERATURE: 97.2 F | HEART RATE: 77 BPM | HEIGHT: 65 IN | WEIGHT: 200 LBS | DIASTOLIC BLOOD PRESSURE: 74 MMHG | OXYGEN SATURATION: 97 % | RESPIRATION RATE: 16 BRPM

## 2024-03-28 DIAGNOSIS — Z17.0 MALIGNANT NEOPLASM OF LOWER-OUTER QUADRANT OF LEFT BREAST OF FEMALE, ESTROGEN RECEPTOR POSITIVE (HCC): Primary | ICD-10-CM

## 2024-03-28 DIAGNOSIS — C50.512 MALIGNANT NEOPLASM OF LOWER-OUTER QUADRANT OF LEFT BREAST OF FEMALE, ESTROGEN RECEPTOR POSITIVE (HCC): Primary | ICD-10-CM

## 2024-03-28 DIAGNOSIS — F41.9 ANXIETY: ICD-10-CM

## 2024-03-28 PROCEDURE — 99213 OFFICE O/P EST LOW 20 MIN: CPT

## 2024-03-28 RX ORDER — LORAZEPAM 0.5 MG/1
0.5 TABLET ORAL EVERY 8 HOURS PRN
Qty: 20 TABLET | Refills: 0 | Status: SHIPPED | OUTPATIENT
Start: 2024-03-28

## 2024-03-28 NOTE — PROGRESS NOTES
Surgical Oncology Follow Up       [facility] SURGICAL ONCOLOGY [place]  [address]  [place] [address]    Pretty Aguila  1950  [id number]  [facility] SURGICAL ONCOLOGY [place]  [address]  [place] [address]    Diagnoses and all orders for this visit:    Malignant neoplasm of lower-outer quadrant of left breast of female, estrogen receptor positive   -     Mammo diagnostic right w 3d & cad; Future        Chief Complaint   Patient presents with    Follow-up       Return in about 6 months (around 9/28/2024) for Office Visit, Imaging - See orders.      Oncology History   Malignant neoplasm of lower-outer quadrant of left breast of female, estrogen receptor positive (HCC)   9/19/2023 Biopsy    Left breast ultrasound-guided biopsy  4 o'clock, 2 cm from nipple (VERENICE)  Invasive breast carcinoma of no special type (ductal NST)  Grade 2  ER 90, SC 90, HER2 1+\  Lymphovascular invasion not definitively identified    Concordant. Malignancy appears unifocal; however, oval circumscribed mass in the upper outer left breast is not accounted for. Bilateral breast MRI is recommended. Biopsy-proven carcinoma measured 3.1 cm on US. Left axilla US negative. Right breast clear.     9/28/2023 Observation    Bilateral breast MRI: Unifocal carcinoma in the lower outer left breast with possible skin involvement. There are no suspicious enhancing masses or suspicious areas of non-mass enhancement in right breast. There is no axillary or internal mammary adenopathy.     10/17/2023 Genetic Testing    A total of 47 genes were evaluated, including: HUYEN, BRCA1, BRCA2, CDH1, CHEK2, PALB2, PTEN, STK11, TP53  Negative result. No pathogenic sequence variants identified  [employer]     11/20/2023 Surgery    Left VERENICE  directed mastectomy with SLN biopsy   Invasive carcinoma of no special type (ductal)  Grade 2  3.6 cm   Margins  negative  0/1 Lymph node  Anatomic Stage IIA  Prognostic Stage IA    Immediate reconstruction with tissue expander and ADM (Dr. Jimenez)     11/20/2023 -  Cancer Staged    Staging form: Breast, AJCC 8th Edition  - Pathologic stage from 11/20/2023: Stage IA (pT2, pN0(sn), cM0, G2, ER+, MT+, HER2-) - Signed by Adrien Gabriel MD on 12/6/2023  Stage prefix: Initial diagnosis  Method of lymph node assessment: Pierz lymph node biopsy  Multigene prognostic tests performed: None  Histologic grading system: 3 grade system             History of Present Illness: The patient returns to the office today in follow-up for her recently treated left breast cancer.  She is 4 months s/p mastectomy with sentinel lymph node biopsy and reconstruction, and is doing very well.  She denies any new lumps, skin changes or tenderness, and is very pleased with the reconstruction.  She denies any headaches, dizziness or bone pain.  She reports losing a few pounds but is not concerned.  She continues on the anastrazole without complaint.      Review of Systems   Constitutional:  Negative for activity change, appetite change, fatigue and unexpected weight change.   HENT: Negative.     Respiratory: Negative.  Negative for shortness of breath.    Cardiovascular: Negative.    Gastrointestinal: Negative.  Negative for abdominal distention, abdominal pain, nausea and vomiting.   Musculoskeletal:  Positive for arthralgias.   Skin: Negative.  Negative for color change and wound.   Neurological: Negative.  Negative for dizziness and headaches.   Hematological: Negative.  Negative for adenopathy.   Psychiatric/Behavioral: Negative.               Patient Active Problem List   Diagnosis    Coronary artery disease involving native coronary artery of native heart without angina pectoris    Essential hypertension    Dyslipidemia    S/P CABG x 3    Vitamin D deficiency    Postsurgical malabsorption    Status post bariatric surgery    At risk for sleep apnea     Chronic bilateral low back pain without sciatica    Candida infection of flexural skin    Postmenopausal    Rheumatoid arthritis involving multiple sites with positive rheumatoid factor (HCC)    Other fatigue    Malignant neoplasm of lower-outer quadrant of left breast of female, estrogen receptor positive (HCC)    Change in bowel habits    Moderate episode of recurrent major depressive disorder (HCC)    Anxiety and depression    Restless leg syndrome    Simple chronic bronchitis (HCC)    Continuous opioid dependence (HCC)    Abnormal glucose    Prediabetes     Past Medical History:   Diagnosis Date    Allergic     Anxiety     Arthritis     Breast cancer (HCC) 09/2023    CAD (coronary artery disease)     Coronary artery disease [id number]    Depression     Dyslipidemia     Hiatal hernia     Hyperlipidemia     Hypertension     Obesity     Obesity (BMI 30-39.9)     Psoriatic arthritis (HCC)     Rheumatoid arthritis (HCC)     Scoliosis     SOB (shortness of breath)      Past Surgical History:   Procedure Laterality Date    BARIATRIC SURGERY  2014    BILE DUCT EXPLORATION      replacement per [place]    BREAST BIOPSY  9/23    CARPAL TUNNEL RELEASE Bilateral 2002    CORONARY ARTERY BYPASS GRAFT  2017    FOOT SURGERY Right     bone surgery to straighten toe.    HIP SURGERY Left 2015    IR DRAINAGE TUBE PLACEMENT  12/19/2023    JOINT REPLACEMENT      LYMPH NODE BIOPSY Left 11/20/2023    Procedure: LEFT LYMPHATIC MAPPING WITH BLUE AND RADIOACTIVE DYES, SENTINEL LYMPH NODE BIOPSY;  Surgeon: Adrien Gabriel MD;  Location:  MAIN OR;  Service: Surgical Oncology    LYMPH NODE DISSECTION Left 11/20/2023    Procedure: POSSIBLE AXILLARY DISSECTION;  Surgeon: Adrien Gabriel MD;  Location:  MAIN OR;  Service: Surgical Oncology    IL BREAST REDUCTION Bilateral 01/16/2024    Procedure: RIGHT BREAST REDUCTION AND LEFT BREAST EXPANDER EXCHANGE FOR PERMANENT IMPLANT. REVISION OF RECONSTRUCTED LEFT BREAST.;  Surgeon: Molina Jimenez MD;  Location:  [facility] MAIN OR;  Service: Plastics    NY SUCTION ASSISTED LIPECTOMY TRUNK Bilateral 2024    Procedure: LIPOSUCTION BREAST;  Surgeon: Molina Jimenez MD;  Location: [facility];  Service: Plastics    NY TISSUE EXPANDER PLACEMENT BREAST RECONSTRUCTION Left 2023    Procedure: IMMEDIATE LEFT BREAST RECONSTRUCTION WITH TISSUE EXPANDER AND ADM;  Surgeon: Molina Jimenez MD;  Location: [facility];  Service: Plastics    REPLACEMENT TOTAL KNEE Right 2017    SIMPLE MASTECTOMY Left 2023    Procedure: LEFT BREAST VERENICE  DIRECTED MASTECTOMY;  Surgeon: Adrien Gabriel MD;  Location: [facility];  Service: Surgical Oncology    SLEEVE GASTROPLASTY      TONSILLECTOMY      TOTAL HIP ARTHROPLASTY Right 2017    US GUIDED BREAST BIOPSY LEFT COMPLETE Left 2023     Family History   Problem Relation Age of Onset    Hypertension Mother     Diabetes Mother     Heart disease Father         CABG x5    No Known Problems Sister     No Known Problems Sister     No Known Problems Son     No Known Problems Daughter     No Known Problems Daughter      Social History     Socioeconomic History    Marital status: /Civil Union     Spouse name: Not on file    Number of children: Not on file    Years of education: Not on file    Highest education level: Not on file   Occupational History    Not on file   Tobacco Use    Smoking status: Former     Current packs/day: 0.00     Average packs/day: 0.5 packs/day for 7.0 years (3.5 ttl pk-yrs)     Types: Cigarettes     Start date:      Quit date: 2017     Years since quittin.2    Smokeless tobacco: Never   Vaping Use    Vaping status: Never Used   Substance and Sexual Activity    Alcohol use: Yes     Alcohol/week: 3.0 standard drinks of alcohol     Types: 3 Glasses of wine per week     Comment: social     Drug use: No    Sexual activity: Not Currently     Partners: Male     Birth control/protection: None   Other Topics Concern    Not on file   Social History Narrative    Not on  file     Social Determinants of Health     Financial Resource Strain: Not on file   Food Insecurity: No Food Insecurity (1/17/2024)    Hunger Vital Sign     Worried About Running Out of Food in the Last Year: Never true     Ran Out of Food in the Last Year: Never true   Transportation Needs: No Transportation Needs (1/17/2024)    PRAPARE - Transportation     Lack of Transportation (Medical): No     Lack of Transportation (Non-Medical): No   Physical Activity: Not on file   Stress: Not on file   Social Connections: Not on file   Intimate Partner Violence: Not on file   Housing Stability: Low Risk  (1/17/2024)    Housing Stability Vital Sign     Unable to Pay for Housing in the Last Year: No     Number of Places Lived in the Last Year: 1     Unstable Housing in the Last Year: No       Current Outpatient Medications:     anastrozole (ARIMIDEX) 1 mg tablet, Take 1 tablet (1 mg total) by mouth daily, Disp: 90 tablet, Rfl: 1    Apremilast 30 MG TABS, Take 30 mg by mouth 2 (two) times a day, Disp: , Rfl:     atorvastatin (LIPITOR) 10 mg tablet, Take 1 tablet (10 mg total) by mouth daily, Disp: 90 tablet, Rfl: 3    buPROPion (WELLBUTRIN XL) 300 mg 24 hr tablet, Take 1 tablet (300 mg total) by mouth every morning, Disp: 90 tablet, Rfl: 3    cholecalciferol (VITAMIN D3) 400 units tablet, Take 400 Units by mouth daily, Disp: , Rfl:     citalopram (CeleXA) 20 mg tablet, Take 1 tablet (20 mg total) by mouth daily, Disp: 30 tablet, Rfl: 6    furosemide (LASIX) 40 mg tablet, TAKE 1 TABLET (40 MG TOTAL) BY MOUTH 2 (TWO) TIMES A DAY, Disp: 180 tablet, Rfl: 0    LORazepam (Ativan) 0.5 mg tablet, Take 1 tablet (0.5 mg total) by mouth every 8 (eight) hours as needed for anxiety, Disp: 20 tablet, Rfl: 0    metoprolol succinate (TOPROL-XL) 50 mg 24 hr tablet, Take 1 tablet (50 mg total) by mouth daily, Disp: 90 tablet, Rfl: 3    Multiple Vitamin (multivitamin) tablet, Take 1 tablet by mouth daily, Disp: , Rfl:     nystatin (MYCOSTATIN)  cream, Apply topically 2 (two) times a day (Patient taking differently: Apply topically 2 (two) times a day As needed), Disp: 30 g, Rfl: 0    nystatin (MYCOSTATIN) powder, Apply topically 2 (two) times a day (Patient taking differently: Apply topically 2 (two) times a day As needed), Disp: 60 g, Rfl: 0    predniSONE 1 mg tablet, TAKE 1 TABLET (1 MG TOTAL) BY MOUTH 3 (THREE) TIMES A DAY., Disp: , Rfl:     traMADol (ULTRAM-ER) 100 mg 24 hr tablet, Take 100 mg by mouth daily, Disp: , Rfl:     docusate sodium (COLACE) 100 mg capsule, Take 1 capsule (100 mg total) by mouth 2 (two) times a day as needed for constipation (Patient not taking: Reported on 3/28/2024), Disp: 20 capsule, Rfl: 1    methocarbamol (ROBAXIN) 500 mg tablet, Take 1 tablet (500 mg total) by mouth 3 (three) times a day as needed for muscle spasms (Patient not taking: Reported on 3/28/2024), Disp: 15 tablet, Rfl: 1    naloxone (NARCAN) 4 mg/0.1 mL nasal spray, Administer 1 spray into a nostril. If no response after 2-3 minutes, give another dose in the other nostril using a new spray. (Patient not taking: Reported on 3/28/2024), Disp: 1 each, Rfl: 1    ondansetron (ZOFRAN) 4 mg tablet, Take 1 tablet (4 mg total) by mouth every 8 (eight) hours as needed for nausea or vomiting (Patient not taking: Reported on 3/28/2024), Disp: 20 tablet, Rfl: 0  Allergies   Allergen Reactions    Iodine - Food Allergy Anaphylaxis     Reaction Date: 07Jan2008;     Povidone Iodine Anaphylaxis    Shellfish-Derived Products - Food Allergy Anaphylaxis     Vitals:    03/28/24 1102   BP: 116/74   Pulse: 77   Resp: 16   Temp: (!) 97.2 °F (36.2 °C)   SpO2: 97%       Physical Exam  Vitals reviewed.   Constitutional:       General: She is not in acute distress.     Appearance: Normal appearance. She is not ill-appearing or toxic-appearing.   HENT:      Head: Normocephalic and atraumatic.      Nose: Nose normal.      Mouth/Throat:      Mouth: Mucous membranes are moist.   Eyes:       General: No scleral icterus.     Conjunctiva/sclera: Conjunctivae normal.   Cardiovascular:      Rate and Rhythm: Normal rate.   Pulmonary:      Effort: Pulmonary effort is normal.   Chest:          Comments: Reconstructed left breast is smooth and even.  Right breast s/p mastopexy has well-healed incisions.  There are no dominant masses, lumps, skin changes or tenderness.  I do not appreciate any adenopathy.  Musculoskeletal:         General: No swelling or tenderness. Normal range of motion.      Cervical back: Normal range of motion and neck supple.   Lymphadenopathy:      Cervical: No cervical adenopathy.      Upper Body:      Right upper body: No supraclavicular, axillary or pectoral adenopathy.      Left upper body: No supraclavicular, axillary or pectoral adenopathy.   Skin:     General: Skin is warm and dry.      Findings: No erythema.   Neurological:      General: No focal deficit present.      Mental Status: She is alert and oriented to person, place, and time.   Psychiatric:         Mood and Affect: Mood normal.         Behavior: Behavior normal.         Thought Content: Thought content normal.         Judgment: Judgment normal.           Discussion/Summary: This is a 73 y/o female who presents today for breast cancer surveillance.  She is doing very well, and there is no evidence of disease at this time.  She has lost over 10lbs since her post-op visit but denies any symptoms; we will continue to monitor her weight on future visits.  I will see her again in 6 months following mammogram, or sooner should the need arise.  She is agreeable to the plan, all questions were answered today.

## 2024-03-29 ENCOUNTER — TELEPHONE (OUTPATIENT)
Dept: MAMMOGRAPHY | Facility: CLINIC | Age: 74
End: 2024-03-29

## 2024-04-04 ENCOUNTER — PATIENT OUTREACH (OUTPATIENT)
Dept: CASE MANAGEMENT | Facility: OTHER | Age: 74
End: 2024-04-04

## 2024-04-04 NOTE — PROGRESS NOTES
OSW placed outreach TC to pt this afternoon. Per chart review she is doing well. OSW received her VM. Detailed VM was provided. OSW will close the referral at this time. She has this writer's contact information if any needs present in the future.

## 2024-04-05 ENCOUNTER — TELEPHONE (OUTPATIENT)
Age: 74
End: 2024-04-05

## 2024-04-05 NOTE — TELEPHONE ENCOUNTER
Patient requesting records for her [employer] plan, they need a UBO 4 hospital bill and an FA 1500 non hospital bill.     Advised that I would send a message back and request them if we have them.     Possibly would need to come from billing but I would double check with staff first.     If we can provide her with the documents she is requesting that we email them to her at [email or web address]    She also requested her initial pathology report with her breast cancer diagnosis but she had the biopsy done through Dr. Gabriel's office in [place] so I was not entirely sure if she needed to go through them to have the radiology report released to her.     Please advise.

## 2024-04-08 ENCOUNTER — TELEPHONE (OUTPATIENT)
Dept: HEMATOLOGY ONCOLOGY | Facility: CLINIC | Age: 74
End: 2024-04-08

## 2024-04-11 ENCOUNTER — TELEPHONE (OUTPATIENT)
Dept: MAMMOGRAPHY | Facility: CLINIC | Age: 74
End: 2024-04-11

## 2024-04-15 ENCOUNTER — HOSPITAL ENCOUNTER (OUTPATIENT)
Dept: RADIOLOGY | Facility: CLINIC | Age: 74
Discharge: HOME | End: 2024-04-15

## 2024-04-15 ENCOUNTER — TELEPHONE (OUTPATIENT)
Dept: RADIOLOGY | Facility: CLINIC | Age: 74
End: 2024-04-15

## 2024-04-15 VITALS
DIASTOLIC BLOOD PRESSURE: 79 MMHG | SYSTOLIC BLOOD PRESSURE: 127 MMHG | HEART RATE: 92 BPM | RESPIRATION RATE: 20 BRPM | OXYGEN SATURATION: 94 % | TEMPERATURE: 97.9 F

## 2024-04-15 DIAGNOSIS — M48.062 SPINAL STENOSIS OF LUMBAR REGION WITH NEUROGENIC CLAUDICATION: ICD-10-CM

## 2024-04-15 DIAGNOSIS — M47.816 LUMBAR SPONDYLOSIS: ICD-10-CM

## 2024-04-15 DIAGNOSIS — M54.59 LUMBAR FACET JOINT PAIN: ICD-10-CM

## 2024-04-17 ENCOUNTER — OFFICE VISIT (OUTPATIENT)
Dept: PLASTIC SURGERY | Facility: CLINIC | Age: 74
End: 2024-04-17
Payer: COMMERCIAL

## 2024-04-17 VITALS — WEIGHT: 190 LBS | HEIGHT: 65 IN | BODY MASS INDEX: 31.65 KG/M2

## 2024-04-17 DIAGNOSIS — C50.512 MALIGNANT NEOPLASM OF LOWER-OUTER QUADRANT OF LEFT BREAST OF FEMALE, ESTROGEN RECEPTOR POSITIVE (HCC): Primary | ICD-10-CM

## 2024-04-17 DIAGNOSIS — Z17.0 MALIGNANT NEOPLASM OF LOWER-OUTER QUADRANT OF LEFT BREAST OF FEMALE, ESTROGEN RECEPTOR POSITIVE (HCC): Primary | ICD-10-CM

## 2024-04-17 PROCEDURE — 99214 OFFICE O/P EST MOD 30 MIN: CPT | Performed by: STUDENT IN AN ORGANIZED HEALTH CARE EDUCATION/TRAINING PROGRAM

## 2024-04-17 NOTE — PROGRESS NOTES
PRS Note    Patient 3 months s/p right breast reduction and left breast tissue expander exchange for permanent implant    Doing well, very happy with results.    She did discuss medial depression in the left breast when lying flat. I discussed possible fat grafting, upsizing of the implant and capsulorrhaphy if patient wished to have it treated.    All incisions well healed  Good size and symmetry    Plan:  -F/U in 1 year for annual checkup  -Spent 35 minutes in consultation with patient. Greater than 50% of the total time was spent obtaining history, evaluation, performing exam, discussion of management options including post-operative care, answering patient's questions and concerns, chart reviewing, and documentation      Molina Jimenez MD   [facility]   [address] [place] [address], [address]   [place] [address]   Office: [phone]

## 2024-04-18 ENCOUNTER — TELEPHONE (OUTPATIENT)
Dept: HEMATOLOGY ONCOLOGY | Facility: CLINIC | Age: 74
End: 2024-04-18

## 2024-04-18 NOTE — TELEPHONE ENCOUNTER
Appointment Schedule   Who are you speaking with? Patient   If it is not the patient, are they listed on an active communication consent form? N/A   Which provider is the appointment scheduled with? Dr Geno Carranza   At which location is the appointment scheduled for? [place]   When is the appointment scheduled?  Please list date and time 4/22/24 844   What is the reason for this appointment? F/u   Did patient voice understanding of the details of this appointment? Yes   Was the no show policy reviewed with patient? Yes

## 2024-04-22 ENCOUNTER — TELEPHONE (OUTPATIENT)
Dept: HEMATOLOGY ONCOLOGY | Facility: CLINIC | Age: 74
End: 2024-04-22

## 2024-04-22 NOTE — TELEPHONE ENCOUNTER
Left voice message instructing patient to call [facility] ([phone]) to reschedule her Follow Up.

## 2024-04-22 NOTE — TELEPHONE ENCOUNTER
Appointment Change  Cancel, Reschedule, Change to Virtual      Who are you speaking with? Patient   If it is not the patient, is the caller listed on the communication consent form? Yes   Which provider is the appointment scheduled with? Dr Geno Carranza   When was the original appointment scheduled?    Please list date and time 4/22/24   At which location is the appointment scheduled to take place? [facility]   Was the appointment rescheduled?     Was the appointment changed from an in person visit to a virtual visit?    If so, please list the details of the change. 4/23/24   What is the reason for the appointment change? Missed appt       Was STAR transport scheduled? No   Does STAR transport need to be scheduled for the new visit (if applicable) No   Does the patient need an infusion appointment rescheduled? No   Does the patient have an upcoming infusion appointment scheduled? If so, when? No   Is the patient undergoing chemotherapy? No   For appointments cancelled with less than 24 hours:  Was the no-show policy reviewed? Yes Looks like she has 3 No shows

## 2024-04-23 ENCOUNTER — OFFICE VISIT (OUTPATIENT)
Dept: HEMATOLOGY ONCOLOGY | Facility: CLINIC | Age: 74
End: 2024-04-23
Payer: COMMERCIAL

## 2024-04-23 VITALS
TEMPERATURE: 97.8 F | OXYGEN SATURATION: 96 % | RESPIRATION RATE: 18 BRPM | HEIGHT: 65 IN | SYSTOLIC BLOOD PRESSURE: 118 MMHG | WEIGHT: 199 LBS | HEART RATE: 70 BPM | DIASTOLIC BLOOD PRESSURE: 76 MMHG | BODY MASS INDEX: 33.15 KG/M2

## 2024-04-23 DIAGNOSIS — C50.912 MALIGNANT NEOPLASM OF LEFT BREAST IN FEMALE, ESTROGEN RECEPTOR POSITIVE, UNSPECIFIED SITE OF BREAST (HCC): Primary | ICD-10-CM

## 2024-04-23 DIAGNOSIS — Z17.0 MALIGNANT NEOPLASM OF LEFT BREAST IN FEMALE, ESTROGEN RECEPTOR POSITIVE, UNSPECIFIED SITE OF BREAST (HCC): Primary | ICD-10-CM

## 2024-04-23 DIAGNOSIS — D64.9 ANEMIA, UNSPECIFIED TYPE: ICD-10-CM

## 2024-04-23 PROCEDURE — 99215 OFFICE O/P EST HI 40 MIN: CPT | Performed by: INTERNAL MEDICINE

## 2024-04-23 NOTE — PROGRESS NOTES
HEMATOLOGY / ONCOLOGY CLINIC FOLLOW UP NOTE    Patient Pretty Aguila  MRN: [id number]  : 1950  Date of Encounter 2024      Referring Provider:      Reason for Encounter: follow up       Oncology History   Malignant neoplasm of lower-outer quadrant of left breast of female, estrogen receptor positive (HCC)   2023 Biopsy    Left breast ultrasound-guided biopsy  4 o'clock, 2 cm from nipple (VERENICE)  Invasive breast carcinoma of no special type (ductal NST)  Grade 2  ER 90, MS 90, HER2 1+\  Lymphovascular invasion not definitively identified    Concordant. Malignancy appears unifocal; however, oval circumscribed mass in the upper outer left breast is not accounted for. Bilateral breast MRI is recommended. Biopsy-proven carcinoma measured 3.1 cm on US. Left axilla US negative. Right breast clear.     2023 Observation    Bilateral breast MRI: Unifocal carcinoma in the lower outer left breast with possible skin involvement. There are no suspicious enhancing masses or suspicious areas of non-mass enhancement in right breast. There is no axillary or internal mammary adenopathy.     10/17/2023 Genetic Testing    A total of 47 genes were evaluated, including: HUYEN, BRCA1, BRCA2, CDH1, CHEK2, PALB2, PTEN, STK11, TP53  Negative result. No pathogenic sequence variants identified  [facility]     2023 Surgery    Left VERENICE  directed mastectomy with SLN biopsy   Invasive carcinoma of no special type (ductal)  Grade 2  3.6 cm   Margins negative  0/1 Lymph node  Anatomic Stage IIA  Prognostic Stage IA    Immediate reconstruction with tissue expander and ADM (Dr. Jimenez)     2023 -  Cancer Staged    Staging form: Breast, AJCC 8th Edition  - Pathologic stage from 2023: Stage IA (pT2, pN0(sn), cM0, G2, ER+, MS+, HER2-) - Signed by Adrien Gabriel MD on 2023  Stage prefix: Initial diagnosis  Method of lymph node assessment: [place] lymph node biopsy  Multigene prognostic tests performed:  None  Histologic grading system: 3 grade system               Assessment / Plan:            Assessment / Plan:     Oncotype Score 12/29/2023  9        Assessment / Plan:     1.  Stage IIA (pT2 pN0(sn) cM0) left breast invasive ductal carcinoma.  Grade 2.  ER positive (%), OK positive (%), HER2 negative (score 1+) by IHC.  Diagnosed November 2023.  2.  BRCA negative   3.  Adjuvant anastrozole OncoDx score 9       Breast Cancer        Pretty Aguila is 74-year-old postmenopausal female who initially noted a mass in the lower outer quadrant of her left breast.  She underwent diagnostic mammogram and ultrasound which demonstrated a unifocal lesion at the 4 o'clock position 2 cm from the nipple.  This was biopsied and shown to be invasive ductal carcinoma, grade 2, ER 90%, OK 90%, HER2/shannan negative (1+).  She underwent an MRI which demonstrated unifocal mass measuring approximately 4.5 cm with questionable slight skin involvement and no adenopathy.  Her axillary ultrasound demonstrated no adenopathy. She underwent left mastectomy with SLN biopsy.  Final pathology was consistent with an invasive carcinoma, negative margins, 0/1 lymph node, grade 2.  She underwent immediate left breast reconstruction with submuscular tissue expander with acellular dermal matrix on 11/20/2023.  Her final stage is IIA (pT2 pN0 cM0) left breast invasive ductal carcinoma, grade 2, ER positive, OK positive HER2 negative.       Patient with favorable risk breast cancer.  Oncotype DX recurrence score  9 so due to size of tumor to assess candidacy of chemotherapy.  We discussed adjuvant endocrine therapy with anastrozole if Oncotype DX recurrence score is low.  She will continue to follow with breast surgery.  She will start treatment with anastrozole after her Oncotype resulted and follow-up in 3 months.     We reviewed the side effects of aromatase inhibitors including, but not limited to hot flashes, vaginal dryness, mood swings,  weight gain, difficulty sleeping, decreased sexual interest, arthralgias/myalgias and worsening of osteopenia/osteoporosis.  She will obtain a baseline DEXA scan.  I recommended she take calcium and vitamin D on a regular basis.     Today Evaluation 4/23/2024    Patient tolerating Anastrozole; started 12/9/2023    Last labs were in Jan 2024      Anemia/Leukocytosis     Labs from 1/17/2024 with WBC 15.1 Hgb 11.4 plts 228, no diff done    Has no evidence bleeding, destruction RBCs; concern with elevated WBC as well    Will do heme workup repeat CBC with diff, retic, iron panel/B12/folate, SPEP although MCV normal.      WBC may be related to underlying fibromyalgia     Anemia may be AOCD but will rule out          Psoriatric arthritis/osteoarthritis/fibromyalgia   Management per PCP       Follow up     Will call with lab results     6 months            HPI:  Dr Williamson 12/2023      Pretty Aguila is 73-year-old postmenopausal female who initially noted a mass in the lower outer quadrant of her left breast.  She underwent diagnostic mammogram and ultrasound which demonstrated a unifocal lesion at the 4 o'clock position 2 cm from the nipple.  This was biopsied and shown to be invasive ductal carcinoma, grade 2, ER 90%, MA 90%, HER2/shannan negative (1+).  She underwent an MRI which demonstrated unifocal mass measuring approximately 4.5 cm with questionable slight skin involvement and no adenopathy.  Her axillary ultrasound demonstrated no adenopathy.  She was evaluated by Dr. Gabriel from breast surgery.  She underwent left mastectomy with SLN biopsy.  Final pathology was consistent with an invasive carcinoma, negative margins, 0/1 lymph node, grade 2.  She underwent immediate left breast reconstruction with submuscular tissue expander with acellular dermal matrix on 11/20/2023.  Patient presents for initial oncology evaluation.     She is following with plastic surgery for seroma after breast reconstruction with tissue expander.   She has been referred to IR for aspiration and drain placement and possible culture of fluid. She notes chronic back pain due to spinal canal stenosis and is to see orthopedics.      She has no family history of breast cancer.  Menarche: 17 year old   Menopause: early 60's   Age at first pregnancy: 1974  OCPs: denies  HRT: denies        Interval History: 4/23/2024    Ms Aguila is doing well on anastrozole in the adjuvant setting.  She initially had some hot flashes which resolved as did mood swings.  She has gained weight, is 199 pounds and is attempting to loose weight.  She has some fatigue but minimal.      She had a Dexa scan 12/2023 with normal bone density    She will have a right mammogram in Dec 2023      REVIEW OF SYSTEMS:  Please note that a 14-point review of systems was performed to include Constitutional, HEENT, Respiratory, CVS, GI, , Musculoskeletal, Integumentary, Neurologic, Rheumatologic, Endocrinologic, Psychiatric, Lymphatic, and Hematologic/Oncologic systems were reviewed and are negative unless otherwise stated in HPI. Positive and negative findings pertinent to this evaluation are incorporated into the history of present illness.    As above         Primary  Oncologic Diagnosis:  1.  Stage IIA (pT2 pN0(sn) cM0) left breast invasive ductal carcinoma.  Grade 2.  ER positive (%), MO positive (%), HER2 negative (score 1+) by IHC.  Diagnosed November 2023.  2.  BRCA negative       Previous Hematologic/ Oncologic Treatment:   S/p left mastectomy with SLN biopsy     Current Hematologic/ Oncologic Treatment:    Plan endocrine therapy with anastrozole.     Disease Status:      Test Results:  Pathology:  9/19/2023: Left breast ultrasound-guided biopsy  4 o'clock, 2 cm from nipple (VERENICE)  Invasive breast carcinoma of no special type (ductal NST)  Grade 2  ER 90, MO 90, HER2 1+\  Lymphovascular invasion not definitively identified     11/20/2023: Left VERENICE  directed mastectomy with SLN  biopsy   Invasive carcinoma of no special type (ductal)  Grade 2  3.6 cm   Margins negative  0/1 Lymph node  Anatomic Stage IIA  Prognostic Stage IA     Radiology:  8/30/2023: Left diagnostic mammogram:  LEFT  A) MASS  Mammo diagnostic bilateral w 3d & cad: There is an irregularly shaped mass with spiculated margins seen in the lower inner quadrant of the left breast in the middle depth. The mass correlates with the palpable mass reported by the patient.   Oval circumscribed mass in the upper outer left breast, posterior third is noted.  US breast left limited (diagnostic): There is a 27 mm x 23 mm x 31 mm irregularly shaped, heterogeneous mass with indistinct margins seen in the left breast at 4 o'clock, 2 cm from the nipple. The mass correlates with the palpable mass reported by the patient.  No left axillary adenopathy is identified.  Note correlate for the oval circumscribed mass in the upper outer left breast is identified.  Right  Mammo diagnostic bilateral w 3d & cad  There are no suspicious masses, grouped microcalcifications or areas of unexplained architectural distortion. The skin and nipple areolar complex are unremarkable.      9/28/2023: MRI breast bilateral:  LEFT  MASS [A]: There is a 4.5 cm x 3 cm x 2.3 cm irregularly shaped mass with irregular margins seen in the lower inner quadrant of the left breast at 4 o'clock in the middle depth, 2 cm from the nipple.  This is the biopsy-proven carcinoma.  Enhancement abuts the skin surface.  There is skin thickening in this region.  See screening captures.   Oval circumscribed mass in the upper outer breast demonstrates increased T2 signal intensity and plateau kinetics.  This likely represents a benign entity.   RIGHT  There are no suspicious enhancing masses or suspicious areas of non-mass enhancement. There is no axillary or internal mammary adenopathy.     11/15/2023: CXR: Clear lungs.        Dexa scan  12/20/2023        1. Normal bone density.     2.   The future fracture risk, as calculated by the [employer]/WHO fracture risk assessment tool (FRAX), cannot be determined since FRAX requires valid hip BMD measurements.     3.  The current NOF guidelines recommend treating patients with a T-score of -2.5 or less in the lumbar spine or hips, or in post-menopausal women and men over the age of 50 with low bone mass (osteopenia) and a FRAX 10 year risk score of >3% for hip   fracture and/or >20% for major osteoporotic fracture.     4.  The NOF recommends follow-up DXA in 1-2 years after initiating therapy for osteoporosis and every 2 years thereafter. More frequent evaluation is appropriate for patients with conditions associated with rapid bone loss, such as glucocorticoid   therapy. The interval between DXA screenings may be longer for individuals without major risk factors and initial T-score in the normal or upper low bone mass range.          ECOG PS: 0    PROBLEM LIST:  Patient Active Problem List   Diagnosis    Coronary artery disease involving native coronary artery of native heart without angina pectoris    Essential hypertension    Dyslipidemia    S/P CABG x 3    Vitamin D deficiency    Postsurgical malabsorption    Status post bariatric surgery    At risk for sleep apnea    Chronic bilateral low back pain without sciatica    Candida infection of flexural skin    Postmenopausal    Rheumatoid arthritis involving multiple sites with positive rheumatoid factor (HCC)    Other fatigue    Malignant neoplasm of lower-outer quadrant of left breast of female, estrogen receptor positive (HCC)    Change in bowel habits    Moderate episode of recurrent major depressive disorder (HCC)    Anxiety and depression    Restless leg syndrome    Simple chronic bronchitis (HCC)    Continuous opioid dependence (HCC)    Abnormal glucose    Prediabetes       Past Medical History:   has a past medical history of Allergic, Anxiety, Arthritis, Breast cancer (HCC) (09/2023),  CAD (coronary artery disease), Coronary artery disease (2017111), Depression, Dyslipidemia, Hiatal hernia, Hyperlipidemia, Hypertension, Obesity, Obesity (BMI 30-39.9), Psoriatic arthritis (HCC), Rheumatoid arthritis (HCC), Scoliosis, and SOB (shortness of breath).    PAST SURGICAL HISTORY:   has a past surgical history that includes Coronary artery bypass graft (2017); Bariatric Surgery (2014); Hip surgery (Left, 2015); Replacement total knee (Right, 04/2017); Total hip arthroplasty (Right, 2017); Foot surgery (Right); Carpal tunnel release (Bilateral, 2002); Bile duct exploration; Sleeve Gastroplasty; US guided breast biopsy left complete (Left, 09/19/2023); Tonsillectomy; pr tissue expander placement breast reconstruction (Left, 11/20/2023); Simple mastectomy (Left, 11/20/2023); Lymph node biopsy (Left, 11/20/2023); Lymph node dissection (Left, 11/20/2023); IR drainage tube placement (12/19/2023); pr breast reduction (Bilateral, 01/16/2024); pr suction assisted lipectomy trunk (Bilateral, 01/16/2024); Breast biopsy (9/23); and Joint replacement.    CURRENT MEDICATIONS  Current Outpatient Medications   Medication Sig Dispense Refill    anastrozole (ARIMIDEX) 1 mg tablet Take 1 tablet (1 mg total) by mouth daily 90 tablet 1    Apremilast 30 MG TABS Take 30 mg by mouth 2 (two) times a day      atorvastatin (LIPITOR) 10 mg tablet Take 1 tablet (10 mg total) by mouth daily 90 tablet 3    buPROPion (WELLBUTRIN XL) 300 mg 24 hr tablet Take 1 tablet (300 mg total) by mouth every morning 90 tablet 3    cholecalciferol (VITAMIN D3) 400 units tablet Take 400 Units by mouth daily      citalopram (CeleXA) 20 mg tablet Take 1 tablet (20 mg total) by mouth daily 30 tablet 6    docusate sodium (COLACE) 100 mg capsule Take 1 capsule (100 mg total) by mouth 2 (two) times a day as needed for constipation (Patient not taking: Reported on 3/28/2024) 20 capsule 1    furosemide (LASIX) 40 mg tablet TAKE 1 TABLET (40 MG TOTAL) BY MOUTH  2 (TWO) TIMES A  tablet 0    LORazepam (ATIVAN) 0.5 mg tablet TAKE 1 TABLET (0.5 MG TOTAL) BY MOUTH EVERY 8 (EIGHT) HOURS AS NEEDED FOR ANXIETY 20 tablet 0    methocarbamol (ROBAXIN) 500 mg tablet Take 1 tablet (500 mg total) by mouth 3 (three) times a day as needed for muscle spasms (Patient not taking: Reported on 3/28/2024) 15 tablet 1    metoprolol succinate (TOPROL-XL) 50 mg 24 hr tablet Take 1 tablet (50 mg total) by mouth daily 90 tablet 3    Multiple Vitamin (multivitamin) tablet Take 1 tablet by mouth daily      naloxone (NARCAN) 4 mg/0.1 mL nasal spray Administer 1 spray into a nostril. If no response after 2-3 minutes, give another dose in the other nostril using a new spray. (Patient not taking: Reported on 3/28/2024) 1 each 1    nystatin (MYCOSTATIN) cream Apply topically 2 (two) times a day (Patient taking differently: Apply topically 2 (two) times a day As needed) 30 g 0    nystatin (MYCOSTATIN) powder Apply topically 2 (two) times a day (Patient taking differently: Apply topically 2 (two) times a day As needed) 60 g 0    ondansetron (ZOFRAN) 4 mg tablet Take 1 tablet (4 mg total) by mouth every 8 (eight) hours as needed for nausea or vomiting (Patient not taking: Reported on 3/28/2024) 20 tablet 0    predniSONE 1 mg tablet TAKE 1 TABLET (1 MG TOTAL) BY MOUTH 3 (THREE) TIMES A DAY.      traMADol (ULTRAM-ER) 100 mg 24 hr tablet Take 100 mg by mouth daily       No current facility-administered medications for this visit.     [unfilled]    SOCIAL HISTORY:   reports that she quit smoking about 7 years ago. Her smoking use included cigarettes. She started smoking about 14 years ago. She has a 3.5 pack-year smoking history. She has never used smokeless tobacco. She reports current alcohol use of about 3.0 standard drinks of alcohol per week. She reports that she does not use drugs.     FAMILY HISTORY:  family history includes Diabetes in her mother; Heart disease in her father; Hypertension in her  "mother; No Known Problems in her daughter, daughter, sister, sister, and son.     ALLERGIES:  is allergic to iodine - food allergy, povidone iodine, and shellfish-derived products - food allergy.      Physical Exam:  Vital Signs:   Visit Vitals  /76 (BP Location: Left arm, Patient Position: Sitting, Cuff Size: Adult)   Pulse 70   Temp 97.8 °F (36.6 °C) (Temporal)   Resp 18   Ht 5' 5\" (1.651 m)   Wt 90.3 kg (199 lb)   SpO2 96%   BMI 33.12 kg/m²   OB Status Postmenopausal   Smoking Status Former   BSA 1.97 m²     Body mass index is 33.12 kg/m².  Body surface area is 1.97 meters squared.    GEN: Alert, awake oriented x3, in no acute distress  HEENT- No pallor, icterus, cyanosis, no oral mucosal lesions,   LAD - no palpable cervical, clavicle, axillary, inguinal LAD  Heart- normal S1 S2, regular rate and rhythm, No murmur, rubs.   Lungs- clear breathing sound bilateral.   Abdomen- soft, Non tender, bowel sounds present  Extremities- No cyanosis, clubbing, edema  Neuro- No focal neurological deficit    Labs:  Lab Results   Component Value Date    WBC 15.06 (H) 01/17/2024    HGB 11.4 (L) 01/17/2024    HCT 35.6 01/17/2024    MCV 97 01/17/2024     01/17/2024     Lab Results   Component Value Date    SODIUM 136 01/17/2024    K 4.5 01/17/2024     01/17/2024    CO2 24 01/17/2024    AGAP 8 01/17/2024    BUN 21 01/17/2024    CREATININE 0.98 01/17/2024    GLUC 246 (H) 01/17/2024    GLUF 143 (H) 01/16/2024    CALCIUM 8.6 01/17/2024    AST 19 10/01/2023    ALT 19 10/01/2023    ALKPHOS 80 10/01/2023    TP 7.4 10/01/2023    TBILI 0.71 10/01/2023    EGFR 58 01/17/2024           I spent 40 minutes on chart review, face to face counseling time, coordination of care and documentation.    Geno Carranza MD PhD        "

## 2024-04-30 LAB
ALBUMIN SERPL ELPH-MCNC: 3.7 G/DL (ref 2.9–4.4)
ALBUMIN SERPL-MCNC: 4 G/DL (ref 3.8–4.8)
ALBUMIN/GLOB SERPL: 1 {RATIO} (ref 0.7–1.7)
ALBUMIN/GLOB SERPL: 1.2 {RATIO} (ref 1.2–2.2)
ALP SERPL-CCNC: 92 IU/L (ref 44–121)
ALPHA1 GLOB SERPL ELPH-MCNC: 0.2 G/DL (ref 0–0.4)
ALPHA2 GLOB SERPL ELPH-MCNC: 0.7 G/DL (ref 0.4–1)
ALT SERPL-CCNC: 23 IU/L (ref 0–32)
AST SERPL-CCNC: 22 IU/L (ref 0–40)
B-GLOBULIN SERPL ELPH-MCNC: 1.2 G/DL (ref 0.7–1.3)
B2 MICROGLOB SERPL-MCNC: 2.5 MG/L (ref 0.6–2.4)
BASOPHILS # BLD AUTO: 0 X10E3/UL (ref 0–0.2)
BASOPHILS NFR BLD AUTO: 0 %
BILIRUB SERPL-MCNC: 0.4 MG/DL (ref 0–1.2)
BUN SERPL-MCNC: 24 MG/DL (ref 8–27)
BUN/CREAT SERPL: 22 (ref 12–28)
CALCIUM SERPL-MCNC: 9.3 MG/DL (ref 8.7–10.3)
CHLORIDE SERPL-SCNC: 101 MMOL/L (ref 96–106)
CO2 SERPL-SCNC: 26 MMOL/L (ref 20–29)
CREAT SERPL-MCNC: 1.07 MG/DL (ref 0.57–1)
EGFR: 55 ML/MIN/1.73
EOSINOPHIL # BLD AUTO: 0.2 X10E3/UL (ref 0–0.4)
EOSINOPHIL NFR BLD AUTO: 4 %
ERYTHROCYTE [DISTWIDTH] IN BLOOD BY AUTOMATED COUNT: 13.4 % (ref 11.7–15.4)
ERYTHROCYTE [SEDIMENTATION RATE] IN BLOOD BY WESTERGREN METHOD: 26 MM/HR (ref 0–40)
FERRITIN SERPL-MCNC: 92 NG/ML (ref 15–150)
FOLATE SERPL-MCNC: 10.4 NG/ML
GAMMA GLOB SERPL ELPH-MCNC: 1.4 G/DL (ref 0.4–1.8)
GLOBULIN SER CALC-MCNC: 3.6 G/DL (ref 2.2–3.9)
GLOBULIN SER-MCNC: 3.3 G/DL (ref 1.5–4.5)
GLUCOSE SERPL-MCNC: 96 MG/DL (ref 70–99)
HCT VFR BLD AUTO: 36.8 % (ref 34–46.6)
HGB BLD-MCNC: 12.2 G/DL (ref 11.1–15.9)
IMM GRANULOCYTES # BLD: 0 X10E3/UL (ref 0–0.1)
IMM GRANULOCYTES NFR BLD: 0 %
IRON SATN MFR SERPL: 25 % (ref 15–55)
IRON SERPL-MCNC: 90 UG/DL (ref 27–139)
LABORATORY COMMENT REPORT: NORMAL
LDH SERPL-CCNC: 214 IU/L (ref 119–226)
LYMPHOCYTES # BLD AUTO: 1.1 X10E3/UL (ref 0.7–3.1)
LYMPHOCYTES NFR BLD AUTO: 19 %
M PROTEIN SERPL ELPH-MCNC: NORMAL G/DL
MAGNESIUM SERPL-MCNC: 2.1 MG/DL (ref 1.6–2.3)
MCH RBC QN AUTO: 31.4 PG (ref 26.6–33)
MCHC RBC AUTO-ENTMCNC: 33.2 G/DL (ref 31.5–35.7)
MCV RBC AUTO: 95 FL (ref 79–97)
MONOCYTES # BLD AUTO: 0.5 X10E3/UL (ref 0.1–0.9)
MONOCYTES NFR BLD AUTO: 8 %
NEUTROPHILS # BLD AUTO: 4 X10E3/UL (ref 1.4–7)
NEUTROPHILS NFR BLD AUTO: 69 %
PLATELET # BLD AUTO: 189 X10E3/UL (ref 150–450)
POTASSIUM SERPL-SCNC: 4.7 MMOL/L (ref 3.5–5.2)
PROT SERPL-MCNC: 7.3 G/DL (ref 6–8.5)
RBC # BLD AUTO: 3.89 X10E6/UL (ref 3.77–5.28)
RETICS/RBC NFR AUTO: 1 % (ref 0.6–2.6)
SODIUM SERPL-SCNC: 139 MMOL/L (ref 134–144)
TIBC SERPL-MCNC: 358 UG/DL (ref 250–450)
UIBC SERPL-MCNC: 268 UG/DL (ref 118–369)
VIT B12 SERPL-MCNC: 294 PG/ML (ref 232–1245)
WBC # BLD AUTO: 5.8 X10E3/UL (ref 3.4–10.8)

## 2024-05-03 ENCOUNTER — TELEPHONE (OUTPATIENT)
Age: 74
End: 2024-05-03

## 2024-05-03 ENCOUNTER — DOCUMENTATION (OUTPATIENT)
Dept: HEMATOLOGY ONCOLOGY | Facility: CLINIC | Age: 74
End: 2024-05-03

## 2024-05-03 NOTE — TELEPHONE ENCOUNTER
Pt called, she wants to know if she can email paperwork about ins for breast reduction     Please call patient back today on her cell  [phone]

## 2024-05-03 NOTE — PROGRESS NOTES
Called the patient to further discuss but there was no answer. A message was left with a direct call back number provided requesting a call back. Any [facility] paperwork related to her surgery needs to be completed by Dr. Jimenez's office due to her reconstruction.

## 2024-05-03 NOTE — PROGRESS NOTES
Received Paperwork   What type of form FMLA   Scanned blank form into patient's Epic chart Yes   Method received form  In Person drop off   Provider responsible for form JASPREET   Informed patient our office turn around time for completing patient forms is 5-7 business days. Yes, informed patient of turn around time     Comments Patient would like a call when forms are completed so she can  and turn in herself.

## 2024-05-06 ENCOUNTER — TELEPHONE (OUTPATIENT)
Dept: PLASTIC SURGERY | Facility: CLINIC | Age: 74
End: 2024-05-06

## 2024-05-06 NOTE — TELEPHONE ENCOUNTER
Tried to call patient back on cell as message request but voicemail is full and unable to leave message.  Left voicemail on home phone that it is ok to fax any documentation and to please all my direct number with any questions.

## 2024-05-06 NOTE — TELEPHONE ENCOUNTER
Pt returning our call to her cell, she is not home so unaware of message left at home number    Read Romi's note to her, gave her Romi's number and fax number

## 2024-05-08 ENCOUNTER — TELEPHONE (OUTPATIENT)
Age: 74
End: 2024-05-08

## 2024-05-08 NOTE — PROGRESS NOTES
Called the patient's home phone again but there was no answer. Another message was left requesting a call back to discuss her disability paperwork. Called the patient's mobile number but there was also no answer. Unable to leave a message due to her mailbox being full.

## 2024-05-08 NOTE — TELEPHONE ENCOUNTER
Patient called asking for Romi's email to send some info. She's a patient of Dr Jimenez    I was told to give out Tammy's email so patient can email forms.

## 2024-05-15 ENCOUNTER — TELEPHONE (OUTPATIENT)
Age: 74
End: 2024-05-15

## 2024-05-15 NOTE — TELEPHONE ENCOUNTER
Patient asking to speak with juan manuel in regards to disability papers, please advise. Thank you!!

## 2024-05-15 NOTE — TELEPHONE ENCOUNTER
[facility] for patient to return call, informed patient I was in the office today until 5:30 PM and will return tomorrow at 8:30 AM.

## 2024-05-16 ENCOUNTER — TELEPHONE (OUTPATIENT)
Dept: HEMATOLOGY ONCOLOGY | Facility: CLINIC | Age: 74
End: 2024-05-16

## 2024-05-16 NOTE — TELEPHONE ENCOUNTER
Spoke to patient, informed her any disability paperwork should be completed by Dr Jimenez's office.

## 2024-05-16 NOTE — TELEPHONE ENCOUNTER
Patient Call    Who are you speaking with? Patient    If it is not the patient, are they listed on an active communication consent form? N/A   What is the reason for this call? Pt is calling to find out if there has been any progress on her disability paperwork.   Does this require a call back? Yes   If a call back is required, please list best call back number [phone]   If a call back is required, advise that a message will be forwarded to their care team and someone will return their call as soon as possible.   Did you relay this information to the patient? Yes

## 2024-05-16 NOTE — TELEPHONE ENCOUNTER
Patient returning Tammy's VM/ reaching out to Tammy/ no response/ routing info     Please call [phone]

## 2024-06-27 DIAGNOSIS — Z17.0 MALIGNANT NEOPLASM OF LOWER-OUTER QUADRANT OF LEFT BREAST OF FEMALE, ESTROGEN RECEPTOR POSITIVE (HCC): ICD-10-CM

## 2024-06-27 DIAGNOSIS — C50.512 MALIGNANT NEOPLASM OF LOWER-OUTER QUADRANT OF LEFT BREAST OF FEMALE, ESTROGEN RECEPTOR POSITIVE (HCC): ICD-10-CM

## 2024-06-27 RX ORDER — ANASTROZOLE 1 MG/1
1 TABLET ORAL DAILY
Qty: 90 TABLET | Refills: 1 | Status: SHIPPED | OUTPATIENT
Start: 2024-06-27

## 2024-07-08 ENCOUNTER — OFFICE VISIT (OUTPATIENT)
Dept: FAMILY MEDICINE CLINIC | Facility: CLINIC | Age: 74
End: 2024-07-08
Payer: COMMERCIAL

## 2024-07-08 VITALS
DIASTOLIC BLOOD PRESSURE: 68 MMHG | OXYGEN SATURATION: 97 % | TEMPERATURE: 98 F | RESPIRATION RATE: 16 BRPM | WEIGHT: 208 LBS | HEART RATE: 66 BPM | BODY MASS INDEX: 34.66 KG/M2 | HEIGHT: 65 IN | SYSTOLIC BLOOD PRESSURE: 118 MMHG

## 2024-07-08 DIAGNOSIS — F33.1 MODERATE EPISODE OF RECURRENT MAJOR DEPRESSIVE DISORDER (HCC): ICD-10-CM

## 2024-07-08 DIAGNOSIS — M06.09 RHEUMATOID ARTHRITIS OF MULTIPLE SITES WITH NEGATIVE RHEUMATOID FACTOR (HCC): ICD-10-CM

## 2024-07-08 DIAGNOSIS — F32.A ANXIETY AND DEPRESSION: ICD-10-CM

## 2024-07-08 DIAGNOSIS — E66.09 CLASS 1 OBESITY DUE TO EXCESS CALORIES WITH SERIOUS COMORBIDITY AND BODY MASS INDEX (BMI) OF 34.0 TO 34.9 IN ADULT: ICD-10-CM

## 2024-07-08 DIAGNOSIS — G89.29 CHRONIC BILATERAL LOW BACK PAIN WITHOUT SCIATICA: ICD-10-CM

## 2024-07-08 DIAGNOSIS — R19.5 LOOSE STOOLS: ICD-10-CM

## 2024-07-08 DIAGNOSIS — G25.81 RESTLESS LEG SYNDROME: ICD-10-CM

## 2024-07-08 DIAGNOSIS — M15.9 PRIMARY OSTEOARTHRITIS INVOLVING MULTIPLE JOINTS: ICD-10-CM

## 2024-07-08 DIAGNOSIS — I25.10 CORONARY ARTERY DISEASE INVOLVING NATIVE CORONARY ARTERY OF NATIVE HEART WITHOUT ANGINA PECTORIS: ICD-10-CM

## 2024-07-08 DIAGNOSIS — C50.512 MALIGNANT NEOPLASM OF LOWER-OUTER QUADRANT OF LEFT BREAST OF FEMALE, ESTROGEN RECEPTOR POSITIVE (HCC): ICD-10-CM

## 2024-07-08 DIAGNOSIS — Z00.00 ANNUAL PHYSICAL EXAM: ICD-10-CM

## 2024-07-08 DIAGNOSIS — Z17.0 MALIGNANT NEOPLASM OF LOWER-OUTER QUADRANT OF LEFT BREAST OF FEMALE, ESTROGEN RECEPTOR POSITIVE (HCC): ICD-10-CM

## 2024-07-08 DIAGNOSIS — E78.5 DYSLIPIDEMIA: ICD-10-CM

## 2024-07-08 DIAGNOSIS — N18.31 STAGE 3A CHRONIC KIDNEY DISEASE (HCC): Primary | ICD-10-CM

## 2024-07-08 DIAGNOSIS — M54.50 CHRONIC BILATERAL LOW BACK PAIN WITHOUT SCIATICA: ICD-10-CM

## 2024-07-08 DIAGNOSIS — L40.50 PSORIATIC ARTHRITIS (HCC): ICD-10-CM

## 2024-07-08 DIAGNOSIS — R73.03 PREDIABETES: ICD-10-CM

## 2024-07-08 DIAGNOSIS — F41.9 ANXIETY AND DEPRESSION: ICD-10-CM

## 2024-07-08 DIAGNOSIS — I10 ESSENTIAL HYPERTENSION: ICD-10-CM

## 2024-07-08 PROBLEM — N18.9 CKD (CHRONIC KIDNEY DISEASE): Status: ACTIVE | Noted: 2024-07-08

## 2024-07-08 PROBLEM — E66.9 OBESITY: Status: ACTIVE | Noted: 2024-07-08

## 2024-07-08 PROBLEM — R19.4 CHANGE IN BOWEL HABITS: Status: RESOLVED | Noted: 2023-10-12 | Resolved: 2024-07-08

## 2024-07-08 PROBLEM — R73.09 ABNORMAL GLUCOSE: Status: RESOLVED | Noted: 2024-03-27 | Resolved: 2024-07-08

## 2024-07-08 PROCEDURE — 99214 OFFICE O/P EST MOD 30 MIN: CPT | Performed by: FAMILY MEDICINE

## 2024-07-08 PROCEDURE — 99397 PER PM REEVAL EST PAT 65+ YR: CPT | Performed by: FAMILY MEDICINE

## 2024-07-08 PROCEDURE — 99396 PREV VISIT EST AGE 40-64: CPT | Performed by: FAMILY MEDICINE

## 2024-07-08 NOTE — PROGRESS NOTES
Subjective:Patient here for annual physical/ Pt is here for interval visit and evaluation of multiple medical problems, review of medications, labs, Health Maintenance and any recent specialty consults           Patient ID: Pretty Aguila is a 74 y.o. female.    HPI    Past Medical History:   Diagnosis Date   • Allergic    • Anxiety    • Arthritis    • Breast cancer (HCC) 09/2023   • CAD (coronary artery disease)    • Coronary artery disease [id number]   • Depression    • Dyslipidemia    • Hiatal hernia    • Hyperlipidemia    • Hypertension    • Obesity    • Obesity (BMI 30-39.9)    • Psoriatic arthritis (HCC)    • Rheumatoid arthritis (HCC)    • Scoliosis    • SOB (shortness of breath)        Family History   Problem Relation Age of Onset   • Hypertension Mother    • Diabetes Mother    • Heart disease Father         CABG x5   • No Known Problems Sister    • No Known Problems Sister    • No Known Problems Son    • No Known Problems Daughter    • No Known Problems Daughter        Past Surgical History:   Procedure Laterality Date   • BARIATRIC SURGERY  2014   • BILE DUCT EXPLORATION      replacement per Steffanie   • BREAST BIOPSY  9/23   • CARPAL TUNNEL RELEASE Bilateral 2002   • CORONARY ARTERY BYPASS GRAFT  2017   • FOOT SURGERY Right     bone surgery to straighten toe.   • HIP SURGERY Left 2015   • IR DRAINAGE TUBE PLACEMENT  12/19/2023   • JOINT REPLACEMENT     • LYMPH NODE BIOPSY Left 11/20/2023    Procedure: LEFT LYMPHATIC MAPPING WITH BLUE AND RADIOACTIVE DYES, SENTINEL LYMPH NODE BIOPSY;  Surgeon: Adrien Gabriel MD;  Location:  MAIN OR;  Service: Surgical Oncology   • LYMPH NODE DISSECTION Left 11/20/2023    Procedure: POSSIBLE AXILLARY DISSECTION;  Surgeon: Adrien Gabriel MD;  Location:  MAIN OR;  Service: Surgical Oncology   • MS BREAST REDUCTION Bilateral 01/16/2024    Procedure: RIGHT BREAST REDUCTION AND LEFT BREAST EXPANDER EXCHANGE FOR PERMANENT IMPLANT. REVISION OF RECONSTRUCTED LEFT BREAST.;  Surgeon:  Molina Jimenez MD;  Location:  MAIN OR;  Service: Plastics   • OH SUCTION ASSISTED LIPECTOMY TRUNK Bilateral 01/16/2024    Procedure: LIPOSUCTION BREAST;  Surgeon: Molina Jimenez MD;  Location: [facility] MAIN OR;  Service: Plastics   • OH TISSUE EXPANDER PLACEMENT BREAST RECONSTRUCTION Left 11/20/2023    Procedure: IMMEDIATE LEFT BREAST RECONSTRUCTION WITH TISSUE EXPANDER AND ADM;  Surgeon: Molina Jimenez MD;  Location: [facility]IN OR;  Service: Plastics   • REPLACEMENT TOTAL KNEE Right 04/2017   • SIMPLE MASTECTOMY Left 11/20/2023    Procedure: LEFT BREAST VERENICE  DIRECTED MASTECTOMY;  Surgeon: Adrien Gabriel MD;  Location: [facility];  Service: Surgical Oncology   • SLEEVE GASTROPLASTY     • TONSILLECTOMY     • TOTAL HIP ARTHROPLASTY Right 2017   • US GUIDED BREAST BIOPSY LEFT COMPLETE Left 09/19/2023        reports that she quit smoking about 7 years ago. Her smoking use included cigarettes. She started smoking about 14 years ago. She has a 3.5 pack-year smoking history. She has never used smokeless tobacco. She reports current alcohol use of about 3.0 standard drinks of alcohol per week. She reports that she does not use drugs.      Current Outpatient Medications:   •  anastrozole (ARIMIDEX) 1 mg tablet, TAKE 1 TABLET (1 MG TOTAL) BY MOUTH DAILY, Disp: 90 tablet, Rfl: 1  •  Apremilast 30 MG TABS, Take 30 mg by mouth 2 (two) times a day, Disp: , Rfl:   •  atorvastatin (LIPITOR) 10 mg tablet, Take 1 tablet (10 mg total) by mouth daily, Disp: 90 tablet, Rfl: 3  •  buPROPion (WELLBUTRIN XL) 300 mg 24 hr tablet, Take 1 tablet (300 mg total) by mouth every morning, Disp: 90 tablet, Rfl: 3  •  cholecalciferol (VITAMIN D3) 400 units tablet, Take 400 Units by mouth daily, Disp: , Rfl:   •  citalopram (CeleXA) 20 mg tablet, Take 1 tablet (20 mg total) by mouth daily, Disp: 30 tablet, Rfl: 6  •  furosemide (LASIX) 40 mg tablet, TAKE 1 TABLET (40 MG TOTAL) BY MOUTH 2 (TWO) TIMES A DAY, Disp: 180 tablet, Rfl: 0  •  LORazepam  (ATIVAN) 0.5 mg tablet, TAKE 1 TABLET (0.5 MG TOTAL) BY MOUTH EVERY 8 (EIGHT) HOURS AS NEEDED FOR ANXIETY, Disp: 20 tablet, Rfl: 0  •  metoprolol succinate (TOPROL-XL) 50 mg 24 hr tablet, Take 1 tablet (50 mg total) by mouth daily, Disp: 90 tablet, Rfl: 3  •  Multiple Vitamin (multivitamin) tablet, Take 1 tablet by mouth daily, Disp: , Rfl:   •  nystatin (MYCOSTATIN) cream, Apply topically 2 (two) times a day (Patient taking differently: Apply topically 2 (two) times a day As needed), Disp: 30 g, Rfl: 0  •  nystatin (MYCOSTATIN) powder, Apply topically 2 (two) times a day (Patient taking differently: Apply topically 2 (two) times a day As needed), Disp: 60 g, Rfl: 0  •  predniSONE 1 mg tablet, TAKE 1 TABLET (1 MG TOTAL) BY MOUTH 3 (THREE) TIMES A DAY., Disp: , Rfl:   •  traMADol (ULTRAM-ER) 100 mg 24 hr tablet, Take 100 mg by mouth daily, Disp: , Rfl:   •  naloxone (NARCAN) 4 mg/0.1 mL nasal spray, Administer 1 spray into a nostril. If no response after 2-3 minutes, give another dose in the other nostril using a new spray. (Patient not taking: Reported on 3/28/2024), Disp: 1 each, Rfl: 1    The following portions of the patient's history were reviewed and updated as appropriate: allergies, current medications, past family history, past medical history, past social history, past surgical history and problem list.    Review of Systems   Constitutional:  Negative for appetite change, chills, fatigue and fever.   Respiratory:  Negative for cough, chest tightness and shortness of breath.    Cardiovascular:  Negative for chest pain, palpitations and leg swelling.   Gastrointestinal:  Negative for abdominal pain, constipation, diarrhea, nausea and vomiting.   Genitourinary:  Negative for difficulty urinating and frequency.   Musculoskeletal:  Negative for arthralgias, back pain, gait problem and neck pain.   Skin:  Negative for rash.   Neurological:  Negative for dizziness, weakness, light-headedness, numbness and  "headaches.   Hematological:  Does not bruise/bleed easily.   Psychiatric/Behavioral:  Negative for dysphoric mood and sleep disturbance. The patient is not nervous/anxious.          PHQ-2/9 Depression Screening    Little interest or pleasure in doing things: 0 - not at all  Feeling down, depressed, or hopeless: 0 - not at all  Trouble falling or staying asleep, or sleeping too much: 0 - not at all  Feeling tired or having little energy: 1 - several days  Poor appetite or overeatin - several days  Feeling bad about yourself - or that you are a failure or have let yourself or your family down: 0 - not at all  Trouble concentrating on things, such as reading the newspaper or watching television: 0 - not at all  Moving or speaking so slowly that other people could have noticed. Or the opposite - being so fidgety or restless that you have been moving around a lot more than usual: 0 - not at all  Thoughts that you would be better off dead, or of hurting yourself in some way: 0 - not at all  PHQ-9 Score: 2  PHQ-9 Interpretation: No or Minimal depression             Objective:    /68 (BP Location: Left arm, Patient Position: Sitting, Cuff Size: Large)   Pulse 66   Temp 98 °F (36.7 °C) (Tympanic)   Resp 16   Ht 5' 5\" (1.651 m)   Wt 94.3 kg (208 lb)   SpO2 97%   BMI 34.61 kg/m²      Physical Exam  Vitals reviewed.   Constitutional:       General: She is not in acute distress.     Appearance: Normal appearance. She is well-developed. She is obese. She is not ill-appearing.   HENT:      Head: Normocephalic.      Right Ear: Tympanic membrane, ear canal and external ear normal.      Left Ear: Tympanic membrane, ear canal and external ear normal.      Nose: Nose normal.      Mouth/Throat:      Mouth: Mucous membranes are moist.      Pharynx: No oropharyngeal exudate.   Eyes:      General: Lids are normal. No scleral icterus.     Extraocular Movements: Extraocular movements intact.      Conjunctiva/sclera: " Conjunctivae normal.      Pupils: Pupils are equal, round, and reactive to light.   Neck:      Thyroid: No thyromegaly.      Vascular: No carotid bruit.   Cardiovascular:      Rate and Rhythm: Normal rate and regular rhythm.      Pulses: Normal pulses.      Heart sounds: Normal heart sounds. No murmur heard.     No friction rub.   Pulmonary:      Effort: Pulmonary effort is normal.      Breath sounds: Normal breath sounds. No wheezing, rhonchi or rales.   Abdominal:      General: Bowel sounds are normal. There is no distension.      Palpations: Abdomen is soft. There is no mass.      Tenderness: There is no abdominal tenderness. There is no guarding.      Hernia: No hernia is present.   Musculoskeletal:         General: Normal range of motion.      Cervical back: Normal range of motion and neck supple.   Lymphadenopathy:      Cervical: No cervical adenopathy.   Skin:     General: Skin is warm and dry.      Findings: No rash.      Comments: No abnormal appearing moles   Neurological:      General: No focal deficit present.      Mental Status: She is alert and oriented to person, place, and time. Mental status is at baseline.      Cranial Nerves: No cranial nerve deficit.      Sensory: No sensory deficit.      Motor: No weakness, tremor or abnormal muscle tone.      Coordination: Coordination normal.      Gait: Gait normal.      Deep Tendon Reflexes: Reflexes normal.   Psychiatric:         Mood and Affect: Mood normal.         Speech: Speech normal.         Behavior: Behavior normal.           Recent Results (from the past 8736 hour(s))   Tissue Exam    Collection Time: 09/19/23  9:29 AM   Result Value Ref Range    Case Report       Surgical Pathology Report                         Case: [id number]                                   Authorizing Provider:  Lauren Dumont MD          Collected:           09/19/2023 0929              Ordering Location:     [facility] Received:            09/19/2023 6393                                      [facility]                                                              Pathologist:           Román Cash MD                                                                Specimen:    Breast, Left, US left brst bx 400 2cmfn, 3 passes, 12g shanice                             Addendum 2       At the request of Dr. Williamson, unstained slides from paraffin BLOCK A1 containing the patient's cancer cells were sent to [facility] for Oncotype DX testing. Upon completion of testing, the [facility] report will be directly sent to the requesting physician as well as posted in the Media Tab of the patient's [employer] EMR by the [facility] Pathology Department.    Please note: The [facility]'s analysis and report is performed independently of [facility], and neither the Hospital nor the Pathology Department screen, review or comment upon  [facility]'s report.  Because the role of testing in cancer diagnosis and management is subject to evolving development, usage and interpretation, we strongly urge that the test limitations described in the [facility] report be carefully read, appropriately shared with the patient, and critically considered when reaching treatment decisions.    This pathology material was removed from archive for purpose of molecular testing.       Addendum       Test Description   Result    Prognostic Interpretation  Estrogen Receptor (clone SP-1)  90-95%                    Positive  Internal control: Positive                      Staining Intensity: Strong  External control: Positive              William Score*:  7         Progesterone Receptor (clone 1E2) 90-95%                Positive   Internal control: Positive                      Staining Intensity: Strong  External control: Positive                  William Score*:  7    HER2 by IHC  (clone 4B5)                        1+                                               Negative    Fixative Duration of Fixation (hrs) Cold Ischemia Time (mins)  Sample Adequacy  Formalin Not stated hours  Not stated   Adequate  Appropriate positive and negative controls were reviewed.  These immunohistochemical tests were performed at [facility] in [place], [place] and interpreted by Dr. Rouse.  An electronic copy of this report will be kept on file in the Medical Records Department at [facility].  * The William score is a semi quantitative system that takes into consideration the proportion of positive cells (scored on a scale of 0-5) and staining intensity (scored on a scale of 0-3).  The proportion and intensity are summed to produce total scores of 0 or 2 through 8.  A score of 0-2 is regarded as negative while 3-8 as positive.      Final Diagnosis       A.  Left breast, 4:00, 2 cm from nipple (ultrasound-guided 12-gauge marquee core biopsy, 3 passes):     - Invasive breast carcinoma of no special type (ductal NST/invasive ductal carcinoma).       -- Tumor present in 3 of 3 tissue cores with largest measurable size of 15 mm.       -- mBR Grade:  Grade 2 of 3         - Duct formation: Score 3 of 3         - Nuclear grade: Score 2 of 3         - Mitotic rate: Score 1 of 3     - In situ component:  Favor focal DCIS, approximately 10% of tumor, grade 1 of 3     - Lymph-vascular invasion:  Not definitively identified    Comment:  Block A1 forwarded for ER/SD/HER2 analysis with the results to be given in a supplemental report.      Microscopic Description       - Immunohistochemical studies (with appropriate controls) demonstrate:     Membranous e-cadherin and P120 catenin.     P63 and SMM-HC negative in invasive carcinoma.     KARYNA-3 positive.       Note       - BEST TUMOR SECTION:  A1.   - Intradepartmental consultation concurs with the diagnosis.  - BROOKE Jones from the [facility] notified via [employer] on 09/21/2023.       Additional Information       All reported additional testing was performed with appropriately reactive controls.  These tests were developed and their performance characteristics determined by [facility] or appropriate performing facility, though some tests may be performed on tissues which have not been validated for performance characteristics (such as staining performed on alcohol exposed cell blocks and decalcified tissues).  Results should be interpreted with caution and in the context of the patients’ clinical condition. These tests may not be cleared or approved by the U.S. Food and Drug Administration, though the FDA has determined that such clearance or approval is not necessary. These tests are used for clinical purposes and they should not be regarded as investigational or for research. This laboratory has been approved by [place] [address], designated as a high-complexity laboratory and is qualified to perform these tests. Interpretation performed at [facility], [address] [place] [address]..      Synoptic Checklist       INVASIVE CARCINOMA OF THE BREAST: Biopsy   INVASIVE CARCINOMA OF THE BREAST: BIOPSY - All Specimens   Protocol posted: 9/21/2022      SPECIMEN      Procedure:    Needle biopsy       Specimen Laterality:    Left       TUMOR      Tumor Site:    Clock position       :    4 o'clock     Tumor Site:    Distance from nipple (Centimeters): 2 cm    Histologic Type:    Invasive carcinoma of no special type (ductal)     Histologic Grade (Winnie Histologic Score):          Glandular (Acinar) / Tubular Differentiation:    Score 3       Nuclear Pleomorphism:    Score 2       Mitotic Rate:    Score 1       Overall Grade:    Grade 2 (scores of 6 or 7)     Tumor Size:    Greatest dimension of largest invasive focus (Millimeters): 15 mm    Ductal Carcinoma In Situ (DCIS):    Present       Architectural Patterns:     "Cribriform       Nuclear Grade:    Grade I (low)       Necrosis:    Not identified     Lymphovascular Invasion:    Not identified     Microcalcifications:    Present in DCIS     Microcalcifications:    Present in invasive carcinoma       Gross Description       A. The specimen is received in formalin, labeled with the patient's name and hospital number, and is designated \" left breast ultrasound biopsy 4:00 2 cm from nipple\".  The specimen consists of 3 fibrofatty tissue cores, each measuring 0.1 to 0.2 cm in diameter, with lengths ranging from 1.4 to 1.8 cm each.  Entirely submitted. Two cassettes.  2 cores in cassette 1; 1 core in cassette 2.  Between sponges.    Note: The estimated total formalin fixation time based upon information provided by the submitting clinician and the standard processing schedule is 9.5 hours.    The cold ischemia time based upon information provided by the submitting clinician and receiving staff in the laboratory is within 60 minutes.    [facility]     CBC and differential    Collection Time: 10/01/23 11:14 AM   Result Value Ref Range    WBC 5.66 4.31 - 10.16 Thousand/uL    RBC 3.82 3.81 - 5.12 Million/uL    Hemoglobin 12.2 11.5 - 15.4 g/dL    Hematocrit 38.8 34.8 - 46.1 %     (H) 82 - 98 fL    MCH 31.9 26.8 - 34.3 pg    MCHC 31.4 31.4 - 37.4 g/dL    RDW 14.2 11.6 - 15.1 %    MPV 10.9 8.9 - 12.7 fL    Platelets 225 149 - 390 Thousands/uL    nRBC 0 /100 WBCs    Segmented % 57 43 - 75 %    Immature Grans % 0 0 - 2 %    Lymphocytes % 27 14 - 44 %    Monocytes % 10 4 - 12 %    Eosinophils Relative 6 0 - 6 %    Basophils Relative 0 0 - 1 %    Absolute Neutrophils 3.18 1.85 - 7.62 Thousands/µL    Absolute Immature Grans 0.01 0.00 - 0.20 Thousand/uL    Absolute Lymphocytes 1.51 0.60 - 4.47 Thousands/µL    Absolute Monocytes 0.59 0.17 - 1.22 Thousand/µL    Eosinophils Absolute 0.35 0.00 - 0.61 Thousand/µL    Basophils Absolute 0.02 0.00 - 0.10 Thousands/µL   Comprehensive metabolic panel "    Collection Time: 10/01/23 11:14 AM   Result Value Ref Range    Sodium 140 135 - 147 mmol/L    Potassium 4.4 3.5 - 5.3 mmol/L    Chloride 104 96 - 108 mmol/L    CO2 32 21 - 32 mmol/L    ANION GAP 4 mmol/L    BUN 23 5 - 25 mg/dL    Creatinine 0.90 0.60 - 1.30 mg/dL    Glucose, Fasting 87 65 - 99 mg/dL    Calcium 8.8 8.4 - 10.2 mg/dL    AST 19 13 - 39 U/L    ALT 19 7 - 52 U/L    Alkaline Phosphatase 80 34 - 104 U/L    Total Protein 7.4 6.4 - 8.4 g/dL    Albumin 3.8 3.5 - 5.0 g/dL    Total Bilirubin 0.71 0.20 - 1.00 mg/dL    eGFR 64 ml/min/1.73sq m   Lipid panel    Collection Time: 10/01/23 11:14 AM   Result Value Ref Range    Cholesterol 193 See Comment mg/dL    Triglycerides 149 See Comment mg/dL    HDL, Direct 58 >=50 mg/dL    LDL Calculated 105 (H) 0 - 100 mg/dL    Non-HDL-Chol (CHOL-HDL) 135 mg/dl   Vitamin D 25 hydroxy    Collection Time: 10/01/23 11:14 AM   Result Value Ref Range    Vit D, 25-Hydroxy 22.4 (L) 30.0 - 100.0 ng/mL   TSH, 3rd generation    Collection Time: 10/01/23 11:14 AM   Result Value Ref Range    TSH 3RD GENERATON 2.534 0.450 - 4.500 uIU/mL   Urinalysis with microscopic    Collection Time: 10/17/23  5:52 PM   Result Value Ref Range    Color, UA Yellow     Clarity, UA Turbid     Specific Gravity, UA 1.030 1.003 - 1.030    pH, UA 5.5 4.5, 5.0, 5.5, 6.0, 6.5, 7.0, 7.5, 8.0    Leukocytes, UA Large (A) Negative    Nitrite, UA Positive (A) Negative    Protein, UA Trace (A) Negative mg/dl    Glucose, UA Negative Negative mg/dl    Ketones, UA Trace (A) Negative mg/dl    Urobilinogen, UA <2.0 <2.0 mg/dl mg/dl    Bilirubin, UA Negative Negative    Occult Blood, UA Negative Negative    RBC, UA 1-2 None Seen, 1-2 /hpf    WBC, UA 30-50 (A) None Seen, 1-2 /hpf    Epithelial Cells Occasional None Seen, Occasional /hpf    Bacteria, UA Innumerable (A) None Seen, Occasional /hpf    MUCUS THREADS Occasional (A) None Seen   MISCELLANEOUS LAB TEST    Collection Time: 10/17/23  5:52 PM   Result Value Ref Range     Miscellaneous Lab Test Result DRAWN ON BEHALF OF THE PATIENT    Tissue Exam    Collection Time: 10/30/23 11:09 AM   Result Value Ref Range    Case Report       Surgical Pathology Report                         Case: [id number]                                   Authorizing Provider:  Avila Ugalde MD            Collected:           10/30/2023 0837              Ordering Location:     [facility]        Received:            10/30/2023 6997                                     [facility]                                                    Pathologist:           Higinio Rodriguez MD                                                                 Specimens:   A) - Duodenum, Cold Bx Duodenum                                                                     B) - Stomach, Cold Bx Gastric Body                                                                  C) - Colon, Random Cold Bx Colon                                                           Final Diagnosis       A. Duodenum, Cold Bx Duodenum:  - Benign duodenal mucosa without specific histopathologic abnormality.  - No intraepithelial lymphocytosis and no villous blunting.  - No epithelial dysplasia and no evidence of malignancy.    B. Stomach, Cold Bx Gastric Body:  - Gastric antral and oxyntic mucosa with chronic, inactive gastritis.  - Negative for intestinal metaplasia, dysplasia or carcinoma.  - No Helicobacter pylori is identified on H&E stained slides.    C. Colon, Random Cold Bx Colon:  - Colonic mucosa with focal active cryptitis.  - No chronic colitis is seen.  - No evidence of lymphocytic or collagenous colitis.    Comment:  The histologic findings are nonspecific, and may be present in many conditions including bowel preparation artifact, acute self-limited and/or infectious colitis, segmental colitis of diverticular disease, drug reaction (NSAID), and may also be idiopathic. Correlation with clinical impression is recommended.        Note        "Interpretation performed at [facility], [address] [facility] [address]        Additional Information       All reported additional testing was performed with appropriately reactive controls.  These tests were developed and their performance characteristics determined by [facility] or appropriate performing facility, though some tests may be performed on tissues which have not been validated for performance characteristics (such as staining performed on alcohol exposed cell blocks and decalcified tissues).  Results should be interpreted with caution and in the context of the patients’ clinical condition. These tests may not be cleared or approved by the U.S. Food and Drug Administration, though the FDA has determined that such clearance or approval is not necessary. These tests are used for clinical purposes and they should not be regarded as investigational or for research. This laboratory has been approved by CLIA 88, designated as a high-complexity laboratory and is qualified to perform these tests.  .      Gross Description       A. The specimen is received in formalin, labeled with the patient's name and hospital number, and is designated \" duodenum\".  The specimen consists of multiple tan soft tissue fragments measuring in aggregate of 0.8 x 0.5 x 0.2 cm.  Entirely submitted. One screened cassette.  B. The specimen is received in formalin, labeled with the patient's name and hospital number, and is designated \" gastric body\".  The specimen consists of 4 tan soft tissue fragments measuring range from 0.3 to 0.4 cm in greatest dimension.  Entirely submitted. One screened cassette.  C. The specimen is received in formalin, labeled with the patient's name and hospital number, and is designated \" random colon\".  The specimen consists of multiple tan soft tissue fragments measuring in aggregate of 0.6 x 0.5 x 0.2 cm.  Entirely submitted. One screened cassette.    Note: The estimated total " formalin fixation time based upon information provided by the submitting clinician and the standard processing schedule is under 72 hours.      LORIbeenadeanna      Clinical Information R/O Celiac    Stress strip    Collection Time: 11/10/23  2:19 PM   Result Value Ref Range    Protocol Name JACQUELINE     Exercise duration (min) 3 min    Exercise duration (sec) 26 sec    Post Peak Systolic  mmHg    Max Diastolic Bp 80 mmHg    Peak  BPM    Max Predicted Heart Rate 149 BPM    Reason for Termination Dyspnea, left knee pain     Test Indication Screening for CAD     Target Hr Formular (220 - Age)*100%     Arrhy During Ex      ECG Interp Before Ex      ECG Interp during Ex      Ex Summary Comment      Chest Pain Statement none     Overall Hr Response To Exercise      Overall BP Response To Exercise     Echo complete w/ contrast if indicated    Collection Time: 11/10/23  2:20 PM   Result Value Ref Range    A4C EF 59 %    LVIDd 3.80 cm    LVIDS 2.30 cm    IVSd 1.00 cm    LVPWd 1.00 cm    FS 39 28 - 44    MV E' Tissue Velocity Septal 6 cm/s    MV E' Tissue Velocity Lateral 10 cm/s    LA Volume Index (BP) 26.3 mL/m2    E/A ratio 0.49     E wave deceleration time 189 ms    MV Peak E Don 49 cm/s    MV Peak A Don 0.99 m/s    Tricuspid annular plane systolic excursion 1.70 cm    LA size 3.3 cm    LA length (A2C) 5.60 cm    LA volume (BP) 54 mL    RAA A4C 13.4 cm2    MV stenosis pressure 1/2 time 55 ms    MV valve area p 1/2 method 4.00     TR Peak Don 2.1 m/s    Triscuspid Valve Regurgitation Peak Gradient 17.0 mmHg    Ao root 3.10 cm    Asc Ao 4 cm    Tricuspid valve peak regurgitation velocity 2.06 m/s    Left ventricular stroke volume (2D) 45.00 mL    IVS 1 cm    LEFT VENTRICLE SYSTOLIC VOLUME (MOD BIPLANE) 2D 18 mL    LV DIASTOLIC VOLUME (MOD BIPLANE) 2D 64 mL    Left Atrium Area-systolic Four Chamber 17 cm2    Left Atrium Area-systolic Apical Two Chamber 18.2 cm2    LVSV, 2D 45 mL    LV EF 60    Stress test only, exercise     Collection Time: 11/10/23  2:46 PM   Result Value Ref Range    Baseline HR 79 bpm    Baseline /82 mmHg    O2 sat rest 98 %    Stress peak  bpm    Post peak  mmHg    O2 sat peak 96 %    Recovery HR 82 bpm    Recovery /80 mmHg    O2 sat recovery 100 %    Max  bpm    Max HR Percent 87 %    Exercise duration (min) 3 min    Exercise duration (sec) 26 sec    Estimated workload 4.6 METS    Rate Pressure Product 21,060.0     Angina Index 0     Stress Stage Reached 1.0     [facility] Treadmill Score 3     ST Elevation (mm) 0.0 mm   Tissue Exam    Collection Time: 11/20/23 12:46 PM   Result Value Ref Range    Case Report       Surgical Pathology Report                         Case: [id number]                                   Authorizing Provider:  Adrien Gabriel MD              Collected:           11/20/2023 1246              Ordering Location:     [facility]     Received:            11/20/2023 [facility]                                     [facility]                                                        Pathologist:           Nino Shin MD                                                       Specimens:   A) - Lymph Node, [place], left axillary sentinel lymp node for touch prep                          B) - Breast, Left, Short superior, long lateral, medium medial                             Final Diagnosis       A. Lymph Node, [place], left axillary sentinel lymp node for touch prep, lymphadenectomy:    -Single lymph node, negative for metastatic carcinoma by leveled H& E and Pankeratin (AE1/AE3) stained slides (0/1).      B. Breast, Left, Short superior, long lateral, medium medial, mastectomy  -Invasive mammary carcinoma, NOS, 2.5  x 3.6  x 2.5 cm   The tumor arise in background of dense fibrotic stroma  -Few foci of ductal carcinoma in situ , intermediate nuclear grade, cribriform, solid  < 10% of total tumor  Margins for Invasive carcinoma      -The nearest surgical  margin ( skin margin) is negative for tumor by 1 mm.     -The posterior margin is negative for tumor by 15 mm  -Microcalcification associated with tumor  -Changes consistent with previous biopsy site   -Benign overlying skin with Seborrheic keratosis      Note       Block B13 contain many tumor cells could be used for future molecular testing if clinically requested.   Tumor cells are  positive for  Ecadherin, P120 and negative for calponin. Controls reacted appropriately   Intradepartmental consultation is in agreement   Interpretation performed at [facility], [address] [address]      Clinical data  Op notes   reop Diagnosis:  Malignant neoplasm of lower-outer quadrant of left breast of female, estrogen receptor positive  [C50.512, Z17.0]     Post-Op Diagnosis Codes:     * Malignant neoplasm of lower-outer quadrant of left breast of female, estrogen receptor positive  [C50.512, Z17.0]     Procedure(s):  Left - LEFT BREAST VERENICE  DIRECTED MASTECTOMY  Left - LEFT LYMPHATIC MAPPING WITH BLUE AND RADIOACTIVE DYES. SENTINEL LYMPH NODE BIOPSY  Left - POSSIBLE AXILLARY DISSECTION  Left - IMMEDIATE LEFT BREAST RECONSTRUCTION WITH TISSUE EXPANDER AND ADM      Additional Information       All reported additional testing was performed with appropriately reactive controls.  These tests were developed and their performance characteristics determined by [facility] or appropriate performing facility, though some tests may be performed on tissues which have not been validated for performance characteristics (such as staining performed on alcohol exposed cell blocks and decalcified tissues).  Results should be interpreted with caution and in the context of the patients’ clinical condition. These tests may not be cleared or approved by the U.S. Food and Drug Administration, though the FDA has determined that such clearance or approval is not necessary. These tests  are used for clinical purposes and they should not be regarded as investigational or for research. This laboratory has been approved by [place] [id number], designated as a high-complexity laboratory and is qualified to perform these tests.  .      Synoptic Checklist       INVASIVE CARCINOMA OF THE BREAST: Resection   INVASIVE CARCINOMA OF THE BREAST: COMPLETE EXCISION - All Specimens   8th Edition - Protocol posted: 9/21/2022      SPECIMEN      Procedure:    Total mastectomy       Specimen Laterality:    Left       TUMOR      Tumor Site:    Lower outer quadrant       Tumor Site:    Clock position       :    4 o'clock     Tumor Site:    Distance from nipple (Centimeters): 2.0 cm    Histologic Type:    Invasive carcinoma of no special type (ductal)     Histologic Grade (Winnie Histologic Score):          Glandular (Acinar) / Tubular Differentiation:    Score 3       Nuclear Pleomorphism:    Score 2       Mitotic Rate:    Score 1       Overall Grade:    Grade 2 (scores of 6 or 7)     Tumor Size:    Greatest dimension of largest invasive focus (Millimeters): 36 mm      Additional Dimension (Millimeters):    25 mm      Additional Dimension (Millimeters):    25 mm    Tumor Focality:    Single focus of invasive carcinoma     Ductal Carcinoma In Situ (DCIS):    Present       :    Negative for extensive intraductal component (EIC)       Size (Extent) of DCIS:    Cannot be determined: few microscopic foci         Number of Blocks with DCIS:    3         Number of Blocks Examined:    24       Architectural Patterns:    Cribriform       Architectural Patterns:    Solid       Nuclear Grade:    Grade II (intermediate)       Necrosis:    Not identified     Lobular Carcinoma In Situ (LCIS):    Not identified     Lymphovascular Invasion:    Not identified     Dermal Lymphovascular Invasion:    Not identified     Microcalcifications:    Present in invasive carcinoma     Microcalcifications:    Present in non-neoplastic tissue     Treatment  "Effect in the Breast:    No known presurgical therapy       MARGINS    Margin Status for Invasive Carcinoma:    All margins negative for invasive carcinoma       Distance from Invasive Carcinoma to Closest Margin:    1 mm      Closest Margin(s) to Invasive Carcinoma:    Anterior     Margin Status for DCIS:    All margins negative for DCIS       Distance from DCIS to Closest Margin:    Greater than: 5 mm      Closest Margin(s) to DCIS:    Posterior       REGIONAL LYMPH NODES    Regional Lymph Node Status:          :    All regional lymph nodes negative for tumor       Total Number of Lymph Nodes Examined (sentinel and non-sentinel):    1       Number of [place] Nodes Examined:    1       PATHOLOGIC STAGE CLASSIFICATION (pTNM, AJCC 8th Edition)      Reporting of pT, pN, and (when applicable) pM categories is based on information available to the pathologist at the time the report is issued. As per the AJCC (Chapter 1, 8th Ed.) it is the managing physician's responsibility to establish the final pathologic stage based upon all pertinent information, including but potentially not limited to this pathology report.    pT Category:    pT2     Regional Lymph Nodes Modifier:    (sn): [place] node(s) evaluated.     pN Category:    pN0          Estrogen Receptor (ER) Status:    Positive (greater than 10% of cells demonstrate nuclear positivity)         Percentage of Cells with Nuclear Positivity:    %       Progesterone Receptor (PgR) Status:    Positive         Percentage of Cells with Nuclear Positivity:    %       HER2 (by immunohistochemistry):    Negative (Score 1+)       Testing Performed on Case Number:    [id number]       Gross Description       A. The specimen is received fresh, labeled with the patient's name and hospital number, and is designated \" left axillary sentinel lymph node for touch prep\".  The specimen consists of a single portion of tan-yellow fatty tissue measuring 2.2 x 2.0 x 1.1 cm.  " "Bisected revealing a tan partially dyed blue lymph node measuring 1.5 cm.  Touch preparations are performed.  The specimen is entirely submitted in 2 cassettes.  B. The specimen is received in formalin, labeled with the patient's name and hospital number, and is designated \" breast, left short superior, long lateral, medium medial\".  The specimen consists of a 1319 g left breast mastectomy specimen (23.0 x 18.5 x 7.5 cm) with an attached tan-pink skin ellipse (21.5 x 12.5 cm).  There are 2 red erythematous areas on the skin surface at 12:00 measuring 0.5 and 0.4 cm.  These areas are 2.5 cm from the nipple.  The nipple areolar complex is flush with the surrounding skin and measures 5.0 x 4.5 cm.  The grossly unremarkable nipple measures 1.5 cm in diameter.  Also, there is a 1.5 x 1.5 cm previously dyed blue area on the skin at 12:00, 2.2 cm from the nipple.  A short suture designates superior, a long suture designates lateral and a medium suture designates medial.  The specimen is inked as follows: Upper outer quadrant-yellow, upper inner quadrant-blue, lower outer quadrant-green, lower inner quadrant-orange and entire deep margin-black.  At 5:00 in the lower outer quadrant/lower inner quadrant, 4 cm from the nipple, is a 2.5 (medial-lateral) x 3.6 (superior-inferior) x 2.5 cm (anterior-posterior) tan-white, spiculated and ill-defined mass containing a Estella  reflector.  The mass is 0.1 cm from the anterior skin, 1.5 cm from the deep margin and 1.7 cm from the inferior margin.  The mass is greater than 2.0 cm away from all remaining margins.  The uninvolved breast parenchyma is yellow, lobulated and predominantly comprised of fat.  No other lesions are grossly identified.  Representative sections are submitted as follows:    1: Nipple, perpendicular section  2: Red erythematous areas on skin surface  3: Previously dyed blue area on skin surface  4: Mass to nearest anterior skin  5: Additional section of anterior " skin near mass  6: Deep margin closest to mass  7-8: Inferior margin closest to mass  9: Uninvolved medial to mass  10: Medial edge of mass  11-14: Representative sections of mass submitted from medial to lateral, cassette 13 contains section with Estella  reflector  15: Lateral edge of mass, includes inferior margin  16: Uninvolved lateral to mass  17-18: Representative sections from the upper outer quadrant  19-20: Representative sections from the upper inner quadrant  21-22: Representative sections from the lower outer quadrant  23-24: Representative sections from the lower inner quadrant    Note: The estimated total formalin fixation time based upon information provided by the submitting clinician and the standard processing schedule is 31.0 hours.  The cold ischemia time based upon information provided by the submitting clinician and receiving staff in the laboratory is within 60 minutes.  RRavotti     Platelet count    Collection Time: 11/20/23  5:37 PM   Result Value Ref Range    Platelets 201 149 - 390 Thousands/uL    MPV 10.5 8.9 - 12.7 fL   Basic metabolic panel    Collection Time: 11/21/23  4:13 AM   Result Value Ref Range    Sodium 137 135 - 147 mmol/L    Potassium 3.9 3.5 - 5.3 mmol/L    Chloride 104 96 - 108 mmol/L    CO2 27 21 - 32 mmol/L    ANION GAP 6 mmol/L    BUN 16 5 - 25 mg/dL    Creatinine 0.86 0.60 - 1.30 mg/dL    Glucose 135 65 - 140 mg/dL    Glucose, Fasting 135 (H) 65 - 99 mg/dL    Calcium 8.4 8.4 - 10.2 mg/dL    eGFR 68 ml/min/1.73sq m   CBC and differential    Collection Time: 11/21/23  4:13 AM   Result Value Ref Range    WBC 10.18 (H) 4.31 - 10.16 Thousand/uL    RBC 3.44 (L) 3.81 - 5.12 Million/uL    Hemoglobin 10.7 (L) 11.5 - 15.4 g/dL    Hematocrit 33.9 (L) 34.8 - 46.1 %    MCV 99 (H) 82 - 98 fL    MCH 31.1 26.8 - 34.3 pg    MCHC 31.6 31.4 - 37.4 g/dL    RDW 13.2 11.6 - 15.1 %    MPV 10.7 8.9 - 12.7 fL    Platelets 197 149 - 390 Thousands/uL    nRBC 0 /100 WBCs    Segmented % 84 (H)  43 - 75 %    Immature Grans % 1 0 - 2 %    Lymphocytes % 8 (L) 14 - 44 %    Monocytes % 7 4 - 12 %    Eosinophils Relative 0 0 - 6 %    Basophils Relative 0 0 - 1 %    Absolute Neutrophils 8.56 (H) 1.85 - 7.62 Thousands/µL    Absolute Immature Grans 0.05 0.00 - 0.20 Thousand/uL    Absolute Lymphocytes 0.84 0.60 - 4.47 Thousands/µL    Absolute Monocytes 0.72 0.17 - 1.22 Thousand/µL    Eosinophils Absolute 0.00 0.00 - 0.61 Thousand/µL    Basophils Absolute 0.01 0.00 - 0.10 Thousands/µL   Magnesium    Collection Time: 11/21/23  4:13 AM   Result Value Ref Range    Magnesium 2.0 1.9 - 2.7 mg/dL   Phosphorus    Collection Time: 11/21/23  4:13 AM   Result Value Ref Range    Phosphorus 2.8 2.3 - 4.1 mg/dL   Basic metabolic panel    Collection Time: 11/22/23  4:34 AM   Result Value Ref Range    Sodium 142 135 - 147 mmol/L    Potassium 3.4 (L) 3.5 - 5.3 mmol/L    Chloride 108 96 - 108 mmol/L    CO2 28 21 - 32 mmol/L    ANION GAP 6 mmol/L    BUN 20 5 - 25 mg/dL    Creatinine 1.01 0.60 - 1.30 mg/dL    Glucose 108 65 - 140 mg/dL    Calcium 8.0 (L) 8.4 - 10.2 mg/dL    eGFR 56 ml/min/1.73sq m   CBC and differential    Collection Time: 11/22/23  4:34 AM   Result Value Ref Range    WBC 8.91 4.31 - 10.16 Thousand/uL    RBC 3.09 (L) 3.81 - 5.12 Million/uL    Hemoglobin 9.6 (L) 11.5 - 15.4 g/dL    Hematocrit 30.7 (L) 34.8 - 46.1 %    MCV 99 (H) 82 - 98 fL    MCH 31.1 26.8 - 34.3 pg    MCHC 31.3 (L) 31.4 - 37.4 g/dL    RDW 13.4 11.6 - 15.1 %    MPV 10.6 8.9 - 12.7 fL    Platelets 198 149 - 390 Thousands/uL    nRBC 0 /100 WBCs    Segmented % 63 43 - 75 %    Immature Grans % 0 0 - 2 %    Lymphocytes % 27 14 - 44 %    Monocytes % 9 4 - 12 %    Eosinophils Relative 1 0 - 6 %    Basophils Relative 0 0 - 1 %    Absolute Neutrophils 5.60 1.85 - 7.62 Thousands/µL    Absolute Immature Grans 0.03 0.00 - 0.20 Thousand/uL    Absolute Lymphocytes 2.37 0.60 - 4.47 Thousands/µL    Absolute Monocytes 0.78 0.17 - 1.22 Thousand/µL    Eosinophils Absolute  0.11 0.00 - 0.61 Thousand/µL    Basophils Absolute 0.02 0.00 - 0.10 Thousands/µL   Body fluid culture and Gram stain    Collection Time: 12/19/23  3:41 PM    Specimen: Other; Body Fluid   Result Value Ref Range    Body Fluid Culture, Sterile Growth in Broth culture only Diphtheroids     Gram Stain Result 1+ Polys     Gram Stain Result No bacteria seen    Anaerobic culture and Gram stain    Collection Time: 12/19/23  3:41 PM    Specimen: Other; Body Fluid   Result Value Ref Range    Anaerobic Culture No anaerobes isolated     Anaerobic Culture Corynebacterium jeikeium (A)        Susceptibility    Corynebacterium jeikeium - DINORA     ZID Performed Yes     Ancillary Pathology Sendout (AP only)    Collection Time: 12/29/23  2:47 PM   Result Value Ref Range    Scan Result Oncotype Dx results    Tissue Exam    Collection Time: 01/16/24  5:27 PM   Result Value Ref Range    Case Report       Surgical Pathology Report                         Case: [id number]                                  Authorizing Provider:  Molina Jimenez MD         Collected:           01/16/2024 8317              Ordering Location:     [facility]     Received:            01/16/2024 [facility]                                     [place] [facility]                                                        Pathologist:           Nino Shin MD                                                       Specimen:    Breast, Right, Right Breast Tissue                                                         Final Diagnosis       A. Breast, Right, Right Breast Tissue, reduction mammoplasty:    -Benign breast tissue with fibrocystic change (total weight  757   gm).  -Benign overlying skin.              Note       Interpretation performed at [facility], [address] [place] [address]        Additional Information       All reported additional testing was performed with appropriately reactive controls.  These tests were  "developed and their performance characteristics determined by [facility] or appropriate performing facility, though some tests may be performed on tissues which have not been validated for performance characteristics (such as staining performed on alcohol exposed cell blocks and decalcified tissues).  Results should be interpreted with caution and in the context of the patients’ clinical condition. These tests may not be cleared or approved by the U.S. Food and Drug Administration, though the FDA has determined that such clearance or approval is not necessary. These tests are used for clinical purposes and they should not be regarded as investigational or for research. This laboratory has been approved by CLIA 88, designated as a high-complexity laboratory and is qualified to perform these tests.  .      Gross Description       A. The specimen is received in formalin, labeled with the patient's name and hospital number, and is designated \" right breast tissue.\"  It consists of 757 g, 18.5 x 16 x 7 cm aggregate of yellow, lobulated soft tissue and white to white-pink wrinkled skin.  No skin lesions are identified.  Sectioning reveals yellow, soft, lobulated cut surfaces with a minimal amount of pink-white fibrous tissue.  No masses or lesions are identified.  Representative sections are submitted in 2 cassettes.      Note: The estimated total formalin fixation time based upon information provided by the submitting clinician and the standard processing schedule is 32.75 hours.    The cold ischemia time based upon information provided by the submitting clinician and receiving staff in the laboratory is within 60 minutes.    MScheib     Basic metabolic panel    Collection Time: 01/16/24  7:57 PM   Result Value Ref Range    Sodium 139 135 - 147 mmol/L    Potassium 3.7 3.5 - 5.3 mmol/L    Chloride 104 96 - 108 mmol/L    CO2 27 21 - 32 mmol/L    ANION GAP 8 mmol/L    BUN 20 5 - 25 mg/dL    Creatinine " 0.90 0.60 - 1.30 mg/dL    Glucose 143 (H) 65 - 140 mg/dL    Glucose, Fasting 143 (H) 65 - 99 mg/dL    Calcium 8.7 8.4 - 10.2 mg/dL    eGFR 64 ml/min/1.73sq m   CBC and differential    Collection Time: 01/16/24  7:57 PM   Result Value Ref Range    WBC 11.60 (H) 4.31 - 10.16 Thousand/uL    RBC 3.82 3.81 - 5.12 Million/uL    Hemoglobin 11.7 11.5 - 15.4 g/dL    Hematocrit 37.0 34.8 - 46.1 %    MCV 97 82 - 98 fL    MCH 30.6 26.8 - 34.3 pg    MCHC 31.6 31.4 - 37.4 g/dL    RDW 13.0 11.6 - 15.1 %    MPV 10.6 8.9 - 12.7 fL    Platelets 242 149 - 390 Thousands/uL    nRBC 0 /100 WBCs    Segmented % 92 (H) 43 - 75 %    Immature Grans % 0 0 - 2 %    Lymphocytes % 6 (L) 14 - 44 %    Monocytes % 2 (L) 4 - 12 %    Eosinophils Relative 0 0 - 6 %    Basophils Relative 0 0 - 1 %    Absolute Neutrophils 10.54 (H) 1.85 - 7.62 Thousands/µL    Absolute Immature Grans 0.05 0.00 - 0.20 Thousand/uL    Absolute Lymphocytes 0.74 0.60 - 4.47 Thousands/µL    Absolute Monocytes 0.24 0.17 - 1.22 Thousand/µL    Eosinophils Absolute 0.01 0.00 - 0.61 Thousand/µL    Basophils Absolute 0.02 0.00 - 0.10 Thousands/µL   ECG 12 lead    Collection Time: 01/16/24  9:04 PM   Result Value Ref Range    Ventricular Rate 76 BPM    Atrial Rate 76 BPM    MN Interval 206 ms    QRSD Interval 100 ms    QT Interval 440 ms    QTC Interval 495 ms    P [place] 68 degrees    QRS Axis 58 degrees    T Wave Axis 86 degrees   Basic metabolic panel    Collection Time: 01/17/24  2:12 AM   Result Value Ref Range    Sodium 136 135 - 147 mmol/L    Potassium 4.5 3.5 - 5.3 mmol/L    Chloride 104 96 - 108 mmol/L    CO2 24 21 - 32 mmol/L    ANION GAP 8 mmol/L    BUN 21 5 - 25 mg/dL    Creatinine 0.98 0.60 - 1.30 mg/dL    Glucose 246 (H) 65 - 140 mg/dL    Calcium 8.6 8.4 - 10.2 mg/dL    eGFR 58 ml/min/1.73sq m   CBC and Platelet    Collection Time: 01/17/24  2:12 AM   Result Value Ref Range    WBC 15.06 (H) 4.31 - 10.16 Thousand/uL    RBC 3.66 (L) 3.81 - 5.12 Million/uL    Hemoglobin 11.4  (L) 11.5 - 15.4 g/dL    Hematocrit 35.6 34.8 - 46.1 %    MCV 97 82 - 98 fL    MCH 31.1 26.8 - 34.3 pg    MCHC 32.0 31.4 - 37.4 g/dL    RDW 13.1 11.6 - 15.1 %    Platelets 228 149 - 390 Thousands/uL    MPV 10.7 8.9 - 12.7 fL   Magnesium    Collection Time: 01/17/24  2:12 AM   Result Value Ref Range    Magnesium 1.9 1.9 - 2.7 mg/dL   POCT hemoglobin A1c    Collection Time: 03/27/24  2:56 PM   Result Value Ref Range    Hemoglobin A1C 5.7 6.5   CBC and differential    Collection Time: 04/26/24 10:52 AM   Result Value Ref Range    White Blood Cell Count 5.8 3.4 - 10.8 x10E3/uL    Red Blood Cell Count 3.89 3.77 - 5.28 x10E6/uL    Hemoglobin 12.2 11.1 - 15.9 g/dL    HCT 36.8 34.0 - 46.6 %    MCV 95 79 - 97 fL    MCH 31.4 26.6 - 33.0 pg    MCHC 33.2 31.5 - 35.7 g/dL    RDW 13.4 11.7 - 15.4 %    Platelet Count 189 150 - 450 x10E3/uL    Neutrophils 69 Not Estab. %    Lymphocytes 19 Not Estab. %    Monocytes 8 Not Estab. %    Eosinophils 4 Not Estab. %    Basophils PCT 0 Not Estab. %    Neutrophils (Absolute) 4.0 1.4 - 7.0 x10E3/uL    Lymphocytes (Absolute) 1.1 0.7 - 3.1 x10E3/uL    Monocytes (Absolute) 0.5 0.1 - 0.9 x10E3/uL    Eosinophils (Absolute) 0.2 0.0 - 0.4 x10E3/uL    Basophils ABS 0.0 0.0 - 0.2 x10E3/uL    Immature Granulocytes 0 Not Estab. %    Immature Granulocytes (Absolute) 0.0 0.0 - 0.1 x10E3/uL   Comprehensive metabolic panel    Collection Time: 04/26/24 10:52 AM   Result Value Ref Range    Glucose, Random 96 70 - 99 mg/dL    BUN 24 8 - 27 mg/dL    Creatinine 1.07 (H) 0.57 - 1.00 mg/dL    eGFR 55 (L) >59 mL/min/1.73    SL AMB BUN/CREATININE RATIO 22 12 - 28    Sodium 139 134 - 144 mmol/L    Potassium 4.7 3.5 - 5.2 mmol/L    Chloride 101 96 - 106 mmol/L    CO2 26 20 - 29 mmol/L    CALCIUM 9.3 8.7 - 10.3 mg/dL    Protein, Total 7.3 6.0 - 8.5 g/dL    Albumin 4.0 3.8 - 4.8 g/dL    Globulin, Total 3.3 1.5 - 4.5 g/dL    Albumin/Globulin Ratio 1.2 1.2 - 2.2    TOTAL BILIRUBIN 0.4 0.0 - 1.2 mg/dL    Alk Phos Isoenzymes  92 44 - 121 IU/L    AST 22 0 - 40 IU/L    ALT 23 0 - 32 IU/L   Protein electrophoresis, serum    Collection Time: 04/26/24 10:52 AM   Result Value Ref Range    Albumin, Electrophoresis 3.7 2.9 - 4.4 g/dL    Alpha-1 Globulin 0.2 0.0 - 0.4 g/dL    Alpha-2 Globulin 0.7 0.4 - 1.0 g/dL    Beta Globulin 1.2 0.7 - 1.3 g/dL    Gamma Globulin 1.4 0.4 - 1.8 g/dL    M-[place] Not Observed Not Observed g/dL    Globulin 3.6 2.2 - 3.9 g/dL    Albumin/Globulin Ratio 1.0 0.7 - 1.7    Please note Comment    TIBC    Collection Time: 04/26/24 10:52 AM   Result Value Ref Range    Total Iron Binding Capacity (TIBC) 358 250 - 450 ug/dL    UIBC 268 118 - 369 ug/dL    Iron, Serum 90 27 - 139 ug/dL    Iron Saturation 25 15 - 55 %   Vitamin B12/Folate, Serum Panel    Collection Time: 04/26/24 10:52 AM   Result Value Ref Range    Vitamin B-12 294 232 - 1,245 pg/mL    FOLATE, SERUM 10.4 >3.0 ng/mL   LD,Blood    Collection Time: 04/26/24 10:52 AM   Result Value Ref Range     119 - 226 IU/L   Sedimentation rate, automated    Collection Time: 04/26/24 10:52 AM   Result Value Ref Range    Sedimentation Rate 26 0 - 40 mm/hr   Magnesium    Collection Time: 04/26/24 10:52 AM   Result Value Ref Range    Magnesium, Serum 2.1 1.6 - 2.3 mg/dL   Ferritin    Collection Time: 04/26/24 10:52 AM   Result Value Ref Range    Ferritin 92 15 - 150 ng/mL   Reticulocytes    Collection Time: 04/26/24 10:52 AM   Result Value Ref Range    Reticulocyte Count 1.0 0.6 - 2.6 %   Beta 2 microglobulin, serum    Collection Time: 04/26/24 10:52 AM   Result Value Ref Range    Beta-2 Microglobulin 2.5 (H) 0.6 - 2.4 mg/L       Laboratory Results: I have personally reviewed the pertinent laboratory results/reports     Radiology/Other Diagnostic Testing Results: I have personally reviewed pertinent reports.      No results found.     Assessment/Plan:  1. Stage 3a chronic kidney disease (HCC)  Assessment & Plan:  Lab Results   Component Value Date    EGFR 55 (L) 04/26/2024     EGFR 58 01/17/2024    EGFR 64 01/16/2024    CREATININE 1.07 (H) 04/26/2024    CREATININE 0.98 01/17/2024    CREATININE 0.90 01/16/2024   Stable will continue to monitor  2. Coronary artery disease involving native coronary artery of native heart without angina pectoris  Assessment & Plan:  No chest pain now  last cardiology visit 8/23 due for  follow up   Orders:  -     Ambulatory Referral to Cardiology; Future  3. Malignant neoplasm of lower-outer quadrant of left breast of female, estrogen receptor positive (HCC)  Assessment & Plan:  Oncology note  reviewed   4. Dyslipidemia  Assessment & Plan:  Due for lipid panel   Orders:  -     Lipid panel; Future; Expected date: 07/08/2024  5. Essential hypertension  Assessment & Plan:  Well controlled on current therapy continue with current medications and will reassess next visit    6. Anxiety and depression  Assessment & Plan:  Doing ok on meds  7. Chronic bilateral low back pain without sciatica  Assessment & Plan:  On tramadol per pain dr   8. Primary osteoarthritis involving multiple joints  Assessment & Plan:  Followed by rheumatology   9. Psoriatic arthritis (HCC)  Assessment & Plan:  Followed by rheumatology   10. Rheumatoid arthritis of multiple sites with negative rheumatoid factor (HCC)  Assessment & Plan:  Followed  by rheumatology   11. Restless leg syndrome  Assessment & Plan:  Will try otc magnesium   12. Moderate episode of recurrent major depressive disorder (HCC)  Assessment & Plan:  Doing well on meds   13. Loose stools  Assessment & Plan:  Pt to cont immodium tiw prn   14. Annual physical exam  Assessment & Plan:  Check routine labs    Orders:  -     TSH, 3rd generation; Future  -     Urinalysis with microscopic; Future  15. Prediabetes  Assessment & Plan:  Check   [id number]   Orders:  -     Hemoglobin A1C; Future; Expected date: 07/08/2024  16. Class 1 obesity due to excess calories with serious comorbidity and body mass index (BMI) of 34.0 to 34.9 in  "adult  -     Ambulatory Referral to Weight Management; Future                 Read package inserts for all medications before starting a new medications, call me if you have any questions.    Patient was given opportunity to ask questions and all questions were answered.    Disclaimer: Portions of the record may have been created with voice recognition software. Occasional wrong word or \"sound a like\" substitutions may have occurred due to the inherent limitations of voice recognition software. Read the chart carefully and recognize, using context, where substitutions have occurred. I have used the Epic copy/forward function to compose this note. I have reviewed my current note to ensure it reflects the current patient status, exam, assessment and plan.  "

## 2024-07-08 NOTE — ASSESSMENT & PLAN NOTE
Lab Results   Component Value Date    EGFR 55 (L) 04/26/2024    EGFR 58 01/17/2024    EGFR 64 01/16/2024    CREATININE 1.07 (H) 04/26/2024    CREATININE 0.98 01/17/2024    CREATININE 0.90 01/16/2024   Stable will continue to monitor

## 2024-07-16 ENCOUNTER — TELEPHONE (OUTPATIENT)
Age: 74
End: 2024-07-16

## 2024-07-16 NOTE — TELEPHONE ENCOUNTER
Called pt to confirm appt for 08/13/24. Pt needs clearance from [facility] before to start weight loss medication. Unable to lvmg. Voice mail full.

## 2024-07-30 NOTE — ASSESSMENT & PLAN NOTE
-At risk for malabsorption of vitamins/minerals secondary to malabsorption and restriction of intake from bariatric surgery  -NOT Currently taking adequate postop bariatric surgery vitamin supplementation: OTC Women's one a day, Vitamin D 1,000IU D3, flax seed oil - recommend start Ludwin MVI with iron and calcium citrate 500mg TID    -Obtain CBC/Metabolic panel  -Patient received education about the importance of adhering to a lifelong supplementation regimen to avoid vitamin/mineral deficiencies

## 2024-07-30 NOTE — ASSESSMENT & PLAN NOTE
-s/p robotic sleeve gastrectomy with ROBBIE by Dr. Wolf at [facility] on 12/31/2012.  She is down 25lbs but would like to get back under 200lbs and feel better. She will consult with [facility] and recommend f/u with RD and me in 1 year.    Up to date with EGD - due in 2 years.    Initial: 240lbs  Current: 206lbs  Juan Francisco: 175lbs  Current BMI is Body mass index is 36.49 kg/m².    Tolerating a regular diet-yes  Eating at least 60 grams of protein per day-no  Following 30/60 minute rule with liquids-no  Drinking at least 64 ounces of fluid per day-no  Drinking carbonated beverages-no  Sufficient exercise-no d/t back pain  Using NSAIDs regularly-no  Using nicotine-no  Using alcohol-no. Advised about the risks of alcohol s/p bariatric surgery and recommend avoiding all alcohol

## 2024-08-02 ENCOUNTER — OFFICE VISIT (OUTPATIENT)
Dept: BARIATRICS | Facility: CLINIC | Age: 74
End: 2024-08-02
Payer: COMMERCIAL

## 2024-08-02 VITALS
HEART RATE: 60 BPM | HEIGHT: 63 IN | DIASTOLIC BLOOD PRESSURE: 80 MMHG | RESPIRATION RATE: 14 BRPM | BODY MASS INDEX: 36.5 KG/M2 | SYSTOLIC BLOOD PRESSURE: 130 MMHG | WEIGHT: 206 LBS

## 2024-08-02 DIAGNOSIS — I10 ESSENTIAL HYPERTENSION: ICD-10-CM

## 2024-08-02 DIAGNOSIS — K91.2 POSTSURGICAL MALABSORPTION: ICD-10-CM

## 2024-08-02 DIAGNOSIS — E66.9 OBESITY, CLASS II, BMI 35-39.9: ICD-10-CM

## 2024-08-02 DIAGNOSIS — E78.5 DYSLIPIDEMIA: ICD-10-CM

## 2024-08-02 DIAGNOSIS — E66.09 CLASS 1 OBESITY DUE TO EXCESS CALORIES WITH SERIOUS COMORBIDITY AND BODY MASS INDEX (BMI) OF 34.0 TO 34.9 IN ADULT: ICD-10-CM

## 2024-08-02 DIAGNOSIS — Z48.815 ENCOUNTER FOR SURGICAL AFTERCARE FOLLOWING SURGERY OF DIGESTIVE SYSTEM: Primary | ICD-10-CM

## 2024-08-02 PROCEDURE — 99204 OFFICE O/P NEW MOD 45 MIN: CPT | Performed by: PHYSICIAN ASSISTANT

## 2024-08-02 PROCEDURE — 3079F DIAST BP 80-89 MM HG: CPT | Performed by: PHYSICIAN ASSISTANT

## 2024-08-02 PROCEDURE — 3075F SYST BP GE 130 - 139MM HG: CPT | Performed by: PHYSICIAN ASSISTANT

## 2024-08-02 PROCEDURE — 1159F MED LIST DOCD IN RCRD: CPT | Performed by: PHYSICIAN ASSISTANT

## 2024-08-02 PROCEDURE — 1160F RVW MEDS BY RX/DR IN RCRD: CPT | Performed by: PHYSICIAN ASSISTANT

## 2024-08-02 NOTE — PROGRESS NOTES
Assessment/Plan:    Encounter for surgical aftercare following surgery of digestive system  -s/p robotic sleeve gastrectomy with ROBBIE by Dr. Wolf at [facility] on 12/31/2012.  She is down 25lbs but would like to get back under 200lbs and feel better. She will consult with [facility] and recommend f/u with RD and me in 1 year.    Up to date with EGD - due in 2 years.    Initial: 240lbs  Current: 206lbs  Juan Francisco: 175lbs  Current BMI is Body mass index is 36.49 kg/m².    Tolerating a regular diet-yes  Eating at least 60 grams of protein per day-no  Following 30/60 minute rule with liquids-no  Drinking at least 64 ounces of fluid per day-no  Drinking carbonated beverages-no  Sufficient exercise-no d/t back pain  Using NSAIDs regularly-no  Using nicotine-no  Using alcohol-no. Advised about the risks of alcohol s/p bariatric surgery and recommend avoiding all alcohol      Postsurgical malabsorption  -At risk for malabsorption of vitamins/minerals secondary to malabsorption and restriction of intake from bariatric surgery  -NOT Currently taking adequate postop bariatric surgery vitamin supplementation: OTC Women's one a day, Vitamin D 1,000IU D3, flax seed oil - recommend start Ludwin MVI with iron and calcium citrate 500mg TID    -Obtain CBC/Metabolic panel  -Patient received education about the importance of adhering to a lifelong supplementation regimen to avoid vitamin/mineral deficiencies       Essential hypertension  -controlled on Metoprolol  -Continue monitoring and management with prescribing provider      Dyslipidemia  -Avoid fried foods and trans fat, limit saturated fats and refined carbohydrates  -Increase fish/omega 3 FA consumption  -Increase physical activity  -obtain lipid panel     Diagnoses and all orders for this visit:    Encounter for surgical aftercare following surgery of digestive system    Postsurgical malabsorption  -     CBC and differential; Future  -     Comprehensive metabolic panel; Future  -      Folate; Future  -     Hemoglobin A1C; Future  -     Iron Panel (Includes Ferritin, Iron Sat%, Iron, and TIBC); Future  -     Vitamin A; Future  -     TSH, 3rd generation with Free T4 reflex; Future  -     PTH, intact; Future  -     Lipid panel; Future  -     Zinc; Future  -     Vitamin D 25 hydroxy; Future  -     Vitamin B12; Future  -     Vitamin B1, whole blood; Future    Essential hypertension    Dyslipidemia  -     Lipid panel; Future    Class 1 obesity due to excess calories with serious comorbidity and body mass index (BMI) of 34.0 to 34.9 in adult  -     Ambulatory Referral to Weight Management    Obesity, Class II, BMI 35-39.9  -     Hemoglobin A1C; Future  -     TSH, 3rd generation with Free T4 reflex; Future  -     Lipid panel; Future          Subjective:      Patient ID: Pretty Aguila is a 74 y.o. female.    -s/p robotic sleeve gastrectomy with ROBBIE by Dr. Wolf at [facility] on 12/31/2012. Presents to the office today for overdue routine follow up. Tolerating diet without issues; denies N/V, dysphagia, reflux. She is down 25lbs since she consulted with Dr. Fernandez in 2021 - has since had BRCA and partial mastectomy.    Last EGD was in October 2023 - small HH and mild gastritis and pathology unremarkable.    She consulted with [facility] in August 2021. 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 Feels restriction with small portions. But grazes/snacks all day and drinks with meals.    Getting ready to retire. Has 3 kids and 6 grand kids. She  at [employer] in [facility].    Diet Recall:   B - Belvita breakfast bar and 1 cup coffee w/ Tbsp of creamer and sweet and low  Snack/grazes - pretzels and candy  "and crackers  L - dinner leftovers or slice pizza or green salad with chicken   Snack -pretzels  D - chicken or pork roast or pasta or hamburgers and veggies   HS - pretzels or ice cream     Fluids - 1/2 cup coffee,16-32oz water, rare glass of wine    The following portions of the patient's history were reviewed and updated as appropriate: allergies, current medications, past family history, past medical history, past social history, past surgical history and problem list.    Review of Systems   Constitutional:  Positive for unexpected weight change (weight regain). Negative for chills and fever.   HENT:  Negative for trouble swallowing.    Respiratory:  Negative for cough and shortness of breath.    Cardiovascular:  Negative for chest pain and palpitations.   Gastrointestinal:  Negative for abdominal pain, constipation, diarrhea, nausea and vomiting.   Musculoskeletal:  Positive for arthralgias and back pain.   Neurological:  Negative for dizziness.   Psychiatric/Behavioral:          Denies anxiety and depression         Objective:      /80 (BP Location: Left arm, Patient Position: Sitting, Cuff Size: Large)   Pulse 60   Resp 14   Ht 5' 3\" (1.6 m)   Wt 93.4 kg (206 lb)   BMI 36.49 kg/m²     Colonoscopy-Completed       Physical Exam  Vitals reviewed.   Constitutional:       General: She is not in acute distress.     Appearance: She is well-developed.   HENT:      Head: Normocephalic and atraumatic.   Eyes:      General: No scleral icterus.  Cardiovascular:      Rate and Rhythm: Normal rate and regular rhythm.   Pulmonary:      Effort: Pulmonary effort is normal. No respiratory distress.   Abdominal:      General: There is no distension.      Palpations: Abdomen is soft.   Musculoskeletal:      Comments: Walks with cane   Skin:     General: Skin is warm and dry.   Neurological:      Mental Status: She is alert.   Psychiatric:         Mood and Affect: Mood normal.         Behavior: Behavior normal.       "     BARRIERS: none identified    GOALS:   Continued/Maintain healthy weight loss with good nutrition intakes.  Adequate hydration with at least 64oz. fluid intake.  Normal vitamin and mineral levels.  Exercise as tolerated.    Follow-up in 1 year. We kindly ask that your arrive 15 minutes before your scheduled appointment time with your provider to allow our staff to room you, get your vital signs and update your chart.    Follow diet as discussed.      Get lab work done in the next 2 weeks.  You have been given a lab slip today.  Please call the office if you need a replacement.  It is recommended to check with your insurance BEFORE getting labs done to make sure they are covered by your policy.  Also, please check with your PCP and other providers before getting labs to avoid duplicate labs. Make sure to HOLD any multivitamins that may contain biotin and any biotin supplements FOR 5 DAYS before any labs since it can affect the results.    Follow vitamin and mineral recommendations as reviewed with you.    Call our office if you have any problems with abdominal pain especially associated with fever, chills, nausea, vomiting or any other concerns.    All  Post-bariatric surgery patients should be aware that very small quantities of any alcohol can cause impairment and it is very possible not to feel the effect. The effect can be in the system for several hours.  It is also a stomach irritant.     It is advised to AVOID alcohol, Nonsteroidal antiinflammatory drugs (NSAIDS) and nicotine of all forms . Any of these can cause stomach irritation/pain.

## 2024-08-05 ENCOUNTER — OFFICE VISIT (OUTPATIENT)
Dept: CARDIOLOGY CLINIC | Facility: CLINIC | Age: 74
End: 2024-08-05
Payer: COMMERCIAL

## 2024-08-05 VITALS
SYSTOLIC BLOOD PRESSURE: 124 MMHG | WEIGHT: 206 LBS | DIASTOLIC BLOOD PRESSURE: 76 MMHG | OXYGEN SATURATION: 98 % | HEIGHT: 63 IN | BODY MASS INDEX: 36.5 KG/M2 | HEART RATE: 83 BPM

## 2024-08-05 DIAGNOSIS — R06.09 DYSPNEA ON EXERTION: ICD-10-CM

## 2024-08-05 DIAGNOSIS — Z95.1 S/P CABG X 3: ICD-10-CM

## 2024-08-05 DIAGNOSIS — E78.5 DYSLIPIDEMIA: ICD-10-CM

## 2024-08-05 DIAGNOSIS — I10 ESSENTIAL HYPERTENSION: ICD-10-CM

## 2024-08-05 DIAGNOSIS — I25.10 CORONARY ARTERY DISEASE INVOLVING NATIVE CORONARY ARTERY OF NATIVE HEART WITHOUT ANGINA PECTORIS: Primary | ICD-10-CM

## 2024-08-05 PROCEDURE — 99213 OFFICE O/P EST LOW 20 MIN: CPT | Performed by: INTERNAL MEDICINE

## 2024-08-05 NOTE — PATIENT INSTRUCTIONS
Notify provider of any new or worsening symptoms.    The patient is advised to continue present regimen.  She is scheduled for follow-up lab.

## 2024-08-05 NOTE — ASSESSMENT & PLAN NOTE
The patient has some dyspnea on exertion without associated chest pain.  The patient had cardiac workup showing adequate ejection fraction.  There was no evidence of ischemia.  This symptom may be multifactorial and most probably related to limited conditioning.

## 2024-08-05 NOTE — PROGRESS NOTES
Subjective:        Patient ID: Pretty Aguila is a 74 y.o. female.    Chief Complaint:  The patient presented to this office for the purpose of cardiac follow-up.  She is known to have a history of coronary artery disease status postcoronary bypass surgery.  The patient is also known to have history of hypertension and hyperlipidemia.  She continues to have dyspnea on exertion but denies any chest pain.  She denies any symptoms of palpitation, dizziness or syncope.  She has no leg edema.  The patient had regular exercise stress test showing no evidence of ischemia and echocardiogram showed adequate ejection fraction.      The following portions of the patient's history were reviewed and updated as appropriate: allergies, current medications, past family history, past medical history, past social history, past surgical history, and problem list.  Review of Systems   Constitutional: Negative.   Cardiovascular:  Positive for dyspnea on exertion. Negative for chest pain, claudication, cyanosis, irregular heartbeat, leg swelling, near-syncope, orthopnea, palpitations, paroxysmal nocturnal dyspnea and syncope.   Respiratory: Negative.     Psychiatric/Behavioral: Negative.            Objective:     Physical Exam  Constitutional:       Appearance: Normal appearance.   HENT:      Head: Normocephalic and atraumatic.   Cardiovascular:      Rate and Rhythm: Normal rate. Rhythm irregular.      Heart sounds: Murmur heard.      Systolic murmur is present with a grade of 2/6.   Pulmonary:      Effort: Pulmonary effort is normal.      Breath sounds: Normal breath sounds.   Musculoskeletal:      Right lower leg: No edema.      Left lower leg: No edema.   Skin:     General: Skin is warm and dry.   Neurological:      Mental Status: She is alert and oriented to person, place, and time.   Psychiatric:         Mood and Affect: Mood normal.         Behavior: Behavior normal.         Lab Review:   Lab Results   Component Value Date    K 4.7  04/26/2024     04/26/2024    CO2 26 04/26/2024    BUN 24 04/26/2024    CREATININE 1.07 (H) 04/26/2024    CREATININE 0.98 01/17/2024    CALCIUM 8.6 01/17/2024         Assessment:       1. Coronary artery disease involving native coronary artery of native heart without angina pectoris        2. Essential hypertension        3. Dyslipidemia        4. S/P CABG x 3        5. Dyspnea on exertion             Plan:       The patient is scheduled for follow-up lab.  She will continue present regimen.

## 2024-08-05 NOTE — ASSESSMENT & PLAN NOTE
Coronary artery disease, stable.  The patient is status postcoronary bypass surgery x 3.  She has no symptoms of angina.

## 2024-08-06 DIAGNOSIS — I10 ESSENTIAL HYPERTENSION: ICD-10-CM

## 2024-08-06 DIAGNOSIS — F32.A ANXIETY AND DEPRESSION: ICD-10-CM

## 2024-08-06 DIAGNOSIS — I25.10 CORONARY ARTERY DISEASE INVOLVING NATIVE CORONARY ARTERY OF NATIVE HEART WITHOUT ANGINA PECTORIS: ICD-10-CM

## 2024-08-06 DIAGNOSIS — F41.9 ANXIETY AND DEPRESSION: ICD-10-CM

## 2024-08-06 RX ORDER — CITALOPRAM 20 MG/1
20 TABLET ORAL DAILY
Qty: 30 TABLET | Refills: 6 | Status: SHIPPED | OUTPATIENT
Start: 2024-08-06

## 2024-08-06 RX ORDER — CITALOPRAM 20 MG/1
20 TABLET ORAL DAILY
Qty: 90 TABLET | Refills: 1 | Status: CANCELLED | OUTPATIENT
Start: 2024-08-06

## 2024-08-06 NOTE — TELEPHONE ENCOUNTER
Reason for call:   [x] Refill   [] Prior Auth  [] Other:     Office:   [x] PCP/Provider - [facility] / Lauren Dumont MD   [] Specialty/Provider -     Medication:       Does the patient have enough for 3 days?   [x] Yes   [] No - Send as HP to POD

## 2024-08-06 NOTE — TELEPHONE ENCOUNTER
Reason for call:   [x] Refill   [] Prior Auth  [] Other:     Office:   [x] PCP/Provider - [facility]  /  Cortney Veras MD   [] Specialty/Provider -     Medication:       Does the patient have enough for 3 days?   [x] Yes   [] No - Send as HP to POD

## 2024-08-07 RX ORDER — METOPROLOL SUCCINATE 50 MG/1
50 TABLET, EXTENDED RELEASE ORAL DAILY
Qty: 100 TABLET | Refills: 1 | Status: SHIPPED | OUTPATIENT
Start: 2024-08-07

## 2024-08-09 ENCOUNTER — OFFICE VISIT (OUTPATIENT)
Dept: BARIATRICS | Facility: CLINIC | Age: 74
End: 2024-08-09
Payer: COMMERCIAL

## 2024-08-09 VITALS
DIASTOLIC BLOOD PRESSURE: 70 MMHG | WEIGHT: 204.6 LBS | SYSTOLIC BLOOD PRESSURE: 116 MMHG | HEIGHT: 63 IN | HEART RATE: 84 BPM | BODY MASS INDEX: 36.25 KG/M2

## 2024-08-09 DIAGNOSIS — M06.09 RHEUMATOID ARTHRITIS OF MULTIPLE SITES WITH NEGATIVE RHEUMATOID FACTOR (HCC): ICD-10-CM

## 2024-08-09 DIAGNOSIS — N18.31 STAGE 3A CHRONIC KIDNEY DISEASE (HCC): ICD-10-CM

## 2024-08-09 DIAGNOSIS — Z98.84 STATUS POST BARIATRIC SURGERY: ICD-10-CM

## 2024-08-09 DIAGNOSIS — I25.10 CORONARY ARTERY DISEASE INVOLVING NATIVE CORONARY ARTERY OF NATIVE HEART WITHOUT ANGINA PECTORIS: ICD-10-CM

## 2024-08-09 DIAGNOSIS — Z95.1 S/P CABG X 3: ICD-10-CM

## 2024-08-09 DIAGNOSIS — L40.50 PSORIATIC ARTHRITIS (HCC): ICD-10-CM

## 2024-08-09 DIAGNOSIS — E66.09 CLASS 2 OBESITY DUE TO EXCESS CALORIES WITH BODY MASS INDEX (BMI) OF 36.0 TO 36.9 IN ADULT: Primary | ICD-10-CM

## 2024-08-09 DIAGNOSIS — E66.01 OBESITY, MORBID (HCC): ICD-10-CM

## 2024-08-09 PROBLEM — E66.812 CLASS 2 OBESITY DUE TO EXCESS CALORIES WITH BODY MASS INDEX (BMI) OF 36.0 TO 36.9 IN ADULT: Status: ACTIVE | Noted: 2024-07-08

## 2024-08-09 PROCEDURE — 99204 OFFICE O/P NEW MOD 45 MIN: CPT | Performed by: INTERNAL MEDICINE

## 2024-08-09 RX ORDER — METFORMIN HYDROCHLORIDE 500 MG/1
500 TABLET, EXTENDED RELEASE ORAL
Qty: 30 TABLET | Refills: 3 | Status: SHIPPED | OUTPATIENT
Start: 2024-08-09

## 2024-08-09 NOTE — PROGRESS NOTES
.    Assessment/Plan     Pretty Aguila  is a 74 y.o. female  referred  by bariatric surgery team on -- to [facility]  for treatment of post-op weight regain.     Brief Summary: From chart review   -s/p robotic sleeve gastrectomy with ROBBIE by Dr. Wolf at [facility] on 12/31/2012.   Initial: 240lbs  Current weight 8/9/2024: 204 lbs  Juan Francisco: 175lbs  Weight regain: +29 lbs  Target Weight : 160 lbs  BMI 36.24 kg/m2 - 35.0-39.9- Obesity Class II    Class 2 obesity due to excess calories with body mass index (BMI) of 36.0 to 36.9 in adult  Reviewed with patient multiple etiologies of postoperative weight gain 30 pounds aging which slows down metabolic rate along with menopause which brings the onset of insulin resistance, hormone blockers such as Arimidex excess calories, physical inactivity from physical limitation, medication contributing such as beta-blockers etc. reviewed diet recall with the patient and encouraged her to substitute whole foods which are fiber rich complex carbs and protein based foods for satiety and metabolic effect and substitute it for the processed foods that she is currently intaking.  Increase hydration.  Patient enjoys walking and and I encouraged her to engage into 15-minute bouts of walking as she has some limitations from arthritis both rheumatoid and osteoarthritis along withpsoriatic arthritis.  Given patient's weight loss goals and class II obesity along with comorbidities of prediabetes and hyperlipidemia and multiple contraindications to oral medications and because of coronary artery disease status post CABG we will recommend Wegovy.  Prescription will be submitted.  Also will start metformin due to hemoglobin A1c of 5.7 due to synergistic mechanism of action with Wegovy.  Will not uptitrate at this point but at follow-up visits.  Patient also has CKD stage III a-would avoid Topamax completely Given age as opposed to renally dosing Topamax at no more than 46 mg a  day.  Patient is on Ultram so unable to use naltrexone patient has a prior history of coronary artery disease status post CABG and therefore will avoid phentermine and Qsymia.  Patient already on bupropion.  Patient could consider alternate agents other than beta-blockers due to its weight gaining potential and could consider weaning this slowly in favor of alternate beta-blocker such as carvedilol if agreeable to cardiology.          -Also counseled that weight regain may occur on stopping medication and it therefore may need to be continued as a weight maintenance medication long term if well tolerated. Patient informed to consider all the  factors outlined below before making an informed decision regarding initiating anti obesity pharmacotherapy.   -Counseled the patient that all AOM's are contraindicated in pregnancy and lactation and should be discontinued if patient were to become pregnant.  -In addition, please follow general recommendations below.          - Discussed at length and the role of weight loss medications and medication options   - Explained the importance of making lifestyle changes in addition to starting any anti-obesity medications if the  patient chooses.  -  Initial weight loss goal of 5-10% weight loss for improved health  - Weight loss can improve patient's co-morbid conditions and/or prevent weight-related complications.  - Weight is not at goal and patient has been unable to achieve a meaningful weight loss above 5% using various programs and tools for more than 6 months      General Lifestyle recommendations:    Nutrition   Do not skip meals. Avoid sugary beverages. At least 64oz of water daily. Counseled the patient on healthy eating with My plate method for macronutrient balance. Informed patient of the importance of focusing on protein goals and non starchy fiber rich vegetables for satiety effect and to help support a calorie deficit.  Limit processed food, refined sugars and  "grain.  Behavioral/Stress   Food log via jesenia or provided paper log (jeesnia options include www.[employer].com, sparkpeople.com, loseit.com, calorieking.com, [employer]). Encouraged mindful eating. Be sure to set aside time to eat, eat slowly, and savor your food. Consider meditation apps and/or taking a few minutes of mindfulness every AM. Weigh daily or atleast 2-3 times/ week  Physical Activity   Increase physical activity by 10 minutes daily. Gradually increase physical activity to a goal of 5 days per week for 30 minutes of MODERATE intensity ( should be able to pass the \"talk test\" but should not be able to sing. Target 150-300 minutes  PLUS 2 days per week of FULL BODY resistance training. Progression will be addressed at follow up visits. Encouraged contemplation regarding establishing a daily physical activity routine  Sleep   Encourage sleep hygiene and importance of having adequate sleep duration at least > 6 hours to support response in weight loss efforts    Handouts provided and reviewed:  THRIVE program at [facility]  MyPlate and food quality  Calorie goal and sample menu  Food log resources, phone jesenia or paper journal  Antiobesity medications options         Pretty was seen today for follow-up.    Diagnoses and all orders for this visit:    Class 2 obesity due to excess calories with body mass index (BMI) of 36.0 to 36.9 in adult  -     Semaglutide-Weight Management (WEGOVY) 0.25 MG/0.5ML; Inject 0.5 mL (0.25 mg total) under the skin once a week  -     metFORMIN (GLUCOPHAGE-XR) 500 mg 24 hr tablet; Take 1 tablet (500 mg total) by mouth daily with dinner    Status post bariatric surgery    S/P CABG x 3    Coronary artery disease involving native coronary artery of native heart without angina pectoris    Obesity, morbid (HCC)    Stage 3a chronic kidney disease (HCC)    Rheumatoid arthritis of multiple sites with negative rheumatoid factor (HCC)    Psoriatic arthritis (HCC)                      Total " time spent reviewing chart, interviewing patient, examining patient, discussing plan, answering all questions, and documentin min, with >50% face-to-face time spent counseling patient on nonsurgical interventions for the treatment of excess weight. Discussed in detail nonsurgical options including intensive lifestyle intervention program, very low-calorie diet program and conservative program.  Discussed the role of weight loss medications.  Counseled patient on diet behavior and exercise modification for weight loss.                HPI     Wt Readings from Last 20 Encounters:   24 92.8 kg (204 lb 9.6 oz)   24 93.4 kg (206 lb)   24 93.4 kg (206 lb)   24 94.3 kg (208 lb)   24 90.3 kg (199 lb)   24 86.2 kg (190 lb)   24 90.7 kg (200 lb)   24 90.7 kg (200 lb)   24 92.5 kg (204 lb)   24 91.6 kg (202 lb)   24 91.1 kg (200 lb 12.8 oz)   24 91.4 kg (201 lb 8 oz)   24 91.4 kg (201 lb 8 oz)   24 90.3 kg (199 lb)   24 93.2 kg (205 lb 6.4 oz)   24 93.4 kg (206 lb)   24 93 kg (205 lb)   24 93.4 kg (205 lb 14.6 oz)   23 93.4 kg (206 lb)   23 96.6 kg (213 lb)           Lifestyle questionnaire     From chart review,   CAD s/p CABG 2018  Breast cancer Br Ca surg s/p mastectomy left last year on Arimidex  Diet recall:  B - [employer] breakfast bar and 1 cup coffee w/ Tbsp of creamer and sweet and low  Snack/grazes - pretzels and candy and crackers  L - dinner leftovers or slice pizza or green salad with chicken   Snack -pretzels  D - chicken or pork roast or pasta or hamburgers and veggies   HS - pretzels or ice cream        Eating out vs cooking at home- none    Beverages  Water--  32 oz crystal lite   Caffeine/tea--half cup of coffee     SSB none    Alcohol: rare glass of wine  Smoking: no  Drug use: no    Physical Activity -- loves to walk   Sleep -- STOP- BANG-7 to 8 hours    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   Psycho social-      Start date:   Current weight : 204  lbs  Current BMI: 8/9/2024 kg/m2  Obesity Class: 35.0-39.9- Obesity Class II  Goal weight: 160 lbs    Food behaviors\"head\" hunger/cravings, \"belly\" hunger/physical hunger, boredom eating, and snacking/grazing  Gyneac (Menopausal status/periods/contraception)- MP        Colonoscopy: UTD  Mammogram: UTD      Objective         The following portions of the patient's history were reviewed and updated as appropriate: allergies, current medications, past family history, past medical history, past social history, past surgical history, and problem list.      /70 (BP Location: Right arm, Patient Position: Sitting, Cuff Size: Large)   Pulse 84   Ht 5' 3\" (1.6 m)   Wt 92.8 kg (204 lb 9.6 oz)   BMI 36.24 kg/m²         Review of Systems   Constitutional:  Negative for fatigue.   HENT:  Negative for sore throat.    Respiratory:  Negative for cough and shortness of breath.    Cardiovascular:  Negative for chest pain, palpitations and leg swelling.   Gastrointestinal:  Negative for abdominal pain, constipation, diarrhea and nausea.   Genitourinary:  Negative for dysuria.   Musculoskeletal:  Negative for arthralgias and back pain.   Skin:  Negative for rash.   Neurological:  Negative for headaches.   Psychiatric/Behavioral:  Negative for dysphoric mood. The patient is not nervous/anxious.        Physical Exam  Vitals and nursing note reviewed.   Constitutional:       Appearance: Normal appearance.   HENT:      Head: Normocephalic.   Neck:      Thyroid: No thyroid mass, thyromegaly or thyroid tenderness.   Cardiovascular:      Rate and Rhythm: Normal rate and regular rhythm.      Pulses: Normal pulses.      Heart sounds: Normal heart sounds.   Pulmonary:      Effort: Pulmonary effort is normal.      Breath sounds: Normal breath sounds.   Abdominal:      General: Bowel sounds are normal.      Palpations: Abdomen is soft.   Musculoskeletal:      " Cervical back: Normal range of motion and neck supple. No tenderness.      Right lower leg: No edema.      Left lower leg: No edema.   Skin:     General: Skin is warm and dry.   Neurological:      General: No focal deficit present.      Mental Status: She is alert and oriented to person, place, and time.   Psychiatric:         Mood and Affect: Mood normal.         Behavior: Behavior normal.         Thought Content: Thought content normal.         Judgment: Judgment normal.            Current meds       Current Outpatient Medications:     anastrozole (ARIMIDEX) 1 mg tablet, TAKE 1 TABLET (1 MG TOTAL) BY MOUTH DAILY, Disp: 90 tablet, Rfl: 1    atorvastatin (LIPITOR) 10 mg tablet, Take 1 tablet (10 mg total) by mouth daily, Disp: 90 tablet, Rfl: 3    buPROPion (WELLBUTRIN XL) 300 mg 24 hr tablet, Take 1 tablet (300 mg total) by mouth every morning, Disp: 90 tablet, Rfl: 3    cholecalciferol (VITAMIN D3) 400 units tablet, Take 400 Units by mouth daily, Disp: , Rfl:     citalopram (CeleXA) 20 mg tablet, Take 1 tablet (20 mg total) by mouth daily, Disp: 30 tablet, Rfl: 6    furosemide (LASIX) 40 mg tablet, TAKE 1 TABLET (40 MG TOTAL) BY MOUTH 2 (TWO) TIMES A DAY, Disp: 180 tablet, Rfl: 0    metFORMIN (GLUCOPHAGE-XR) 500 mg 24 hr tablet, Take 1 tablet (500 mg total) by mouth daily with dinner, Disp: 30 tablet, Rfl: 3    metoprolol succinate (TOPROL-XL) 50 mg 24 hr tablet, Take 1 tablet (50 mg total) by mouth daily Patient would like these sent to her via the mail., Disp: 100 tablet, Rfl: 1    naloxone (NARCAN) 4 mg/0.1 mL nasal spray, Administer 1 spray into a nostril. If no response after 2-3 minutes, give another dose in the other nostril using a new spray., Disp: 1 each, Rfl: 1    nystatin (MYCOSTATIN) powder, Apply topically 2 (two) times a day, Disp: 60 g, Rfl: 0    predniSONE 1 mg tablet, TAKE 1 TABLET (1 MG TOTAL) BY MOUTH 3 (THREE) TIMES A DAY., Disp: , Rfl:     Semaglutide-Weight Management (WEGOVY) 0.25 MG/0.5ML,  Inject 0.5 mL (0.25 mg total) under the skin once a week, Disp: 2 mL, Rfl: 0    traMADol (ULTRAM-ER) 100 mg 24 hr tablet, Take 100 mg by mouth daily, Disp: , Rfl:     Apremilast 30 MG TABS, Take 30 mg by mouth 2 (two) times a day (Patient not taking: Reported on 8/9/2024), Disp: , Rfl:     LORazepam (ATIVAN) 0.5 mg tablet, TAKE 1 TABLET (0.5 MG TOTAL) BY MOUTH EVERY 8 (EIGHT) HOURS AS NEEDED FOR ANXIETY (Patient not taking: Reported on 8/9/2024), Disp: 20 tablet, Rfl: 0    Multiple Vitamin (multivitamin) tablet, Take 1 tablet by mouth daily (Patient not taking: Reported on 8/9/2024), Disp: , Rfl:     nystatin (MYCOSTATIN) cream, Apply topically 2 (two) times a day (Patient not taking: Reported on 8/9/2024), Disp: 30 g, Rfl: 0         Medication considerations/contraindications     -Patient denies personal history of pancreatitis. Patient also denies personal and family history of medullary thyroid cancer and multiple endocrine neoplasia type 2 (MEN 2 tumor). -Patient denies any history of kidney stones, seizures, or glaucoma, diabetic retinopathy, gall bladder disease, hyperthyroidism.  -Denies Hx of CAD, PAD, palpitations, arrhythmia.   -Denies uncontrolled anxiety or depression, suicidal behavior or thinking , insomnia or sleep disturbance.         Labs     Most recent labs reviewed   Lab Results   Component Value Date    SODIUM 139 04/26/2024    K 4.7 04/26/2024     04/26/2024    CO2 26 04/26/2024    AGAP 8 01/17/2024    BUN 24 04/26/2024    CREATININE 1.07 (H) 04/26/2024    GLUC 96 04/26/2024    GLUF 143 (H) 01/16/2024    CALCIUM 8.6 01/17/2024    AST 22 04/26/2024    ALT 23 04/26/2024    ALKPHOS 80 10/01/2023    TP 7.3 04/26/2024    TBILI 0.4 04/26/2024    EGFR 55 (L) 04/26/2024     Lab Results   Component Value Date    HGBA1C 5.7 03/27/2024     Lab Results   Component Value Date    LFJ6NQLZTQBH 2.534 10/01/2023    TSH 1.620 02/24/2021     Lab Results   Component Value Date    CHOLESTEROL 193 10/01/2023      Lab Results   Component Value Date    HDL 58 10/01/2023     Lab Results   Component Value Date    TRIG 149 10/01/2023     Lab Results   Component Value Date    LDLCALC 105 (H) 10/01/2023

## 2024-08-09 NOTE — ASSESSMENT & PLAN NOTE
Reviewed with patient multiple etiologies of postoperative weight gain 30 pounds aging which slows down metabolic rate along with menopause which brings the onset of insulin resistance, hormone blockers such as Arimidex excess calories, physical inactivity from physical limitation, medication contributing such as beta-blockers etc. reviewed diet recall with the patient and encouraged her to substitute whole foods which are fiber rich complex carbs and protein based foods for satiety and metabolic effect and substitute it for the processed foods that she is currently intaking.  Increase hydration.  Patient enjoys walking and and I encouraged her to engage into 15-minute bouts of walking as she has some limitations from arthritis both rheumatoid and osteoarthritis along withpsoriatic arthritis.  Given patient's weight loss goals and class II obesity along with comorbidities of prediabetes and hyperlipidemia and multiple contraindications to oral medications and because of coronary artery disease status post CABG we will recommend Wegovy.  Prescription will be submitted.  Also will start metformin due to hemoglobin A1c of 5.7 due to synergistic mechanism of action with Wegovy.  Will not uptitrate at this point but at follow-up visits.  Patient also has CKD stage III a-would avoid Topamax completely Given age as opposed to renally dosing Topamax at no more than 46 mg a day.  Patient is on Ultram so unable to use naltrexone patient has a prior history of coronary artery disease status post CABG and therefore will avoid phentermine and Qsymia.  Patient already on bupropion.  Patient could consider alternate agents other than beta-blockers due to its weight gaining potential and could consider weaning this slowly in favor of alternate beta-blocker such as carvedilol if agreeable to cardiology.

## 2024-08-17 NOTE — PROGRESS NOTES
Breast Oncology Nurse Navigator    Called patient for initial outreach from nurse navigator. Left voicemail message with contact information. Requested a call back. Referral placed for oncology social worker.
54.4

## 2024-08-23 DIAGNOSIS — I25.10 CORONARY ARTERY DISEASE INVOLVING NATIVE CORONARY ARTERY OF NATIVE HEART WITHOUT ANGINA PECTORIS: ICD-10-CM

## 2024-08-23 DIAGNOSIS — E78.5 DYSLIPIDEMIA: ICD-10-CM

## 2024-08-26 RX ORDER — ATORVASTATIN CALCIUM 10 MG/1
10 TABLET, FILM COATED ORAL DAILY
Qty: 100 TABLET | Refills: 1 | Status: SHIPPED | OUTPATIENT
Start: 2024-08-26

## 2024-08-27 ENCOUNTER — TELEPHONE (OUTPATIENT)
Dept: BARIATRICS | Facility: CLINIC | Age: 74
End: 2024-08-27

## 2024-08-30 ENCOUNTER — CLINICAL SUPPORT (OUTPATIENT)
Dept: BARIATRICS | Facility: CLINIC | Age: 74
End: 2024-08-30

## 2024-08-30 VITALS — WEIGHT: 206.4 LBS | BODY MASS INDEX: 35.24 KG/M2 | HEIGHT: 64 IN

## 2024-08-30 DIAGNOSIS — K91.2 POSTSURGICAL MALABSORPTION: Primary | ICD-10-CM

## 2024-08-30 PROCEDURE — WMDI30

## 2024-08-30 PROCEDURE — RECHECK

## 2024-08-30 NOTE — PROGRESS NOTES
Bariatric Follow Up Nutrition Note    Type of surgery  Vertical sleeve gastrectomy  Surgery Date: 12/31/12  12 years  post-op  Surgeon: [facility]    Maintained her Juan Francisco for 1x year.  About that time  got hurt, he is now retired, she works full time. She 2 hip replacements and knee replacement and open heart sx. When that happened stopped going to PT and now having issues with walking.     Nutrition Assessment   Pretty Aguila  74 y.o.  female    Current weight:  206.4#  Current BMI:  Body mass index is 35.43 kg/m².    Weight on Day of Weight Loss Surgery: 240#  Weight in (lb) to have BMI = 25: 150#  Pre-Op Excess Wt: 90#  Juan Francisco:  175#  Post-Op Wt Loss: 34#/ 38% EBWL in 12 year(s)    Review of History and Medications   Took first dose of Wegovy yesterday and felt good.  Felt was in more control   Past Medical History:   Diagnosis Date    Allergic     Anxiety     Arthritis     Breast cancer (HCC) 09/2023    CAD (coronary artery disease)     Coronary artery disease [id number]    Depression     Diabetes mellitus (HCC) 5/1/24    Mother-Diabetic    Dyslipidemia     Hiatal hernia     Hyperlipidemia     Hypertension     Obesity     Obesity (BMI 30-39.9)     Psoriatic arthritis (HCC)     Rheumatoid arthritis (HCC)     Scoliosis     SOB (shortness of breath)      Past Surgical History:   Procedure Laterality Date    BARIATRIC SURGERY  2014    BILE DUCT EXPLORATION      replacement per Steffanie    BREAST BIOPSY  9/23    CARPAL TUNNEL RELEASE Bilateral 2002    CORONARY ARTERY BYPASS GRAFT  2017    FOOT SURGERY Right     bone surgery to straighten toe.    HIP SURGERY Left 2015    IR DRAINAGE TUBE PLACEMENT  12/19/2023    JOINT REPLACEMENT      LYMPH NODE BIOPSY Left 11/20/2023    Procedure: LEFT LYMPHATIC MAPPING WITH BLUE AND RADIOACTIVE DYES, SENTINEL LYMPH NODE BIOPSY;  Surgeon: Adrien Gabriel MD;  Location:  MAIN OR;  Service: Surgical Oncology    LYMPH NODE DISSECTION Left 11/20/2023    Procedure: POSSIBLE AXILLARY  DISSECTION;  Surgeon: Adrien Gabriel MD;  Location: [facility];  Service: Surgical Oncology    NV BREAST REDUCTION Bilateral 2024    Procedure: RIGHT BREAST REDUCTION AND LEFT BREAST EXPANDER EXCHANGE FOR PERMANENT IMPLANT. REVISION OF RECONSTRUCTED LEFT BREAST.;  Surgeon: Molina Jimenez MD;  Location: [facility];  Service: Plastics    NV SUCTION ASSISTED LIPECTOMY TRUNK Bilateral 2024    Procedure: LIPOSUCTION BREAST;  Surgeon: Molina Jimenez MD;  Location: [facility];  Service: Plastics    NV TISSUE EXPANDER PLACEMENT BREAST RECONSTRUCTION Left 2023    Procedure: IMMEDIATE LEFT BREAST RECONSTRUCTION WITH TISSUE EXPANDER AND ADM;  Surgeon: Molina Jimenez MD;  Location: [facility];  Service: Plastics    REPLACEMENT TOTAL KNEE Right 2017    SIMPLE MASTECTOMY Left 2023    Procedure: LEFT BREAST VERENICE  DIRECTED MASTECTOMY;  Surgeon: Adrien Gabriel MD;  Location:  MAIN OR;  Service: Surgical Oncology    SLEEVE GASTROPLASTY      TONSILLECTOMY      TOTAL HIP ARTHROPLASTY Right 2017    US GUIDED BREAST BIOPSY LEFT COMPLETE Left 2023     Social History     Socioeconomic History    Marital status: /Civil Union     Spouse name: Not on file    Number of children: Not on file    Years of education: Not on file    Highest education level: Not on file   Occupational History    Not on file   Tobacco Use    Smoking status: Former     Current packs/day: 0.00     Average packs/day: 0.5 packs/day for 7.0 years (3.5 ttl pk-yrs)     Types: Cigarettes     Start date: 2010     Quit date: 2017     Years since quittin.6    Smokeless tobacco: Never    Tobacco comments:     Have already quit smoking in 2017   Vaping Use    Vaping status: Never Used   Substance and Sexual Activity    Alcohol use: Yes     Alcohol/week: 2.0 standard drinks of alcohol     Types: 2 Glasses of wine per week     Comment: social     Drug use: No    Sexual activity: Not Currently     Partners: Male     Birth  control/protection: None   Other Topics Concern    Not on file   Social History Narrative    Not on file     Social Determinants of Health     Financial Resource Strain: Not on file   Food Insecurity: No Food Insecurity (1/17/2024)    Hunger Vital Sign     Worried About Running Out of Food in the Last Year: Never true     Ran Out of Food in the Last Year: Never true   Transportation Needs: No Transportation Needs (1/17/2024)    PRAPARE - Transportation     Lack of Transportation (Medical): No     Lack of Transportation (Non-Medical): No   Physical Activity: Not on file   Stress: Not on file   Social Connections: Not on file   Intimate Partner Violence: Not on file   Housing Stability: Low Risk  (1/17/2024)    Housing Stability Vital Sign     Unable to Pay for Housing in the Last Year: No     Number of Times Moved in the Last Year: 1     Homeless in the Last Year: No       Current Outpatient Medications:     anastrozole (ARIMIDEX) 1 mg tablet, TAKE 1 TABLET (1 MG TOTAL) BY MOUTH DAILY, Disp: 90 tablet, Rfl: 1    Apremilast 30 MG TABS, Take 30 mg by mouth 2 (two) times a day (Patient not taking: Reported on 8/9/2024), Disp: , Rfl:     atorvastatin (LIPITOR) 10 mg tablet, TAKE 1 TABLET BY MOUTH DAILY, Disp: 100 tablet, Rfl: 1    buPROPion (WELLBUTRIN XL) 300 mg 24 hr tablet, Take 1 tablet (300 mg total) by mouth every morning, Disp: 90 tablet, Rfl: 3    cholecalciferol (VITAMIN D3) 400 units tablet, Take 400 Units by mouth daily, Disp: , Rfl:     citalopram (CeleXA) 20 mg tablet, Take 1 tablet (20 mg total) by mouth daily, Disp: 30 tablet, Rfl: 6    furosemide (LASIX) 40 mg tablet, TAKE 1 TABLET (40 MG TOTAL) BY MOUTH 2 (TWO) TIMES A DAY, Disp: 180 tablet, Rfl: 0    LORazepam (ATIVAN) 0.5 mg tablet, TAKE 1 TABLET (0.5 MG TOTAL) BY MOUTH EVERY 8 (EIGHT) HOURS AS NEEDED FOR ANXIETY (Patient not taking: Reported on 8/9/2024), Disp: 20 tablet, Rfl: 0    metFORMIN (GLUCOPHAGE-XR) 500 mg 24 hr tablet, Take 1 tablet (500 mg  total) by mouth daily with dinner, Disp: 30 tablet, Rfl: 3    metoprolol succinate (TOPROL-XL) 50 mg 24 hr tablet, Take 1 tablet (50 mg total) by mouth daily Patient would like these sent to her via the mail., Disp: 100 tablet, Rfl: 1    Multiple Vitamin (multivitamin) tablet, Take 1 tablet by mouth daily (Patient not taking: Reported on 8/9/2024), Disp: , Rfl:     naloxone (NARCAN) 4 mg/0.1 mL nasal spray, Administer 1 spray into a nostril. If no response after 2-3 minutes, give another dose in the other nostril using a new spray., Disp: 1 each, Rfl: 1    nystatin (MYCOSTATIN) cream, Apply topically 2 (two) times a day (Patient not taking: Reported on 8/9/2024), Disp: 30 g, Rfl: 0    nystatin (MYCOSTATIN) powder, Apply topically 2 (two) times a day, Disp: 60 g, Rfl: 0    predniSONE 1 mg tablet, TAKE 1 TABLET (1 MG TOTAL) BY MOUTH 3 (THREE) TIMES A DAY., Disp: , Rfl:     Semaglutide-Weight Management (WEGOVY) 0.25 MG/0.5ML, Inject 0.5 mL (0.25 mg total) under the skin once a week, Disp: 2 mL, Rfl: 0    traMADol (ULTRAM-ER) 100 mg 24 hr tablet, Take 100 mg by mouth daily, Disp: , Rfl:     Food Intake and Lifestyle Assessment   Food Intake Assessment completed via usual diet recall  Works full time  Wakes 7am  coffee  Breakfast: eats when gets to work--cereal OR any snack that someone brought to the office.   Snack: grazes on candies, really likes the strawberry hard candy--6 before lunch  Lunch: slice of pizza or sandwich (turkey with occ cheese) +chips or salad (grilled chicken on) OR leftovers (chicken and rice OR pasta--smaller portion than dinner)  Snack: grazes--more hard candies or chips or pretzels  Gets home at 5:30-6pm  Dinner: 7pm-hamburger helper (not usual) + salad was last night OR tonight not sure, maybe pizza OR stromboli OR burgers + rice and veggie   Snack: more sweets--ice cream or occ fruit (watermelon or 2 cuties) or pretzels or italian ice    Beverage intake: water and coffee/tea, crystal  light  Diet texture/stage: regular  Protein supplement: not usually, but did purchase Boost  Estimated protein intake per day: ,75gm  Estimated fluid intake per day: coffee with sf coffee creamer, 16-32oz water and crystal lights  Meals eaten away from home: 2-3 times per month  Typical meal pattern: 3 meals per day and grazes on snacks per day  Eating Behaviors: Appropriate portion sizes, Consumption of high calorie/ high fat foods, Frequent snacking/ grazing, Mindless eating, Emotional eating, Craves sweet foods, and Craves salty foods    Food allergies or intolerances: seafood  Cultural or Episcopalian considerations: -    Vitamins:    OTC multi--gave samples of [employer] multi    Physical Assessment  Nutrition Related Findings  none    Physical Activity  Types of exercise: None  Current physical limitations: hip pain, walks with a walker. Enjoys walking, but 1x per week for 30-45mins     Psychosocial Assessment   Support systems: friend(s) relative(s)  Socioeconomic factors: lives with . Both cook and she shops    Nutrition Diagnosis  Diagnosis: Overweight / Obesity (NC-3.3)  Related to: Excessive energy intake  As Evidenced by: BMI >25     Interventions and Teaching   Patient educated on post-op nutrition guidelines.       Patient educated and handouts provided.  Capacity of post-surgery stomach  Adequate hydration  Expected weight loss  Weight loss plateaus/ possibility of weight regain  Exercise  Nutrition considerations after surgery  Protein supplements  Meal planning and preparation  Appropriate carbohydrate, protein, and fat intake, and food/fluid choices to maximize safe weight loss, nutrient intake, and tolerance   Dietary and lifestyle changes  Possible problems with poor eating habits  Intuitive eating  Techniques for self monitoring and keeping daily food journal  Potential for food intolerance after surgery, and ways to deal with them including: lactose intolerance, nausea, reflux, vomiting,  diarrhea, food intolerance, appetite changes, gas  Vitamin / Mineral supplementation of Multivitamin with minerals, Calcium, Vitamin B12, Iron, and Vitamin D    Education provided to: patient    Barriers to learning: No barriers identified    Readiness to change: preparation    Comprehension: verbalizes understanding     Expected Compliance: good    Goals  Increase to 64oz fluids  Food journal via [employer]  Exercise 30 minutes 5 times per week  Eat 3 meals per day with protein at each meal  Eliminate mindless snacking--limit to 2 planned protein snacks  1200 cals, 75gm protein  Protein shake for breakfast  Start bariatric multi and Viactive 1 BID  F/U with [facility] provider in Nov  F/U with RD PRN    Time Spent:   30 Minutes

## 2024-09-12 DIAGNOSIS — E66.01 CLASS 2 SEVERE OBESITY DUE TO EXCESS CALORIES WITH SERIOUS COMORBIDITY AND BODY MASS INDEX (BMI) OF 35.0 TO 35.9 IN ADULT (HCC): Primary | ICD-10-CM

## 2024-09-19 ENCOUNTER — HOSPITAL ENCOUNTER (OUTPATIENT)
Dept: MAMMOGRAPHY | Facility: CLINIC | Age: 74
End: 2024-09-19
Payer: COMMERCIAL

## 2024-09-19 VITALS — BODY MASS INDEX: 35.17 KG/M2 | HEIGHT: 64 IN | WEIGHT: 206 LBS

## 2024-09-19 DIAGNOSIS — Z17.0 MALIGNANT NEOPLASM OF LOWER-OUTER QUADRANT OF LEFT BREAST OF FEMALE, ESTROGEN RECEPTOR POSITIVE (HCC): ICD-10-CM

## 2024-09-19 DIAGNOSIS — C50.512 MALIGNANT NEOPLASM OF LOWER-OUTER QUADRANT OF LEFT BREAST OF FEMALE, ESTROGEN RECEPTOR POSITIVE (HCC): ICD-10-CM

## 2024-09-19 PROCEDURE — G0279 TOMOSYNTHESIS, MAMMO: HCPCS

## 2024-09-19 PROCEDURE — 77065 DX MAMMO INCL CAD UNI: CPT

## 2024-09-25 ENCOUNTER — NURSE TRIAGE (OUTPATIENT)
Age: 74
End: 2024-09-25

## 2024-09-25 NOTE — TELEPHONE ENCOUNTER
"Pt has been experiencing ongoing CAMPOS, but becoming more frequent the past 2-3 weeks.   Pt denies any chest pain  Pt is scheduled to see Dr Conrad on Monday and will call back or report to urgent care, ED if symptoms worsen.     Reason for Disposition   MILD longstanding difficulty breathing (e.g., speaks in phrases, SOB even at rest, pulse 100-120) and SAME as normal    Answer Assessment - Initial Assessment Questions  1. RESPIRATORY STATUS: \"Describe your breathing?\" (e.g., wheezing, shortness of breath, unable to speak, severe coughing)       SOB  2. ONSET: \"When did this breathing problem begin?\"       Ongoing but became worse the last few weeks  3. PATTERN \"Does the difficult breathing come and go, or has it been constant since it started?\"       CAMPOS  4. SEVERITY: \"How bad is your breathing?\" (e.g., mild, moderate, severe)     - MILD: No SOB at rest, mild SOB with walking, speaks normally in sentences, can lay down, no retractions, pulse < 100.     - MODERATE: SOB at rest, SOB with minimal exertion and prefers to sit, cannot lie down flat, speaks in phrases, mild retractions, audible wheezing, pulse 100-120.     - SEVERE: Very SOB at rest, speaks in single words, struggling to breathe, sitting hunched forward, retractions, pulse > 120       mild  5. RECURRENT SYMPTOM: \"Have you had difficulty breathing before?\" If Yes, ask: \"When was the last time?\" and \"What happened that time?\"       onging  6. CARDIAC HISTORY: \"Do you have any history of heart disease?\" (e.g., heart attack, angina, bypass surgery, angioplasty)       Open heart surgery  7. LUNG HISTORY: \"Do you have any history of lung disease?\"  (e.g., pulmonary embolus, asthma, emphysema)      denies  8. CAUSE: \"What do you think is causing the breathing problem?\"       unknown  9. OTHER SYMPTOMS: \"Do you have any other symptoms? (e.g., dizziness, runny nose, cough, chest pain, fever)      lightheaded    Protocols used: Breathing Difficulty-ADULT-[place]    "

## 2024-09-30 ENCOUNTER — OFFICE VISIT (OUTPATIENT)
Dept: CARDIOLOGY CLINIC | Facility: CLINIC | Age: 74
End: 2024-09-30
Payer: COMMERCIAL

## 2024-09-30 VITALS
DIASTOLIC BLOOD PRESSURE: 74 MMHG | HEART RATE: 76 BPM | HEIGHT: 64 IN | BODY MASS INDEX: 34.15 KG/M2 | OXYGEN SATURATION: 98 % | WEIGHT: 200 LBS | SYSTOLIC BLOOD PRESSURE: 108 MMHG

## 2024-09-30 DIAGNOSIS — Z95.1 S/P CABG X 3: ICD-10-CM

## 2024-09-30 DIAGNOSIS — E78.5 DYSLIPIDEMIA: ICD-10-CM

## 2024-09-30 DIAGNOSIS — I25.10 CORONARY ARTERY DISEASE INVOLVING NATIVE CORONARY ARTERY OF NATIVE HEART WITHOUT ANGINA PECTORIS: Primary | ICD-10-CM

## 2024-09-30 DIAGNOSIS — I10 ESSENTIAL HYPERTENSION: ICD-10-CM

## 2024-09-30 DIAGNOSIS — R06.09 DYSPNEA ON EXERTION: ICD-10-CM

## 2024-09-30 PROCEDURE — 99214 OFFICE O/P EST MOD 30 MIN: CPT | Performed by: INTERNAL MEDICINE

## 2024-09-30 PROCEDURE — 93000 ELECTROCARDIOGRAM COMPLETE: CPT | Performed by: INTERNAL MEDICINE

## 2024-09-30 NOTE — ASSESSMENT & PLAN NOTE
Hyperlipidemia, stable.  We will continue atorvastatin.  The patient is scheduled for follow-up lab.

## 2024-09-30 NOTE — PROGRESS NOTES
Subjective:        Patient ID: Pretty Aguila is a 74 y.o. female.    Chief Complaint:  The patient presented to this office for the purpose of cardiac follow-up.  She is known to have a history of coronary artery disease with hypertension and hyperlipidemia.  She is status post CABG x 3.  On occasion the patient has been experiencing symptom of shortness of breath.  She said this could occur after walking just a few steps he had some other days the patient does not experience similar symptoms.  There is no associated symptom of chest pain however the patient may occasionally feel somewhat lightheaded.  She is not aware of any symptoms of palpitation or feeling faint.  She has no leg edema.      The following portions of the patient's history were reviewed and updated as appropriate: allergies, current medications, past family history, past medical history, past social history, past surgical history, and problem list.  Review of Systems   Constitutional: Negative.   Cardiovascular:  Positive for dyspnea on exertion. Negative for chest pain, irregular heartbeat, leg swelling, near-syncope, orthopnea, palpitations, paroxysmal nocturnal dyspnea and syncope.   Respiratory: Negative.     Psychiatric/Behavioral: Negative.            Objective:     Physical Exam  Constitutional:       Appearance: Normal appearance.   HENT:      Head: Normocephalic and atraumatic.   Cardiovascular:      Rate and Rhythm: Normal rate and regular rhythm.   Pulmonary:      Effort: Pulmonary effort is normal.      Breath sounds: Normal breath sounds.   Musculoskeletal:      Right lower leg: No edema.      Left lower leg: No edema.   Skin:     General: Skin is warm and dry.   Neurological:      Mental Status: She is alert and oriented to person, place, and time.   Psychiatric:         Mood and Affect: Mood normal.         Behavior: Behavior normal.         Lab Review:   Lab Results   Component Value Date    K 4.7 04/26/2024     04/26/2024     CO2 26 04/26/2024    BUN 24 04/26/2024    CREATININE 1.07 (H) 04/26/2024    CREATININE 0.98 01/17/2024    CALCIUM 8.6 01/17/2024         Assessment:       1. Coronary artery disease involving native coronary artery of native heart without angina pectoris  POCT ECG    NM myocardial perfusion spect (rx stress and/or rest)    Echo complete w/ contrast if indicated    XR chest pa and lateral      2. Essential hypertension        3. S/P CABG x 3        4. Dyslipidemia        5. Dyspnea on exertion  NM myocardial perfusion spect (rx stress and/or rest)    Echo complete w/ contrast if indicated    XR chest pa and lateral           Plan:       The patient will be scheduled for echocardiogram and nuclear stress test.  She will also have a chest x-ray.  She will continue present medications.

## 2024-09-30 NOTE — ASSESSMENT & PLAN NOTE
Coronary artery disease status post coronary bypass surgery x 3.  The patient is angina free but experiences symptom of shortness of breath on exertion.  I will be arranging for the patient to have a nuclear stress test.

## 2024-09-30 NOTE — PATIENT INSTRUCTIONS
The patient will be scheduled for echocardiogram and pharmacologic nuclear stress test as well as a chest x-ray.  She will continue present medications.  She will proceed with the blood work.

## 2024-09-30 NOTE — ASSESSMENT & PLAN NOTE
The patient is complaining of feelings of dyspnea on mild exertion.  The etiology of this is unclear.  I would like to arrange for the patient to have a nuclear stress test with pharmacologic stimulation.  I will also arrange for the patient to have a chest x-ray.

## 2024-10-01 ENCOUNTER — TELEPHONE (OUTPATIENT)
Dept: HEMATOLOGY ONCOLOGY | Facility: CLINIC | Age: 74
End: 2024-10-01

## 2024-10-01 NOTE — TELEPHONE ENCOUNTER
Left vm for pt. Change in provider's schedule. Rs pt's appt to 11/12 at 1140 am. Left hope line #.    Sent to leadership to override

## 2024-10-02 ENCOUNTER — OFFICE VISIT (OUTPATIENT)
Dept: SURGICAL ONCOLOGY | Facility: CLINIC | Age: 74
End: 2024-10-02
Payer: COMMERCIAL

## 2024-10-02 ENCOUNTER — TELEPHONE (OUTPATIENT)
Dept: HEMATOLOGY ONCOLOGY | Facility: CLINIC | Age: 74
End: 2024-10-02

## 2024-10-02 VITALS
OXYGEN SATURATION: 96 % | TEMPERATURE: 97.4 F | BODY MASS INDEX: 34.15 KG/M2 | WEIGHT: 200 LBS | DIASTOLIC BLOOD PRESSURE: 64 MMHG | SYSTOLIC BLOOD PRESSURE: 124 MMHG | HEIGHT: 64 IN | HEART RATE: 87 BPM

## 2024-10-02 DIAGNOSIS — Z17.0 MALIGNANT NEOPLASM OF LOWER-OUTER QUADRANT OF LEFT BREAST OF FEMALE, ESTROGEN RECEPTOR POSITIVE (HCC): Primary | ICD-10-CM

## 2024-10-02 DIAGNOSIS — C50.512 MALIGNANT NEOPLASM OF LOWER-OUTER QUADRANT OF LEFT BREAST OF FEMALE, ESTROGEN RECEPTOR POSITIVE (HCC): Primary | ICD-10-CM

## 2024-10-02 PROCEDURE — 99213 OFFICE O/P EST LOW 20 MIN: CPT

## 2024-10-02 RX ORDER — TRAMADOL HYDROCHLORIDE 200 MG/1
200 TABLET, EXTENDED RELEASE ORAL DAILY
COMMUNITY
Start: 2024-10-01

## 2024-10-02 NOTE — ASSESSMENT & PLAN NOTE
There is no evidence of disease recurrence on exam of the affected breast, and right breast imaging is unremarkable.  I will see her again in 6 months for another clinical exam.

## 2024-10-02 NOTE — TELEPHONE ENCOUNTER
Tayler left that her next appointment with [facility] has been scheduled for Thurs. 4/3/25 at 11:30am.  [facility] number was provided to confirm.

## 2024-10-02 NOTE — PROGRESS NOTES
Surgical Oncology Follow Up       [facility] [place]  [address]  [place] [address]    Pretty MAHMOOD Mingo  1950  [id number]  [facility] [place]  [address]  [place] [address]    1. Malignant neoplasm of lower-outer quadrant of left breast of female, estrogen receptor positive (HCC)  Assessment & Plan:  There is no evidence of disease recurrence on exam of the affected breast, and right breast imaging is unremarkable.  I will see her again in 6 months for another clinical exam.         Chief Complaint   Patient presents with    Follow-up       Return in about 6 months (around 4/2/2025) for Office Visit.      Oncology History   Malignant neoplasm of lower-outer quadrant of left breast of female, estrogen receptor positive (HCC)   9/19/2023 Biopsy    Left breast ultrasound-guided biopsy  4 o'clock, 2 cm from nipple (VERENICE)  Invasive breast carcinoma of no special type (ductal NST)  Grade 2  ER 90, MS 90, HER2 1+\  Lymphovascular invasion not definitively identified    Concordant. Malignancy appears unifocal; however, oval circumscribed mass in the upper outer left breast is not accounted for. Bilateral breast MRI is recommended. Biopsy-proven carcinoma measured 3.1 cm on US. Left axilla US negative. Right breast clear.     9/28/2023 Observation    Bilateral breast MRI: Unifocal carcinoma in the lower outer left breast with possible skin involvement. There are no suspicious enhancing masses or suspicious areas of non-mass enhancement in right breast. There is no axillary or internal mammary adenopathy.     10/17/2023 Genetic Testing    A total of 47 genes were evaluated, including: HUYEN, BRCA1, BRCA2, CDH1, CHEK2, PALB2, PTEN, STK11, TP53  Negative result. No pathogenic sequence variants identified  [facility]     11/20/2023 Surgery    Left VERENICE  directed mastectomy with SLN  biopsy   Invasive carcinoma of no special type (ductal)  Grade 2  3.6 cm   Margins negative  0/1 Lymph node  Anatomic Stage IIA  Prognostic Stage IA    Immediate reconstruction with tissue expander and ADM (Dr. Jimenez)     11/20/2023 -  Cancer Staged    Staging form: Breast, AJCC 8th Edition  - Pathologic stage from 11/20/2023: Stage IA (pT2, pN0(sn), cM0, G2, ER+, IL+, HER2-) - Signed by Adrien Gabriel MD on 12/6/2023  Stage prefix: Initial diagnosis  Method of lymph node assessment: [place] lymph node biopsy  Multigene prognostic tests performed: None  Histologic grading system: 3 grade system               History of Present Illness: This is a 73 y/o female who returns to the office today in follow-up for her history of breast cancer.  She is currently MAMADOU at just under 1 year and doing well. She recently experienced some pain in the lateral reconstructed breast, and thought she felt some lumps, but these symptoms have resolved.  She denies any other findings on self-exam, and denies any other new symptoms.  Right breast mammogram was performed on September 19, BIRADS-2.       Review of Systems   Constitutional:  Negative for activity change, appetite change, fatigue and unexpected weight change.   HENT: Negative.     Respiratory: Negative.     Cardiovascular: Negative.    Gastrointestinal: Negative.  Negative for abdominal pain, nausea and vomiting.   Musculoskeletal: Negative.    Skin: Negative.  Negative for color change and wound.   Neurological: Negative.  Negative for dizziness and headaches.   Hematological: Negative.  Negative for adenopathy.   Psychiatric/Behavioral: Negative.               Patient Active Problem List   Diagnosis    Coronary artery disease involving native coronary artery of native heart without angina pectoris    Essential hypertension    Dyslipidemia    S/P CABG x 3    Dyspnea on exertion    Vitamin D deficiency    Encounter for surgical aftercare following surgery of digestive system     Postsurgical malabsorption    Status post bariatric surgery    At risk for sleep apnea    Chronic bilateral low back pain without sciatica    Candida infection of flexural skin    Postmenopausal    Annual physical exam    Rheumatoid arthritis of multiple sites with negative rheumatoid factor (HCC)    Other fatigue    Malignant neoplasm of lower-outer quadrant of left breast of female, estrogen receptor positive (HCC)    Moderate episode of recurrent major depressive disorder (HCC)    Anxiety and depression    Restless leg syndrome    Simple chronic bronchitis (HCC)    Continuous opioid dependence (HCC)    Prediabetes    CKD (chronic kidney disease)    Psoriatic arthritis (HCC)    Osteoarthritis of multiple joints    Loose stools    Class 2 obesity due to excess calories with body mass index (BMI) of 36.0 to 36.9 in adult     Past Medical History:   Diagnosis Date    Allergic     Anxiety     Arthritis     Breast cancer (HCC) 09/2023    CAD (coronary artery disease)     Coronary artery disease [id number]    Depression     Diabetes mellitus (HCC) 5/1/24    Mother-Diabetic    Dyslipidemia     Hiatal hernia     Hyperlipidemia     Hypertension     Obesity     Obesity (BMI 30-39.9)     Psoriatic arthritis (HCC)     Rheumatoid arthritis (HCC)     Scoliosis     SOB (shortness of breath)      Past Surgical History:   Procedure Laterality Date    BARIATRIC SURGERY  2014    BILE DUCT EXPLORATION      replacement per Steffanie    BREAST BIOPSY  9/23    CARPAL TUNNEL RELEASE Bilateral 2002    CORONARY ARTERY BYPASS GRAFT  2017    FOOT SURGERY Right     bone surgery to straighten toe.    HIP SURGERY Left 2015    IR DRAINAGE TUBE PLACEMENT  12/19/2023    JOINT REPLACEMENT      LYMPH NODE BIOPSY Left 11/20/2023    Procedure: LEFT LYMPHATIC MAPPING WITH BLUE AND RADIOACTIVE DYES, SENTINEL LYMPH NODE BIOPSY;  Surgeon: Adrien Gabriel MD;  Location:  MAIN OR;  Service: Surgical Oncology    LYMPH NODE DISSECTION Left 11/20/2023    Procedure:  POSSIBLE AXILLARY DISSECTION;  Surgeon: Adrien Gabriel MD;  Location: [facility];  Service: Surgical Oncology    KS BREAST REDUCTION Bilateral 2024    Procedure: RIGHT BREAST REDUCTION AND LEFT BREAST EXPANDER EXCHANGE FOR PERMANENT IMPLANT. REVISION OF RECONSTRUCTED LEFT BREAST.;  Surgeon: Molina Jimenez MD;  Location: [facility];  Service: Plastics    KS SUCTION ASSISTED LIPECTOMY TRUNK Bilateral 2024    Procedure: LIPOSUCTION BREAST;  Surgeon: Molina Jimenez MD;  Location: [facility];  Service: Plastics    KS TISSUE EXPANDER PLACEMENT BREAST RECONSTRUCTION Left 2023    Procedure: IMMEDIATE LEFT BREAST RECONSTRUCTION WITH TISSUE EXPANDER AND ADM;  Surgeon: Molina Jimenez MD;  Location: [facility];  Service: Plastics    REPLACEMENT TOTAL KNEE Right 2017    SIMPLE MASTECTOMY Left 2023    Procedure: LEFT BREAST VERENICE  DIRECTED MASTECTOMY;  Surgeon: Adrien Gabriel MD;  Location:  MAIN OR;  Service: Surgical Oncology    SLEEVE GASTROPLASTY      TONSILLECTOMY      TOTAL HIP ARTHROPLASTY Right 2017    US GUIDED BREAST BIOPSY LEFT COMPLETE Left 2023     Family History   Problem Relation Age of Onset    Hypertension Mother     Diabetes Mother     Heart disease Father         CABG x5    Arthritis Father     No Known Problems Sister     No Known Problems Sister     No Known Problems Daughter     No Known Problems Daughter     No Known Problems Son     Breast cancer Neg Hx      Social History     Socioeconomic History    Marital status: /Civil Union     Spouse name: Not on file    Number of children: Not on file    Years of education: Not on file    Highest education level: Not on file   Occupational History    Not on file   Tobacco Use    Smoking status: Former     Current packs/day: 0.00     Average packs/day: 0.5 packs/day for 7.0 years (3.5 ttl pk-yrs)     Types: Cigarettes     Start date: 2010     Quit date:      Years since quittin.7    Smokeless tobacco: Never     Tobacco comments:     Have already quit smoking in 2017   Vaping Use    Vaping status: Never Used   Substance and Sexual Activity    Alcohol use: Yes     Alcohol/week: 2.0 standard drinks of alcohol     Types: 2 Glasses of wine per week     Comment: social     Drug use: No    Sexual activity: Not Currently     Partners: Male     Birth control/protection: None   Other Topics Concern    Not on file   Social History Narrative    Not on file     Social Determinants of Health     Financial Resource Strain: Not on file   Food Insecurity: No Food Insecurity (1/17/2024)    Hunger Vital Sign     Worried About Running Out of Food in the Last Year: Never true     Ran Out of Food in the Last Year: Never true   Transportation Needs: No Transportation Needs (1/17/2024)    PRAPARE - Transportation     Lack of Transportation (Medical): No     Lack of Transportation (Non-Medical): No   Physical Activity: Not on file   Stress: Not on file   Social Connections: Not on file   Intimate Partner Violence: Not on file   Housing Stability: Low Risk  (1/17/2024)    Housing Stability Vital Sign     Unable to Pay for Housing in the Last Year: No     Number of Times Moved in the Last Year: 1     Homeless in the Last Year: No       Current Outpatient Medications:     anastrozole (ARIMIDEX) 1 mg tablet, TAKE 1 TABLET (1 MG TOTAL) BY MOUTH DAILY, Disp: 90 tablet, Rfl: 1    atorvastatin (LIPITOR) 10 mg tablet, TAKE 1 TABLET BY MOUTH DAILY, Disp: 100 tablet, Rfl: 1    buPROPion (WELLBUTRIN XL) 300 mg 24 hr tablet, Take 1 tablet (300 mg total) by mouth every morning, Disp: 90 tablet, Rfl: 3    cholecalciferol (VITAMIN D3) 400 units tablet, Take 400 Units by mouth daily, Disp: , Rfl:     citalopram (CeleXA) 20 mg tablet, Take 1 tablet (20 mg total) by mouth daily, Disp: 30 tablet, Rfl: 6    furosemide (LASIX) 40 mg tablet, TAKE 1 TABLET (40 MG TOTAL) BY MOUTH 2 (TWO) TIMES A DAY, Disp: 180 tablet, Rfl: 0    metFORMIN (GLUCOPHAGE-XR) 500 mg 24 hr  tablet, Take 1 tablet (500 mg total) by mouth daily with dinner, Disp: 30 tablet, Rfl: 3    metoprolol succinate (TOPROL-XL) 50 mg 24 hr tablet, Take 1 tablet (50 mg total) by mouth daily Patient would like these sent to her via the mail., Disp: 100 tablet, Rfl: 1    naloxone (NARCAN) 4 mg/0.1 mL nasal spray, Administer 1 spray into a nostril. If no response after 2-3 minutes, give another dose in the other nostril using a new spray., Disp: 1 each, Rfl: 1    nystatin (MYCOSTATIN) powder, Apply topically 2 (two) times a day, Disp: 60 g, Rfl: 0    predniSONE 1 mg tablet, TAKE 1 TABLET (1 MG TOTAL) BY MOUTH 3 (THREE) TIMES A DAY., Disp: , Rfl:     Semaglutide-Weight Management (WEGOVY) 0.25 MG/0.5ML, Inject 0.5 mL (0.25 mg total) under the skin once a week, Disp: 2 mL, Rfl: 0    Semaglutide-Weight Management (WEGOVY) 0.5 MG/0.5ML, Inject 0.5 mL (0.5 mg total) under the skin once a week, Disp: 2 mL, Rfl: 0    traMADol (ULTRAM-ER) 100 mg 24 hr tablet, Take 100 mg by mouth daily, Disp: , Rfl:     traMADol (ULTRAM-ER) 200 MG 24 hr tablet, Take 200 mg by mouth daily, Disp: , Rfl:     Apremilast 30 MG TABS, Take 30 mg by mouth 2 (two) times a day (Patient not taking: Reported on 8/9/2024), Disp: , Rfl:     LORazepam (ATIVAN) 0.5 mg tablet, TAKE 1 TABLET (0.5 MG TOTAL) BY MOUTH EVERY 8 (EIGHT) HOURS AS NEEDED FOR ANXIETY (Patient not taking: Reported on 8/9/2024), Disp: 20 tablet, Rfl: 0    Multiple Vitamin (multivitamin) tablet, Take 1 tablet by mouth daily (Patient not taking: Reported on 8/9/2024), Disp: , Rfl:     nystatin (MYCOSTATIN) cream, Apply topically 2 (two) times a day (Patient not taking: Reported on 8/9/2024), Disp: 30 g, Rfl: 0  Allergies   Allergen Reactions    Iodine - Food Allergy Anaphylaxis     Reaction Date: 07Jan2008;     Povidone Iodine Anaphylaxis    Shellfish-Derived Products - Food Allergy Anaphylaxis     Vitals:    10/02/24 1025   BP: 124/64   Pulse: 87   Temp: (!) 97.4 °F (36.3 °C)   SpO2: 96%        Physical Exam  Vitals reviewed.   Constitutional:       General: She is not in acute distress.     Appearance: Normal appearance. She is not ill-appearing or toxic-appearing.   HENT:      Head: Normocephalic and atraumatic.      Nose: Nose normal.      Mouth/Throat:      Mouth: Mucous membranes are moist.   Eyes:      General: No scleral icterus.  Cardiovascular:      Rate and Rhythm: Normal rate.   Pulmonary:      Effort: Pulmonary effort is normal.   Chest:   Breasts:     Right: Normal.          Comments: Bilateral breasts are generally smooth, with stable scarring present.  I do not appreciate any masses, and there is no tenderness on palpation. No adenopathy present on exam today.  Musculoskeletal:         General: No swelling or tenderness. Normal range of motion.      Cervical back: Normal range of motion and neck supple.   Lymphadenopathy:      Cervical: No cervical adenopathy.      Upper Body:      Right upper body: No supraclavicular, axillary or pectoral adenopathy.      Left upper body: No supraclavicular, axillary or pectoral adenopathy.   Skin:     General: Skin is warm and dry.      Coloration: Skin is not jaundiced.      Findings: No erythema.   Neurological:      General: No focal deficit present.      Mental Status: She is alert and oriented to person, place, and time.   Psychiatric:         Mood and Affect: Mood normal.         Behavior: Behavior normal.         Thought Content: Thought content normal.         Judgment: Judgment normal.           Imaging  Mammo diagnostic right w 3d & cad    Result Date: 9/19/2024  Narrative: DIAGNOSIS: Malignant neoplasm of lower-outer quadrant of left breast of female, estrogen receptor positive (HCC) TECHNIQUE: Digital diagnostic mammography was performed. Computer Aided Detection (CAD) analyzed all applicable images. COMPARISONS: Prior breast imaging dated: 11/20/2023, 09/28/2023, 09/19/2023, 09/19/2023, 08/30/2023, 08/30/2023, 11/10/2017, 11/10/2017, and  10/03/2017 RELEVANT HISTORY: Family Breast Cancer History: History of breast cancer in Neg Hx. Family Medical History: No known relevant family medical history. Personal History: Hormone history includes hormone replacement therapy. Surgical history includes breast biopsy. Medical history includes breast cancer. RISK ASSESSMENT: Tyrer-zick risk assessment reporting was suppressed due to the patient's history and/or demographic factors. TISSUE DENSITY: The breasts are almost entirely fatty. INDICATION: Pretty Aguila is a 74 y.o. female presenting for history of breast cancer. FINDINGS: There are no suspicious masses, grouped microcalcifications or areas of unexplained architectural distortion. The skin and nipple areolar complex are unremarkable.  Postreduction changes are noted.  Benign-appearing calcification is noted.     Impression:  No evidence of malignancy. ASSESSMENT/BI-RADS CATEGORY: Right: 2 - Benign Overall: 2 - Benign RECOMMENDATION:      - Diagnostic mammogram in 1 year for the right breast. Workstation ID: [id number] Signed by:  Lauri Jurado MD     I personally reviewed and interpreted the above imaging data.

## 2024-10-09 DIAGNOSIS — E66.01 CLASS 2 SEVERE OBESITY DUE TO EXCESS CALORIES WITH SERIOUS COMORBIDITY AND BODY MASS INDEX (BMI) OF 35.0 TO 35.9 IN ADULT (HCC): ICD-10-CM

## 2024-10-09 DIAGNOSIS — E66.812 CLASS 2 SEVERE OBESITY DUE TO EXCESS CALORIES WITH SERIOUS COMORBIDITY AND BODY MASS INDEX (BMI) OF 35.0 TO 35.9 IN ADULT (HCC): ICD-10-CM

## 2024-10-09 NOTE — TELEPHONE ENCOUNTER
Pt currently on 0.5 mg dose and would like to increase. She states that she is not having any side effects and is tolerating the Rx very well. She states that she does not feel the Rx is effecting her appetite or weight at this point but hopefully with this dose increase she will start to experience the benefits of the Rx.    She does not have any pens left and her next inj is on Monday    Reason for call:   [x] Refill   [] Prior Auth  [] Other:     Office:   [] PCP/Provider -   [x] Specialty/Provider - [facility] / Dagmar Jorgensen MD     Medication:   ~ Semaglutide-Weight Management (WEGOVY) 0.5 MG/0.5ML - Inject 0.5 mL (0.5 mg total) under the skin once a week *INCREASE*    Pharmacy:   [facility], [facility] [place], [place] - [address] [facility]     Does the patient have enough for 3 days?   [x] Yes   [] No - Send as HP to POD    How are you tolerating the medication?   [] Nausea  [] Vomiting  [] Diarrhea  [x] Asymptomatic     Would you like an increase in your dose?  [x] Yes   [] No

## 2024-10-11 DIAGNOSIS — E66.811 OBESITY, CLASS I, BMI 30-34.9: Primary | ICD-10-CM

## 2024-10-15 ENCOUNTER — PATIENT MESSAGE (OUTPATIENT)
Dept: BARIATRICS | Facility: CLINIC | Age: 74
End: 2024-10-15

## 2024-10-16 DIAGNOSIS — E66.811 OBESITY, CLASS I, BMI 30-34.9: ICD-10-CM

## 2024-10-16 NOTE — PATIENT COMMUNICATION
Pt calling in to have script for wegovy 1.7mg sent to the [facility], [facility] - [place], [place] - [address] [facility].   She notes she was due for injection this past Monday and is out of medication.   Called pharmacy and left VM with script.     Please send electronic script for pt.

## 2024-10-16 NOTE — PATIENT COMMUNICATION
Patient called in to state that her prescription for Wegovy was sent to the wrong pharmacy. It was not supposed to go to [facility]. Her prescription is supposed to be sent to [facility].   [facility], [facility] [place] - [address] [facility] [phone]     Please call the patient when this is sent over to the pharmacy at [phone].

## 2024-10-16 NOTE — TELEPHONE ENCOUNTER
Please resend Wegovy prescriptions to [facility]. Preferred pharmacy has been updated in pts chart.

## 2024-10-21 DIAGNOSIS — F41.9 ANXIETY AND DEPRESSION: ICD-10-CM

## 2024-10-21 DIAGNOSIS — F32.A ANXIETY AND DEPRESSION: ICD-10-CM

## 2024-10-22 RX ORDER — BUPROPION HYDROCHLORIDE 300 MG/1
300 TABLET ORAL EVERY MORNING
Qty: 90 TABLET | Refills: 1 | Status: SHIPPED | OUTPATIENT
Start: 2024-10-22 | End: 2025-10-17

## 2024-10-26 ENCOUNTER — LAB (OUTPATIENT)
Dept: LAB | Facility: CLINIC | Age: 74
End: 2024-10-26
Payer: COMMERCIAL

## 2024-10-26 DIAGNOSIS — Z00.00 ANNUAL PHYSICAL EXAM: ICD-10-CM

## 2024-10-26 DIAGNOSIS — D64.9 ANEMIA, UNSPECIFIED TYPE: ICD-10-CM

## 2024-10-26 DIAGNOSIS — E66.812 OBESITY, CLASS II, BMI 35-39.9: ICD-10-CM

## 2024-10-26 DIAGNOSIS — R73.03 PREDIABETES: ICD-10-CM

## 2024-10-26 DIAGNOSIS — K91.2 POSTSURGICAL MALABSORPTION: ICD-10-CM

## 2024-10-26 DIAGNOSIS — Z17.0 MALIGNANT NEOPLASM OF LEFT BREAST IN FEMALE, ESTROGEN RECEPTOR POSITIVE, UNSPECIFIED SITE OF BREAST (HCC): ICD-10-CM

## 2024-10-26 DIAGNOSIS — E78.5 DYSLIPIDEMIA: ICD-10-CM

## 2024-10-26 DIAGNOSIS — C50.912 MALIGNANT NEOPLASM OF LEFT BREAST IN FEMALE, ESTROGEN RECEPTOR POSITIVE, UNSPECIFIED SITE OF BREAST (HCC): ICD-10-CM

## 2024-10-26 LAB
25(OH)D3 SERPL-MCNC: 42.4 NG/ML (ref 30–100)
ALBUMIN SERPL BCG-MCNC: 4 G/DL (ref 3.5–5)
ALP SERPL-CCNC: 62 U/L (ref 34–104)
ALT SERPL W P-5'-P-CCNC: 16 U/L (ref 7–52)
ANION GAP SERPL CALCULATED.3IONS-SCNC: 6 MMOL/L (ref 4–13)
AST SERPL W P-5'-P-CCNC: 19 U/L (ref 13–39)
BACTERIA UR QL AUTO: ABNORMAL /HPF
BASOPHILS # BLD AUTO: 0.01 THOUSANDS/ΜL (ref 0–0.1)
BASOPHILS NFR BLD AUTO: 0 % (ref 0–1)
BILIRUB SERPL-MCNC: 0.65 MG/DL (ref 0.2–1)
BILIRUB UR QL STRIP: NEGATIVE
BUN SERPL-MCNC: 22 MG/DL (ref 5–25)
CALCIUM SERPL-MCNC: 9.3 MG/DL (ref 8.4–10.2)
CHLORIDE SERPL-SCNC: 104 MMOL/L (ref 96–108)
CHOLEST SERPL-MCNC: 141 MG/DL
CLARITY UR: CLEAR
CO2 SERPL-SCNC: 30 MMOL/L (ref 21–32)
COLOR UR: YELLOW
CREAT SERPL-MCNC: 1.15 MG/DL (ref 0.6–1.3)
EOSINOPHIL # BLD AUTO: 0.1 THOUSAND/ΜL (ref 0–0.61)
EOSINOPHIL NFR BLD AUTO: 3 % (ref 0–6)
ERYTHROCYTE [DISTWIDTH] IN BLOOD BY AUTOMATED COUNT: 13.8 % (ref 11.6–15.1)
ERYTHROCYTE [SEDIMENTATION RATE] IN BLOOD: 31 MM/HOUR (ref 0–29)
EST. AVERAGE GLUCOSE BLD GHB EST-MCNC: 123 MG/DL
FERRITIN SERPL-MCNC: 119 NG/ML (ref 11–307)
FOLATE SERPL-MCNC: 18 NG/ML
GFR SERPL CREATININE-BSD FRML MDRD: 47 ML/MIN/1.73SQ M
GLUCOSE P FAST SERPL-MCNC: 89 MG/DL (ref 65–99)
GLUCOSE UR STRIP-MCNC: NEGATIVE MG/DL
HBA1C MFR BLD: 5.9 %
HCT VFR BLD AUTO: 30.2 % (ref 34.8–46.1)
HDLC SERPL-MCNC: 49 MG/DL
HGB BLD-MCNC: 9.8 G/DL (ref 11.5–15.4)
HGB UR QL STRIP.AUTO: NEGATIVE
HYALINE CASTS #/AREA URNS LPF: ABNORMAL /LPF
IMM GRANULOCYTES # BLD AUTO: 0.02 THOUSAND/UL (ref 0–0.2)
IMM GRANULOCYTES NFR BLD AUTO: 1 % (ref 0–2)
IRON SATN MFR SERPL: 27 % (ref 15–50)
IRON SERPL-MCNC: 92 UG/DL (ref 50–212)
KETONES UR STRIP-MCNC: NEGATIVE MG/DL
LDH SERPL-CCNC: 176 U/L (ref 140–271)
LDLC SERPL CALC-MCNC: 75 MG/DL (ref 0–100)
LEUKOCYTE ESTERASE UR QL STRIP: ABNORMAL
LYMPHOCYTES # BLD AUTO: 1.67 THOUSANDS/ΜL (ref 0.6–4.47)
LYMPHOCYTES NFR BLD AUTO: 42 % (ref 14–44)
MAGNESIUM SERPL-MCNC: 2.3 MG/DL (ref 1.9–2.7)
MCH RBC QN AUTO: 35 PG (ref 26.8–34.3)
MCHC RBC AUTO-ENTMCNC: 32.5 G/DL (ref 31.4–37.4)
MCV RBC AUTO: 108 FL (ref 82–98)
MONOCYTES # BLD AUTO: 0.36 THOUSAND/ΜL (ref 0.17–1.22)
MONOCYTES NFR BLD AUTO: 9 % (ref 4–12)
MUCOUS THREADS UR QL AUTO: ABNORMAL
NEUTROPHILS # BLD AUTO: 1.82 THOUSANDS/ΜL (ref 1.85–7.62)
NEUTS SEG NFR BLD AUTO: 45 % (ref 43–75)
NITRITE UR QL STRIP: NEGATIVE
NON-SQ EPI CELLS URNS QL MICRO: ABNORMAL /HPF
NONHDLC SERPL-MCNC: 92 MG/DL
NRBC BLD AUTO-RTO: 0 /100 WBCS
PH UR STRIP.AUTO: 5.5 [PH]
PLATELET # BLD AUTO: 109 THOUSANDS/UL (ref 149–390)
PMV BLD AUTO: 11.8 FL (ref 8.9–12.7)
POTASSIUM SERPL-SCNC: 3.6 MMOL/L (ref 3.5–5.3)
PROT SERPL-MCNC: 7.8 G/DL (ref 6.4–8.4)
PROT UR STRIP-MCNC: NEGATIVE MG/DL
PTH-INTACT SERPL-MCNC: 75.1 PG/ML (ref 12–88)
RBC # BLD AUTO: 2.8 MILLION/UL (ref 3.81–5.12)
RBC #/AREA URNS AUTO: ABNORMAL /HPF
RETICS # AUTO: NORMAL 10*3/UL (ref 14097–95744)
RETICS # CALC: 1.4 % (ref 0.37–1.87)
SODIUM SERPL-SCNC: 140 MMOL/L (ref 135–147)
SP GR UR STRIP.AUTO: 1.02 (ref 1–1.03)
TIBC SERPL-MCNC: 344 UG/DL (ref 250–450)
TRIGL SERPL-MCNC: 83 MG/DL
TSH SERPL DL<=0.05 MIU/L-ACNC: 3.78 UIU/ML (ref 0.45–4.5)
UIBC SERPL-MCNC: 252 UG/DL (ref 155–355)
UROBILINOGEN UR STRIP-ACNC: <2 MG/DL
VIT B12 SERPL-MCNC: 733 PG/ML (ref 180–914)
WBC # BLD AUTO: 3.98 THOUSAND/UL (ref 4.31–10.16)
WBC #/AREA URNS AUTO: ABNORMAL /HPF

## 2024-10-26 PROCEDURE — 83735 ASSAY OF MAGNESIUM: CPT

## 2024-10-26 PROCEDURE — 86334 IMMUNOFIX E-PHORESIS SERUM: CPT

## 2024-10-26 PROCEDURE — 85045 AUTOMATED RETICULOCYTE COUNT: CPT

## 2024-10-26 PROCEDURE — 83036 HEMOGLOBIN GLYCOSYLATED A1C: CPT

## 2024-10-26 PROCEDURE — 83550 IRON BINDING TEST: CPT

## 2024-10-26 PROCEDURE — 82728 ASSAY OF FERRITIN: CPT

## 2024-10-26 PROCEDURE — 85652 RBC SED RATE AUTOMATED: CPT

## 2024-10-26 PROCEDURE — 82746 ASSAY OF FOLIC ACID SERUM: CPT

## 2024-10-26 PROCEDURE — 83540 ASSAY OF IRON: CPT

## 2024-10-26 PROCEDURE — 84590 ASSAY OF VITAMIN A: CPT

## 2024-10-26 PROCEDURE — 82607 VITAMIN B-12: CPT

## 2024-10-26 PROCEDURE — 84630 ASSAY OF ZINC: CPT

## 2024-10-26 PROCEDURE — 83615 LACTATE (LD) (LDH) ENZYME: CPT

## 2024-10-26 PROCEDURE — 80061 LIPID PANEL: CPT

## 2024-10-26 PROCEDURE — 80053 COMPREHEN METABOLIC PANEL: CPT

## 2024-10-26 PROCEDURE — 36415 COLL VENOUS BLD VENIPUNCTURE: CPT

## 2024-10-26 PROCEDURE — 84425 ASSAY OF VITAMIN B-1: CPT

## 2024-10-26 PROCEDURE — 82306 VITAMIN D 25 HYDROXY: CPT

## 2024-10-26 PROCEDURE — 81001 URINALYSIS AUTO W/SCOPE: CPT

## 2024-10-26 PROCEDURE — 85025 COMPLETE CBC W/AUTO DIFF WBC: CPT

## 2024-10-26 PROCEDURE — 84443 ASSAY THYROID STIM HORMONE: CPT

## 2024-10-26 PROCEDURE — 84165 PROTEIN E-PHORESIS SERUM: CPT

## 2024-10-26 PROCEDURE — 83970 ASSAY OF PARATHORMONE: CPT

## 2024-10-26 PROCEDURE — 82232 ASSAY OF BETA-2 PROTEIN: CPT

## 2024-10-27 LAB — B2 MICROGLOB SERPL-MCNC: 3.2 MG/L (ref 0.6–2.4)

## 2024-10-28 ENCOUNTER — HOSPITAL ENCOUNTER (OUTPATIENT)
Dept: NON INVASIVE DIAGNOSTICS | Facility: CLINIC | Age: 74
Discharge: HOME | End: 2024-10-28
Payer: COMMERCIAL

## 2024-10-28 ENCOUNTER — HOSPITAL ENCOUNTER (OUTPATIENT)
Dept: NON INVASIVE DIAGNOSTICS | Facility: CLINIC | Age: 74
End: 2024-10-28
Payer: COMMERCIAL

## 2024-10-28 VITALS
BODY MASS INDEX: 34.15 KG/M2 | SYSTOLIC BLOOD PRESSURE: 124 MMHG | WEIGHT: 200 LBS | DIASTOLIC BLOOD PRESSURE: 64 MMHG | HEIGHT: 64 IN | HEART RATE: 87 BPM

## 2024-10-28 DIAGNOSIS — R06.09 DYSPNEA ON EXERTION: ICD-10-CM

## 2024-10-28 DIAGNOSIS — I25.10 CORONARY ARTERY DISEASE INVOLVING NATIVE CORONARY ARTERY OF NATIVE HEART WITHOUT ANGINA PECTORIS: ICD-10-CM

## 2024-10-28 LAB
ALBUMIN SERPL ELPH-MCNC: 3.83 G/DL (ref 3.2–5.1)
ALBUMIN SERPL ELPH-MCNC: 51.1 % (ref 48–70)
ALPHA1 GLOB SERPL ELPH-MCNC: 0.33 G/DL (ref 0.15–0.47)
ALPHA1 GLOB SERPL ELPH-MCNC: 4.4 % (ref 1.8–7)
ALPHA2 GLOB SERPL ELPH-MCNC: 0.66 G/DL (ref 0.42–1.04)
ALPHA2 GLOB SERPL ELPH-MCNC: 8.8 % (ref 5.9–14.9)
AORTIC ROOT: 3.3 CM
AORTIC VALVE ANNULUS: 2.3 CM
APICAL FOUR CHAMBER EJECTION FRACTION: 62 %
ASCENDING AORTA: 4.2 CM
BETA GLOB ABNORMAL SERPL ELPH-MCNC: 0.47 G/DL (ref 0.31–0.57)
BETA1 GLOB SERPL ELPH-MCNC: 6.2 % (ref 4.7–7.7)
BETA2 GLOB SERPL ELPH-MCNC: 7.5 % (ref 3.1–7.9)
BETA2+GAMMA GLOB SERPL ELPH-MCNC: 0.56 G/DL (ref 0.2–0.58)
BSA FOR ECHO PROCEDURE: 1.96 M2
E WAVE DECELERATION TIME: 231 MS
E/A RATIO: 0.59
FRACTIONAL SHORTENING: 28 (ref 28–44)
GAMMA GLOB ABNORMAL SERPL ELPH-MCNC: 1.65 G/DL (ref 0.4–1.66)
GAMMA GLOB SERPL ELPH-MCNC: 22 % (ref 6.9–22.3)
IGG/ALB SER: 1.04 {RATIO} (ref 1.1–1.8)
INTERPRETATION UR IFE-IMP: NORMAL
INTERVENTRICULAR SEPTUM IN DIASTOLE (PARASTERNAL SHORT AXIS VIEW): 1.2 CM
INTERVENTRICULAR SEPTUM: 1.2 CM (ref 0.6–1.1)
LAAS-AP2: 21.2 CM2
LAAS-AP4: 20.8 CM2
LEFT ATRIUM SIZE: 3.1 CM
LEFT ATRIUM VOLUME (MOD BIPLANE): 65 ML
LEFT ATRIUM VOLUME INDEX (MOD BIPLANE): 33.2 ML/M2
LEFT INTERNAL DIMENSION IN SYSTOLE: 2.3 CM (ref 2.1–4)
LEFT VENTRICLE DIASTOLIC VOLUME (MOD BIPLANE): 73 ML
LEFT VENTRICLE DIASTOLIC VOLUME INDEX (MOD BIPLANE): 37.2 ML/M2
LEFT VENTRICLE SYSTOLIC VOLUME (MOD BIPLANE): 30 ML
LEFT VENTRICLE SYSTOLIC VOLUME INDEX (MOD BIPLANE): 15.3 ML/M2
LEFT VENTRICULAR INTERNAL DIMENSION IN DIASTOLE: 3.2 CM (ref 3.5–6)
LEFT VENTRICULAR POSTERIOR WALL IN END DIASTOLE: 0.8 CM
LEFT VENTRICULAR STROKE VOLUME: 24 ML
LV EF: 59 %
LVSV (TEICH): 24 ML
MV E'TISSUE VEL-LAT: 13 CM/S
MV E'TISSUE VEL-SEP: 5 CM/S
MV PEAK A VEL: 1.05 M/S
MV PEAK E VEL: 62 CM/S
MV STENOSIS PRESSURE HALF TIME: 67 MS
MV VALVE AREA P 1/2 METHOD: 3.28
PROT PATTERN SERPL ELPH-IMP: ABNORMAL
PROT SERPL-MCNC: 7.5 G/DL (ref 6.4–8.2)
RA PRESSURE ESTIMATED: 8 MMHG
RIGHT ATRIUM AREA SYSTOLE A4C: 14.3 CM2
RIGHT VENTRICLE ID DIMENSION: 3.2 CM
RV PSP: 26 MMHG
SINOTUBULAR JUNCTION: 3 CM
SL CV LEFT ATRIUM LENGTH A2C: 5.6 CM
SL CV LV EF: 55
SL CV PED ECHO LEFT VENTRICLE DIASTOLIC VOLUME (MOD BIPLANE) 2D: 42 ML
SL CV PED ECHO LEFT VENTRICLE SYSTOLIC VOLUME (MOD BIPLANE) 2D: 18 ML
SL CV SINUS OF VALSALVA 2D: 3.1 CM
STJ: 3 CM
TR MAX PG: 18 MMHG
TR PEAK VELOCITY: 2.1 M/S
TRICUSPID ANNULAR PLANE SYSTOLIC EXCURSION: 1.1 CM
TRICUSPID VALVE PEAK REGURGITATION VELOCITY: 2.1 M/S
ZINC SERPL-MCNC: 68 UG/DL (ref 44–115)

## 2024-10-28 PROCEDURE — 93306 TTE W/DOPPLER COMPLETE: CPT

## 2024-10-28 PROCEDURE — 86334 IMMUNOFIX E-PHORESIS SERUM: CPT | Performed by: PATHOLOGY

## 2024-10-28 PROCEDURE — 93306 TTE W/DOPPLER COMPLETE: CPT | Performed by: INTERNAL MEDICINE

## 2024-10-28 PROCEDURE — 84165 PROTEIN E-PHORESIS SERUM: CPT | Performed by: PATHOLOGY

## 2024-10-30 LAB — VIT B1 BLD-SCNC: 107.8 NMOL/L (ref 66.5–200)

## 2024-10-31 DIAGNOSIS — R71.8 ELEVATED MCV: Primary | ICD-10-CM

## 2024-10-31 DIAGNOSIS — K91.2 POSTSURGICAL MALABSORPTION: ICD-10-CM

## 2024-10-31 LAB — VIT A SERPL-MCNC: 35.7 UG/DL (ref 22–69.5)

## 2024-10-31 NOTE — RESULT ENCOUNTER NOTE
Please update Pretty on her labs, thank you:    -Her anemia is worsening - please have her f/u with her Heme asap! Obtain methylmalonic acid lab test as well. Vitamin levels look good - continue current vitamin/mineral regimen

## 2024-11-01 ENCOUNTER — HOSPITAL ENCOUNTER (EMERGENCY)
Facility: HOSPITAL | Age: 74
Discharge: HOME | End: 2024-11-01
Attending: EMERGENCY MEDICINE
Payer: COMMERCIAL

## 2024-11-01 ENCOUNTER — APPOINTMENT (EMERGENCY)
Dept: CT IMAGING | Facility: HOSPITAL | Age: 74
End: 2024-11-01
Payer: COMMERCIAL

## 2024-11-01 ENCOUNTER — APPOINTMENT (EMERGENCY)
Dept: RADIOLOGY | Facility: HOSPITAL | Age: 74
End: 2024-11-01
Payer: COMMERCIAL

## 2024-11-01 ENCOUNTER — NURSE TRIAGE (OUTPATIENT)
Age: 74
End: 2024-11-01

## 2024-11-01 VITALS
OXYGEN SATURATION: 97 % | HEART RATE: 90 BPM | RESPIRATION RATE: 16 BRPM | DIASTOLIC BLOOD PRESSURE: 67 MMHG | TEMPERATURE: 98 F | SYSTOLIC BLOOD PRESSURE: 115 MMHG

## 2024-11-01 DIAGNOSIS — R53.83 FATIGUE: ICD-10-CM

## 2024-11-01 DIAGNOSIS — D61.818 PANCYTOPENIA (HCC): ICD-10-CM

## 2024-11-01 DIAGNOSIS — U07.1 COVID-19: Primary | ICD-10-CM

## 2024-11-01 DIAGNOSIS — R06.02 SHORTNESS OF BREATH: ICD-10-CM

## 2024-11-01 LAB
2HR DELTA HS TROPONIN: 1 NG/L
ALBUMIN SERPL BCG-MCNC: 3.9 G/DL (ref 3.5–5)
ALP SERPL-CCNC: 58 U/L (ref 34–104)
ALT SERPL W P-5'-P-CCNC: 18 U/L (ref 7–52)
ANION GAP SERPL CALCULATED.3IONS-SCNC: 3 MMOL/L (ref 4–13)
ANISOCYTOSIS BLD QL SMEAR: PRESENT
AST SERPL W P-5'-P-CCNC: 21 U/L (ref 13–39)
BASOPHILS # BLD AUTO: 0.01 THOUSANDS/ΜL (ref 0–0.1)
BASOPHILS NFR BLD AUTO: 0 % (ref 0–1)
BILIRUB SERPL-MCNC: 0.52 MG/DL (ref 0.2–1)
BNP SERPL-MCNC: 107 PG/ML (ref 0–100)
BUN SERPL-MCNC: 21 MG/DL (ref 5–25)
CALCIUM SERPL-MCNC: 9.2 MG/DL (ref 8.4–10.2)
CARDIAC TROPONIN I PNL SERPL HS: 4 NG/L
CARDIAC TROPONIN I PNL SERPL HS: 5 NG/L
CHLORIDE SERPL-SCNC: 107 MMOL/L (ref 96–108)
CO2 SERPL-SCNC: 29 MMOL/L (ref 21–32)
CREAT SERPL-MCNC: 1.08 MG/DL (ref 0.6–1.3)
EOSINOPHIL # BLD AUTO: 0.1 THOUSAND/ΜL (ref 0–0.61)
EOSINOPHIL NFR BLD AUTO: 3 % (ref 0–6)
ERYTHROCYTE [DISTWIDTH] IN BLOOD BY AUTOMATED COUNT: 13.8 % (ref 11.6–15.1)
FLUAV AG UPPER RESP QL IA.RAPID: NEGATIVE
FLUBV AG UPPER RESP QL IA.RAPID: NEGATIVE
GFR SERPL CREATININE-BSD FRML MDRD: 51 ML/MIN/1.73SQ M
GLUCOSE SERPL-MCNC: 142 MG/DL (ref 65–140)
HCT VFR BLD AUTO: 28.9 % (ref 34.8–46.1)
HGB BLD-MCNC: 9.5 G/DL (ref 11.5–15.4)
IMM GRANULOCYTES # BLD AUTO: 0.01 THOUSAND/UL (ref 0–0.2)
IMM GRANULOCYTES NFR BLD AUTO: 0 % (ref 0–2)
LYMPHOCYTES # BLD AUTO: 1.61 THOUSANDS/ΜL (ref 0.6–4.47)
LYMPHOCYTES NFR BLD AUTO: 41 % (ref 14–44)
MACROCYTES BLD QL AUTO: PRESENT
MCH RBC QN AUTO: 35.6 PG (ref 26.8–34.3)
MCHC RBC AUTO-ENTMCNC: 32.9 G/DL (ref 31.4–37.4)
MCV RBC AUTO: 108 FL (ref 82–98)
MONOCYTES # BLD AUTO: 0.28 THOUSAND/ΜL (ref 0.17–1.22)
MONOCYTES NFR BLD AUTO: 7 % (ref 4–12)
NEUTROPHILS # BLD AUTO: 1.89 THOUSANDS/ΜL (ref 1.85–7.62)
NEUTS SEG NFR BLD AUTO: 49 % (ref 43–75)
NRBC BLD AUTO-RTO: 0 /100 WBCS
PLATELET # BLD AUTO: 96 THOUSANDS/UL (ref 149–390)
PLATELET BLD QL SMEAR: ABNORMAL
PMV BLD AUTO: 11.2 FL (ref 8.9–12.7)
POTASSIUM SERPL-SCNC: 3.7 MMOL/L (ref 3.5–5.3)
PROT SERPL-MCNC: 7.4 G/DL (ref 6.4–8.4)
RBC # BLD AUTO: 2.67 MILLION/UL (ref 3.81–5.12)
RBC MORPH BLD: PRESENT
SARS-COV+SARS-COV-2 AG RESP QL IA.RAPID: POSITIVE
SODIUM SERPL-SCNC: 139 MMOL/L (ref 135–147)
TOXIC GRANULES BLD QL SMEAR: PRESENT
TSH SERPL DL<=0.05 MIU/L-ACNC: 1.8 UIU/ML (ref 0.45–4.5)
WBC # BLD AUTO: 3.9 THOUSAND/UL (ref 4.31–10.16)

## 2024-11-01 PROCEDURE — 84443 ASSAY THYROID STIM HORMONE: CPT | Performed by: EMERGENCY MEDICINE

## 2024-11-01 PROCEDURE — 74176 CT ABD & PELVIS W/O CONTRAST: CPT

## 2024-11-01 PROCEDURE — 86308 HETEROPHILE ANTIBODY SCREEN: CPT | Performed by: EMERGENCY MEDICINE

## 2024-11-01 PROCEDURE — 36415 COLL VENOUS BLD VENIPUNCTURE: CPT

## 2024-11-01 PROCEDURE — 83880 ASSAY OF NATRIURETIC PEPTIDE: CPT | Performed by: EMERGENCY MEDICINE

## 2024-11-01 PROCEDURE — 87811 SARS-COV-2 COVID19 W/OPTIC: CPT | Performed by: EMERGENCY MEDICINE

## 2024-11-01 PROCEDURE — 80053 COMPREHEN METABOLIC PANEL: CPT | Performed by: EMERGENCY MEDICINE

## 2024-11-01 PROCEDURE — 84484 ASSAY OF TROPONIN QUANT: CPT | Performed by: EMERGENCY MEDICINE

## 2024-11-01 PROCEDURE — 71045 X-RAY EXAM CHEST 1 VIEW: CPT

## 2024-11-01 PROCEDURE — 93005 ELECTROCARDIOGRAM TRACING: CPT

## 2024-11-01 PROCEDURE — 99285 EMERGENCY DEPT VISIT HI MDM: CPT | Performed by: EMERGENCY MEDICINE

## 2024-11-01 PROCEDURE — 99285 EMERGENCY DEPT VISIT HI MDM: CPT

## 2024-11-01 PROCEDURE — 87804 INFLUENZA ASSAY W/OPTIC: CPT | Performed by: EMERGENCY MEDICINE

## 2024-11-01 PROCEDURE — 85025 COMPLETE CBC W/AUTO DIFF WBC: CPT | Performed by: EMERGENCY MEDICINE

## 2024-11-01 NOTE — ED NOTES
Pt was ambulated with steady gait and maintained 96% oxygen.      Julia Osullivan  11/01/24 [id number]

## 2024-11-01 NOTE — Clinical Note
Pretty Aguila was seen and treated in our emergency department on 11/1/2024.                Diagnosis: Covid -19    Pretty  may return to work on return date.    She may return on this date: 11/06/2024         If you have any questions or concerns, please don't hesitate to call.      Allen Ellis MD    ______________________________           _______________          _______________  Hospital Representative                              Date                                Time

## 2024-11-01 NOTE — TELEPHONE ENCOUNTER
"Pt stated Oncology Provider: Dr. Carranza    What is the reason for the call/chief complaint?    Patient calling in reporting that she is having anemia symptoms and is inquiring if she should go to the ER. I further assessed symptoms. Patient states she has extreme fatigue, with SOB on and off, none at the moment. She denies chest pain, fevers, cough, congestion, bleeding, being able to get up and get around, nausea/vomiting, constipation/diarrhea, dehydration.    An appointment was made for patient for later this afternoon to further discuss, but made patient aware her hemoglobin is 9.8- which would not currently qualify her for a blood transfusion and it does not sound like she needs to immediately go to ER, she is ok to proceed to appointment with hematology. I instructed her to go to ER is SOB returns and she feels as though she is weak. Patient verbalized understanding and has no additional questions or concerns at this moment.      Reason for Disposition   Patient wants to be seen    Answer Assessment - Initial Assessment Questions  1. DESCRIPTION: \"Describe how you are feeling.\"      Fatigue  2. SEVERITY: \"How bad is it?\"  \"Can you stand and walk?\"      Able to get up to stand/walk  3. ONSET: \"When did these symptoms begin?\" (e.g., hours, days, weeks, months)      Progressing the last few weeks  4. CAUSE: \"What do you think is causing the weakness or fatigue?\" (e.g., not drinking enough fluids, medical problem, trouble sleeping)      Anemia  5. NEW MEDICINES:  \"Have you started on any new medicines recently?\" (e.g., opioid pain medicines, benzodiazepines, muscle relaxants, antidepressants, antihistamines, neuroleptics, beta blockers)      Denies  6. OTHER SYMPTOMS: \"Do you have any other symptoms?\" (e.g., chest pain, fever, cough, SOB, vomiting, diarrhea, bleeding, other areas of pain)      Denies    Protocols used: Weakness (Generalized) and Fatigue-Adult-OH    "

## 2024-11-01 NOTE — ED PROVIDER NOTES
Pt Name: Pretty Aguila  MRN: [id number]  Birthdate 1950  Age/Sex: 74 y.o. female  Date of evaluation: 11/1/2024  PCP: Lauren Dumont MD    CHIEF COMPLAINT    Chief Complaint   Patient presents with    Shortness of Breath     Pt reports fatigue and episodes of SOB, worsening fatigue over last few weeks       FINAL IMPRESSION    Final diagnoses:   COVID-19   Shortness of breath   Fatigue   Pancytopenia (HCC)         DISPOSITION/PLAN    Presentation as above with shortness of breath in the setting of  more chronic fatigue.  Source of fatigue over the past 2 months is unclear at this time, patient noted to have declining cell lines but not critically anemic, with slightly worsening pancytopenia recently suspected to be virally mediated.  Suspect intermittent shortness of breath as well as worsening fatigue over the past 2 weeks secondary to COVID-19.  Do not suspect bacterial pneumonia, sepsis, meningitis, encephalitis, PE, ACS, aortic dissection, other acute life threat.  Workup in emergency department overall reassuring without evidence of acute life threat, felt stable for close outpatient follow-up with her cardiologist for nuclear stress test on Wednesday of this coming week as previously scheduled as well as with her oncologist.  Discharged with strict return precautions, follow-up with primary care doctor as well as those specialists.  Time reflects when diagnosis was documented in both MDM as applicable and the Disposition within this note       Time User Action Codes Description Comment    11/1/2024  6:52 PM Allen Ellis Add [U07.1] COVID-19     11/1/2024  6:52 PM Allen Ellis Add [R06.02] Shortness of breath     11/1/2024  6:52 PM Allen Ellis Add [R53.83] Fatigue     11/1/2024  6:52 PM Allen Ellis Add [D61.818] Pancytopenia (HCC)           ED Disposition       ED Disposition   Discharge    Condition   Stable    Date/Time   Fri Nov 1, 2024  6:52 PM    Comment   Pretty Aguila  discharge to home/self care.                   Follow-up Information       Follow up With Specialties Details Why Contact Info Additional Information     [facility] Emergency Medicine Go to  If symptoms worsen [address] [facility] [address]  [phone] [facility], [address] [facility], [place], [place], [address]    Lauren Dumont MD Family Medicine Call in 3 days To discuss this visit and schedule close follow-up [address] [facility]  [address]  [facility] [address]  [phone]       Geno Carranza MD Medical Oncology, Hematology and Oncology, Hematology, Oncology Call in 3 days To discuss this visit and schedule close follow-up [address] [facility] [address]  [phone]                   HPI    74 y.o. female presenting with fatigue as well as shortness of breath.  Patient states that she has been having generalized fatigue over the past 2 months, no clear provoking or palliating factors, is currently following with her oncologist based on her history of breast cancer, treated with mastectomy as well as anastrozole, no radiation or other chemotherapy per patient, this is concordant with the EMR.  She denies chest pain, nausea, vomiting, trauma, numbness, focal weakness, other symptoms.      Past Medical and Surgical History    Past Medical History:   Diagnosis Date    Allergic     Anxiety     Arthritis     Breast cancer (HCC) 09/2023    CAD (coronary artery disease)     Coronary artery disease [id number]    Depression     Diabetes mellitus (HCC) 5/1/24    Mother-Diabetic    Dyslipidemia     Hiatal hernia     Hyperlipidemia     Hypertension     Obesity     Obesity (BMI 30-39.9)     Psoriatic arthritis (HCC)     Rheumatoid arthritis (HCC)     Scoliosis     SOB (shortness of breath)        Past Surgical History:   Procedure Laterality Date    BARIATRIC SURGERY  2014    BILE DUCT EXPLORATION      replacement  per [facility]    BREAST BIOPSY  9/23    CARPAL TUNNEL RELEASE Bilateral 2002    CORONARY ARTERY BYPASS GRAFT  2017    FOOT SURGERY Right     bone surgery to straighten toe.    HIP SURGERY Left 2015    IR DRAINAGE TUBE PLACEMENT  12/19/2023    JOINT REPLACEMENT      LYMPH NODE BIOPSY Left 11/20/2023    Procedure: LEFT LYMPHATIC MAPPING WITH BLUE AND RADIOACTIVE DYES, SENTINEL LYMPH NODE BIOPSY;  Surgeon: Adrien Gabriel MD;  Location:  MAIN OR;  Service: Surgical Oncology    LYMPH NODE DISSECTION Left 11/20/2023    Procedure: POSSIBLE AXILLARY DISSECTION;  Surgeon: Adrien Gabriel MD;  Location:  MAIN OR;  Service: Surgical Oncology    AK BREAST REDUCTION Bilateral 01/16/2024    Procedure: RIGHT BREAST REDUCTION AND LEFT BREAST EXPANDER EXCHANGE FOR PERMANENT IMPLANT. REVISION OF RECONSTRUCTED LEFT BREAST.;  Surgeon: Molina Jimenez MD;  Location:  MAIN OR;  Service: Plastics    AK SUCTION ASSISTED LIPECTOMY TRUNK Bilateral 01/16/2024    Procedure: LIPOSUCTION BREAST;  Surgeon: Molina Jimenez MD;  Location:  MAIN OR;  Service: Plastics    AK TISSUE EXPANDER PLACEMENT BREAST RECONSTRUCTION Left 11/20/2023    Procedure: IMMEDIATE LEFT BREAST RECONSTRUCTION WITH TISSUE EXPANDER AND ADM;  Surgeon: Molina Jimenez MD;  Location:  MAIN OR;  Service: Plastics    REPLACEMENT TOTAL KNEE Right 04/2017    SIMPLE MASTECTOMY Left 11/20/2023    Procedure: LEFT BREAST VERENICE  DIRECTED MASTECTOMY;  Surgeon: Adrien Gabriel MD;  Location:  MAIN OR;  Service: Surgical Oncology    SLEEVE GASTROPLASTY      TONSILLECTOMY      TOTAL HIP ARTHROPLASTY Right 2017    US GUIDED BREAST BIOPSY LEFT COMPLETE Left 09/19/2023       Family History   Problem Relation Age of Onset    Hypertension Mother     Diabetes Mother     Heart disease Father         CABG x5    Arthritis Father     No Known Problems Sister     No Known Problems Sister     No Known Problems Daughter     No Known Problems Daughter     No Known Problems Son     Breast cancer Neg Hx         Social History     Tobacco Use    Smoking status: Former     Current packs/day: 0.00     Average packs/day: 0.5 packs/day for 7.0 years (3.5 ttl pk-yrs)     Types: Cigarettes     Start date: 2010     Quit date: 2017     Years since quittin.8    Smokeless tobacco: Never    Tobacco comments:     Have already quit smoking in 2017   Vaping Use    Vaping status: Never Used   Substance Use Topics    Alcohol use: Yes     Alcohol/week: 2.0 standard drinks of alcohol     Types: 2 Glasses of wine per week     Comment: social     Drug use: No           Allergies    Allergies   Allergen Reactions    Iodine - Food Allergy Anaphylaxis     Reaction Date: 2008;     Povidone Iodine Anaphylaxis    Shellfish-Derived Products - Food Allergy Anaphylaxis       Home Medications    Prior to Admission medications    Medication Sig Start Date End Date Taking? Authorizing Provider   anastrozole (ARIMIDEX) 1 mg tablet TAKE 1 TABLET (1 MG TOTAL) BY MOUTH DAILY 24   Geno Carranza MD   Apremilast 30 MG TABS Take 30 mg by mouth 2 (two) times a day  Patient not taking: Reported on 2024   Historical Provider, MD   atorvastatin (LIPITOR) 10 mg tablet TAKE 1 TABLET BY MOUTH DAILY 24   Cortney Conrad MD   buPROPion (WELLBUTRIN XL) 300 mg 24 hr tablet TAKE 1 TABLET (300 MG TOTAL) BY MOUTH EVERY MORNING 10/22/24 10/17/25  Lauren Dumont MD   cholecalciferol (VITAMIN D3) 400 units tablet Take 400 Units by mouth daily    Historical Provider, MD   citalopram (CeleXA) 20 mg tablet Take 1 tablet (20 mg total) by mouth daily 24   Lauren Dumont MD   furosemide (LASIX) 40 mg tablet TAKE 1 TABLET (40 MG TOTAL) BY MOUTH 2 (TWO) TIMES A DAY 24   Cortney Conrad MD   LORazepam (ATIVAN) 0.5 mg tablet TAKE 1 TABLET (0.5 MG TOTAL) BY MOUTH EVERY 8 (EIGHT) HOURS AS NEEDED FOR ANXIETY  Patient not taking: Reported on 2024 3/28/24   Lauren Dumont MD   metFORMIN (GLUCOPHAGE-XR) 500 mg 24 hr tablet Take 1  tablet (500 mg total) by mouth daily with dinner 8/9/24   Dagmar Jorgensen MD   metoprolol succinate (TOPROL-XL) 50 mg 24 hr tablet Take 1 tablet (50 mg total) by mouth daily Patient would like these sent to her via the mail. 8/7/24   Cortney Conrad MD   Multiple Vitamin (multivitamin) tablet Take 1 tablet by mouth daily  Patient not taking: Reported on 8/9/2024    Historical Provider, MD   naloxone (NARCAN) 4 mg/0.1 mL nasal spray Administer 1 spray into a nostril. If no response after 2-3 minutes, give another dose in the other nostril using a new spray. 1/14/24 1/13/25  Molina Jimenez MD   nystatin (MYCOSTATIN) cream Apply topically 2 (two) times a day  Patient not taking: Reported on 8/9/2024 3/22/23   JOCELYN Nunez   nystatin (MYCOSTATIN) powder Apply topically 2 (two) times a day 3/22/23   JOCELYN Nunez   predniSONE 1 mg tablet TAKE 1 TABLET (1 MG TOTAL) BY MOUTH 3 (THREE) TIMES A DAY. 3/20/24   Historical Provider, MD   Semaglutide-Weight Management (WEGOVY) 0.5 MG/0.5ML Inject 0.5 mL (0.5 mg total) under the skin once a week 9/12/24   Dagmar Jorgensen MD   Semaglutide-Weight Management (WEGOVY) 1 MG/0.5ML Inject 0.5 mL (1 mg total) under the skin once a week 10/11/24   Dagmar Jorgensen MD   Semaglutide-Weight Management (WEGOVY) 1.7 MG/0.75ML Inject 0.75 mL (1.7 mg total) under the skin once a week 10/16/24   Dagmar Jorgensen MD   traMADol (ULTRAM-ER) 100 mg 24 hr tablet Take 100 mg by mouth daily 3/12/24   Historical Provider, MD   traMADol (ULTRAM-ER) 200 MG 24 hr tablet Take 200 mg by mouth daily 10/1/24   Historical Provider, MD           Review of Systems    Review of Systems   Constitutional:  Positive for fatigue. Negative for activity change, chills and fever.   HENT:  Negative for drooling and facial swelling.    Eyes:  Negative for pain, discharge and visual disturbance.   Respiratory:  Positive for shortness of breath. Negative for apnea, cough, chest  tightness and wheezing.    Cardiovascular:  Negative for chest pain and leg swelling.   Genitourinary:  Negative for difficulty urinating, dysuria and urgency.   Musculoskeletal:  Negative for arthralgias, back pain and gait problem.   Skin:  Negative for color change and rash.   Neurological:  Negative for dizziness, speech difficulty, weakness and headaches.   Psychiatric/Behavioral:  Negative for agitation, behavioral problems and confusion.            All other systems reviewed and negative.    Physical Exam      ED Triage Vitals [11/01/24 1433]   Temperature Pulse Respirations Blood Pressure SpO2   98 °F (36.7 °C) 90 16 115/67 97 %      Temp Source Heart Rate Source Patient Position - Orthostatic VS BP Location FiO2 (%)   Oral Monitor Sitting Right arm --      Pain Score       No Pain               Physical Exam  Vitals and nursing note reviewed.   Constitutional:       General: She is not in acute distress.     Appearance: She is well-developed. She is not ill-appearing, toxic-appearing or diaphoretic.   HENT:      Head: Normocephalic and atraumatic.      Right Ear: External ear normal.      Left Ear: External ear normal.      Nose: Nose normal. No congestion or rhinorrhea.      Mouth/Throat:      Mouth: Mucous membranes are moist.      Pharynx: Oropharynx is clear. No oropharyngeal exudate or posterior oropharyngeal erythema.   Eyes:      Conjunctiva/sclera: Conjunctivae normal.      Pupils: Pupils are equal, round, and reactive to light.   Cardiovascular:      Rate and Rhythm: Normal rate and regular rhythm.      Pulses: Normal pulses.      Heart sounds: Normal heart sounds. No murmur heard.     No friction rub. No gallop.   Pulmonary:      Effort: Pulmonary effort is normal. No respiratory distress.      Breath sounds: Normal breath sounds. No wheezing or rales.   Abdominal:      General: There is no distension.      Palpations: Abdomen is soft.      Tenderness: There is no abdominal tenderness. There is no  guarding or rebound.   Musculoskeletal:         General: No deformity. Normal range of motion.      Cervical back: Normal range of motion and neck supple. No rigidity or tenderness.      Right lower leg: No edema.      Left lower leg: No edema.   Skin:     General: Skin is warm and dry.      Capillary Refill: Capillary refill takes less than 2 seconds.      Findings: No erythema or rash.   Neurological:      Mental Status: She is alert and oriented to person, place, and time.      Cranial Nerves: No cranial nerve deficit.      Sensory: No sensory deficit.      Motor: No weakness.      Gait: Gait normal.   Psychiatric:         Behavior: Behavior normal.         Thought Content: Thought content normal.         Judgment: Judgment normal.              Diagnostic Results    EKG Interpretation    Rate:  74  BPM  Rhythm:  Normal Sinus Rhythm   Axis:  Normal   Intervals: First-degree AV block with AK of 210ms QTc  457 ms  Q waves:  No pathologic Q waves   T waves:  Normal   ST segments:  No significant elevations or depressions     Impression:  Normal sinus rhythm  with first-degree AV block but  without evidence of acute ischemia or significant arrhythmia      EKG for comparison: EKG dated 1 November 2020 for similar in character no major changes    EKG interpreted by me.     Labs:    Results Reviewed       Procedure Component Value Units Date/Time    HS Troponin I 2hr [[id number]]  (Normal) Collected: 11/01/24 1807    Lab Status: Final result Specimen: Blood from Arm, Left Updated: 11/01/24 1846     hs TnI 2hr 5 ng/L      Delta 2hr hsTnI 1 ng/L     B-Type Natriuretic Peptide(BNP) [[id number]]  (Abnormal) Collected: 11/01/24 1438    Lab Status: Final result Specimen: Blood from Arm, Left Updated: 11/01/24 1706      pg/mL     TSH [[id number]]  (Normal) Collected: 11/01/24 1438    Lab Status: Final result Specimen: Blood from Arm, Left Updated: 11/01/24 1702     TSH 3RD GENERATON 1.800 uIU/mL     FLU/COVID Rapid Antigen  (30 min. TAT) - Preferred screening test in ED [[id number]]  (Abnormal) Collected: 11/01/24 1607    Lab Status: Final result Specimen: Nares from Nose Updated: 11/01/24 1632     SARS COV Rapid Antigen Positive     Influenza A Rapid Antigen Negative     Influenza B Rapid Antigen Negative    Narrative:      This test has been performed using the [employer] Mariana 2 FLU+SARS Antigen test under the Emergency Use Authorization (EUA). This test has been validated by the  and verified by the performing laboratory. The [place] uses lateral flow immunofluorescent sandwich assay to detect SARS-COV, Influenza A and Influenza B Antigen.     The [employer] Mariana 2 SARS Antigen test does not differentiate between SARS-CoV and SARS-CoV-2.     Negative results are presumptive and may be confirmed with a molecular assay, if necessary, for patient management. Negative results do not rule out SARS-CoV-2 or influenza infection and should not be used as the sole basis for treatment or patient management decisions. A negative test result may occur if the level of antigen in a sample is below the limit of detection of this test.     Positive results are indicative of the presence of viral antigens, but do not rule out bacterial infection or co-infection with other viruses.     All test results should be used as an adjunct to clinical observations and other information available to the provider.    FOR PEDIATRIC PATIENTS - copy/paste COVID Guidelines URL to browser: [email or web address]    Mononucleosis screen [[id number]] Collected: 11/01/24 1607    Lab Status: In process Specimen: Blood from Arm, Left Updated: 11/01/24 1611    CBC and differential [[id number]]  (Abnormal) Collected: 11/01/24 1438    Lab Status: Final result Specimen: Blood from Arm, Left Updated: 11/01/24 1524     WBC 3.90 Thousand/uL      RBC 2.67 Million/uL      Hemoglobin 9.5 g/dL      Hematocrit 28.9 %        fL      MCH 35.6 pg      MCHC 32.9 g/dL      RDW 13.8 %      MPV 11.2 fL      Platelets 96 Thousands/uL      nRBC 0 /100 WBCs      Segmented % 49 %      Immature Grans % 0 %      Lymphocytes % 41 %      Monocytes % 7 %      Eosinophils Relative 3 %      Basophils Relative 0 %      Absolute Neutrophils 1.89 Thousands/µL      Absolute Immature Grans 0.01 Thousand/uL      Absolute Lymphocytes 1.61 Thousands/µL      Absolute Monocytes 0.28 Thousand/µL      Eosinophils Absolute 0.10 Thousand/µL      Basophils Absolute 0.01 Thousands/µL     Narrative:      This is an appended report.  These results have been appended to a previously verified report.    Smear Review(Phlebs Do Not Order) [[id number]]  (Abnormal) Collected: 11/01/24 1438    Lab Status: Final result Specimen: Blood from Arm, Left Updated: 11/01/24 1524     Toxic Granulation Present     RBC Morphology Present     Platelet Estimate Borderline     Anisocytosis Present     Macrocytes Present    HS Troponin 0hr (reflex protocol) [[id number]]  (Normal) Collected: 11/01/24 1438    Lab Status: Final result Specimen: Blood from Arm, Left Updated: 11/01/24 1510     hs TnI 0hr 4 ng/L     Comprehensive metabolic panel [[id number]]  (Abnormal) Collected: 11/01/24 1438    Lab Status: Final result Specimen: Blood from Arm, Left Updated: 11/01/24 1505     Sodium 139 mmol/L      Potassium 3.7 mmol/L      Chloride 107 mmol/L      CO2 29 mmol/L      ANION GAP 3 mmol/L      BUN 21 mg/dL      Creatinine 1.08 mg/dL      Glucose 142 mg/dL      Calcium 9.2 mg/dL      AST 21 U/L      ALT 18 U/L      Alkaline Phosphatase 58 U/L      Total Protein 7.4 g/dL      Albumin 3.9 g/dL      Total Bilirubin 0.52 mg/dL      eGFR 51 ml/min/1.73sq m     Narrative:      National Kidney Disease Foundation guidelines for Chronic Kidney Disease (CKD):     Stage 1 with normal or high GFR (GFR > 90 mL/min/1.73 square meters)    Stage 2 Mild CKD (GFR = 60-89 mL/min/1.73 square meters)    Stage 3A Moderate  CKD (GFR = 45-59 mL/min/1.73 square meters)    Stage 3B Moderate CKD (GFR = 30-44 mL/min/1.73 square meters)    Stage 4 Severe CKD (GFR = 15-29 mL/min/1.73 square meters)    Stage 5 End Stage CKD (GFR <15 mL/min/1.73 square meters)  Note: GFR calculation is accurate only with a steady state creatinine            All labs reviewed and utilized in the medical decision making process    Radiology:    CT abdomen pelvis wo contrast   Final Result      No acute abnormality identified in the lower chest, abdomen or pelvis.      Postsurgical changes status post sleeve gastrectomy with moderate hiatal hernia   .   Small amount of pneumobilia in setting of likely prior biliary intervention.   An unremarkable gallbladder is present despite what appears to be cholecystectomy clips.   No associated inflammatory change.   Correlate with surgical history.      If symptoms localize to the right upper quadrant, a right upper quadrant ultrasound versus postcontrast CT could be considered.      The study was marked in EPIC for immediate notification.      Workstation performed: [id number]         XR chest 1 view portable    (Results Pending)       All radiology studies independently viewed by me and interpreted by the radiologist.    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  Discussed CT results with the radiologist, he felt findings consistent with that reported surgical history.  Patient did well with ambulatory pulse ox without significant desaturation or concerning symptoms.      Medications - No data to display        PATIENT REFERRED TO:     [facility] Emergency Department  [address] [facility] [id number] [phone]  Go to   If symptoms worsen    Lauren Dumont  MD  [address] [facility]  [address]  [place] [address]  [phone]    Call in 3 days  To discuss this visit and schedule close follow-up    Geno Carranza MD  [address] [facility] [address]  [phone]    Call in 3 days  To discuss this visit and schedule close follow-up      DISCHARGE MEDICATIONS:    Discharge Medication List as of 11/1/2024  6:55 PM        CONTINUE these medications which have NOT CHANGED    Details   anastrozole (ARIMIDEX) 1 mg tablet TAKE 1 TABLET (1 MG TOTAL) BY MOUTH DAILY, Starting Thu 6/27/2024, Normal      Apremilast 30 MG TABS Take 30 mg by mouth 2 (two) times a day, Starting Wed 1/11/2023, Historical Med      atorvastatin (LIPITOR) 10 mg tablet TAKE 1 TABLET BY MOUTH DAILY, Starting Mon 8/26/2024, Normal      buPROPion (WELLBUTRIN XL) 300 mg 24 hr tablet TAKE 1 TABLET (300 MG TOTAL) BY MOUTH EVERY MORNING, Starting Tue 10/22/2024, Until Fri 10/17/2025, Normal      cholecalciferol (VITAMIN D3) 400 units tablet Take 400 Units by mouth daily, Historical Med      citalopram (CeleXA) 20 mg tablet Take 1 tablet (20 mg total) by mouth daily, Starting Tue 8/6/2024, Normal      furosemide (LASIX) 40 mg tablet TAKE 1 TABLET (40 MG TOTAL) BY MOUTH 2 (TWO) TIMES A DAY, Normal      LORazepam (ATIVAN) 0.5 mg tablet TAKE 1 TABLET (0.5 MG TOTAL) BY MOUTH EVERY 8 (EIGHT) HOURS AS NEEDED FOR ANXIETY, Starting Thu 3/28/2024, Normal      metFORMIN (GLUCOPHAGE-XR) 500 mg 24 hr tablet Take 1 tablet (500 mg total) by mouth daily with dinner, Starting Fri 8/9/2024, Normal      metoprolol succinate (TOPROL-XL) 50 mg 24 hr tablet Take 1 tablet (50 mg total) by mouth daily Patient would like these sent to her via the mail., Starting Wed 8/7/2024, Normal      Multiple Vitamin (multivitamin) tablet Take 1 tablet by mouth daily, Historical Med      naloxone (NARCAN) 4 mg/0.1 mL nasal spray Administer 1 spray into a nostril. If no response after 2-3 minutes, give another dose in the other nostril using a new  "spray., Normal      nystatin (MYCOSTATIN) cream Apply topically 2 (two) times a day, Starting Wed 3/22/2023, Normal      nystatin (MYCOSTATIN) powder Apply topically 2 (two) times a day, Starting Wed 3/22/2023, Normal      predniSONE 1 mg tablet TAKE 1 TABLET (1 MG TOTAL) BY MOUTH 3 (THREE) TIMES A DAY., Historical Med      Semaglutide-Weight Management (WEGOVY) 0.5 MG/0.5ML Inject 0.5 mL (0.5 mg total) under the skin once a week, Starting Thu 9/12/2024, Normal      Semaglutide-Weight Management (WEGOVY) 1 MG/0.5ML Inject 0.5 mL (1 mg total) under the skin once a week, Starting Fri 10/11/2024, Normal      Semaglutide-Weight Management (WEGOVY) 1.7 MG/0.75ML Inject 0.75 mL (1.7 mg total) under the skin once a week, Starting Wed 10/16/2024, Normal      traMADol (ULTRAM-ER) 100 mg 24 hr tablet Take 100 mg by mouth daily, Starting Tue 3/12/2024, Historical Med      traMADol (ULTRAM-ER) 200 MG 24 hr tablet Take 200 mg by mouth daily, Starting Tue 10/1/2024, Historical Med             No discharge procedures on file.         Allen Ellis MD    Portions of the record may have been created with voice recognition software.  Occasional wrong word or \"sound alike\" substitutions may have occurred due to the inherent limitations of voice recognition software.  Please read the chart carefully and recognize, using context, where substitutions have occurred     Allen Ellis MD  11/02/24 0006    "

## 2024-11-02 LAB — HETEROPH AB SER QL: NEGATIVE

## 2024-11-04 LAB
ATRIAL RATE: 73 BPM
ATRIAL RATE: 89 BPM
P AXIS: 41 DEGREES
P AXIS: 53 DEGREES
PR INTERVAL: 186 MS
PR INTERVAL: 214 MS
QRS AXIS: 3 DEGREES
QRS AXIS: 55 DEGREES
QRSD INTERVAL: 104 MS
QRSD INTERVAL: 106 MS
QT INTERVAL: 402 MS
QT INTERVAL: 432 MS
QTC INTERVAL: 475 MS
QTC INTERVAL: 489 MS
T WAVE AXIS: 79 DEGREES
T WAVE AXIS: 87 DEGREES
VENTRICULAR RATE: 73 BPM
VENTRICULAR RATE: 89 BPM

## 2024-11-04 PROCEDURE — 93010 ELECTROCARDIOGRAM REPORT: CPT | Performed by: INTERNAL MEDICINE

## 2024-11-07 ENCOUNTER — TELEPHONE (OUTPATIENT)
Age: 74
End: 2024-11-07

## 2024-11-07 NOTE — TELEPHONE ENCOUNTER
Call received from patient. Stating she was in the ED for intermittent SOB and fatigue due to being anemic. She has an appointment with Dr Carranza on Tuesday 11/12 to discuss this. Questioning if she has any sooner appointments available at any other location Dr Carranza travels to, due to wanting to see her for the symptoms she is having. She would be okay with traveling to the [facility] location for an appointment. Requesting call back if this is possible.

## 2024-11-08 ENCOUNTER — OFFICE VISIT (OUTPATIENT)
Age: 74
End: 2024-11-08
Payer: COMMERCIAL

## 2024-11-08 ENCOUNTER — APPOINTMENT (OUTPATIENT)
Dept: LAB | Facility: CLINIC | Age: 74
End: 2024-11-08
Payer: COMMERCIAL

## 2024-11-08 VITALS
SYSTOLIC BLOOD PRESSURE: 130 MMHG | RESPIRATION RATE: 16 BRPM | HEART RATE: 80 BPM | WEIGHT: 193 LBS | BODY MASS INDEX: 32.95 KG/M2 | HEIGHT: 64 IN | DIASTOLIC BLOOD PRESSURE: 77 MMHG | OXYGEN SATURATION: 95 % | TEMPERATURE: 97.9 F

## 2024-11-08 DIAGNOSIS — D64.9 ANEMIA, UNSPECIFIED TYPE: ICD-10-CM

## 2024-11-08 DIAGNOSIS — D64.9 ANEMIA, UNSPECIFIED TYPE: Primary | ICD-10-CM

## 2024-11-08 DIAGNOSIS — R53.83 OTHER FATIGUE: ICD-10-CM

## 2024-11-08 DIAGNOSIS — Z17.0 MALIGNANT NEOPLASM OF CENTRAL PORTION OF LEFT BREAST IN FEMALE, ESTROGEN RECEPTOR POSITIVE (HCC): ICD-10-CM

## 2024-11-08 DIAGNOSIS — C50.112 MALIGNANT NEOPLASM OF CENTRAL PORTION OF LEFT BREAST IN FEMALE, ESTROGEN RECEPTOR POSITIVE (HCC): ICD-10-CM

## 2024-11-08 DIAGNOSIS — D69.6 PLATELETS DECREASED (HCC): ICD-10-CM

## 2024-11-08 LAB
ALBUMIN SERPL BCG-MCNC: 4.2 G/DL (ref 3.5–5)
ALP SERPL-CCNC: 63 U/L (ref 34–104)
ALT SERPL W P-5'-P-CCNC: 25 U/L (ref 7–52)
ANION GAP SERPL CALCULATED.3IONS-SCNC: 6 MMOL/L (ref 4–13)
ANISOCYTOSIS BLD QL SMEAR: PRESENT
AST SERPL W P-5'-P-CCNC: 23 U/L (ref 13–39)
BASOPHILS # BLD AUTO: 0.01 THOUSANDS/ÂΜL (ref 0–0.1)
BASOPHILS NFR BLD AUTO: 0 % (ref 0–1)
BILIRUB SERPL-MCNC: 0.54 MG/DL (ref 0.2–1)
BUN SERPL-MCNC: 26 MG/DL (ref 5–25)
CALCIUM SERPL-MCNC: 9.8 MG/DL (ref 8.4–10.2)
CHLORIDE SERPL-SCNC: 105 MMOL/L (ref 96–108)
CO2 SERPL-SCNC: 30 MMOL/L (ref 21–32)
CREAT SERPL-MCNC: 1.13 MG/DL (ref 0.6–1.3)
EOSINOPHIL # BLD AUTO: 0.13 THOUSAND/ÂΜL (ref 0–0.61)
EOSINOPHIL NFR BLD AUTO: 3 % (ref 0–6)
ERYTHROCYTE [DISTWIDTH] IN BLOOD BY AUTOMATED COUNT: 13.5 % (ref 11.6–15.1)
ERYTHROCYTE [SEDIMENTATION RATE] IN BLOOD: 37 MM/HOUR (ref 0–29)
GFR SERPL CREATININE-BSD FRML MDRD: 48 ML/MIN/1.73SQ M
GLUCOSE SERPL-MCNC: 117 MG/DL (ref 65–140)
HCT VFR BLD AUTO: 30.2 % (ref 34.8–46.1)
HGB BLD-MCNC: 10 G/DL (ref 11.5–15.4)
IMM GRANULOCYTES # BLD AUTO: 0.01 THOUSAND/UL (ref 0–0.2)
IMM GRANULOCYTES NFR BLD AUTO: 0 % (ref 0–2)
LDH SERPL-CCNC: 193 U/L (ref 140–271)
LYMPHOCYTES # BLD AUTO: 2.19 THOUSANDS/ÂΜL (ref 0.6–4.47)
LYMPHOCYTES NFR BLD AUTO: 48 % (ref 14–44)
MCH RBC QN AUTO: 35.7 PG (ref 26.8–34.3)
MCHC RBC AUTO-ENTMCNC: 33.1 G/DL (ref 31.4–37.4)
MCV RBC AUTO: 108 FL (ref 82–98)
MONOCYTES # BLD AUTO: 0.34 THOUSAND/ÂΜL (ref 0.17–1.22)
MONOCYTES NFR BLD AUTO: 8 % (ref 4–12)
NEUTROPHILS # BLD AUTO: 1.84 THOUSANDS/ÂΜL (ref 1.85–7.62)
NEUTS SEG NFR BLD AUTO: 41 % (ref 43–75)
NRBC BLD AUTO-RTO: 0 /100 WBCS
PLATELET # BLD AUTO: 99 THOUSANDS/UL (ref 149–390)
PLATELET BLD QL SMEAR: ABNORMAL
PMV BLD AUTO: 11.1 FL (ref 8.9–12.7)
POTASSIUM SERPL-SCNC: 3.8 MMOL/L (ref 3.5–5.3)
PROT SERPL-MCNC: 8 G/DL (ref 6.4–8.4)
RBC # BLD AUTO: 2.8 MILLION/UL (ref 3.81–5.12)
RBC MORPH BLD: PRESENT
RETICS # AUTO: NORMAL 10*3/UL (ref 14097–95744)
RETICS # CALC: 0.98 % (ref 0.37–1.87)
SODIUM SERPL-SCNC: 141 MMOL/L (ref 135–147)
WBC # BLD AUTO: 4.52 THOUSAND/UL (ref 4.31–10.16)

## 2024-11-08 PROCEDURE — 83010 ASSAY OF HAPTOGLOBIN QUANT: CPT

## 2024-11-08 PROCEDURE — 88185 FLOWCYTOMETRY/TC ADD-ON: CPT

## 2024-11-08 PROCEDURE — 85025 COMPLETE CBC W/AUTO DIFF WBC: CPT

## 2024-11-08 PROCEDURE — 99215 OFFICE O/P EST HI 40 MIN: CPT | Performed by: INTERNAL MEDICINE

## 2024-11-08 PROCEDURE — 88184 FLOWCYTOMETRY/ TC 1 MARKER: CPT

## 2024-11-08 PROCEDURE — 85652 RBC SED RATE AUTOMATED: CPT

## 2024-11-08 PROCEDURE — 83615 LACTATE (LD) (LDH) ENZYME: CPT

## 2024-11-08 PROCEDURE — 85045 AUTOMATED RETICULOCYTE COUNT: CPT

## 2024-11-08 PROCEDURE — 80053 COMPREHEN METABOLIC PANEL: CPT

## 2024-11-08 PROCEDURE — 36415 COLL VENOUS BLD VENIPUNCTURE: CPT

## 2024-11-08 NOTE — PROGRESS NOTES
HEMATOLOGY / ONCOLOGY CLINIC FOLLOW UP NOTE    Patient Pretty Aguila  MRN: [id number]  : 1950  Date of Encounter 2024         Referring Provider:       Reason for Encounter: follow up     Stage IIA breast    Pancytopenia           Oncology History   Malignant neoplasm of lower-outer quadrant of left breast of female, estrogen receptor positive (HCC)           Oncology History   Malignant neoplasm of lower-outer quadrant of left breast of female, estrogen receptor positive (HCC)   2023 Biopsy    Left breast ultrasound-guided biopsy  4 o'clock, 2 cm from nipple (VERENICE)  Invasive breast carcinoma of no special type (ductal NST)  Grade 2  ER 90, PA 90, HER2 1+\  Lymphovascular invasion not definitively identified    Concordant. Malignancy appears unifocal; however, oval circumscribed mass in the upper outer left breast is not accounted for. Bilateral breast MRI is recommended. Biopsy-proven carcinoma measured 3.1 cm on US. Left axilla US negative. Right breast clear.     2023 Observation    Bilateral breast MRI: Unifocal carcinoma in the lower outer left breast with possible skin involvement. There are no suspicious enhancing masses or suspicious areas of non-mass enhancement in right breast. There is no axillary or internal mammary adenopathy.     10/17/2023 Genetic Testing    A total of 47 genes were evaluated, including: HUYEN, BRCA1, BRCA2, CDH1, CHEK2, PALB2, PTEN, STK11, TP53  Negative result. No pathogenic sequence variants identified  [facility]     2023 Surgery    Left VERENICE  directed mastectomy with SLN biopsy   Invasive carcinoma of no special type (ductal)  Grade 2  3.6 cm   Margins negative  0/1 Lymph node  Anatomic Stage IIA  Prognostic Stage IA    Immediate reconstruction with tissue expander and ADM (Dr. Jimenez)     2023 -  Cancer Staged    Staging form: Breast, AJCC 8th Edition  - Pathologic stage from 2023: Stage IA (pT2, pN0(sn), cM0, G2, ER+, PA+, HER2-) - Signed  by Adrien Gabriel MD on 12/6/2023  Stage prefix: Initial diagnosis  Method of lymph node assessment: [place] lymph node biopsy  Multigene prognostic tests performed: None  Histologic grading system: 3 grade system           Assessment / Plan:     Oncotype Score 12/29/2023  9        Assessment / Plan:     1.  Stage IIA (pT2 pN0(sn) cM0) left breast invasive ductal carcinoma.  Grade 2.  ER positive (%), [place] positive (%), HER2 negative (score 1+) by IHC.  Diagnosed November 2023.  2.  BRCA negative   3.  Adjuvant anastrozole OncoDx score 9       Breast Cancer        Pretty Aguila is 74-year-old postmenopausal female who initially noted a mass in the lower outer quadrant of her left breast.  She underwent diagnostic mammogram and ultrasound which demonstrated a unifocal lesion at the 4 o'clock position 2 cm from the nipple.  This was biopsied and shown to be invasive ductal carcinoma, grade 2, ER 90%, NY 90%, HER2/shannan negative (1+).  She underwent an MRI which demonstrated unifocal mass measuring approximately 4.5 cm with questionable slight skin involvement and no adenopathy.  Her axillary ultrasound demonstrated no adenopathy. She underwent left mastectomy with SLN biopsy.  Final pathology was consistent with an invasive carcinoma, negative margins, 0/1 lymph node, grade 2.  She underwent immediate left breast reconstruction with submuscular tissue expander with acellular dermal matrix on 11/20/2023.  Her final stage is IIA (pT2 pN0 cM0) left breast invasive ductal carcinoma, grade 2, ER positive, NY positive HER2 negative.       Patient with favorable risk breast cancer.  Oncotype DX recurrence score  9 so due to size of tumor to assess candidacy of chemotherapy.  We discussed adjuvant endocrine therapy with anastrozole if Oncotype DX recurrence score is low.  She will continue to follow with breast surgery.  She will start treatment with anastrozole after her Oncotype resulted and follow-up in 3 months.     We  reviewed the side effects of aromatase inhibitors including, but not limited to hot flashes, vaginal dryness, mood swings, weight gain, difficulty sleeping, decreased sexual interest, arthralgias/myalgias and worsening of osteopenia/osteoporosis.  She will obtain a baseline DEXA scan.  I recommended she take calcium and vitamin D on a regular basis.       4/23/2024     Patient tolerating Anastrozole; started 12/9/2023     Last labs were in Jan 2024 11/8/2024    Tolerating anastrozole; on adjuvant therapy almost one year        Anemia/Leukocytosis      Labs from 1/17/2024 with WBC 15.1 Hgb 11.4 plts 228, no diff done     Has no evidence bleeding, destruction RBCs; concern with elevated WBC as well     Will do heme workup repeat CBC with diff, retic, iron panel/B12/folate, SPEP although MCV normal.       WBC may be related to underlying fibromyalgia      Anemia may be AOCD but will rule out    11/8/2024    Recent ER visit 11/1/2024 due to increased fatigue/SOB/CAMPOS    Found to be COVID positive without symptoms    Labs 10/26/2024 and 11/1/2024 as follows    WBC 3.98 Hgb 9.8 plts 109  ANC 1820     WBC 3.9 Hgb 9.5  plts 96 ANC 1890     Today repeated WBC 4.52 Hgb 10  plts 99 ANC 1840    Other labs done 10/26/2024     Had SPEP no M spike    FLC not done    Total iron 92 ferritin 119 % sat 27 folate 18 B12 733 zinc 68     Flow cytometry pending    May all be COVID related although anemic past visit    Will continue to follow          Psoriatric arthritis/osteoarthritis/fibromyalgia   Management per PCP        Follow up      Will call with lab results      3 weeks               HPI:  Dr Williamson 12/2023      Pretty Aguila is 73-year-old postmenopausal female who initially noted a mass in the lower outer quadrant of her left breast.  She underwent diagnostic mammogram and ultrasound which demonstrated a unifocal lesion at the 4 o'clock position 2 cm from the nipple.  This was biopsied and shown to be  invasive ductal carcinoma, grade 2, ER 90%, NJ 90%, HER2/shannan negative (1+).  She underwent an MRI which demonstrated unifocal mass measuring approximately 4.5 cm with questionable slight skin involvement and no adenopathy.  Her axillary ultrasound demonstrated no adenopathy.  She was evaluated by Dr. Gabriel from breast surgery.  She underwent left mastectomy with SLN biopsy.  Final pathology was consistent with an invasive carcinoma, negative margins, 0/1 lymph node, grade 2.  She underwent immediate left breast reconstruction with submuscular tissue expander with acellular dermal matrix on 11/20/2023.  Patient presents for initial oncology evaluation.     She is following with plastic surgery for seroma after breast reconstruction with tissue expander.  She has been referred to IR for aspiration and drain placement and possible culture of fluid. She notes chronic back pain due to spinal canal stenosis and is to see orthopedics.      She has no family history of breast cancer.  Menarche: 17 year old   Menopause: early 60's   Age at first pregnancy: 1974  OCPs: denies  HRT: denies        Interval History: 4/23/2024     Ms Aguila is doing well on anastrozole in the adjuvant setting.  She initially had some hot flashes which resolved as did mood swings.  She has gained weight, is 199 pounds and is attempting to loose weight.  She has some fatigue but minimal.       She had a Dexa scan 12/2023 with normal bone density     She will have a right mammogram in Dec 2023           Interval History:   11/8/2024    Recently seen in ER for exhaustion, SOB/CAMPOS,   Hgb 9.5 plts 96 WBC 3.9 with normal diff.  May be COVID related.  Will do flow cytometry.  No evidence of hemolysis but will run tests   B12/folate/iron normal.  SPEP negative.  Source unclear.        REVIEW OF SYSTEMS: as above   Please note that a 14-point review of systems was performed to include Constitutional, HEENT, Respiratory, CVS, GI, , Musculoskeletal,  Integumentary, Neurologic, Rheumatologic, Endocrinologic, Psychiatric, Lymphatic, and Hematologic/Oncologic systems were reviewed and are negative unless otherwise stated in HPI. Positive and negative findings pertinent to this evaluation are incorporated into the history of present illness.      ECOG PS: 1     Primary  Oncologic Diagnosis:  1.  Stage IIA (pT2 pN0(sn) cM0) left breast invasive ductal carcinoma.  Grade 2.  ER positive (%), HI positive (%), HER2 negative (score 1+) by IHC.  Diagnosed November 2023.  2.  BRCA negative       Previous Hematologic/ Oncologic Treatment:   S/p left mastectomy with SLN biopsy     Current Hematologic/ Oncologic Treatment:    Plan endocrine therapy with anastrozole.     Disease Status:      Test Results:  Pathology:  9/19/2023: Left breast ultrasound-guided biopsy  4 o'clock, 2 cm from nipple (VERENICE)  Invasive breast carcinoma of no special type (ductal NST)  Grade 2  ER 90, HI 90, HER2 1+\  Lymphovascular invasion not definitively identified     11/20/2023: Left VERENICE  directed mastectomy with SLN biopsy   Invasive carcinoma of no special type (ductal)  Grade 2  3.6 cm   Margins negative  0/1 Lymph node  Anatomic Stage IIA  Prognostic Stage IA     Radiology:  8/30/2023: Left diagnostic mammogram:  LEFT  A) MASS  Mammo diagnostic bilateral w 3d & cad: There is an irregularly shaped mass with spiculated margins seen in the lower inner quadrant of the left breast in the middle depth. The mass correlates with the palpable mass reported by the patient.   Oval circumscribed mass in the upper outer left breast, posterior third is noted.  US breast left limited (diagnostic): There is a 27 mm x 23 mm x 31 mm irregularly shaped, heterogeneous mass with indistinct margins seen in the left breast at 4 o'clock, 2 cm from the nipple. The mass correlates with the palpable mass reported by the patient.  No left axillary adenopathy is identified.  Note correlate for the oval  circumscribed mass in the upper outer left breast is identified.  Right  Mammo diagnostic bilateral w 3d & cad  There are no suspicious masses, grouped microcalcifications or areas of unexplained architectural distortion. The skin and nipple areolar complex are unremarkable.      9/28/2023: MRI breast bilateral:  LEFT  MASS [A]: There is a 4.5 cm x 3 cm x 2.3 cm irregularly shaped mass with irregular margins seen in the lower inner quadrant of the left breast at 4 o'clock in the middle depth, 2 cm from the nipple.  This is the biopsy-proven carcinoma.  Enhancement abuts the skin surface.  There is skin thickening in this region.  See screening captures.   Oval circumscribed mass in the upper outer breast demonstrates increased T2 signal intensity and plateau kinetics.  This likely represents a benign entity.   RIGHT  There are no suspicious enhancing masses or suspicious areas of non-mass enhancement. There is no axillary or internal mammary adenopathy.     11/15/2023: CXR: Clear lungs.        Dexa scan  12/20/2023        1. Normal bone density.     2.  The future fracture risk, as calculated by the [employer]/WHO fracture risk assessment tool (FRAX), cannot be determined since FRAX requires valid hip BMD measurements.     3.  The current NOF guidelines recommend treating patients with a T-score of -2.5 or less in the lumbar spine or hips, or in post-menopausal women and men over the age of 50 with low bone mass (osteopenia) and a FRAX 10 year risk score of >3% for hip   fracture and/or >20% for major osteoporotic fracture.     4.  The NOF recommends follow-up DXA in 1-2 years after initiating therapy for osteoporosis and every 2 years thereafter. More frequent evaluation is appropriate for patients with conditions associated with rapid bone loss, such as glucocorticoid   therapy. The interval between DXA screenings may be longer for individuals without major risk factors and initial T-score in the  normal or upper low bone mass range.                    PROBLEM LIST:  Patient Active Problem List   Diagnosis    Coronary artery disease involving native coronary artery of native heart without angina pectoris    Essential hypertension    Dyslipidemia    S/P CABG x 3    Dyspnea on exertion    Vitamin D deficiency    Encounter for surgical aftercare following surgery of digestive system    Postsurgical malabsorption    Status post bariatric surgery    At risk for sleep apnea    Chronic bilateral low back pain without sciatica    Candida infection of flexural skin    Postmenopausal    Annual physical exam    Rheumatoid arthritis of multiple sites with negative rheumatoid factor (HCC)    Other fatigue    Malignant neoplasm of lower-outer quadrant of left breast of female, estrogen receptor positive (HCC)    Moderate episode of recurrent major depressive disorder (HCC)    Anxiety and depression    Restless leg syndrome    Simple chronic bronchitis (HCC)    Continuous opioid dependence (HCC)    Prediabetes    CKD (chronic kidney disease)    Psoriatic arthritis (HCC)    Osteoarthritis of multiple joints    Loose stools    Class 2 obesity due to excess calories with body mass index (BMI) of 36.0 to 36.9 in adult       Past Medical History:   has a past medical history of Allergic, Anxiety, Arthritis, Breast cancer (HCC) (09/2023), CAD (coronary artery disease), Coronary artery disease (2017111), Depression, Diabetes mellitus (HCC) (5/1/24), Dyslipidemia, Hiatal hernia, Hyperlipidemia, Hypertension, Obesity, Obesity (BMI 30-39.9), Psoriatic arthritis (HCC), Rheumatoid arthritis (HCC), Scoliosis, and SOB (shortness of breath).    PAST SURGICAL HISTORY:   has a past surgical history that includes Coronary artery bypass graft (2017); Bariatric Surgery (2014); Hip surgery (Left, 2015); Replacement total knee (Right, 04/2017); Total hip arthroplasty (Right, 2017); Foot surgery (Right); Carpal tunnel release (Bilateral, 2002);  Bile duct exploration; Sleeve Gastroplasty; US guided breast biopsy left complete (Left, 09/19/2023); Tonsillectomy; pr tissue expander placement breast reconstruction (Left, 11/20/2023); Simple mastectomy (Left, 11/20/2023); Lymph node biopsy (Left, 11/20/2023); Lymph node dissection (Left, 11/20/2023); IR drainage tube placement (12/19/2023); pr breast reduction (Bilateral, 01/16/2024); pr suction assisted lipectomy trunk (Bilateral, 01/16/2024); Breast biopsy (9/23); and Joint replacement.    CURRENT MEDICATIONS  Current Outpatient Medications   Medication Sig Dispense Refill    anastrozole (ARIMIDEX) 1 mg tablet TAKE 1 TABLET (1 MG TOTAL) BY MOUTH DAILY 90 tablet 1    Apremilast 30 MG TABS Take 30 mg by mouth 2 (two) times a day (Patient not taking: Reported on 8/9/2024)      atorvastatin (LIPITOR) 10 mg tablet TAKE 1 TABLET BY MOUTH DAILY 100 tablet 1    buPROPion (WELLBUTRIN XL) 300 mg 24 hr tablet TAKE 1 TABLET (300 MG TOTAL) BY MOUTH EVERY MORNING 90 tablet 1    cholecalciferol (VITAMIN D3) 400 units tablet Take 400 Units by mouth daily      citalopram (CeleXA) 20 mg tablet Take 1 tablet (20 mg total) by mouth daily 30 tablet 6    furosemide (LASIX) 40 mg tablet TAKE 1 TABLET (40 MG TOTAL) BY MOUTH 2 (TWO) TIMES A  tablet 0    LORazepam (ATIVAN) 0.5 mg tablet TAKE 1 TABLET (0.5 MG TOTAL) BY MOUTH EVERY 8 (EIGHT) HOURS AS NEEDED FOR ANXIETY (Patient not taking: Reported on 8/9/2024) 20 tablet 0    metFORMIN (GLUCOPHAGE-XR) 500 mg 24 hr tablet Take 1 tablet (500 mg total) by mouth daily with dinner 30 tablet 3    metoprolol succinate (TOPROL-XL) 50 mg 24 hr tablet Take 1 tablet (50 mg total) by mouth daily Patient would like these sent to her via the mail. 100 tablet 1    Multiple Vitamin (multivitamin) tablet Take 1 tablet by mouth daily (Patient not taking: Reported on 8/9/2024)      naloxone (NARCAN) 4 mg/0.1 mL nasal spray Administer 1 spray into a nostril. If no response after 2-3 minutes, give  another dose in the other nostril using a new spray. 1 each 1    nystatin (MYCOSTATIN) cream Apply topically 2 (two) times a day (Patient not taking: Reported on 8/9/2024) 30 g 0    nystatin (MYCOSTATIN) powder Apply topically 2 (two) times a day 60 g 0    predniSONE 1 mg tablet TAKE 1 TABLET (1 MG TOTAL) BY MOUTH 3 (THREE) TIMES A DAY.      Semaglutide-Weight Management (WEGOVY) 0.5 MG/0.5ML Inject 0.5 mL (0.5 mg total) under the skin once a week 2 mL 0    Semaglutide-Weight Management (WEGOVY) 1 MG/0.5ML Inject 0.5 mL (1 mg total) under the skin once a week 2 mL 1    Semaglutide-Weight Management (WEGOVY) 1.7 MG/0.75ML Inject 0.75 mL (1.7 mg total) under the skin once a week 3 mL 1    traMADol (ULTRAM-ER) 100 mg 24 hr tablet Take 100 mg by mouth daily      traMADol (ULTRAM-ER) 200 MG 24 hr tablet Take 200 mg by mouth daily       No current facility-administered medications for this visit.     [unfilled]    SOCIAL HISTORY:   reports that she quit smoking about 7 years ago. Her smoking use included cigarettes. She started smoking about 14 years ago. She has a 3.5 pack-year smoking history. She has never used smokeless tobacco. She reports current alcohol use of about 2.0 standard drinks of alcohol per week. She reports that she does not use drugs.     FAMILY HISTORY:  family history includes Arthritis in her father; Diabetes in her mother; Heart disease in her father; Hypertension in her mother; No Known Problems in her daughter, daughter, sister, sister, and son.     ALLERGIES:  is allergic to iodine - food allergy, povidone iodine, and shellfish-derived products - food allergy.      Physical Exam:  Vital Signs:   Visit Vitals  OB Status Postmenopausal   Smoking Status Former     There is no height or weight on file to calculate BMI.  There is no height or weight on file to calculate BSA.    GEN: Alert, awake oriented x3, in no acute distress  HEENT- No pallor, icterus, cyanosis, no oral mucosal lesions,   LAD - no  palpable cervical, clavicle, axillary, inguinal LAD  Heart- normal S1 S2, regular rate and rhythm, No murmur, rubs.   Lungs- clear breathing sound bilateral.   Abdomen- soft, Non tender, bowel sounds present  Extremities- No cyanosis, clubbing, edema  Neuro- No focal neurological deficit    Labs:  Lab Results   Component Value Date    WBC 3.90 (L) 11/01/2024    HGB 9.5 (L) 11/01/2024    HCT 28.9 (L) 11/01/2024     (H) 11/01/2024    PLT 96 (L) 11/01/2024     Lab Results   Component Value Date    SODIUM 139 11/01/2024    K 3.7 11/01/2024     11/01/2024    CO2 29 11/01/2024    AGAP 3 (L) 11/01/2024    BUN 21 11/01/2024    CREATININE 1.08 11/01/2024    GLUC 142 (H) 11/01/2024    GLUF 89 10/26/2024    CALCIUM 9.2 11/01/2024    AST 21 11/01/2024    ALT 18 11/01/2024    ALKPHOS 58 11/01/2024    TP 7.4 11/01/2024    TBILI 0.52 11/01/2024    EGFR 51 11/01/2024           I spent 40 minutes on chart review, face to face counseling time, coordination of care and documentation.    Geno Carranza MD PhD

## 2024-11-09 LAB — HAPTOGLOB SERPL-MCNC: 116 MG/DL (ref 42–346)

## 2024-11-12 ENCOUNTER — TELEPHONE (OUTPATIENT)
Age: 74
End: 2024-11-12

## 2024-11-12 ENCOUNTER — TELEPHONE (OUTPATIENT)
Dept: HEMATOLOGY ONCOLOGY | Facility: CLINIC | Age: 74
End: 2024-11-12

## 2024-11-12 DIAGNOSIS — D69.6 PLATELETS DECREASED (HCC): ICD-10-CM

## 2024-11-12 DIAGNOSIS — D64.9 ANEMIA, UNSPECIFIED TYPE: Primary | ICD-10-CM

## 2024-11-12 NOTE — TELEPHONE ENCOUNTER
Returned call to patient in regards to msg sent through [employer]  Explained that Dr. Carranza reviewed labs and they were stable.  Hgb, wbc and plats have improved.  Most recent hgb = 10.0  Patient is concerned as her energy level is poor.  I suggested she reach out to her PCP for further evaluation.    Patient will have CBCD repeated prior to her appointment with Dr. Carranza in December

## 2024-11-14 ENCOUNTER — OFFICE VISIT (OUTPATIENT)
Dept: BARIATRICS | Facility: CLINIC | Age: 74
End: 2024-11-14
Payer: COMMERCIAL

## 2024-11-14 VITALS
HEIGHT: 64 IN | DIASTOLIC BLOOD PRESSURE: 70 MMHG | HEART RATE: 94 BPM | WEIGHT: 190.6 LBS | BODY MASS INDEX: 32.54 KG/M2 | SYSTOLIC BLOOD PRESSURE: 120 MMHG

## 2024-11-14 DIAGNOSIS — E66.09 CLASS 1 OBESITY DUE TO EXCESS CALORIES WITH SERIOUS COMORBIDITY AND BODY MASS INDEX (BMI) OF 32.0 TO 32.9 IN ADULT: Primary | ICD-10-CM

## 2024-11-14 DIAGNOSIS — E66.811 CLASS 1 OBESITY DUE TO EXCESS CALORIES WITH SERIOUS COMORBIDITY AND BODY MASS INDEX (BMI) OF 32.0 TO 32.9 IN ADULT: Primary | ICD-10-CM

## 2024-11-14 PROCEDURE — 99215 OFFICE O/P EST HI 40 MIN: CPT | Performed by: INTERNAL MEDICINE

## 2024-11-14 RX ORDER — TRAMADOL HYDROCHLORIDE 50 MG/1
TABLET ORAL
COMMUNITY

## 2024-11-14 NOTE — PROGRESS NOTES
"  Program: Conservative Program    Assessment/Plan     Pretty Aguila  is a 74 y.o. female with  returns to follow up  for treatment of excess body weight and associated risk factors/co-morbidities.     Class 1 obesity due to excess calories with serious comorbidity and body mass index (BMI) of 32.0 to 32.9 in adult  Patient is currently on- Wegovy 1.7 mg.  She reports that her cravings have reduced and she has also decrease snacking.  However she still continues to like a large of carb heavy processed foods such as pizza Pasta bread.  She reports that she may not truly be in the calorie deficit as suggested by the surgical dietitian.  Advised tracking with [employer]..  She has lost 14 pounds in 20 weeks; under 1 pound per week ggested tracking.  We will increase to 2.4 mg.  She also reports a feeling of \"cotton in her mouth\".  I suggested increasing hydration.  We also talked about her using under her desk bike and resistance bands.           Most recent notes and records were reviewed.         Return visit:  2-3 months     Nutrition   Do not skip meals. Avoid sugary beverages. At least 64oz of water daily.    Behavioral/Stress   Food log via jesenia or provided paper log (jesenia options include www.Broadlink.com, sparkpeople.com, loseit.com, calorieking.com, Kintera). Encouraged mindful eating    Physical Activity  Increase physical activity by 10 minutes daily. Gradually increase physical activity to a goal of 5 days per week for 30 minutes of MODERATE intensity ( ( should be able to pass the \"talk test\" but should not be able to sing.  target 150-300 minutes  PLUS 2-3 days per week of FULL BODY resistance training. Progression will be addressed at follow up visits. Encouraged planning regarding establishing physical activity routine    Sleep  Provided sleep hygiene counseling and importance of having adequate sleep duration          Pretty was seen today for follow-up.    Diagnoses and all orders for this " visit:    Class 1 obesity due to excess calories with serious comorbidity and body mass index (BMI) of 32.0 to 32.9 in adult  -     Semaglutide-Weight Management (WEGOVY) 2.4 MG/0.75ML; Inject 0.75 mL (2.4 mg total) under the skin once a week                Total time spent reviewing chart, interviewing patient, examining patient, discussing plan, answering all questions, and documentin minutes with >50% face-to-face time with the patient.            Weight trajectory     Wt Readings from Last 20 Encounters:   24 86.5 kg (190 lb 9.6 oz)   24 87.5 kg (193 lb)   10/28/24 90.7 kg (200 lb)   10/02/24 90.7 kg (200 lb)   24 90.7 kg (200 lb)   24 93.4 kg (206 lb)   24 93.6 kg (206 lb 6.4 oz)   24 92.8 kg (204 lb 9.6 oz)   24 93.4 kg (206 lb)   24 93.4 kg (206 lb)   24 94.3 kg (208 lb)   24 90.3 kg (199 lb)   24 86.2 kg (190 lb)   24 90.7 kg (200 lb)   24 90.7 kg (200 lb)   24 92.5 kg (204 lb)   24 91.6 kg (202 lb)   24 91.1 kg (200 lb 12.8 oz)   24 91.4 kg (201 lb 8 oz)   24 91.4 kg (201 lb 8 oz)         Lifestyle questionnaire     Diet recall:  B - Belvita breakfast bar and 1 cup coffee w/ Tbsp of creamer and sweet and low  Snack/grazes  - decpretzels and candy and crackers  L - dinner leftovers or slice pizza or green salad with chicken   Snack -pretzels  D - chicken or pork roast or pasta or hamburgers and veggies   HS - pretzels or ice cream         Eating out vs cooking at home- none     Beverages  Water--  32 oz crystal lite   Caffeine/tea--half cup of coffee     SSB none     Alcohol: rare glass of wine  Smoking: no  Drug use: no     Physical Activity -- loves to walk ,   Sleep -- STOP- BANG-7 to 8 hours     Occupation-[employer]   Psycho social-       Start date: 24  Current weight : 204  lbs  Current BMI: kg/m2  Obesity Class: 35.0-39.9- Obesity Class II  Goal  "weight: 160 lbs    Weight on 11/15/2024: 190 lbs  BMI on 11/15/2024: 32.7 kg/m2 30.0-34.9- Obesity Class I  Difference: -14 lbs       Gyneac (Menopausal status/periods/contraception)- MP           Colonoscopy: UTD  Mammogram: UTD           Anti obesity Medications/ Medical---    Weight loss medication and dose: Wegovy  Date initiated: Aug 2024               Objective         The following portions of the patient's history were reviewed and updated as appropriate: allergies, current medications, past family history, past medical history, past social history, past surgical history, and problem list.      /70 (BP Location: Left arm, Patient Position: Sitting, Cuff Size: Large)   Pulse 94   Ht 5' 4\" (1.626 m)   Wt 86.5 kg (190 lb 9.6 oz)   BMI 32.72 kg/m²             Review of Systems   Constitutional:  Negative for fatigue.   HENT:  Negative for sore throat.    Respiratory:  Negative for cough and shortness of breath.    Cardiovascular:  Negative for chest pain, palpitations and leg swelling.   Gastrointestinal:  Negative for abdominal pain, constipation, diarrhea and nausea.   Genitourinary:  Negative for dysuria.   Musculoskeletal:  Negative for arthralgias and back pain.   Skin:  Negative for rash.   Neurological:  Negative for headaches.   Psychiatric/Behavioral:  Negative for dysphoric mood. The patient is not nervous/anxious.          Physical Exam  Vitals and nursing note reviewed.   Constitutional:       Appearance: Normal appearance.   HENT:      Head: Normocephalic.   Pulmonary:      Effort: Pulmonary effort is normal.   Neurological:      General: No focal deficit present.      Mental Status: She is alert and oriented to person, place, and time.   Psychiatric:         Mood and Affect: Mood normal.         Behavior: Behavior normal.         Thought Content: Thought content normal.         Judgment: Judgment normal.          Current medications       Current Outpatient Medications:     anastrozole " (ARIMIDEX) 1 mg tablet, TAKE 1 TABLET (1 MG TOTAL) BY MOUTH DAILY, Disp: 90 tablet, Rfl: 1    atorvastatin (LIPITOR) 10 mg tablet, TAKE 1 TABLET BY MOUTH DAILY, Disp: 100 tablet, Rfl: 1    buPROPion (WELLBUTRIN XL) 300 mg 24 hr tablet, TAKE 1 TABLET (300 MG TOTAL) BY MOUTH EVERY MORNING, Disp: 90 tablet, Rfl: 1    cholecalciferol (VITAMIN D3) 400 units tablet, Take 400 Units by mouth daily, Disp: , Rfl:     citalopram (CeleXA) 20 mg tablet, Take 1 tablet (20 mg total) by mouth daily, Disp: 30 tablet, Rfl: 6    furosemide (LASIX) 40 mg tablet, TAKE 1 TABLET (40 MG TOTAL) BY MOUTH 2 (TWO) TIMES A DAY, Disp: 180 tablet, Rfl: 0    ixekizumab (Taltz) 80 MG/ML subcutaneous injection, Inject 80 mg under the skin every 28 days, Disp: , Rfl:     LORazepam (ATIVAN) 0.5 mg tablet, TAKE 1 TABLET (0.5 MG TOTAL) BY MOUTH EVERY 8 (EIGHT) HOURS AS NEEDED FOR ANXIETY, Disp: 20 tablet, Rfl: 0    metFORMIN (GLUCOPHAGE-XR) 500 mg 24 hr tablet, Take 1 tablet (500 mg total) by mouth daily with dinner, Disp: 30 tablet, Rfl: 3    metoprolol succinate (TOPROL-XL) 50 mg 24 hr tablet, Take 1 tablet (50 mg total) by mouth daily Patient would like these sent to her via the mail., Disp: 100 tablet, Rfl: 1    Multiple Vitamin (multivitamin) tablet, Take 1 tablet by mouth daily, Disp: , Rfl:     naloxone (NARCAN) 4 mg/0.1 mL nasal spray, Administer 1 spray into a nostril. If no response after 2-3 minutes, give another dose in the other nostril using a new spray., Disp: 1 each, Rfl: 1    nystatin (MYCOSTATIN) cream, Apply topically 2 (two) times a day (Patient taking differently: Apply topically as needed), Disp: 30 g, Rfl: 0    nystatin (MYCOSTATIN) powder, Apply topically 2 (two) times a day (Patient taking differently: Apply topically if needed), Disp: 60 g, Rfl: 0    predniSONE 1 mg tablet, TAKE 1 TABLET (1 MG TOTAL) BY MOUTH 3 (THREE) TIMES A DAY., Disp: , Rfl:     Semaglutide-Weight Management (WEGOVY) 2.4 MG/0.75ML, Inject 0.75 mL (2.4 mg  "total) under the skin once a week, Disp: 3 mL, Rfl: 2    traMADol (ULTRAM) 50 mg tablet, , Disp: , Rfl:     Apremilast 30 MG TABS, Take 30 mg by mouth 2 (two) times a day (Patient not taking: Reported on 8/9/2024), Disp: , Rfl:     traMADol (ULTRAM-ER) 100 mg 24 hr tablet, Take 100 mg by mouth daily (Patient not taking: Reported on 11/14/2024), Disp: , Rfl:          Medication considerations/contraindications     -Patient denies personal history of pancreatitis. Patient also denies personal and family history of medullary thyroid cancer, and multiple endocrine neoplasia type 2 (MEN 2 tumor). -Patient denies any history of kidney stones, seizures, or glaucoma, diabetic retinopathy, gall bladder disease, gastroparesis, hyperthyroidism.  -Denies Hx of CAD, PAD, palpitations, arrhythmia, uncontrolled hypertension  -Denies uncontrolled anxiety or depression, suicidal behavior or thinking , insomnia or sleep disturbance.         Labs     Most recent labs reviewed   Lab Results   Component Value Date    SODIUM 141 11/08/2024    K 3.8 11/08/2024     11/08/2024    CO2 30 11/08/2024    AGAP 6 11/08/2024    BUN 26 (H) 11/08/2024    CREATININE 1.13 11/08/2024    GLUC 117 11/08/2024    GLUF 89 10/26/2024    CALCIUM 9.8 11/08/2024    AST 23 11/08/2024    ALT 25 11/08/2024    ALKPHOS 63 11/08/2024    TP 8.0 11/08/2024    TBILI 0.54 11/08/2024    EGFR 48 11/08/2024     Lab Results   Component Value Date    HGBA1C 5.9 (H) 10/26/2024     Lab Results   Component Value Date    SMJ4UJSXULJN 1.800 11/01/2024    TSH 1.620 02/24/2021     Lab Results   Component Value Date    CHOLESTEROL 141 10/26/2024     Lab Results   Component Value Date    HDL 49 (L) 10/26/2024     Lab Results   Component Value Date    TRIG 83 10/26/2024     Lab Results   Component Value Date    LDLCALC 75 10/26/2024     No results found for: \"VITD\"  No components found for: \"FASTINS\"   "

## 2024-11-14 NOTE — ASSESSMENT & PLAN NOTE
"Patient is currently on- Wegovy 1.7 mg.  She reports that her cravings have reduced and she has also decrease snacking.  However she still continues to like a large of carb heavy processed foods such as pizza Pasta bread.  She reports that she may not truly be in the calorie deficit as suggested by the surgical dietitian.  Advised tracking with [employer]..  She has lost 14 pounds in 20 weeks; under 1 pound per week ggested tracking.  We will increase to 2.4 mg.  She also reports a feeling of \"cotton in her mouth\".  I suggested increasing hydration.  We also talked about her using under her desk bike and resistance bands.        "

## 2024-11-15 LAB — SCAN RESULT: NORMAL

## 2024-11-20 ENCOUNTER — HOSPITAL ENCOUNTER (OUTPATIENT)
Dept: NON INVASIVE DIAGNOSTICS | Facility: CLINIC | Age: 74
Discharge: HOME | End: 2024-11-20
Payer: COMMERCIAL

## 2024-11-20 LAB
CHEST PAIN STATEMENT: NORMAL
MAX DIASTOLIC BP: 66 MMHG
MAX HR PERCENT: 72 %
MAX HR: 108 BPM
MAX PREDICTED HEART RATE: 148 BPM
NUC STRESS EJECTION FRACTION: 53 %
PROTOCOL NAME: NORMAL
RATE PRESSURE PRODUCT: NORMAL
REASON FOR TERMINATION: NORMAL
SL CV REST NUCLEAR ISOTOPE DOSE: 10.6 MCI
SL CV STRESS NUCLEAR ISOTOPE DOSE: 32.4 MCI
SL CV STRESS RECOVERY BP: NORMAL MMHG
SL CV STRESS RECOVERY HR: 100 BPM
SL CV STRESS RECOVERY O2 SAT: 99 %
STRESS ANGINA INDEX: 0
STRESS BASELINE BP: NORMAL MMHG
STRESS BASELINE HR: 90 BPM
STRESS O2 SAT REST: 99 %
STRESS PEAK HR: 108 BPM
STRESS POST ESTIMATED WORKLOAD: 1.5 METS
STRESS POST EXERCISE DUR MIN: 3 MIN
STRESS POST EXERCISE DUR MIN: 3 MIN
STRESS POST EXERCISE DUR SEC: 0 SEC
STRESS POST EXERCISE DUR SEC: 0 SEC
STRESS POST O2 SAT PEAK: 96 %
STRESS POST PEAK BP: 118 MMHG
STRESS POST PEAK HR: 108 BPM
STRESS POST PEAK SYSTOLIC BP: 118 MMHG
STRESS/REST PERFUSION RATIO: 0.93
TARGET HR FORMULA: NORMAL
TEST INDICATION: NORMAL

## 2024-11-20 PROCEDURE — 78452 HT MUSCLE IMAGE SPECT MULT: CPT | Performed by: INTERNAL MEDICINE

## 2024-11-20 PROCEDURE — 78452 HT MUSCLE IMAGE SPECT MULT: CPT

## 2024-11-20 PROCEDURE — A9502 TC99M TETROFOSMIN: HCPCS

## 2024-11-20 PROCEDURE — 93017 CV STRESS TEST TRACING ONLY: CPT

## 2024-11-20 PROCEDURE — 93018 CV STRESS TEST I&R ONLY: CPT | Performed by: INTERNAL MEDICINE

## 2024-11-20 PROCEDURE — 93016 CV STRESS TEST SUPVJ ONLY: CPT | Performed by: INTERNAL MEDICINE

## 2024-11-20 RX ORDER — REGADENOSON 0.08 MG/ML
0.4 INJECTION, SOLUTION INTRAVENOUS ONCE
Status: COMPLETED | OUTPATIENT
Start: 2024-11-20 | End: 2024-11-20

## 2024-11-20 RX ADMIN — REGADENOSON 0.4 MG: 0.08 INJECTION, SOLUTION INTRAVENOUS at 13:04

## 2024-11-22 ENCOUNTER — TELEPHONE (OUTPATIENT)
Age: 74
End: 2024-11-22

## 2024-11-22 NOTE — TELEPHONE ENCOUNTER
Received call from pt asking for the results of the stress test done on 11/20. Pt stated she is still having the sob when getting up from a sitting position, lasting about 30-40 secs, as noted before. She also stated she saw her hematologist and had blood work done and found her hemoglobin to be low at a 10.  Pt just wanted to make Dr. Conrad aware.    Please review and advise.

## 2024-11-25 DIAGNOSIS — R42 DIZZINESS: Primary | ICD-10-CM

## 2024-11-25 NOTE — TELEPHONE ENCOUNTER
Patient calling again for NM stress test results. States she is still experiencing the shortness of breath every time she stands.    She is very anxious over all of this.    [phone] Pretty

## 2024-12-03 ENCOUNTER — HOSPITAL ENCOUNTER (OUTPATIENT)
Dept: NON INVASIVE DIAGNOSTICS | Facility: HOSPITAL | Age: 74
Discharge: HOME | End: 2024-12-03
Attending: INTERNAL MEDICINE
Payer: COMMERCIAL

## 2024-12-03 DIAGNOSIS — R42 DIZZINESS: ICD-10-CM

## 2024-12-03 PROCEDURE — 93225 XTRNL ECG REC<48 HRS REC: CPT

## 2024-12-03 PROCEDURE — 93226 XTRNL ECG REC<48 HR SCAN A/R: CPT

## 2024-12-04 PROBLEM — R19.5 LOOSE STOOLS: Status: RESOLVED | Noted: 2024-07-08 | Resolved: 2024-12-04

## 2024-12-04 PROBLEM — Z48.815 ENCOUNTER FOR SURGICAL AFTERCARE FOLLOWING SURGERY OF DIGESTIVE SYSTEM: Status: RESOLVED | Noted: 2021-05-13 | Resolved: 2024-12-04

## 2024-12-05 ENCOUNTER — TELEPHONE (OUTPATIENT)
Age: 74
End: 2024-12-05

## 2024-12-05 NOTE — TELEPHONE ENCOUNTER
Patient was scheduled this afternoon for a follow up from cardiologist for SOB, but is unable to make it and needed to reschedule.  Rescheduled the patient for Dr. Dumont's next available date for an OVL, 1/9/25, and added patient to a wait list.  Please advise patient if anything should become available sooner.  Thank you!

## 2024-12-07 ENCOUNTER — APPOINTMENT (EMERGENCY)
Dept: RADIOLOGY | Facility: HOSPITAL | Age: 74
DRG: 810 | End: 2024-12-07
Payer: MEDICARE

## 2024-12-07 ENCOUNTER — HOSPITAL ENCOUNTER (INPATIENT)
Facility: HOSPITAL | Age: 74
LOS: 3 days | Discharge: HOME | DRG: 810 | End: 2024-12-12
Attending: EMERGENCY MEDICINE | Admitting: HOSPITALIST
Payer: MEDICARE

## 2024-12-07 DIAGNOSIS — R06.00 DYSPNEA: ICD-10-CM

## 2024-12-07 DIAGNOSIS — R42 DIZZINESS: Primary | ICD-10-CM

## 2024-12-07 DIAGNOSIS — D61.818 PANCYTOPENIA (HCC): ICD-10-CM

## 2024-12-07 DIAGNOSIS — D64.9 ANEMIA: ICD-10-CM

## 2024-12-07 DIAGNOSIS — I95.9 HYPOTENSIVE EPISODE: ICD-10-CM

## 2024-12-07 DIAGNOSIS — B37.2 CANDIDA INFECTION OF FLEXURAL SKIN: ICD-10-CM

## 2024-12-07 LAB
2HR DELTA HS TROPONIN: -1 NG/L
ALBUMIN SERPL BCG-MCNC: 4.1 G/DL (ref 3.5–5)
ALP SERPL-CCNC: 57 U/L (ref 34–104)
ALT SERPL W P-5'-P-CCNC: 15 U/L (ref 7–52)
ANION GAP SERPL CALCULATED.3IONS-SCNC: 10 MMOL/L (ref 4–13)
AST SERPL W P-5'-P-CCNC: 16 U/L (ref 13–39)
BASOPHILS # BLD AUTO: 0.01 THOUSANDS/ΜL (ref 0–0.1)
BASOPHILS NFR BLD AUTO: 0 % (ref 0–1)
BILIRUB SERPL-MCNC: 0.65 MG/DL (ref 0.2–1)
BNP SERPL-MCNC: 295 PG/ML (ref 0–100)
BUN SERPL-MCNC: 25 MG/DL (ref 5–25)
CALCIUM SERPL-MCNC: 9.4 MG/DL (ref 8.4–10.2)
CARDIAC TROPONIN I PNL SERPL HS: 15 NG/L (ref ?–50)
CARDIAC TROPONIN I PNL SERPL HS: 16 NG/L (ref ?–50)
CHLORIDE SERPL-SCNC: 102 MMOL/L (ref 96–108)
CO2 SERPL-SCNC: 26 MMOL/L (ref 21–32)
CREAT SERPL-MCNC: 1.25 MG/DL (ref 0.6–1.3)
D DIMER PPP FEU-MCNC: 0.53 UG/ML FEU
EOSINOPHIL # BLD AUTO: 0.04 THOUSAND/ΜL (ref 0–0.61)
EOSINOPHIL NFR BLD AUTO: 1 % (ref 0–6)
ERYTHROCYTE [DISTWIDTH] IN BLOOD BY AUTOMATED COUNT: 13.7 % (ref 11.6–15.1)
FLUAV RNA RESP QL NAA+PROBE: NEGATIVE
FLUBV RNA RESP QL NAA+PROBE: NEGATIVE
GFR SERPL CREATININE-BSD FRML MDRD: 43 ML/MIN/1.73SQ M
GLUCOSE SERPL-MCNC: 103 MG/DL (ref 65–140)
HCT VFR BLD AUTO: 29.2 % (ref 34.8–46.1)
HGB BLD-MCNC: 9.8 G/DL (ref 11.5–15.4)
IMM GRANULOCYTES # BLD AUTO: 0.04 THOUSAND/UL (ref 0–0.2)
IMM GRANULOCYTES NFR BLD AUTO: 1 % (ref 0–2)
LYMPHOCYTES # BLD AUTO: 1.36 THOUSANDS/ΜL (ref 0.6–4.47)
LYMPHOCYTES NFR BLD AUTO: 28 % (ref 14–44)
MCH RBC QN AUTO: 35.6 PG (ref 26.8–34.3)
MCHC RBC AUTO-ENTMCNC: 33.6 G/DL (ref 31.4–37.4)
MCV RBC AUTO: 106 FL (ref 82–98)
MONOCYTES # BLD AUTO: 0.36 THOUSAND/ΜL (ref 0.17–1.22)
MONOCYTES NFR BLD AUTO: 7 % (ref 4–12)
NEUTROPHILS # BLD AUTO: 3.1 THOUSANDS/ΜL (ref 1.85–7.62)
NEUTS SEG NFR BLD AUTO: 63 % (ref 43–75)
NRBC BLD AUTO-RTO: 1 /100 WBCS
OVALOCYTES BLD QL SMEAR: PRESENT
PLATELET # BLD AUTO: 76 THOUSANDS/UL (ref 149–390)
PLATELET BLD QL SMEAR: ABNORMAL
PMV BLD AUTO: 12.4 FL (ref 8.9–12.7)
POIKILOCYTOSIS BLD QL SMEAR: PRESENT
POTASSIUM SERPL-SCNC: 3.2 MMOL/L (ref 3.5–5.3)
PROT SERPL-MCNC: 7.9 G/DL (ref 6.4–8.4)
RBC # BLD AUTO: 2.75 MILLION/UL (ref 3.81–5.12)
RBC MORPH BLD: PRESENT
RSV RNA RESP QL NAA+PROBE: NEGATIVE
SARS-COV-2 RNA RESP QL NAA+PROBE: NEGATIVE
SODIUM SERPL-SCNC: 138 MMOL/L (ref 135–147)
WBC # BLD AUTO: 4.91 THOUSAND/UL (ref 4.31–10.16)

## 2024-12-07 PROCEDURE — 36415 COLL VENOUS BLD VENIPUNCTURE: CPT

## 2024-12-07 PROCEDURE — 84484 ASSAY OF TROPONIN QUANT: CPT | Performed by: EMERGENCY MEDICINE

## 2024-12-07 PROCEDURE — 71046 X-RAY EXAM CHEST 2 VIEWS: CPT

## 2024-12-07 PROCEDURE — 83880 ASSAY OF NATRIURETIC PEPTIDE: CPT

## 2024-12-07 PROCEDURE — 85025 COMPLETE CBC W/AUTO DIFF WBC: CPT | Performed by: EMERGENCY MEDICINE

## 2024-12-07 PROCEDURE — 0241U HB NFCT DS VIR RESP RNA 4 TRGT: CPT

## 2024-12-07 PROCEDURE — 99285 EMERGENCY DEPT VISIT HI MDM: CPT | Performed by: EMERGENCY MEDICINE

## 2024-12-07 PROCEDURE — 99284 EMERGENCY DEPT VISIT MOD MDM: CPT

## 2024-12-07 PROCEDURE — 85379 FIBRIN DEGRADATION QUANT: CPT

## 2024-12-07 PROCEDURE — 80053 COMPREHEN METABOLIC PANEL: CPT | Performed by: EMERGENCY MEDICINE

## 2024-12-07 PROCEDURE — 93005 ELECTROCARDIOGRAM TRACING: CPT

## 2024-12-07 RX ORDER — BUPROPION HYDROCHLORIDE 150 MG/1
300 TABLET ORAL EVERY MORNING
Status: DISCONTINUED | OUTPATIENT
Start: 2024-12-08 | End: 2024-12-12 | Stop reason: HOSPADM

## 2024-12-07 RX ORDER — ANASTROZOLE 1 MG/1
1 TABLET ORAL DAILY
Status: DISCONTINUED | OUTPATIENT
Start: 2024-12-08 | End: 2024-12-12 | Stop reason: HOSPADM

## 2024-12-07 RX ORDER — ATORVASTATIN CALCIUM 10 MG/1
10 TABLET, FILM COATED ORAL DAILY
Status: DISCONTINUED | OUTPATIENT
Start: 2024-12-08 | End: 2024-12-12 | Stop reason: HOSPADM

## 2024-12-07 RX ORDER — METOPROLOL SUCCINATE 50 MG/1
50 TABLET, EXTENDED RELEASE ORAL DAILY
Status: DISCONTINUED | OUTPATIENT
Start: 2024-12-08 | End: 2024-12-12 | Stop reason: HOSPADM

## 2024-12-07 RX ORDER — FUROSEMIDE 40 MG/1
40 TABLET ORAL
Status: DISCONTINUED | OUTPATIENT
Start: 2024-12-08 | End: 2024-12-12 | Stop reason: HOSPADM

## 2024-12-07 RX ORDER — NYSTATIN 100000 U/G
CREAM TOPICAL 2 TIMES DAILY
Status: CANCELLED | OUTPATIENT
Start: 2024-12-07

## 2024-12-07 RX ORDER — POTASSIUM CHLORIDE 1500 MG/1
40 TABLET, EXTENDED RELEASE ORAL ONCE
Status: COMPLETED | OUTPATIENT
Start: 2024-12-07 | End: 2024-12-07

## 2024-12-07 RX ORDER — PREDNISONE 1 MG/1
1 TABLET ORAL 3 TIMES DAILY
Status: DISCONTINUED | OUTPATIENT
Start: 2024-12-07 | End: 2024-12-12 | Stop reason: HOSPADM

## 2024-12-07 RX ORDER — CITALOPRAM HYDROBROMIDE 20 MG/1
20 TABLET ORAL DAILY
Status: DISCONTINUED | OUTPATIENT
Start: 2024-12-08 | End: 2024-12-12 | Stop reason: HOSPADM

## 2024-12-07 RX ORDER — ENOXAPARIN SODIUM 100 MG/ML
40 INJECTION SUBCUTANEOUS DAILY
Status: DISCONTINUED | OUTPATIENT
Start: 2024-12-08 | End: 2024-12-12 | Stop reason: HOSPADM

## 2024-12-07 RX ORDER — TRAMADOL HYDROCHLORIDE 50 MG/1
50 TABLET ORAL EVERY 6 HOURS PRN
Status: DISCONTINUED | OUTPATIENT
Start: 2024-12-07 | End: 2024-12-12 | Stop reason: HOSPADM

## 2024-12-07 RX ORDER — METFORMIN HYDROCHLORIDE 500 MG/1
500 TABLET, EXTENDED RELEASE ORAL
Status: DISCONTINUED | OUTPATIENT
Start: 2024-12-08 | End: 2024-12-07

## 2024-12-07 RX ADMIN — SODIUM CHLORIDE 1000 ML: 0.9 INJECTION, SOLUTION INTRAVENOUS at 21:27

## 2024-12-07 RX ADMIN — POTASSIUM CHLORIDE 40 MEQ: 1500 TABLET, EXTENDED RELEASE ORAL at 19:01

## 2024-12-07 NOTE — LETTER
[facility] FLO[facility] MED SURG UNIT  [address]  [place] [id number]  Dept: [phone]    December 12, 2024     Patient: Pretty Aguila   YOB: 1950   Date of Visit: 12/7/2024       To Whom it May Concern:    Pretty Aguila is under my professional care. She was seen in the hospital from 12/7/2024 to 12/12/24. She may return to work on 12/16/2024 without limitations.    If you have any questions or concerns, please don't hesitate to call.         Sincerely,          Tabby Aguiar MD

## 2024-12-07 NOTE — ED PROVIDER NOTES
Time reflects when diagnosis was documented in both MDM as applicable and the Disposition within this note       Time User Action Codes Description Comment    12/7/2024  9:16 PM Josee Vela Add [R42] Dizziness     12/7/2024  9:16 PM Josee Vela Add [R06.00] Dyspnea     12/7/2024  9:17 PM Josee Vela Add [I95.9] Hypotensive episode           ED Disposition       ED Disposition   Admit    Condition   Stable    Date/Time   Sat Dec 7, 2024  9:16 PM    Comment   Case was discussed with Dr uribe and the patient's admission status was agreed to be Admission Status: observation status to the service of Dr. Madrid .               Assessment & Plan       Medical Decision Making  Pretty is a 74-year-old female with a past medical history of CAD, DLD, HTN, dyspnea on exertion, anxiety, depression, prediabetes, thrombocytopenia, CKD, who presents to the ED for dyspnea.    DDx includes but is not limited to: CHF, orthostatic hypotension, ACS,    See ED course for MDM    Results shared with patient. Patient admitted to [facility].      Amount and/or Complexity of Data Reviewed  Labs: ordered. Decision-making details documented in ED Course.  Radiology: ordered and independent interpretation performed.    Risk  Prescription drug management.  Decision regarding hospitalization.      ED Course as of 12/07/24 2315   Sat Dec 07, 2024   1829 D-Dimer, Quant(!): 0.53  WNL once age is adjusted for    1836 Potassium(!): 3.2  Oral repletion ordered   1837 Hemoglobin(!): 9.8  Anemic but holding steady at patient's baseline   2057 Delta 2hr hsTnI: -1   2100 Overall patient's workup is nonspecific, some evidence of possible new onset CHF, also evidence of orthostatic hypotension.  Fluids initiated with plans to monitor carefully.  Patient admitted for further workup to [facility].       Medications   anastrozole (ARIMIDEX) tablet 1 mg (has no administration in time range)   atorvastatin (LIPITOR) tablet 10 mg (has no administration in  time range)   buPROPion (WELLBUTRIN XL) 24 hr tablet 300 mg (has no administration in time range)   Cholecalciferol (VITAMIN D3) tablet 400 Units (has no administration in time range)   citalopram (CeleXA) tablet 20 mg (has no administration in time range)   furosemide (LASIX) tablet 40 mg ( Oral Held by provider 12/7/24 2212)   metoprolol succinate (TOPROL-XL) 24 hr tablet 50 mg (has no administration in time range)   predniSONE tablet 1 mg (has no administration in time range)   traMADol (ULTRAM) tablet 50 mg (has no administration in time range)   enoxaparin (LOVENOX) subcutaneous injection 40 mg (has no administration in time range)   potassium chloride (Klor-Con M20) CR tablet 40 mEq (40 mEq Oral Given 12/7/24 1901)   sodium chloride 0.9 % bolus 1,000 mL (1,000 mL Intravenous New Bag 12/7/24 2127)       ED Risk Strat Scores                                               History of Present Illness       Chief Complaint   Patient presents with    Dizziness     Dizziness, shortness of breath and feeling faint when standing and with position change.        Past Medical History:   Diagnosis Date    Allergic     Anxiety     Arthritis     Breast cancer (HCC) 09/2023    CAD (coronary artery disease)     Coronary artery disease [id number]    Depression     Diabetes mellitus (HCC) 5/1/24    Mother-Diabetic    Dyslipidemia     Hiatal hernia     Hyperlipidemia     Hypertension     Obesity     Obesity (BMI 30-39.9)     Psoriatic arthritis (HCC)     Rheumatoid arthritis (HCC)     Scoliosis     SOB (shortness of breath)       Past Surgical History:   Procedure Laterality Date    BARIATRIC SURGERY  2014    BILE DUCT EXPLORATION      replacement per [place]    BREAST BIOPSY  9/23    CARPAL TUNNEL RELEASE Bilateral 2002    CORONARY ARTERY BYPASS GRAFT  2017    FOOT SURGERY Right     bone surgery to straighten toe.    HIP SURGERY Left 2015    IR DRAINAGE TUBE PLACEMENT  12/19/2023    JOINT REPLACEMENT      LYMPH NODE BIOPSY Left  2023    Procedure: LEFT LYMPHATIC MAPPING WITH BLUE AND RADIOACTIVE DYES, SENTINEL LYMPH NODE BIOPSY;  Surgeon: Adrien Gabriel MD;  Location: [facility] MAIN OR;  Service: Surgical Oncology    LYMPH NODE DISSECTION Left 2023    Procedure: POSSIBLE AXILLARY DISSECTION;  Surgeon: Adrien Gabriel MD;  Location:  MAIN OR;  Service: Surgical Oncology    ME BREAST REDUCTION Bilateral 2024    Procedure: RIGHT BREAST REDUCTION AND LEFT BREAST EXPANDER EXCHANGE FOR PERMANENT IMPLANT. REVISION OF RECONSTRUCTED LEFT BREAST.;  Surgeon: Molina Jimenez MD;  Location: [facility] MAIN OR;  Service: Plastics    ME SUCTION ASSISTED LIPECTOMY TRUNK Bilateral 2024    Procedure: LIPOSUCTION BREAST;  Surgeon: Molina Jimenez MD;  Location: [facility] MAIN OR;  Service: Plastics    ME TISSUE EXPANDER PLACEMENT BREAST RECONSTRUCTION Left 2023    Procedure: IMMEDIATE LEFT BREAST RECONSTRUCTION WITH TISSUE EXPANDER AND ADM;  Surgeon: Molina Jimenez MD;  Location:  MAIN OR;  Service: Plastics    REPLACEMENT TOTAL KNEE Right 2017    SIMPLE MASTECTOMY Left 2023    Procedure: LEFT BREAST VERENICE  DIRECTED MASTECTOMY;  Surgeon: Adrien Gabriel MD;  Location:  MAIN OR;  Service: Surgical Oncology    SLEEVE GASTROPLASTY      TONSILLECTOMY      TOTAL HIP ARTHROPLASTY Right 2017    US GUIDED BREAST BIOPSY LEFT COMPLETE Left 2023      Family History   Problem Relation Age of Onset    Hypertension Mother     Diabetes Mother     Heart disease Father         CABG x5    Arthritis Father     No Known Problems Sister     No Known Problems Sister     No Known Problems Daughter     No Known Problems Daughter     No Known Problems Son     Breast cancer Neg Hx       Social History     Tobacco Use    Smoking status: Former     Current packs/day: 0.00     Average packs/day: 0.5 packs/day for 7.0 years (3.5 ttl pk-yrs)     Types: Cigarettes     Start date: 2010     Quit date: 2017     Years since quittin.9    Smokeless tobacco: Never     Tobacco comments:     Have already quit smoking in 2017   Vaping Use    Vaping status: Never Used   Substance Use Topics    Alcohol use: Yes     Alcohol/week: 2.0 standard drinks of alcohol     Types: 2 Glasses of wine per week     Comment: social     Drug use: No      E-Cigarette/Vaping    E-Cigarette Use Never User       E-Cigarette/Vaping Substances    Nicotine No     THC No     CBD No     Flavoring No     Other No       I have reviewed and agree with the history as documented.     Pretty is a 74-year-old female with a past medical history of CAD, DLD, HTN, dyspnea on exertion, anxiety, depression, prediabetes, thrombocytopenia, CKD, who presents to the ED for dyspnea.  Patient reports that over the past several months she has been having dyspnea with exertion, but reports that has been getting progressively worse culminating in today when even standing caused her to be dyspneic.  She reports that typically the dyspnea would go away after she rested for a while, however today she reports that the dyspnea did not resolve quickly, and kept reoccurring whereas usually she would have the dyspnea only intermittently.    She also endorses lightheadedness that only begins after she has been dyspneic for a couple of minutes.  Reports that she has not lost consciousness, hit her head, or fallen.    She denies any specific changes with her routine today in regards to diet, fluid intake, or activity level.  She denies any chest pain, nausea, vomiting, diaphoresis.         Review of Systems   Constitutional:  Negative for activity change, chills and fever.   Eyes:  Negative for pain and visual disturbance.   Respiratory:  Positive for shortness of breath. Negative for chest tightness.    Cardiovascular:  Negative for chest pain.   Gastrointestinal:  Negative for abdominal pain, nausea and vomiting.   Genitourinary:  Negative for dysuria and hematuria.   Skin:  Negative for rash and wound.   Neurological:  Positive for  light-headedness. Negative for dizziness, syncope and headaches.   Psychiatric/Behavioral: Negative.             Objective       ED Triage Vitals   Temperature Pulse Blood Pressure Respirations SpO2 Patient Position - Orthostatic VS   12/07/24 1707 12/07/24 1707 12/07/24 1707 12/07/24 1707 12/07/24 1707 12/07/24 1707   98.5 °F (36.9 °C) 98 94/62 20 100 % Sitting      Temp Source Heart Rate Source BP Location FiO2 (%) Pain Score    12/07/24 1707 12/07/24 1707 12/07/24 1707 -- 12/07/24 2239    Oral Monitor Right arm  No Pain      Vitals      Date and Time Temp Pulse SpO2 Resp BP Pain Score FACES Pain Rating User   12/07/24 2239 97.8 °F (36.6 °C) 86 94 % 20 111/57 No Pain -- CK   12/07/24 2200 -- 84 99 % 18 97/62 -- -- AM   12/07/24 2119 -- 87 100 % 18 115/62 -- -- DB   12/07/24 2041 -- 91 -- 18 80/51 -- -- AM   12/07/24 2038 -- 89 -- 18 96/54 -- -- AM   12/07/24 2037 -- 84 -- 18 96/63 -- -- AM   12/07/24 1707 98.5 °F (36.9 °C) 98 100 % 20 94/62 -- -- AW            Physical Exam  Vitals and nursing note reviewed.   Constitutional:       Appearance: Normal appearance.   HENT:      Head: Normocephalic and atraumatic.      Right Ear: External ear normal.      Left Ear: External ear normal.      Nose: Nose normal.      Mouth/Throat:      Mouth: Mucous membranes are moist.      Pharynx: Oropharynx is clear.   Eyes:      Extraocular Movements: Extraocular movements intact.      Conjunctiva/sclera: Conjunctivae normal.   Cardiovascular:      Rate and Rhythm: Normal rate.      Heart sounds: Normal heart sounds.   Pulmonary:      Effort: Pulmonary effort is normal.      Breath sounds: Normal breath sounds.      Comments: Had patient walk around the room, she did not make it more than 20 steps before becoming dyspneic and lightheaded.  Abdominal:      General: There is no distension.      Palpations: Abdomen is soft.      Tenderness: There is no abdominal tenderness. There is no guarding.   Musculoskeletal:         General:  Normal range of motion.      Cervical back: Normal range of motion and neck supple.   Skin:     General: Skin is warm and dry.   Neurological:      General: No focal deficit present.      Mental Status: She is alert and oriented to person, place, and time.   Psychiatric:         Mood and Affect: Mood normal.         Behavior: Behavior normal.         Results Reviewed       Procedure Component Value Units Date/Time    COVID/FLU/RSV [[id number]] Collected: 12/07/24 2304    Lab Status: In process Specimen: Nares from Nose Updated: 12/07/24 2311    Platelet count [[id number]]     Lab Status: No result Specimen: Blood     HS Troponin I 2hr [[id number]]  (Normal) Collected: 12/07/24 2021    Lab Status: Final result Specimen: Blood from Arm, Right Updated: 12/07/24 2052     hs TnI 2hr 15 ng/L      Delta 2hr hsTnI -1 ng/L     HS Troponin I 4hr [[id number]]     Lab Status: No result Specimen: Blood     B-Type Natriuretic Peptide(BNP) [[id number]]  (Abnormal) Collected: 12/07/24 1714    Lab Status: Final result Specimen: Blood from Arm, Right Updated: 12/07/24 1841      pg/mL     D-Dimer [[id number]]  (Abnormal) Collected: 12/07/24 1714    Lab Status: Final result Specimen: Blood from Arm, Right Updated: 12/07/24 1828     D-Dimer, Quant 0.53 ug/ml FEU     Narrative:      In the evaluation for possible pulmonary embolism, in the appropriate (Well's Score of 4 or less) patient, the age adjusted d-dimer cutoff for this patient can be calculated as:    Age x 0.01 (in ug/mL) for Age-adjusted D-dimer exclusion threshold for a patient over 50 years.    CBC and differential [[id number]]  (Abnormal) Collected: 12/07/24 1714    Lab Status: Final result Specimen: Blood from Arm, Right Updated: 12/07/24 1755     WBC 4.91 Thousand/uL      RBC 2.75 Million/uL      Hemoglobin 9.8 g/dL      Hematocrit 29.2 %       fL      MCH 35.6 pg      MCHC 33.6 g/dL      RDW 13.7 %      MPV 12.4 fL      Platelets 76 Thousands/uL      nRBC 1 /100  WBCs      Segmented % 63 %      Immature Grans % 1 %      Lymphocytes % 28 %      Monocytes % 7 %      Eosinophils Relative 1 %      Basophils Relative 0 %      Absolute Neutrophils 3.10 Thousands/µL      Absolute Immature Grans 0.04 Thousand/uL      Absolute Lymphocytes 1.36 Thousands/µL      Absolute Monocytes 0.36 Thousand/µL      Eosinophils Absolute 0.04 Thousand/µL      Basophils Absolute 0.01 Thousands/µL     Narrative:      This is an appended report.  These results have been appended to a previously verified report.    HS Troponin 0hr (reflex protocol) [[id number]]  (Normal) Collected: 12/07/24 1714    Lab Status: Final result Specimen: Blood from Arm, Right Updated: 12/07/24 1755     hs TnI 0hr 16 ng/L     Smear Review(Phlebs Do Not Order) [[id number]]  (Abnormal) Collected: 12/07/24 1714    Lab Status: Final result Specimen: Blood from Arm, Right Updated: 12/07/24 1755     RBC Morphology Present     Platelet Estimate Decreased     Ovalocytes Present     Poikilocytes Present    Comprehensive metabolic panel [[id number]]  (Abnormal) Collected: 12/07/24 1714    Lab Status: Final result Specimen: Blood from Arm, Right Updated: 12/07/24 1748     Sodium 138 mmol/L      Potassium 3.2 mmol/L      Chloride 102 mmol/L      CO2 26 mmol/L      ANION GAP 10 mmol/L      BUN 25 mg/dL      Creatinine 1.25 mg/dL      Glucose 103 mg/dL      Calcium 9.4 mg/dL      AST 16 U/L      ALT 15 U/L      Alkaline Phosphatase 57 U/L      Total Protein 7.9 g/dL      Albumin 4.1 g/dL      Total Bilirubin 0.65 mg/dL      eGFR 43 ml/min/1.73sq m     Narrative:      National Kid[employer] guidelines for Chronic Kidney Disease (CKD):     Stage 1 with normal or high GFR (GFR > 90 mL/min/1.73 square meters)    Stage 2 Mild CKD (GFR = 60-89 mL/min/1.73 square meters)    Stage 3A Moderate CKD (GFR = 45-59 mL/min/1.73 square meters)    Stage 3B Moderate CKD (GFR = 30-44 mL/min/1.73 square meters)    Stage 4 Severe CKD (GFR = 15-29  mL/min/1.73 square meters)    Stage 5 End Stage CKD (GFR <15 mL/min/1.73 square meters)  Note: GFR calculation is accurate only with a steady state creatinine            XR chest 2 views   ED Interpretation by Leeann Granados MD (12/07 1838)   No acute cardiac, pulmonary, or osseous processes noted.            ECG 12 Lead Documentation Only    Date/Time: 12/7/2024 11:11 PM    Performed by: Leeann Granados MD  Authorized by: Leeann Granados MD    Indications / Diagnosis:  91  Patient location:  ED  Previous ECG:     Previous ECG:  Compared to current  Interpretation:     Interpretation: normal    Rate:     ECG rate:  91  Rhythm:     Rhythm: sinus rhythm    QRS:     QRS axis:  Normal    QRS intervals:  Normal  Conduction:     Conduction: normal    ST segments:     ST segments:  Normal  T waves:     T waves: non-specific        ED Medication and Procedure Management   Prior to Admission Medications   Prescriptions Last Dose Informant Patient Reported? Taking?   Apremilast 30 MG TABS  Self Yes No   Sig: Take 30 mg by mouth 2 (two) times a day   Patient not taking: Reported on 8/9/2024   LORazepam (ATIVAN) 0.5 mg tablet   No No   Sig: TAKE 1 TABLET (0.5 MG TOTAL) BY MOUTH EVERY 8 (EIGHT) HOURS AS NEEDED FOR ANXIETY   Multiple Vitamin (multivitamin) tablet 12/7/2024 Self Yes Yes   Sig: Take 1 tablet by mouth daily   Semaglutide-Weight Management (WEGOVY) 2.4 MG/0.75ML Past Month  No Yes   Sig: Inject 0.75 mL (2.4 mg total) under the skin once a week   anastrozole (ARIMIDEX) 1 mg tablet 12/7/2024  No Yes   Sig: TAKE 1 TABLET (1 MG TOTAL) BY MOUTH DAILY   atorvastatin (LIPITOR) 10 mg tablet 12/7/2024  No Yes   Sig: TAKE 1 TABLET BY MOUTH DAILY   buPROPion (WELLBUTRIN XL) 300 mg 24 hr tablet 12/7/2024  No Yes   Sig: TAKE 1 TABLET (300 MG TOTAL) BY MOUTH EVERY MORNING   cholecalciferol (VITAMIN D3) 400 units tablet 12/7/2024 Self Yes Yes   Sig: Take 400 Units by mouth daily   citalopram (CeleXA) 20 mg tablet 12/7/2024  No  Yes   Sig: Take 1 tablet (20 mg total) by mouth daily   furosemide (LASIX) 40 mg tablet 12/6/2024 Self No Yes   Sig: TAKE 1 TABLET (40 MG TOTAL) BY MOUTH 2 (TWO) TIMES A DAY   ixekizumab (Taltz) 80 MG/ML subcutaneous injection Unknown  Yes No   Sig: Inject 80 mg under the skin every 28 days   Patient not taking: Reported on 12/7/2024   metFORMIN (GLUCOPHAGE-XR) 500 mg 24 hr tablet Unknown  No No   Sig: Take 1 tablet (500 mg total) by mouth daily with dinner   Patient not taking: Reported on 12/7/2024   metoprolol succinate (TOPROL-XL) 50 mg 24 hr tablet   No No   Sig: Take 1 tablet (50 mg total) by mouth daily Patient would like these sent to her via the mail.   naloxone (NARCAN) 4 mg/0.1 mL nasal spray Unknown Self No No   Sig: Administer 1 spray into a nostril. If no response after 2-3 minutes, give another dose in the other nostril using a new spray.   nystatin (MYCOSTATIN) cream Past Month Self No Yes   Sig: Apply topically 2 (two) times a day   Patient taking differently: Apply topically as needed   nystatin (MYCOSTATIN) powder Past Month Self No Yes   Sig: Apply topically 2 (two) times a day   Patient taking differently: Apply topically if needed   predniSONE 1 mg tablet 12/7/2024  Yes Yes   Sig: TAKE 1 TABLET (1 MG TOTAL) BY MOUTH 3 (THREE) TIMES A DAY.   traMADol (ULTRAM) 50 mg tablet 12/7/2024  Yes Yes   traMADol (ULTRAM-ER) 100 mg 24 hr tablet Unknown  Yes No   Sig: Take 100 mg by mouth daily   Patient not taking: Reported on 11/14/2024      Facility-Administered Medications: None     Current Discharge Medication List        CONTINUE these medications which have NOT CHANGED    Details   anastrozole (ARIMIDEX) 1 mg tablet TAKE 1 TABLET (1 MG TOTAL) BY MOUTH DAILY  Qty: 90 tablet, Refills: 1    Associated Diagnoses: Malignant neoplasm of lower-outer quadrant of left breast of female, estrogen receptor positive (HCC)      atorvastatin (LIPITOR) 10 mg tablet TAKE 1 TABLET BY MOUTH DAILY  Qty: 100 tablet,  Refills: 1    Comments: Please send a replace/new response with 90-Day Supply if appropriate to maximize member benefit. Requesting 1 year supply.  Associated Diagnoses: Coronary artery disease involving native coronary artery of native heart without angina pectoris; Dyslipidemia      buPROPion (WELLBUTRIN XL) 300 mg 24 hr tablet TAKE 1 TABLET (300 MG TOTAL) BY MOUTH EVERY MORNING  Qty: 90 tablet, Refills: 1    Associated Diagnoses: Anxiety and depression      cholecalciferol (VITAMIN D3) 400 units tablet Take 400 Units by mouth daily      citalopram (CeleXA) 20 mg tablet Take 1 tablet (20 mg total) by mouth daily  Qty: 30 tablet, Refills: 6    Associated Diagnoses: Anxiety and depression      furosemide (LASIX) 40 mg tablet TAKE 1 TABLET (40 MG TOTAL) BY MOUTH 2 (TWO) TIMES A DAY  Qty: 180 tablet, Refills: 0    Associated Diagnoses: Leg swelling      Multiple Vitamin (multivitamin) tablet Take 1 tablet by mouth daily      nystatin (MYCOSTATIN) cream Apply topically 2 (two) times a day  Qty: 30 g, Refills: 0    Associated Diagnoses: Candida infection of flexural skin      nystatin (MYCOSTATIN) powder Apply topically 2 (two) times a day  Qty: 60 g, Refills: 0    Associated Diagnoses: Candida infection of flexural skin      predniSONE 1 mg tablet TAKE 1 TABLET (1 MG TOTAL) BY MOUTH 3 (THREE) TIMES A DAY.      Semaglutide-Weight Management (WEGOVY) 2.4 MG/0.75ML Inject 0.75 mL (2.4 mg total) under the skin once a week  Qty: 3 mL, Refills: 2    Associated Diagnoses: Class 1 obesity due to excess calories with serious comorbidity and body mass index (BMI) of 32.0 to 32.9 in adult      traMADol (ULTRAM) 50 mg tablet       Apremilast 30 MG TABS Take 30 mg by mouth 2 (two) times a day      ixekizumab (Taltz) 80 MG/ML subcutaneous injection Inject 80 mg under the skin every 28 days      LORazepam (ATIVAN) 0.5 mg tablet TAKE 1 TABLET (0.5 MG TOTAL) BY MOUTH EVERY 8 (EIGHT) HOURS AS NEEDED FOR ANXIETY  Qty: 20 tablet, Refills:  0    Associated Diagnoses: Anxiety      metFORMIN (GLUCOPHAGE-XR) 500 mg 24 hr tablet Take 1 tablet (500 mg total) by mouth daily with dinner  Qty: 30 tablet, Refills: 3    Associated Diagnoses: Class 2 obesity due to excess calories with body mass index (BMI) of 36.0 to 36.9 in adult      metoprolol succinate (TOPROL-XL) 50 mg 24 hr tablet Take 1 tablet (50 mg total) by mouth daily Patient would like these sent to her via the mail.  Qty: 100 tablet, Refills: 1    Associated Diagnoses: Coronary artery disease involving native coronary artery of native heart without angina pectoris; Essential hypertension      naloxone (NARCAN) 4 mg/0.1 mL nasal spray Administer 1 spray into a nostril. If no response after 2-3 minutes, give another dose in the other nostril using a new spray.  Qty: 1 each, Refills: 1    Associated Diagnoses: Malignant neoplasm of lower-outer quadrant of left breast of female, estrogen receptor positive (HCC)      traMADol (ULTRAM-ER) 100 mg 24 hr tablet Take 100 mg by mouth daily           No discharge procedures on file.  ED SEPSIS DOCUMENTATION   Time reflects when diagnosis was documented in both MDM as applicable and the Disposition within this note       Time User Action Codes Description Comment    12/7/2024  9:16 PM Josee Vela [R42] Dizziness     12/7/2024  9:16 PM Josee Vela Add [R06.00] Dyspnea     12/7/2024  9:17 PM Josee Vela Add [I95.9] Hypotensive episode                  Leeann Granados MD  12/07/24 3410

## 2024-12-08 LAB
4HR DELTA HS TROPONIN: -3 NG/L
ANION GAP SERPL CALCULATED.3IONS-SCNC: 6 MMOL/L (ref 4–13)
ATRIAL RATE: 91 BPM
BASOPHILS # BLD AUTO: 0 THOUSANDS/ΜL (ref 0–0.1)
BASOPHILS NFR BLD AUTO: 0 % (ref 0–1)
BUN SERPL-MCNC: 23 MG/DL (ref 5–25)
CALCIUM SERPL-MCNC: 8.5 MG/DL (ref 8.4–10.2)
CARDIAC TROPONIN I PNL SERPL HS: 13 NG/L (ref ?–50)
CHLORIDE SERPL-SCNC: 107 MMOL/L (ref 96–108)
CO2 SERPL-SCNC: 27 MMOL/L (ref 21–32)
CREAT SERPL-MCNC: 1.15 MG/DL (ref 0.6–1.3)
EOSINOPHIL # BLD AUTO: 0.08 THOUSAND/ΜL (ref 0–0.61)
EOSINOPHIL NFR BLD AUTO: 2 % (ref 0–6)
ERYTHROCYTE [DISTWIDTH] IN BLOOD BY AUTOMATED COUNT: 13.7 % (ref 11.6–15.1)
FOLATE SERPL-MCNC: 18.8 NG/ML
GFR SERPL CREATININE-BSD FRML MDRD: 47 ML/MIN/1.73SQ M
GLUCOSE SERPL-MCNC: 90 MG/DL (ref 65–140)
HCT VFR BLD AUTO: 23.8 % (ref 34.8–46.1)
HGB BLD-MCNC: 8.1 G/DL (ref 11.5–15.4)
IMM GRANULOCYTES # BLD AUTO: 0.03 THOUSAND/UL (ref 0–0.2)
IMM GRANULOCYTES NFR BLD AUTO: 1 % (ref 0–2)
LDH SERPL-CCNC: 157 U/L (ref 140–271)
LYMPHOCYTES # BLD AUTO: 2.49 THOUSANDS/ΜL (ref 0.6–4.47)
LYMPHOCYTES NFR BLD AUTO: 55 % (ref 14–44)
MCH RBC QN AUTO: 36.3 PG (ref 26.8–34.3)
MCHC RBC AUTO-ENTMCNC: 34 G/DL (ref 31.4–37.4)
MCV RBC AUTO: 107 FL (ref 82–98)
MONOCYTES # BLD AUTO: 0.32 THOUSAND/ΜL (ref 0.17–1.22)
MONOCYTES NFR BLD AUTO: 7 % (ref 4–12)
NEUTROPHILS # BLD AUTO: 1.61 THOUSANDS/ΜL (ref 1.85–7.62)
NEUTS SEG NFR BLD AUTO: 35 % (ref 43–75)
NRBC BLD AUTO-RTO: 0 /100 WBCS
P AXIS: 78 DEGREES
PLATELET # BLD AUTO: 61 THOUSANDS/UL (ref 149–390)
PLATELET # BLD AUTO: 62 THOUSANDS/UL (ref 149–390)
PMV BLD AUTO: 12.1 FL (ref 8.9–12.7)
PMV BLD AUTO: 12.4 FL (ref 8.9–12.7)
POTASSIUM SERPL-SCNC: 3.1 MMOL/L (ref 3.5–5.3)
PR INTERVAL: 192 MS
QRS AXIS: 62 DEGREES
QRSD INTERVAL: 102 MS
QT INTERVAL: 406 MS
QTC INTERVAL: 499 MS
RBC # BLD AUTO: 2.23 MILLION/UL (ref 3.81–5.12)
RETICS # AUTO: NORMAL 10*3/UL (ref 14097–95744)
RETICS # CALC: 0.9 % (ref 0.37–1.87)
SODIUM SERPL-SCNC: 140 MMOL/L (ref 135–147)
T WAVE AXIS: 63 DEGREES
VENTRICULAR RATE: 91 BPM
VIT B12 SERPL-MCNC: 381 PG/ML (ref 180–914)
WBC # BLD AUTO: 4.53 THOUSAND/UL (ref 4.31–10.16)

## 2024-12-08 PROCEDURE — 99223 1ST HOSP IP/OBS HIGH 75: CPT | Performed by: INTERNAL MEDICINE

## 2024-12-08 PROCEDURE — 82746 ASSAY OF FOLIC ACID SERUM: CPT

## 2024-12-08 PROCEDURE — 93010 ELECTROCARDIOGRAM REPORT: CPT | Performed by: INTERNAL MEDICINE

## 2024-12-08 PROCEDURE — 85045 AUTOMATED RETICULOCYTE COUNT: CPT

## 2024-12-08 PROCEDURE — 85025 COMPLETE CBC W/AUTO DIFF WBC: CPT

## 2024-12-08 PROCEDURE — 82607 VITAMIN B-12: CPT

## 2024-12-08 PROCEDURE — 80048 BASIC METABOLIC PNL TOTAL CA: CPT

## 2024-12-08 PROCEDURE — 83615 LACTATE (LD) (LDH) ENZYME: CPT

## 2024-12-08 PROCEDURE — 84484 ASSAY OF TROPONIN QUANT: CPT

## 2024-12-08 PROCEDURE — 85049 AUTOMATED PLATELET COUNT: CPT

## 2024-12-08 RX ORDER — SODIUM CHLORIDE, SODIUM LACTATE, POTASSIUM CHLORIDE, CALCIUM CHLORIDE 600; 310; 30; 20 MG/100ML; MG/100ML; MG/100ML; MG/100ML
100 INJECTION, SOLUTION INTRAVENOUS CONTINUOUS
Status: DISPENSED | OUTPATIENT
Start: 2024-12-08 | End: 2024-12-09

## 2024-12-08 RX ORDER — POTASSIUM CHLORIDE 1500 MG/1
40 TABLET, EXTENDED RELEASE ORAL ONCE
Status: COMPLETED | OUTPATIENT
Start: 2024-12-08 | End: 2024-12-08

## 2024-12-08 RX ADMIN — SODIUM CHLORIDE 1000 ML: 0.9 INJECTION, SOLUTION INTRAVENOUS at 20:29

## 2024-12-08 RX ADMIN — CITALOPRAM HYDROBROMIDE 20 MG: 20 TABLET, FILM COATED ORAL at 08:35

## 2024-12-08 RX ADMIN — CHOLECALCIFEROL (VITAMIN D3) 10 MCG (400 UNIT) TABLET 400 UNITS: at 08:37

## 2024-12-08 RX ADMIN — ANASTROZOLE 1 MG: 1 TABLET, COATED ORAL at 08:37

## 2024-12-08 RX ADMIN — SODIUM CHLORIDE, SODIUM LACTATE, POTASSIUM CHLORIDE, AND CALCIUM CHLORIDE 100 ML/HR: .6; .31; .03; .02 INJECTION, SOLUTION INTRAVENOUS at 18:19

## 2024-12-08 RX ADMIN — PREDNISONE 1 MG: 1 TABLET ORAL at 08:34

## 2024-12-08 RX ADMIN — ATORVASTATIN CALCIUM 10 MG: 10 TABLET, FILM COATED ORAL at 08:34

## 2024-12-08 RX ADMIN — PREDNISONE 1 MG: 1 TABLET ORAL at 17:16

## 2024-12-08 RX ADMIN — POTASSIUM CHLORIDE 40 MEQ: 1500 TABLET, EXTENDED RELEASE ORAL at 11:33

## 2024-12-08 RX ADMIN — BUPROPION HYDROCHLORIDE 300 MG: 150 TABLET, FILM COATED, EXTENDED RELEASE ORAL at 08:34

## 2024-12-08 RX ADMIN — ENOXAPARIN SODIUM 40 MG: 40 INJECTION SUBCUTANEOUS at 08:34

## 2024-12-08 RX ADMIN — Medication 3 MG: at 23:01

## 2024-12-08 RX ADMIN — POTASSIUM CHLORIDE 40 MEQ: 1500 TABLET, EXTENDED RELEASE ORAL at 06:50

## 2024-12-08 RX ADMIN — PREDNISONE 1 MG: 1 TABLET ORAL at 20:28

## 2024-12-08 NOTE — ED NOTES
Patient ambulated independently with cane. Oxygen saturation remained above 98%.     Demetria Grimm RN  12/07/24 1879

## 2024-12-08 NOTE — PLAN OF CARE
Problem: PAIN - ADULT  Goal: Verbalizes/displays adequate comfort level or baseline comfort level  Description: Interventions:  - Encourage patient to monitor pain and request assistance  - Assess pain using appropriate pain scale  - Administer analgesics based on type and severity of pain and evaluate response  - Implement non-pharmacological measures as appropriate and evaluate response  - Consider cultural and social influences on pain and pain management  - Notify physician/advanced practitioner if interventions unsuccessful or patient reports new pain  Outcome: Progressing     Problem: INFECTION - ADULT  Goal: Absence or prevention of progression during hospitalization  Description: INTERVENTIONS:  - Assess and monitor for signs and symptoms of infection  - Monitor lab/diagnostic results  - Monitor all insertion sites, i.e. indwelling lines, tubes, and drains  - Monitor endotracheal if appropriate and nasal secretions for changes in amount and color  - [place] appropriate cooling/warming therapies per order  - Administer medications as ordered  - Instruct and encourage patient and family to use good hand hygiene technique  - Identify and instruct in appropriate isolation precautions for identified infection/condition  Outcome: Progressing  Goal: Absence of fever/infection during neutropenic period  Description: INTERVENTIONS:  - Monitor WBC    Outcome: Progressing     Problem: SAFETY ADULT  Goal: Patient will remain free of falls  Description: INTERVENTIONS:  - Educate patient/family on patient safety including physical limitations  - Instruct patient to call for assistance with activity   - Consult OT/PT to assist with strengthening/mobility   - Keep Call bell within reach  - Keep bed low and locked with side rails adjusted as appropriate  - Keep care items and personal belongings within reach  - Initiate and maintain comfort rounds  - Make Fall Risk Sign visible to staff  - Offer Toileting every 2 Hours,  in advance of need  - Initiate/Maintain bed alarm  - Obtain necessary fall risk management equipment: bed alarm   - Apply yellow socks and bracelet for high fall risk patients  - Consider moving patient to room near nurses station  Outcome: Progressing  Goal: Maintain or return to baseline ADL function  Description: INTERVENTIONS:  -  Assess patient's ability to carry out ADLs; assess patient's baseline for ADL function and identify physical deficits which impact ability to perform ADLs (bathing, care of mouth/teeth, toileting, grooming, dressing, etc.)  - Assess/evaluate cause of self-care deficits   - Assess range of motion  - Assess patient's mobility; develop plan if impaired  - Assess patient's need for assistive devices and provide as appropriate  - Encourage maximum independence but intervene and supervise when necessary  - Involve family in performance of ADLs  - Assess for home care needs following discharge   - Consider OT consult to assist with ADL evaluation and planning for discharge  - Provide patient education as appropriate  Outcome: Progressing  Goal: Maintains/Returns to pre admission functional level  Description: INTERVENTIONS:  - Perform AM-PAC 6 Click Basic Mobility/ Daily Activity assessment daily.  - Set and communicate daily mobility goal to care team and patient/family/caregiver.   - Collaborate with rehabilitation services on mobility goals if consulted  - Perform Range of Motion 3 times a day.  - Reposition patient every 2 hours.  - Dangle patient 3 times a day  - Stand patient 3 times a day  - Ambulate patient 3 times a day  - Out of bed to chair 3 times a day   - Out of bed for meals 3 times a day  - Out of bed for toileting  - Record patient progress and toleration of activity level   Outcome: Progressing     Problem: DISCHARGE PLANNING  Goal: Discharge to home or other facility with appropriate resources  Description: INTERVENTIONS:  - Identify barriers to discharge w/patient and  caregiver  - Arrange for needed discharge resources and transportation as appropriate  - Identify discharge learning needs (meds, wound care, etc.)  - Arrange for interpretive services to assist at discharge as needed  - Refer to Case Management Department for coordinating discharge planning if the patient needs post-hospital services based on physician/advanced practitioner order or complex needs related to functional status, cognitive ability, or social support system  Outcome: Progressing     Problem: Knowledge Deficit  Goal: Patient/family/caregiver demonstrates understanding of disease process, treatment plan, medications, and discharge instructions  Description: Complete learning assessment and assess knowledge base.  Interventions:  - Provide teaching at level of understanding  - Provide teaching via preferred learning methods  Outcome: Progressing

## 2024-12-08 NOTE — UTILIZATION REVIEW
Initial Clinical Review    WAS OBSERVATION 12/7/24 @ 2118 CONVERTED TO INPATIENT ADMISSION 12/9/24 @ 1524 DUE TO CONTINUED STAY REQUIRED TO CARE FOR PATIENT WITH DX: [facility].    Admission: Date/Time/Statement:   Admission Orders (From admission, onward)       Ordered        12/09/24 1524  INPATIENT ADMISSION  Once            12/07/24 2118  Place in Observation  Once                          Orders Placed This Encounter   Procedures    INPATIENT ADMISSION     Standing Status:   Standing     Number of Occurrences:   1     Level of Care:   Med Surg [16]     Estimated length of stay:   More than 2 Midnights     Certification:   I certify that inpatient services are medically necessary for this patient for a duration of greater than two midnights. See H&P and MD Progress Notes for additional information about the patient's course of treatment.     ED Arrival Information       Expected   -    Arrival   12/7/2024 16:51    Acuity   Urgent              Means of arrival   Walk-In    Escorted by   Family Member    Service   Hospitalist    Admission type   Emergency              Arrival complaint   SOB, dizziness, lightheaded             Chief Complaint   Patient presents with    Dizziness     Dizziness, shortness of breath and feeling faint when standing and with position change.        Initial Presentation: 74 y.o. female to ED via walk-in from home  Present to ED with worsening dyspnea on exertion. Endorses dizziness, lightheadedness and shortness of breath which required her to sit down.   PMHX breast cancer, hypertension, hyperlipidemia, CAD s/p CABG   Admitted to OBS with DX: Dyspnea on exertion   on exam: hypotensive; tachypnea; BNP was elevated to 295, troponin negative, D-dimer 0.53. orthostatic vitals were positive and she received a 1 L normal saline bolus. Heme 9.8; K 3.2  PLAN: tele monitoring; monitor labs; f/u repeat orthostatics; f/u COVID/FLU;  Hold metoprolol and lasix due to low blood pressures       Anticipated  Length of Stay/Certification Statement: Patient will be admitted on an observation basis with an anticipated length of stay of less than 2 midnights secondary to dyspnea on exertion.       Date: 12/8/24    Day 2 - OBSERVATION  (+) orthostatic with BP down to 80/51 last night;  gradual downtrending of hemoglobin and platelet count.   Plan: recd ivf bolus 1L x1; start ivf; tele monitoring; monitor labs; f/u repeat orthostatics after ivf; f/u COVID/FLU;  Hold metoprolol and lasix due to low blood pressures; f/u  vitamin B12, folic acid and reticulocyte index.       Date: 12/9/24 - CHANGED TO INPATIENT  She denies any recurrence of her episodes of dyspnea and dizziness/lightheadedness while in the hospital. Still feeling fatigued. WBC 3.09; heme 7.6; AG . Covid/FLU/RSV negative;  Folate, B12 within normal limits. Reticulocyte count percentage 0.9%, reticulocyte index is hypoproliferative at 0.3   Plan: cont ivf; tele monitoring; monitor labs; f/u repeat orthostatics after ivf; f/u COVID/FLU;  Hold metoprolol and lasix due to low blood pressures; f/u HIV, hep C, EBV panel; heme / onc consult       HEME / ONC CONSULT  Hematology was consulted for pancytopenia.   Given the patient's age, progressively worsening pancytopenia with macrocytic anemia in the setting of a positive smoking history, and normal B12 and folate levels, we concerned that she may be developing MDS.  She did endorse a history of RA. In terms of medications, she notably did not receive chemotherapy or radiation for her breast cancer. There are case reports of reversible HT-induced pancytopenia but this would be highly unusual. Bupropion also is associated with pancytopenia, but again would be very rare.   Plan: consult IR for bone marrow biopsy with flow cytometry.  Ordered B12 1000 mg IM daily x3 doses as level of 381     ED Treatment-Medication Administration from 12/07/2024 9631 to 12/07/2024 6287         Date/Time Order Dose Route Action      12/07/2024 1901 potassium chloride (Klor-Con M20) CR tablet 40 mEq 40 mEq Oral Given     12/07/2024 2127 sodium chloride 0.9 % bolus 1,000 mL 1,000 mL Intravenous New Bag            Scheduled Medications:  anastrozole, 1 mg, Oral, Daily  atorvastatin, 10 mg, Oral, Daily  buPROPion, 300 mg, Oral, QAM  cholecalciferol, 400 Units, Oral, Daily  citalopram, 20 mg, Oral, Daily  enoxaparin, 40 mg, Subcutaneous, Daily  metoprolol succinate, 50 mg, Oral, Daily  potassium chloride, 40 mEq, Oral, Once  predniSONE, 1 mg, Oral, TID    sodium chloride 0.9 % bolus 1,000 mL  Dose: 1,000 mL  Freq: Once Route: IV  Last Dose: 1,000 mL (12/08/24 2029)  Start: 12/08/24 1945 End: 12/09/24 0029       Continuous IV Infusions: lactated ringers infusion Rate: 100 mL/hr         PRN Meds:  traMADol, 50 mg, Oral, Q6H PRN      ED Triage Vitals   Temperature Pulse Respirations Blood Pressure SpO2 Pain Score   12/07/24 1707 12/07/24 1707 12/07/24 1707 12/07/24 1707 12/07/24 1707 12/07/24 2239   98.5 °F (36.9 °C) 98 20 94/62 100 % No Pain     Weight (last 2 days)       Date/Time Weight    12/07/24 2239 83.1 (183.2)            Vital Signs (last 3 days)       Date/Time Temp Pulse Resp BP MAP (mmHg) SpO2 O2 Device Patient Position - Orthostatic VS Jose Luis Coma Scale Score Pain    12/09/24 15:42:31 98.5 °F (36.9 °C) 87 16 108/83 91 100 % -- Lying -- --    12/09/24 14:03:34 -- 100 -- 108/72 84 97 % -- Standing for 3 minutes - Orthostatic VS -- --    12/09/24 14:02:52 -- 101 -- 95/63 74 98 % -- -- -- --    12/09/24 14:02:01 -- 101 -- 95/63 74 98 % -- Standing - Orthostatic VS -- --    12/09/24 14:01:02 -- 93 -- 102/67 79 97 % -- Sitting - Orthostatic VS -- --    12/09/24 14:00:01 -- 89 -- 102/63 76 96 % -- Lying - Orthostatic VS -- --    12/09/24 1045 -- -- -- -- -- -- -- -- -- 6    12/09/24 0800 -- -- -- -- -- -- -- -- 15 --    12/09/24 07:21:12 97.7 °F (36.5 °C) 81 18 101/62 75 96 % -- -- -- --    12/08/24 22:43:06 97.9 °F (36.6 °C) 84 17 112/62 79 95  % -- -- -- --    12/08/24 2000 -- -- -- -- -- -- None (Room air) -- 15 No Pain    12/08/24 18:17:11 97.9 °F (36.6 °C) 84 19 109/69 82 98 % -- -- -- --    12/08/24 1655 -- -- -- 83/51 62 98 % None (Room air) Standing - Orthostatic VS -- --    12/08/24 1653 -- -- -- 99/56 73 97 % None (Room air) Sitting - Orthostatic VS -- --    12/08/24 1650 98.3 °F (36.8 °C) 85 17 102/55 76 96 % None (Room air) Lying - Orthostatic VS -- --    12/08/24 1141 98.4 °F (36.9 °C) 85 18 108/68 84 94 % None (Room air) Lying -- --    12/08/24 0809 98 °F (36.7 °C) 82 18 109/67 84 96 % None (Room air) Lying 15 No Pain    12/07/24 2255 -- -- -- -- -- -- -- -- 15 No Pain    12/07/24 2239 97.8 °F (36.6 °C) 86 20 111/57 81 94 % None (Room air) Lying -- No Pain    12/07/24 2200 -- 84 18 97/62 76 99 % None (Room air) Lying -- --    12/07/24 2119 -- 87 18 115/62 83 100 % None (Room air) Sitting -- --    12/07/24 2041 -- 91 18 80/51 -- -- -- Standing - Orthostatic VS -- --    12/07/24 2038 -- 89 18 96/54 -- -- -- Sitting - Orthostatic VS -- --    12/07/24 2037 -- 84 18 96/63 -- -- -- Lying - Orthostatic VS -- --    12/07/24 1707 98.5 °F (36.9 °C) 98 20 94/62 73 100 % None (Room air) Sitting -- --              Pertinent Labs/Diagnostic Test Results:   Radiology:  XR chest 2 views   ED Interpretation by Leeann Granados MD (12/07 1838)   No acute cardiac, pulmonary, or osseous processes noted.        Final Interpretation by Manisha Barrera MD (12/08 0827)      No acute cardiopulmonary disease.      Moderate hiatal hernia.         Workstation performed: [id number]             Results from last 7 days   Lab Units 12/07/24  2304   SARS-COV-2  Negative     Results from last 7 days   Lab Units 12/09/24  0434 12/08/24  0503 12/08/24  0001 12/07/24  1714   WBC Thousand/uL 3.09* 4.53  --  4.91   HEMOGLOBIN g/dL 7.6* 8.1*  --  9.8*   HEMATOCRIT % 22.5* 23.8*  --  29.2*   PLATELETS Thousands/uL 59* 61* 62* 76*   TOTAL NEUT ABS Thousands/µL 1.04* 1.61*  --   3.10        Results from last 7 days   Lab Units 12/09/24  0434 12/08/24  0503 12/07/24  1714   SODIUM mmol/L 140 140 138   POTASSIUM mmol/L 4.2 3.1* 3.2*   CHLORIDE mmol/L 112* 107 102   CO2 mmol/L 25 27 26   ANION GAP mmol/L 3* 6 10   BUN mg/dL 19 23 25   CREATININE mg/dL 0.91 1.15 1.25   EGFR ml/min/1.73sq m 63 47 43   CALCIUM mg/dL 8.3* 8.5 9.4   MAGNESIUM mg/dL 2.0  --   --      Results from last 7 days   Lab Units 12/07/24  1714   AST U/L 16   ALT U/L 15   ALK PHOS U/L 57   TOTAL PROTEIN g/dL 7.9   ALBUMIN g/dL 4.1   TOTAL BILIRUBIN mg/dL 0.65        Results from last 7 days   Lab Units 12/09/24  0434 12/08/24  0503 12/07/24  1714   GLUCOSE RANDOM mg/dL 86 90 103        Results from last 7 days   Lab Units 12/08/24  0001 12/07/24  2021 12/07/24  1714   HS TNI 0HR ng/L  --   --  16   HS TNI 2HR ng/L  --  15  --    HSTNI D2 ng/L  --  -1  --    HS TNI 4HR ng/L 13  --   --    HSTNI D4 ng/L -3  --   --      Results from last 7 days   Lab Units 12/07/24  1714   D-DIMER QUANTITATIVE ug/ml FEU 0.53*        Results from last 7 days   Lab Units 12/07/24  1714   BNP pg/mL 295*        Results from last 7 days   Lab Units 12/07/24  2304   INFLUENZA A PCR  Negative   INFLUENZA B PCR  Negative   RSV PCR  Negative          Past Medical History:   Diagnosis Date    Allergic     Anxiety     Arthritis     Breast cancer ([facility]) 09/2023    CAD (coronary artery disease)     Coronary artery disease [id number]    Depression     Diabetes mellitus ([facility]) 5/1/24    Mother-Diabetic    Dyslipidemia     Hiatal hernia     Hyperlipidemia     Hypertension     Obesity     Obesity (BMI 30-39.9)     Psoriatic arthritis ([facility])     Rheumatoid arthritis ([facility])     Scoliosis     SOB (shortness of breath)      Present on Admission:   Dyspnea on exertion   Essential hypertension   Dyslipidemia   Coronary artery disease involving native coronary artery of native heart without angina pectoris      Admitting Diagnosis: Dizziness [R42]  Dyspnea  [R06.00]  Hypotensive episode [I95.9]  Age/Sex: 74 y.o. female    Network Utilization Review Department  ATTENTION: Please call with any questions or concerns to [phone] and carefully listen to the prompts so that you are directed to the right person. All voicemails are confidential.   For Discharge needs, contact Care Management DC Support Team at [phone] opt. [phone]  Send all requests for admission clinical reviews, approved or denied determinations and any other requests to dedicated fax number below belonging to the campus where the patient is receiving treatment. List of dedicated fax numbers for the Facilities:  FACILITY NAME UR FAX NUMBER   ADMISSION DENIALS (Administrative/Medical Necessity) [phone]   DISCHARGE SUPPORT TEAM (NETWORK) [phone]   PARENT CHILD HEALTH (Maternity/NICU/Pediatrics) [phone]   [facility] [phone]   [facility] [phone]   [facility] [phone]   [facility] [phone]   [facility] [phone]   [facility] [phone]   [facility] [phone]   [facility] [phone]   [facility] [phone]   [facility] [phone]   [facility] [phone]   [facility] [phone]

## 2024-12-08 NOTE — H&P
H&P - Hospitalist   Name: Pretty Aguila 74 y.o. female I MRN: [id number]  Unit/Bed#: -01 I Date of Admission: 12/7/2024   Date of Service: 12/7/2024 I Hospital Day: 0     Assessment & Plan  Dyspnea on exertion  Progressively worsening CAMPOS   History of similar presentation previously   CXR: wet read no acute cardiopulmonary disease   Orthostatic vitals positive     Orthostatic hypotension vs PE vs CHF vs COVID/FLU  Less likely concern for PE due to Wells score of 1 and absence of tachycardia and leg swelling, however notable history of breast cancer   Less likely concern for CHF due to absent signs of volume overload including JVD and lower extremity edema    Plan  Repeat orthostatic vitals in AM  If continued to be positive can consider fluids vs starting midodrine   If signs of volume overload can consider diuresis   Hold metoprolol and lasix due to low blood pressures   F/u COVID/FLU  Malignant neoplasm of lower-outer quadrant of left breast of female, estrogen receptor positive (HCC)  S/p left breast mastectomy and reconstruction with submuscular tissue expander with acellular dermal matrix   Essential hypertension  Lasix 40 mg BID and Toprol XL 50 mg at home    Plan   Hold medications due to low blood pressure   Dyslipidemia  Continue PTA atorvastatin 10 mg  Coronary artery disease involving native coronary artery of native heart without angina pectoris  Follows with Dr. Conrad  Continue PTA atorvastatin 10 mg       VTE Pharmacologic Prophylaxis: VTE Score: 3 Moderate Risk (Score 3-4) - Pharmacological DVT Prophylaxis Ordered: enoxaparin (Lovenox).  Code Status: Level 1 - Full Code   Discussion with family: Updated  (daughter) at bedside.    Anticipated Length of Stay: Patient will be admitted on an observation basis with an anticipated length of stay of less than 2 midnights secondary to dyspnea on exertion.    History of Present Illness   Chief Complaint: Dyspnea on exertion     Pretty  TIMOTEO Aguila is a 74 y.o. female with a PMH of breast cancer, hypertension, hyperlipidemia, CAD s/p CABG who presents with dyspnea on exertion.  She reports a history of dyspnea on exertion and states that it has been progressively worsening.  Today she was at the grocery store and experienced dizziness, lightheadedness and shortness of breath which required her to sit down.  She states improvement with rest which is consistent with her previous episodes.  Daughter at bedside endorses adequate fluid intake however patient reports that she takes small sips of water frequently and endorses bright yellow urine.  Previous outpatient notes encourage oral hydration and cardiac workup including echocardiogram was unremarkable with an ejection fraction of 55%.  In the ED BNP was elevated to 295, troponin negative, D-dimer 0.53.  Chest x-ray wet read revealed no acute cardiopulmonary disease.  Her orthostatic vitals were positive and she received a 1 L normal saline bolus.    Review of Systems   Constitutional:  Negative for chills and fever.   HENT:  Negative for ear pain and sore throat.    Eyes:  Negative for pain and visual disturbance.   Respiratory:  Negative for cough and shortness of breath.    Cardiovascular:  Negative for chest pain and palpitations.   Gastrointestinal:  Negative for abdominal pain and vomiting.   Genitourinary:  Negative for dysuria and hematuria.   Musculoskeletal:  Positive for arthralgias. Negative for back pain.   Skin:  Negative for color change and rash.   Neurological:  Positive for dizziness and light-headedness. Negative for seizures, syncope and headaches.   All other systems reviewed and are negative.      Historical Information   Past Medical History:   Diagnosis Date    Allergic     Anxiety     Arthritis     Breast cancer (HCC) 09/2023    CAD (coronary artery disease)     Coronary artery disease [id number]    Depression     Diabetes mellitus (HCC) 5/1/24    Mother-Diabetic    Dyslipidemia      Hiatal hernia     Hyperlipidemia     Hypertension     Obesity     Obesity (BMI 30-39.9)     Psoriatic arthritis (HCC)     Rheumatoid arthritis (HCC)     Scoliosis     SOB (shortness of breath)      Past Surgical History:   Procedure Laterality Date    BARIATRIC SURGERY  2014    BILE DUCT EXPLORATION      replacement per Steffanie    BREAST BIOPSY  9/23    CARPAL TUNNEL RELEASE Bilateral 2002    CORONARY ARTERY BYPASS GRAFT  2017    FOOT SURGERY Right     bone surgery to straighten toe.    HIP SURGERY Left 2015    IR DRAINAGE TUBE PLACEMENT  12/19/2023    JOINT REPLACEMENT      LYMPH NODE BIOPSY Left 11/20/2023    Procedure: LEFT LYMPHATIC MAPPING WITH BLUE AND RADIOACTIVE DYES, SENTINEL LYMPH NODE BIOPSY;  Surgeon: Adrien Gabriel MD;  Location: [facility];  Service: Surgical Oncology    LYMPH NODE DISSECTION Left 11/20/2023    Procedure: POSSIBLE AXILLARY DISSECTION;  Surgeon: Adrien Gabriel MD;  Location: [facility];  Service: Surgical Oncology    UT BREAST REDUCTION Bilateral 01/16/2024    Procedure: RIGHT BREAST REDUCTION AND LEFT BREAST EXPANDER EXCHANGE FOR PERMANENT IMPLANT. REVISION OF RECONSTRUCTED LEFT BREAST.;  Surgeon: Molina Jimenez MD;  Location: [facility];  Service: Plastics    UT SUCTION ASSISTED LIPECTOMY TRUNK Bilateral 01/16/2024    Procedure: LIPOSUCTION BREAST;  Surgeon: Molina Jimenez MD;  Location: [facility];  Service: Plastics    UT TISSUE EXPANDER PLACEMENT BREAST RECONSTRUCTION Left 11/20/2023    Procedure: IMMEDIATE LEFT BREAST RECONSTRUCTION WITH TISSUE EXPANDER AND ADM;  Surgeon: Molina Jimenez MD;  Location: [facility];  Service: Plastics    REPLACEMENT TOTAL KNEE Right 04/2017    SIMPLE MASTECTOMY Left 11/20/2023    Procedure: LEFT BREAST VERENICE  DIRECTED MASTECTOMY;  Surgeon: Adrien Gabriel MD;  Location:  MAIN OR;  Service: Surgical Oncology    SLEEVE GASTROPLASTY      TONSILLECTOMY      TOTAL HIP ARTHROPLASTY Right 2017    US GUIDED BREAST BIOPSY LEFT COMPLETE Left 09/19/2023     Social  History     Tobacco Use    Smoking status: Former     Current packs/day: 0.00     Average packs/day: 0.5 packs/day for 7.0 years (3.5 ttl pk-yrs)     Types: Cigarettes     Start date: 2010     Quit date:      Years since quittin.9    Smokeless tobacco: Never    Tobacco comments:     Have already quit smoking in 2017   Vaping Use    Vaping status: Never Used   Substance and Sexual Activity    Alcohol use: Yes     Alcohol/week: 2.0 standard drinks of alcohol     Types: 2 Glasses of wine per week     Comment: social     Drug use: No    Sexual activity: Not Currently     Partners: Male     Birth control/protection: None     E-Cigarette/Vaping    E-Cigarette Use Never User      E-Cigarette/Vaping Substances    Nicotine No     THC No     CBD No     Flavoring No     Other No      Family History   Problem Relation Age of Onset    Hypertension Mother     Diabetes Mother     Heart disease Father         CABG x5    Arthritis Father     No Known Problems Sister     No Known Problems Sister     No Known Problems Daughter     No Known Problems Daughter     No Known Problems Son     Breast cancer Neg Hx      Social History:  Marital Status: /Civil Union   Occupation:   Patient Pre-hospital Living Situation: Home  Patient Pre-hospital Level of Mobility: walks with cane  Patient Pre-hospital Diet Restrictions: None    Meds/Allergies   I have reviewed home medications with patient personally.  Prior to Admission medications    Medication Sig Start Date End Date Taking? Authorizing Provider   anastrozole (ARIMIDEX) 1 mg tablet TAKE 1 TABLET (1 MG TOTAL) BY MOUTH DAILY 24  Yes Geno Carranza MD   atorvastatin (LIPITOR) 10 mg tablet TAKE 1 TABLET BY MOUTH DAILY 24  Yes Cortney Conrad MD   buPROPion (WELLBUTRIN XL) 300 mg 24 hr tablet TAKE 1 TABLET (300 MG TOTAL) BY MOUTH EVERY MORNING 10/22/24 10/17/25 Yes Lauren Dumont MD   cholecalciferol (VITAMIN D3) 400 units tablet Take 400 Units by mouth daily    Yes Historical Provider, MD   citalopram (CeleXA) 20 mg tablet Take 1 tablet (20 mg total) by mouth daily 8/6/24  Yes Lauren Dumont MD   furosemide (LASIX) 40 mg tablet TAKE 1 TABLET (40 MG TOTAL) BY MOUTH 2 (TWO) TIMES A DAY 2/14/24  Yes Cortney Conrad MD   Multiple Vitamin (multivitamin) tablet Take 1 tablet by mouth daily   Yes Historical Provider, MD   nystatin (MYCOSTATIN) cream Apply topically 2 (two) times a day  Patient taking differently: Apply topically as needed 3/22/23  Yes JOCELYN Nunez   nystatin (MYCOSTATIN) powder Apply topically 2 (two) times a day  Patient taking differently: Apply topically if needed 3/22/23  Yes JOCELYN Nunez   predniSONE 1 mg tablet TAKE 1 TABLET (1 MG TOTAL) BY MOUTH 3 (THREE) TIMES A DAY. 3/20/24  Yes Historical Provider, MD   Semaglutide-Weight Management (WEGOVY) 2.4 MG/0.75ML Inject 0.75 mL (2.4 mg total) under the skin once a week 11/14/24  Yes Dagmar Jorgensen MD   traMADol (ULTRAM) 50 mg tablet    Yes Historical Provider, MD   Apremilast 30 MG TABS Take 30 mg by mouth 2 (two) times a day  Patient not taking: Reported on 8/9/2024 1/11/23   Historical Provider, MD   ixekizumab (Taltz) 80 MG/ML subcutaneous injection Inject 80 mg under the skin every 28 days  Patient not taking: Reported on 12/7/2024 10/22/24 8/27/25  Historical Provider, MD   LORazepam (ATIVAN) 0.5 mg tablet TAKE 1 TABLET (0.5 MG TOTAL) BY MOUTH EVERY 8 (EIGHT) HOURS AS NEEDED FOR ANXIETY 3/28/24   Lauren Dumont MD   metFORMIN (GLUCOPHAGE-XR) 500 mg 24 hr tablet Take 1 tablet (500 mg total) by mouth daily with dinner  Patient not taking: Reported on 12/7/2024 8/9/24   Dagmar Jorgensen MD   metoprolol succinate (TOPROL-XL) 50 mg 24 hr tablet Take 1 tablet (50 mg total) by mouth daily Patient would like these sent to her via the mail. 8/7/24   Cortnye Conrad MD   naloxone (NARCAN) 4 mg/0.1 mL nasal spray Administer 1 spray into a nostril. If no response after 2-3  minutes, give another dose in the other nostril using a new spray. 1/14/24 1/13/25  Molina Jimenez MD   traMADol (ULTRAM-ER) 100 mg 24 hr tablet Take 100 mg by mouth daily  Patient not taking: Reported on 11/14/2024 3/12/24   Historical Provider, MD     Allergies   Allergen Reactions    Iodine - Food Allergy Anaphylaxis     Reaction Date: 07Jan2008;     Povidone Iodine Anaphylaxis    Shellfish-Derived Products - Food Allergy Anaphylaxis       Objective :  Temp:  [97.8 °F (36.6 °C)-98.5 °F (36.9 °C)] 97.8 °F (36.6 °C)  HR:  [84-98] 86  BP: ()/(51-63) 111/57  Resp:  [18-20] 20  SpO2:  [94 %-100 %] 94 %  O2 Device: None (Room air)    Physical Exam  Vitals and nursing note reviewed.   Constitutional:       General: She is not in acute distress.     Appearance: She is well-developed.   HENT:      Head: Normocephalic and atraumatic.      Mouth/Throat:      Mouth: Mucous membranes are dry.   Eyes:      Conjunctiva/sclera: Conjunctivae normal.   Cardiovascular:      Rate and Rhythm: Normal rate and regular rhythm.      Heart sounds: No murmur heard.  Pulmonary:      Effort: Pulmonary effort is normal. No respiratory distress.      Breath sounds: Normal breath sounds.   Abdominal:      Palpations: Abdomen is soft.      Tenderness: There is no abdominal tenderness.   Musculoskeletal:      Cervical back: Neck supple.      Right lower leg: No edema.      Left lower leg: No edema.   Skin:     General: Skin is warm and dry.      Capillary Refill: Capillary refill takes less than 2 seconds.   Neurological:      Mental Status: She is alert and oriented to person, place, and time.      Motor: No weakness.   Psychiatric:         Mood and Affect: Mood normal.         Behavior: Behavior normal.          Lines/Drains:            Lab Results: I have reviewed the following results:  Results from last 7 days   Lab Units 12/07/24  1714   WBC Thousand/uL 4.91   HEMOGLOBIN g/dL 9.8*   HEMATOCRIT % 29.2*   PLATELETS Thousands/uL 76*    SEGS PCT % 63   LYMPHO PCT % 28   MONO PCT % 7   EOS PCT % 1     Results from last 7 days   Lab Units 12/07/24  1714   SODIUM mmol/L 138   POTASSIUM mmol/L 3.2*   CHLORIDE mmol/L 102   CO2 mmol/L 26   BUN mg/dL 25   CREATININE mg/dL 1.25   ANION GAP mmol/L 10   CALCIUM mg/dL 9.4   ALBUMIN g/dL 4.1   TOTAL BILIRUBIN mg/dL 0.65   ALK PHOS U/L 57   ALT U/L 15   AST U/L 16   GLUCOSE RANDOM mg/dL 103             Lab Results   Component Value Date    HGBA1C 5.9 (H) 10/26/2024    HGBA1C 5.7 03/27/2024           Imaging Results Review: I reviewed radiology reports from this admission including: chest xray.  Other Study Results Review: No additional pertinent studies reviewed.    Administrative Statements       ** Please Note: This note has been constructed using a voice recognition system. **

## 2024-12-08 NOTE — ASSESSMENT & PLAN NOTE
S/p left breast mastectomy and reconstruction with submuscular tissue expander with acellular dermal matrix

## 2024-12-08 NOTE — PLAN OF CARE
Problem: PAIN - ADULT  Goal: Verbalizes/displays adequate comfort level or baseline comfort level  Description: Interventions:  - Encourage patient to monitor pain and request assistance  - Assess pain using appropriate pain scale  - Administer analgesics based on type and severity of pain and evaluate response  - Implement non-pharmacological measures as appropriate and evaluate response  - Consider cultural and social influences on pain and pain management  - Notify physician/advanced practitioner if interventions unsuccessful or patient reports new pain  Outcome: Progressing     Problem: INFECTION - ADULT  Goal: Absence or prevention of progression during hospitalization  Description: INTERVENTIONS:  - Assess and monitor for signs and symptoms of infection  - Monitor lab/diagnostic results  - Monitor all insertion sites, i.e. indwelling lines, tubes, and drains  - Monitor endotracheal if appropriate and nasal secretions for changes in amount and color  - [place] appropriate cooling/warming therapies per order  - Administer medications as ordered  - Instruct and encourage patient and family to use good hand hygiene technique  - Identify and instruct in appropriate isolation precautions for identified infection/condition  Outcome: Progressing  Goal: Absence of fever/infection during neutropenic period  Description: INTERVENTIONS:  - Monitor WBC    Outcome: Progressing     Problem: SAFETY ADULT  Goal: Patient will remain free of falls  Description: INTERVENTIONS:  - Educate patient/family on patient safety including physical limitations  - Instruct patient to call for assistance with activity   - Consult OT/PT to assist with strengthening/mobility   - Keep Call bell within reach  - Keep bed low and locked with side rails adjusted as appropriate  - Keep care items and personal belongings within reach  - Initiate and maintain comfort rounds  - Make Fall Risk Sign visible to staff  - Offer Toileting every  Hours,  in advance of need  - Initiate/Maintain alarm  - Obtain necessary fall risk management equipment:   - Apply yellow socks and bracelet for high fall risk patients  - Consider moving patient to room near nurses station  Outcome: Progressing  Goal: Maintain or return to baseline ADL function  Description: INTERVENTIONS:  -  Assess patient's ability to carry out ADLs; assess patient's baseline for ADL function and identify physical deficits which impact ability to perform ADLs (bathing, care of mouth/teeth, toileting, grooming, dressing, etc.)  - Assess/evaluate cause of self-care deficits   - Assess range of motion  - Assess patient's mobility; develop plan if impaired  - Assess patient's need for assistive devices and provide as appropriate  - Encourage maximum independence but intervene and supervise when necessary  - Involve family in performance of ADLs  - Assess for home care needs following discharge   - Consider OT consult to assist with ADL evaluation and planning for discharge  - Provide patient education as appropriate  Outcome: Progressing  Goal: Maintains/Returns to pre admission functional level  Description: INTERVENTIONS:  - Perform AM-PAC 6 Click Basic Mobility/ Daily Activity assessment daily.  - Set and communicate daily mobility goal to care team and patient/family/caregiver.   - Collaborate with rehabilitation services on mobility goals if consulted  - Perform Range of Motion  times a day.  - Reposition patient every  hours.  - Dangle patient  times a day  - Stand patient  times a day  - Ambulate patient  times a day  - Out of bed to chair  times a day   - Out of bed for meals times a day  - Out of bed for toileting  - Record patient progress and toleration of activity level   Outcome: Progressing     Problem: DISCHARGE PLANNING  Goal: Discharge to home or other facility with appropriate resources  Description: INTERVENTIONS:  - Identify barriers to discharge w/patient and caregiver  - Arrange for  needed discharge resources and transportation as appropriate  - Identify discharge learning needs (meds, wound care, etc.)  - Arrange for interpretive services to assist at discharge as needed  - Refer to Case Management Department for coordinating discharge planning if the patient needs post-hospital services based on physician/advanced practitioner order or complex needs related to functional status, cognitive ability, or social support system  Outcome: Progressing     Problem: Knowledge Deficit  Goal: Patient/family/caregiver demonstrates understanding of disease process, treatment plan, medications, and discharge instructions  Description: Complete learning assessment and assess knowledge base.  Interventions:  - Provide teaching at level of understanding  - Provide teaching via preferred learning methods  Outcome: Progressing

## 2024-12-08 NOTE — ASSESSMENT & PLAN NOTE
Progressively worsening CAMPOS   History of similar presentation previously   CXR: wet read no acute cardiopulmonary disease   Orthostatic vitals positive     Orthostatic hypotension vs PE vs CHF vs COVID/FLU  Less likely concern for PE due to Wells score of 1 and absence of tachycardia and leg swelling, however notable history of breast cancer   Less likely concern for CHF due to absent signs of volume overload including JVD and lower extremity edema    Plan  Repeat orthostatic vitals in AM  If continued to be positive can consider fluids vs starting midodrine   If signs of volume overload can consider diuresis   Hold metoprolol and lasix due to low blood pressures   F/u COVID/FLU

## 2024-12-09 PROBLEM — D61.818 PANCYTOPENIA (HCC): Status: ACTIVE | Noted: 2024-12-09

## 2024-12-09 LAB
ANION GAP SERPL CALCULATED.3IONS-SCNC: 3 MMOL/L (ref 4–13)
BASOPHILS # BLD AUTO: 0.01 THOUSANDS/ΜL (ref 0–0.1)
BASOPHILS NFR BLD AUTO: 0 % (ref 0–1)
BUN SERPL-MCNC: 19 MG/DL (ref 5–25)
CALCIUM SERPL-MCNC: 8.3 MG/DL (ref 8.4–10.2)
CHLORIDE SERPL-SCNC: 112 MMOL/L (ref 96–108)
CO2 SERPL-SCNC: 25 MMOL/L (ref 21–32)
CREAT SERPL-MCNC: 0.91 MG/DL (ref 0.6–1.3)
EOSINOPHIL # BLD AUTO: 0.08 THOUSAND/ΜL (ref 0–0.61)
EOSINOPHIL NFR BLD AUTO: 3 % (ref 0–6)
ERYTHROCYTE [DISTWIDTH] IN BLOOD BY AUTOMATED COUNT: 13.8 % (ref 11.6–15.1)
GFR SERPL CREATININE-BSD FRML MDRD: 63 ML/MIN/1.73SQ M
GLUCOSE SERPL-MCNC: 86 MG/DL (ref 65–140)
HAV IGM SER QL: NORMAL
HBV CORE IGM SER QL: NORMAL
HBV SURFACE AG SER QL: NORMAL
HCT VFR BLD AUTO: 22.5 % (ref 34.8–46.1)
HCV AB SER QL: NORMAL
HGB BLD-MCNC: 7.6 G/DL (ref 11.5–15.4)
HIV 1+2 AB+HIV1 P24 AG SERPL QL IA: NORMAL
HIV 2 AB SERPL QL IA: NORMAL
HIV1 AB SERPL QL IA: NORMAL
HIV1 P24 AG SERPL QL IA: NORMAL
IMM GRANULOCYTES # BLD AUTO: 0.01 THOUSAND/UL (ref 0–0.2)
IMM GRANULOCYTES NFR BLD AUTO: 0 % (ref 0–2)
LYMPHOCYTES # BLD AUTO: 1.71 THOUSANDS/ΜL (ref 0.6–4.47)
LYMPHOCYTES NFR BLD AUTO: 55 % (ref 14–44)
MAGNESIUM SERPL-MCNC: 2 MG/DL (ref 1.9–2.7)
MCH RBC QN AUTO: 36.2 PG (ref 26.8–34.3)
MCHC RBC AUTO-ENTMCNC: 33.8 G/DL (ref 31.4–37.4)
MCV RBC AUTO: 107 FL (ref 82–98)
MONOCYTES # BLD AUTO: 0.24 THOUSAND/ΜL (ref 0.17–1.22)
MONOCYTES NFR BLD AUTO: 8 % (ref 4–12)
NEUTROPHILS # BLD AUTO: 1.04 THOUSANDS/ΜL (ref 1.85–7.62)
NEUTS SEG NFR BLD AUTO: 34 % (ref 43–75)
NRBC BLD AUTO-RTO: 0 /100 WBCS
PLATELET # BLD AUTO: 59 THOUSANDS/UL (ref 149–390)
PMV BLD AUTO: 12.4 FL (ref 8.9–12.7)
POTASSIUM SERPL-SCNC: 4.2 MMOL/L (ref 3.5–5.3)
RBC # BLD AUTO: 2.1 MILLION/UL (ref 3.81–5.12)
SODIUM SERPL-SCNC: 140 MMOL/L (ref 135–147)
WBC # BLD AUTO: 3.09 THOUSAND/UL (ref 4.31–10.16)

## 2024-12-09 PROCEDURE — 83735 ASSAY OF MAGNESIUM: CPT

## 2024-12-09 PROCEDURE — 86665 EPSTEIN-BARR CAPSID VCA: CPT

## 2024-12-09 PROCEDURE — 99223 1ST HOSP IP/OBS HIGH 75: CPT | Performed by: INTERNAL MEDICINE

## 2024-12-09 PROCEDURE — 85025 COMPLETE CBC W/AUTO DIFF WBC: CPT

## 2024-12-09 PROCEDURE — 87389 HIV-1 AG W/HIV-1&-2 AB AG IA: CPT

## 2024-12-09 PROCEDURE — 86664 EPSTEIN-BARR NUCLEAR ANTIGEN: CPT

## 2024-12-09 PROCEDURE — 86663 EPSTEIN-BARR ANTIBODY: CPT

## 2024-12-09 PROCEDURE — 80074 ACUTE HEPATITIS PANEL: CPT

## 2024-12-09 PROCEDURE — 99232 SBSQ HOSP IP/OBS MODERATE 35: CPT | Performed by: HOSPITALIST

## 2024-12-09 PROCEDURE — 80048 BASIC METABOLIC PNL TOTAL CA: CPT

## 2024-12-09 RX ORDER — CYANOCOBALAMIN 1000 UG/ML
1000 INJECTION, SOLUTION INTRAMUSCULAR; SUBCUTANEOUS
Status: DISCONTINUED | OUTPATIENT
Start: 2024-12-09 | End: 2024-12-12 | Stop reason: HOSPADM

## 2024-12-09 RX ADMIN — CITALOPRAM HYDROBROMIDE 20 MG: 20 TABLET, FILM COATED ORAL at 10:37

## 2024-12-09 RX ADMIN — TRAMADOL HYDROCHLORIDE 50 MG: 50 TABLET ORAL at 10:45

## 2024-12-09 RX ADMIN — PREDNISONE 1 MG: 1 TABLET ORAL at 21:54

## 2024-12-09 RX ADMIN — Medication 3 MG: at 21:54

## 2024-12-09 RX ADMIN — PREDNISONE 1 MG: 1 TABLET ORAL at 16:47

## 2024-12-09 RX ADMIN — CYANOCOBALAMIN 1000 MCG: 1000 INJECTION, SOLUTION INTRAMUSCULAR; SUBCUTANEOUS at 16:47

## 2024-12-09 RX ADMIN — ENOXAPARIN SODIUM 40 MG: 40 INJECTION SUBCUTANEOUS at 10:36

## 2024-12-09 RX ADMIN — CHOLECALCIFEROL (VITAMIN D3) 10 MCG (400 UNIT) TABLET 400 UNITS: at 10:36

## 2024-12-09 RX ADMIN — BUPROPION HYDROCHLORIDE 300 MG: 150 TABLET, FILM COATED, EXTENDED RELEASE ORAL at 10:37

## 2024-12-09 RX ADMIN — ANASTROZOLE 1 MG: 1 TABLET, COATED ORAL at 11:45

## 2024-12-09 RX ADMIN — PREDNISONE 1 MG: 1 TABLET ORAL at 10:36

## 2024-12-09 RX ADMIN — ATORVASTATIN CALCIUM 10 MG: 10 TABLET, FILM COATED ORAL at 10:37

## 2024-12-09 NOTE — TELEPHONE ENCOUNTER
Patient called requesting refill for Wegovy 2.4 mg. Patient made aware medication was refilled on 11/14/24 for 3 ml  with 2 refills to [facility]. Patient instructed to contact the pharmacy to obtain refills of medication. Patient verbalized understanding.

## 2024-12-09 NOTE — ASSESSMENT & PLAN NOTE
Progressively worsening CAMPOS   History of similar presentation previously   CXR: wet read no acute cardiopulmonary disease   Orthostatic vitals positive     Orthostatic hypotension vs PE vs CHF vs COVID/FLU vs secondary to anemia  Less likely concern for PE due to Wells score of 1 and absence of tachycardia and leg swelling, however notable history of breast cancer   Less likely concern for CHF due to absent signs of volume overload including JVD and lower extremity edema  Covid/FLU/RSV negative    Plan  Repeat orthostatic vitals  If continued to be positive can consider fluids vs starting midodrine   If signs of volume overload can consider diuresis   Hold metoprolol and lasix due to low blood pressures

## 2024-12-09 NOTE — ASSESSMENT & PLAN NOTE
Patient presented with worsening anemia, thrombocytopenia.  Previously hemoglobin was 11.4 in January 2024, however downtrending since then.  Similar downtrend with thrombocytes.  On this admission, patient has pancytopenia.   WBC 3.09, with lymphocytosis of 55% and decrease in segmented neutrophils, ANC 1.04   Hgb 7.6   PLT 59K  Folate, B12 within normal limits.  LDH within normal limits.  Reticulocyte count percentage 0.9%, reticulocyte index is hypoproliferative at 0.3  Unclear etiology of pancytopenia, with possible contribution by anastrozole although it is rare  DDx includes but not limited to possible viral etiology(i.e. HIV, hepatitis, EBV) myelodysplastic syndrome, aplastic anemia, leukemia/lymphoma, metastatic bone marrow infiltration    Plan  Follow-up HIV, hep C, EBV panel  Consult hematology/oncology  Daily CBC with differential

## 2024-12-09 NOTE — PLAN OF CARE
Problem: PAIN - ADULT  Goal: Verbalizes/displays adequate comfort level or baseline comfort level  Description: Interventions:  - Encourage patient to monitor pain and request assistance  - Assess pain using appropriate pain scale  - Administer analgesics based on type and severity of pain and evaluate response  - Implement non-pharmacological measures as appropriate and evaluate response  - Consider cultural and social influences on pain and pain management  - Notify physician/advanced practitioner if interventions unsuccessful or patient reports new pain  Outcome: Progressing     Problem: INFECTION - ADULT  Goal: Absence or prevention of progression during hospitalization  Description: INTERVENTIONS:  - Assess and monitor for signs and symptoms of infection  - Monitor lab/diagnostic results  - Monitor all insertion sites, i.e. indwelling lines, tubes, and drains  - Monitor endotracheal if appropriate and nasal secretions for changes in amount and color  - Graham appropriate cooling/warming therapies per order  - Administer medications as ordered  - Instruct and encourage patient and family to use good hand hygiene technique  - Identify and instruct in appropriate isolation precautions for identified infection/condition  Outcome: Progressing  Goal: Absence of fever/infection during neutropenic period  Description: INTERVENTIONS:  - Monitor WBC    Outcome: Progressing     Problem: SAFETY ADULT  Goal: Patient will remain free of falls  Description: INTERVENTIONS:  - Educate patient/family on patient safety including physical limitations  - Instruct patient to call for assistance with activity   - Consult OT/PT to assist with strengthening/mobility   - Keep Call bell within reach  - Keep bed low and locked with side rails adjusted as appropriate  - Keep care items and personal belongings within reach  - Initiate and maintain comfort rounds  - Make Fall Risk Sign visible to staff  - Apply yellow socks and bracelet  for high fall risk patients  - Consider moving patient to room near nurses station  Outcome: Progressing  Goal: Maintain or return to baseline ADL function  Description: INTERVENTIONS:  -  Assess patient's ability to carry out ADLs; assess patient's baseline for ADL function and identify physical deficits which impact ability to perform ADLs (bathing, care of mouth/teeth, toileting, grooming, dressing, etc.)  - Assess/evaluate cause of self-care deficits   - Assess range of motion  - Assess patient's mobility; develop plan if impaired  - Assess patient's need for assistive devices and provide as appropriate  - Encourage maximum independence but intervene and supervise when necessary  - Involve family in performance of ADLs  - Assess for home care needs following discharge   - Consider OT consult to assist with ADL evaluation and planning for discharge  - Provide patient education as appropriate  Outcome: Progressing  Goal: Maintains/Returns to pre admission functional level  Description: INTERVENTIONS:  - Perform AM-PAC 6 Click Basic Mobility/ Daily Activity assessment daily.  - Set and communicate daily mobility goal to care team and patient/family/caregiver.   - Record patient progress and toleration of activity level   Outcome: Progressing     Problem: DISCHARGE PLANNING  Goal: Discharge to home or other facility with appropriate resources  Description: INTERVENTIONS:  - Identify barriers to discharge w/patient and caregiver  - Arrange for needed discharge resources and transportation as appropriate  - Identify discharge learning needs (meds, wound care, etc.)  - Arrange for interpretive services to assist at discharge as needed  - Refer to Case Management Department for coordinating discharge planning if the patient needs post-hospital services based on physician/advanced practitioner order or complex needs related to functional status, cognitive ability, or social support system  Outcome: Progressing      Problem: Knowledge Deficit  Goal: Patient/family/caregiver demonstrates understanding of disease process, treatment plan, medications, and discharge instructions  Description: Complete learning assessment and assess knowledge base.  Interventions:  - Provide teaching at level of understanding  - Provide teaching via preferred learning methods  Outcome: Progressing

## 2024-12-09 NOTE — PROGRESS NOTES
Progress Note - Hospitalist   Name: Pretty Aguila 74 y.o. female I MRN: [id number]  Unit/Bed#: S -01 I Date of Admission: 12/7/2024   Date of Service: 12/9/2024 I Hospital Day: 0    Assessment & Plan  Dyspnea on exertion  Progressively worsening CAMPOS   History of similar presentation previously   CXR: wet read no acute cardiopulmonary disease   Orthostatic vitals positive     Orthostatic hypotension vs PE vs CHF vs COVID/FLU vs secondary to anemia  Less likely concern for PE due to Wells score of 1 and absence of tachycardia and leg swelling, however notable history of breast cancer   Less likely concern for CHF due to absent signs of volume overload including JVD and lower extremity edema  Covid/FLU/RSV negative    Plan  Repeat orthostatic vitals  If continued to be positive can consider fluids vs starting midodrine   If signs of volume overload can consider diuresis   Hold metoprolol and lasix due to low blood pressures     Pancytopenia (HCC)  Patient presented with worsening anemia, thrombocytopenia.  Previously hemoglobin was 11.4 in January 2024, however downtrending since then.  Similar downtrend with thrombocytes.  On this admission, patient has pancytopenia.   WBC 3.09, with lymphocytosis of 55% and decrease in segmented neutrophils, ANC 1.04   Hgb 7.6   PLT 59K  Folate, B12 within normal limits.  LDH within normal limits.  Reticulocyte count percentage 0.9%, reticulocyte index is hypoproliferative at 0.3  Unclear etiology of pancytopenia, with possible contribution by anastrozole although it is rare  DDx includes but not limited to possible viral etiology(i.e. HIV, hepatitis, EBV) myelodysplastic syndrome, aplastic anemia, leukemia/lymphoma, metastatic bone marrow infiltration    Plan  Follow-up HIV, hep C, EBV panel  Consult hematology/oncology  Daily CBC with differential    Malignant neoplasm of lower-outer quadrant of left breast of female, estrogen receptor positive (HCC)  S/p left breast  mastectomy and reconstruction with submuscular tissue expander with acellular dermal matrix   Essential hypertension  Lasix 40 mg BID and Toprol XL 50 mg at home    Plan   Hold medications due to low blood pressure   Dyslipidemia  Continue PTA atorvastatin 10 mg  Coronary artery disease involving native coronary artery of native heart without angina pectoris  Follows with Dr. Conrad  Continue PTA atorvastatin 10 mg     VTE Pharmacologic Prophylaxis: VTE Score: 3 Moderate Risk (Score 3-4) - Pharmacological DVT Prophylaxis Ordered: enoxaparin (Lovenox).    Mobility:   Basic Mobility Inpatient Raw Score: 24  JH-HLM Goal: 8: Walk 250 feet or more  JH-HLM Achieved: 8: Walk 250 feet ot more  JH-HLM Goal achieved. Continue to encourage appropriate mobility.    Patient Centered Rounds: I performed bedside rounds with nursing staff today.   Discussions with Specialists or Other Care Team Provider: Hematology/Oncology    Education and Discussions with Family / Patient: Updated  (daughter) at bedside.    Current Length of Stay: 0 day(s)  Current Patient Status: Observation   Certification Statement: The patient will continue to require additional inpatient hospital stay due to workup of pancytopenia, possible bone marrow biopsy  Discharge Plan: Anticipate discharge in 48 hrs to home.    Code Status: Level 1 - Full Code    Subjective   Patient seen and evaluated bedside, she is sitting upright comfortably in her recliner.  She denies any current chest pain, shortness of breath or palpitations.  She denies any recurrence of her episodes of dyspnea and dizziness/lightheadedness while in the hospital.  She states that she was ambulating yesterday with her daughter around the hospital floor without any issues. She does any other active complaints. She states she has been dealing with these episodes for a few months now. She also mentions feeling fatigued as well.     Objective :  Temp:  [97.7 °F (36.5 °C)-98.3 °F (36.8  °C)] 97.7 °F (36.5 °C)  HR:  [81-85] 81  BP: ()/(51-69) 101/62  Resp:  [17-19] 18  SpO2:  [95 %-98 %] 96 %  O2 Device: None (Room air)    Body mass index is 32.45 kg/m².     Input and Output Summary (last 24 hours):   No intake or output data in the 24 hours ending 12/09/24 1350    Physical Exam  Vitals and nursing note reviewed.   Constitutional:       General: She is not in acute distress.     Appearance: She is well-developed.   HENT:      Head: Normocephalic and atraumatic.   Eyes:      Conjunctiva/sclera: Conjunctivae normal.   Cardiovascular:      Rate and Rhythm: Normal rate and regular rhythm.      Heart sounds: No murmur heard.  Pulmonary:      Effort: Pulmonary effort is normal. No respiratory distress.      Breath sounds: Normal breath sounds.   Abdominal:      Palpations: Abdomen is soft.      Tenderness: There is no abdominal tenderness.   Musculoskeletal:         General: No swelling.      Cervical back: Neck supple.   Skin:     General: Skin is warm and dry.      Capillary Refill: Capillary refill takes less than 2 seconds.   Neurological:      Mental Status: She is alert.   Psychiatric:         Mood and Affect: Mood normal.         Lines/Drains:        Telemetry:  Telemetry Orders (From admission, onward)               24 Hour Telemetry Monitoring  Continuous x 24 Hours (Telem)        Expiring   Question:  Reason for 24 Hour Telemetry  Answer:  Syncope suspected to be cardiac in origin                     Telemetry Reviewed: Normal Sinus Rhythm  Indication for Continued Telemetry Use: No indication for continued use. Will discontinue.                Lab Results: I have reviewed the following results:   Results from last 7 days   Lab Units 12/09/24  0434   WBC Thousand/uL 3.09*   HEMOGLOBIN g/dL 7.6*   HEMATOCRIT % 22.5*   PLATELETS Thousands/uL 59*   SEGS PCT % 34*   LYMPHO PCT % 55*   MONO PCT % 8   EOS PCT % 3     Results from last 7 days   Lab Units 12/09/24  0434 12/08/24  0503  12/07/24  1714   SODIUM mmol/L 140   < > 138   POTASSIUM mmol/L 4.2   < > 3.2*   CHLORIDE mmol/L 112*   < > 102   CO2 mmol/L 25   < > 26   BUN mg/dL 19   < > 25   CREATININE mg/dL 0.91   < > 1.25   ANION GAP mmol/L 3*   < > 10   CALCIUM mg/dL 8.3*   < > 9.4   ALBUMIN g/dL  --   --  4.1   TOTAL BILIRUBIN mg/dL  --   --  0.65   ALK PHOS U/L  --   --  57   ALT U/L  --   --  15   AST U/L  --   --  16   GLUCOSE RANDOM mg/dL 86   < > 103    < > = values in this interval not displayed.                       Recent Cultures (last 7 days):         Imaging Results Review: I reviewed radiology reports from this admission including: chest xray.  Other Study Results Review: EKG was reviewed.     Last 24 Hours Medication List:     Current Facility-Administered Medications:     anastrozole (ARIMIDEX) tablet 1 mg, Daily    atorvastatin (LIPITOR) tablet 10 mg, Daily    buPROPion (WELLBUTRIN XL) 24 hr tablet 300 mg, QAM    Cholecalciferol (VITAMIN D3) tablet 400 Units, Daily    citalopram (CeleXA) tablet 20 mg, Daily    enoxaparin (LOVENOX) subcutaneous injection 40 mg, Daily    [Held by provider] furosemide (LASIX) tablet 40 mg, BID (diuretic)    melatonin tablet 3 mg, HS    metoprolol succinate (TOPROL-XL) 24 hr tablet 50 mg, Daily    predniSONE tablet 1 mg, TID    traMADol (ULTRAM) tablet 50 mg, Q6H PRN    Administrative Statements   Today, Patient Was Seen By: Tabby Aguiar MD    **Please Note: This note may have been constructed using a voice recognition system.**

## 2024-12-09 NOTE — CONSULTS
Oncology Consult Note  Pretty Aguila 74 y.o. female MRN: [id number]  Unit/Bed#: S -01 Encounter: [id number]    Presenting Complaint:  SOB and dizziness    Reason for Consult:  Pancytopenia    History of Presenting Illness:  Pretty Aguila is seen for initial consultation 12/7/2024 at the referral of Marivel Panchal MD.    Ms. Aguila is a 74 yoF with PMHx of HTN, HLD, CAD s/p CABG, obesity on [place], and stage IIA invasive ductal carcinoma of the L breast (pT2, pN0(sn), cM0, G2, ER+, DC+, HER2 1+ neg by IHC, BRCA-, neg lymphovascular invasion, Onco Score 9) who was admitted to [facility] on 12/9/24 with progressively worsening SOB and dizziness.    Roughly five weeks ago, she presented to the ED with fatigue and SOB, and was diagnosed with COVID on 11/1/24.  She was referred to cardiology who performed a NM stress test which was normal and showed an EF of 53%, and a 24-hour Holter monitor appears to have been placed 12/3/24 with results unknown.  Most recent TTE is consistent with EF 55%.  She was at the grocery store and experienced SOB and near syncope which improved with sitting.  The patient endorses frequent PO fluid intake.    In terms of her oncology history, the patient was first diagnosed with ductal carcinoma Sep 2023 and is s/p L mastectomy with sentinel lymph node biopsy (0/1) with reconstruction Nov 2023.  She was started on anastrozole 1 mg 12/9/23 with plans for at least five years of treatment.    In the ED vitals were stable and within normal limits with the exception of positive orthostatics.  Labs showed Hb 9.8 on presentation which has since decreased to 7.6, reticulocyte index is hypoproliferative at 0.3, , Plt decreased from 76-59, WBC decreased from 4.9-3.1, potassium increased from 3.2-4.2 after supplementation, SCr improved from 1.25-0.91, HSTrops are neg x3, BNP was 295 (possibly understated due to obesity), all other values for CMP roughly WNL, and D-dimer mildly elevated at 0.53.   CXR shows no acute process.  She is COVID/flu/RSV negative.  Hematology was consulted for pancytopenia.    Review of Systems:  As stated in the HPI otherwise the fourteen point review of systems was negative.    Past Medical History:   Diagnosis Date    Allergic     Anxiety     Arthritis     Breast cancer (HCC) 2023    CAD (coronary artery disease)     Coronary artery disease 2017    Depression     Diabetes mellitus (HCC) 24    Mother-Diabetic    Dyslipidemia     Hiatal hernia     Hyperlipidemia     Hypertension     Obesity     Obesity (BMI 30-39.9)     Psoriatic arthritis (HCC)     Rheumatoid arthritis (HCC)     Scoliosis     SOB (shortness of breath)      Social History     Socioeconomic History    Marital status: /Civil Union     Spouse name: None    Number of children: None    Years of education: None    Highest education level: None   Occupational History    None   Tobacco Use    Smoking status: Former     Current packs/day: 0.00     Average packs/day: 0.5 packs/day for 7.0 years (3.5 ttl pk-yrs)     Types: Cigarettes     Start date: 2010     Quit date: 2017     Years since quittin.9    Smokeless tobacco: Never    Tobacco comments:     Have already quit smoking in 2017   Vaping Use    Vaping status: Never Used   Substance and Sexual Activity    Alcohol use: Yes     Alcohol/week: 2.0 standard drinks of alcohol     Types: 2 Glasses of wine per week     Comment: social     Drug use: No    Sexual activity: Not Currently     Partners: Male     Birth control/protection: None   Other Topics Concern    None   Social History Narrative    None     Social Drivers of Health     Financial Resource Strain: Not on file   Food Insecurity: No Food Insecurity (2024)    Nursing - Inadequate Food Risk Classification     Worried About Running Out of Food in the Last Year: Never true     Ran Out of Food in the Last Year: Never true     Ran Out of Food in the Last Year: 1   Transportation Needs: No  Transportation Needs (2024)    Nursing - Transportation Risk Classification     Lack of Transportation: Not on file     Lack of Transportation: 2   Physical Activity: Not on file   Stress: Not on file   Social Connections: Not on file   Intimate Partner Violence: Patient Unable To Answer (2024)    Nursing IPS     Feels Physically and Emotionally Safe: Not on file     Physically Hurt by Someone: Not on file     Humiliated or Emotionally Abused by Someone: Not on file     Physically Hurt by Someone: 97     Hurt or Threatened by Someone: 97   Housing Stability: Unknown (2024)    Nursing: Inadequate Housing Risk Classification     Has Housing: Not on file     Worried About Losing Housing: Not on file     Unable to Get Utilities: Not on file     Unable to Pay for Housing in the Last Year: 2     Has Housin     Family History   Problem Relation Age of Onset    Hypertension Mother     Diabetes Mother     Heart disease Father         CABG x5    Arthritis Father     No Known Problems Sister     No Known Problems Sister     No Known Problems Daughter     No Known Problems Daughter     No Known Problems Son     Breast cancer Neg Hx      Allergies   Allergen Reactions    Iodine - Food Allergy Anaphylaxis     Reaction Date: 2008;     Povidone Iodine Anaphylaxis    Shellfish-Derived Products - Food Allergy Anaphylaxis     Current Facility-Administered Medications:     anastrozole (ARIMIDEX) tablet 1 mg, 1 mg, Oral, Daily, Clay Arauz MD, 1 mg at 24 1145    atorvastatin (LIPITOR) tablet 10 mg, 10 mg, Oral, Daily, Clay Arauz MD, 10 mg at 24 1037    buPROPion (WELLBUTRIN XL) 24 hr tablet 300 mg, 300 mg, Oral, QAM, Clay Arauz MD, 300 mg at 24 1037    Cholecalciferol (VITAMIN D3) tablet 400 Units, 400 Units, Oral, Daily, Clay Arauz MD, 400 Units at 24 1036    citalopram (CeleXA) tablet 20 mg, 20 mg, Oral, Daily, Clay Arauz MD, 20 mg at 24 1037    enoxaparin  "(LOVENOX) subcutaneous injection 40 mg, 40 mg, Subcutaneous, Daily, Clay Arauz MD, 40 mg at 12/09/24 1036    [Held by provider] furosemide (LASIX) tablet 40 mg, 40 mg, Oral, BID (diuretic), Clay Arauz MD    melatonin tablet 3 mg, 3 mg, Oral, HS, Clay Arauz MD, 3 mg at 12/08/24 2301    metoprolol succinate (TOPROL-XL) 24 hr tablet 50 mg, 50 mg, Oral, Daily, Clay Arauz MD    predniSONE tablet 1 mg, 1 mg, Oral, TID, Clay Arauz MD, 1 mg at 12/09/24 1036    traMADol (ULTRAM) tablet 50 mg, 50 mg, Oral, Q6H PRN, Clay Arauz MD, 50 mg at 12/09/24 1045    /62   Pulse 81   Temp 97.7 °F (36.5 °C)   Resp 18   Ht 5' 3\" (1.6 m)   Wt 83.1 kg (183 lb 3.2 oz)   SpO2 96%   BMI 32.45 kg/m²     General: WDWN, well-appearing, no acute distress  HEENT: PERRLA, moist mucosa, atraumatic, no masses, no cervical/supraclavicular lymphadenopathy  Respiratory: CTAB w/o wheezes, rales, or rhonchi, no increased work of breathing  Cardiovascular: RRR w/o murmurs, 2+ radial and pedal pulses, no b/l LE edema  Abdomen: soft, non-tender, non-distended, no hepatomegaly or splenomegaly  /Rectal: deferred  Musculoskeletal: moves all four extremities with normal strength and ROM  Integumentary: warm, dry, no rash or bruising  Neurological: no gross or focal deficits identified, normal sensation  Psychiatric: pleasant and cooperative with normal mood, affect, and cognition    Recent Results (from the past 48 hours)   ECG 12 lead    Collection Time: 12/07/24  5:13 PM   Result Value Ref Range    Ventricular Rate 91 BPM    Atrial Rate 91 BPM    CA Interval 192 ms    QRSD Interval 102 ms    QT Interval 406 ms    QTC Interval 499 ms    P [place] 78 degrees    QRS Axis 62 degrees    T Wave Axis 63 degrees   CBC and differential    Collection Time: 12/07/24  5:14 PM   Result Value Ref Range    WBC 4.91 4.31 - 10.16 Thousand/uL    RBC 2.75 (L) 3.81 - 5.12 Million/uL    Hemoglobin 9.8 (L) 11.5 - 15.4 g/dL    Hematocrit 29.2 (L) " "34.8 - 46.1 %     (H) 82 - 98 fL    MCH 35.6 (H) 26.8 - 34.3 pg    MCHC 33.6 31.4 - 37.4 g/dL    RDW 13.7 11.6 - 15.1 %    MPV 12.4 8.9 - 12.7 fL    Platelets 76 (L) 149 - 390 Thousands/uL    nRBC 1 /100 WBCs    Segmented % 63 43 - 75 %    Immature Grans % 1 0 - 2 %    Lymphocytes % 28 14 - 44 %    Monocytes % 7 4 - 12 %    Eosinophils Relative 1 0 - 6 %    Basophils Relative 0 0 - 1 %    Absolute Neutrophils 3.10 1.85 - 7.62 Thousands/µL    Absolute Immature Grans 0.04 0.00 - 0.20 Thousand/uL    Absolute Lymphocytes 1.36 0.60 - 4.47 Thousands/µL    Absolute Monocytes 0.36 0.17 - 1.22 Thousand/µL    Eosinophils Absolute 0.04 0.00 - 0.61 Thousand/µL    Basophils Absolute 0.01 0.00 - 0.10 Thousands/µL   Comprehensive metabolic panel    Collection Time: 12/07/24  5:14 PM   Result Value Ref Range    Sodium 138 135 - 147 mmol/L    Potassium 3.2 (L) 3.5 - 5.3 mmol/L    Chloride 102 96 - 108 mmol/L    CO2 26 21 - 32 mmol/L    ANION GAP 10 4 - 13 mmol/L    BUN 25 5 - 25 mg/dL    Creatinine 1.25 0.60 - 1.30 mg/dL    Glucose 103 65 - 140 mg/dL    Calcium 9.4 8.4 - 10.2 mg/dL    AST 16 13 - 39 U/L    ALT 15 7 - 52 U/L    Alkaline Phosphatase 57 34 - 104 U/L    Total Protein 7.9 6.4 - 8.4 g/dL    Albumin 4.1 3.5 - 5.0 g/dL    Total Bilirubin 0.65 0.20 - 1.00 mg/dL    eGFR 43 ml/min/1.73sq m   HS Troponin 0hr (reflex protocol)    Collection Time: 12/07/24  5:14 PM   Result Value Ref Range    hs TnI 0hr 16 \"Refer to ACS Flowchart\"- see link ng/L   Smear Review(Phlebs Do Not Order)    Collection Time: 12/07/24  5:14 PM   Result Value Ref Range    RBC Morphology Present     Platelet Estimate Decreased (A) Adequate    Ovalocytes Present     Poikilocytes Present    B-Type Natriuretic Peptide(BNP)    Collection Time: 12/07/24  5:14 PM   Result Value Ref Range     (H) 0 - 100 pg/mL   D-Dimer    Collection Time: 12/07/24  5:14 PM   Result Value Ref Range    D-Dimer, Quant 0.53 (H) <0.50 ug/ml FEU   HS Troponin I 2hr    " "Collection Time: 12/07/24  8:21 PM   Result Value Ref Range    hs TnI 2hr 15 \"Refer to ACS Flowchart\"- see link ng/L    Delta 2hr hsTnI -1 <20 ng/L   COVID/FLU/RSV    Collection Time: 12/07/24 11:04 PM    Specimen: Nose; Nares   Result Value Ref Range    SARS-CoV-2 Negative Negative    INFLUENZA A PCR Negative Negative    INFLUENZA B PCR Negative Negative    RSV PCR Negative Negative   HS Troponin I 4hr    Collection Time: 12/08/24 12:01 AM   Result Value Ref Range    hs TnI 4hr 13 \"Refer to ACS Flowchart\"- see link ng/L    Delta 4hr hsTnI -3 <20 ng/L   Platelet count    Collection Time: 12/08/24 12:01 AM   Result Value Ref Range    Platelets 62 (L) 149 - 390 Thousands/uL    MPV 12.4 8.9 - 12.7 fL   Folate    Collection Time: 12/08/24 12:01 AM   Result Value Ref Range    Folate 18.8 >5.9 ng/mL   Vitamin B12    Collection Time: 12/08/24 12:01 AM   Result Value Ref Range    Vitamin B-12 381 180 - 914 pg/mL   CBC and differential    Collection Time: 12/08/24  5:03 AM   Result Value Ref Range    WBC 4.53 4.31 - 10.16 Thousand/uL    RBC 2.23 (L) 3.81 - 5.12 Million/uL    Hemoglobin 8.1 (L) 11.5 - 15.4 g/dL    Hematocrit 23.8 (L) 34.8 - 46.1 %     (H) 82 - 98 fL    MCH 36.3 (H) 26.8 - 34.3 pg    MCHC 34.0 31.4 - 37.4 g/dL    RDW 13.7 11.6 - 15.1 %    MPV 12.1 8.9 - 12.7 fL    Platelets 61 (L) 149 - 390 Thousands/uL    nRBC 0 /100 WBCs    Segmented % 35 (L) 43 - 75 %    Immature Grans % 1 0 - 2 %    Lymphocytes % 55 (H) 14 - 44 %    Monocytes % 7 4 - 12 %    Eosinophils Relative 2 0 - 6 %    Basophils Relative 0 0 - 1 %    Absolute Neutrophils 1.61 (L) 1.85 - 7.62 Thousands/µL    Absolute Immature Grans 0.03 0.00 - 0.20 Thousand/uL    Absolute Lymphocytes 2.49 0.60 - 4.47 Thousands/µL    Absolute Monocytes 0.32 0.17 - 1.22 Thousand/µL    Eosinophils Absolute 0.08 0.00 - 0.61 Thousand/µL    Basophils Absolute 0.00 0.00 - 0.10 Thousands/µL   Basic metabolic panel    Collection Time: 12/08/24  5:03 AM   Result Value " Ref Range    Sodium 140 135 - 147 mmol/L    Potassium 3.1 (L) 3.5 - 5.3 mmol/L    Chloride 107 96 - 108 mmol/L    CO2 27 21 - 32 mmol/L    ANION GAP 6 4 - 13 mmol/L    BUN 23 5 - 25 mg/dL    Creatinine 1.15 0.60 - 1.30 mg/dL    Glucose 90 65 - 140 mg/dL    Calcium 8.5 8.4 - 10.2 mg/dL    eGFR 47 ml/min/1.73sq m   Lactate dehydrogenase    Collection Time: 12/08/24  5:03 AM   Result Value Ref Range     140 - 271 U/L   Retic Count    Collection Time: 12/08/24  5:03 AM   Result Value Ref Range    Retic Ct Abs 20,000 14,097 - 95,744    Retic Ct Pct 0.90 0.37 - 1.87 %   Basic metabolic panel    Collection Time: 12/09/24  4:34 AM   Result Value Ref Range    Sodium 140 135 - 147 mmol/L    Potassium 4.2 3.5 - 5.3 mmol/L    Chloride 112 (H) 96 - 108 mmol/L    CO2 25 21 - 32 mmol/L    ANION GAP 3 (L) 4 - 13 mmol/L    BUN 19 5 - 25 mg/dL    Creatinine 0.91 0.60 - 1.30 mg/dL    Glucose 86 65 - 140 mg/dL    Calcium 8.3 (L) 8.4 - 10.2 mg/dL    eGFR 63 ml/min/1.73sq m   Magnesium    Collection Time: 12/09/24  4:34 AM   Result Value Ref Range    Magnesium 2.0 1.9 - 2.7 mg/dL   CBC and differential    Collection Time: 12/09/24  4:34 AM   Result Value Ref Range    WBC 3.09 (L) 4.31 - 10.16 Thousand/uL    RBC 2.10 (L) 3.81 - 5.12 Million/uL    Hemoglobin 7.6 (L) 11.5 - 15.4 g/dL    Hematocrit 22.5 (L) 34.8 - 46.1 %     (H) 82 - 98 fL    MCH 36.2 (H) 26.8 - 34.3 pg    MCHC 33.8 31.4 - 37.4 g/dL    RDW 13.8 11.6 - 15.1 %    MPV 12.4 8.9 - 12.7 fL    Platelets 59 (L) 149 - 390 Thousands/uL    nRBC 0 /100 WBCs    Segmented % 34 (L) 43 - 75 %    Immature Grans % 0 0 - 2 %    Lymphocytes % 55 (H) 14 - 44 %    Monocytes % 8 4 - 12 %    Eosinophils Relative 3 0 - 6 %    Basophils Relative 0 0 - 1 %    Absolute Neutrophils 1.04 (L) 1.85 - 7.62 Thousands/µL    Absolute Immature Grans 0.01 0.00 - 0.20 Thousand/uL    Absolute Lymphocytes 1.71 0.60 - 4.47 Thousands/µL    Absolute Monocytes 0.24 0.17 - 1.22 Thousand/µL    Eosinophils  Absolute 0.08 0.00 - 0.61 Thousand/µL    Basophils Absolute 0.01 0.00 - 0.10 Thousands/µL     XR chest 2 views  Result Date: 12/8/2024  Narrative: XR CHEST PA AND LATERAL DUAL ENERGY SUBTRACTION. INDICATION:  SOB. Dizziness. COMPARISON: CXR and abdomen CT  11/01/2024, chest CT 12/03/2014. FINDINGS: Clear lungs. No pneumothorax or pleural effusion. Left breast implant. Normal heart size, CABG. Moderate hiatal hernia. Bones are unremarkable for age. Cholecystectomy. Gastric bypass.     Impression: No acute cardiopulmonary disease. Moderate hiatal hernia. Workstation performed: [id number]     NM myocardial perfusion spect (rx stress and/or rest)  Result Date: 11/20/2024  Narrative:   Stress ECG: No ST deviation is noted. The ECG was not diagnostic due to pharmacological (vasodilator) stress. The stress ECG is equivocal for ischemia after pharmacologic vasodilation.   Stress Function: Left ventricular function post-stress is normal. Stress ejection fraction is 53%.   Stress Combined Conclusion: Left ventricular perfusion is normal.   Perfusion: There are no perfusion defects.     Stress strip  Result Date: 11/20/2024  Narrative: Confirmed by JASMIN FAITH ([id number]),  Leidy Potts ([id number]) on 11/20/2024 1:17:09 PM    ECOG PS: 1    Assessment/Plan:  74 yoF with PMHx of HTN, HLD, CAD s/p CABG, obesity on [place], and stage IIA invasive ductal carcinoma of the L breast (pT2, pN0(sn), cM0, G2, ER+, [place]+, HER2 1+ neg by IHC, BRCA-, neg lymphovascular invasion, Onco Score 9) who presented with progressively worsening SOB and near syncope found to be orthostatic and to have progressively worsening pancytopenia.  Hematology was consulted for pancytopenia.    Pancytopenia  Given the patient's age, progressively worsening pancytopenia with macrocytic anemia in the setting of a positive smoking history, and normal B12 and folate levels, we concerned that she may be developing MDS.  She did endorse a history of RA.  She has had a  recent COVID infection in early Nov 2024 but no known exposures with others with URI (i.e. possible parvovirus).  It is possible that her marrow has been borderline and her recent COVID infection has worsened her cell counts, and is possible but less likely that a subclinical parvo infection is at fault.  She does have a roughly 10 year smoking history quit five years ago, and a minimal exposure to ETOH.  In terms of medications, she notably did not receive chemotherapy or radiation for her breast cancer.  There are case reports of reversible HT-induced pancytopenia but this would be highly unusual.  Bupropion also is associated with pancytopenia, but again would be very rare.  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  - Consulted IR for bone marrow biopsy with flow cytometry  - Ordered B12 1000 mg IM daily x3 doses as level of 381 could be considered equivocal  - No indication to repeat anemia panel given normal results on 10/26/24 (IronSat 27, Ferritin 119, and TIBC 344)  - No indication to order erythropoietin levels at this time given her normal renal function  - Continue to trend CBC  - Transfusion threshold Hb <7, Plt <20 w/o hemorrhage, and Plt <50 w/ hemorrhage    Stage IIA invasive ductal carcinoma of the L breast (pT2, pN0(sn), cM0, G2, ER+, HI+, HER2 1+ neg by IHC, BRCA-, neg lymphovascular invasion, Onco Score 9)  Notably the patient did not receive any adjuvant chemotherapy or radiation, but was treated with mastectomy with adjuvant anastrozole.  - Continue anastrozole 1 mg PO daily  - We will contact our coordinator as she needs to schedule her routine follow up with her primary oncologist, Dr. Carranza    Thank you for the consult, we will continue to follow.  Additional recommendations to follow per attending, Dr. Berman.    Denzel Berger DO, PGY4  Hematology/Oncology Fellow

## 2024-12-10 LAB
ANION GAP SERPL CALCULATED.3IONS-SCNC: 5 MMOL/L (ref 4–13)
BASOPHILS # BLD MANUAL: 0 THOUSAND/UL (ref 0–0.1)
BASOPHILS NFR MAR MANUAL: 0 % (ref 0–1)
BUN SERPL-MCNC: 17 MG/DL (ref 5–25)
CALCIUM SERPL-MCNC: 8.7 MG/DL (ref 8.4–10.2)
CHLORIDE SERPL-SCNC: 110 MMOL/L (ref 96–108)
CO2 SERPL-SCNC: 25 MMOL/L (ref 21–32)
CREAT SERPL-MCNC: 0.91 MG/DL (ref 0.6–1.3)
EOSINOPHIL # BLD MANUAL: 0.1 THOUSAND/UL (ref 0–0.4)
EOSINOPHIL NFR BLD MANUAL: 3 % (ref 0–6)
ERYTHROCYTE [DISTWIDTH] IN BLOOD BY AUTOMATED COUNT: 13.7 % (ref 11.6–15.1)
GFR SERPL CREATININE-BSD FRML MDRD: 63 ML/MIN/1.73SQ M
GLUCOSE SERPL-MCNC: 84 MG/DL (ref 65–140)
HCT VFR BLD AUTO: 23.3 % (ref 34.8–46.1)
HGB BLD-MCNC: 7.8 G/DL (ref 11.5–15.4)
LYMPHOCYTES # BLD AUTO: 2.11 THOUSAND/UL (ref 0.6–4.47)
LYMPHOCYTES # BLD AUTO: 57 % (ref 14–44)
MCH RBC QN AUTO: 35.8 PG (ref 26.8–34.3)
MCHC RBC AUTO-ENTMCNC: 33.5 G/DL (ref 31.4–37.4)
MCV RBC AUTO: 107 FL (ref 82–98)
MONOCYTES # BLD AUTO: 0.03 THOUSAND/UL (ref 0–1.22)
MONOCYTES NFR BLD: 1 % (ref 4–12)
NEUTROPHILS # BLD MANUAL: 1.05 THOUSAND/UL (ref 1.85–7.62)
NEUTS SEG NFR BLD AUTO: 32 % (ref 43–75)
NRBC BLD AUTO-RTO: 1 /100 WBC (ref 0–2)
OVALOCYTES BLD QL SMEAR: PRESENT
PLATELET # BLD AUTO: 59 THOUSANDS/UL (ref 149–390)
PLATELET BLD QL SMEAR: ABNORMAL
PMV BLD AUTO: 11.3 FL (ref 8.9–12.7)
POTASSIUM SERPL-SCNC: 4 MMOL/L (ref 3.5–5.3)
RBC # BLD AUTO: 2.18 MILLION/UL (ref 3.81–5.12)
RBC MORPH BLD: PRESENT
SODIUM SERPL-SCNC: 140 MMOL/L (ref 135–147)
VARIANT LYMPHS # BLD AUTO: 7 %
WBC # BLD AUTO: 3.29 THOUSAND/UL (ref 4.31–10.16)

## 2024-12-10 PROCEDURE — 80048 BASIC METABOLIC PNL TOTAL CA: CPT

## 2024-12-10 PROCEDURE — 99232 SBSQ HOSP IP/OBS MODERATE 35: CPT | Performed by: INTERNAL MEDICINE

## 2024-12-10 PROCEDURE — NC001 PR NO CHARGE: Performed by: INTERNAL MEDICINE

## 2024-12-10 PROCEDURE — 85007 BL SMEAR W/DIFF WBC COUNT: CPT

## 2024-12-10 PROCEDURE — 85027 COMPLETE CBC AUTOMATED: CPT

## 2024-12-10 RX ORDER — LORAZEPAM 2 MG/ML
0.5 INJECTION INTRAMUSCULAR
Status: COMPLETED | OUTPATIENT
Start: 2024-12-11 | End: 2024-12-11

## 2024-12-10 RX ADMIN — Medication 3 MG: at 20:57

## 2024-12-10 RX ADMIN — PREDNISONE 1 MG: 1 TABLET ORAL at 10:10

## 2024-12-10 RX ADMIN — CITALOPRAM HYDROBROMIDE 20 MG: 20 TABLET, FILM COATED ORAL at 10:10

## 2024-12-10 RX ADMIN — ANASTROZOLE 1 MG: 1 TABLET, COATED ORAL at 10:11

## 2024-12-10 RX ADMIN — ATORVASTATIN CALCIUM 10 MG: 10 TABLET, FILM COATED ORAL at 10:10

## 2024-12-10 RX ADMIN — CHOLECALCIFEROL (VITAMIN D3) 10 MCG (400 UNIT) TABLET 400 UNITS: at 10:10

## 2024-12-10 RX ADMIN — METOPROLOL SUCCINATE 50 MG: 50 TABLET, EXTENDED RELEASE ORAL at 10:10

## 2024-12-10 RX ADMIN — BUPROPION HYDROCHLORIDE 300 MG: 150 TABLET, FILM COATED, EXTENDED RELEASE ORAL at 10:10

## 2024-12-10 RX ADMIN — ENOXAPARIN SODIUM 40 MG: 40 INJECTION SUBCUTANEOUS at 10:10

## 2024-12-10 RX ADMIN — PREDNISONE 1 MG: 1 TABLET ORAL at 16:41

## 2024-12-10 RX ADMIN — TRAMADOL HYDROCHLORIDE 50 MG: 50 TABLET ORAL at 10:13

## 2024-12-10 RX ADMIN — PREDNISONE 1 MG: 1 TABLET ORAL at 20:57

## 2024-12-10 NOTE — TELEMEDICINE
e-Consult (IPC)  - Interventional Radiology  Pretty Aguila 74 y.o. female MRN: [id number]  Unit/Bed#: S -01 Encounter: [id number]          Interventional Radiology has been consulted to evaluate Pretty Aguila    We were consulted by hematology/oncology service concerning this patient with pancytopenis.    Inpatient Consult to IR  Consult performed by: Abraham Almanzar MD  Consult ordered by: Denzel Berger DO        12/10/24    Assessment/Recommendation:   74 year-old female with history of left breast cancer, now with worsening pancytopenia, now with request for bone marrow biopsy for further evaluation.    We will plan for bone marrow biopsy tomorrow. Please make patient NPO after midnight. PT/INR will be ordered for AM lab draw.    >5 min, >50% time spent reviewing/analyzing record, written report will be generated in the EMR.     Thank you for allowing Interventional Radiology to participate in the care of Pretty Aguila. Please don't hesitate to call or TigerText us with any questions.     Abraham Almanzar MD

## 2024-12-10 NOTE — PLAN OF CARE
Problem: PAIN - ADULT  Goal: Verbalizes/displays adequate comfort level or baseline comfort level  Description: Interventions:  - Encourage patient to monitor pain and request assistance  - Assess pain using appropriate pain scale  - Administer analgesics based on type and severity of pain and evaluate response  - Implement non-pharmacological measures as appropriate and evaluate response  - Consider cultural and social influences on pain and pain management  - Notify physician/advanced practitioner if interventions unsuccessful or patient reports new pain  Outcome: Progressing     Problem: SAFETY ADULT  Goal: Maintain or return to baseline ADL function  Description: INTERVENTIONS:  -  Assess patient's ability to carry out ADLs; assess patient's baseline for ADL function and identify physical deficits which impact ability to perform ADLs (bathing, care of mouth/teeth, toileting, grooming, dressing, etc.)  - Assess/evaluate cause of self-care deficits   - Assess range of motion  - Assess patient's mobility; develop plan if impaired  - Assess patient's need for assistive devices and provide as appropriate  - Encourage maximum independence but intervene and supervise when necessary  - Involve family in performance of ADLs  - Assess for home care needs following discharge   - Consider OT consult to assist with ADL evaluation and planning for discharge  - Provide patient education as appropriate  Outcome: Progressing     Problem: DISCHARGE PLANNING  Goal: Discharge to home or other facility with appropriate resources  Description: INTERVENTIONS:  - Identify barriers to discharge w/patient and caregiver  - Arrange for needed discharge resources and transportation as appropriate  - Identify discharge learning needs (meds, wound care, etc.)  - Arrange for interpretive services to assist at discharge as needed  - Refer to Case Management Department for coordinating discharge planning if the patient needs post-hospital  services based on physician/advanced practitioner order or complex needs related to functional status, cognitive ability, or social support system  Outcome: Progressing

## 2024-12-10 NOTE — PROGRESS NOTES
Progress Note - Hospitalist   Name: Pretty Aguila 74 y.o. female I MRN: [id number]  Unit/Bed#: S -01 I Date of Admission: 12/7/2024   Date of Service: 12/10/2024 I Hospital Day: 1    Assessment & Plan  Pancytopenia (HCC)  Patient presented with worsening anemia, thrombocytopenia.  Previously hemoglobin was 11.4 in January 2024, however downtrending since then.  Similar downtrend with thrombocytes.  On this admission, patient has pancytopenia.   WBC 3.09, with lymphocytosis of 55% and decrease in segmented neutrophils, ANC 1.04   Hgb 7.6   PLT 59K  Folate, B12 within normal limits.  LDH within normal limits.  Reticulocyte count percentage 0.9%, reticulocyte index is hypoproliferative at 0.3  Unclear etiology of pancytopenia, with possible contribution by anastrozole although it is rare  DDx includes but not limited to possible viral etiology(i.e. HIV, hepatitis, EBV) myelodysplastic syndrome, aplastic anemia, leukemia/lymphoma, metastatic bone marrow infiltration  HIV and hepatitis panel within normal limits    Plan  Consult hematology/oncology  IR consultation in anticipation of bone marrow biopsy tomorrow  Follow-up EBV panel  Daily CBC with differential    Dyspnea on exertion  Progressively worsening CAMPOS   History of similar presentation previously   CXR: wet read no acute cardiopulmonary disease   Orthostatic vitals positive     Orthostatic hypotension vs PE vs CHF vs COVID/FLU vs secondary to anemia  Less likely concern for PE due to Wells score of 1 and absence of tachycardia and leg swelling, however notable history of breast cancer   Less likely concern for CHF due to absent signs of volume overload including JVD and lower extremity edema  Covid/FLU/RSV negative    Plan  Repeat orthostatic vitals  If continued to be positive can consider fluids vs starting midodrine   If signs of volume overload can consider diuresis   Holding lasix due to low blood pressures     Malignant neoplasm of lower-outer  quadrant of left breast of female, estrogen receptor positive (HCC)  S/p left breast mastectomy and reconstruction with submuscular tissue expander with acellular dermal matrix   Essential hypertension  Lasix 40 mg BID and Toprol XL 50 mg at home    Plan   Hold Lasix due to low blood pressure   Dyslipidemia  Continue PTA atorvastatin 10 mg  Coronary artery disease involving native coronary artery of native heart without angina pectoris  Follows with Dr. Conrad  Continue PTA atorvastatin 10 mg     VTE Pharmacologic Prophylaxis: VTE Score: 3 Moderate Risk (Score 3-4) - Pharmacological DVT Prophylaxis Ordered: enoxaparin (Lovenox).    Mobility:   Basic Mobility Inpatient Raw Score: 24  JH-HLM Goal: 8: Walk 250 feet or more  JH-HLM Achieved: 8: Walk 250 feet ot more  JH-HLM Goal achieved. Continue to encourage appropriate mobility.    Patient Centered Rounds: I performed bedside rounds with nursing staff today.   Discussions with Specialists or Other Care Team Provider: Hematology/oncology, interventional radiology    Education and Discussions with Family / Patient: Attempted to update  (daughter) via phone. Left voicemail.     Current Length of Stay: 1 day(s)  Current Patient Status: Inpatient   Certification Statement: The patient will continue to require additional inpatient hospital stay due to bone marrow biopsy scheduled for tomorrow  Discharge Plan: Anticipate discharge in 24-48 hrs to home.    Code Status: Level 1 - Full Code    Subjective   Patient seen and evaluated bedside, she is sitting upright in the recliner comfortably, enjoying her breakfast.  She denies any active complaints currently.  She does mention that she felt a little bit short of breath yesterday while showering.  But she denies any dizziness, lightheadedness and states she was able to ambulate yesterday with her daughter without issue.  She expresses anxiety at getting a bone marrow biopsy tomorrow and states she had a bad  experience previously with getting a biopsy and requests a medication to help with her anxiety.    Objective :  Temp:  [97.6 °F (36.4 °C)-98.5 °F (36.9 °C)] 97.8 °F (36.6 °C)  HR:  [85-90] 90  BP: ()/(62-83) 94/62  Resp:  [12-18] 12  SpO2:  [97 %-100 %] 98 %    Body mass index is 32.45 kg/m².     Input and Output Summary (last 24 hours):   No intake or output data in the 24 hours ending 12/10/24 1531    Physical Exam  Vitals and nursing note reviewed.   Constitutional:       General: She is not in acute distress.     Appearance: She is well-developed.   HENT:      Head: Normocephalic and atraumatic.   Eyes:      Conjunctiva/sclera: Conjunctivae normal.   Cardiovascular:      Rate and Rhythm: Normal rate and regular rhythm.      Heart sounds: No murmur heard.  Pulmonary:      Effort: Pulmonary effort is normal. No respiratory distress.      Breath sounds: Normal breath sounds.   Abdominal:      Palpations: Abdomen is soft.      Tenderness: There is no abdominal tenderness.   Musculoskeletal:         General: No swelling.      Cervical back: Neck supple.   Skin:     General: Skin is warm and dry.      Capillary Refill: Capillary refill takes less than 2 seconds.   Neurological:      Mental Status: She is alert.   Psychiatric:         Mood and Affect: Mood normal.         Lines/Drains:              Lab Results: I have reviewed the following results:   Results from last 7 days   Lab Units 12/10/24  0450 12/09/24  0434   WBC Thousand/uL 3.29* 3.09*   HEMOGLOBIN g/dL 7.8* 7.6*   HEMATOCRIT % 23.3* 22.5*   PLATELETS Thousands/uL 59* 59*   SEGS PCT %  --  34*   LYMPHO PCT % 57* 55*   MONO PCT % 1* 8   EOS PCT % 3 3     Results from last 7 days   Lab Units 12/10/24  0450 12/08/24  0503 12/07/24  1714   SODIUM mmol/L 140   < > 138   POTASSIUM mmol/L 4.0   < > 3.2*   CHLORIDE mmol/L 110*   < > 102   CO2 mmol/L 25   < > 26   BUN mg/dL 17   < > 25   CREATININE mg/dL 0.91   < > 1.25   ANION GAP mmol/L 5   < > 10   CALCIUM  mg/dL 8.7   < > 9.4   ALBUMIN g/dL  --   --  4.1   TOTAL BILIRUBIN mg/dL  --   --  0.65   ALK PHOS U/L  --   --  57   ALT U/L  --   --  15   AST U/L  --   --  16   GLUCOSE RANDOM mg/dL 84   < > 103    < > = values in this interval not displayed.                       Recent Cultures (last 7 days):           Last 24 Hours Medication List:     Current Facility-Administered Medications:     anastrozole (ARIMIDEX) tablet 1 mg, Daily    atorvastatin (LIPITOR) tablet 10 mg, Daily    buPROPion (WELLBUTRIN XL) 24 hr tablet 300 mg, QAM    Cholecalciferol (VITAMIN D3) tablet 400 Units, Daily    citalopram (CeleXA) tablet 20 mg, Daily    cyanocobalamin injection 1,000 mcg, Q30 Days    enoxaparin (LOVENOX) subcutaneous injection 40 mg, Daily    [Held by provider] furosemide (LASIX) tablet 40 mg, BID (diuretic)    [START ON 12/11/2024] LORazepam (ATIVAN) injection 0.5 mg, On Call    melatonin tablet 3 mg, HS    metoprolol succinate (TOPROL-XL) 24 hr tablet 50 mg, Daily    predniSONE tablet 1 mg, TID    traMADol (ULTRAM) tablet 50 mg, Q6H PRN    Administrative Statements   Today, Patient Was Seen By: Tabby Aguiar MD    **Please Note: This note may have been constructed using a voice recognition system.**

## 2024-12-10 NOTE — ASSESSMENT & PLAN NOTE
Progressively worsening CAMPOS   History of similar presentation previously   CXR: wet read no acute cardiopulmonary disease   Orthostatic vitals positive     Orthostatic hypotension vs PE vs CHF vs COVID/FLU vs secondary to anemia  Less likely concern for PE due to Wells score of 1 and absence of tachycardia and leg swelling, however notable history of breast cancer   Less likely concern for CHF due to absent signs of volume overload including JVD and lower extremity edema  Covid/FLU/RSV negative    Plan  Repeat orthostatic vitals  If continued to be positive can consider fluids vs starting midodrine   If signs of volume overload can consider diuresis   Holding lasix due to low blood pressures

## 2024-12-10 NOTE — ASSESSMENT & PLAN NOTE
Patient presented with worsening anemia, thrombocytopenia.  Previously hemoglobin was 11.4 in January 2024, however downtrending since then.  Similar downtrend with thrombocytes.  On this admission, patient has pancytopenia.   WBC 3.09, with lymphocytosis of 55% and decrease in segmented neutrophils, ANC 1.04   Hgb 7.6   PLT 59K  Folate, B12 within normal limits.  LDH within normal limits.  Reticulocyte count percentage 0.9%, reticulocyte index is hypoproliferative at 0.3  Unclear etiology of pancytopenia, with possible contribution by anastrozole although it is rare  DDx includes but not limited to possible viral etiology(i.e. HIV, hepatitis, EBV) myelodysplastic syndrome, aplastic anemia, leukemia/lymphoma, metastatic bone marrow infiltration  HIV and hepatitis panel within normal limits    Plan  Consult hematology/oncology  IR consultation in anticipation of bone marrow biopsy tomorrow  Follow-up EBV panel  Daily CBC with differential

## 2024-12-10 NOTE — UTILIZATION REVIEW
SEE INITIAL REVIEW AT BOTTOM  Date: 12/10/2024                          Current Patient Class: Inpatient  Current Level of Care: med surg     HPI:74 y.o. female  OBSERVATION ADMISSION 12/7/24 @ 2118 CONVERTED TO INPATIENT ADMISSION 12/9/24 @ 1524 AFTER 2 MN OBS  DUE TO CONTINUED STAY REQUIRED TO CARE FOR PATIENT WITH DX: CAMPOS.     Assessment/Plan:   presenting with pancytopenia and dizziness with what appears to have been orthostatic symptoms.   EXAM Been ambulating to the bathroom and back with without any further recurrences of dizziness, lightheadedness or near syncope. Denied any complaints on my evaluation and physical exam  unremarkable for any acute findings. She has been seen by hematology and is now recommended for bone marrow biopsy. All other workup to date has been unremarkable for etiology for of pancytopenia. She is scheduled with IR tomorrow for bone marrow biopsy  Pancytopenia  Follow-up EBV panel  Daily CBC with differential  CAMPOS  Holding OP lasix due to low BP  IR consult  presented with progressively worsening SOB and near syncope found to be orthostatic and to have progressively worsening pancytopenia   bone marrow biopsy tomorrow. Please make patient NPO after midnight. PT/INR will be ordered for AM lab draw   .   Medications:   Scheduled Medications:  anastrozole, 1 mg, Oral, Daily  atorvastatin, 10 mg, Oral, Daily  buPROPion, 300 mg, Oral, QAM  cholecalciferol, 400 Units, Oral, Daily  citalopram, 20 mg, Oral, Daily  cyanocobalamin, 1,000 mcg, Intramuscular, Q30 Days  enoxaparin, 40 mg, Subcutaneous, Daily  [Held by provider] furosemide, 40 mg, Oral, BID (diuretic)  [START ON 12/11/2024] LORazepam, 0.5 mg, Intravenous, On Call  melatonin, 3 mg, Oral, HS  metoprolol succinate, 50 mg, Oral, Daily  predniSONE, 1 mg, Oral, TID      Continuous IV Infusions:     PRN Meds:  traMADol, 50 mg, Oral, Q6H PRN      Discharge Plan: TBD    Vital Signs (last 3 days)       Date/Time Temp Pulse Resp BP MAP (mmHg)  SpO2 O2 Device Patient Position - Orthostatic VS Jose Luis Coma Scale Score Pain    12/10/24 1013 -- -- -- -- -- -- -- -- -- 5    12/10/24 08:00:30 97.6 °F (36.4 °C) 85 18 127/71 90 97 % -- -- -- --    12/10/24 0100 -- -- -- -- -- -- -- -- -- No Pain    12/09/24 22:18:25 98 °F (36.7 °C) 85 18 114/67 83 97 % -- -- -- --    12/09/24 2000 -- -- -- -- -- -- -- -- 15 --    12/09/24 15:42:31 98.5 °F (36.9 °C) 87 16 108/83 91 100 % -- Lying -- --    12/09/24 14:03:34 -- 100 -- 108/72 84 97 % -- Standing for 3 minutes - Orthostatic VS -- --    12/09/24 14:02:52 -- 101 -- 95/63 74 98 % -- -- -- --    12/09/24 14:02:01 -- 101 -- 95/63 74 98 % -- Standing - Orthostatic VS -- --    12/09/24 14:01:02 -- 93 -- 102/67 79 97 % -- Sitting - Orthostatic VS -- --    12/09/24 14:00:01 -- 89 -- 102/63 76 96 % -- Lying - Orthostatic VS -- --    12/09/24 1045 -- -- -- -- -- -- -- -- -- 6    12/09/24 0800 -- -- -- -- -- -- -- -- 15 --    12/09/24 07:21:12 97.7 °F (36.5 °C) 81 18 101/62 75 96 % -- -- -- --    12/08/24 22:43:06 97.9 °F (36.6 °C) 84 17 112/62 79 95 % -- -- -- --    12/08/24 2000 -- -- -- -- -- -- None (Room air) -- 15 No Pain    12/08/24 18:17:11 97.9 °F (36.6 °C) 84 19 109/69 82 98 % -- -- -- --    12/08/24 1655 -- -- -- 83/51 62 98 % None (Room air) Standing - Orthostatic VS -- --    12/08/24 1653 -- -- -- 99/56 73 97 % None (Room air) Sitting - Orthostatic VS -- --    12/08/24 1650 98.3 °F (36.8 °C) 85 17 102/55 76 96 % None (Room air) Lying - Orthostatic VS -- --    12/08/24 1141 98.4 °F (36.9 °C) 85 18 108/68 84 94 % None (Room air) Lying -- --    12/08/24 0809 98 °F (36.7 °C) 82 18 109/67 84 96 % None (Room air) Lying 15 No Pain    12/07/24 2255 -- -- -- -- -- -- -- -- 15 No Pain    12/07/24 2239 97.8 °F (36.6 °C) 86 20 111/57 81 94 % None (Room air) Lying -- No Pain    12/07/24 2200 -- 84 18 97/62 76 99 % None (Room air) Lying -- --    12/07/24 2119 -- 87 18 115/62 83 100 % None (Room air) Sitting -- --    12/07/24 2041  -- 91 18 80/51 -- -- -- Standing - Orthostatic VS -- --    12/07/24 2038 -- 89 18 96/54 -- -- -- Sitting - Orthostatic VS -- --    12/07/24 2037 -- 84 18 96/63 -- -- -- Lying - Orthostatic VS -- --    12/07/24 1707 98.5 °F (36.9 °C) 98 20 94/62 73 100 % None (Room air) Sitting -- --          Weight (last 2 days)       None            Pertinent Labs/Diagnostic Results:   Radiology:  XR chest 2 views   ED Interpretation by Leeann Granados MD (12/07 1838)   No acute cardiac, pulmonary, or osseous processes noted.        Final Interpretation by Manisha Barrera MD (12/08 0827)      No acute cardiopulmonary disease.      Moderate hiatal hernia.         Workstation performed: [id number]         IR biopsy bone marrow    (Results Pending)     Cardiology:  ECG 12 lead   Final Result by Terrance Johnson MD (12/08 1430)   Normal sinus rhythm   Nonspecific ST and T wave abnormality   Prolonged QT   Abnormal ECG   Confirmed by Terrance Johnson ([id number]) on 12/8/2024 2:30:34 PM        GI:  No orders to display       Results from last 7 days   Lab Units 12/07/24  2304   SARS-COV-2  Negative     Results from last 7 days   Lab Units 12/10/24  0450 12/09/24  0434 12/08/24  0503 12/08/24  0001 12/07/24  1714   WBC Thousand/uL 3.29* 3.09* 4.53  --  4.91   HEMOGLOBIN g/dL 7.8* 7.6* 8.1*  --  9.8*   HEMATOCRIT % 23.3* 22.5* 23.8*  --  29.2*   PLATELETS Thousands/uL 59* 59* 61*   < > 76*   TOTAL NEUT ABS Thousands/µL  --  1.04* 1.61*  --  3.10    < > = values in this interval not displayed.     Results from last 7 days   Lab Units 12/08/24  0503   RETIC CT ABS  20,000   RETIC CT PCT % 0.90     Results from last 7 days   Lab Units 12/10/24  0450 12/09/24  0434 12/08/24  0503 12/07/24  1714   SODIUM mmol/L 140 140 140 138   POTASSIUM mmol/L 4.0 4.2 3.1* 3.2*   CHLORIDE mmol/L 110* 112* 107 102   CO2 mmol/L 25 25 27 26   ANION GAP mmol/L 5 3* 6 10   BUN mg/dL 17 19 23 25   CREATININE mg/dL 0.91 0.91 1.15 1.25   EGFR ml/min/1.73sq m 63 63 47 43  "  CALCIUM mg/dL 8.7 8.3* 8.5 9.4   MAGNESIUM mg/dL  --  2.0  --   --      Results from last 7 days   Lab Units 12/07/24  1714   AST U/L 16   ALT U/L 15   ALK PHOS U/L 57   TOTAL PROTEIN g/dL 7.9   ALBUMIN g/dL 4.1   TOTAL BILIRUBIN mg/dL 0.65         Results from last 7 days   Lab Units 12/10/24  0450 12/09/24  0434 12/08/24  0503 12/07/24  1714   GLUCOSE RANDOM mg/dL 84 86 90 103             No results found for: \"BETA-HYDROXYBUTYRATE\"                   Results from last 7 days   Lab Units 12/08/24  0001 12/07/24  2021 12/07/24  1714   HS TNI 0HR ng/L  --   --  16   HS TNI 2HR ng/L  --  15  --    HSTNI D2 ng/L  --  -1  --    HS TNI 4HR ng/L 13  --   --    HSTNI D4 ng/L -3  --   --      Results from last 7 days   Lab Units 12/07/24  1714   D-DIMER QUANTITATIVE ug/ml FEU 0.53*                             Results from last 7 days   Lab Units 12/07/24  1714   BNP pg/mL 295*                 Results from last 7 days   Lab Units 12/09/24  1221   HEP B S AG  Non-reactive   HEP C AB  Non-reactive   HEP B C IGM  Non-reactive                         Results from last 7 days   Lab Units 12/07/24  2304   INFLUENZA A PCR  Negative   INFLUENZA B PCR  Negative   RSV PCR  Negative                                               Network Utilization Review Department  ATTENTION: Please call with any questions or concerns to [phone] and carefully listen to the prompts so that you are directed to the right person. All voicemails are confidential.   For Discharge needs, contact Care Management DC Support Team at [phone] opt. 2  Send all requests for admission clinical reviews, approved or denied determinations and any other requests to dedicated fax number below belonging to the campus where the patient is receiving treatment. List of dedicated fax numbers for the Facilities:  FACILITY NAME UR FAX NUMBER   ADMISSION DENIALS (Administrative/Medical Necessity) [phone]   DISCHARGE SUPPORT TEAM (NETWORK) [phone]   PARENT " CHILD HEALTH (Maternity/NICU/Pediatrics) [phone]   [facility] [phone]   [facility] [phone]   [facility] [phone]   [facility] [phone]   [facility] [phone]   [facility] [phone]   [facility] [phone]   [facility] [phone]   [facility] [phone]   [facility] [phone]   [facility] [phone]   [facility] [phone]

## 2024-12-11 ENCOUNTER — APPOINTMENT (INPATIENT)
Dept: RADIOLOGY | Facility: HOSPITAL | Age: 74
DRG: 810 | End: 2024-12-11
Attending: INTERNAL MEDICINE
Payer: MEDICARE

## 2024-12-11 LAB
BASOPHILS # BLD AUTO: 0.01 THOUSANDS/ΜL (ref 0–0.1)
BASOPHILS NFR BLD AUTO: 0 % (ref 0–1)
EBV EA IGG SER QL IA: POSITIVE
EBV NA IGG SER QL IA: POSITIVE
EBV VCA IGG SER QL IA: POSITIVE
EBV VCA IGM SER QL IA: NEGATIVE
EOSINOPHIL # BLD AUTO: 0.1 THOUSAND/ΜL (ref 0–0.61)
EOSINOPHIL NFR BLD AUTO: 3 % (ref 0–6)
ERYTHROCYTE [DISTWIDTH] IN BLOOD BY AUTOMATED COUNT: 13.9 % (ref 11.6–15.1)
HCT VFR BLD AUTO: 25.7 % (ref 34.8–46.1)
HGB BLD-MCNC: 8.6 G/DL (ref 11.5–15.4)
IMM GRANULOCYTES # BLD AUTO: 0.01 THOUSAND/UL (ref 0–0.2)
IMM GRANULOCYTES NFR BLD AUTO: 0 % (ref 0–2)
INR PPP: 1.03 (ref 0.85–1.19)
LYMPHOCYTES # BLD AUTO: 1.97 THOUSANDS/ΜL (ref 0.6–4.47)
LYMPHOCYTES NFR BLD AUTO: 57 % (ref 14–44)
MCH RBC QN AUTO: 36 PG (ref 26.8–34.3)
MCHC RBC AUTO-ENTMCNC: 33.5 G/DL (ref 31.4–37.4)
MCV RBC AUTO: 108 FL (ref 82–98)
MONOCYTES # BLD AUTO: 0.19 THOUSAND/ΜL (ref 0.17–1.22)
MONOCYTES NFR BLD AUTO: 5 % (ref 4–12)
NEUTROPHILS # BLD AUTO: 1.21 THOUSANDS/ΜL (ref 1.85–7.62)
NEUTS SEG NFR BLD AUTO: 35 % (ref 43–75)
NRBC BLD AUTO-RTO: 0 /100 WBCS
PLATELET # BLD AUTO: 64 THOUSANDS/UL (ref 149–390)
PMV BLD AUTO: 12.1 FL (ref 8.9–12.7)
PROTHROMBIN TIME: 14.2 SECONDS (ref 12.3–15)
RBC # BLD AUTO: 2.39 MILLION/UL (ref 3.81–5.12)
WBC # BLD AUTO: 3.49 THOUSAND/UL (ref 4.31–10.16)

## 2024-12-11 PROCEDURE — 85610 PROTHROMBIN TIME: CPT | Performed by: INTERNAL MEDICINE

## 2024-12-11 PROCEDURE — 88374 M/PHMTRC ALYS ISHQUANT/SEMIQ: CPT

## 2024-12-11 PROCEDURE — 88313 SPECIAL STAINS GROUP 2: CPT | Performed by: STUDENT IN AN ORGANIZED HEALTH CARE EDUCATION/TRAINING PROGRAM

## 2024-12-11 PROCEDURE — 88185 FLOWCYTOMETRY/TC ADD-ON: CPT

## 2024-12-11 PROCEDURE — 88184 FLOWCYTOMETRY/ TC 1 MARKER: CPT

## 2024-12-11 PROCEDURE — 38222 DX BONE MARROW BX & ASPIR: CPT | Performed by: RADIOLOGY

## 2024-12-11 PROCEDURE — 99152 MOD SED SAME PHYS/QHP 5/>YRS: CPT

## 2024-12-11 PROCEDURE — 99232 SBSQ HOSP IP/OBS MODERATE 35: CPT | Performed by: INTERNAL MEDICINE

## 2024-12-11 PROCEDURE — 07DR3ZX EXTRACTION OF ILIAC BONE MARROW, PERCUTANEOUS APPROACH, DIAGNOSTIC: ICD-10-PCS | Performed by: RADIOLOGY

## 2024-12-11 PROCEDURE — 77002 NEEDLE LOCALIZATION BY XRAY: CPT | Performed by: RADIOLOGY

## 2024-12-11 PROCEDURE — 99152 MOD SED SAME PHYS/QHP 5/>YRS: CPT | Performed by: RADIOLOGY

## 2024-12-11 PROCEDURE — 85097 BONE MARROW INTERPRETATION: CPT | Performed by: STUDENT IN AN ORGANIZED HEALTH CARE EDUCATION/TRAINING PROGRAM

## 2024-12-11 PROCEDURE — 85025 COMPLETE CBC W/AUTO DIFF WBC: CPT

## 2024-12-11 PROCEDURE — 88264 CHROMOSOME ANALYSIS 20-25: CPT

## 2024-12-11 PROCEDURE — 88377 M/PHMTRC ALYS ISHQUANT/SEMIQ: CPT

## 2024-12-11 PROCEDURE — 88342 IMHCHEM/IMCYTCHM 1ST ANTB: CPT | Performed by: STUDENT IN AN ORGANIZED HEALTH CARE EDUCATION/TRAINING PROGRAM

## 2024-12-11 PROCEDURE — 88305 TISSUE EXAM BY PATHOLOGIST: CPT | Performed by: STUDENT IN AN ORGANIZED HEALTH CARE EDUCATION/TRAINING PROGRAM

## 2024-12-11 PROCEDURE — 77002 NEEDLE LOCALIZATION BY XRAY: CPT

## 2024-12-11 PROCEDURE — 88341 IMHCHEM/IMCYTCHM EA ADD ANTB: CPT | Performed by: STUDENT IN AN ORGANIZED HEALTH CARE EDUCATION/TRAINING PROGRAM

## 2024-12-11 PROCEDURE — 81455 SO/HL 51/>GSAP DNA/DNA&RNA: CPT

## 2024-12-11 PROCEDURE — C1830 POWER BONE MARROW BX NEEDLE: HCPCS

## 2024-12-11 PROCEDURE — 88360 TUMOR IMMUNOHISTOCHEM/MANUAL: CPT | Performed by: STUDENT IN AN ORGANIZED HEALTH CARE EDUCATION/TRAINING PROGRAM

## 2024-12-11 PROCEDURE — 38222 DX BONE MARROW BX & ASPIR: CPT

## 2024-12-11 PROCEDURE — 88237 TISSUE CULTURE BONE MARROW: CPT

## 2024-12-11 PROCEDURE — 85007 BL SMEAR W/DIFF WBC COUNT: CPT | Performed by: RADIOLOGY

## 2024-12-11 PROCEDURE — 88311 DECALCIFY TISSUE: CPT | Performed by: STUDENT IN AN ORGANIZED HEALTH CARE EDUCATION/TRAINING PROGRAM

## 2024-12-11 RX ORDER — FENTANYL CITRATE 50 UG/ML
INJECTION, SOLUTION INTRAMUSCULAR; INTRAVENOUS AS NEEDED
Status: COMPLETED | OUTPATIENT
Start: 2024-12-11 | End: 2024-12-11

## 2024-12-11 RX ORDER — LIDOCAINE WITH 8.4% SOD BICARB 0.9%(10ML)
SYRINGE (ML) INJECTION AS NEEDED
Status: COMPLETED | OUTPATIENT
Start: 2024-12-11 | End: 2024-12-11

## 2024-12-11 RX ORDER — MIDAZOLAM HYDROCHLORIDE 2 MG/2ML
INJECTION, SOLUTION INTRAMUSCULAR; INTRAVENOUS AS NEEDED
Status: COMPLETED | OUTPATIENT
Start: 2024-12-11 | End: 2024-12-11

## 2024-12-11 RX ADMIN — FENTANYL CITRATE 50 MCG: 50 INJECTION INTRAMUSCULAR; INTRAVENOUS at 12:31

## 2024-12-11 RX ADMIN — MIDAZOLAM 1 MG: 1 INJECTION INTRAMUSCULAR; INTRAVENOUS at 12:31

## 2024-12-11 RX ADMIN — Medication 3 MG: at 21:35

## 2024-12-11 RX ADMIN — LORAZEPAM 0.5 MG: 2 INJECTION INTRAMUSCULAR; INTRAVENOUS at 11:41

## 2024-12-11 RX ADMIN — FENTANYL CITRATE 50 MCG: 50 INJECTION INTRAMUSCULAR; INTRAVENOUS at 12:36

## 2024-12-11 RX ADMIN — PREDNISONE 1 MG: 1 TABLET ORAL at 16:59

## 2024-12-11 RX ADMIN — Medication 5 ML: at 12:38

## 2024-12-11 RX ADMIN — PREDNISONE 1 MG: 1 TABLET ORAL at 21:35

## 2024-12-11 RX ADMIN — MIDAZOLAM 1 MG: 1 INJECTION INTRAMUSCULAR; INTRAVENOUS at 12:36

## 2024-12-11 NOTE — PROGRESS NOTES
Progress Note - Hospitalist   Name: Pretty Aguila 74 y.o. female I MRN: [id number]  Unit/Bed#: S -01 I Date of Admission: 12/7/2024   Date of Service: 12/11/2024 I Hospital Day: 2    Assessment & Plan  Pancytopenia (HCC)  Patient presented with worsening anemia, thrombocytopenia.  Previously hemoglobin was 11.4 in January 2024, however downtrending since then.  Similar downtrend with thrombocytes.  On this admission, patient has pancytopenia.   WBC 3.09, with lymphocytosis of 55% and decrease in segmented neutrophils, ANC 1.04   Hgb 7.6   PLT 59K  Folate, B12 within normal limits.  LDH within normal limits.  Reticulocyte count percentage 0.9%, reticulocyte index is hypoproliferative at 0.3  Unclear etiology of pancytopenia, with possible contribution by anastrozole although it is rare  DDx includes but not limited to possible viral etiology(i.e. HIV, hepatitis, EBV) myelodysplastic syndrome, aplastic anemia, leukemia/lymphoma, metastatic bone marrow infiltration  EBV, HIV, and hepatitis panel within normal limits  CBC still shows pancytopenia as of 12/11   WBC 3.49   Hgb 8.6   PLT 64k    Plan  Consult hematology/oncology  Bone marrow biopsy to be done 12/11  Patient n.p.o.for bone marrow biopsy today  Patient received 1 dose of IV cobalamin will continue every 30 days.  Daily CBC with differential    Dyspnea on exertion  Progressively worsening CAMPOS   History of similar presentation previously   CXR: wet read no acute cardiopulmonary disease   Orthostatic vitals positive     Orthostatic hypotension vs PE vs CHF vs COVID/FLU vs secondary to anemia  Less likely concern for PE due to Wells score of 1 and absence of tachycardia and leg swelling, however notable history of breast cancer   Less likely concern for CHF due to absent signs of volume overload including JVD and lower extremity edema  Covid/FLU/RSV negative    Plan  Repeat orthostatic vitals  If continued to be positive can consider fluids vs  starting midodrine   If signs of volume overload can consider diuresis   Holding lasix due to low blood pressures     Malignant neoplasm of lower-outer quadrant of left breast of female, estrogen receptor positive (HCC)  S/p left breast mastectomy and reconstruction with submuscular tissue expander with acellular dermal matrix   Essential hypertension  Lasix 40 mg BID and Toprol XL 50 mg at home    Plan   Hold Lasix due to low blood pressure   Dyslipidemia  Continue PTA atorvastatin 10 mg  Coronary artery disease involving native coronary artery of native heart without angina pectoris  Follows with Dr. Conrad  Continue PTA atorvastatin 10 mg     VTE Pharmacologic Prophylaxis: VTE Score: 3 Moderate Risk (Score 3-4) - Pharmacological DVT Prophylaxis Ordered: enoxaparin (Lovenox).    Mobility:   Basic Mobility Inpatient Raw Score: 24  JH-HLM Goal: 8: Walk 250 feet or more  JH-HLM Achieved: 7: Walk 25 feet or more  JH-HLM Goal achieved. Continue to encourage appropriate mobility.    Patient Centered Rounds: I performed bedside rounds with nursing staff today.   Discussions with Specialists or Other Care Team Provider: Hematology/oncology, interventional radiology    Education and Discussions with Family / Patient: Attempted to update  (daughter) via phone. Left voicemail.     Current Length of Stay: 2 day(s)  Current Patient Status: Inpatient   Certification Statement: The patient will continue to require additional inpatient hospital stay due to bone marrow biopsy scheduled for tomorrow  Discharge Plan: Anticipate discharge in 24-48 hrs to home.    Code Status: Level 1 - Full Code    Subjective   Patient seen and evaluated bedside, she denies any active complaints currently.  She denies any dyspnea overnight, denies any dizziness, lightheadedness and states she was able to ambulate yesterday without issue although she states she has not been exerting herself.  She is pending a bone marrow biopsy today.  Requesting ice chips for her dry mouth due to being NPO.    Objective :  Temp:  [97.7 °F (36.5 °C)-98 °F (36.7 °C)] 97.7 °F (36.5 °C)  HR:  [81-90] 81  BP: ()/(62-73) 117/73  Resp:  [12-18] 16  SpO2:  [95 %-98 %] 95 %    Body mass index is 32.45 kg/m².     Input and Output Summary (last 24 hours):   No intake or output data in the 24 hours ending 12/11/24 1102    Physical Exam  Vitals and nursing note reviewed.   Constitutional:       General: She is not in acute distress.     Appearance: Normal appearance. She is well-developed.   HENT:      Head: Normocephalic and atraumatic.   Eyes:      Conjunctiva/sclera: Conjunctivae normal.   Cardiovascular:      Rate and Rhythm: Normal rate and regular rhythm.      Heart sounds: No murmur heard.     No friction rub. No gallop.   Pulmonary:      Effort: Pulmonary effort is normal. No respiratory distress.      Breath sounds: Normal breath sounds. No wheezing, rhonchi or rales.   Abdominal:      General: Abdomen is flat. Bowel sounds are normal.      Palpations: Abdomen is soft.      Tenderness: There is no abdominal tenderness. There is no guarding.      Hernia: No hernia is present.   Musculoskeletal:         General: No swelling.      Cervical back: Neck supple. No rigidity.   Skin:     General: Skin is warm and dry.      Capillary Refill: Capillary refill takes less than 2 seconds.   Neurological:      Mental Status: She is alert.   Psychiatric:         Mood and Affect: Mood normal.         Behavior: Behavior normal.         Thought Content: Thought content normal.         Judgment: Judgment normal.         Lines/Drains:              Lab Results: I have reviewed the following results:   Results from last 7 days   Lab Units 12/11/24  0543   WBC Thousand/uL 3.49*   HEMOGLOBIN g/dL 8.6*   HEMATOCRIT % 25.7*   PLATELETS Thousands/uL 64*   SEGS PCT % 35*   LYMPHO PCT % 57*   MONO PCT % 5   EOS PCT % 3     Results from last 7 days   Lab Units 12/10/24  0450 12/08/24  0503  12/07/24  1714   SODIUM mmol/L 140   < > 138   POTASSIUM mmol/L 4.0   < > 3.2*   CHLORIDE mmol/L 110*   < > 102   CO2 mmol/L 25   < > 26   BUN mg/dL 17   < > 25   CREATININE mg/dL 0.91   < > 1.25   ANION GAP mmol/L 5   < > 10   CALCIUM mg/dL 8.7   < > 9.4   ALBUMIN g/dL  --   --  4.1   TOTAL BILIRUBIN mg/dL  --   --  0.65   ALK PHOS U/L  --   --  57   ALT U/L  --   --  15   AST U/L  --   --  16   GLUCOSE RANDOM mg/dL 84   < > 103    < > = values in this interval not displayed.     Results from last 7 days   Lab Units 12/11/24  0543   INR  1.03                   Recent Cultures (last 7 days):         Last 24 Hours Medication List:     Current Facility-Administered Medications:     anastrozole (ARIMIDEX) tablet 1 mg, Daily    atorvastatin (LIPITOR) tablet 10 mg, Daily    buPROPion (WELLBUTRIN XL) 24 hr tablet 300 mg, QAM    Cholecalciferol (VITAMIN D3) tablet 400 Units, Daily    citalopram (CeleXA) tablet 20 mg, Daily    cyanocobalamin injection 1,000 mcg, Q30 Days    enoxaparin (LOVENOX) subcutaneous injection 40 mg, Daily    [Held by provider] furosemide (LASIX) tablet 40 mg, BID (diuretic)    LORazepam (ATIVAN) injection 0.5 mg, On Call    melatonin tablet 3 mg, HS    metoprolol succinate (TOPROL-XL) 24 hr tablet 50 mg, Daily    predniSONE tablet 1 mg, TID    traMADol (ULTRAM) tablet 50 mg, Q6H PRN    Administrative Statements   Today, Patient Was Seen By: Oseas    **Please Note: This note may have been constructed using a voice recognition system.**

## 2024-12-11 NOTE — UTILIZATION REVIEW
Continued Stay Review    Date: ***                          Current Patient Class: Inpatient  Current Level of Care: ***    HPI:74 y.o. female initially admitted on ***     Assessment/Plan: ***    Medications:   Scheduled Medications:  anastrozole, 1 mg, Oral, Daily  atorvastatin, 10 mg, Oral, Daily  buPROPion, 300 mg, Oral, QAM  cholecalciferol, 400 Units, Oral, Daily  citalopram, 20 mg, Oral, Daily  cyanocobalamin, 1,000 mcg, Intramuscular, Q30 Days  enoxaparin, 40 mg, Subcutaneous, Daily  [Held by provider] furosemide, 40 mg, Oral, BID (diuretic)  melatonin, 3 mg, Oral, HS  metoprolol succinate, 50 mg, Oral, Daily  predniSONE, 1 mg, Oral, TID      Continuous IV Infusions:     PRN Meds:  traMADol, 50 mg, Oral, Q6H PRN      Discharge Plan: ***    Vital Signs (last 3 days)       Date/Time Temp Pulse Resp BP MAP (mmHg) SpO2 O2 Device Patient Position - Orthostatic VS [place] Coma Scale Score Pain    12/11/24 1507 -- -- -- 98/60 -- -- -- Lying -- --    12/11/24 14:52:18 97.9 °F (36.6 °C) 92 12 94/56 69 92 % -- -- -- --    12/11/24 12:53:01 97.8 °F (36.6 °C) 79 12 106/69 81 96 % -- -- -- --    12/11/24 1243 -- 83 -- 125/77 -- 100 % -- -- -- --    12/11/24 1238 -- 81 18 130/76 -- 100 % -- -- -- --    12/11/24 1233 -- 82 18 141/85 -- 100 % -- -- -- --    12/11/24 1231 -- 85 18 139/82 -- 100 % -- -- -- --    12/11/24 0800 -- -- -- -- -- -- -- -- 15 No Pain    12/11/24 07:33:38 97.7 °F (36.5 °C) 81 16 117/73 88 95 % -- -- -- --    12/10/24 22:12:10 98 °F (36.7 °C) 82 18 108/68 81 96 % -- -- -- --    12/10/24 2052 -- -- -- -- -- -- -- -- -- No Pain    12/10/24 14:05:12 97.8 °F (36.6 °C) 90 12 94/62 73 98 % -- -- -- --    12/10/24 1013 -- -- -- -- -- -- -- -- -- 5    12/10/24 0900 -- -- -- -- -- -- -- -- 15 --    12/10/24 08:00:30 97.6 °F (36.4 °C) 85 18 127/71 90 97 % -- -- -- --    12/10/24 0100 -- -- -- -- -- -- -- -- -- No Pain    12/09/24 22:18:25 98 °F (36.7 °C) 85 18 114/67 83 97 % -- -- -- --    12/09/24 2000 -- --  -- -- -- -- -- -- 15 --    12/09/24 15:42:31 98.5 °F (36.9 °C) 87 16 108/83 91 100 % -- Lying -- --    12/09/24 14:03:34 -- 100 -- 108/72 84 97 % -- Standing for 3 minutes - Orthostatic VS -- --    12/09/24 14:02:52 -- 101 -- 95/63 74 98 % -- -- -- --    12/09/24 14:02:01 -- 101 -- 95/63 74 98 % -- Standing - Orthostatic VS -- --    12/09/24 14:01:02 -- 93 -- 102/67 79 97 % -- Sitting - Orthostatic VS -- --    12/09/24 14:00:01 -- 89 -- 102/63 76 96 % -- Lying - Orthostatic VS -- --    12/09/24 1045 -- -- -- -- -- -- -- -- -- 6    12/09/24 0800 -- -- -- -- -- -- -- -- 15 --    12/09/24 07:21:12 97.7 °F (36.5 °C) 81 18 101/62 75 96 % -- -- -- --    12/08/24 22:43:06 97.9 °F (36.6 °C) 84 17 112/62 79 95 % -- -- -- --    12/08/24 2000 -- -- -- -- -- -- None (Room air) -- 15 No Pain    12/08/24 18:17:11 97.9 °F (36.6 °C) 84 19 109/69 82 98 % -- -- -- --    12/08/24 1655 -- -- -- 83/51 62 98 % None (Room air) Standing - Orthostatic VS -- --    12/08/24 1653 -- -- -- 99/56 73 97 % None (Room air) Sitting - Orthostatic VS -- --    12/08/24 1650 98.3 °F (36.8 °C) 85 17 102/55 76 96 % None (Room air) Lying - Orthostatic VS -- --    12/08/24 1141 98.4 °F (36.9 °C) 85 18 108/68 84 94 % None (Room air) Lying -- --    12/08/24 0809 98 °F (36.7 °C) 82 18 109/67 84 96 % None (Room air) Lying 15 No Pain          Weight (last 2 days)       None            Pertinent Labs/Diagnostic Results:   Radiology:  XR chest 2 views   ED Interpretation by Leeann Granados MD (12/07 0918)   No acute cardiac, pulmonary, or osseous processes noted.        Final Interpretation by Manisha Barrera MD (12/08 0827)      No acute cardiopulmonary disease.      Moderate hiatal hernia.         Workstation performed: [id number]         IR biopsy bone marrow    (Results Pending)     Cardiology:  ECG 12 lead   Final Result by Terrance Johnson MD (12/08 0280)   Normal sinus rhythm   Nonspecific ST and T wave abnormality   Prolonged QT   Abnormal ECG  "  Confirmed by Terrance Johnson ([id number]) on 12/8/2024 2:30:34 PM        GI:  No orders to display       Results from last 7 days   Lab Units 12/07/24  2304   SARS-COV-2  Negative     Results from last 7 days   Lab Units 12/11/24  0543 12/10/24  0450 12/09/24  0434 12/08/24  0503 12/08/24  0001 12/07/24  1714   WBC Thousand/uL 3.49* 3.29* 3.09* 4.53  --  4.91   HEMOGLOBIN g/dL 8.6* 7.8* 7.6* 8.1*  --  9.8*   HEMATOCRIT % 25.7* 23.3* 22.5* 23.8*  --  29.2*   PLATELETS Thousands/uL 64* 59* 59* 61*   < > 76*   TOTAL NEUT ABS Thousands/µL 1.21*  --  1.04* 1.61*  --  3.10    < > = values in this interval not displayed.     Results from last 7 days   Lab Units 12/08/24  0503   RETIC CT ABS  20,000   RETIC CT PCT % 0.90     Results from last 7 days   Lab Units 12/10/24  0450 12/09/24  0434 12/08/24  0503 12/07/24  1714   SODIUM mmol/L 140 140 140 138   POTASSIUM mmol/L 4.0 4.2 3.1* 3.2*   CHLORIDE mmol/L 110* 112* 107 102   CO2 mmol/L 25 25 27 26   ANION GAP mmol/L 5 3* 6 10   BUN mg/dL 17 19 23 25   CREATININE mg/dL 0.91 0.91 1.15 1.25   EGFR ml/min/1.73sq m 63 63 47 43   CALCIUM mg/dL 8.7 8.3* 8.5 9.4   MAGNESIUM mg/dL  --  2.0  --   --      Results from last 7 days   Lab Units 12/07/24  1714   AST U/L 16   ALT U/L 15   ALK PHOS U/L 57   TOTAL PROTEIN g/dL 7.9   ALBUMIN g/dL 4.1   TOTAL BILIRUBIN mg/dL 0.65         Results from last 7 days   Lab Units 12/10/24  0450 12/09/24  0434 12/08/24  0503 12/07/24  1714   GLUCOSE RANDOM mg/dL 84 86 90 103             No results found for: \"BETA-HYDROXYBUTYRATE\"                   Results from last 7 days   Lab Units 12/08/24  0001 12/07/24 2021 12/07/24  1714   HS TNI 0HR ng/L  --   --  16   HS TNI 2HR ng/L  --  15  --    HSTNI D2 ng/L  --  -1  --    HS TNI 4HR ng/L 13  --   --    HSTNI D4 ng/L -3  --   --      Results from last 7 days   Lab Units 12/07/24  1714   D-DIMER QUANTITATIVE ug/ml FEU 0.53*     Results from last 7 days   Lab Units 12/11/24  0543   PROTIME seconds 14.2   INR  " 1.03                         Results from last 7 days   Lab Units 12/07/24  1714   BNP pg/mL 295*                 Results from last 7 days   Lab Units 12/09/24  1221   HEP B S AG  Non-reactive   HEP C AB  Non-reactive   HEP B C IGM  Non-reactive                         Results from last 7 days   Lab Units 12/07/24  2304   INFLUENZA A PCR  Negative   INFLUENZA B PCR  Negative   RSV PCR  Negative                                               Network Utilization Review Department  ATTENTION: Please call with any questions or concerns to [phone] and carefully listen to the prompts so that you are directed to the right person. All voicemails are confidential.   For Discharge needs, contact Care Management DC Support Team at [phone] opt. 2  Send all requests for admission clinical reviews, approved or denied determinations and any other requests to dedicated fax number below belonging to the campus where the patient is receiving treatment. List of dedicated fax numbers for the Facilities:  FACILITY NAME UR FAX NUMBER   ADMISSION DENIALS (Administrative/Medical Necessity) [phone]   DISCHARGE SUPPORT TEAM (NETWORK) [phone]   PARENT CHILD HEALTH (Maternity/NICU/Pediatrics) [phone]   [facility] [phone]   [facility] [phone]   [facility] [phone]   [facility] [phone]   [facility] [phone]   [facility] [phone]   [facility] [phone]   [facility] [phone]   [facility] [phone]   [facility] [phone]   [facility] [phone]   [facility] [place] [phone]

## 2024-12-11 NOTE — DISCHARGE INSTRUCTIONS
Bone Marrow Biopsy     WHAT YOU NEED TO KNOW:   A bone marrow biopsy is a procedure to remove a small amount of bone marrow from your bone. Bone marrow is the soft tissue inside your bone that helps to make blood cells. The sample is tested for disease or infection.    DISCHARGE INSTRUCTIONS:     1. Limit your activities day of biopsy as directed by your doctor.    2. Use medication as ordered.    3. Return to your normal diet.Small sips of flat soda will help with nausea.    4. Remove band-aid or dressing 24 hours after procedure.    Contact Interventional Radiology at [phone] (ARMENTA PATIENTS: Contact Interventional Radiology at [phone]) (SHEILA PATIENTS: Contact Interventional Radiology at [phone]) if:    1. Difficulty breathing, nausea or vomiting.    2. Chills or fever above 101 F.    3. Pain at biopsy site not relieved by medication.    4. Develop any redness, swelling, heat, unusual drainage, heavy bruising or bleeding from biopsy site.

## 2024-12-11 NOTE — ASSESSMENT & PLAN NOTE
Patient presented with worsening anemia, thrombocytopenia.  Previously hemoglobin was 11.4 in January 2024, however downtrending since then.  Similar downtrend with thrombocytes.  On this admission, patient has pancytopenia.   WBC 3.09, with lymphocytosis of 55% and decrease in segmented neutrophils, ANC 1.04   Hgb 7.6   PLT 59K  Folate, B12 within normal limits.  LDH within normal limits.  Reticulocyte count percentage 0.9%, reticulocyte index is hypoproliferative at 0.3  Unclear etiology of pancytopenia, with possible contribution by anastrozole although it is rare  DDx includes but not limited to possible viral etiology(i.e. HIV, hepatitis, EBV) myelodysplastic syndrome, aplastic anemia, leukemia/lymphoma, metastatic bone marrow infiltration  EBV, HIV, and hepatitis panel within normal limits  CBC still shows pancytopenia as of 12/11   WBC 3.49   Hgb 8.6   PLT 64k    Plan  Consult hematology/oncology  Bone marrow biopsy to be done 12/11  Patient n.p.o.for bone marrow biopsy today  Patient received 1 dose of IV cobalamin will continue every 30 days.  Daily CBC with differential

## 2024-12-11 NOTE — SEDATION DOCUMENTATION
Procedure ended. IR bone marrow biopsy completed by Dr. Woods. Bandaid to site. Report and care assumed by primary RN   
None

## 2024-12-11 NOTE — PLAN OF CARE
Problem: PAIN - ADULT  Goal: Verbalizes/displays adequate comfort level or baseline comfort level  Description: Interventions:  - Encourage patient to monitor pain and request assistance  - Assess pain using appropriate pain scale  - Administer analgesics based on type and severity of pain and evaluate response  - Implement non-pharmacological measures as appropriate and evaluate response  - Consider cultural and social influences on pain and pain management  - Notify physician/advanced practitioner if interventions unsuccessful or patient reports new pain  Outcome: Progressing     Problem: INFECTION - ADULT  Goal: Absence or prevention of progression during hospitalization  Description: INTERVENTIONS:  - Assess and monitor for signs and symptoms of infection  - Monitor lab/diagnostic results  - Monitor all insertion sites, i.e. indwelling lines, tubes, and drains  - Monitor endotracheal if appropriate and nasal secretions for changes in amount and color  - [place] appropriate cooling/warming therapies per order  - Administer medications as ordered  - Instruct and encourage patient and family to use good hand hygiene technique  - Identify and instruct in appropriate isolation precautions for identified infection/condition  Outcome: Progressing  Goal: Absence of fever/infection during neutropenic period  Description: INTERVENTIONS:  - Monitor WBC    Outcome: Progressing     Problem: SAFETY ADULT  Goal: Patient will remain free of falls  Description: INTERVENTIONS:  - Educate patient/family on patient safety including physical limitations  - Instruct patient to call for assistance with activity   - Consult OT/PT to assist with strengthening/mobility   - Keep Call bell within reach  - Keep bed low and locked with side rails adjusted as appropriate  - Keep care items and personal belongings within reach  - Initiate and maintain comfort rounds  - Make Fall Risk Sign visible to staff  - Apply yellow socks and bracelet  for high fall risk patients  - Consider moving patient to room near nurses station  Outcome: Progressing  Goal: Maintain or return to baseline ADL function  Description: INTERVENTIONS:  -  Assess patient's ability to carry out ADLs; assess patient's baseline for ADL function and identify physical deficits which impact ability to perform ADLs (bathing, care of mouth/teeth, toileting, grooming, dressing, etc.)  - Assess/evaluate cause of self-care deficits   - Assess range of motion  - Assess patient's mobility; develop plan if impaired  - Assess patient's need for assistive devices and provide as appropriate  - Encourage maximum independence but intervene and supervise when necessary  - Involve family in performance of ADLs  - Assess for home care needs following discharge   - Consider OT consult to assist with ADL evaluation and planning for discharge  - Provide patient education as appropriate  Outcome: Progressing  Goal: Maintains/Returns to pre admission functional level  Description: INTERVENTIONS:  - Perform AM-PAC 6 Click Basic Mobility/ Daily Activity assessment daily.  - Set and communicate daily mobility goal to care team and patient/family/caregiver.   - Collaborate with rehabilitation services on mobility goals if consulted  - Out of bed for toileting  - Record patient progress and toleration of activity level   Outcome: Progressing     Problem: DISCHARGE PLANNING  Goal: Discharge to home or other facility with appropriate resources  Description: INTERVENTIONS:  - Identify barriers to discharge w/patient and caregiver  - Arrange for needed discharge resources and transportation as appropriate  - Identify discharge learning needs (meds, wound care, etc.)  - Arrange for interpretive services to assist at discharge as needed  - Refer to Case Management Department for coordinating discharge planning if the patient needs post-hospital services based on physician/advanced practitioner order or complex needs  related to functional status, cognitive ability, or social support system  Outcome: Progressing     Problem: Knowledge Deficit  Goal: Patient/family/caregiver demonstrates understanding of disease process, treatment plan, medications, and discharge instructions  Description: Complete learning assessment and assess knowledge base.  Interventions:  - Provide teaching at level of understanding  - Provide teaching via preferred learning methods  Outcome: Progressing

## 2024-12-11 NOTE — BRIEF OP NOTE (RAD/CATH)
INTERVENTIONAL RADIOLOGY PROCEDURE NOTE    Date: 12/11/2024    Procedure: IR BIOPSY BONE MARROW     Preoperative diagnosis:   1. Dizziness    2. Dyspnea    3. Hypotensive episode    4. Pancytopenia (HCC)         Postoperative diagnosis: Same.    Surgeon: Tye Woods MD     Assistant: None. No qualified resident was available.    Blood loss: Minimal    Specimens: Bone marrow    Findings: Successful bone marrow biopsy    Complications: None immediate.    Anesthesia: conscious sedation

## 2024-12-12 ENCOUNTER — TELEPHONE (OUTPATIENT)
Age: 74
End: 2024-12-12

## 2024-12-12 VITALS
HEART RATE: 94 BPM | DIASTOLIC BLOOD PRESSURE: 65 MMHG | SYSTOLIC BLOOD PRESSURE: 103 MMHG | OXYGEN SATURATION: 97 % | BODY MASS INDEX: 32.46 KG/M2 | HEIGHT: 63 IN | TEMPERATURE: 98.4 F | RESPIRATION RATE: 18 BRPM | WEIGHT: 183.2 LBS

## 2024-12-12 LAB
ANION GAP SERPL CALCULATED.3IONS-SCNC: 4 MMOL/L (ref 4–13)
BUN SERPL-MCNC: 17 MG/DL (ref 5–25)
CALCIUM SERPL-MCNC: 8.5 MG/DL (ref 8.4–10.2)
CHLORIDE SERPL-SCNC: 108 MMOL/L (ref 96–108)
CO2 SERPL-SCNC: 28 MMOL/L (ref 21–32)
CREAT SERPL-MCNC: 0.82 MG/DL (ref 0.6–1.3)
ERYTHROCYTE [DISTWIDTH] IN BLOOD BY AUTOMATED COUNT: 13.5 % (ref 11.6–15.1)
GFR SERPL CREATININE-BSD FRML MDRD: 71 ML/MIN/1.73SQ M
GLUCOSE SERPL-MCNC: 78 MG/DL (ref 65–140)
HCT VFR BLD AUTO: 24.5 % (ref 34.8–46.1)
HGB BLD-MCNC: 8 G/DL (ref 11.5–15.4)
MCH RBC QN AUTO: 35.4 PG (ref 26.8–34.3)
MCHC RBC AUTO-ENTMCNC: 32.7 G/DL (ref 31.4–37.4)
MCV RBC AUTO: 108 FL (ref 82–98)
PLATELET # BLD AUTO: 60 THOUSANDS/UL (ref 149–390)
PMV BLD AUTO: 12.4 FL (ref 8.9–12.7)
POTASSIUM SERPL-SCNC: 4.3 MMOL/L (ref 3.5–5.3)
RBC # BLD AUTO: 2.26 MILLION/UL (ref 3.81–5.12)
SODIUM SERPL-SCNC: 140 MMOL/L (ref 135–147)
WBC # BLD AUTO: 3.13 THOUSAND/UL (ref 4.31–10.16)

## 2024-12-12 PROCEDURE — 85027 COMPLETE CBC AUTOMATED: CPT

## 2024-12-12 PROCEDURE — 99239 HOSP IP/OBS DSCHRG MGMT >30: CPT | Performed by: INTERNAL MEDICINE

## 2024-12-12 PROCEDURE — 80048 BASIC METABOLIC PNL TOTAL CA: CPT

## 2024-12-12 RX ORDER — CYANOCOBALAMIN 1000 UG/ML
1000 INJECTION, SOLUTION INTRAMUSCULAR; SUBCUTANEOUS
Qty: 1 ML | Refills: 0 | Status: SHIPPED | OUTPATIENT
Start: 2025-01-08 | End: 2025-01-13 | Stop reason: ALTCHOICE

## 2024-12-12 RX ORDER — INSULIN LISPRO 100 [IU]/ML
1-6 INJECTION, SOLUTION INTRAVENOUS; SUBCUTANEOUS
Status: DISCONTINUED | OUTPATIENT
Start: 2024-12-12 | End: 2024-12-12 | Stop reason: HOSPADM

## 2024-12-12 RX ADMIN — PREDNISONE 1 MG: 1 TABLET ORAL at 15:51

## 2024-12-12 RX ADMIN — CITALOPRAM HYDROBROMIDE 20 MG: 20 TABLET, FILM COATED ORAL at 09:37

## 2024-12-12 RX ADMIN — ATORVASTATIN CALCIUM 10 MG: 10 TABLET, FILM COATED ORAL at 09:37

## 2024-12-12 RX ADMIN — ANASTROZOLE 1 MG: 1 TABLET, COATED ORAL at 09:38

## 2024-12-12 RX ADMIN — CHOLECALCIFEROL (VITAMIN D3) 10 MCG (400 UNIT) TABLET 400 UNITS: at 09:37

## 2024-12-12 RX ADMIN — PREDNISONE 1 MG: 1 TABLET ORAL at 09:37

## 2024-12-12 RX ADMIN — BUPROPION HYDROCHLORIDE 300 MG: 150 TABLET, FILM COATED, EXTENDED RELEASE ORAL at 09:37

## 2024-12-12 RX ADMIN — ENOXAPARIN SODIUM 40 MG: 40 INJECTION SUBCUTANEOUS at 09:37

## 2024-12-12 RX ADMIN — TRAMADOL HYDROCHLORIDE 50 MG: 50 TABLET ORAL at 09:40

## 2024-12-12 NOTE — DISCHARGE INSTR - AVS FIRST PAGE
Dear Pretty Aguila,     It was our pleasure to care for you here at [facility].  It is our hope that we were always able to exceed the expected standards for your care during your stay.  You were hospitalized due to pancytopenia.  You were cared for on the 2nd floor by Tabby Aguiar MD under the service of Mari Rivas MD with the [facility] who covers for your primary care physician (PCP), Lauren Dumont MD, while you were hospitalized.  If you have any questions or concerns related to this hospitalization, you may contact us at .  For follow up as well as any medication refills, we recommend that you follow up with your primary care physician.  A registered nurse will reach out to you by phone within a few days after your discharge to answer any additional questions that you may have after going home.  However, at this time we provide for you here, the most important instructions / recommendations at discharge:     Notable Medication Adjustments -   We are holding your Lasix 40 mg tablets due to you being volume depleted and having softer blood pressures during this admission. Please follow up with your primary care provider if it necessary to restart this medication at a lower dose.   You are scheduled for vitamin B12 injections every 30 days for 2 more doses with hematology/oncology.  Testing Required after Discharge -   None  Important follow up information -   Please follow-up with your primary care provider within 1 week of discharge.  Please follow-up with hematology oncology outpatient, your next appointment is December 31, 2024.  Other Instructions -   You have pending studies from your bone marrow biopsy. Please review the results of these studies with hematology/oncology outpatient.  Your vitamin B12 injections will be scheduled by hematology/oncology outpatient.  Please review this entire after visit summary as  additional general instructions including medication list, appointments, activity, diet, any pertinent wound care, and other additional recommendations from your care team that may be provided for you.      Sincerely,     Tabby Aguiar MD

## 2024-12-12 NOTE — UTILIZATION REVIEW
Continued Stay Review    Date: 12/11                          Current Patient Class: Inpatient  Current Level of Care: MS    HPI:74 y.o. female initially admitted on 12/09     Assessment/Plan: No acute ovn events. Denies recurrence of symptoms of dizziness or lightheadedness. No syncopal or presyncopal episodes. Lab work this morning shows stable pancytopenia with some improvement in WBC, hemoglobin and platelets. Plan for bone marrow biopsy today. On NPO for bone marrow bx. Cont IM Cobalamin. Daily CBC w/ diff.     IR Procedure Note:  Date: 12/11/2024   Procedure: IR BIOPSY BONE MARROW   Anesthesia: conscious sedation   Specimens: Bone marrow   Findings: Successful bone marrow biopsy    Medications:   Scheduled Medications:  anastrozole, 1 mg, Oral, Daily  atorvastatin, 10 mg, Oral, Daily  buPROPion, 300 mg, Oral, QAM  cholecalciferol, 400 Units, Oral, Daily  citalopram, 20 mg, Oral, Daily  cyanocobalamin, 1,000 mcg, Intramuscular, Q30 Days  enoxaparin, 40 mg, Subcutaneous, Daily  [Held by provider] furosemide, 40 mg, Oral, BID (diuretic)  melatonin, 3 mg, Oral, HS  [Held by provider] metoprolol succinate, 50 mg, Oral, Daily  predniSONE, 1 mg, Oral, TID      Continuous IV Infusions:     PRN Meds:  traMADol, 50 mg, Oral, Q6H PRN      Discharge Plan: TBD    Vital Signs (last 3 days)       Date/Time Temp Pulse Resp BP MAP (mmHg) SpO2 Patient Position - Orthostatic VS [place] Coma Scale Score Pain    12/12/24 07:55:43 97.9 °F (36.6 °C) 79 -- 121/75 90 96 % -- -- --    12/11/24 2335 -- -- -- -- -- 93 % -- -- No Pain    12/11/24 22:26:56 97.8 °F (36.6 °C) 82 18 107/65 79 93 % -- -- --    12/11/24 1507 -- -- -- 98/60 -- -- Lying -- --    12/11/24 14:52:18 97.9 °F (36.6 °C) 92 12 94/56 69 92 % -- -- --    12/11/24 12:53:01 97.8 °F (36.6 °C) 79 12 106/69 81 96 % -- -- --    12/11/24 1243 -- 83 -- 125/77 -- 100 % -- -- --    12/11/24 1238 -- 81 18 130/76 -- 100 % -- -- --    12/11/24 1233 -- 82 18 141/85 -- 100 % -- -- --     12/11/24 1231 -- 85 18 139/82 -- 100 % -- -- --    12/11/24 0800 -- -- -- -- -- -- -- 15 No Pain    12/11/24 07:33:38 97.7 °F (36.5 °C) 81 16 117/73 88 95 % -- -- --    12/10/24 22:12:10 98 °F (36.7 °C) 82 18 108/68 81 96 % -- -- --    12/10/24 2052 -- -- -- -- -- -- -- -- No Pain    12/10/24 14:05:12 97.8 °F (36.6 °C) 90 12 94/62 73 98 % -- -- --    12/10/24 1013 -- -- -- -- -- -- -- -- 5    12/10/24 0900 -- -- -- -- -- -- -- 15 --    12/10/24 08:00:30 97.6 °F (36.4 °C) 85 18 127/71 90 97 % -- -- --    12/10/24 0100 -- -- -- -- -- -- -- -- No Pain    12/09/24 22:18:25 98 °F (36.7 °C) 85 18 114/67 83 97 % -- -- --    12/09/24 2000 -- -- -- -- -- -- -- 15 --    12/09/24 15:42:31 98.5 °F (36.9 °C) 87 16 108/83 91 100 % Lying -- --    12/09/24 14:03:34 -- 100 -- 108/72 84 97 % Standing for 3 minutes - Orthostatic VS -- --    12/09/24 14:02:52 -- 101 -- 95/63 74 98 % -- -- --    12/09/24 14:02:01 -- 101 -- 95/63 74 98 % Standing - Orthostatic VS -- --    12/09/24 14:01:02 -- 93 -- 102/67 79 97 % Sitting - Orthostatic VS -- --    12/09/24 14:00:01 -- 89 -- 102/63 76 96 % Lying - Orthostatic VS -- --    12/09/24 1045 -- -- -- -- -- -- -- -- 6    12/09/24 0800 -- -- -- -- -- -- -- 15 --    12/09/24 07:21:12 97.7 °F (36.5 °C) 81 18 101/62 75 96 % -- -- --          Weight (last 2 days)       None            Pertinent Labs/Diagnostic Results:   Radiology:  IR biopsy bone marrow   Final Interpretation by Tye Woods MD (12/11 2046)   Successful CT guided bone marrow biopsy of right ileum.         Workstation performed: [id number]         XR chest 2 views   ED Interpretation by Leeann Granados MD (12/07 1838)   No acute cardiac, pulmonary, or osseous processes noted.        Final Interpretation by Manisha Barrera MD (12/08 0827)      No acute cardiopulmonary disease.      Moderate hiatal hernia.         Workstation performed: [id number]           Cardiology:  ECG 12 lead   Final Result by Terrance Johnson MD (12/08 1740)    Normal sinus rhythm   Nonspecific ST and T wave abnormality   Prolonged QT   Abnormal ECG   Confirmed by Terrance Johnson ([id number]) on 12/8/2024 2:30:34 PM        GI:  No orders to display       Results from last 7 days   Lab Units 12/07/24  2304   SARS-COV-2  Negative     Results from last 7 days   Lab Units 12/12/24 0447 12/11/24  0543 12/10/24  0450 12/09/24  0434 12/08/24  0503   WBC Thousand/uL 3.13* 3.49* 3.29* 3.09* 4.53   HEMOGLOBIN g/dL 8.0* 8.6* 7.8* 7.6* 8.1*   HEMATOCRIT % 24.5* 25.7* 23.3* 22.5* 23.8*   PLATELETS Thousands/uL 60* 64* 59* 59* 61*   TOTAL NEUT ABS Thousands/µL  --  1.21*  --  1.04* 1.61*     Results from last 7 days   Lab Units 12/08/24  0503   RETIC CT ABS  20,000   RETIC CT PCT % 0.90     Results from last 7 days   Lab Units 12/12/24  0447 12/10/24  0450 12/09/24  0434 12/08/24  0503 12/07/24  1714   SODIUM mmol/L 140 140 140 140 138   POTASSIUM mmol/L 4.3 4.0 4.2 3.1* 3.2*   CHLORIDE mmol/L 108 110* 112* 107 102   CO2 mmol/L 28 25 25 27 26   ANION GAP mmol/L 4 5 3* 6 10   BUN mg/dL 17 17 19 23 25   CREATININE mg/dL 0.82 0.91 0.91 1.15 1.25   EGFR ml/min/1.73sq m 71 63 63 47 43   CALCIUM mg/dL 8.5 8.7 8.3* 8.5 9.4   MAGNESIUM mg/dL  --   --  2.0  --   --      Results from last 7 days   Lab Units 12/07/24  1714   AST U/L 16   ALT U/L 15   ALK PHOS U/L 57   TOTAL PROTEIN g/dL 7.9   ALBUMIN g/dL 4.1   TOTAL BILIRUBIN mg/dL 0.65         Results from last 7 days   Lab Units 12/12/24  0447 12/10/24  0450 12/09/24  0434 12/08/24  0503 12/07/24  1714   GLUCOSE RANDOM mg/dL 78 84 86 90 103             Results from last 7 days   Lab Units 12/08/24  0001 12/07/24 2021 12/07/24  1714   HS TNI 0HR ng/L  --   --  16   HS TNI 2HR ng/L  --  15  --    HSTNI D2 ng/L  --  -1  --    HS TNI 4HR ng/L 13  --   --    HSTNI D4 ng/L -3  --   --      Results from last 7 days   Lab Units 12/07/24  1714   D-DIMER QUANTITATIVE ug/ml FEU 0.53*     Results from last 7 days   Lab Units 12/11/24  0543   PROTIME seconds  14.2   INR  1.03                         Results from last 7 days   Lab Units 12/07/24  1714   BNP pg/mL 295*                 Results from last 7 days   Lab Units 12/09/24  1221   HEP B S AG  Non-reactive   HEP C AB  Non-reactive   HEP B C IGM  Non-reactive                         Results from last 7 days   Lab Units 12/07/24  2304   INFLUENZA A PCR  Negative   INFLUENZA B PCR  Negative   RSV PCR  Negative           Network Utilization Review Department  ATTENTION: Please call with any questions or concerns to [phone] and carefully listen to the prompts so that you are directed to the right person. All voicemails are confidential.   For Discharge needs, contact Care Management DC Support Team at [phone] opt. 2  Send all requests for admission clinical reviews, approved or denied determinations and any other requests to dedicated fax number below belonging to the campus where the patient is receiving treatment. List of dedicated fax numbers for the Facilities:  FACILITY NAME UR FAX NUMBER   ADMISSION DENIALS (Administrative/Medical Necessity) [phone]   DISCHARGE SUPPORT TEAM (NETWORK) [phone]   PARENT CHILD HEALTH (Maternity/NICU/Pediatrics) [phone]   [facility] [phone]   [facility] [phone]   Count includes the [facility] [phone]   [facility] [phone]   [facility] [phone]   [facility] [phone]   [facility] [phone]   [facility] [phone]   [facility] [phone]   [facility] [phone]   [facility] [phone]   [facility] [phone]

## 2024-12-12 NOTE — ASSESSMENT & PLAN NOTE
Patient presented with worsening anemia, thrombocytopenia.  Previously hemoglobin was 11.4 in January 2024, however downtrending since then.  Similar downtrend with thrombocytes.  On this admission, patient has pancytopenia.   WBC 3.09, with lymphocytosis of 55% and decrease in segmented neutrophils, ANC 1.04   Hgb 7.6   PLT 59K  Folate, B12 within normal limits.  LDH within normal limits.  Reticulocyte count percentage 0.9%, reticulocyte index is hypoproliferative at 0.3  Unclear etiology of pancytopenia, with possible contribution by anastrozole although it is rare  DDx includes but not limited to possible viral etiology(i.e. HIV, hepatitis, EBV) myelodysplastic syndrome, aplastic anemia, leukemia/lymphoma, metastatic bone marrow infiltration  EBV, HIV, and hepatitis panel within normal limits  CBC still shows pancytopenia as of 12/11   WBC 3.49   Hgb 8.6   PLT 64k    Plan  Follow-up with hematology oncology on December 31, 2024  Continue cobalamin 1000 mg every 30 days outpatient with hematology/oncology  Follow-up bone marrow biopsy and studies outpatient  Follow-up with PCP in 1 week

## 2024-12-12 NOTE — ASSESSMENT & PLAN NOTE
Progressively worsening CAMPOS   History of similar presentation previously   CXR: wet read no acute cardiopulmonary disease   Orthostatic vitals positive     Orthostatic hypotension vs PE vs CHF vs COVID/FLU vs secondary to anemia  Less likely concern for PE due to Wells score of 1 and absence of tachycardia and leg swelling, however notable history of breast cancer   Less likely concern for CHF due to absent signs of volume overload including JVD and lower extremity edema  Covid/FLU/RSV negative

## 2024-12-12 NOTE — ASSESSMENT & PLAN NOTE
Lasix 40 mg BID and Toprol XL 50 mg at home    Plan   Hold Lasix due to low blood pressure   We will continue prior to admission Toprol-XL 50 mg  Monitor blood pressures at home  Follow-up with PCP in 1 week

## 2024-12-12 NOTE — PLAN OF CARE
Problem: PAIN - ADULT  Goal: Verbalizes/displays adequate comfort level or baseline comfort level  Description: Interventions:  - Encourage patient to monitor pain and request assistance  - Assess pain using appropriate pain scale  - Administer analgesics based on type and severity of pain and evaluate response  - Implement non-pharmacological measures as appropriate and evaluate response  - Consider cultural and social influences on pain and pain management  - Notify physician/advanced practitioner if interventions unsuccessful or patient reports new pain  Outcome: Progressing     Problem: INFECTION - ADULT  Goal: Absence or prevention of progression during hospitalization  Description: INTERVENTIONS:  - Assess and monitor for signs and symptoms of infection  - Monitor lab/diagnostic results  - Monitor all insertion sites, i.e. indwelling lines, tubes, and drains  - Monitor endotracheal if appropriate and nasal secretions for changes in amount and color  - [place] appropriate cooling/warming therapies per order  - Administer medications as ordered  - Instruct and encourage patient and family to use good hand hygiene technique  - Identify and instruct in appropriate isolation precautions for identified infection/condition  Outcome: Progressing     Problem: SAFETY ADULT  Goal: Patient will remain free of falls  Description: INTERVENTIONS:  - Educate patient/family on patient safety including physical limitations  - Instruct patient to call for assistance with activity   - Consult OT/PT to assist with strengthening/mobility   - Keep Call bell within reach  - Keep bed low and locked with side rails adjusted as appropriate  - Keep care items and personal belongings within reach  - Initiate and maintain comfort rounds  - Make Fall Risk Sign visible to staff  - Apply yellow socks and bracelet for high fall risk patients  - Consider moving patient to room near nurses station  Outcome: Progressing     Problem: DISCHARGE  PLANNING  Goal: Discharge to home or other facility with appropriate resources  Description: INTERVENTIONS:  - Identify barriers to discharge w/patient and caregiver  - Arrange for needed discharge resources and transportation as appropriate  - Identify discharge learning needs (meds, wound care, etc.)  - Arrange for interpretive services to assist at discharge as needed  - Refer to Case Management Department for coordinating discharge planning if the patient needs post-hospital services based on physician/advanced practitioner order or complex needs related to functional status, cognitive ability, or social support system  Outcome: Progressing

## 2024-12-12 NOTE — DISCHARGE SUMMARY
Discharge Summary - Hospitalist   Name: Pretty Aguila 74 y.o. female I MRN: [id number]  Unit/Bed#: S -01 I Date of Admission: 12/7/2024   Date of Service: 12/12/2024 I Hospital Day: 3     Assessment & Plan  Pancytopenia (HCC)  Patient presented with worsening anemia, thrombocytopenia.  Previously hemoglobin was 11.4 in January 2024, however downtrending since then.  Similar downtrend with thrombocytes.  On this admission, patient has pancytopenia.   WBC 3.09, with lymphocytosis of 55% and decrease in segmented neutrophils, ANC 1.04   Hgb 7.6   PLT 59K  Folate, B12 within normal limits.  LDH within normal limits.  Reticulocyte count percentage 0.9%, reticulocyte index is hypoproliferative at 0.3  Unclear etiology of pancytopenia, with possible contribution by anastrozole although it is rare  DDx includes but not limited to possible viral etiology(i.e. HIV, hepatitis, EBV) myelodysplastic syndrome, aplastic anemia, leukemia/lymphoma, metastatic bone marrow infiltration  EBV, HIV, and hepatitis panel within normal limits  CBC still shows pancytopenia as of 12/11   WBC 3.49   Hgb 8.6   PLT 64k    Plan  Follow-up with hematology oncology on December 31, 2024  Continue cobalamin 1000 mg every 30 days outpatient with hematology/oncology  Follow-up bone marrow biopsy and studies outpatient  Follow-up with PCP in 1 week    Dyspnea on exertion  Progressively worsening CAMPOS   History of similar presentation previously   CXR: wet read no acute cardiopulmonary disease   Orthostatic vitals positive     Orthostatic hypotension vs PE vs CHF vs COVID/FLU vs secondary to anemia  Less likely concern for PE due to Wells score of 1 and absence of tachycardia and leg swelling, however notable history of breast cancer   Less likely concern for CHF due to absent signs of volume overload including JVD and lower extremity edema  Covid/FLU/RSV negative    Malignant neoplasm of lower-outer quadrant of left breast of female,  estrogen receptor positive (HCC)  S/p left breast mastectomy and reconstruction with submuscular tissue expander with acellular dermal matrix   Essential hypertension  Lasix 40 mg BID and Toprol XL 50 mg at home    Plan   Hold Lasix due to low blood pressure   We will continue prior to admission Toprol-XL 50 mg  Monitor blood pressures at home  Follow-up with PCP in 1 week  Dyslipidemia  Continue PTA atorvastatin 10 mg  Coronary artery disease involving native coronary artery of native heart without angina pectoris  Follows with Dr. Conrad  Continue PTA atorvastatin 10 mg      Medical Problems       Resolved Problems  Date Reviewed: 9/30/2024   None       Discharging Physician / Practitioner: Tabby Aguiar MD  PCP: Lauren Dumont MD  Admission Date:   Admission Orders (From admission, onward)       Ordered        12/09/24 1524  INPATIENT ADMISSION  Once            12/07/24 2118  Place in Observation  Once                          Discharge Date: 12/12/24    Consultations During Hospital Stay:  Hematology/oncology    Procedures Performed:   Bone marrow biopsy    Significant Findings / Test Results:   CBC showing pancytopenia  B12, folate within normal limits    Incidental Findings:   None    Test Results Pending at Discharge (will require follow up):   Bone marrow biopsy studies and results.  Outpatient Tests Requested:  None    Complications: None    Reason for Admission: Dyspnea and lightheadedness    Hospital Course:   Pretty Aguila is a 74 y.o. female patient who originally presented to the hospital on 12/7/2024 due to dyspnea and lightheadedness.  Chest x-ray showed no acute cardiopulmonary disease.  Orthostatic vitals were positive on admission, patient received IV fluids and home Lasix was held.  During her hospital stay it was noted that patient was pancytopenic.  Folate, B12 and LDH were within normal limits.  Patient's reticulocyte count was 0.9%, had a reticulocyte index of 0.3 which was hypoproliferative.  " Hematology/oncology was consulted and recommended follow-up with bone marrow biopsy.  Patient HIV, hepatitis panel and EBV panel were unremarkable.  Bone marrow biopsy was performed on 12/11/2024.   Her Lasix was held during this hospitalization due to dehydration and softer blood pressures. We will recommend that she hold her Lasix and follow-up with PCP to see if it is necessary to restart.  Will recommend to continue her home dose Toprol 50 mg once daily at this time.  She will keep a daily log of her blood pressures and follow-up with her PCP. Studies from bone marrow biopsy pending.  Patient will follow-up with hematology/oncology on 12/31/2024.     Please see above list of diagnoses and related plan for additional information.     Condition at Discharge: good    Discharge Day Visit / Exam:   Subjective: Patient seen and evaluated bedside, no overnight events.  She denies any dizziness or lightheadedness or shortness of breath.  No active complaints.  Vitals: Blood Pressure: 103/65 (12/12/24 1510)  Pulse: 94 (12/12/24 1510)  Temperature: 98.4 °F (36.9 °C) (12/12/24 1439)  Temp Source: Oral (12/09/24 1542)  Respirations: 18 (12/11/24 2226)  Height: 5' 3\" (160 cm) (12/07/24 2239)  Weight - Scale: 83.1 kg (183 lb 3.2 oz) (12/07/24 2239)  SpO2: 97 % (12/12/24 1510)  Physical Exam  Vitals and nursing note reviewed.   Constitutional:       General: She is not in acute distress.     Appearance: Normal appearance. She is well-developed.   HENT:      Head: Normocephalic and atraumatic.   Eyes:      Conjunctiva/sclera: Conjunctivae normal.   Cardiovascular:      Rate and Rhythm: Normal rate and regular rhythm.      Heart sounds: No murmur heard.     No friction rub. No gallop.   Pulmonary:      Effort: Pulmonary effort is normal. No respiratory distress.      Breath sounds: Normal breath sounds. No wheezing, rhonchi or rales.   Abdominal:      General: Abdomen is flat. Bowel sounds are normal.      Palpations: Abdomen " is soft.      Tenderness: There is no abdominal tenderness. There is no guarding.      Hernia: No hernia is present.   Musculoskeletal:         General: No swelling.      Cervical back: Neck supple. No rigidity.   Skin:     General: Skin is warm and dry.      Capillary Refill: Capillary refill takes less than 2 seconds.   Neurological:      Mental Status: She is alert.   Psychiatric:         Mood and Affect: Mood normal.         Behavior: Behavior normal.         Thought Content: Thought content normal.         Judgment: Judgment normal.         Discussion with Family: Updated  (daughter) at bedside.    Discharge instructions/Information to patient and family:   See after visit summary for information provided to patient and family.      Provisions for Follow-Up Care:  See after visit summary for information related to follow-up care and any pertinent home health orders.      Mobility at time of Discharge:   Basic Mobility Inpatient Raw Score: 24  JH-HLM Goal: 8: Walk 250 feet or more  JH-HLM Achieved: 8: Walk 250 feet ot more  HLM Goal achieved. Continue to encourage appropriate mobility.     Disposition:   Home    Planned Readmission: no    Discharge Medications:  See after visit summary for reconciled discharge medications provided to patient and/or family.      Administrative Statements   Discharge Statement:  I have spent a total time of 30 minutes in caring for this patient on the day of the visit/encounter.     **Please Note: This note may have been constructed using a voice recognition system**

## 2024-12-13 LAB — SCAN RESULT: NORMAL

## 2024-12-13 NOTE — UTILIZATION REVIEW
NOTIFICATION OF ADMISSION DISCHARGE   This is a Notification of Discharge from [facility]. Please be advised that this patient has been discharge from our facility. Below you will find the admission and discharge date and time including the patient’s disposition.   UTILIZATION REVIEW CONTACT:  Emi Joshi  Utilization   Network Utilization Review Department  Phone: [phone] x carefully listen to the prompts. All voicemails are confidential.  Email: [email or web address]     ADMISSION INFORMATION  PRESENTATION DATE: 12/7/2024  5:27 PM  OBERVATION ADMISSION DATE: 12/07/2024 2118  INPATIENT ADMISSION DATE: 12/9/24  3:24 PM   DISCHARGE DATE: 12/12/2024  5:57 PM   DISPOSITION:Home/Self Care    Network Utilization Review Department  ATTENTION: Please call with any questions or concerns to [phone] and carefully listen to the prompts so that you are directed to the right person. All voicemails are confidential.   For Discharge needs, contact Care Management DC Support Team at [phone] opt. 2  Send all requests for admission clinical reviews, approved or denied determinations and any other requests to dedicated fax number below belonging to the campus where the patient is receiving treatment. List of dedicated fax numbers for the Facilities:  FACILITY NAME UR FAX NUMBER   ADMISSION DENIALS (Administrative/Medical Necessity) [phone]   DISCHARGE SUPPORT TEAM ([facility]) [phone]   PARENT CHILD HEALTH (Maternity/NICU/Pediatrics) [phone]   [facility] [phone]   [facility] [phone]   [facility] [phone]   [facility] [phone]   [facility] [phone]   [facility] [phone]   [facility] [phone]   [facility]  [phone]   [facility] [phone]   Critical access hospital [phone]   [facility] [phone]   [facility] [phone]

## 2024-12-16 ENCOUNTER — TRANSITIONAL CARE MANAGEMENT (OUTPATIENT)
Dept: FAMILY MEDICINE CLINIC | Facility: CLINIC | Age: 74
End: 2024-12-16

## 2024-12-16 LAB
MISCELLANEOUS LAB TEST RESULT: NORMAL
MISCELLANEOUS LAB TEST RESULT: NORMAL
SCAN RESULT: NORMAL

## 2024-12-17 LAB — MISCELLANEOUS LAB TEST RESULT: NORMAL

## 2024-12-17 NOTE — ASSESSMENT & PLAN NOTE
Has appt with hematology 12/31 pt will be  unable to work will give STD for 2 months   12/8/24 -2/23/25 rtw 2/24/25  Orders:  •  CBC and differential; Future

## 2024-12-17 NOTE — ASSESSMENT & PLAN NOTE
Has been running very low will decrease to metoprolol 25XLqd  and continue monitoring at home    Orders:  •  metoprolol succinate (Toprol XL) 25 mg 24 hr tablet; Take 1 tablet (25 mg total) by mouth daily

## 2024-12-17 NOTE — ASSESSMENT & PLAN NOTE
Ok on meds     Orders:  •  citalopram (CeleXA) 20 mg tablet; Take 1 tablet (20 mg total) by mouth daily

## 2024-12-18 ENCOUNTER — OFFICE VISIT (OUTPATIENT)
Dept: FAMILY MEDICINE CLINIC | Facility: CLINIC | Age: 74
End: 2024-12-18
Payer: COMMERCIAL

## 2024-12-18 ENCOUNTER — TELEPHONE (OUTPATIENT)
Dept: NON INVASIVE DIAGNOSTICS | Facility: HOSPITAL | Age: 74
End: 2024-12-18

## 2024-12-18 VITALS
SYSTOLIC BLOOD PRESSURE: 110 MMHG | OXYGEN SATURATION: 99 % | HEART RATE: 92 BPM | HEIGHT: 63 IN | TEMPERATURE: 97.8 F | BODY MASS INDEX: 32.43 KG/M2 | DIASTOLIC BLOOD PRESSURE: 64 MMHG | RESPIRATION RATE: 16 BRPM | WEIGHT: 183 LBS

## 2024-12-18 DIAGNOSIS — E78.5 DYSLIPIDEMIA: ICD-10-CM

## 2024-12-18 DIAGNOSIS — D61.818 PANCYTOPENIA (HCC): ICD-10-CM

## 2024-12-18 DIAGNOSIS — F41.9 ANXIETY AND DEPRESSION: ICD-10-CM

## 2024-12-18 DIAGNOSIS — F32.A ANXIETY AND DEPRESSION: ICD-10-CM

## 2024-12-18 DIAGNOSIS — Z23 ENCOUNTER FOR IMMUNIZATION: ICD-10-CM

## 2024-12-18 DIAGNOSIS — I25.10 CORONARY ARTERY DISEASE INVOLVING NATIVE CORONARY ARTERY OF NATIVE HEART WITHOUT ANGINA PECTORIS: ICD-10-CM

## 2024-12-18 DIAGNOSIS — I10 ESSENTIAL HYPERTENSION: Primary | ICD-10-CM

## 2024-12-18 DIAGNOSIS — R06.09 DYSPNEA ON EXERTION: ICD-10-CM

## 2024-12-18 LAB
MISCELLANEOUS LAB TEST RESULT: NORMAL
SCAN RESULT: NORMAL

## 2024-12-18 PROCEDURE — 99496 TRANSJ CARE MGMT HIGH F2F 7D: CPT | Performed by: FAMILY MEDICINE

## 2024-12-18 PROCEDURE — 90471 IMMUNIZATION ADMIN: CPT | Performed by: FAMILY MEDICINE

## 2024-12-18 PROCEDURE — 90662 IIV NO PRSV INCREASED AG IM: CPT | Performed by: FAMILY MEDICINE

## 2024-12-18 RX ORDER — METOPROLOL SUCCINATE 25 MG/1
25 TABLET, EXTENDED RELEASE ORAL DAILY
Qty: 30 TABLET | Refills: 5 | Status: SHIPPED | OUTPATIENT
Start: 2024-12-18

## 2024-12-18 RX ORDER — CITALOPRAM HYDROBROMIDE 20 MG/1
20 TABLET ORAL DAILY
Qty: 90 TABLET | Refills: 2 | Status: SHIPPED | OUTPATIENT
Start: 2024-12-18

## 2024-12-19 ENCOUNTER — APPOINTMENT (OUTPATIENT)
Dept: LAB | Facility: CLINIC | Age: 74
End: 2024-12-19
Payer: COMMERCIAL

## 2024-12-19 DIAGNOSIS — D69.6 PLATELETS DECREASED (HCC): ICD-10-CM

## 2024-12-19 DIAGNOSIS — D61.818 PANCYTOPENIA (HCC): ICD-10-CM

## 2024-12-19 DIAGNOSIS — D64.9 ANEMIA, UNSPECIFIED TYPE: ICD-10-CM

## 2024-12-19 DIAGNOSIS — C50.912 MALIGNANT NEOPLASM OF LEFT BREAST IN FEMALE, ESTROGEN RECEPTOR POSITIVE, UNSPECIFIED SITE OF BREAST (HCC): ICD-10-CM

## 2024-12-19 DIAGNOSIS — Z17.0 MALIGNANT NEOPLASM OF LEFT BREAST IN FEMALE, ESTROGEN RECEPTOR POSITIVE, UNSPECIFIED SITE OF BREAST (HCC): ICD-10-CM

## 2024-12-19 LAB
ALBUMIN SERPL BCG-MCNC: 4 G/DL (ref 3.5–5)
ALP SERPL-CCNC: 60 U/L (ref 34–104)
ALT SERPL W P-5'-P-CCNC: 31 U/L (ref 7–52)
ANION GAP SERPL CALCULATED.3IONS-SCNC: 6 MMOL/L (ref 4–13)
ANISOCYTOSIS BLD QL SMEAR: PRESENT
AST SERPL W P-5'-P-CCNC: 20 U/L (ref 13–39)
BASOPHILS # BLD MANUAL: 0 THOUSAND/UL (ref 0–0.1)
BASOPHILS NFR MAR MANUAL: 0 % (ref 0–1)
BILIRUB SERPL-MCNC: 0.5 MG/DL (ref 0.2–1)
BUN SERPL-MCNC: 20 MG/DL (ref 5–25)
CALCIUM SERPL-MCNC: 9.4 MG/DL (ref 8.4–10.2)
CHLORIDE SERPL-SCNC: 107 MMOL/L (ref 96–108)
CO2 SERPL-SCNC: 26 MMOL/L (ref 21–32)
CREAT SERPL-MCNC: 1.11 MG/DL (ref 0.6–1.3)
EOSINOPHIL # BLD MANUAL: 0.03 THOUSAND/UL (ref 0–0.4)
EOSINOPHIL NFR BLD MANUAL: 1 % (ref 0–6)
ERYTHROCYTE [DISTWIDTH] IN BLOOD BY AUTOMATED COUNT: 13.4 % (ref 11.6–15.1)
FOLATE SERPL-MCNC: 15.5 NG/ML
GFR SERPL CREATININE-BSD FRML MDRD: 49 ML/MIN/1.73SQ M
GLUCOSE SERPL-MCNC: 84 MG/DL (ref 65–140)
HCT VFR BLD AUTO: 25.8 % (ref 34.8–46.1)
HGB BLD-MCNC: 8.6 G/DL (ref 11.5–15.4)
IRON SATN MFR SERPL: 20 % (ref 15–50)
IRON SERPL-MCNC: 66 UG/DL (ref 50–212)
LYMPHOCYTES # BLD AUTO: 1.22 THOUSAND/UL (ref 0.6–4.47)
LYMPHOCYTES # BLD AUTO: 36 % (ref 14–44)
MCH RBC QN AUTO: 35.7 PG (ref 26.8–34.3)
MCHC RBC AUTO-ENTMCNC: 33.3 G/DL (ref 31.4–37.4)
MCV RBC AUTO: 107 FL (ref 82–98)
MONOCYTES # BLD AUTO: 0.03 THOUSAND/UL (ref 0–1.22)
MONOCYTES NFR BLD: 1 % (ref 4–12)
NEUTROPHILS # BLD MANUAL: 1.76 THOUSAND/UL (ref 1.85–7.62)
NEUTS BAND NFR BLD MANUAL: 1 % (ref 0–8)
NEUTS SEG NFR BLD AUTO: 57 % (ref 43–75)
OVALOCYTES BLD QL SMEAR: PRESENT
PLATELET # BLD AUTO: 66 THOUSANDS/UL (ref 149–390)
PLATELET BLD QL SMEAR: ABNORMAL
PMV BLD AUTO: 11.6 FL (ref 8.9–12.7)
POIKILOCYTOSIS BLD QL SMEAR: PRESENT
POTASSIUM SERPL-SCNC: 4.5 MMOL/L (ref 3.5–5.3)
PROT SERPL-MCNC: 7.7 G/DL (ref 6.4–8.4)
RBC # BLD AUTO: 2.41 MILLION/UL (ref 3.81–5.12)
RBC MORPH BLD: PRESENT
SODIUM SERPL-SCNC: 139 MMOL/L (ref 135–147)
TIBC SERPL-MCNC: 324.8 UG/DL (ref 250–450)
TRANSFERRIN SERPL-MCNC: 232 MG/DL (ref 203–362)
UIBC SERPL-MCNC: 259 UG/DL (ref 155–355)
VARIANT LYMPHS # BLD AUTO: 4 %
VIT B12 SERPL-MCNC: 544 PG/ML (ref 180–914)
WBC # BLD AUTO: 3.04 THOUSAND/UL (ref 4.31–10.16)

## 2024-12-19 PROCEDURE — 83550 IRON BINDING TEST: CPT

## 2024-12-19 PROCEDURE — 83540 ASSAY OF IRON: CPT

## 2024-12-19 PROCEDURE — 85027 COMPLETE CBC AUTOMATED: CPT

## 2024-12-19 PROCEDURE — 82607 VITAMIN B-12: CPT

## 2024-12-19 PROCEDURE — 36415 COLL VENOUS BLD VENIPUNCTURE: CPT

## 2024-12-19 PROCEDURE — 80053 COMPREHEN METABOLIC PANEL: CPT

## 2024-12-19 PROCEDURE — 82746 ASSAY OF FOLIC ACID SERUM: CPT

## 2024-12-19 PROCEDURE — 85007 BL SMEAR W/DIFF WBC COUNT: CPT

## 2024-12-20 ENCOUNTER — RESULTS FOLLOW-UP (OUTPATIENT)
Dept: FAMILY MEDICINE CLINIC | Facility: CLINIC | Age: 74
End: 2024-12-20

## 2024-12-20 NOTE — TELEPHONE ENCOUNTER
Advise cbc  results stable  no significant change   A. Relayed results to (patient as per provider message. Patient expressed understanding and did not have any further questions.

## 2024-12-22 ENCOUNTER — HOSPITAL ENCOUNTER (EMERGENCY)
Facility: HOSPITAL | Age: 74
Discharge: HOME | End: 2024-12-22
Attending: EMERGENCY MEDICINE
Payer: COMMERCIAL

## 2024-12-22 ENCOUNTER — APPOINTMENT (EMERGENCY)
Dept: RADIOLOGY | Facility: HOSPITAL | Age: 74
End: 2024-12-22
Payer: COMMERCIAL

## 2024-12-22 VITALS
RESPIRATION RATE: 16 BRPM | HEART RATE: 82 BPM | BODY MASS INDEX: 33.48 KG/M2 | WEIGHT: 188.93 LBS | HEIGHT: 63 IN | TEMPERATURE: 97.5 F | DIASTOLIC BLOOD PRESSURE: 67 MMHG | OXYGEN SATURATION: 96 % | SYSTOLIC BLOOD PRESSURE: 119 MMHG

## 2024-12-22 DIAGNOSIS — D72.819 LEUKOPENIA: ICD-10-CM

## 2024-12-22 DIAGNOSIS — D61.818 PANCYTOPENIA (HCC): ICD-10-CM

## 2024-12-22 DIAGNOSIS — D69.6 THROMBOCYTOPENIA (HCC): ICD-10-CM

## 2024-12-22 DIAGNOSIS — D64.9 ANEMIA: ICD-10-CM

## 2024-12-22 DIAGNOSIS — S73.005A CLOSED DISLOCATION OF LEFT HIP, INITIAL ENCOUNTER (HCC): Primary | ICD-10-CM

## 2024-12-22 LAB
ALBUMIN SERPL BCG-MCNC: 4 G/DL (ref 3.5–5)
ALP SERPL-CCNC: 61 U/L (ref 34–104)
ALT SERPL W P-5'-P-CCNC: 29 U/L (ref 7–52)
ANION GAP SERPL CALCULATED.3IONS-SCNC: 9 MMOL/L (ref 4–13)
ANISOCYTOSIS BLD QL SMEAR: PRESENT
APTT PPP: 25 SECONDS (ref 23–34)
AST SERPL W P-5'-P-CCNC: 22 U/L (ref 13–39)
BASOPHILS # BLD MANUAL: 0.03 THOUSAND/UL (ref 0–0.1)
BASOPHILS NFR MAR MANUAL: 1 % (ref 0–1)
BILIRUB SERPL-MCNC: 0.67 MG/DL (ref 0.2–1)
BUN SERPL-MCNC: 20 MG/DL (ref 5–25)
CALCIUM SERPL-MCNC: 9.1 MG/DL (ref 8.4–10.2)
CHLORIDE SERPL-SCNC: 106 MMOL/L (ref 96–108)
CO2 SERPL-SCNC: 22 MMOL/L (ref 21–32)
CREAT SERPL-MCNC: 0.97 MG/DL (ref 0.6–1.3)
EOSINOPHIL # BLD MANUAL: 0.06 THOUSAND/UL (ref 0–0.4)
EOSINOPHIL NFR BLD MANUAL: 2 % (ref 0–6)
ERYTHROCYTE [DISTWIDTH] IN BLOOD BY AUTOMATED COUNT: 13.7 % (ref 11.6–15.1)
GFR SERPL CREATININE-BSD FRML MDRD: 58 ML/MIN/1.73SQ M
GLUCOSE SERPL-MCNC: 92 MG/DL (ref 65–140)
HCT VFR BLD AUTO: 25.2 % (ref 34.8–46.1)
HGB BLD-MCNC: 8.4 G/DL (ref 11.5–15.4)
INR PPP: 1.06 (ref 0.85–1.19)
LG PLATELETS BLD QL SMEAR: PRESENT
LYMPHOCYTES # BLD AUTO: 1.07 THOUSAND/UL (ref 0.6–4.47)
LYMPHOCYTES # BLD AUTO: 37 % (ref 14–44)
MCH RBC QN AUTO: 35.9 PG (ref 26.8–34.3)
MCHC RBC AUTO-ENTMCNC: 33.3 G/DL (ref 31.4–37.4)
MCV RBC AUTO: 108 FL (ref 82–98)
MONOCYTES # BLD AUTO: 0.03 THOUSAND/UL (ref 0–1.22)
MONOCYTES NFR BLD: 1 % (ref 4–12)
NEUTROPHILS # BLD MANUAL: 1.71 THOUSAND/UL (ref 1.85–7.62)
NEUTS SEG NFR BLD AUTO: 59 % (ref 43–75)
OVALOCYTES BLD QL SMEAR: PRESENT
PLATELET # BLD AUTO: 64 THOUSANDS/UL (ref 149–390)
PLATELET BLD QL SMEAR: ABNORMAL
PMV BLD AUTO: 12.7 FL (ref 8.9–12.7)
POIKILOCYTOSIS BLD QL SMEAR: PRESENT
POTASSIUM SERPL-SCNC: 4.1 MMOL/L (ref 3.5–5.3)
PROT SERPL-MCNC: 7.7 G/DL (ref 6.4–8.4)
PROTHROMBIN TIME: 14.5 SECONDS (ref 12.3–15)
RBC # BLD AUTO: 2.34 MILLION/UL (ref 3.81–5.12)
RBC MORPH BLD: PRESENT
SODIUM SERPL-SCNC: 137 MMOL/L (ref 135–147)
WBC # BLD AUTO: 2.9 THOUSAND/UL (ref 4.31–10.16)

## 2024-12-22 PROCEDURE — 99284 EMERGENCY DEPT VISIT MOD MDM: CPT

## 2024-12-22 PROCEDURE — 85730 THROMBOPLASTIN TIME PARTIAL: CPT

## 2024-12-22 PROCEDURE — 85610 PROTHROMBIN TIME: CPT

## 2024-12-22 PROCEDURE — 96374 THER/PROPH/DIAG INJ IV PUSH: CPT

## 2024-12-22 PROCEDURE — 36415 COLL VENOUS BLD VENIPUNCTURE: CPT

## 2024-12-22 PROCEDURE — 80053 COMPREHEN METABOLIC PANEL: CPT

## 2024-12-22 PROCEDURE — 93005 ELECTROCARDIOGRAM TRACING: CPT

## 2024-12-22 PROCEDURE — 73501 X-RAY EXAM HIP UNI 1 VIEW: CPT

## 2024-12-22 PROCEDURE — 96375 TX/PRO/DX INJ NEW DRUG ADDON: CPT

## 2024-12-22 PROCEDURE — 99285 EMERGENCY DEPT VISIT HI MDM: CPT | Performed by: EMERGENCY MEDICINE

## 2024-12-22 PROCEDURE — 85027 COMPLETE CBC AUTOMATED: CPT

## 2024-12-22 PROCEDURE — 73502 X-RAY EXAM HIP UNI 2-3 VIEWS: CPT

## 2024-12-22 PROCEDURE — 85007 BL SMEAR W/DIFF WBC COUNT: CPT

## 2024-12-22 PROCEDURE — 96361 HYDRATE IV INFUSION ADD-ON: CPT

## 2024-12-22 PROCEDURE — 27265 TREAT HIP DISLOCATION: CPT | Performed by: EMERGENCY MEDICINE

## 2024-12-22 RX ORDER — ONDANSETRON 2 MG/ML
4 INJECTION INTRAMUSCULAR; INTRAVENOUS ONCE
Status: COMPLETED | OUTPATIENT
Start: 2024-12-22 | End: 2024-12-22

## 2024-12-22 RX ORDER — PROPOFOL 10 MG/ML
80 INJECTION, EMULSION INTRAVENOUS ONCE
Status: COMPLETED | OUTPATIENT
Start: 2024-12-22 | End: 2024-12-22

## 2024-12-22 RX ORDER — ACETAMINOPHEN 10 MG/ML
1000 INJECTION, SOLUTION INTRAVENOUS ONCE
Status: COMPLETED | OUTPATIENT
Start: 2024-12-22 | End: 2024-12-22

## 2024-12-22 RX ORDER — FENTANYL CITRATE 50 UG/ML
50 INJECTION, SOLUTION INTRAMUSCULAR; INTRAVENOUS ONCE
Refills: 0 | Status: COMPLETED | OUTPATIENT
Start: 2024-12-22 | End: 2024-12-22

## 2024-12-22 RX ADMIN — FENTANYL CITRATE 50 MCG: 50 INJECTION INTRAMUSCULAR; INTRAVENOUS at 15:00

## 2024-12-22 RX ADMIN — PROPOFOL 80 MG: 10 INJECTION, EMULSION INTRAVENOUS at 15:36

## 2024-12-22 RX ADMIN — ACETAMINOPHEN 1000 MG: 1000 INJECTION, SOLUTION INTRAVENOUS at 15:22

## 2024-12-22 RX ADMIN — ONDANSETRON 4 MG: 2 INJECTION INTRAMUSCULAR; INTRAVENOUS at 15:21

## 2024-12-22 RX ADMIN — SODIUM CHLORIDE 500 ML: 0.9 INJECTION, SOLUTION INTRAVENOUS at 15:21

## 2024-12-22 NOTE — ED PROCEDURE NOTE
Procedure  Pre-Procedural Sedation    Performed by: Teo Spring DO  Authorized by: Teo Spring DO    Consent:     Consent obtained:  Verbal    Consent given by:  Patient    Risks discussed:  Allergic reaction, prolonged hypoxia resulting in organ damage, prolonged sedation necessitating reversal, respiratory compromise necessitating ventilatory assistance and intubation, nausea and vomiting    Alternatives discussed:  Analgesia without sedation and anxiolysis  Universal protocol:     Procedure explained and questions answered to patient or proxy's satisfaction: yes      Relevant documents present and verified: yes      Test results available and properly labeled: yes      Radiology Images displayed and confirmed.  If images not available, report reviewed: yes      Required blood products, implants, devices, and special equipment available: yes      Site/side marked: yes      Immediately prior to procedure a time out was called: yes      Patient identity confirmation method:  Verbally with patient and arm band  Indications:     Sedation purpose:  Dislocation reduction    Procedure necessitating sedation performed by:  Physician performing sedation    Intended level of sedation:  Moderate (conscious sedation)                   Teo Spring DO  12/22/24 9057

## 2024-12-22 NOTE — ED PROVIDER NOTES
Time reflects when diagnosis was documented in both MDM as applicable and the Disposition within this note       Time User Action Codes Description Comment    12/22/2024  4:01 PM Rosy Goins Add [S73.005A] Closed dislocation of left hip, initial encounter (HCC)     12/22/2024  5:28 PM GoinsZaida dorantesve Add [D64.9] Anemia     12/22/2024  5:28 PM Goins Rosy Add [D72.819] Leukopenia     12/22/2024  5:28 PM Goins Rosy Add [D69.6] Thrombocytopenia (HCC)     12/22/2024  5:28 PM Goins, Rosy Add [D61.818] Pancytopenia (HCC)           ED Disposition       ED Disposition   Discharge    Condition   Stable    Date/Time   Sun Dec 22, 2024  4:24 PM    Comment   Pretty Aguila discharge to home/self care.                   Assessment & Plan       Medical Decision Making  Pretty Aguila is a 74 y.o. female presenting with left hip pain after standing, concerned for dislocation. Vitals grossly unremarkable. Exam remarkable for left hip shortened and internally rotated, limited ROM at left hip. Able to ROM knee and left ankle. Neurvascularly intact compared to right left. Exam otherwise unremarkable.    Differential diagnosis including but not limited to: dislocation, sprain, strain, fracture, contusion; doubt compartment syndrome.     Plan:  - Will examine CBC per family request given recent reports of hypotension at home and previously abnormal blood work. Unless grossly different than previous and requiring intervention, patient expressed preference to follow-up with hematologist with whom she already has an appointment  - Will examine CMP to evaluate for metabolic abnormalities  - Will give fentanyl for pain  - Will get xray of hip to evaluate for dislocation/fracture  - Will perform procedural sedation to reduce if hip is dislocated.    Will continue to monitor while patient is in the ED and reconsider further evaluation or intervention as needed.    See ED course for further updates and  interpretation of results.    Based on these results and H&P, patient with left hip dislocation that was successfully reduced in the ED. Remained neuro-vascularly intact, reported resolution of pain, able to ambulate. Discharged with orthopedics follow-up.    Results, clinical impressions, and plan were discussed with patient and family. They expressed understanding and were in agreement with plan. Patient was given the opportunity to ask questions in ED. All questions and concerns were addressed in ED.    After evaluation and workup in the emergency department Patient appears well, is nontoxic appearing, expresses understanding and agrees with plan of care at this time.  In light of this patient would benefit from outpatient management. Discussed strict return precautions.  Discussed importance of following up with PCP in the next few days.  All questions answered.  Patient is agreeable to discharge.     Amount and/or Complexity of Data Reviewed  Labs: ordered. Decision-making details documented in ED Course.  Radiology: ordered and independent interpretation performed.    Risk  Prescription drug management.        ED Course as of 12/23/24 0057   Sun Dec 22, 2024   1404 Blood Pressure: 144/79   1404 Temperature: 97.5 °F (36.4 °C)   1404 Pulse: 83   1404 Respirations: 16   1404 SpO2: 100 %   1505 PTT: 25   1505 POCT INR: 1.06   1505 PROTIME: 14.5   1508 Sodium: 137   1508 Potassium: 4.1   1508 Chloride: 106   1508 Carbon Dioxide: 22   1508 ANION GAP: 9   1508 BUN: 20   1508 Creatinine: 0.97   1508 GLUCOSE: 92   1508 Calcium: 9.1   1508 AST: 22   1509 ALT: 29   1509 ALK PHOS: 61   1509 Total Protein: 7.7   1509 Albumin: 4.0   1509 Total Bilirubin: 0.67   1509 GFR, Calculated: 58   1600 Xrays performed post, appears to be in place   1633 WBC(!): 2.90   1633 Hemoglobin(!): 8.4   1633 Platelet Count(!): 64   1634 Re-evaluated. Continues to be neurovascularly intact.   1722 Able to ambulate without issue.    Discussed  all results with patient. Discussed blood values with patient. She has meeting with hematology this week. Asked that her pathology results not be discussed until she spoke with them. Denies illness symptoms. At this time, with close hematology follow-up this is reasonable.       Medications   fentaNYL injection 50 mcg (50 mcg Intravenous Given 12/22/24 1500)   sodium chloride 0.9 % bolus 500 mL (0 mL Intravenous Stopped 12/22/24 1621)   acetaminophen (Ofirmev) injection 1,000 mg (0 mg Intravenous Stopped 12/22/24 1537)   propofol (DIPRIVAN) 200 MG/20ML bolus injection 80 mg (80 mg Intravenous Given by Other 12/22/24 1536)   ondansetron (ZOFRAN) injection 4 mg (4 mg Intravenous Given 12/22/24 1521)       ED Risk Strat Scores                          SBIRT 22yo+      Flowsheet Row Most Recent Value   Initial Alcohol Screen: US AUDIT-C     1. How often do you have a drink containing alcohol? 0 Filed at: 12/22/2024 1637   2. How many drinks containing alcohol do you have on a typical day you are drinking?  0 Filed at: 12/22/2024 1637   3a. Male UNDER 65: How often do you have five or more drinks on one occasion? 0 Filed at: 12/22/2024 1637   3b. FEMALE Any Age, or MALE 65+: How often do you have 4 or more drinks on one occassion? 0 Filed at: 12/22/2024 1637   Audit-C Score 0 Filed at: 12/22/2024 1637   CHANI: How many times in the past year have you...    Used an illegal drug or used a prescription medication for non-medical reasons? Never Filed at: 12/22/2024 1637                            History of Present Illness       Chief Complaint   Patient presents with    Hip Injury - Major     Pt states put pressure on hip trying to sit down L hip popped out tried to pop it back in herself could not get it, has happened to both hips in past and was able to pop it back in       Past Medical History:   Diagnosis Date    Allergic     Anxiety     Arthritis     Breast cancer (HCC) 09/2023    CAD (coronary artery disease)      Coronary artery disease [id number]    Depression     Diabetes mellitus (HCC) 5/1/24    Mother-Diabetic    Dyslipidemia     Hiatal hernia     Hyperlipidemia     Hypertension     Obesity     Obesity (BMI 30-39.9)     Psoriatic arthritis (HCC)     Rheumatoid arthritis (HCC)     Scoliosis     SOB (shortness of breath)       Past Surgical History:   Procedure Laterality Date    BARIATRIC SURGERY  2014    BILE DUCT EXPLORATION      replacement per [facility]    BREAST BIOPSY  9/23    CARPAL TUNNEL RELEASE Bilateral 2002    CORONARY ARTERY BYPASS GRAFT  2017    FOOT SURGERY Right     bone surgery to straighten toe.    HIP SURGERY Left 2015    IR BIOPSY BONE MARROW  12/11/2024    IR DRAINAGE TUBE PLACEMENT  12/19/2023    JOINT REPLACEMENT      LYMPH NODE BIOPSY Left 11/20/2023    Procedure: LEFT LYMPHATIC MAPPING WITH BLUE AND RADIOACTIVE DYES, SENTINEL LYMPH NODE BIOPSY;  Surgeon: Adrien Gabriel MD;  Location: [facility];  Service: Surgical Oncology    LYMPH NODE DISSECTION Left 11/20/2023    Procedure: POSSIBLE AXILLARY DISSECTION;  Surgeon: Adrien Gabriel MD;  Location: [facility];  Service: Surgical Oncology    MD BREAST REDUCTION Bilateral 01/16/2024    Procedure: RIGHT BREAST REDUCTION AND LEFT BREAST EXPANDER EXCHANGE FOR PERMANENT IMPLANT. REVISION OF RECONSTRUCTED LEFT BREAST.;  Surgeon: Molina Jimenez MD;  Location: [facility];  Service: Plastics    MD SUCTION ASSISTED LIPECTOMY TRUNK Bilateral 01/16/2024    Procedure: LIPOSUCTION BREAST;  Surgeon: Molina Jimenez MD;  Location: [facility];  Service: Plastics    MD TISSUE EXPANDER PLACEMENT BREAST RECONSTRUCTION Left 11/20/2023    Procedure: IMMEDIATE LEFT BREAST RECONSTRUCTION WITH TISSUE EXPANDER AND ADM;  Surgeon: Molina Jimenez MD;  Location: [facility];  Service: Plastics    REPLACEMENT TOTAL KNEE Right 04/2017    SIMPLE MASTECTOMY Left 11/20/2023    Procedure: LEFT BREAST VERENICE  DIRECTED MASTECTOMY;  Surgeon: Adrien Gabriel MD;  Location: [facility];  Service: Surgical  Oncology    SLEEVE GASTROPLASTY      TONSILLECTOMY      TOTAL HIP ARTHROPLASTY Right 2017    US GUIDED BREAST BIOPSY LEFT COMPLETE Left 2023      Family History   Problem Relation Age of Onset    Hypertension Mother     Diabetes Mother     Heart disease Father         CABG x5    Arthritis Father     No Known Problems Sister     No Known Problems Sister     No Known Problems Daughter     No Known Problems Daughter     No Known Problems Son     Breast cancer Neg Hx       Social History     Tobacco Use    Smoking status: Former     Current packs/day: 0.00     Average packs/day: 0.5 packs/day for 7.0 years (3.5 ttl pk-yrs)     Types: Cigarettes     Start date: 2010     Quit date: 2017     Years since quittin.9    Smokeless tobacco: Never    Tobacco comments:     Have already quit smoking in 2017   Vaping Use    Vaping status: Never Used   Substance Use Topics    Alcohol use: Yes     Alcohol/week: 2.0 standard drinks of alcohol     Types: 2 Glasses of wine per week     Comment: social     Drug use: No      E-Cigarette/Vaping    E-Cigarette Use Never User       E-Cigarette/Vaping Substances    Nicotine No     THC No     CBD No     Flavoring No     Other No       I have reviewed and agree with the history as documented.     HPI    Pretty Aguila is a 74 y.o. female with history of HTN, HLD, CABG, bronchitis, CKD, psoriatic arthritis, pancytopenia, RA, presenting for left hip pain.    Patient reports left hip pain and inability to stand on or move her hip after standing up and feeling her hip pop. Feels similar to previous hip dislocations. Denies trauma to the region. Denies falls or other injury. Patient describes pain as throbbing. Pain severity now is severe, 10/10 . Pain is aggravated by movement, weight bearing, and palpation. Had not tried relieving factors prior to presentation. Patient denies numbness, tingling, weakness, and loss of sensation. She denies precipitating symptoms. Last meal with earlier  this morning, at least 6-7 hours prior to presentation. She reports in the past she had had multiple dislocations and has been able to reduce them at home, but was unable to today.    Review of Systems   Constitutional:  Negative for chills and fever.   HENT:  Negative for congestion, hearing loss and trouble swallowing.    Eyes:  Negative for pain and visual disturbance.   Respiratory:  Negative for cough and shortness of breath.    Cardiovascular:  Negative for chest pain, palpitations and leg swelling.   Gastrointestinal:  Negative for abdominal pain, constipation, diarrhea, nausea and vomiting.   Genitourinary:  Negative for dysuria, frequency and hematuria.   Musculoskeletal:  Positive for arthralgias (Left hip pain). Negative for back pain.   Skin:  Negative for color change and rash.   Neurological:  Negative for dizziness, seizures, syncope, weakness, light-headedness and headaches.   Psychiatric/Behavioral:  Negative for dysphoric mood.    All other systems reviewed and are negative.          Objective       ED Triage Vitals [12/22/24 1347]   Temperature Pulse Blood Pressure Respirations SpO2 Patient Position - Orthostatic VS   97.5 °F (36.4 °C) 83 144/79 16 100 % --      Temp src Heart Rate Source BP Location FiO2 (%) Pain Score    -- -- -- -- 10 - Worst Possible Pain      Vitals      Date and Time Temp Pulse SpO2 Resp BP Pain Score FACES Pain Rating User   12/22/24 1615 -- 82 96 % 16 119/67 -- -- MD   12/22/24 1600 -- 83 99 % 16 113/61 -- -- MD   12/22/24 1555 -- 83 99 % 15 114/59 -- -- MD   12/22/24 1550 -- 82 98 % 17 120/64 -- -- MD   12/22/24 1545 -- 81 99 % 19 139/82 -- -- MD   12/22/24 1540 -- 83 97 % 19 146/83 -- -- MD   12/22/24 1535 -- 83 100 % 16 145/83 -- -- MD   12/22/24 1531 -- 84 100 % 14 142/82 -- -- MD   12/22/24 1500 -- -- -- -- -- 10 - Worst Possible Pain -- NR   12/22/24 1347 97.5 °F (36.4 °C) 83 100 % 16 144/79 10 - Worst Possible Pain -- JDT            Physical Exam  Vitals and nursing  note reviewed.   Constitutional:       General: She is not in acute distress.     Appearance: She is well-developed.   HENT:      Head: Normocephalic and atraumatic.      Mouth/Throat:      Mouth: Mucous membranes are moist.      Pharynx: Oropharynx is clear.   Eyes:      Extraocular Movements: Extraocular movements intact.      Conjunctiva/sclera: Conjunctivae normal.   Cardiovascular:      Rate and Rhythm: Normal rate and regular rhythm.      Pulses: No decreased pulses.           Dorsalis pedis pulses are 2+ on the right side and 2+ on the left side.        Posterior tibial pulses are 2+ on the right side and 2+ on the left side.      Heart sounds: Normal heart sounds. No murmur heard.  Pulmonary:      Effort: Pulmonary effort is normal. No respiratory distress.      Breath sounds: Normal breath sounds. No wheezing, rhonchi or rales.   Abdominal:      General: Abdomen is flat.      Palpations: Abdomen is soft.      Tenderness: There is no abdominal tenderness.   Musculoskeletal:         General: Tenderness (Left hip) and deformity (Left hip internally rotated, forshortened) present.      Cervical back: Normal range of motion.   Skin:     General: Skin is warm and dry.      Capillary Refill: Capillary refill takes less than 2 seconds.   Neurological:      General: No focal deficit present.      Mental Status: She is alert and oriented to person, place, and time.      Sensory: Sensation is intact. No sensory deficit.      Motor: No weakness.   Psychiatric:         Mood and Affect: Mood normal.         Behavior: Behavior normal.         Results Reviewed       Procedure Component Value Units Date/Time    RBC Morphology Reflex Test [[id number]] Collected: 12/22/24 1437    Lab Status: Final result Specimen: Blood from Arm, Left Updated: 12/22/24 1702    CBC and differential [[id number]]  (Abnormal) Collected: 12/22/24 1437    Lab Status: Final result Specimen: Blood from Arm, Left Updated: 12/22/24 1630     WBC 2.90  Thousand/uL      RBC 2.34 Million/uL      Hemoglobin 8.4 g/dL      Hematocrit 25.2 %       fL      MCH 35.9 pg      MCHC 33.3 g/dL      RDW 13.7 %      MPV 12.7 fL      Platelets 64 Thousands/uL     Manual Differential(PHLEBS Do Not Order) [[id number]]  (Abnormal) Collected: 12/22/24 1437    Lab Status: Final result Specimen: Blood from Arm, Left Updated: 12/22/24 1630     Segmented % 59 %      Lymphocytes % 37 %      Monocytes % 1 %      Eosinophils % 2 %      Basophils % 1 %      Absolute Neutrophils 1.71 Thousand/uL      Absolute Lymphocytes 1.07 Thousand/uL      Absolute Monocytes 0.03 Thousand/uL      Absolute Eosinophils 0.06 Thousand/uL      Absolute Basophils 0.03 Thousand/uL      Total Counted --     RBC Morphology Present     Platelet Estimate Decreased     Large Platelet Present     Anisocytosis Present     Ovalocytes Present     Poikilocytes Present    Comprehensive metabolic panel [[id number]] Collected: 12/22/24 1437    Lab Status: Final result Specimen: Blood from Arm, Left Updated: 12/22/24 1508     Sodium 137 mmol/L      Potassium 4.1 mmol/L      Chloride 106 mmol/L      CO2 22 mmol/L      ANION GAP 9 mmol/L      BUN 20 mg/dL      Creatinine 0.97 mg/dL      Glucose 92 mg/dL      Calcium 9.1 mg/dL      AST 22 U/L      ALT 29 U/L      Alkaline Phosphatase 61 U/L      Total Protein 7.7 g/dL      Albumin 4.0 g/dL      Total Bilirubin 0.67 mg/dL      eGFR 58 ml/min/1.73sq m     Narrative:      [employer] guidelines for Chronic Kidney Disease (CKD):     Stage 1 with normal or high GFR (GFR > 90 mL/min/1.73 square meters)    Stage 2 Mild CKD (GFR = 60-89 mL/min/1.73 square meters)    Stage 3A Moderate CKD (GFR = 45-59 mL/min/1.73 square meters)    Stage 3B Moderate CKD (GFR = 30-44 mL/min/1.73 square meters)    Stage 4 Severe CKD (GFR = 15-29 mL/min/1.73 square meters)    Stage 5 End Stage CKD (GFR <15 mL/min/1.73 square meters)  Note: GFR calculation is accurate only with a  steady state creatinine    Protime-INR [[id number]]  (Normal) Collected: 12/22/24 1437    Lab Status: Final result Specimen: Blood from Arm, Left Updated: 12/22/24 1505     Protime 14.5 seconds      INR 1.06    Narrative:      INR Therapeutic Range    Indication                                             INR Range      Atrial Fibrillation                                               2.0-3.0  Hypercoagulable State                                    2.0.2.3  Left Ventricular Asist Device                            2.0-3.0  Mechanical Heart Valve                                  -    Aortic(with afib, MI, embolism, HF, LA enlargement,    and/or coagulopathy)                                     2.0-3.0 (2.5-3.5)     Mitral                                                             2.5-3.5  Prosthetic/Bioprosthetic Heart Valve               2.0-3.0  Venous thromboembolism (VTE: VT, PE        2.0-3.0    APTT [[id number]]  (Normal) Collected: 12/22/24 1437    Lab Status: Final result Specimen: Blood from Arm, Left Updated: 12/22/24 1505     PTT 25 seconds             XR hip/pelv 1 vw LEFT if performed   ED Interpretation by Rosy Goins MD (12/22 1634)   Per my interpretation: No acute osseous abnormality, hip appears to be appropriately reduced      XR hip/pelv 2-3 vws left if performed   ED Interpretation by Rosy Goins MD (12/22 1600)   Per my interpretation: Left hip dislocation          Pre-Procedural Sedation    Performed by: Rosy Goins MD  Authorized by: Rosy Goins MD    Consent:     Consent obtained:  Verbal and written    Consent given by:  Patient    Risks discussed:  Allergic reaction, prolonged hypoxia resulting in organ damage, dysrhythmia, prolonged sedation necessitating reversal, inadequate sedation, respiratory compromise necessitating ventilatory assistance and intubation, vomiting and nausea    Alternatives discussed:  Analgesia without sedation, anxiolysis and regional  "anesthesia  Universal protocol:     Procedure explained and questions answered to patient or proxy's satisfaction: yes      Immediately prior to procedure a time out was called: yes      Patient identity confirmation method:  Verbally with patient and arm band  Indications:     Sedation purpose:  Dislocation reduction    Procedure necessitating sedation performed by:  Different physician    Intended level of sedation:  Moderate (conscious sedation)  Pre-sedation assessment:     Time since last food or drink:  7    ASA classification: class 2 - patient with mild systemic disease      Neck mobility: normal      Mouth opening:  3 or more finger widths    Thyromental distance:  4 finger widths    Mallampati score:  II - soft palate, uvula, fauces visible    Pre-sedation assessments completed and reviewed: airway patency, cardiovascular function, hydration status, mental status, nausea/vomiting, pain level, respiratory function and temperature      History of difficult intubation: no      Pre-sedation assessment completed:  12/22/2024 2:03 PM  Orthopedic injury treatment    Date/Time: 12/22/2024 3:31 PM    Performed by: Rosy Goins MD  Authorized by: Rosy Goins MD    Patient Location:  ED  [facility] Protocol:  Procedure performed by: (Bay (Attending), Saw (EM PGY1), Jeffrey (EM PGY1))  Consent: Verbal consent obtained. Written consent obtained.  Risks and benefits: risks, benefits and alternatives were discussed  Consent given by: patient  Time out: Immediately prior to procedure a \"time out\" was called to verify the correct patient, procedure, equipment, support staff and site/side marked as required.  Patient understanding: patient states understanding of the procedure being performed  Patient consent: the patient's understanding of the procedure matches consent given  Procedure consent: procedure consent matches procedure scheduled  Test results: test results available and properly " labeled  Radiology Images displayed and confirmed. If images not available, report reviewed: imaging studies available  Required items: required blood products, implants, devices, and special equipment available  Patient identity confirmed: verbally with patient and arm band    Injury location:  Hip  Location details:  Left hip  Injury type:  Dislocation  Dislocation type: posterior    Spontaneous?: Yes    Prosthesis?: Yes    Neurovascular status: Neurovascularly intact    Distal perfusion: normal    Neurological function: normal    Range of motion: reduced    Local anesthesia used?: No    General anesthesia used?: Yes    Anesthesia:  See MAR for details  Sedation type:  Moderate (conscious) sedation (See separate Procedural Sedation form)  Manipulation performed?: Yes    Reduction method: traction and counter traction  Reduction successful?: Yes    Confirmation: Reduction confirmed by x-ray    Immobilization: Wedge pillow.  Neurovascular status: Neurovascularly intact    Distal perfusion: normal    Neurological function: normal    Range of motion: improved    Patient tolerance:  Patient tolerated the procedure well with no immediate complications      ED Medication and Procedure Management   Prior to Admission Medications   Prescriptions Last Dose Informant Patient Reported? Taking?   Apremilast 30 MG TABS  Self Yes No   Sig: Take 30 mg by mouth 2 (two) times a day   Patient not taking: Reported on 8/9/2024   LORazepam (ATIVAN) 0.5 mg tablet   No No   Sig: TAKE 1 TABLET (0.5 MG TOTAL) BY MOUTH EVERY 8 (EIGHT) HOURS AS NEEDED FOR ANXIETY   Multiple Vitamin (multivitamin) tablet  Self Yes No   Sig: Take 1 tablet by mouth daily   Semaglutide-Weight Management (WEGOVY) 2.4 MG/0.75ML   No No   Sig: Inject 0.75 mL (2.4 mg total) under the skin once a week   anastrozole (ARIMIDEX) 1 mg tablet   No No   Sig: TAKE 1 TABLET (1 MG TOTAL) BY MOUTH DAILY   atorvastatin (LIPITOR) 10 mg tablet   No No   Sig: TAKE 1 TABLET BY  MOUTH DAILY   buPROPion (WELLBUTRIN XL) 300 mg 24 hr tablet   No No   Sig: TAKE 1 TABLET (300 MG TOTAL) BY MOUTH EVERY MORNING   cholecalciferol (VITAMIN D3) 400 units tablet  Self Yes No   Sig: Take 400 Units by mouth daily   citalopram (CeleXA) 20 mg tablet   No No   Sig: Take 1 tablet (20 mg total) by mouth daily   cyanocobalamin 1,000 mcg/mL   No No   Sig: Inject 1 mL (1,000 mcg total) into a muscle every 30 (thirty) days for 2 doses   ixekizumab (Taltz) 80 MG/ML subcutaneous injection   Yes No   Sig: Inject 80 mg under the skin every 28 days   Patient not taking: Reported on 12/7/2024   metFORMIN (GLUCOPHAGE-XR) 500 mg 24 hr tablet   No No   Sig: Take 1 tablet (500 mg total) by mouth daily with dinner   metoprolol succinate (Toprol XL) 25 mg 24 hr tablet   No No   Sig: Take 1 tablet (25 mg total) by mouth daily   naloxone (NARCAN) 4 mg/0.1 mL nasal spray  Self No No   Sig: Administer 1 spray into a nostril. If no response after 2-3 minutes, give another dose in the other nostril using a new spray.   nystatin (MYCOSTATIN) cream  Self No No   Sig: Apply topically 2 (two) times a day   nystatin (MYCOSTATIN) powder  Self No No   Sig: Apply topically 2 (two) times a day   predniSONE 1 mg tablet   Yes No   Sig: TAKE 1 TABLET (1 MG TOTAL) BY MOUTH 3 (THREE) TIMES A DAY.   traMADol (ULTRAM) 50 mg tablet   Yes No      Facility-Administered Medications: None     Discharge Medication List as of 12/22/2024  5:29 PM        CONTINUE these medications which have NOT CHANGED    Details   anastrozole (ARIMIDEX) 1 mg tablet TAKE 1 TABLET (1 MG TOTAL) BY MOUTH DAILY, Starting Thu 6/27/2024, Normal      Apremilast 30 MG TABS Take 30 mg by mouth 2 (two) times a day, Starting Wed 1/11/2023, Historical Med      atorvastatin (LIPITOR) 10 mg tablet TAKE 1 TABLET BY MOUTH DAILY, Starting Mon 8/26/2024, Normal      buPROPion (WELLBUTRIN XL) 300 mg 24 hr tablet TAKE 1 TABLET (300 MG TOTAL) BY MOUTH EVERY MORNING, Starting Tue 10/22/2024,  Until Fri 10/17/2025, Normal      cholecalciferol (VITAMIN D3) 400 units tablet Take 400 Units by mouth daily, Historical Med      citalopram (CeleXA) 20 mg tablet Take 1 tablet (20 mg total) by mouth daily, Starting Wed 12/18/2024, Normal      cyanocobalamin 1,000 mcg/mL Inject 1 mL (1,000 mcg total) into a muscle every 30 (thirty) days for 2 doses, Starting Wed 1/8/2025, Until Sat 2/8/2025, Normal      ixekizumab (Taltz) 80 MG/ML subcutaneous injection Inject 80 mg under the skin every 28 days, Starting Tue 10/22/2024, Until Wed 8/27/2025, Historical Med      LORazepam (ATIVAN) 0.5 mg tablet TAKE 1 TABLET (0.5 MG TOTAL) BY MOUTH EVERY 8 (EIGHT) HOURS AS NEEDED FOR ANXIETY, Starting Thu 3/28/2024, Normal      metFORMIN (GLUCOPHAGE-XR) 500 mg 24 hr tablet Take 1 tablet (500 mg total) by mouth daily with dinner, Starting Fri 8/9/2024, Normal      metoprolol succinate (Toprol XL) 25 mg 24 hr tablet Take 1 tablet (25 mg total) by mouth daily, Starting Wed 12/18/2024, Normal      Multiple Vitamin (multivitamin) tablet Take 1 tablet by mouth daily, Historical Med      naloxone (NARCAN) 4 mg/0.1 mL nasal spray Administer 1 spray into a nostril. If no response after 2-3 minutes, give another dose in the other nostril using a new spray., Normal      nystatin (MYCOSTATIN) cream Apply topically 2 (two) times a day, Starting Wed 3/22/2023, Normal      nystatin (MYCOSTATIN) powder Apply topically 2 (two) times a day, Starting Wed 3/22/2023, Normal      predniSONE 1 mg tablet TAKE 1 TABLET (1 MG TOTAL) BY MOUTH 3 (THREE) TIMES A DAY., Historical Med      Semaglutide-Weight Management (WEGOVY) 2.4 MG/0.75ML Inject 0.75 mL (2.4 mg total) under the skin once a week, Starting Thu 11/14/2024, Normal      traMADol (ULTRAM) 50 mg tablet Historical Med             ED SEPSIS DOCUMENTATION   Time reflects when diagnosis was documented in both MDM as applicable and the Disposition within this note       Time User Action Codes Description  "Comment    12/22/2024  4:01 PM Rosy Goins [S73.005A] Closed dislocation of left hip, initial encounter (HCC)     12/22/2024  5:28 PM Rosy Goins Add [D64.9] Anemia     12/22/2024  5:28 PM Rosy Goins Add [D72.819] Leukopenia     12/22/2024  5:28 PM Rosy Goins Add [D69.6] Thrombocytopenia (HCC)     12/22/2024  5:28 PM Rosy Goins Add [D61.818] Pancytopenia (HCC)           Portions of the above record have been created with voice recognition software.  Occasional wrong word or \"sound alike\" substitutions may have occurred due to the inherent limitations of voice recognition software.  Read the chart carefully and recognize, using context, where substitutions may have occurred.         Rosy Goins MD  12/23/24 0057    "

## 2024-12-22 NOTE — DISCHARGE INSTRUCTIONS
You were evaluated in the emergency department for: hip dislocation. You can access your current and pending results through [employer] [employer]. A radiologist will take a second look at your X-Rays, if you had any, and you will be contacted with any new findings.    - You should follow-up with your primary care provider, as soon as possible, for re-evaluation.  - You are being referred to orthopedics for further evaluation    Please, return to the emergency department if you experience new or worsening symptoms, fever, chest pain, shortness of breath, difficulty breathing, dizziness, abdominal pain, persistent nausea/vomiting, syncope or passing out, blood in your urine or stool, coughing up blood, leg swelling/pain, urinary retention, bowel or bladder incontinence, numbness between your legs.

## 2024-12-22 NOTE — ED ATTENDING ATTESTATION
12/22/2024  ISegundo DO, saw and evaluated the patient. I have discussed the patient with the resident/non-physician practitioner and agree with the resident's/non-physician practitioner's findings, Plan of Care, and MDM as documented in the resident's/non-physician practitioner's note, except where noted. All available labs and Radiology studies were reviewed.  I was present for key portions of any procedure(s) performed by the resident/non-physician practitioner and I was immediately available to provide assistance.       At this point I agree with the current assessment done in the Emergency Department.  I have conducted an independent evaluation of this patient a history and physical is as follows:    73 yo F coming in for eval of left hip pain after turning quickly, felt a pop in her left hip.  Except a few hours ago and she has been unable to walk since it happened.  She states that she has dislocated her hip maybe 6 times previously, but has been able to get it back in at home.    PE:  The patient is well appearing, non-toxic, in NAD. Head: normocephalic, atraumatic. HEENT: mucous membranes moist.  Lungs: CTA b/l, no resp distress. Heart: RRR. No M/R/G. Abdomen: NT, ND, no R/R/G. Neuro: CN2-12 intact, GCS 15. Normal strength and sensation, normal speech and gait. Cap refill < 2 sec, skin warm and dry. No rashes or lesions.  MSK: the left leg is held in internal rotation and is shortened. NVI distally.    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 Plan - procedural sedation and reduction.    Sedation and reduction performed with satisfactory reduction. Pt able to stand and ambulate with walker, abduction pillow given, hip precautions reviewed. F/u ortho outpt.    ED Course         Critical Care Time  Procedures

## 2024-12-23 LAB — MISCELLANEOUS LAB TEST RESULT: NORMAL

## 2024-12-26 ENCOUNTER — TELEPHONE (OUTPATIENT)
Age: 74
End: 2024-12-26

## 2024-12-26 NOTE — TELEPHONE ENCOUNTER
Called and spoke to Pretty. Unfortunately, Dr. Carranza does not have any earlier appointments available. Pretty appreciated the call.

## 2024-12-26 NOTE — TELEPHONE ENCOUNTER
Pt advised that we do not have these forms in the office. She will try to get then from online and have someone drop them off  I also gave her our fax # so she can fax them if need be

## 2024-12-26 NOTE — TELEPHONE ENCOUNTER
Patient called regarding a disability form # M01.Form ID # [id number].Patient stated she was advised from the disability office that the doctors office  are familiar with these forms.

## 2024-12-26 NOTE — TELEPHONE ENCOUNTER
Patient calling in, stated that they would like to be seen earlier if at all possible, current appt 12/31 - Tuesday, as they have family here until Monday and would like they to be present for when the biopsy results are gone over. Patient stated they would be okay with a phone call from a nurse / doctor as well if an in person visit was not possible. Please call them to let them know either way at [phone]

## 2024-12-27 PROCEDURE — 88313 SPECIAL STAINS GROUP 2: CPT | Performed by: STUDENT IN AN ORGANIZED HEALTH CARE EDUCATION/TRAINING PROGRAM

## 2024-12-27 PROCEDURE — 88311 DECALCIFY TISSUE: CPT | Performed by: STUDENT IN AN ORGANIZED HEALTH CARE EDUCATION/TRAINING PROGRAM

## 2024-12-27 PROCEDURE — 88341 IMHCHEM/IMCYTCHM EA ADD ANTB: CPT | Performed by: STUDENT IN AN ORGANIZED HEALTH CARE EDUCATION/TRAINING PROGRAM

## 2024-12-27 PROCEDURE — 85060 BLOOD SMEAR INTERPRETATION: CPT | Performed by: STUDENT IN AN ORGANIZED HEALTH CARE EDUCATION/TRAINING PROGRAM

## 2024-12-27 PROCEDURE — 88360 TUMOR IMMUNOHISTOCHEM/MANUAL: CPT | Performed by: STUDENT IN AN ORGANIZED HEALTH CARE EDUCATION/TRAINING PROGRAM

## 2024-12-27 PROCEDURE — 85097 BONE MARROW INTERPRETATION: CPT | Performed by: STUDENT IN AN ORGANIZED HEALTH CARE EDUCATION/TRAINING PROGRAM

## 2024-12-27 PROCEDURE — 88342 IMHCHEM/IMCYTCHM 1ST ANTB: CPT | Performed by: STUDENT IN AN ORGANIZED HEALTH CARE EDUCATION/TRAINING PROGRAM

## 2024-12-27 PROCEDURE — 88305 TISSUE EXAM BY PATHOLOGIST: CPT | Performed by: STUDENT IN AN ORGANIZED HEALTH CARE EDUCATION/TRAINING PROGRAM

## 2024-12-27 NOTE — TELEPHONE ENCOUNTER
Patient's daughter in law, Betty, called with the patient on the line to give information for the [employer] website to submit the MO1 medical statement form online as they were having trouble accessing the actual form to print out.  They were then able to access the link for the form and will try to submit it as a [employer] message or drop it off at the office.

## 2024-12-30 PROBLEM — D46.Z MDS (MYELODYSPLASTIC SYNDROME), HIGH GRADE (HCC): Status: ACTIVE | Noted: 2024-12-30

## 2024-12-30 LAB
ANISOCYTOSIS BLD QL SMEAR: PRESENT
ATRIAL RATE: 80 BPM
BLASTS NFR BLD MANUAL: 2 %
EOSINOPHIL NFR BLD MANUAL: 2 % (ref 0–6)
LG PLATELETS BLD QL SMEAR: PRESENT
LYMPHOCYTES # BLD AUTO: 47 %
MACROCYTES BLD QL AUTO: PRESENT
MONOCYTES NFR BLD AUTO: 1 % (ref 4–12)
NEUTS SEG NFR BLD AUTO: 44 %
OVALOCYTES BLD QL SMEAR: PRESENT
P AXIS: 70 DEGREES
PATHOLOGY REVIEW: YES
POIKILOCYTOSIS BLD QL SMEAR: PRESENT
PR INTERVAL: 198 MS
QRS AXIS: 63 DEGREES
QRSD INTERVAL: 102 MS
QT INTERVAL: 420 MS
QTC INTERVAL: 484 MS
RBC MORPH BLD: PRESENT
T WAVE AXIS: 98 DEGREES
TOTAL CELLS COUNTED SPEC: 100
VARIANT LYMPHS # BLD AUTO: 4 % (ref 0–0)
VENTRICULAR RATE: 80 BPM

## 2024-12-30 PROCEDURE — 93010 ELECTROCARDIOGRAM REPORT: CPT | Performed by: INTERNAL MEDICINE

## 2024-12-31 ENCOUNTER — OFFICE VISIT (OUTPATIENT)
Dept: HEMATOLOGY ONCOLOGY | Facility: CLINIC | Age: 74
End: 2024-12-31
Payer: COMMERCIAL

## 2024-12-31 ENCOUNTER — APPOINTMENT (OUTPATIENT)
Dept: LAB | Facility: HOSPITAL | Age: 74
End: 2024-12-31
Payer: COMMERCIAL

## 2024-12-31 ENCOUNTER — TELEPHONE (OUTPATIENT)
Age: 74
End: 2024-12-31

## 2024-12-31 ENCOUNTER — HOSPITAL ENCOUNTER (OUTPATIENT)
Dept: CT IMAGING | Facility: HOSPITAL | Age: 74
Discharge: HOME | End: 2024-12-31
Payer: COMMERCIAL

## 2024-12-31 VITALS
TEMPERATURE: 97.8 F | RESPIRATION RATE: 18 BRPM | HEIGHT: 63 IN | HEART RATE: 100 BPM | OXYGEN SATURATION: 98 % | DIASTOLIC BLOOD PRESSURE: 70 MMHG | SYSTOLIC BLOOD PRESSURE: 108 MMHG | BODY MASS INDEX: 31.54 KG/M2 | WEIGHT: 178 LBS

## 2024-12-31 DIAGNOSIS — C50.512 MALIGNANT NEOPLASM OF LOWER-OUTER QUADRANT OF LEFT BREAST OF FEMALE, ESTROGEN RECEPTOR POSITIVE (HCC): ICD-10-CM

## 2024-12-31 DIAGNOSIS — R06.09 DYSPNEA ON EXERTION: ICD-10-CM

## 2024-12-31 DIAGNOSIS — D61.818 PANCYTOPENIA (HCC): ICD-10-CM

## 2024-12-31 DIAGNOSIS — D46.Z MDS (MYELODYSPLASTIC SYNDROME), HIGH GRADE (HCC): ICD-10-CM

## 2024-12-31 DIAGNOSIS — Z17.0 MALIGNANT NEOPLASM OF LOWER-OUTER QUADRANT OF LEFT BREAST OF FEMALE, ESTROGEN RECEPTOR POSITIVE (HCC): Primary | ICD-10-CM

## 2024-12-31 DIAGNOSIS — D46.Z MDS (MYELODYSPLASTIC SYNDROME), HIGH GRADE (HCC): Primary | ICD-10-CM

## 2024-12-31 DIAGNOSIS — Z17.0 MALIGNANT NEOPLASM OF LOWER-OUTER QUADRANT OF LEFT BREAST OF FEMALE, ESTROGEN RECEPTOR POSITIVE (HCC): ICD-10-CM

## 2024-12-31 DIAGNOSIS — C50.512 MALIGNANT NEOPLASM OF LOWER-OUTER QUADRANT OF LEFT BREAST OF FEMALE, ESTROGEN RECEPTOR POSITIVE (HCC): Primary | ICD-10-CM

## 2024-12-31 LAB
ALBUMIN SERPL BCG-MCNC: 3.7 G/DL (ref 3.5–5)
ALP SERPL-CCNC: 60 U/L (ref 34–104)
ALT SERPL W P-5'-P-CCNC: 17 U/L (ref 7–52)
ANION GAP SERPL CALCULATED.3IONS-SCNC: 11 MMOL/L (ref 4–13)
ANISOCYTOSIS BLD QL SMEAR: PRESENT
AST SERPL W P-5'-P-CCNC: 16 U/L (ref 13–39)
BASOPHILS # BLD MANUAL: 0 THOUSAND/UL (ref 0–0.1)
BASOPHILS NFR MAR MANUAL: 0 % (ref 0–1)
BILIRUB SERPL-MCNC: 0.67 MG/DL (ref 0.2–1)
BUN SERPL-MCNC: 30 MG/DL (ref 5–25)
CALCIUM SERPL-MCNC: 9.1 MG/DL (ref 8.4–10.2)
CHLORIDE SERPL-SCNC: 102 MMOL/L (ref 96–108)
CO2 SERPL-SCNC: 23 MMOL/L (ref 21–32)
CREAT SERPL-MCNC: 1.07 MG/DL (ref 0.6–1.3)
EOSINOPHIL # BLD MANUAL: 0.11 THOUSAND/UL (ref 0–0.4)
EOSINOPHIL NFR BLD MANUAL: 4 % (ref 0–6)
ERYTHROCYTE [DISTWIDTH] IN BLOOD BY AUTOMATED COUNT: 14.2 % (ref 11.6–15.1)
GFR SERPL CREATININE-BSD FRML MDRD: 51 ML/MIN/1.73SQ M
GLUCOSE P FAST SERPL-MCNC: 88 MG/DL (ref 65–99)
HCT VFR BLD AUTO: 24.2 % (ref 34.8–46.1)
HGB BLD-MCNC: 8 G/DL (ref 11.5–15.4)
LDH SERPL-CCNC: 173 U/L (ref 140–271)
LG PLATELETS BLD QL SMEAR: PRESENT
LYMPHOCYTES # BLD AUTO: 1.13 THOUSAND/UL (ref 0.6–4.47)
LYMPHOCYTES # BLD AUTO: 39 % (ref 14–44)
MCH RBC QN AUTO: 35.7 PG (ref 26.8–34.3)
MCHC RBC AUTO-ENTMCNC: 33.1 G/DL (ref 31.4–37.4)
MCV RBC AUTO: 108 FL (ref 82–98)
MONOCYTES # BLD AUTO: 0.06 THOUSAND/UL (ref 0–1.22)
MONOCYTES NFR BLD: 2 % (ref 4–12)
NEUTROPHILS # BLD MANUAL: 1.53 THOUSAND/UL (ref 1.85–7.62)
NEUTS BAND NFR BLD MANUAL: 2 % (ref 0–8)
NEUTS SEG NFR BLD AUTO: 52 % (ref 43–75)
OVALOCYTES BLD QL SMEAR: PRESENT
PLATELET # BLD AUTO: 62 THOUSANDS/UL (ref 149–390)
PLATELET BLD QL SMEAR: ABNORMAL
PMV BLD AUTO: 12.4 FL (ref 8.9–12.7)
POIKILOCYTOSIS BLD QL SMEAR: PRESENT
POTASSIUM SERPL-SCNC: 3.6 MMOL/L (ref 3.5–5.3)
PROT SERPL-MCNC: 7.3 G/DL (ref 6.4–8.4)
RBC # BLD AUTO: 2.24 MILLION/UL (ref 3.81–5.12)
RBC MORPH BLD: PRESENT
RETICS # AUTO: NORMAL 10*3/UL (ref 14097–95744)
RETICS # CALC: 1 % (ref 0.37–1.87)
SODIUM SERPL-SCNC: 136 MMOL/L (ref 135–147)
VARIANT LYMPHS # BLD AUTO: 1 %
WBC # BLD AUTO: 2.83 THOUSAND/UL (ref 4.31–10.16)

## 2024-12-31 PROCEDURE — 36415 COLL VENOUS BLD VENIPUNCTURE: CPT

## 2024-12-31 PROCEDURE — 80053 COMPREHEN METABOLIC PANEL: CPT

## 2024-12-31 PROCEDURE — 85027 COMPLETE CBC AUTOMATED: CPT

## 2024-12-31 PROCEDURE — 83615 LACTATE (LD) (LDH) ENZYME: CPT

## 2024-12-31 PROCEDURE — 85007 BL SMEAR W/DIFF WBC COUNT: CPT

## 2024-12-31 PROCEDURE — 71275 CT ANGIOGRAPHY CHEST: CPT

## 2024-12-31 PROCEDURE — 99215 OFFICE O/P EST HI 40 MIN: CPT | Performed by: INTERNAL MEDICINE

## 2024-12-31 PROCEDURE — 85045 AUTOMATED RETICULOCYTE COUNT: CPT

## 2024-12-31 RX ORDER — SODIUM CHLORIDE 9 MG/ML
20 INJECTION, SOLUTION INTRAVENOUS ONCE
OUTPATIENT
Start: 2025-01-20

## 2024-12-31 RX ORDER — FLUCONAZOLE 100 MG/1
100 TABLET ORAL DAILY
Qty: 30 TABLET | Refills: 6 | Status: SHIPPED | OUTPATIENT
Start: 2024-12-31 | End: 2025-07-29

## 2024-12-31 RX ORDER — SODIUM CHLORIDE 9 MG/ML
20 INJECTION, SOLUTION INTRAVENOUS ONCE
OUTPATIENT
Start: 2025-01-14

## 2024-12-31 RX ORDER — SODIUM CHLORIDE 9 MG/ML
20 INJECTION, SOLUTION INTRAVENOUS ONCE
OUTPATIENT
Start: 2025-01-15

## 2024-12-31 RX ORDER — SODIUM CHLORIDE 9 MG/ML
20 INJECTION, SOLUTION INTRAVENOUS ONCE
OUTPATIENT
Start: 2025-01-16

## 2024-12-31 RX ORDER — SODIUM CHLORIDE 9 MG/ML
20 INJECTION, SOLUTION INTRAVENOUS ONCE
OUTPATIENT
Start: 2025-01-13

## 2024-12-31 RX ORDER — PROCHLORPERAZINE MALEATE 10 MG
10 TABLET ORAL EVERY 6 HOURS PRN
Qty: 30 TABLET | Refills: 3 | Status: SHIPPED | OUTPATIENT
Start: 2024-12-31

## 2024-12-31 RX ORDER — SODIUM CHLORIDE 9 MG/ML
20 INJECTION, SOLUTION INTRAVENOUS ONCE
OUTPATIENT
Start: 2025-01-17

## 2024-12-31 RX ORDER — SODIUM CHLORIDE 9 MG/ML
20 INJECTION, SOLUTION INTRAVENOUS ONCE
OUTPATIENT
Start: 2025-01-21

## 2024-12-31 RX ORDER — ACYCLOVIR 200 MG/1
200 CAPSULE ORAL 2 TIMES DAILY
Qty: 60 CAPSULE | Refills: 0 | Status: SHIPPED | OUTPATIENT
Start: 2024-12-31 | End: 2024-12-31

## 2024-12-31 RX ORDER — ONDANSETRON 8 MG/1
8 TABLET, FILM COATED ORAL EVERY 8 HOURS PRN
Qty: 30 TABLET | Refills: 3 | Status: SHIPPED | OUTPATIENT
Start: 2024-12-31

## 2024-12-31 RX ORDER — ACYCLOVIR 400 MG/1
400 TABLET ORAL 2 TIMES DAILY
Qty: 60 TABLET | Refills: 4 | Status: SHIPPED | OUTPATIENT
Start: 2024-12-31 | End: 2025-01-30

## 2024-12-31 RX ADMIN — IOHEXOL 80 ML: 350 INJECTION, SOLUTION INTRAVENOUS at 11:09

## 2024-12-31 NOTE — ASSESSMENT & PLAN NOTE
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 IPSS-R score of 8.5, very high risk. Complicated with progressive pancytopenia, most recent hemoglobin ~ 8, PLT ~ 60 and WBC ~ 2.9 with ANC ~ 1700 /uL     Above diagnosis, for medically fit patients, preferred treatment would be bridging therapy (Azacytidine) vs reduced intensity conditioning followed by Allogenic Stem Cell Transplantation. If patient is deemed not ASCT candidate, HMA based regimen, preferably subcutaneous Azacytidine is to be considered given improved OS compared to other HMA e.g. decitabine. Will avoid adding Venetoclax given P53 deletion.       PLAN   - referral to [facility], Dr. Lacey for ASCT evaluation   - starting SC Azacytidine 75 mg/m2 IV or subcutaneously daily for 7 days, repeat cycles every 4 weeks which would be indicated either as definitive therapy if non ASCT candidate or as bridging therapy prior to conditioning and transplant. Benefits/side effects reviewed and consent is obtained.   - weekly CBC w diff & CMP   - follow up in 2-3 weeks or sooner if needed   - prophylactic antifungal/antibacterial/antiviral meds ordered + PRN anti emetic

## 2024-12-31 NOTE — TELEPHONE ENCOUNTER
Jody called to report immediate findings noted on CTA study from today. Requests Dr. Carranza review report in Epic.

## 2024-12-31 NOTE — PROGRESS NOTES
"Name: Pretty Aguila      : 1950      MRN: [id number]  Encounter Provider: Geno Carranza MD  Encounter Date: 2024   Encounter department: [facility]  :  Assessment & Plan  Malignant neoplasm of lower-outer quadrant of left breast of female, estrogen receptor positive (HCC)  S/p left mastectomy with SLNB on 23     stage IIA (pT2 pN0 cM0) invasive carcinoma, G2, ER %, SC %, HER2 1+, tumor size 36 mm, negative margins, total one SLN was negative, no LVI identified.     Genetic testing was negative. Oncotype DX recurrence score was 9. Initiated adjuvant anastrozole on 2023.     Continue Anastrozole,   Annual R Mammogram; done 24 \"No evidence of malignancy\".  DEXA every 2 years; done 23 \"Normal Bone Density\"   Continue PO calcium and VIT D supplementation    Pancytopenia (HCC)  Due to MDS/AML with TP53 mutation - see below.   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 IPSS-R score of 8.5, very high risk. Complicated with progressive pancytopenia, most recent hemoglobin ~ 8, PLT ~ 60 and WBC ~ 2.9 with ANC ~ 1700 /uL     Above diagnosis, for medically fit patients, preferred treatment would be bridging therapy (Azacytidine) vs reduced intensity conditioning followed by Allogenic Stem Cell Transplantation. If patient is deemed not ASCT candidate, HMA based regimen, preferably subcutaneous Azacytidine is to be considered given improved OS compared to other HMA e.g. decitabine. Will avoid adding Venetoclax given P53 deletion.       PLAN   - [facility], Dr. Lacey for ASCT evaluation   - starting SC Azacytidine 75 mg/m2 IV or subcutaneously daily for 7 days, repeat cycles every 4 weeks which would be indicated either as definitive therapy if non ASCT candidate " or as bridging therapy prior to conditioning and transplant. Benefits/side effects reviewed and consent is obtained.   - weekly CBC w diff & CMP   - follow up in 2-3 weeks or sooner if needed   - prophylactic antifungal/antibacterial/antiviral meds ordered + PRN anti emetic   Dyspnea on exertion  CAMPOS and fatigue are most likely due to MDS/AML and anemia however given the reported desat events to mid 80's and accompanied near syncope events, need to rule out PE or other cardiopulmonary abnormality.   CTA Chest PE stat is ordered   Trans Thoracic Echocardiogram          History of Present Illness     HPI  Pretty Aguila is a 74 y.o. female with medical hx remarkable of DM, HTN, HLD, CAD s/p CABG, Psoriatic/Rheumatoid Arthritis, fibromyalgia, Hiatal Hernia.     In 9/2023 had left breast mass biopsy revealed invasive ductal carcinoma, G 2, ER 90%, TX 90%, HER2 1+, with suspected skin involvement on the MRI.     Patient underwent left mastectomy with SLNB on 11/20/23, pathology consistent with stage IIA (pT2 pN0 cM0) invasive carcinoma, G2, ER %, TX %, HER2 1+, tumor size 36 mm, negative margins, total one SLN was negative, no LVI identified.     Genetic testing was negative. Oncotype DX recurrence score was 9. Initiated adjuvant anastrozole on 12/09/2023.     11/1/24 ER visit with fatigue & SOB, tested COVID positive     12/07 - 12/12/24 hospital admission due to progressive CAMPOS and near syncope event, during which had progressive pancytopenia. Folate, B12, EBV, HIV, and hepatitis labs all unremarkable.     12/11/24 Bone Marrow Biopsy: normo-cellular 20-30%, with increased blasts 15-20%, Multilineage dysplasia, fibrosis 2-3 of 3, with complex karyotype including TP53 deletion and mutation, 5q deletion and monosomy 7; findings consistent with high grade myeloid neoplasm, at least MDS/AML with mutated TP53 (Int. Consensus Classification) / MDS with Bi-allelic TP53 inactivation (WHO Classification). The focus  that approaches 20% blasts by IHC also raises the possibility of classifying it as overt AML with mutated TP53. The combination of TP53 mutation and deletion, as well as the complex karyotype, is associated with a very poor prognosis, and as such, may best be classified and treated as AML with mutated TP53 - as clinically indicated. There is no evidence of metastatic carcinoma.     12/22/24 ER visit with left hip pain, diagnosed with L hip dislocation successfully reduced in the ER.       Ref 12/11/24 12/12/24 12/19/24 12/22/24    WBC 4.31 - 10.16 Thousand/uL 3.49 (L) 3.13 (L) 3.04 (L) 2.90 (L)   Hemoglobin 11.5 - 15.4 g/dL 8.6 (L) 8.0 (L) 8.6 (L) 8.4 (L)   Platelet Count 149 - 390 Thousands/uL 64 (L) 60 (L) 66 (L) 64 (L)   Monocytes % 4 - 12 % 5  1 (L) 1 (L)   Atypical Lymphocytes % <=0 %   4 (H)    Absolute Neutrophils 1.85 - 7.62 Thousand/uL 1.21 (L)  1.76 (L) 1.71 (L)     Patient presenting today with her  and two daughters to follow up on above. Results and new diagnosis of MDS/AML are explained in details with above A&P. She complains of fatigue and worsening CAMPOS. Per her daughters her saturation occasionally drop to mid 80's and when happens she feels near syncope! Denies fever, chills, night sweats or weight loss. Denies chest pain, cough. Denies SOB at rest / while sitting. Able to speak in complete sentences.       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 Malignancy appears unifocal; however, oval circumscribed mass in the upper outer left breast is not accounted for. Bilateral breast MRI is recommended. Biopsy-proven carcinoma  measured 3.1 cm on US. Left axilla US negative. Right breast clear.     9/28/2023 Observation    Bilateral breast MRI: Unifocal carcinoma in the lower outer left breast with possible skin involvement. There are no suspicious enhancing masses or suspicious areas of non-mass enhancement in right breast. There is no axillary or internal mammary adenopathy.     10/17/2023 Genetic Testing    A total of 47 genes were evaluated, including: HUYEN, BRCA1, BRCA2, CDH1, CHEK2, PALB2, PTEN, STK11, TP53  Negative result. No pathogenic sequence variants identified  [employer]     11/20/2023 Surgery    Left VERENICE  directed mastectomy with SLN biopsy   Invasive carcinoma of no special type (ductal)  Grade 2  3.6 cm   Margins negative  0/1 Lymph node  Anatomic Stage IIA  Prognostic Stage IA    Immediate reconstruction with tissue expander and ADM (Dr. Jimenez)     11/20/2023 -  Cancer Staged    Staging form: Breast, AJCC 8th Edition  - Pathologic stage from 11/20/2023: Stage IA (pT2, pN0(sn), cM0, G2, ER+, HI+, HER2-) - Signed by Adrien Gabriel MD on 12/6/2023  Stage prefix: Initial diagnosis  Method of lymph node assessment: [place] lymph node biopsy  Multigene prognostic tests performed: None  Histologic grading system: 3 grade system           Review of Systems  - GENERAL: positive fro fatigue and dyspnea on exertion ; Negative for any nausea, vomiting, fevers, chills, or weight loss.  - HEENT: Negative for any head/Neck trauma, pain, double/blurry vision, sinusitis, rhinitis, nose bleeding.  - CARDIAC: Negative for any chest pain, palpitation, Dyspnea on exertion, peripheral edema.  - PULMONARY: Negative for any SOB at rest, cough, wheezing.   - GASTROINTESTINAL: Negative for any abdominal pain, N/V/D/C, blood in stool.   - GENITOURINARY: Negative for any dysuria, hematuria, incontinence.  - NEUROLOGIC: Negative for any muscle weakness, numbness/tingling, memory changes.    - MUSCULOSKELETAL: Negative for any joint pains/swelling,  limited ROM.   - INTEGUMENTARY: Negative for any rashes, cuts/ lesions.  - HEMATOLOGIC: Negative for any abnormal bruising, frequent infections or bleeding.       Objective   There were no vitals taken for this visit.     Physical Exam  - GEN: Appears well, alert and oriented x 3, pleasant and cooperative, in no acute distress  - HEENT: Anicteric, mucous membranes moist, PERRL and EOMI   - NECK: No lymphadenopathy, JVD or carotid bruits   - HEART: RRR, normal S1 and S2, no murmurs, clicks, gallops or rubs   - LUNGS: Clear to auscultation bilaterally; no wheezes, rales, or rhonchi  - ABDOMEN: Normal bowel sounds, soft, no tenderness, no distention, no organomegaly or masses felt on exam.   - EXTREMITIES: Peripheral pulses normal; no clubbing, cyanosis, or edema  - NEURO: No focal findings, CN II-XII are grossly intact.   - Musculoskeletal: 5/5 strength, normal ROM, no swollen or erythematous joints.   - SKIN: Normal without suspicious lesions on exposed skin    Administrative Statements   I have spent a total time of 55 minutes in caring for this patient on the day of the visit/encounter including Diagnostic results, Prognosis, Risks and benefits of tx options, Instructions for management, Patient and family education, Importance of tx compliance, Risk factor reductions, Impressions, Counseling / Coordination of care, Documenting in the medical record, Reviewing / ordering tests, medicine, procedures  , and Obtaining or reviewing history  .     Praveen Ledezma DO   Hematology and Medical Oncology - PGY V  [facility]

## 2024-12-31 NOTE — PATIENT INSTRUCTIONS
Vidaza daily for 7 days every 28 days    Zofran and compazine ordered for nausea    Acyclovir (anti viral) and diflucan (antifungal) sent     Weekly labs

## 2024-12-31 NOTE — ASSESSMENT & PLAN NOTE
"S/p left mastectomy with SLNB on 11/20/23     stage IIA (pT2 pN0 cM0) invasive carcinoma, G2, ER %, PA %, HER2 1+, tumor size 36 mm, negative margins, total one SLN was negative, no LVI identified.     Genetic testing was negative. Oncotype DX recurrence score was 9. Initiated adjuvant anastrozole on 12/09/2023.     Continue Anastrozole,   Annual R Mammogram; done 9/19/24 \"No evidence of malignancy\".  DEXA every 2 years; done 12/20/23 \"Normal Bone Density\"   Continue PO calcium and VIT D supplementation    "

## 2024-12-31 NOTE — ASSESSMENT & PLAN NOTE
CAMPOS and fatigue are most likely due to MDS/AML and anemia however given the reported desat events to mid 80's and accompanied near syncope events, need to rule out PE or other cardiopulmonary abnormality.   CTA Chest PE stat is ordered   Trans Thoracic Echocardiogram

## 2025-01-02 ENCOUNTER — TELEPHONE (OUTPATIENT)
Age: 75
End: 2025-01-02

## 2025-01-02 ENCOUNTER — TELEPHONE (OUTPATIENT)
Dept: OBGYN CLINIC | Facility: CLINIC | Age: 75
End: 2025-01-02

## 2025-01-02 DIAGNOSIS — D46.Z MDS (MYELODYSPLASTIC SYNDROME), HIGH GRADE (HCC): Primary | ICD-10-CM

## 2025-01-02 NOTE — TELEPHONE ENCOUNTER
Spoke with patient and went over scheduled dates and times. Patient Is aware and understanding of her upcoming appointments. Vicky- Can you please change order dates to reflect scheduled appointments.

## 2025-01-02 NOTE — TELEPHONE ENCOUNTER
Infusion: Please reach out to patient to schedule cycle 1 of treatment    Clerical: Please reach out to patient to schedule ECHO.

## 2025-01-02 NOTE — TELEPHONE ENCOUNTER
Called patient regarding appointment with dr. Baum. Unfortunately, dr Baum does not see patients with total hip replacement. Offered appointment with dr Little. Patient will call back when she figures out her insurance. Provided CB number, patient verbalized understanding.    **if patient calls back, please schedule appt with Dr. Little only.

## 2025-01-03 ENCOUNTER — TELEPHONE (OUTPATIENT)
Age: 75
End: 2025-01-03

## 2025-01-03 ENCOUNTER — TELEPHONE (OUTPATIENT)
Dept: HEMATOLOGY ONCOLOGY | Facility: CLINIC | Age: 75
End: 2025-01-03

## 2025-01-03 ENCOUNTER — DOCUMENTATION (OUTPATIENT)
Dept: HEMATOLOGY ONCOLOGY | Facility: CLINIC | Age: 75
End: 2025-01-03

## 2025-01-03 NOTE — TELEPHONE ENCOUNTER
Patient had an appointment with  on 12/31 and was told a  will be reaching out to patient directly to navigate insurance. Per patient she starts chemotherapy soon and wants to discuss concern with . Advised patient I will send message to  and they will return her call.       Please call patient.       Thanks!

## 2025-01-03 NOTE — PROGRESS NOTES
Received request regarding patients concern of insurance/lack of/loss  Outreach to patient attempted twice, No response and unable to leave message as mailbox is full.  Will continue to attempt outreach

## 2025-01-03 NOTE — TELEPHONE ENCOUNTER
Called and spoke to Pretty. She is starting her new treatment on 1/13. Dr. Carranza would like to move her appointment on 1/7 to 1/14 at 4:20 pm. Pretty is agreeable to the new appointment.    Pretty is trying to figure out her insurance for this new year. She is unsure if she will be able to get it again through work. She is waiting on a call back from her employer. I told her I would reach out to our financial advocacy team to see if they can be of any assistance. Pretty would appreciate a call.

## 2025-01-06 ENCOUNTER — DOCUMENTATION (OUTPATIENT)
Dept: HEMATOLOGY ONCOLOGY | Facility: CLINIC | Age: 75
End: 2025-01-06

## 2025-01-06 DIAGNOSIS — I25.10 CORONARY ARTERY DISEASE INVOLVING NATIVE CORONARY ARTERY OF NATIVE HEART WITHOUT ANGINA PECTORIS: ICD-10-CM

## 2025-01-06 DIAGNOSIS — E78.5 DYSLIPIDEMIA: ICD-10-CM

## 2025-01-06 NOTE — PROGRESS NOTES
Return call from patient regarding loss of insurance and assistance with upcoming  Vidaza treatments.  Spoke with Pretty regarding concerns of insurance, patient assistance with your upcoming treatments. Pretty states she has lost her commercial insurance from employer and has already submitted her online application for medicare part b which she had been infomed would not be active until 2-1. I informed Pretty she is currently on the right path regarding your process with Medicare and her next step would be to pick a part D plan that is affordable to you, to which she has already started shopping around for that.  We discussed foundations, free drug, mathew care.   I was able to obtain a jibmo in the amount of $10,000 through the [facility] that will cover the infusion medication for your upcoming treatments.   It does back date therefore making your effective date 12-7-2024 expiring 12-6-2025. You will receive a welcome packet in the mail informing you of the program. There is nothing you will have to do as we are handling this for you.  As the funding runs low you receive a letter informing you that additional funding is required. Please contact me to advise so I can reapply for another jimbo.  Also, in regards to the mathew care application, as mentioned  I have requested an application be mailed to you.    Please keep me posted regarding your medicare application, should you have any further questions or concerns please do not hesitate to contact me directly,   Patient was grateful for the call and the assistance provided.    [employer] information:   ID# [id number]  CARD#[id number]  BIN#[id number]  PCN# [id number]  [facility]# [id number]  EFF 12-7-24  THRU 12-6-25  AMT$10,000

## 2025-01-08 DIAGNOSIS — E78.5 DYSLIPIDEMIA: ICD-10-CM

## 2025-01-08 DIAGNOSIS — I25.10 CORONARY ARTERY DISEASE INVOLVING NATIVE CORONARY ARTERY OF NATIVE HEART WITHOUT ANGINA PECTORIS: ICD-10-CM

## 2025-01-08 RX ORDER — ATORVASTATIN CALCIUM 10 MG/1
10 TABLET, FILM COATED ORAL DAILY
Qty: 90 TABLET | Refills: 1 | Status: SHIPPED | OUTPATIENT
Start: 2025-01-08

## 2025-01-08 RX ORDER — ATORVASTATIN CALCIUM 10 MG/1
10 TABLET, FILM COATED ORAL DAILY
Qty: 90 TABLET | Refills: 0 | Status: CANCELLED | OUTPATIENT
Start: 2025-01-08

## 2025-01-10 ENCOUNTER — TELEPHONE (OUTPATIENT)
Age: 75
End: 2025-01-10

## 2025-01-10 ENCOUNTER — TELEPHONE (OUTPATIENT)
Dept: INFUSION CENTER | Facility: CLINIC | Age: 75
End: 2025-01-10

## 2025-01-10 RX ORDER — DEXAMETHASONE 4 MG/1
10 TABLET ORAL ONCE
Start: 2025-01-20

## 2025-01-10 RX ORDER — ONDANSETRON 4 MG/1
16 TABLET, ORALLY DISINTEGRATING ORAL ONCE
Start: 2025-01-20 | End: 2025-01-13

## 2025-01-10 NOTE — TELEPHONE ENCOUNTER
Received message from Jo SANTAMARIA infusion RN, asking to change pre-meds in treatment plan  (zofran and decadron) to oral instead of IV since patient is only receiving Vidaza SQ. Confirmed with Dr. Carranza and orders adjusted.

## 2025-01-10 NOTE — TELEPHONE ENCOUNTER
Spoke with pt. via telephone. Covered infusion center operations pertaining to (her) first appt. Pt. aware one visitor perimited, not under 14 y.o. Timeout and no show uriel explained, light snacks provided, dress for air conditioned room, take medications normally taken prior/during appt. Pt. verbalized understanding, no questions at this time.

## 2025-01-11 ENCOUNTER — APPOINTMENT (OUTPATIENT)
Dept: LAB | Facility: CLINIC | Age: 75
End: 2025-01-11
Payer: MEDICARE

## 2025-01-11 DIAGNOSIS — D46.Z MDS (MYELODYSPLASTIC SYNDROME), HIGH GRADE (HCC): ICD-10-CM

## 2025-01-11 LAB
ALBUMIN SERPL BCG-MCNC: 3.8 G/DL (ref 3.5–5)
ALP SERPL-CCNC: 83 U/L (ref 34–104)
ALT SERPL W P-5'-P-CCNC: 14 U/L (ref 7–52)
ANION GAP SERPL CALCULATED.3IONS-SCNC: 10 MMOL/L (ref 4–13)
AST SERPL W P-5'-P-CCNC: 15 U/L (ref 13–39)
BASOPHILS # BLD MANUAL: 0 THOUSAND/UL (ref 0–0.1)
BASOPHILS NFR MAR MANUAL: 0 % (ref 0–1)
BILIRUB SERPL-MCNC: 0.63 MG/DL (ref 0.2–1)
BUN SERPL-MCNC: 29 MG/DL (ref 5–25)
CALCIUM SERPL-MCNC: 9.3 MG/DL (ref 8.4–10.2)
CHLORIDE SERPL-SCNC: 105 MMOL/L (ref 96–108)
CO2 SERPL-SCNC: 24 MMOL/L (ref 21–32)
CREAT SERPL-MCNC: 1.13 MG/DL (ref 0.6–1.3)
EOSINOPHIL # BLD MANUAL: 0 THOUSAND/UL (ref 0–0.4)
EOSINOPHIL NFR BLD MANUAL: 0 % (ref 0–6)
ERYTHROCYTE [DISTWIDTH] IN BLOOD BY AUTOMATED COUNT: 14.6 % (ref 11.6–15.1)
GFR SERPL CREATININE-BSD FRML MDRD: 48 ML/MIN/1.73SQ M
GLUCOSE SERPL-MCNC: 115 MG/DL (ref 65–140)
HCT VFR BLD AUTO: 24 % (ref 34.8–46.1)
HGB BLD-MCNC: 7.8 G/DL (ref 11.5–15.4)
LDH SERPL-CCNC: 176 U/L (ref 140–271)
LYMPHOCYTES # BLD AUTO: 1.33 THOUSAND/UL (ref 0.6–4.47)
LYMPHOCYTES # BLD AUTO: 61 % (ref 14–44)
MCH RBC QN AUTO: 35.1 PG (ref 26.8–34.3)
MCHC RBC AUTO-ENTMCNC: 32.5 G/DL (ref 31.4–37.4)
MCV RBC AUTO: 108 FL (ref 82–98)
MONOCYTES # BLD AUTO: 0.02 THOUSAND/UL (ref 0–1.22)
MONOCYTES NFR BLD: 1 % (ref 4–12)
NEUTROPHILS # BLD MANUAL: 0.64 THOUSAND/UL (ref 1.85–7.62)
NEUTS SEG NFR BLD AUTO: 32 % (ref 43–75)
NRBC BLD AUTO-RTO: 2 /100 WBC (ref 0–2)
OVALOCYTES BLD QL SMEAR: PRESENT
PLATELET # BLD AUTO: 60 THOUSANDS/UL (ref 149–390)
PLATELET BLD QL SMEAR: ABNORMAL
PMV BLD AUTO: 12.6 FL (ref 8.9–12.7)
POTASSIUM SERPL-SCNC: 3.5 MMOL/L (ref 3.5–5.3)
PROT SERPL-MCNC: 7.5 G/DL (ref 6.4–8.4)
RBC # BLD AUTO: 2.22 MILLION/UL (ref 3.81–5.12)
RBC MORPH BLD: PRESENT
SODIUM SERPL-SCNC: 139 MMOL/L (ref 135–147)
VARIANT LYMPHS # BLD AUTO: 6 %
WBC # BLD AUTO: 1.99 THOUSAND/UL (ref 4.31–10.16)

## 2025-01-11 PROCEDURE — 85007 BL SMEAR W/DIFF WBC COUNT: CPT

## 2025-01-11 PROCEDURE — 80053 COMPREHEN METABOLIC PANEL: CPT

## 2025-01-11 PROCEDURE — 36415 COLL VENOUS BLD VENIPUNCTURE: CPT

## 2025-01-11 PROCEDURE — 85027 COMPLETE CBC AUTOMATED: CPT

## 2025-01-11 PROCEDURE — 83615 LACTATE (LD) (LDH) ENZYME: CPT

## 2025-01-12 RX ORDER — ONDANSETRON 8 MG/1
16 TABLET, ORALLY DISINTEGRATING ORAL ONCE
Status: CANCELLED
Start: 2025-01-27 | End: 2025-01-20

## 2025-01-12 RX ORDER — DEXAMETHASONE 4 MG/1
10 TABLET ORAL ONCE
Status: CANCELLED
Start: 2025-01-22

## 2025-01-12 RX ORDER — ONDANSETRON 8 MG/1
16 TABLET, ORALLY DISINTEGRATING ORAL ONCE
Status: CANCELLED
Start: 2025-01-23 | End: 2025-01-16

## 2025-01-12 RX ORDER — DEXAMETHASONE 4 MG/1
10 TABLET ORAL ONCE
Status: CANCELLED
Start: 2025-01-21

## 2025-01-12 RX ORDER — DEXAMETHASONE 4 MG/1
10 TABLET ORAL ONCE
Status: CANCELLED
Start: 2025-01-27

## 2025-01-12 RX ORDER — ONDANSETRON 8 MG/1
16 TABLET, ORALLY DISINTEGRATING ORAL ONCE
Status: CANCELLED
Start: 2025-01-28 | End: 2025-01-21

## 2025-01-12 RX ORDER — ONDANSETRON 8 MG/1
16 TABLET, ORALLY DISINTEGRATING ORAL ONCE
Status: CANCELLED
Start: 2025-01-21 | End: 2025-01-14

## 2025-01-12 RX ORDER — ONDANSETRON 8 MG/1
16 TABLET, ORALLY DISINTEGRATING ORAL ONCE
Status: CANCELLED
Start: 2025-01-22 | End: 2025-01-15

## 2025-01-12 RX ORDER — DEXAMETHASONE 4 MG/1
10 TABLET ORAL ONCE
Status: CANCELLED
Start: 2025-01-28

## 2025-01-12 RX ORDER — ONDANSETRON 8 MG/1
16 TABLET, ORALLY DISINTEGRATING ORAL ONCE
Status: CANCELLED
Start: 2025-01-24 | End: 2025-01-17

## 2025-01-12 RX ORDER — DEXAMETHASONE 4 MG/1
10 TABLET ORAL ONCE
Status: CANCELLED
Start: 2025-01-24

## 2025-01-12 RX ORDER — DEXAMETHASONE 4 MG/1
10 TABLET ORAL ONCE
Status: CANCELLED
Start: 2025-01-23

## 2025-01-13 ENCOUNTER — TELEPHONE (OUTPATIENT)
Dept: HEMATOLOGY ONCOLOGY | Facility: CLINIC | Age: 75
End: 2025-01-13

## 2025-01-13 ENCOUNTER — HOSPITAL ENCOUNTER (OUTPATIENT)
Dept: INFUSION CENTER | Facility: CLINIC | Age: 75
Discharge: HOME | End: 2025-01-13
Payer: MEDICARE

## 2025-01-13 VITALS
TEMPERATURE: 97.8 F | DIASTOLIC BLOOD PRESSURE: 66 MMHG | RESPIRATION RATE: 18 BRPM | SYSTOLIC BLOOD PRESSURE: 102 MMHG | OXYGEN SATURATION: 98 % | HEART RATE: 92 BPM

## 2025-01-13 DIAGNOSIS — D46.Z MDS (MYELODYSPLASTIC SYNDROME), HIGH GRADE (HCC): Primary | ICD-10-CM

## 2025-01-13 DIAGNOSIS — D46.Z MDS (MYELODYSPLASTIC SYNDROME), HIGH GRADE (HCC): ICD-10-CM

## 2025-01-13 LAB
BASOPHILS # BLD MANUAL: 0.03 THOUSAND/UL (ref 0–0.1)
BASOPHILS NFR MAR MANUAL: 1 % (ref 0–1)
EOSINOPHIL # BLD MANUAL: 0.03 THOUSAND/UL (ref 0–0.4)
EOSINOPHIL NFR BLD MANUAL: 1 % (ref 0–6)
ERYTHROCYTE [DISTWIDTH] IN BLOOD BY AUTOMATED COUNT: 14.4 % (ref 11.6–15.1)
HCT VFR BLD AUTO: 23.3 % (ref 34.8–46.1)
HGB BLD-MCNC: 7.9 G/DL (ref 11.5–15.4)
LYMPHOCYTES # BLD AUTO: 1.78 THOUSAND/UL (ref 0.6–4.47)
LYMPHOCYTES # BLD AUTO: 69 % (ref 14–44)
MACROCYTES BLD QL AUTO: PRESENT
MCH RBC QN AUTO: 36.2 PG (ref 26.8–34.3)
MCHC RBC AUTO-ENTMCNC: 33.9 G/DL (ref 31.4–37.4)
MCV RBC AUTO: 107 FL (ref 82–98)
METAMYELOCYTE ABSOLUTE CT: 0.05 THOUSAND/UL (ref 0–0.1)
METAMYELOCYTES NFR BLD MANUAL: 2 % (ref 0–1)
MONOCYTES # BLD AUTO: 0.03 THOUSAND/UL (ref 0–1.22)
MONOCYTES NFR BLD: 1 % (ref 4–12)
NEUTROPHILS # BLD MANUAL: 0.6 THOUSAND/UL (ref 1.85–7.62)
NEUTS BAND NFR BLD MANUAL: 2 % (ref 0–8)
NEUTS SEG NFR BLD AUTO: 22 % (ref 43–75)
OVALOCYTES BLD QL SMEAR: PRESENT
PLATELET # BLD AUTO: 62 THOUSANDS/UL (ref 149–390)
PLATELET BLD QL SMEAR: ABNORMAL
PMV BLD AUTO: 11.5 FL (ref 8.9–12.7)
RBC # BLD AUTO: 2.18 MILLION/UL (ref 3.81–5.12)
RBC MORPH BLD: PRESENT
VARIANT LYMPHS # BLD AUTO: 2 %
WBC # BLD AUTO: 2.51 THOUSAND/UL (ref 4.31–10.16)

## 2025-01-13 PROCEDURE — 85007 BL SMEAR W/DIFF WBC COUNT: CPT | Performed by: INTERNAL MEDICINE

## 2025-01-13 PROCEDURE — 85027 COMPLETE CBC AUTOMATED: CPT | Performed by: INTERNAL MEDICINE

## 2025-01-13 RX ORDER — SODIUM CHLORIDE 9 MG/ML
20 INJECTION, SOLUTION INTRAVENOUS ONCE
Status: CANCELLED | OUTPATIENT
Start: 2025-01-16

## 2025-01-13 NOTE — TELEPHONE ENCOUNTER
Patient's daughter calling in, she stated her phone did not connect and is requesting a call back from Vicky Pleitez. [facility] # [phone]

## 2025-01-13 NOTE — PROGRESS NOTES
Pt here for vidaza D1C1, labs out of parameters. CBC repeated, ANC 0.6. Per Vicky Pleitez RN, we are holding treatment and she will reach out to pt later today about the plan moving forward. Pt aware of the plan as of now, all questions answered. Pt declined AVS.

## 2025-01-13 NOTE — TELEPHONE ENCOUNTER
Patients daughter Rosamaria calling regarding infusion schedule change and reason for the changes. She would like a call back to discuss since patient was a bit confused with the next steps. Please call Rosamaria back at [phone] she is on the Communication consent

## 2025-01-13 NOTE — TELEPHONE ENCOUNTER
Called and spoke to patient's daughter, Yane. I explained the plan with her mother's treatment for right now due to her low ANC. Pending repeat labs later this week, we may decrease her vidaza dose to start off with or change the number of treatment days she gets. In the meantime, Pretty should practice neutropenic precautions. Yane expressed concerns about her mom's ongoing shortness of breath and multiple events during the day where she almost passes out. I told her this is most likely related to her anemia and that I would see if Dr. Carranza recommends a transfusion or anything else. I advised that if these symptoms get worse, she should proceed to the ER.

## 2025-01-13 NOTE — TELEPHONE ENCOUNTER
Called and spoke to Pretty. Dr. Carranza reviewed her labs and held her treatment today due to a low absolute neutrophil count. Dr. Carranza would like her to repeat her labs on Wednesday or Thursday to see if this count comes back up. Based on the repeat labs, Dr. Carranza may adjust her vidaza dose or decrease her treatment days to 5 days instead of 7. I will call Pretty back at the end of the week to let her know what the plan will be moving forward. I will also be rescheduling her follow up with Dr. Carranza tomorrow until after she starts treatment. Pretty verbalized understanding.

## 2025-01-13 NOTE — TELEPHONE ENCOUNTER
Called and spoke to patient's daughter, Yane. I let her know that Dr. Carranza does not believe her mom needs the vitamin B12 injections. This prescription was ordered by a doctor while she was inpatient, but her B12 was normal. I also let Yane know that we can try to set up a transfusion for 1 unit of blood for Pretty to see if that helps her shortness of breath and dizziness at all. She will need to go to the lab at [place] 1-3 days prior to the transfusion to get a type and screen. Yane was with Pretty. They agreed to a transfusion and verbalized understanding.

## 2025-01-13 NOTE — TELEPHONE ENCOUNTER
Dr. Carranza reviewed patient's labs for treatment today. ANC was low at 0.64. Asked infusion RN, Debora OATES, to draw STAT CBC to verify. Repeat ANC 0.60. Decision made to hold first treatment. Debora OATES and patient made aware. I will call to follow up with Pretty on next steps later today.

## 2025-01-13 NOTE — TELEPHONE ENCOUNTER
Per BROOKE Perry, cancel appts for the week of 1/13, and keep two appts for next week. Office will advise on next steps, based on pt's lab work at the end of this week.

## 2025-01-13 NOTE — TELEPHONE ENCOUNTER
Called and left voicemail for patient's daughter, Rosamaria, to discuss why her mom's treatment was postponed. Call back number provided

## 2025-01-13 NOTE — TELEPHONE ENCOUNTER
Received a phone call from patient.  Patient inquiring about Vitamin B12 injections.  Patient stated that she received vials in mail.  Please call patient with updates.

## 2025-01-14 ENCOUNTER — APPOINTMENT (OUTPATIENT)
Dept: LAB | Facility: CLINIC | Age: 75
End: 2025-01-14
Payer: MEDICARE

## 2025-01-14 ENCOUNTER — HOSPITAL ENCOUNTER (OUTPATIENT)
Dept: INFUSION CENTER | Facility: CLINIC | Age: 75
End: 2025-01-14

## 2025-01-14 DIAGNOSIS — D46.Z MDS (MYELODYSPLASTIC SYNDROME), HIGH GRADE (HCC): ICD-10-CM

## 2025-01-14 LAB
ABO GROUP BLD: NORMAL
BLD GP AB SCN SERPL QL: POSITIVE
BLOOD GROUP ANTIBODIES SERPL: NORMAL
KELL GROUP AG RBC: NEGATIVE
RH BLD: POSITIVE
SPECIMEN EXPIRATION DATE: NORMAL

## 2025-01-14 PROCEDURE — 86850 RBC ANTIBODY SCREEN: CPT

## 2025-01-14 PROCEDURE — 86900 BLOOD TYPING SEROLOGIC ABO: CPT

## 2025-01-14 PROCEDURE — 86905 BLOOD TYPING RBC ANTIGENS: CPT

## 2025-01-14 PROCEDURE — 86901 BLOOD TYPING SEROLOGIC RH(D): CPT

## 2025-01-14 PROCEDURE — 86921 COMPATIBILITY TEST INCUBATE: CPT

## 2025-01-14 PROCEDURE — 86922 COMPATIBILITY TEST ANTIGLOB: CPT

## 2025-01-14 PROCEDURE — 86870 RBC ANTIBODY IDENTIFICATION: CPT

## 2025-01-14 PROCEDURE — 36415 COLL VENOUS BLD VENIPUNCTURE: CPT

## 2025-01-14 RX ORDER — SODIUM CHLORIDE 9 MG/ML
20 INJECTION, SOLUTION INTRAVENOUS ONCE
Status: CANCELLED | OUTPATIENT
Start: 2025-01-14

## 2025-01-14 NOTE — TELEPHONE ENCOUNTER
Acknowledged, I see note that [place] [facility] [place]. Thank you!

## 2025-01-15 DIAGNOSIS — D46.Z MDS (MYELODYSPLASTIC SYNDROME), HIGH GRADE (HCC): Primary | ICD-10-CM

## 2025-01-16 ENCOUNTER — HOSPITAL ENCOUNTER (OUTPATIENT)
Dept: INFUSION CENTER | Facility: CLINIC | Age: 75
Discharge: HOME | End: 2025-01-16
Payer: MEDICARE

## 2025-01-16 VITALS
SYSTOLIC BLOOD PRESSURE: 105 MMHG | RESPIRATION RATE: 18 BRPM | DIASTOLIC BLOOD PRESSURE: 71 MMHG | TEMPERATURE: 99.1 F | HEART RATE: 81 BPM | OXYGEN SATURATION: 97 %

## 2025-01-16 DIAGNOSIS — D46.Z MDS (MYELODYSPLASTIC SYNDROME), HIGH GRADE (HCC): Primary | ICD-10-CM

## 2025-01-16 PROCEDURE — P9040 RBC LEUKOREDUCED IRRADIATED: HCPCS

## 2025-01-16 PROCEDURE — 86902 BLOOD TYPE ANTIGEN DONOR EA: CPT

## 2025-01-16 PROCEDURE — 36430 TRANSFUSION BLD/BLD COMPNT: CPT

## 2025-01-16 RX ORDER — SODIUM CHLORIDE 9 MG/ML
20 INJECTION, SOLUTION INTRAVENOUS ONCE
Status: COMPLETED | OUTPATIENT
Start: 2025-01-16 | End: 2025-01-16

## 2025-01-16 RX ADMIN — SODIUM CHLORIDE 20 ML/HR: 0.9 INJECTION, SOLUTION INTRAVENOUS at 09:05

## 2025-01-17 ENCOUNTER — TELEPHONE (OUTPATIENT)
Age: 75
End: 2025-01-17

## 2025-01-17 ENCOUNTER — APPOINTMENT (OUTPATIENT)
Dept: LAB | Facility: CLINIC | Age: 75
End: 2025-01-17
Payer: MEDICARE

## 2025-01-17 ENCOUNTER — TELEPHONE (OUTPATIENT)
Dept: INFUSION CENTER | Facility: CLINIC | Age: 75
End: 2025-01-17

## 2025-01-17 DIAGNOSIS — K91.2 POSTSURGICAL MALABSORPTION: ICD-10-CM

## 2025-01-17 DIAGNOSIS — D46.Z MDS (MYELODYSPLASTIC SYNDROME), HIGH GRADE (HCC): ICD-10-CM

## 2025-01-17 DIAGNOSIS — R71.8 ELEVATED MCV: ICD-10-CM

## 2025-01-17 DIAGNOSIS — D46.Z MDS (MYELODYSPLASTIC SYNDROME), HIGH GRADE (HCC): Primary | ICD-10-CM

## 2025-01-17 LAB
ABO GROUP BLD BPU: NORMAL
ALBUMIN SERPL BCG-MCNC: 3.8 G/DL (ref 3.5–5)
ALP SERPL-CCNC: 84 U/L (ref 34–104)
ALT SERPL W P-5'-P-CCNC: 14 U/L (ref 7–52)
ANION GAP SERPL CALCULATED.3IONS-SCNC: 5 MMOL/L (ref 4–13)
ANISOCYTOSIS BLD QL SMEAR: PRESENT
AST SERPL W P-5'-P-CCNC: 15 U/L (ref 13–39)
BASOPHILS # BLD MANUAL: 0 THOUSAND/UL (ref 0–0.1)
BASOPHILS NFR MAR MANUAL: 0 % (ref 0–1)
BILIRUB SERPL-MCNC: 0.52 MG/DL (ref 0.2–1)
BPU ID: NORMAL
BUN SERPL-MCNC: 21 MG/DL (ref 5–25)
CALCIUM SERPL-MCNC: 9.4 MG/DL (ref 8.4–10.2)
CHLORIDE SERPL-SCNC: 109 MMOL/L (ref 96–108)
CO2 SERPL-SCNC: 26 MMOL/L (ref 21–32)
CREAT SERPL-MCNC: 0.85 MG/DL (ref 0.6–1.3)
CROSSMATCH: NORMAL
EOSINOPHIL # BLD MANUAL: 0.03 THOUSAND/UL (ref 0–0.4)
EOSINOPHIL NFR BLD MANUAL: 1 % (ref 0–6)
ERYTHROCYTE [DISTWIDTH] IN BLOOD BY AUTOMATED COUNT: 18.1 % (ref 11.6–15.1)
GFR SERPL CREATININE-BSD FRML MDRD: 68 ML/MIN/1.73SQ M
GLUCOSE SERPL-MCNC: 91 MG/DL (ref 65–140)
HCT VFR BLD AUTO: 25.6 % (ref 34.8–46.1)
HGB BLD-MCNC: 8.8 G/DL (ref 11.5–15.4)
LDH SERPL-CCNC: 181 U/L (ref 140–271)
LG PLATELETS BLD QL SMEAR: PRESENT
LYMPHOCYTES # BLD AUTO: 1.62 THOUSAND/UL (ref 0.6–4.47)
LYMPHOCYTES # BLD AUTO: 60 % (ref 14–44)
MACROCYTES BLD QL AUTO: PRESENT
MCH RBC QN AUTO: 35.1 PG (ref 26.8–34.3)
MCHC RBC AUTO-ENTMCNC: 34.4 G/DL (ref 31.4–37.4)
MCV RBC AUTO: 102 FL (ref 82–98)
MONOCYTES # BLD AUTO: 0.08 THOUSAND/UL (ref 0–1.22)
MONOCYTES NFR BLD: 3 % (ref 4–12)
NEUTROPHILS # BLD MANUAL: 0.89 THOUSAND/UL (ref 1.85–7.62)
NEUTS SEG NFR BLD AUTO: 34 % (ref 43–75)
OVALOCYTES BLD QL SMEAR: PRESENT
PLATELET # BLD AUTO: 58 THOUSANDS/UL (ref 149–390)
PLATELET BLD QL SMEAR: ABNORMAL
PMV BLD AUTO: 12.3 FL (ref 8.9–12.7)
POTASSIUM SERPL-SCNC: 4.9 MMOL/L (ref 3.5–5.3)
PROT SERPL-MCNC: 7.7 G/DL (ref 6.4–8.4)
RBC # BLD AUTO: 2.51 MILLION/UL (ref 3.81–5.12)
RBC MORPH BLD: PRESENT
SODIUM SERPL-SCNC: 140 MMOL/L (ref 135–147)
UNIT DISPENSE STATUS: NORMAL
UNIT PRODUCT CODE: NORMAL
UNIT PRODUCT VOLUME: 350 ML
UNIT RH: NORMAL
VARIANT LYMPHS # BLD AUTO: 2 %
WBC # BLD AUTO: 2.62 THOUSAND/UL (ref 4.31–10.16)

## 2025-01-17 PROCEDURE — 85007 BL SMEAR W/DIFF WBC COUNT: CPT

## 2025-01-17 PROCEDURE — 85027 COMPLETE CBC AUTOMATED: CPT

## 2025-01-17 PROCEDURE — 83615 LACTATE (LD) (LDH) ENZYME: CPT

## 2025-01-17 PROCEDURE — 36415 COLL VENOUS BLD VENIPUNCTURE: CPT

## 2025-01-17 PROCEDURE — 80053 COMPREHEN METABOLIC PANEL: CPT

## 2025-01-17 PROCEDURE — 83918 ORGANIC ACIDS TOTAL QUANT: CPT

## 2025-01-17 NOTE — TELEPHONE ENCOUNTER
Called and spoke to Pretty. Her symptoms have not improved by much since she got her blood transfusion. She said she can take a few more steps before her breathing gets labored, but she is still extremely fatigued. She is going to get her labs repeated today. I will call her when they are resulted to let her know if she is okay for treatment on Monday. Pretty verbalized understanding.

## 2025-01-17 NOTE — TELEPHONE ENCOUNTER
Vicky COX called [facility] to get pt scheduled for D1-7 of C1, starting next Monday. Pt was to receive tx on Monday, 1/13 but was deferred due to blood counts.

## 2025-01-17 NOTE — TELEPHONE ENCOUNTER
Called and spoke to Pretty. Dr. Carranza reviewed her labs from today and is okay with her getting treated on Monday, 1/20. I spoke to infusion who will update her treatments for the rest of the week. Pretty verbalized understanding.    Called and spoke to [facility] to let them know that patient will start C1D1 on Monday and will need the rest of her appts adjusted.

## 2025-01-20 ENCOUNTER — TELEPHONE (OUTPATIENT)
Age: 75
End: 2025-01-20

## 2025-01-20 ENCOUNTER — HOSPITAL ENCOUNTER (OUTPATIENT)
Dept: INFUSION CENTER | Facility: CLINIC | Age: 75
Discharge: HOME | End: 2025-01-20
Payer: MEDICARE

## 2025-01-20 ENCOUNTER — TELEPHONE (OUTPATIENT)
Dept: HEMATOLOGY ONCOLOGY | Facility: CLINIC | Age: 75
End: 2025-01-20

## 2025-01-20 VITALS
SYSTOLIC BLOOD PRESSURE: 92 MMHG | OXYGEN SATURATION: 99 % | TEMPERATURE: 98.5 F | DIASTOLIC BLOOD PRESSURE: 60 MMHG | BODY MASS INDEX: 31.18 KG/M2 | RESPIRATION RATE: 18 BRPM | HEIGHT: 63 IN | WEIGHT: 176 LBS | HEART RATE: 96 BPM

## 2025-01-20 DIAGNOSIS — D46.Z MDS (MYELODYSPLASTIC SYNDROME), HIGH GRADE (HCC): Primary | ICD-10-CM

## 2025-01-20 PROCEDURE — 96401 CHEMO ANTI-NEOPL SQ/IM: CPT

## 2025-01-20 RX ORDER — DEXAMETHASONE 4 MG/1
10 TABLET ORAL ONCE
Status: COMPLETED | OUTPATIENT
Start: 2025-01-20 | End: 2025-01-20

## 2025-01-20 RX ORDER — SODIUM CHLORIDE 9 MG/ML
20 INJECTION, SOLUTION INTRAVENOUS ONCE
Status: DISCONTINUED | OUTPATIENT
Start: 2025-01-20 | End: 2025-01-23 | Stop reason: HOSPADM

## 2025-01-20 RX ORDER — ONDANSETRON 4 MG/1
16 TABLET, ORALLY DISINTEGRATING ORAL ONCE
Status: COMPLETED | OUTPATIENT
Start: 2025-01-20 | End: 2025-01-20

## 2025-01-20 RX ADMIN — ONDANSETRON 16 MG: 4 TABLET, ORALLY DISINTEGRATING ORAL at 12:28

## 2025-01-20 RX ADMIN — AZACITIDINE 137.5 MG: 100 INJECTION, POWDER, LYOPHILIZED, FOR SOLUTION INTRAVENOUS; SUBCUTANEOUS at 13:13

## 2025-01-20 RX ADMIN — DEXAMETHASONE 10 MG: 4 TABLET ORAL at 12:28

## 2025-01-20 NOTE — PROGRESS NOTES
Per Vicky d/t labs patient will only do D1-5 for C1. Will resume D1-7 for C2 and labs only needed prior to D1 treatment, no need for weekly labs at this time. REviewed with patient and daughter, they will discuss weekly labs vs labs prior to treatment with Dr. Carranza on 1/28 at .

## 2025-01-20 NOTE — TELEPHONE ENCOUNTER
Due to patient's low ANC and platelet prior to starting treatment, decision made with Dr. Campos, who is covering Dr. Carranza, to treat only 5 days instead of 7. Will discuss with patient

## 2025-01-20 NOTE — PROGRESS NOTES
Patient presents to Infusion Center for D1C1 Vidaza Injections. Patient offers no complaints at this time. Labs reviewed from 1/17- ANC and PLT outside of parameters- OK to treat per DR. Carranza. Injections administered into b/l abdomen. Treatment tolerated without incident. Next appt confirmed with patient for 1/21 at 2:30pm at [place]. Calendar print out of appts provided to patient.

## 2025-01-20 NOTE — TELEPHONE ENCOUNTER
Pt was scheduled out on Friday, 1/17 for [id number]. Called and spoke to pt re: change in schedule. Pt verbalized understanding of appt dates and times. Will provide updated calendar to pt at her 1/20 appt.

## 2025-01-21 ENCOUNTER — HOSPITAL ENCOUNTER (OUTPATIENT)
Dept: INFUSION CENTER | Facility: CLINIC | Age: 75
Discharge: HOME | End: 2025-01-21

## 2025-01-21 ENCOUNTER — HOSPITAL ENCOUNTER (OUTPATIENT)
Dept: INFUSION CENTER | Facility: CLINIC | Age: 75
Discharge: HOME | End: 2025-01-21
Payer: MEDICARE

## 2025-01-21 VITALS
HEART RATE: 88 BPM | DIASTOLIC BLOOD PRESSURE: 76 MMHG | SYSTOLIC BLOOD PRESSURE: 118 MMHG | OXYGEN SATURATION: 99 % | BODY MASS INDEX: 31.54 KG/M2 | WEIGHT: 178 LBS | TEMPERATURE: 97.6 F | HEIGHT: 63 IN

## 2025-01-21 DIAGNOSIS — D46.Z MDS (MYELODYSPLASTIC SYNDROME), HIGH GRADE (HCC): Primary | ICD-10-CM

## 2025-01-21 LAB — METHYLMALONATE SERPL-SCNC: 195 NMOL/L (ref 0–378)

## 2025-01-21 PROCEDURE — 96401 CHEMO ANTI-NEOPL SQ/IM: CPT

## 2025-01-21 RX ORDER — ONDANSETRON 8 MG/1
16 TABLET, ORALLY DISINTEGRATING ORAL ONCE
Status: COMPLETED | OUTPATIENT
Start: 2025-01-21 | End: 2025-01-21

## 2025-01-21 RX ORDER — SODIUM CHLORIDE 9 MG/ML
20 INJECTION, SOLUTION INTRAVENOUS ONCE
Status: DISCONTINUED | OUTPATIENT
Start: 2025-01-21 | End: 2025-01-24 | Stop reason: HOSPADM

## 2025-01-21 RX ADMIN — DEXAMETHASONE 10 MG: 2 TABLET ORAL at 14:38

## 2025-01-21 RX ADMIN — ONDANSETRON 16 MG: 8 TABLET, ORALLY DISINTEGRATING ORAL at 14:38

## 2025-01-21 RX ADMIN — AZACITIDINE 137.5 MG: 100 INJECTION, POWDER, LYOPHILIZED, FOR SOLUTION INTRAVENOUS; SUBCUTANEOUS at 15:17

## 2025-01-21 NOTE — PROGRESS NOTES
Patient tolerated treatment without incident. 2 injections administered (one in R and one in L abdomen. Next appt confirmed with patient for 1/22 at 2:30pm at [place]. AVS declined.

## 2025-01-21 NOTE — PROGRESS NOTES
Patient to Infusion Center for Day 2 Vidaza. Patient offers no complaints at this time. Labs reviewed from 1/17-no parameters for treatment today.

## 2025-01-22 ENCOUNTER — HOSPITAL ENCOUNTER (OUTPATIENT)
Dept: INFUSION CENTER | Facility: CLINIC | Age: 75
Discharge: HOME | End: 2025-01-22
Payer: MEDICARE

## 2025-01-22 VITALS
HEIGHT: 63 IN | BODY MASS INDEX: 31.89 KG/M2 | DIASTOLIC BLOOD PRESSURE: 71 MMHG | SYSTOLIC BLOOD PRESSURE: 118 MMHG | HEART RATE: 88 BPM | WEIGHT: 180 LBS | OXYGEN SATURATION: 98 % | TEMPERATURE: 97.8 F | RESPIRATION RATE: 16 BRPM

## 2025-01-22 DIAGNOSIS — D46.Z MDS (MYELODYSPLASTIC SYNDROME), HIGH GRADE (HCC): Primary | ICD-10-CM

## 2025-01-22 PROCEDURE — 96401 CHEMO ANTI-NEOPL SQ/IM: CPT

## 2025-01-22 RX ORDER — ONDANSETRON 8 MG/1
16 TABLET, ORALLY DISINTEGRATING ORAL ONCE
Status: COMPLETED | OUTPATIENT
Start: 2025-01-22 | End: 2025-01-22

## 2025-01-22 RX ORDER — SODIUM CHLORIDE 9 MG/ML
20 INJECTION, SOLUTION INTRAVENOUS ONCE
Status: DISCONTINUED | OUTPATIENT
Start: 2025-01-22 | End: 2025-01-25 | Stop reason: HOSPADM

## 2025-01-22 RX ADMIN — AZACITIDINE 137.5 MG: 100 INJECTION, POWDER, LYOPHILIZED, FOR SOLUTION INTRAVENOUS; SUBCUTANEOUS at 15:14

## 2025-01-22 RX ADMIN — ONDANSETRON 16 MG: 8 TABLET, ORALLY DISINTEGRATING ORAL at 14:53

## 2025-01-22 RX ADMIN — DEXAMETHASONE 10 MG: 2 TABLET ORAL at 14:53

## 2025-01-22 NOTE — PROGRESS NOTES
Patient tolerated treatment well. Injections given in right and left abdomen. Confirmed next appointment for 1/23 @ 200. AVS declined.

## 2025-01-23 ENCOUNTER — HOSPITAL ENCOUNTER (OUTPATIENT)
Dept: INFUSION CENTER | Facility: CLINIC | Age: 75
Discharge: HOME | End: 2025-01-23
Payer: MEDICARE

## 2025-01-23 VITALS
OXYGEN SATURATION: 97 % | WEIGHT: 180 LBS | HEART RATE: 89 BPM | BODY MASS INDEX: 31.89 KG/M2 | TEMPERATURE: 97.7 F | HEIGHT: 63 IN | SYSTOLIC BLOOD PRESSURE: 110 MMHG | DIASTOLIC BLOOD PRESSURE: 72 MMHG

## 2025-01-23 DIAGNOSIS — D46.Z MDS (MYELODYSPLASTIC SYNDROME), HIGH GRADE (HCC): Primary | ICD-10-CM

## 2025-01-23 PROCEDURE — 96401 CHEMO ANTI-NEOPL SQ/IM: CPT

## 2025-01-23 RX ORDER — SODIUM CHLORIDE 9 MG/ML
20 INJECTION, SOLUTION INTRAVENOUS ONCE
Status: DISCONTINUED | OUTPATIENT
Start: 2025-01-23 | End: 2025-01-26 | Stop reason: HOSPADM

## 2025-01-23 RX ORDER — ONDANSETRON 8 MG/1
16 TABLET, ORALLY DISINTEGRATING ORAL ONCE
Status: COMPLETED | OUTPATIENT
Start: 2025-01-23 | End: 2025-01-23

## 2025-01-23 RX ADMIN — AZACITIDINE 137.5 MG: 100 INJECTION, POWDER, LYOPHILIZED, FOR SOLUTION INTRAVENOUS; SUBCUTANEOUS at 14:55

## 2025-01-23 RX ADMIN — ONDANSETRON 16 MG: 8 TABLET, ORALLY DISINTEGRATING ORAL at 14:13

## 2025-01-23 RX ADMIN — DEXAMETHASONE 10 MG: 2 TABLET ORAL at 14:13

## 2025-01-23 NOTE — PROGRESS NOTES
Patient tolerated treatment well with no adverse reactions. Declined AVS. Confirmed next appointment at the [facility] for 01/24/2025 @ 1000. Patient was wheeled out of the care area by her guest.

## 2025-01-23 NOTE — PROGRESS NOTES
Patient presents to the Infusion center for the treatment of Vidaza (day 4). There are no lab parameters for today's treatment. Patient offers no concerns at this time. She is resting comfortably in the chair, call bell within reach.

## 2025-01-24 ENCOUNTER — HOSPITAL ENCOUNTER (OUTPATIENT)
Dept: INFUSION CENTER | Facility: CLINIC | Age: 75
End: 2025-01-24
Payer: MEDICARE

## 2025-01-24 VITALS
WEIGHT: 180.5 LBS | OXYGEN SATURATION: 100 % | BODY MASS INDEX: 31.98 KG/M2 | DIASTOLIC BLOOD PRESSURE: 72 MMHG | SYSTOLIC BLOOD PRESSURE: 117 MMHG | HEIGHT: 63 IN | TEMPERATURE: 97.3 F | HEART RATE: 83 BPM

## 2025-01-24 DIAGNOSIS — D46.Z MDS (MYELODYSPLASTIC SYNDROME), HIGH GRADE (HCC): Primary | ICD-10-CM

## 2025-01-24 PROCEDURE — 96401 CHEMO ANTI-NEOPL SQ/IM: CPT

## 2025-01-24 RX ORDER — ONDANSETRON 8 MG/1
16 TABLET, ORALLY DISINTEGRATING ORAL ONCE
Status: COMPLETED | OUTPATIENT
Start: 2025-01-24 | End: 2025-01-24

## 2025-01-24 RX ORDER — SODIUM CHLORIDE 9 MG/ML
20 INJECTION, SOLUTION INTRAVENOUS ONCE
Status: DISPENSED | OUTPATIENT
Start: 2025-01-24

## 2025-01-24 RX ADMIN — DEXAMETHASONE 10 MG: 2 TABLET ORAL at 10:14

## 2025-01-24 RX ADMIN — AZACITIDINE 137.5 MG: 100 INJECTION, POWDER, LYOPHILIZED, FOR SOLUTION INTRAVENOUS; SUBCUTANEOUS at 10:43

## 2025-01-24 RX ADMIN — ONDANSETRON 16 MG: 8 TABLET, ORALLY DISINTEGRATING ORAL at 10:15

## 2025-01-24 NOTE — PROGRESS NOTES
Pt tolerated tx without issue. Injections given in B/L lower abdomen with BROOKE WHITING. Pt confirmed next appointment on 2/17 @1230 [facility]. AVS declined.

## 2025-01-24 NOTE — PROGRESS NOTES
Pt arrives to infusion center for D5 Vidaza. Offers no complaints. Labs from 1/17- no tx parameters for today. Pt sitting comfortably in chair, wheels locked, call bell within reach.      apex/murmur loudness: II/VI

## 2025-01-27 ENCOUNTER — APPOINTMENT (OUTPATIENT)
Dept: LAB | Facility: CLINIC | Age: 75
End: 2025-01-27
Payer: MEDICARE

## 2025-01-27 DIAGNOSIS — D46.Z MDS (MYELODYSPLASTIC SYNDROME), HIGH GRADE (HCC): ICD-10-CM

## 2025-01-27 LAB
ALBUMIN SERPL BCG-MCNC: 3.5 G/DL (ref 3.5–5)
ALP SERPL-CCNC: 79 U/L (ref 34–104)
ALT SERPL W P-5'-P-CCNC: 18 U/L (ref 7–52)
ANION GAP SERPL CALCULATED.3IONS-SCNC: 7 MMOL/L (ref 4–13)
ANISOCYTOSIS BLD QL SMEAR: PRESENT
AST SERPL W P-5'-P-CCNC: 11 U/L (ref 13–39)
BASOPHILS # BLD MANUAL: 0 THOUSAND/UL (ref 0–0.1)
BASOPHILS NFR MAR MANUAL: 0 % (ref 0–1)
BILIRUB SERPL-MCNC: 0.61 MG/DL (ref 0.2–1)
BUN SERPL-MCNC: 26 MG/DL (ref 5–25)
BURR CELLS BLD QL SMEAR: PRESENT
CALCIUM SERPL-MCNC: 9 MG/DL (ref 8.4–10.2)
CHLORIDE SERPL-SCNC: 105 MMOL/L (ref 96–108)
CO2 SERPL-SCNC: 25 MMOL/L (ref 21–32)
CREAT SERPL-MCNC: 0.86 MG/DL (ref 0.6–1.3)
EOSINOPHIL # BLD MANUAL: 0.09 THOUSAND/UL (ref 0–0.4)
EOSINOPHIL NFR BLD MANUAL: 4 % (ref 0–6)
ERYTHROCYTE [DISTWIDTH] IN BLOOD BY AUTOMATED COUNT: 15.7 % (ref 11.6–15.1)
GFR SERPL CREATININE-BSD FRML MDRD: 67 ML/MIN/1.73SQ M
GIANT PLATELETS BLD QL SMEAR: PRESENT
GLUCOSE SERPL-MCNC: 123 MG/DL (ref 65–140)
HCT VFR BLD AUTO: 24.2 % (ref 34.8–46.1)
HELMET CELLS BLD QL SMEAR: PRESENT
HGB BLD-MCNC: 8 G/DL (ref 11.5–15.4)
LDH SERPL-CCNC: 171 U/L (ref 140–271)
LG PLATELETS BLD QL SMEAR: PRESENT
LYMPHOCYTES # BLD AUTO: 1.57 THOUSAND/UL (ref 0.6–4.47)
LYMPHOCYTES # BLD AUTO: 69 % (ref 14–44)
MACROCYTES BLD QL AUTO: PRESENT
MCH RBC QN AUTO: 33.8 PG (ref 26.8–34.3)
MCHC RBC AUTO-ENTMCNC: 33.1 G/DL (ref 31.4–37.4)
MCV RBC AUTO: 102 FL (ref 82–98)
MONOCYTES # BLD AUTO: 0.02 THOUSAND/UL (ref 0–1.22)
MONOCYTES NFR BLD: 1 % (ref 4–12)
NEUTROPHILS # BLD MANUAL: 0.59 THOUSAND/UL (ref 1.85–7.62)
NEUTS BAND NFR BLD MANUAL: 1 % (ref 0–8)
NEUTS SEG NFR BLD AUTO: 25 % (ref 43–75)
NRBC BLD AUTO-RTO: 1 /100 WBC (ref 0–2)
OVALOCYTES BLD QL SMEAR: PRESENT
PLATELET # BLD AUTO: 64 THOUSANDS/UL (ref 149–390)
PLATELET BLD QL SMEAR: ABNORMAL
PMV BLD AUTO: 12.1 FL (ref 8.9–12.7)
POIKILOCYTOSIS BLD QL SMEAR: PRESENT
POTASSIUM SERPL-SCNC: 3.9 MMOL/L (ref 3.5–5.3)
PROT SERPL-MCNC: 7.2 G/DL (ref 6.4–8.4)
RBC # BLD AUTO: 2.37 MILLION/UL (ref 3.81–5.12)
RBC MORPH BLD: PRESENT
SODIUM SERPL-SCNC: 137 MMOL/L (ref 135–147)
WBC # BLD AUTO: 2.27 THOUSAND/UL (ref 4.31–10.16)

## 2025-01-27 PROCEDURE — 85027 COMPLETE CBC AUTOMATED: CPT

## 2025-01-27 PROCEDURE — 80053 COMPREHEN METABOLIC PANEL: CPT

## 2025-01-27 PROCEDURE — 85007 BL SMEAR W/DIFF WBC COUNT: CPT

## 2025-01-27 PROCEDURE — 83615 LACTATE (LD) (LDH) ENZYME: CPT

## 2025-01-27 PROCEDURE — 36415 COLL VENOUS BLD VENIPUNCTURE: CPT

## 2025-01-28 ENCOUNTER — OFFICE VISIT (OUTPATIENT)
Dept: HEMATOLOGY ONCOLOGY | Facility: CLINIC | Age: 75
End: 2025-01-28
Payer: MEDICARE

## 2025-01-28 ENCOUNTER — TELEPHONE (OUTPATIENT)
Dept: HEMATOLOGY ONCOLOGY | Facility: CLINIC | Age: 75
End: 2025-01-28

## 2025-01-28 VITALS
OXYGEN SATURATION: 99 % | WEIGHT: 197 LBS | HEART RATE: 99 BPM | RESPIRATION RATE: 18 BRPM | HEIGHT: 63 IN | SYSTOLIC BLOOD PRESSURE: 100 MMHG | TEMPERATURE: 97.7 F | DIASTOLIC BLOOD PRESSURE: 62 MMHG | BODY MASS INDEX: 34.91 KG/M2

## 2025-01-28 DIAGNOSIS — D61.818 PANCYTOPENIA (HCC): ICD-10-CM

## 2025-01-28 DIAGNOSIS — Z17.0 MALIGNANT NEOPLASM OF LOWER-OUTER QUADRANT OF LEFT BREAST OF FEMALE, ESTROGEN RECEPTOR POSITIVE (HCC): ICD-10-CM

## 2025-01-28 DIAGNOSIS — C50.512 MALIGNANT NEOPLASM OF LOWER-OUTER QUADRANT OF LEFT BREAST OF FEMALE, ESTROGEN RECEPTOR POSITIVE (HCC): ICD-10-CM

## 2025-01-28 DIAGNOSIS — Z17.0 MALIGNANT NEOPLASM OF LEFT BREAST IN FEMALE, ESTROGEN RECEPTOR POSITIVE, UNSPECIFIED SITE OF BREAST (HCC): ICD-10-CM

## 2025-01-28 DIAGNOSIS — C50.112 MALIGNANT NEOPLASM OF CENTRAL PORTION OF LEFT BREAST IN FEMALE, ESTROGEN RECEPTOR POSITIVE (HCC): ICD-10-CM

## 2025-01-28 DIAGNOSIS — C50.912 MALIGNANT NEOPLASM OF LEFT BREAST IN FEMALE, ESTROGEN RECEPTOR POSITIVE, UNSPECIFIED SITE OF BREAST (HCC): ICD-10-CM

## 2025-01-28 DIAGNOSIS — D69.6 PLATELETS DECREASED (HCC): ICD-10-CM

## 2025-01-28 DIAGNOSIS — R06.09 DYSPNEA ON EXERTION: ICD-10-CM

## 2025-01-28 DIAGNOSIS — Z17.0 MALIGNANT NEOPLASM OF CENTRAL PORTION OF LEFT BREAST IN FEMALE, ESTROGEN RECEPTOR POSITIVE (HCC): ICD-10-CM

## 2025-01-28 DIAGNOSIS — D46.Z MDS (MYELODYSPLASTIC SYNDROME), HIGH GRADE (HCC): Primary | ICD-10-CM

## 2025-01-28 DIAGNOSIS — D64.9 ANEMIA, UNSPECIFIED TYPE: ICD-10-CM

## 2025-01-28 PROCEDURE — 99214 OFFICE O/P EST MOD 30 MIN: CPT | Performed by: INTERNAL MEDICINE

## 2025-01-28 NOTE — ASSESSMENT & PLAN NOTE
"Stage IIA (pT2 pN0(sn) cM0) left breast invasive ductal carcinoma.  Grade 2.  ER positive (%), MI positive (%), HER2 negative (score 1+) by IHC, BRCA negative, Dx Nov 2023, adjuvant anastrozole OncoDx score 9 (low risk, HT could be sufficient), s/p left mastectomy with SLNB on 11/20/23, tumor size 36 mm, negative margins, total one SLN was negative, no LVI identified. Initiated adjuvant anastrozole on 12/9/2023.    Continue Anastrozole  Annual R Mammogram; done 9/19/24 \"No evidence of malignancy\".  DEXA every 2 years; done 12/20/23 \"Normal Bone Density\", due for repeat DEXA but will defer at this time given her active chemotherapy treatment and significant fatigue in getting to appointments   Continue calcium and Vit D       "

## 2025-01-28 NOTE — PATIENT INSTRUCTIONS
Office visit 3/4/25 morning    Referral to [facility]    Weekly labs

## 2025-01-28 NOTE — PROGRESS NOTES
"Name: Pretty Aguila      : 1950      MRN: [id number]  Encounter Provider: Geno Carranza MD  Encounter Date: 2025   Encounter department: [facility]  :  Assessment & Plan  Malignant neoplasm of lower-outer quadrant of left breast of female, estrogen receptor positive (HCC)  Stage IIA (pT2 pN0(sn) cM0) left breast invasive ductal carcinoma.  Grade 2.  ER positive (%), RI positive (%), HER2 negative (score 1+) by IHC, BRCA negative, Dx 2023, adjuvant anastrozole OncoDx score 9 (low risk, HT could be sufficient), s/p left mastectomy with SLNB on 23, tumor size 36 mm, negative margins, total one SLN was negative, no LVI identified. Initiated adjuvant anastrozole on 2023.    Continue Anastrozole  Annual R Mammogram; done 24 \"No evidence of malignancy\".  DEXA every 2 years; done 23 \"Normal Bone Density\", due for repeat DEXA but will defer at this time given her active chemotherapy treatment and significant fatigue in getting to appointments   Continue calcium and Vit D       MDS (myelodysplastic syndrome), high grade (HCC)  High grade myeloid neoplasm at least MDS/AML with complex karyotype including TP53 mutation and deletion, and 15-20% blasts vs overt AML with TP53 mutation. IPSS-R score of 8.5, very high risk. Complicated with progressive pancytopenia, most recent hemoglobin 8.0, PLT 64, and WBC 2.3 with .  Above diagnosis, for medically fit patients, preferred treatment would be bridging therapy (Azacytidine) vs reduced intensity conditioning followed by Allogenic Stem Cell Transplantation. If patient is deemed not ASCT candidate, HMA based regimen, preferably subcutaneous Azacytidine is to be considered given improved OS compared to other HMA e.g. decitabine. Will avoid adding Venetoclax given P53 deletion.      Referral to [facility], Dr. Lacey for ASCT evaluation, patient is willing to travel to [facility] if " needed  Continue SC Azacytidine, may consider dose reduction for C2 75 mg/m2 IV or subcutaneously daily for 7 days, repeat cycles every 4 weeks which would be indicated either as definitive therapy if non ASCT candidate or as bridging therapy prior to conditioning and transplant. Benefits/side effects reviewed and consent is obtained.   Weekly CBC w diff & CMP  Hb transfusion threshold of <7.5  Follow up on 3/4/25 weeks or sooner if needed   Continue prophylactic antifungal/antibacterial/antiviral meds + PRN anti emetic   Ambulatory referral to pulmonology given CAMPOS/SOB  Orders:    CBC and differential; Standing    Comprehensive metabolic panel; Standing    Pancytopenia (HCC)  Due to MDS/AML with TP53 mutation - see above.       Anemia, unspecified type  Due to MDS/AML with TP53 mutation - see above.       Platelets decreased (HCC)  Due to MDS/AML with TP53 mutation - see above.       Dyspnea on exertion  Noted to have CAMPOS on prior office visit and sent patient for CTA.  CTA Chest (12/31/24) was negative for PE and identified a 4 mm pulmonary nodule  Orders:    Ambulatory Referral to Pulmonology; Future    Additional recommendations to follow per attending, Dr. Carranza.    Denzel Berger DO, PGY4  Hematology/Oncology Fellow      History of Present Illness     Pretty Aguila is a 74 y.o. female with medical hx remarkable of DM, HTN, HLD, CAD s/p CABG, Psoriatic/Rheumatoid Arthritis, fibromyalgia, Hiatal Hernia.     In 9/2023 had left breast mass biopsy revealed invasive ductal carcinoma, G 2, ER 90%, MS 90%, HER2 1+, with suspected skin involvement on the MRI.     Patient underwent left mastectomy with SLNB on 11/20/23, pathology consistent with stage IIA (pT2 pN0 cM0) invasive carcinoma, G2, ER %, MS %, HER2 1+, tumor size 36 mm, negative margins, total one SLN was negative, no LVI identified.     Genetic testing was negative. Oncotype DX recurrence score was 9. Initiated adjuvant anastrozole on  12/09/2023.     11/1/24 ER visit with fatigue & SOB, tested COVID positive     12/07 - 12/12/24 hospital admission due to progressive CAMPOS and near syncope event, during which had progressive pancytopenia. Folate, B12, EBV, HIV, and hepatitis labs all unremarkable.     12/11/24 Bone Marrow Biopsy: normo-cellular 20-30%, with increased blasts 15-20%, Multilineage dysplasia, fibrosis 2-3 of 3, with complex karyotype including TP53 deletion and mutation, 5q deletion and monosomy 7; findings consistent with high grade myeloid neoplasm, at least MDS/AML with mutated TP53 (Int. Consensus Classification) / MDS with Bi-allelic TP53 inactivation (WHO Classification). The focus that approaches 20% blasts by IHC also raises the possibility of classifying it as overt AML with mutated TP53. The combination of TP53 mutation and deletion, as well as the complex karyotype, is associated with a very poor prognosis, and as such, may best be classified and treated as AML with mutated TP53 - as clinically indicated. There is no evidence of metastatic carcinoma.     12/22/24 ER visit with left hip pain, diagnosed with L hip dislocation successfully reduced in the ER.       Ref 12/11/24 12/12/24 12/19/24 12/22/24    WBC 4.31 - 10.16 Thousand/uL 3.49 (L) 3.13 (L) 3.04 (L) 2.90 (L)   Hemoglobin 11.5 - 15.4 g/dL 8.6 (L) 8.0 (L) 8.6 (L) 8.4 (L)   Platelet Count 149 - 390 Thousands/uL 64 (L) 60 (L) 66 (L) 64 (L)   Monocytes % 4 - 12 % 5  1 (L) 1 (L)   Atypical Lymphocytes % <=0 %   4 (H)    Absolute Neutrophils 1.85 - 7.62 Thousand/uL 1.21 (L)  1.76 (L) 1.71 (L)     Patient presenting today with her  and two daughters to follow up on above. Results and new diagnosis of MDS/AML are explained in details with above A&P. She complains of fatigue and worsening CAMPOS. Per her daughters her saturation occasionally drop to mid 80's and when happens she feels near syncope! Denies fever, chills, night sweats or weight loss. Denies chest pain,  cough. Denies SOB at rest / while sitting. Able to speak in complete sentences.     Patient presents for one month follow up last seen 12/31/24.  She has completed C1-D1-5 of azacitidine on 1/24/25.  CTA Chest was neg for PE.  Labs (1/27/25) show roughly stable cell counts with Hb 8.8 > 8.0, WBC 2.6 > 2.3 (lymph 69, neut 25), , and Plt 58 > 64.  CMP shows SCr 0.86 and all other aspects are wnl.  .    SOB even with standing.  Fatigue has worsened.  Feels symptoms consistent with asthenia. Denied palpitaions.  Has decreased appetite and decreased taste.    History obtained from: patient    Oncology History   Malignant neoplasm of lower-outer quadrant of left breast of female, estrogen receptor positive (HCC)   9/19/2023 Biopsy    Left breast ultrasound-guided biopsy  4 o'clock, 2 cm from nipple (VERENICE)  Invasive breast carcinoma of no special type (ductal NST)  Grade 2  ER 90, MT 90, HER2 1+\  Lymphovascular invasion not definitively identified    Concordant. Malignancy appears unifocal; however, oval circumscribed mass in the upper outer left breast is not accounted for. Bilateral breast MRI is recommended. Biopsy-proven carcinoma measured 3.1 cm on US. Left axilla US negative. Right breast clear.     9/28/2023 Observation    Bilateral breast MRI: Unifocal carcinoma in the lower outer left breast with possible skin involvement. There are no suspicious enhancing masses or suspicious areas of non-mass enhancement in right breast. There is no axillary or internal mammary adenopathy.     10/17/2023 Genetic Testing    A total of 47 genes were evaluated, including: HUYEN, BRCA1, BRCA2, CDH1, CHEK2, PALB2, PTEN, STK11, TP53  Negative result. No pathogenic sequence variants identified  [employer]     11/20/2023 Surgery    Left VERENICE  directed mastectomy with SLN biopsy   Invasive carcinoma of no special type (ductal)  Grade 2  3.6 cm   Margins negative  0/1 Lymph node  Anatomic Stage IIA  Prognostic Stage  IA    Immediate reconstruction with tissue expander and ADM (Dr. Jimenez)     11/20/2023 -  Cancer Staged    Staging form: Breast, AJCC 8th Edition  - Pathologic stage from 11/20/2023: Stage IA (pT2, pN0(sn), cM0, G2, ER+, MI+, HER2-) - Signed by Adrien Gabriel MD on 12/6/2023  Stage prefix: Initial diagnosis  Method of lymph node assessment: [place] lymph node biopsy  Multigene prognostic tests performed: None  Histologic grading system: 3 grade system       MDS (myelodysplastic syndrome), high grade (HCC)   12/30/2024 Initial Diagnosis    MDS (myelodysplastic syndrome), high grade (HCC)     1/20/2025 -  Chemotherapy    alteplase (CATHFLO), 2 mg, Intracatheter, Every 1 Minute as needed, 1 of 6 cycles  azaCITIDine (VIDAZA), 75 mg/m2 = 137.5 mg (100 % of original dose 75 mg/m2), Subcutaneous azaCITIDine, Once, 2 of 7 cycles  Dose modification: 75 mg/m2 (original dose 75 mg/m2, Cycle 0, Reason: Anticipated Tolerance)  Administration: 137.5 mg (1/20/2025), 137.5 mg (1/21/2025), 137.5 mg (1/22/2025), 137.5 mg (1/23/2025), 137.5 mg (1/24/2025)         Review of Systems  - GENERAL: positive fro fatigue and dyspnea on exertion ; Negative for any nausea, vomiting, fevers, chills, or weight loss.  - HEENT: Negative for any head/Neck trauma, pain, double/blurry vision, sinusitis, rhinitis, nose bleeding.  - CARDIAC: Negative for any chest pain, palpitation, Dyspnea on exertion, peripheral edema.  - PULMONARY: Negative for any SOB at rest, cough, wheezing.   - GASTROINTESTINAL: Negative for any abdominal pain, N/V/D/C, blood in stool.   - GENITOURINARY: Negative for any dysuria, hematuria, incontinence.  - NEUROLOGIC: Negative for any muscle weakness, numbness/tingling, memory changes.    - MUSCULOSKELETAL: Negative for any joint pains/swelling, limited ROM.   - INTEGUMENTARY: Negative for any rashes, cuts/ lesions.  - HEMATOLOGIC: Negative for any abnormal bruising, frequent infections or bleeding.       Objective   /62 (BP  "Location: Left arm, Patient Position: Sitting, Cuff Size: Adult)   Pulse 99   Temp 97.7 °F (36.5 °C) (Temporal)   Resp 18   Ht 5' 3\" (1.6 m)   Wt 89.4 kg (197 lb)   SpO2 99%   BMI 34.90 kg/m²      Physical Exam  - GEN: Appears well, alert and oriented x 3, pleasant and cooperative, in no acute distress  - HEENT: Anicteric, mucous membranes moist, PERRL and EOMI   - NECK: No lymphadenopathy, JVD or carotid bruits   - HEART: RRR, normal S1 and S2, no murmurs, clicks, gallops or rubs   - LUNGS: Clear to auscultation bilaterally; no wheezes, rales, or rhonchi  - ABDOMEN: Normal bowel sounds, soft, no tenderness, no distention, no organomegaly or masses felt on exam.   - EXTREMITIES: Peripheral pulses normal; no clubbing, cyanosis, or edema  - NEURO: No focal findings, CN II-XII are grossly intact.   - Musculoskeletal: 5/5 strength, normal ROM, no swollen or erythematous joints.   - SKIN: Normal without suspicious lesions on exposed skin    Administrative Statements   I have spent a total time of 55 minutes in caring for this patient on the day of the visit/encounter including Diagnostic results, Prognosis, Risks and benefits of tx options, Instructions for management, Patient and family education, Importance of tx compliance, Risk factor reductions, Impressions, Counseling / Coordination of care, Documenting in the medical record, Reviewing / ordering tests, medicine, procedures  , and Obtaining or reviewing history  .   "

## 2025-01-28 NOTE — ASSESSMENT & PLAN NOTE
High grade myeloid neoplasm at least MDS/AML with complex karyotype including TP53 mutation and deletion, and 15-20% blasts vs overt AML with TP53 mutation. IPSS-R score of 8.5, very high risk. Complicated with progressive pancytopenia, most recent hemoglobin 8.0, PLT 64, and WBC 2.3 with .  Above diagnosis, for medically fit patients, preferred treatment would be bridging therapy (Azacytidine) vs reduced intensity conditioning followed by Allogenic Stem Cell Transplantation. If patient is deemed not ASCT candidate, HMA based regimen, preferably subcutaneous Azacytidine is to be considered given improved OS compared to other HMA e.g. decitabine. Will avoid adding Venetoclax given P53 deletion.      Referral to [facility], Dr. Lacey for ASCT evaluation, patient is willing to travel to [facility] if needed  Continue SC Azacytidine, may consider dose reduction for C2 75 mg/m2 IV or subcutaneously daily for 7 days, repeat cycles every 4 weeks which would be indicated either as definitive therapy if non ASCT candidate or as bridging therapy prior to conditioning and transplant. Benefits/side effects reviewed and consent is obtained.   Weekly CBC w diff & CMP  Hb transfusion threshold of <7.5  Follow up on 3/4/25 weeks or sooner if needed   Continue prophylactic antifungal/antibacterial/antiviral meds + PRN anti emetic   Ambulatory referral to pulmonology given CAMPOS/SOB  Orders:    CBC and differential; Standing    Comprehensive metabolic panel; Standing

## 2025-02-03 ENCOUNTER — TELEPHONE (OUTPATIENT)
Age: 75
End: 2025-02-03

## 2025-02-03 NOTE — TELEPHONE ENCOUNTER
Patient called in regarding her medication for Metformin. She is currently in the process of changing over her insurance to Medicare Part D. With her new insurance the metformin is too expensive. She would like to know if there are any other options that are cost effective for her. Patient is also going to call her insurance to see what the pricing would be for her medications. Please call her at [phone] as she would like to hear what Dr. Jorgensen advises.

## 2025-02-04 ENCOUNTER — TELEPHONE (OUTPATIENT)
Age: 75
End: 2025-02-04

## 2025-02-04 NOTE — TELEPHONE ENCOUNTER
Call from Pretty, asking when her next cycle of chemo will start and when she should have her blood work. Reviewed with her that her cycles are p25wuum and that she should have her blood work done about 3 days prior to her next treatment. She said she is going to [facility] for an appointment next week and was asking if she needed any blood work done for that appointment. Explained that, if they wanted any blood work, she could have it done there when she is there for her appointment. She had no other questions at this time.

## 2025-02-06 ENCOUNTER — APPOINTMENT (EMERGENCY)
Dept: RADIOLOGY | Facility: HOSPITAL | Age: 75
End: 2025-02-06
Payer: MEDICARE

## 2025-02-06 ENCOUNTER — HOSPITAL ENCOUNTER (EMERGENCY)
Facility: HOSPITAL | Age: 75
Discharge: HOME | End: 2025-02-06
Attending: EMERGENCY MEDICINE
Payer: MEDICARE

## 2025-02-06 VITALS
RESPIRATION RATE: 16 BRPM | OXYGEN SATURATION: 97 % | TEMPERATURE: 98.5 F | SYSTOLIC BLOOD PRESSURE: 93 MMHG | HEART RATE: 81 BPM | DIASTOLIC BLOOD PRESSURE: 54 MMHG

## 2025-02-06 DIAGNOSIS — S73.035A ANTERIOR DISLOCATION OF LEFT HIP, INITIAL ENCOUNTER (HCC): Primary | ICD-10-CM

## 2025-02-06 PROCEDURE — 99285 EMERGENCY DEPT VISIT HI MDM: CPT | Performed by: EMERGENCY MEDICINE

## 2025-02-06 PROCEDURE — 99152 MOD SED SAME PHYS/QHP 5/>YRS: CPT | Performed by: EMERGENCY MEDICINE

## 2025-02-06 PROCEDURE — 73502 X-RAY EXAM HIP UNI 2-3 VIEWS: CPT

## 2025-02-06 PROCEDURE — 99283 EMERGENCY DEPT VISIT LOW MDM: CPT

## 2025-02-06 PROCEDURE — 96365 THER/PROPH/DIAG IV INF INIT: CPT

## 2025-02-06 PROCEDURE — 27265 TREAT HIP DISLOCATION: CPT | Performed by: EMERGENCY MEDICINE

## 2025-02-06 PROCEDURE — 73501 X-RAY EXAM HIP UNI 1 VIEW: CPT

## 2025-02-06 RX ORDER — PROPOFOL 10 MG/ML
100 INJECTION, EMULSION INTRAVENOUS ONCE
Status: COMPLETED | OUTPATIENT
Start: 2025-02-06 | End: 2025-02-06

## 2025-02-06 RX ORDER — FENTANYL CITRATE 50 UG/ML
1 INJECTION, SOLUTION INTRAMUSCULAR; INTRAVENOUS ONCE
Refills: 0 | Status: COMPLETED | OUTPATIENT
Start: 2025-02-06 | End: 2025-02-06

## 2025-02-06 RX ORDER — PROPOFOL 10 MG/ML
50 INJECTION, EMULSION INTRAVENOUS ONCE
Status: COMPLETED | OUTPATIENT
Start: 2025-02-06 | End: 2025-02-06

## 2025-02-06 RX ORDER — SODIUM CHLORIDE, SODIUM GLUCONATE, SODIUM ACETATE, POTASSIUM CHLORIDE, MAGNESIUM CHLORIDE, SODIUM PHOSPHATE, DIBASIC, AND POTASSIUM PHOSPHATE .53; .5; .37; .037; .03; .012; .00082 G/100ML; G/100ML; G/100ML; G/100ML; G/100ML; G/100ML; G/100ML
1000 INJECTION, SOLUTION INTRAVENOUS ONCE
Status: COMPLETED | OUTPATIENT
Start: 2025-02-06 | End: 2025-02-06

## 2025-02-06 RX ADMIN — SODIUM CHLORIDE, SODIUM GLUCONATE, SODIUM ACETATE, POTASSIUM CHLORIDE, MAGNESIUM CHLORIDE, SODIUM PHOSPHATE, DIBASIC, AND POTASSIUM PHOSPHATE 1000 ML: .53; .5; .37; .037; .03; .012; .00082 INJECTION, SOLUTION INTRAVENOUS at 02:34

## 2025-02-06 RX ADMIN — PROPOFOL 50 MG: 10 INJECTION, EMULSION INTRAVENOUS at 02:40

## 2025-02-06 RX ADMIN — PROPOFOL 50 MG: 10 INJECTION, EMULSION INTRAVENOUS at 02:44

## 2025-02-06 NOTE — DISCHARGE INSTRUCTIONS
"You were seen in the emergency department for evaluation of left hip dislocation.  We procedurally sedated you with a drug called propofol and used a technique called the Allis technique to put your hip back in place.  We felt a \"pop\" indicating successful reduction.  Your x-rays after the procedure have confirmed that the hip was reduced properly.  Please follow-up with orthopedic surgery for further management.  Please follow-up with your PCP.  You may bear weight as tolerated but please adhere to the instructions below.  Please return to the ED if you fall, pass out, or dislocate your hip again.     Do NOT bend your hip past a right angle (90 degrees).  Do NOT cross your legs or ankles.  Do NOT let your legs roll inward or outward and do NOT turn on your operated leg.    Patient Education     Hip Dislocation Discharge Instructions   About this topic   The hip joint is a \"ball and socket\" joint. The \"ball\" part of the joint is the top part of the thigh bone. It is the femoral head. The \"socket\", or acetabulum, is a groove in the pelvic bone. The ball fits into the socket and together they make up the hip joint. They are held in place by strong bands called ligaments and muscles. When the femoral head is pushed out of the socket, this is called a hip dislocation. It most often takes a great deal of force for this injury to happen. Other injuries, like a broken pelvis, often happen with a hip dislocation. This is a serious injury. It needs to be treated right away. You can have long-term damage to the nerves and blood vessels if you are not treated right away.     What care is needed at home?   Ask your doctor what you need to do when you go home. Make sure you ask questions if you do not understand what the doctor says. This way you will know what you need to do.  Rest. Allow your injury to heal before you do slow movements.  Place an ice pack or a bag of frozen peas wrapped in a towel over the painful part. Never " Hpi Title: Evaluation of Skin Lesions How Severe Are Your Spot(S)?: mild put ice right on the skin. Do not leave the ice on more than 10 to 15 minutes at a time.  Prop your hip on pillows to help with swelling.  Drugs  Crutches, cane, or walker to take pressure off the injured hip  Brace to keep your hip from moving  Exercises  Heat may be used later but not right away. Heat can make swelling worse. If your doctor tells you to use heat, put a heating pad on the painful part for no more than 20 minutes at a time. Never go to sleep with a heating pad on as this can cause burns.  What follow-up care is needed?   Your doctor may ask you to make visits to the office to check on your progress. Be sure to keep these visits. Your doctor may send you to physical therapy to help you heal faster.  What drugs may be needed?   The doctor may order drugs to:  Help with pain and swelling  Prevent blood clots  Will physical activity be limited?   You may need to rest your hip for a while. You should not do physical activity that makes your health problem worse. Talk to your doctor if you run, work out, or play sports. You may not be able to do those things until your health problem gets better.  What can be done to prevent this health problem?   You cannot prevent a hip dislocation. You can lower your risk by preventing injuries or accidents.  Always wear a seat belt. Drive safely. Obey speed limits. Do not drink and drive.  Stay active and work out to keep your muscles strong and flexible, especially if you have dislocated your hip in the past. If it has happened before, you are more likely to dislocate your hip again.  Be careful when on ladders, roofs, or high places where a fall could happen.  Keep your home free of clutter and well lit to lessen the chance of falling.  Wear the right equipment when playing sports.  Follow hip safety tips if you have had a total hip replacement with a traditional approach:  Do NOT bend your hip past a right angle (90 degrees).  Do NOT cross your legs or ankles.  Do  Have Your Spot(S) Been Treated In The Past?: has not been treated NOT let your legs roll inward or outward and do NOT turn on your operated leg.  When do I need to call the doctor?   Signs of infection. These include a fever of 100.4°F (38°C) or higher, chills, redness, warmth.  Pain or swelling gets worse  Numbness in your leg or foot  Your leg or foot feels cold  Teach Back: Helping You Understand   The Teach Back Method helps you understand the information we are giving you. After you talk with the staff, tell them in your own words what you learned. This helps to make sure the staff has described each thing clearly. It also helps to explain things that may have been confusing. Before going home, make sure you can do these:  I can tell you about my condition.  I can tell you what may help ease my pain.  I can tell you what changes I need to make with my activities.  I can tell you what I will do I have more pain or swelling or I have or numbness in my leg or foot.  Last Reviewed Date   2020-02-24  Consumer Information Use and Disclaimer   This generalized information is a limited summary of diagnosis, treatment, and/or medication information. It is not meant to be comprehensive and should be used as a tool to help the user understand and/or assess potential diagnostic and treatment options. It does NOT include all information about conditions, treatments, medications, side effects, or risks that may apply to a specific patient. It is not intended to be medical advice or a substitute for the medical advice, diagnosis, or treatment of a health care provider based on the health care provider's examination and assessment of a patient’s specific and unique circumstances. Patients must speak with a health care provider for complete information about their health, medical questions, and treatment options, including any risks or benefits regarding use of medications. This information does not endorse any treatments or medications as safe, effective, or approved for treating a specific  Year Removed: 1900 patient. [employer] and its affiliates disclaim any warranty or liability relating to this information or the use thereof. The use of this information is governed by the Terms of Use, available at https://www.[employer].com/en/know/clinical-effectiveness-terms   Copyright   Copyright © 2024 [employer], Inc. and its affiliates and/or licensors. All rights reserved.

## 2025-02-06 NOTE — ED ATTENDING ATTESTATION
2/6/2025  I, Erasmo Diaz MD, saw and evaluated the patient. I have discussed the patient with the resident/non-physician practitioner and agree with the resident's/non-physician practitioner's findings, Plan of Care, and MDM as documented in the resident's/non-physician practitioner's note, except where noted. All available labs and Radiology studies were reviewed.  I was present for key portions of any procedure(s) performed by the resident/non-physician practitioner and I was immediately available to provide assistance.       At this point I agree with the current assessment done in the Emergency Department.  I have conducted an independent evaluation of this patient a history and physical is as follows:      Final Diagnosis:  1. Anterior dislocation of left hip, initial encounter ([facility])      Chief Complaint   Patient presents with    Hip Injury     Pt was walking in her bathroom when she twisted wrong and dislocated her L hip; same thing happened a month ago. C/o pain in L hip           A:  -74-year-old female who presents with left hip dislocation.      P:  -X-ray left hip to evaluate for associated fracture.  -Will require sedation for reduction.      H:    74-year-old female presents with left hip dislocation.  Patient was turning to use the toilet when her left hip dislocated.  This has happened in the past.  No head injury or loss of conscious.      PMH:  Past Medical History:   Diagnosis Date    Allergic     Anxiety     Arthritis     Breast cancer ([facility]) 09/2023    CAD (coronary artery disease)     Coronary artery disease [id number]    Depression     Diabetes mellitus ([facility]) 5/1/24    Mother-Diabetic    Dyslipidemia     Hiatal hernia     Hyperlipidemia     Hypertension     Obesity     Obesity (BMI 30-39.9)     Psoriatic arthritis ([facility])     Rheumatoid arthritis ([facility])     Scoliosis     SOB (shortness of breath)        PSH:  Past Surgical History:   Procedure Laterality Date    BARIATRIC SURGERY  2014    BILE  DUCT EXPLORATION      replacement per Steffanie    BREAST BIOPSY  9/23    CARPAL TUNNEL RELEASE Bilateral 2002    CORONARY ARTERY BYPASS GRAFT  2017    FOOT SURGERY Right     bone surgery to straighten toe.    HIP SURGERY Left 2015    IR BIOPSY BONE MARROW  12/11/2024    IR DRAINAGE TUBE PLACEMENT  12/19/2023    JOINT REPLACEMENT      LYMPH NODE BIOPSY Left 11/20/2023    Procedure: LEFT LYMPHATIC MAPPING WITH BLUE AND RADIOACTIVE DYES, SENTINEL LYMPH NODE BIOPSY;  Surgeon: Adrien Gabriel MD;  Location: [facility] OR;  Service: Surgical Oncology    LYMPH NODE DISSECTION Left 11/20/2023    Procedure: POSSIBLE AXILLARY DISSECTION;  Surgeon: Adrien Gabriel MD;  Location: [facility];  Service: Surgical Oncology    CT BREAST REDUCTION Bilateral 01/16/2024    Procedure: RIGHT BREAST REDUCTION AND LEFT BREAST EXPANDER EXCHANGE FOR PERMANENT IMPLANT. REVISION OF RECONSTRUCTED LEFT BREAST.;  Surgeon: Molina Jimenez MD;  Location: [facility];  Service: Plastics    CT SUCTION ASSISTED LIPECTOMY TRUNK Bilateral 01/16/2024    Procedure: LIPOSUCTION BREAST;  Surgeon: Molina Jimenez MD;  Location: [facility];  Service: Plastics    CT TISSUE EXPANDER PLACEMENT BREAST RECONSTRUCTION Left 11/20/2023    Procedure: IMMEDIATE LEFT BREAST RECONSTRUCTION WITH TISSUE EXPANDER AND ADM;  Surgeon: Molina Jimenez MD;  Location: [facility];  Service: Plastics    REPLACEMENT TOTAL KNEE Right 04/2017    SIMPLE MASTECTOMY Left 11/20/2023    Procedure: LEFT BREAST VERENICE  DIRECTED MASTECTOMY;  Surgeon: Adrien Gabriel MD;  Location: [facility];  Service: Surgical Oncology    SLEEVE GASTROPLASTY      TONSILLECTOMY      TOTAL HIP ARTHROPLASTY Right 2017    US GUIDED BREAST BIOPSY LEFT COMPLETE Left 09/19/2023         PE:   Vitals:    02/06/25 0245 02/06/25 0250 02/06/25 0255 02/06/25 0300   BP: 102/56 (!) 89/50 92/54 (!) 89/57   BP Location: Right arm Right arm Right arm Right arm   Pulse: 86 85 83 82   Resp: 17 16 16 16   Temp:       TempSrc:       SpO2: 96% 99%  98% 98%         Constitutional: Vital signs are normal. She appears well-developed. She is cooperative. No distress.   Cardiovascular: Normal rate, regular rhythm.  Pulmonary/Chest: Effort normal.   Abdominal: Soft. Normal appearance.   Neurological: She is alert.   Skin: Skin is warm, dry and intact.   MSK: Left hip held in flexion and internal rotation.  Tenderness over the left hip.  Psychiatric: She has a normal mood and affect. Her speech is normal and behavior is normal. Thought content normal.          - 13 point ROS was performed and all are normal unless stated in the history above.   - Nursing note reviewed. Vitals reviewed.   - Orders placed by myself and/or advanced practitioner / resident.    - Previous chart was reviewed  - No language barrier.   - History obtained from patient.   - There are no limitations to the history obtained. Reasons ROS could not be obtained:  N/A         Medications   multi-electrolyte (ISOLYTE-S PH 7.4) bolus 1,000 mL (1,000 mL Intravenous New Bag 2/6/25 0234)   fentanyl citrate (PF) (FOR EMS ONLY) 100 mcg/2 mL injection 100 mcg (0 mcg Does not apply Given to EMS 2/6/25 0129)   propofol (DIPRIVAN) 200 MG/20ML bolus injection 100 mg (50 mg Intravenous Given by Other 2/6/25 0240)   propofol (DIPRIVAN) 200 MG/20ML bolus injection 50 mg (50 mg Intravenous Given by Other 2/6/25 0244)     XR hip/pelv 1 vw left if performed   ED Interpretation   Successful reduction of left hip      XR hip/pelv 2-3 vws left if performed   ED Interpretation   Left prosthetic hip present and dislocated        Orders Placed This Encounter   Procedures    Procedural Sedation    Orthopedic injury treatment    XR hip/pelv 2-3 vws left if performed    XR hip/pelv 1 vw left if performed     Labs Reviewed - No data to display  Time reflects when diagnosis was documented in both MDM as applicable and the Disposition within this note       Time User Action Codes Description Comment    2/6/2025  3:11 AM Rao  Vincenzo See [S73.035A] Anterior dislocation of left hip, initial encounter ([facility])           ED Disposition       ED Disposition   Discharge    Condition   Stable    Date/Time   Thu Feb 6, 2025  3:17 AM    Comment   Pretty Aguila discharge to home/self care.                   Follow-up Information       Follow up With Specialties Details Why Contact Info Additional Information    Erasmo Colón MD Orthopedic Spine Surgery, Orthopedic Surgery Call today  [address]  [address]  [facility] [address]  [phone]       Lauren Dumont MD Family Medicine Call   [address]  [address]  [facility] [address]  [phone]       [facility] Emergency Medicine Go to  If concerning symptoms present [address] [place] [id number] [phone] [facility], [address] [place] [address], [address] [phone]          Patient's Medications   Discharge Prescriptions    No medications on file     No discharge procedures on file.  Prior to Admission Medications   Prescriptions Last Dose Informant Patient Reported? Taking?   Apremilast 30 MG TABS  Self Yes No   Sig: Take 30 mg by mouth 2 (two) times a day   Patient not taking: Reported on 8/9/2024   LORazepam (ATIVAN) 0.5 mg tablet   No No   Sig: TAKE 1 TABLET (0.5 MG TOTAL) BY MOUTH EVERY 8 (EIGHT) HOURS AS NEEDED FOR ANXIETY   Multiple Vitamin (multivitamin) tablet  Self Yes No   Sig: Take 1 tablet by mouth daily   Semaglutide-Weight Management (WEGOVY) 2.4 MG/0.75ML   No No   Sig: Inject 0.75 mL (2.4 mg total) under the skin once a week   acyclovir (ZOVIRAX) 400 MG tablet   No No   Sig: Take 1 tablet (400 mg total) by mouth 2 (two) times a day   anastrozole (ARIMIDEX) 1 mg tablet   No No   Sig: TAKE 1 TABLET (1 MG TOTAL) BY MOUTH DAILY   atorvastatin (LIPITOR) 10 mg tablet   No No   Sig: TAKE 1 TABLET BY MOUTH DAILY   buPROPion (WELLBUTRIN XL) 300 mg 24 hr  "tablet   No No   Sig: TAKE 1 TABLET (300 MG TOTAL) BY MOUTH EVERY MORNING   cholecalciferol (VITAMIN D3) 400 units tablet  Self Yes No   Sig: Take 400 Units by mouth daily   citalopram (CeleXA) 20 mg tablet   No No   Sig: Take 1 tablet (20 mg total) by mouth daily   fluconazole (DIFLUCAN) 100 mg tablet   No No   Sig: Take 1 tablet (100 mg total) by mouth daily   ixekizumab (Taltz) 80 MG/ML subcutaneous injection   Yes No   Sig: Inject 80 mg under the skin every 28 days   Patient not taking: Reported on 12/7/2024   metFORMIN (GLUCOPHAGE-XR) 500 mg 24 hr tablet   No No   Sig: Take 1 tablet (500 mg total) by mouth daily with dinner   metoprolol succinate (Toprol XL) 25 mg 24 hr tablet   No No   Sig: Take 1 tablet (25 mg total) by mouth daily   naloxone (NARCAN) 4 mg/0.1 mL nasal spray  Self No No   Sig: Administer 1 spray into a nostril. If no response after 2-3 minutes, give another dose in the other nostril using a new spray.   nystatin (MYCOSTATIN) cream  Self No No   Sig: Apply topically 2 (two) times a day   nystatin (MYCOSTATIN) powder  Self No No   Sig: Apply topically 2 (two) times a day   ondansetron (ZOFRAN) 8 mg tablet   No No   Sig: Take 1 tablet (8 mg total) by mouth every 8 (eight) hours as needed for nausea or vomiting   predniSONE 1 mg tablet   Yes No   Sig: TAKE 1 TABLET (1 MG TOTAL) BY MOUTH 3 (THREE) TIMES A DAY.   prochlorperazine (COMPAZINE) 10 mg tablet   No No   Sig: Take 1 tablet (10 mg total) by mouth every 6 (six) hours as needed for nausea or vomiting   traMADol (ULTRAM) 50 mg tablet   Yes No      Facility-Administered Medications: None       Portions of the record may have been created with voice recognition software. Occasional wrong word or \"sound a like\" substitutions may have occurred due to the inherent limitations of voice recognition software. Read the chart carefully and recognize, using context, where substitutions have occurred.         ED Course         Critical Care " Time  Procedures

## 2025-02-06 NOTE — ED PROCEDURE NOTE
Procedure  Procedural Sedation    Date/Time: 2/6/2025 2:40 AM    Performed by: Adela Serrato MD  Authorized by: Adela Serrato MD    Immediate pre-procedure details:     Reassessment: Patient reassessed immediately prior to procedure      Reviewed: vital signs      Verified: bag valve mask available, emergency equipment available, intubation equipment available, IV patency confirmed, oxygen available, reversal medications available and suction available    Procedure details (see MAR for exact dosages):     Preoxygenation:  Nasal cannula    Sedation:  Propofol    Analgesia:  Fentanyl    Intra-procedure monitoring:  Blood pressure monitoring, continuous capnometry, cardiac monitor and continuous pulse oximetry    Intra-procedure events: respiratory depression      Intra-procedure management:  Airway repositioning    Total sedation time (minutes):  14  Post-procedure details:     Post-sedation assessment completed:  2/6/2025 2:55 AM    Attendance: Constant attendance by certified staff until patient recovered      Recovery: Patient returned to pre-procedure baseline      Post-sedation assessments completed and reviewed: airway patency, cardiovascular function, mental status and respiratory function      Patient is stable for discharge or admission: yes      Patient tolerance:  Tolerated well, no immediate complications                   Adela Serrato MD  02/06/25 0259

## 2025-02-06 NOTE — ED PROVIDER NOTES
Time reflects when diagnosis was documented in both MDM as applicable and the Disposition within this note       Time User Action Codes Description Comment    2/6/2025  3:11 AM Vincenzo Yeh Add [S73.035A] Anterior dislocation of left hip, initial encounter (HCC)           ED Disposition       ED Disposition   Discharge    Condition   Stable    Date/Time   Thu Feb 6, 2025  3:17 AM    Comment   Pretty Aguila discharge to home/self care.                   Assessment & Plan       Medical Decision Making  Amount and/or Complexity of Data Reviewed  Radiology: ordered and independent interpretation performed.    Risk  Prescription drug management.    Patient is a 74 y.o. female with a past medical history of recently diagnosed leukemia, prosthetic hip, and multiple left hip dislocations presenting for left hip deformity.      Vital signs hypotensive but otherwise stable. On exam patient has obvious deformity with restricted range of motion and pain at the left hip.    History and physical exam most consistent with left anterior hip dislocation. However, differential diagnosis included but not limited to fracture.     Plan: Left hip x-ray, 1 L [place] due to hypotension on arrival, procedural sedation with propofol, left hip reduction via Allis technique, postreduction left hip x-ray    View ED course for further discussion on patient workup.    All radiology studies independently viewed by me and interpreted by the radiologist.  I reviewed all testing with the patient.     Upon re-evaluation patient had few episodes of apnea and hypoxia during procedure but responded well after jaw thrust.  Audible pop experienced during reduction.  Patient's limbs are of equal length postprocedure.  Patient is awake, alert, having normal conversation, and mentating well with stable vital signs.  Patient's repeat x-ray shows successful hip reduction.  Patient ambulating well after procedure with no complications.  Patient able to tolerate  "p.o.  Stable for discharge.    Disposition: Discharged with strict ED return precautions, precautions regarding cautious movement of left lower extremity, instruction to follow-up with orthopedic surgery, and instruction to follow-up with PCP.    Portions of the record may have been created with voice recognition software. Occasional wrong word or \"sound a like\" substitutions may have occurred due to the inherent limitations of voice recognition software. Read the chart carefully and recognize, using context, where substitutions have occurred.    ED Course as of 02/06/25 0411   Thu Feb 06, 2025 0220 Procedural sedation and dislocation reduction consent obtained at this time   0315 Patient tolerated ambulation and p.o. well.  Stable for discharge.       Medications   fentanyl citrate (PF) (FOR EMS ONLY) 100 mcg/2 mL injection 100 mcg (0 mcg Does not apply Given to EMS 2/6/25 0129)   multi-electrolyte (ISOLYTE-S PH 7.4) bolus 1,000 mL (0 mL Intravenous Stopped 2/6/25 0343)   propofol (DIPRIVAN) 200 MG/20ML bolus injection 100 mg (50 mg Intravenous Given by Other 2/6/25 0240)   propofol (DIPRIVAN) 200 MG/20ML bolus injection 50 mg (50 mg Intravenous Given by Other 2/6/25 0244)       ED Risk Strat Scores                          SBIRT 20yo+      Flowsheet Row Most Recent Value   Initial Alcohol Screen: US AUDIT-C     1. How often do you have a drink containing alcohol? 0 Filed at: 02/06/2025 0128   2. How many drinks containing alcohol do you have on a typical day you are drinking?  0 Filed at: 02/06/2025 0128   3b. FEMALE Any Age, or MALE 65+: How often do you have 4 or more drinks on one occassion? 0 Filed at: 02/06/2025 0128   Audit-C Score 0 Filed at: 02/06/2025 0128   CHANI: How many times in the past year have you...    Used an illegal drug or used a prescription medication for non-medical reasons? Never Filed at: 02/06/2025 0128                            History of Present Illness       Chief Complaint "   Patient presents with    Hip Injury     Pt was walking in her bathroom when she twisted wrong and dislocated her L hip; same thing happened a month ago. C/o pain in L hip       Past Medical History:   Diagnosis Date    Allergic     Anxiety     Arthritis     Breast cancer (HCC) 09/2023    CAD (coronary artery disease)     Coronary artery disease [id number]    Depression     Diabetes mellitus (HCC) 5/1/24    Mother-Diabetic    Dyslipidemia     Hiatal hernia     Hyperlipidemia     Hypertension     Obesity     Obesity (BMI 30-39.9)     Psoriatic arthritis (HCC)     Rheumatoid arthritis (HCC)     Scoliosis     SOB (shortness of breath)       Past Surgical History:   Procedure Laterality Date    BARIATRIC SURGERY  2014    BILE DUCT EXPLORATION      replacement per Steffanie    BREAST BIOPSY  9/23    CARPAL TUNNEL RELEASE Bilateral 2002    CORONARY ARTERY BYPASS GRAFT  2017    FOOT SURGERY Right     bone surgery to straighten toe.    HIP SURGERY Left 2015    IR BIOPSY BONE MARROW  12/11/2024    IR DRAINAGE TUBE PLACEMENT  12/19/2023    JOINT REPLACEMENT      LYMPH NODE BIOPSY Left 11/20/2023    Procedure: LEFT LYMPHATIC MAPPING WITH BLUE AND RADIOACTIVE DYES, SENTINEL LYMPH NODE BIOPSY;  Surgeon: Adrien Gabriel MD;  Location:  MAIN OR;  Service: Surgical Oncology    LYMPH NODE DISSECTION Left 11/20/2023    Procedure: POSSIBLE AXILLARY DISSECTION;  Surgeon: Adrien Gabriel MD;  Location:  MAIN OR;  Service: Surgical Oncology    NY BREAST REDUCTION Bilateral 01/16/2024    Procedure: RIGHT BREAST REDUCTION AND LEFT BREAST EXPANDER EXCHANGE FOR PERMANENT IMPLANT. REVISION OF RECONSTRUCTED LEFT BREAST.;  Surgeon: Molina Jimenez MD;  Location:  MAIN OR;  Service: Plastics    NY SUCTION ASSISTED LIPECTOMY TRUNK Bilateral 01/16/2024    Procedure: LIPOSUCTION BREAST;  Surgeon: Molina Jimenez MD;  Location:  MAIN OR;  Service: Plastics    NY TISSUE EXPANDER PLACEMENT BREAST RECONSTRUCTION Left 11/20/2023    Procedure: IMMEDIATE LEFT  BREAST RECONSTRUCTION WITH TISSUE EXPANDER AND ADM;  Surgeon: Molina Jimenez MD;  Location:  MAIN OR;  Service: Plastics    REPLACEMENT TOTAL KNEE Right 2017    SIMPLE MASTECTOMY Left 2023    Procedure: LEFT BREAST VERENICE  DIRECTED MASTECTOMY;  Surgeon: Adrien Gabriel MD;  Location:  MAIN OR;  Service: Surgical Oncology    SLEEVE GASTROPLASTY      TONSILLECTOMY      TOTAL HIP ARTHROPLASTY Right 2017    US GUIDED BREAST BIOPSY LEFT COMPLETE Left 2023      Family History   Problem Relation Age of Onset    Hypertension Mother     Diabetes Mother     Heart disease Father         CABG x5    Arthritis Father     No Known Problems Sister     No Known Problems Sister     No Known Problems Daughter     No Known Problems Daughter     No Known Problems Son     Breast cancer Neg Hx       Social History     Tobacco Use    Smoking status: Former     Current packs/day: 0.00     Average packs/day: 0.5 packs/day for 7.0 years (3.5 ttl pk-yrs)     Types: Cigarettes     Start date: 2010     Quit date: 2017     Years since quittin.1    Smokeless tobacco: Never    Tobacco comments:     Have already quit smoking in 2017   Vaping Use    Vaping status: Never Used   Substance Use Topics    Alcohol use: Yes     Alcohol/week: 2.0 standard drinks of alcohol     Types: 2 Glasses of wine per week     Comment: social     Drug use: No      E-Cigarette/Vaping    E-Cigarette Use Never User       E-Cigarette/Vaping Substances    Nicotine No     THC No     CBD No     Flavoring No     Other No       I have reviewed and agree with the history as documented.     HPI    Patient is a 74-year-old female with a past medical history of recently diagnosed leukemia, prosthetic hip, and multiple left hip dislocations presenting for left hip deformity.  Patient states she was walking in her bathroom when she twisted her left lower extremity incorrectly and dislocated her left hip.  Patient states that this last happened a month ago and  has occurred many times prior to this.  She is typically able to reduce these herself.  Patient received 100 mcg of fentanyl by EMS and is complaining of severe pain with any movement.    Review of Systems   Musculoskeletal:  Positive for arthralgias.   All other systems reviewed and are negative.          Objective       ED Triage Vitals   Temperature Pulse Blood Pressure Respirations SpO2 Patient Position - Orthostatic VS   02/06/25 0126 02/06/25 0126 02/06/25 0130 02/06/25 0126 02/06/25 0126 02/06/25 0130   98.5 °F (36.9 °C) 95 (!) 87/54 18 95 % Lying      Temp Source Heart Rate Source BP Location FiO2 (%) Pain Score    02/06/25 0126 02/06/25 0126 02/06/25 0130 -- --    Oral Monitor Right arm        Vitals      Date and Time Temp Pulse SpO2 Resp BP Pain Score FACES Pain Rating User   02/06/25 0330 -- 81 97 % 16 93/54 -- -- PT   02/06/25 0300 -- 82 98 % 16 89/57 -- -- PT   02/06/25 0255 -- 83 98 % 16 92/54 -- -- PT   02/06/25 0250 -- 85 99 % 16 89/50 -- -- PT   02/06/25 0245 -- 86 96 % 17 102/56 -- -- PT   02/06/25 0240 -- 85 92 % 18 93/56 -- -- PT   02/06/25 0230 -- 80 95 % 18 88/54 -- -- PT   02/06/25 0223 -- 84 98 % -- 89/55 -- -- KK   02/06/25 0130 -- 70 -- 18 87/54 -- -- PT   02/06/25 0126 98.5 °F (36.9 °C) 95 95 % 18 -- -- -- PT            Physical Exam  Vitals and nursing note reviewed.   Constitutional:       General: She is not in acute distress.     Appearance: Normal appearance. She is not ill-appearing, toxic-appearing or diaphoretic.   HENT:      Head: Normocephalic and atraumatic.   Eyes:      General: No scleral icterus.        Right eye: No discharge.         Left eye: No discharge.      Extraocular Movements: Extraocular movements intact.      Conjunctiva/sclera: Conjunctivae normal.      Pupils: Pupils are equal, round, and reactive to light.   Cardiovascular:      Rate and Rhythm: Normal rate and regular rhythm.      Pulses: Normal pulses.      Heart sounds: Normal heart sounds. No murmur  heard.  Pulmonary:      Effort: Pulmonary effort is normal. No respiratory distress.      Breath sounds: Normal breath sounds. No stridor. No wheezing, rhonchi or rales.   Abdominal:      General: Bowel sounds are normal. There is no distension.      Palpations: Abdomen is soft.      Tenderness: There is no abdominal tenderness. There is no right CVA tenderness, left CVA tenderness, guarding or rebound. Negative signs include Cuadra's sign and McBurney's sign.   Musculoskeletal:      Cervical back: Normal range of motion and neck supple. No tenderness.      Right hip: No deformity or tenderness. Normal range of motion.      Left hip: Deformity and tenderness present. Decreased range of motion.      Right lower leg: No edema.      Left lower leg: No edema.   Skin:     General: Skin is warm and dry.      Coloration: Skin is not jaundiced.      Findings: No erythema.   Neurological:      General: No focal deficit present.      Mental Status: She is alert and oriented to person, place, and time.      Cranial Nerves: No cranial nerve deficit.      Sensory: No sensory deficit.      Motor: No weakness.   Psychiatric:         Mood and Affect: Mood normal.         Behavior: Behavior normal.         Thought Content: Thought content normal.         Judgment: Judgment normal.       Results Reviewed       None            XR hip/pelv 1 vw left if performed   ED Interpretation by Vincenzo Yeh DO (02/06 0316)   Successful reduction of left hip      XR hip/pelv 2-3 vws left if performed   ED Interpretation by Vincenzo Yeh DO (02/06 0220)   Left prosthetic hip present and dislocated          Orthopedic injury treatment    Date/Time: 2/6/2025 3:00 AM    Performed by: Vincenzo Yeh DO  Authorized by: Vincenzo Yeh DO    Patient Location:  ED  [facility] Protocol:  Procedure performed by: (Adela Johnston MD and Erasmo Diaz MD)  Consent: Written consent obtained.  Risks and benefits: risks, benefits and alternatives were  "discussed  Consent given by: patient  Time out: Immediately prior to procedure a \"time out\" was called to verify the correct patient, procedure, equipment, support staff and site/side marked as required.  Timeout called at: 2/6/2025 2:38 AM.  Patient understanding: patient states understanding of the procedure being performed  Patient consent: the patient's understanding of the procedure matches consent given  Procedure consent: procedure consent matches procedure scheduled  Relevant documents: relevant documents present and verified  Test results: test results available and properly labeled  Radiology Images displayed and confirmed. If images not available, report reviewed: imaging studies available  Required items: required blood products, implants, devices, and special equipment available  Patient identity confirmed: verbally with patient    Injury location:  Hip  Location details:  Left hip  Injury type:  Dislocation  Dislocation type: anterior    Spontaneous?: Yes    Prosthesis?: Yes    Neurovascular status: Neurovascularly intact    Distal perfusion: normal    Neurological function: normal    Range of motion: reduced    Sedation type:  Moderate (conscious) sedation (See separate Procedural Sedation form)  Manipulation performed?: Yes    Reduction method: Allis maneuver  Skeletal traction used?: Yes    Reduction successful?: Yes    Confirmation: Reduction confirmed by x-ray    Neurovascular status: Neurovascularly intact    Distal perfusion: normal    Neurological function: normal    Range of motion: improved    Patient tolerance:  Patient tolerated the procedure well with no immediate complications      ED Medication and Procedure Management   Prior to Admission Medications   Prescriptions Last Dose Informant Patient Reported? Taking?   Apremilast 30 MG TABS  Self Yes No   Sig: Take 30 mg by mouth 2 (two) times a day   Patient not taking: Reported on 8/9/2024   LORazepam (ATIVAN) 0.5 mg tablet   No No   Sig: " TAKE 1 TABLET (0.5 MG TOTAL) BY MOUTH EVERY 8 (EIGHT) HOURS AS NEEDED FOR ANXIETY   Multiple Vitamin (multivitamin) tablet  Self Yes No   Sig: Take 1 tablet by mouth daily   Semaglutide-Weight Management (WEGOVY) 2.4 MG/0.75ML   No No   Sig: Inject 0.75 mL (2.4 mg total) under the skin once a week   acyclovir (ZOVIRAX) 400 MG tablet   No No   Sig: Take 1 tablet (400 mg total) by mouth 2 (two) times a day   anastrozole (ARIMIDEX) 1 mg tablet   No No   Sig: TAKE 1 TABLET (1 MG TOTAL) BY MOUTH DAILY   atorvastatin (LIPITOR) 10 mg tablet   No No   Sig: TAKE 1 TABLET BY MOUTH DAILY   buPROPion (WELLBUTRIN XL) 300 mg 24 hr tablet   No No   Sig: TAKE 1 TABLET (300 MG TOTAL) BY MOUTH EVERY MORNING   cholecalciferol (VITAMIN D3) 400 units tablet  Self Yes No   Sig: Take 400 Units by mouth daily   citalopram (CeleXA) 20 mg tablet   No No   Sig: Take 1 tablet (20 mg total) by mouth daily   fluconazole (DIFLUCAN) 100 mg tablet   No No   Sig: Take 1 tablet (100 mg total) by mouth daily   ixekizumab (Taltz) 80 MG/ML subcutaneous injection   Yes No   Sig: Inject 80 mg under the skin every 28 days   Patient not taking: Reported on 12/7/2024   metFORMIN (GLUCOPHAGE-XR) 500 mg 24 hr tablet   No No   Sig: Take 1 tablet (500 mg total) by mouth daily with dinner   metoprolol succinate (Toprol XL) 25 mg 24 hr tablet   No No   Sig: Take 1 tablet (25 mg total) by mouth daily   naloxone (NARCAN) 4 mg/0.1 mL nasal spray  Self No No   Sig: Administer 1 spray into a nostril. If no response after 2-3 minutes, give another dose in the other nostril using a new spray.   nystatin (MYCOSTATIN) cream  Self No No   Sig: Apply topically 2 (two) times a day   nystatin (MYCOSTATIN) powder  Self No No   Sig: Apply topically 2 (two) times a day   ondansetron (ZOFRAN) 8 mg tablet   No No   Sig: Take 1 tablet (8 mg total) by mouth every 8 (eight) hours as needed for nausea or vomiting   predniSONE 1 mg tablet   Yes No   Sig: TAKE 1 TABLET (1 MG TOTAL) BY  MOUTH 3 (THREE) TIMES A DAY.   prochlorperazine (COMPAZINE) 10 mg tablet   No No   Sig: Take 1 tablet (10 mg total) by mouth every 6 (six) hours as needed for nausea or vomiting   traMADol (ULTRAM) 50 mg tablet   Yes No      Facility-Administered Medications: None     Discharge Medication List as of 2/6/2025  3:30 AM        CONTINUE these medications which have NOT CHANGED    Details   acyclovir (ZOVIRAX) 400 MG tablet Take 1 tablet (400 mg total) by mouth 2 (two) times a day, Starting Tue 12/31/2024, Until Thu 1/30/2025, Normal      anastrozole (ARIMIDEX) 1 mg tablet TAKE 1 TABLET (1 MG TOTAL) BY MOUTH DAILY, Starting Thu 6/27/2024, Normal      Apremilast 30 MG TABS Take 30 mg by mouth 2 (two) times a day, Starting Wed 1/11/2023, Historical Med      atorvastatin (LIPITOR) 10 mg tablet TAKE 1 TABLET BY MOUTH DAILY, Starting Wed 1/8/2025, Normal      buPROPion (WELLBUTRIN XL) 300 mg 24 hr tablet TAKE 1 TABLET (300 MG TOTAL) BY MOUTH EVERY MORNING, Starting Tue 10/22/2024, Until Fri 10/17/2025, Normal      cholecalciferol (VITAMIN D3) 400 units tablet Take 400 Units by mouth daily, Historical Med      citalopram (CeleXA) 20 mg tablet Take 1 tablet (20 mg total) by mouth daily, Starting Wed 12/18/2024, Normal      fluconazole (DIFLUCAN) 100 mg tablet Take 1 tablet (100 mg total) by mouth daily, Starting Tue 12/31/2024, Until Tue 7/29/2025, Normal      ixekizumab (Taltz) 80 MG/ML subcutaneous injection Inject 80 mg under the skin every 28 days, Starting Tue 10/22/2024, Until Wed 8/27/2025, Historical Med      LORazepam (ATIVAN) 0.5 mg tablet TAKE 1 TABLET (0.5 MG TOTAL) BY MOUTH EVERY 8 (EIGHT) HOURS AS NEEDED FOR ANXIETY, Starting Thu 3/28/2024, Normal      metFORMIN (GLUCOPHAGE-XR) 500 mg 24 hr tablet Take 1 tablet (500 mg total) by mouth daily with dinner, Starting Fri 8/9/2024, Normal      metoprolol succinate (Toprol XL) 25 mg 24 hr tablet Take 1 tablet (25 mg total) by mouth daily, Starting Wed 12/18/2024, Normal       Multiple Vitamin (multivitamin) tablet Take 1 tablet by mouth daily, Historical Med      naloxone (NARCAN) 4 mg/0.1 mL nasal spray Administer 1 spray into a nostril. If no response after 2-3 minutes, give another dose in the other nostril using a new spray., Normal      nystatin (MYCOSTATIN) cream Apply topically 2 (two) times a day, Starting Wed 3/22/2023, Normal      nystatin (MYCOSTATIN) powder Apply topically 2 (two) times a day, Starting Wed 3/22/2023, Normal      ondansetron (ZOFRAN) 8 mg tablet Take 1 tablet (8 mg total) by mouth every 8 (eight) hours as needed for nausea or vomiting, Starting Tue 12/31/2024, Normal      predniSONE 1 mg tablet TAKE 1 TABLET (1 MG TOTAL) BY MOUTH 3 (THREE) TIMES A DAY., Historical Med      prochlorperazine (COMPAZINE) 10 mg tablet Take 1 tablet (10 mg total) by mouth every 6 (six) hours as needed for nausea or vomiting, Starting Tue 12/31/2024, Normal      Semaglutide-Weight Management (WEGOVY) 2.4 MG/0.75ML Inject 0.75 mL (2.4 mg total) under the skin once a week, Starting Thu 11/14/2024, Normal      traMADol (ULTRAM) 50 mg tablet Historical Med           No discharge procedures on file.  ED SEPSIS DOCUMENTATION   Time reflects when diagnosis was documented in both MDM as applicable and the Disposition within this note       Time User Action Codes Description Comment    2/6/2025  3:11 AM Vincenzo Yeh Add [S73.035A] Anterior dislocation of left hip, initial encounter (HCC)                  Vincenzo Yeh DO  02/06/25 2120

## 2025-02-07 ENCOUNTER — VBI (OUTPATIENT)
Dept: FAMILY MEDICINE CLINIC | Facility: CLINIC | Age: 75
End: 2025-02-07

## 2025-02-07 ENCOUNTER — TELEPHONE (OUTPATIENT)
Age: 75
End: 2025-02-07

## 2025-02-07 ENCOUNTER — PATIENT OUTREACH (OUTPATIENT)
Dept: CASE MANAGEMENT | Facility: OTHER | Age: 75
End: 2025-02-07

## 2025-02-07 DIAGNOSIS — Z17.0 MALIGNANT NEOPLASM OF LOWER-OUTER QUADRANT OF LEFT BREAST OF FEMALE, ESTROGEN RECEPTOR POSITIVE (HCC): Primary | ICD-10-CM

## 2025-02-07 DIAGNOSIS — E66.811 CLASS 1 OBESITY DUE TO EXCESS CALORIES WITH SERIOUS COMORBIDITY AND BODY MASS INDEX (BMI) OF 32.0 TO 32.9 IN ADULT: ICD-10-CM

## 2025-02-07 DIAGNOSIS — E66.09 CLASS 1 OBESITY DUE TO EXCESS CALORIES WITH SERIOUS COMORBIDITY AND BODY MASS INDEX (BMI) OF 32.0 TO 32.9 IN ADULT: ICD-10-CM

## 2025-02-07 DIAGNOSIS — D46.Z MDS (MYELODYSPLASTIC SYNDROME), HIGH GRADE (HCC): ICD-10-CM

## 2025-02-07 DIAGNOSIS — C50.512 MALIGNANT NEOPLASM OF LOWER-OUTER QUADRANT OF LEFT BREAST OF FEMALE, ESTROGEN RECEPTOR POSITIVE (HCC): Primary | ICD-10-CM

## 2025-02-07 NOTE — TELEPHONE ENCOUNTER
Received a phone call from patient's daughter, Yane.  Patient had first cycle of Vidaza on 1/24.  Patient c/o runny nose that is draining a clear mucus and then nose feeling very dry at times.  Patient denies any fevers and/or any other symptoms.  Encouraged Yane to have patient use a catina pot/nasal rinse and to assure to use only distilled water with it.  Also encouraged to use saline drops when nose is dry and a cool humidifier.  Yane verbalized understanding.  Yane requesting to speak with  regarding resources for patients to utilize any spa services that may be available.  Please call patient with any further recommendations.

## 2025-02-07 NOTE — PROGRESS NOTES
OSW received SW referral, as pts dtr, Yane, requested a TC. OSW placed outreach TC to Yane this afternoon. OSW was unable to leave a VM, as her mailbox is full. OSW will try again at a later date.

## 2025-02-07 NOTE — TELEPHONE ENCOUNTER
02/07/25 10:16 AM    Patient contacted post ED visit, outreach attempt made but message could not be left. Additional outreach attempt will be made.     Thank you.  Harry Zarate MA  [facility]

## 2025-02-08 ENCOUNTER — HOSPITAL ENCOUNTER (INPATIENT)
Facility: HOSPITAL | Age: 75
LOS: 2 days | Discharge: HOME | DRG: 809 | End: 2025-02-11
Attending: EMERGENCY MEDICINE | Admitting: INTERNAL MEDICINE
Payer: MEDICARE

## 2025-02-08 ENCOUNTER — APPOINTMENT (EMERGENCY)
Dept: CT IMAGING | Facility: HOSPITAL | Age: 75
DRG: 809 | End: 2025-02-08
Payer: MEDICARE

## 2025-02-08 ENCOUNTER — APPOINTMENT (EMERGENCY)
Dept: RADIOLOGY | Facility: HOSPITAL | Age: 75
DRG: 809 | End: 2025-02-08
Payer: MEDICARE

## 2025-02-08 DIAGNOSIS — D61.818 PANCYTOPENIA (HCC): ICD-10-CM

## 2025-02-08 DIAGNOSIS — R55 SYNCOPE: Primary | ICD-10-CM

## 2025-02-08 DIAGNOSIS — D69.6 PLATELETS DECREASED (HCC): ICD-10-CM

## 2025-02-08 DIAGNOSIS — D46.Z MDS (MYELODYSPLASTIC SYNDROME), HIGH GRADE (HCC): ICD-10-CM

## 2025-02-08 DIAGNOSIS — D64.9 ANEMIA: ICD-10-CM

## 2025-02-08 LAB
2HR DELTA HS TROPONIN: 0 NG/L
4HR DELTA HS TROPONIN: 0 NG/L
ABO GROUP BLD: NORMAL
ALBUMIN SERPL BCG-MCNC: 3.5 G/DL (ref 3.5–5)
ALP SERPL-CCNC: 75 U/L (ref 34–104)
ALT SERPL W P-5'-P-CCNC: 11 U/L (ref 7–52)
ANION GAP SERPL CALCULATED.3IONS-SCNC: 11 MMOL/L (ref 4–13)
AST SERPL W P-5'-P-CCNC: 12 U/L (ref 13–39)
ATRIAL RATE: 82 BPM
BILIRUB SERPL-MCNC: 0.82 MG/DL (ref 0.2–1)
BLD GP AB SCN SERPL QL: POSITIVE
BUN SERPL-MCNC: 23 MG/DL (ref 5–25)
CALCIUM SERPL-MCNC: 8.9 MG/DL (ref 8.4–10.2)
CARDIAC TROPONIN I PNL SERPL HS: 4 NG/L (ref ?–50)
CHLORIDE SERPL-SCNC: 101 MMOL/L (ref 96–108)
CO2 SERPL-SCNC: 25 MMOL/L (ref 21–32)
CREAT SERPL-MCNC: 1.01 MG/DL (ref 0.6–1.3)
ERYTHROCYTE [DISTWIDTH] IN BLOOD BY AUTOMATED COUNT: 15.9 % (ref 11.6–15.1)
FLUAV RNA RESP QL NAA+PROBE: NEGATIVE
FLUBV RNA RESP QL NAA+PROBE: NEGATIVE
GFR SERPL CREATININE-BSD FRML MDRD: 55 ML/MIN/1.73SQ M
GLUCOSE SERPL-MCNC: 86 MG/DL (ref 65–140)
HCT VFR BLD AUTO: 17.1 % (ref 34.8–46.1)
HGB BLD-MCNC: 5.8 G/DL (ref 11.5–15.4)
MCH RBC QN AUTO: 35.4 PG (ref 26.8–34.3)
MCHC RBC AUTO-ENTMCNC: 33.9 G/DL (ref 31.4–37.4)
MCV RBC AUTO: 104 FL (ref 82–98)
P AXIS: 63 DEGREES
PLATELET # BLD AUTO: 79 THOUSANDS/UL (ref 149–390)
PMV BLD AUTO: 11.4 FL (ref 8.9–12.7)
POTASSIUM SERPL-SCNC: 3.5 MMOL/L (ref 3.5–5.3)
PR INTERVAL: 186 MS
PROT SERPL-MCNC: 7.5 G/DL (ref 6.4–8.4)
QRS AXIS: 33 DEGREES
QRSD INTERVAL: 100 MS
QT INTERVAL: 404 MS
QTC INTERVAL: 472 MS
RBC # BLD AUTO: 1.64 MILLION/UL (ref 3.81–5.12)
RH BLD: POSITIVE
RSV RNA RESP QL NAA+PROBE: NEGATIVE
SARS-COV-2 RNA RESP QL NAA+PROBE: NEGATIVE
SODIUM SERPL-SCNC: 137 MMOL/L (ref 135–147)
SPECIMEN EXPIRATION DATE: NORMAL
T WAVE AXIS: 70 DEGREES
VENTRICULAR RATE: 82 BPM
WBC # BLD AUTO: 1.02 THOUSAND/UL (ref 4.31–10.16)

## 2025-02-08 PROCEDURE — 86922 COMPATIBILITY TEST ANTIGLOB: CPT

## 2025-02-08 PROCEDURE — 70450 CT HEAD/BRAIN W/O DYE: CPT

## 2025-02-08 PROCEDURE — 36415 COLL VENOUS BLD VENIPUNCTURE: CPT

## 2025-02-08 PROCEDURE — 93010 ELECTROCARDIOGRAM REPORT: CPT | Performed by: INTERNAL MEDICINE

## 2025-02-08 PROCEDURE — 96361 HYDRATE IV INFUSION ADD-ON: CPT

## 2025-02-08 PROCEDURE — 96360 HYDRATION IV INFUSION INIT: CPT

## 2025-02-08 PROCEDURE — 86921 COMPATIBILITY TEST INCUBATE: CPT

## 2025-02-08 PROCEDURE — 86900 BLOOD TYPING SEROLOGIC ABO: CPT

## 2025-02-08 PROCEDURE — 30233N1 TRANSFUSION OF NONAUTOLOGOUS RED BLOOD CELLS INTO PERIPHERAL VEIN, PERCUTANEOUS APPROACH: ICD-10-PCS | Performed by: HOSPITALIST

## 2025-02-08 PROCEDURE — 93005 ELECTROCARDIOGRAM TRACING: CPT

## 2025-02-08 PROCEDURE — P9040 RBC LEUKOREDUCED IRRADIATED: HCPCS

## 2025-02-08 PROCEDURE — 73030 X-RAY EXAM OF SHOULDER: CPT

## 2025-02-08 PROCEDURE — P9016 RBC LEUKOCYTES REDUCED: HCPCS

## 2025-02-08 PROCEDURE — 99285 EMERGENCY DEPT VISIT HI MDM: CPT

## 2025-02-08 PROCEDURE — 99223 1ST HOSP IP/OBS HIGH 75: CPT | Performed by: HOSPITALIST

## 2025-02-08 PROCEDURE — 86902 BLOOD TYPE ANTIGEN DONOR EA: CPT

## 2025-02-08 PROCEDURE — 86901 BLOOD TYPING SEROLOGIC RH(D): CPT

## 2025-02-08 PROCEDURE — 85025 COMPLETE CBC W/AUTO DIFF WBC: CPT

## 2025-02-08 PROCEDURE — 99291 CRITICAL CARE FIRST HOUR: CPT | Performed by: EMERGENCY MEDICINE

## 2025-02-08 PROCEDURE — 36430 TRANSFUSION BLD/BLD COMPNT: CPT

## 2025-02-08 PROCEDURE — 80053 COMPREHEN METABOLIC PANEL: CPT

## 2025-02-08 PROCEDURE — 86850 RBC ANTIBODY SCREEN: CPT

## 2025-02-08 PROCEDURE — 84484 ASSAY OF TROPONIN QUANT: CPT

## 2025-02-08 PROCEDURE — 0241U HB NFCT DS VIR RESP RNA 4 TRGT: CPT

## 2025-02-08 RX ORDER — CITALOPRAM HYDROBROMIDE 20 MG/1
20 TABLET ORAL DAILY
Status: DISCONTINUED | OUTPATIENT
Start: 2025-02-09 | End: 2025-02-11 | Stop reason: HOSPADM

## 2025-02-08 RX ORDER — NYSTATIN 100000 [USP'U]/G
POWDER TOPICAL 2 TIMES DAILY
Status: DISCONTINUED | OUTPATIENT
Start: 2025-02-08 | End: 2025-02-11 | Stop reason: HOSPADM

## 2025-02-08 RX ORDER — TRAMADOL HYDROCHLORIDE 50 MG/1
50 TABLET ORAL
Status: DISCONTINUED | OUTPATIENT
Start: 2025-02-09 | End: 2025-02-11 | Stop reason: HOSPADM

## 2025-02-08 RX ORDER — ANASTROZOLE 1 MG/1
1 TABLET ORAL DAILY
Status: DISCONTINUED | OUTPATIENT
Start: 2025-02-09 | End: 2025-02-11 | Stop reason: HOSPADM

## 2025-02-08 RX ORDER — ENOXAPARIN SODIUM 100 MG/ML
40 INJECTION SUBCUTANEOUS
Status: DISCONTINUED | OUTPATIENT
Start: 2025-02-09 | End: 2025-02-11 | Stop reason: HOSPADM

## 2025-02-08 RX ORDER — METOPROLOL SUCCINATE 25 MG/1
25 TABLET, EXTENDED RELEASE ORAL DAILY
Status: DISCONTINUED | OUTPATIENT
Start: 2025-02-09 | End: 2025-02-11 | Stop reason: HOSPADM

## 2025-02-08 RX ORDER — NYSTATIN 100000 U/G
CREAM TOPICAL 2 TIMES DAILY PRN
Status: DISCONTINUED | OUTPATIENT
Start: 2025-02-08 | End: 2025-02-11 | Stop reason: HOSPADM

## 2025-02-08 RX ORDER — ACETAMINOPHEN 325 MG/1
650 TABLET ORAL ONCE
Status: COMPLETED | OUTPATIENT
Start: 2025-02-08 | End: 2025-02-08

## 2025-02-08 RX ORDER — PREDNISONE 1 MG/1
1 TABLET ORAL 3 TIMES DAILY
Status: DISCONTINUED | OUTPATIENT
Start: 2025-02-08 | End: 2025-02-11 | Stop reason: HOSPADM

## 2025-02-08 RX ORDER — ATORVASTATIN CALCIUM 10 MG/1
10 TABLET, FILM COATED ORAL DAILY
Status: DISCONTINUED | OUTPATIENT
Start: 2025-02-09 | End: 2025-02-11 | Stop reason: HOSPADM

## 2025-02-08 RX ORDER — FLUCONAZOLE 100 MG/1
100 TABLET ORAL DAILY
Status: DISCONTINUED | OUTPATIENT
Start: 2025-02-09 | End: 2025-02-11 | Stop reason: HOSPADM

## 2025-02-08 RX ADMIN — ACETAMINOPHEN 650 MG: 325 TABLET, FILM COATED ORAL at 16:54

## 2025-02-08 RX ADMIN — PREDNISONE 1 MG: 1 TABLET ORAL at 21:43

## 2025-02-08 RX ADMIN — SODIUM CHLORIDE 1000 ML: 0.9 INJECTION, SOLUTION INTRAVENOUS at 18:09

## 2025-02-08 RX ADMIN — SODIUM CHLORIDE 500 ML: 0.9 INJECTION, SOLUTION INTRAVENOUS at 14:02

## 2025-02-08 NOTE — ED PROVIDER NOTES
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  Prodromal symptoms included lightheadedness and generalized weakness. No preceding chest pain, seizure like symptoms, or focal neurological deficits.  Vitals showed mild hypotension 99/58, patient given 500 mL bolus of sodium chloride.  CT head wo contrast was negative for any acute intracranial hemorrhage, fracture or infarction., R Shoulder Xray wet read negative for fracture, CBC&D for pancytopenia, with leukopenia 1.02, anemia of 5.8, patient consented to transfusion and is set to receive 2 units pRBC's. EKG showed NSR with PVC's, 0hr trops wnl. Patient admitted to [facility] for observation with telemetry.   Differential diagnosis includes orthostatic or postural hypotension, situational syncope, syncope due to cardiac origin, vasovagal syncope,  seizures, transient ischemic attack or stroke, hypoglycemia, vertigo, narcolepsy, adrenal insufficiency, sepsis,    Amount and/or Complexity of Data Reviewed  Labs: ordered.  Radiology: ordered and independent interpretation performed.    Risk  OTC drugs.  Decision regarding hospitalization.             Medications   sodium chloride 0.9 % bolus 500 mL (has no administration in time range)       ED Risk Strat Scores                          SBIRT 22yo+      Flowsheet Row Most Recent Value   Initial Alcohol Screen: US AUDIT-C     1. How often do you have a drink containing alcohol? 1 Filed at: 02/08/2025 1249   2. How many drinks containing alcohol do you have on a typical day you are drinking?  0 Filed at: 02/08/2025 124   3b. FEMALE Any Age, or MALE 65+: How often do you have 4 or more drinks on one occassion? 0 Filed at: 02/08/2025 1245   Audit-C Score 1 Filed at:  02/08/2025 1245   CHANI: How many times in the past year have you...    Used an illegal drug or used a prescription medication for non-medical reasons? Never Filed at: 02/08/2025 1245                            History of Present Illness       Chief Complaint   Patient presents with    Syncope     Arrives via ems from home, syncope x2, was attempting to go to bathroom, +HS, -thinners/ASA       Past Medical History:   Diagnosis Date    Allergic     Anxiety     Arthritis     Breast cancer (HCC) 09/2023    CAD (coronary artery disease)     Coronary artery disease [id number]    Depression     Diabetes mellitus (HCC) 5/1/24    Mother-Diabetic    Dyslipidemia     Hiatal hernia     Hyperlipidemia     Hypertension     Obesity     Obesity (BMI 30-39.9)     Psoriatic arthritis (HCC)     Rheumatoid arthritis (HCC)     Scoliosis     SOB (shortness of breath)       Past Surgical History:   Procedure Laterality Date    BARIATRIC SURGERY  2014    BILE DUCT EXPLORATION      replacement per [place]    BREAST BIOPSY  9/23    CARPAL TUNNEL RELEASE Bilateral 2002    CORONARY ARTERY BYPASS GRAFT  2017    FOOT SURGERY Right     bone surgery to straighten toe.    HIP SURGERY Left 2015    IR BIOPSY BONE MARROW  12/11/2024    IR DRAINAGE TUBE PLACEMENT  12/19/2023    JOINT REPLACEMENT      LYMPH NODE BIOPSY Left 11/20/2023    Procedure: LEFT LYMPHATIC MAPPING WITH BLUE AND RADIOACTIVE DYES, SENTINEL LYMPH NODE BIOPSY;  Surgeon: Adrien Gabriel MD;  Location:  MAIN OR;  Service: Surgical Oncology    LYMPH NODE DISSECTION Left 11/20/2023    Procedure: POSSIBLE AXILLARY DISSECTION;  Surgeon: Adrien Gabriel MD;  Location:  MAIN OR;  Service: Surgical Oncology    ME BREAST REDUCTION Bilateral 01/16/2024    Procedure: RIGHT BREAST REDUCTION AND LEFT BREAST EXPANDER EXCHANGE FOR PERMANENT IMPLANT. REVISION OF RECONSTRUCTED LEFT BREAST.;  Surgeon: Molina Jimenez MD;  Location:  MAIN OR;  Service: Plastics    ME SUCTION ASSISTED LIPECTOMY TRUNK Bilateral  2024    Procedure: LIPOSUCTION BREAST;  Surgeon: Molina Jimenez MD;  Location: [facility] MAIN OR;  Service: Plastics    OH TISSUE EXPANDER PLACEMENT BREAST RECONSTRUCTION Left 2023    Procedure: IMMEDIATE LEFT BREAST RECONSTRUCTION WITH TISSUE EXPANDER AND ADM;  Surgeon: Molina Jimenez MD;  Location: [facility] MAIN OR;  Service: Plastics    REPLACEMENT TOTAL KNEE Right 2017    SIMPLE MASTECTOMY Left 2023    Procedure: LEFT BREAST VERENICE  DIRECTED MASTECTOMY;  Surgeon: Adrien Gabriel MD;  Location: [facility] MAIN OR;  Service: Surgical Oncology    SLEEVE GASTROPLASTY      TONSILLECTOMY      TOTAL HIP ARTHROPLASTY Right 2017    US GUIDED BREAST BIOPSY LEFT COMPLETE Left 2023      Family History   Problem Relation Age of Onset    Hypertension Mother     Diabetes Mother     Heart disease Father         CABG x5    Arthritis Father     No Known Problems Sister     No Known Problems Sister     No Known Problems Daughter     No Known Problems Daughter     No Known Problems Son     Breast cancer Neg Hx       Social History     Tobacco Use    Smoking status: Former     Current packs/day: 0.00     Average packs/day: 0.5 packs/day for 7.0 years (3.5 ttl pk-yrs)     Types: Cigarettes     Start date: 2010     Quit date:      Years since quittin.1    Smokeless tobacco: Never    Tobacco comments:     Have already quit smoking in 2017   Vaping Use    Vaping status: Never Used   Substance Use Topics    Alcohol use: Yes     Alcohol/week: 2.0 standard drinks of alcohol     Types: 2 Glasses of wine per week     Comment: social     Drug use: No      E-Cigarette/Vaping    E-Cigarette Use Never User       E-Cigarette/Vaping Substances    Nicotine No     THC No     CBD No     Flavoring No     Other No       I have reviewed and agree with the history as documented.     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  Patient states she was walking in the bathroom this morning when she began feeling lightheaded and felt like her legs became weak from underneath her and ultimately lost consciousness.  Her  who was in a nearby room heard a thud and went to see the patient.  The patient total loss of consciousness was less than 1 minute as per daughter bedside.  Positive for head strike and trauma of the right shoulder.  Patient is not currently on any aspirin or blood thinners.  Patient denies any tongue biting, urinary incontinence, or tonic-clonic movements.  She does endorse confusion after the episode of loss of consciousness and difficulty finding her words but that has resolved.  She denies any focal weaknesses, loss of sensation or headaches.  She denies any prodromal chest pain, nausea or vomiting.  She does endorse a history of exertional dyspnea that has worsened over the last few months, she denies any chest pain on exertion, orthopnea, PND or increased leg swelling. She does endorses 1 day history of runny nose and states she took cloropheramine yesterday for allergy relief. She denies fevers, chills, sore throat, cough, chest pain, abdominal pain, nausea, vomiting, diarrhea, constipation, dysuria, increased urinary frequency or urgency.  Patient states she drinks about 16 ounces of water a day, she is not currently taking any diuretics.      Syncope  Associated symptoms: shortness of breath and weakness    Associated symptoms: no chest pain, no fever, no nausea, no palpitations and no vomiting        Review of Systems   Constitutional:  Positive for fatigue. Negative for chills and fever.   HENT:  Negative for sore throat.    Respiratory:  Positive for shortness of breath.    Cardiovascular:  Positive for syncope. Negative for chest pain, palpitations and leg swelling.   Gastrointestinal:  Negative for abdominal pain, diarrhea, nausea and  vomiting.   Genitourinary:  Negative for dysuria, frequency, hematuria and urgency.   Musculoskeletal:  Positive for arthralgias.   Skin:  Negative for color change and rash.   Neurological:  Positive for syncope, weakness and light-headedness.           Objective       ED Triage Vitals [02/08/25 1244]   Temperature Pulse Blood Pressure Respirations SpO2 Patient Position - Orthostatic VS   98.4 °F (36.9 °C) 89 99/54 18 99 % Sitting      Temp Source Heart Rate Source BP Location FiO2 (%) Pain Score    Oral Monitor Right arm -- --      Vitals      Date and Time Temp Pulse SpO2 Resp BP Pain Score FACES Pain Rating User   02/08/25 1244 98.4 °F (36.9 °C) 89 99 % 18 99/54 -- -- DP            Physical Exam  Vitals and nursing note reviewed.   Constitutional:       General: She is not in acute distress.     Appearance: She is well-developed.   HENT:      Head: Normocephalic and atraumatic.      Mouth/Throat:      Mouth: Mucous membranes are dry.      Pharynx: No oropharyngeal exudate or posterior oropharyngeal erythema.   Eyes:      Comments: Sclera anicteric   Pale conjunctiva   Cardiovascular:      Rate and Rhythm: Normal rate and regular rhythm.      Heart sounds: No murmur heard.  Pulmonary:      Effort: Pulmonary effort is normal. No respiratory distress.      Breath sounds: Normal breath sounds. No wheezing, rhonchi or rales.   Abdominal:      General: There is no distension.      Palpations: Abdomen is soft.      Tenderness: There is no abdominal tenderness. There is no guarding or rebound.   Musculoskeletal:         General: Tenderness present. No swelling.      Cervical back: Neck supple.      Comments: Right shoulder tenderness near the acromion     Skin:     General: Skin is warm and dry.      Capillary Refill: Capillary refill takes 2 to 3 seconds.      Findings: Bruising present.      Comments: Bilateral supraorbital bruising   Neurological:      Mental Status: She is alert and oriented to person, place, and  time.      Motor: No weakness.   Psychiatric:         Mood and Affect: Mood normal.         Results Reviewed       Procedure Component Value Units Date/Time    CBC and differential [[id number]]     Lab Status: No result Specimen: Blood     Comprehensive metabolic panel [[id number]]     Lab Status: No result Specimen: Blood     HS Troponin 0hr (reflex protocol) [[id number]]     Lab Status: No result Specimen: Blood     FLU/RSV/COVID - if FLU/RSV clinically relevant (2hr TAT) [[id number]]     Lab Status: No result Specimen: Nares from Nose             CT head wo contrast    (Results Pending)   XR shoulder 2+ views RIGHT    (Results Pending)       ECG 12 Lead Documentation Only    Date/Time: 2/8/2025 1:48 PM    Performed by: Tabby Aguiar MD  Authorized by: Tabby Aguiar MD    Indications / Diagnosis:  Syncope  ECG reviewed by me, the ED Provider: yes    Patient location:  ED  Previous ECG:     Previous ECG:  Compared to current    Similarity:  No change    Comparison to cardiac monitor: Yes    Interpretation:     Interpretation: normal    Rate:     ECG rate:  82    ECG rate assessment: normal    Rhythm:     Rhythm: sinus rhythm    Ectopy:     Ectopy: PVCs    QRS:     QRS axis:  Normal  ST segments:     ST segments:  Normal      ED Medication and Procedure Management   Prior to Admission Medications   Prescriptions Last Dose Informant Patient Reported? Taking?   Apremilast 30 MG TABS  Self Yes No   Sig: Take 30 mg by mouth 2 (two) times a day   Patient not taking: Reported on 8/9/2024   LORazepam (ATIVAN) 0.5 mg tablet   No No   Sig: TAKE 1 TABLET (0.5 MG TOTAL) BY MOUTH EVERY 8 (EIGHT) HOURS AS NEEDED FOR ANXIETY   Multiple Vitamin (multivitamin) tablet  Self Yes No   Sig: Take 1 tablet by mouth daily   Semaglutide-Weight Management (WEGOVY) 2.4 MG/0.75ML   No No   Sig: Inject 0.75 mL (2.4 mg total) under the skin once a week   acyclovir (ZOVIRAX) 400 MG tablet   No No   Sig: Take 1 tablet (400 mg total) by mouth 2  (two) times a day   anastrozole (ARIMIDEX) 1 mg tablet   No No   Sig: TAKE 1 TABLET (1 MG TOTAL) BY MOUTH DAILY   atorvastatin (LIPITOR) 10 mg tablet   No No   Sig: TAKE 1 TABLET BY MOUTH DAILY   buPROPion (WELLBUTRIN XL) 300 mg 24 hr tablet   No No   Sig: TAKE 1 TABLET (300 MG TOTAL) BY MOUTH EVERY MORNING   cholecalciferol (VITAMIN D3) 400 units tablet  Self Yes No   Sig: Take 400 Units by mouth daily   citalopram (CeleXA) 20 mg tablet   No No   Sig: Take 1 tablet (20 mg total) by mouth daily   fluconazole (DIFLUCAN) 100 mg tablet   No No   Sig: Take 1 tablet (100 mg total) by mouth daily   ixekizumab (Taltz) 80 MG/ML subcutaneous injection   Yes No   Sig: Inject 80 mg under the skin every 28 days   Patient not taking: Reported on 12/7/2024   metFORMIN (GLUCOPHAGE-XR) 500 mg 24 hr tablet   No No   Sig: Take 1 tablet (500 mg total) by mouth daily with dinner   metoprolol succinate (Toprol XL) 25 mg 24 hr tablet   No No   Sig: Take 1 tablet (25 mg total) by mouth daily   naloxone (NARCAN) 4 mg/0.1 mL nasal spray  Self No No   Sig: Administer 1 spray into a nostril. If no response after 2-3 minutes, give another dose in the other nostril using a new spray.   nystatin (MYCOSTATIN) cream  Self No No   Sig: Apply topically 2 (two) times a day   nystatin (MYCOSTATIN) powder  Self No No   Sig: Apply topically 2 (two) times a day   ondansetron (ZOFRAN) 8 mg tablet   No No   Sig: Take 1 tablet (8 mg total) by mouth every 8 (eight) hours as needed for nausea or vomiting   predniSONE 1 mg tablet   Yes No   Sig: TAKE 1 TABLET (1 MG TOTAL) BY MOUTH 3 (THREE) TIMES A DAY.   prochlorperazine (COMPAZINE) 10 mg tablet   No No   Sig: Take 1 tablet (10 mg total) by mouth every 6 (six) hours as needed for nausea or vomiting   traMADol (ULTRAM) 50 mg tablet   Yes No      Facility-Administered Medications: None     Patient's Medications   Discharge Prescriptions    No medications on file     No discharge procedures on file.  ED SEPSIS  DOCUMENTATION            Tabby Aguiar MD  02/08/25 6590

## 2025-02-08 NOTE — Clinical Note
----- Message from Roshan San MD sent at 5/31/2019  5:39 AM EDT -----  Normal stress test!    Above stress results given to patient.  2 pt identifiers used Case was discussed with Dr. Young and the patient's admission status was agreed to be Admission Status: observation status to the service of Dr. Griffiths .

## 2025-02-08 NOTE — ED ATTENDING ATTESTATION
2/8/2025  I, Lauri Centeno MD, saw and evaluated the patient. I have discussed the patient with the resident/non-physician practitioner and agree with the resident's/non-physician practitioner's findings, Plan of Care, and MDM as documented in the resident's/non-physician practitioner's note, except where noted. All available labs and Radiology studies were reviewed.  I was present for key portions of any procedure(s) performed by the resident/non-physician practitioner and I was immediately available to provide assistance.       At this point I agree with the current assessment done in the Emergency Department.  I have conducted an independent evaluation of this patient a history and physical is as follows: Syncopal event with head strike.  Patient did have prodrome.  History of AML, pancytopenia.    No source of active bleeding on history or examination    Hemoglobin 5.8, down from baseline    Did have head strike.  No trauma alert criteria, CT head with no acute traumatic injuries.     Will transfuse 2 units pRBCs and admit to the hospitalist team.    Critical Care Time Statement: Upon my evaluation, this patient had a high probability of imminent or life-threatening deterioration due to anemia requiring initiation of emergent blood transfusion, which required my direct attention, intervention, and personal management.  I spent a total of 47 minutes directly providing critical care services, including interpretation of complex medical databases, evaluating for the presence of life-threatening injuries or illnesses, complex medical decision making (to support/prevent further life-threatening deterioration)., and interpretation of hemodynamic data. This time is exclusive of procedures, teaching, treating other patients, family meetings, and any prior time recorded by providers other than myself.        Results Reviewed       Procedure Component Value Units Date/Time    HS Troponin I 2hr [[id number]]  Collected: 02/08/25 1657    Lab Status: In process Specimen: Blood from Arm, Right Updated: 02/08/25 1659    HS Troponin I 4hr [[id number]]     Lab Status: No result Specimen: Blood     CBC and differential [[id number]]  (Abnormal) Collected: 02/08/25 1401    Lab Status: Final result Specimen: Blood from Arm, Left Updated: 02/08/25 1510     WBC 1.02 Thousand/uL      RBC 1.64 Million/uL      Hemoglobin 5.8 g/dL      Hematocrit 17.1 %       fL      MCH 35.4 pg      MCHC 33.9 g/dL      RDW 15.9 %      MPV 11.4 fL      Platelets 79 Thousands/uL     Narrative:      This is an appended report.  These results have been appended to a previously verified report.    FLU/RSV/COVID - if FLU/RSV clinically relevant (2hr TAT) [[id number]]  (Normal) Collected: 02/08/25 1401    Lab Status: Final result Specimen: Nares from Nose Updated: 02/08/25 1446     SARS-CoV-2 Negative     INFLUENZA A PCR Negative     INFLUENZA B PCR Negative     RSV PCR Negative    Narrative:      This test has been performed using the CoV-2/Flu/RSV plus assay on the [employer] GeneXpert platform. This test has been validated by the  and verified by the performing laboratory.     This test is designed to amplify and detect the following: nucleocapsid (N), envelope (E), and RNA-dependent RNA polymerase (RdRP) genes of the SARS-CoV-2 genome; matrix (M), basic polymerase (PB2), and acidic protein (PA) segments of the influenza A genome; matrix (M) and non-structural protein (NS) segments of the influenza B genome, and the nucleocapsid genes of RSV A and RSV B.     Positive results are indicative of the presence of Flu A, Flu B, RSV, and/or SARS-CoV-2 RNA. Positive results for SARS-CoV-2 or suspected novel influenza should be reported to state, local, or federal health departments according to local reporting requirements.      All results should be assessed in conjunction with clinical presentation and other laboratory markers for clinical  management.     FOR PEDIATRIC PATIENTS - copy/paste COVID Guidelines URL to browser: [email or web address]       HS Troponin 0hr (reflex protocol) [[id number]]  (Normal) Collected: 02/08/25 1401    Lab Status: Final result Specimen: Blood from Arm, Left Updated: 02/08/25 1431     hs TnI 0hr 4 ng/L     Comprehensive metabolic panel [[id number]]  (Abnormal) Collected: 02/08/25 1401    Lab Status: Final result Specimen: Blood from Arm, Left Updated: 02/08/25 1430     Sodium 137 mmol/L      Potassium 3.5 mmol/L      Chloride 101 mmol/L      CO2 25 mmol/L      ANION GAP 11 mmol/L      BUN 23 mg/dL      Creatinine 1.01 mg/dL      Glucose 86 mg/dL      Calcium 8.9 mg/dL      AST 12 U/L      ALT 11 U/L      Alkaline Phosphatase 75 U/L      Total Protein 7.5 g/dL      Albumin 3.5 g/dL      Total Bilirubin 0.82 mg/dL      eGFR 55 ml/min/1.73sq m     Narrative:      National Kidney Disease Foundation guidelines for Chronic Kidney Disease (CKD):     Stage 1 with normal or high GFR (GFR > 90 mL/min/1.73 square meters)    Stage 2 Mild CKD (GFR = 60-89 mL/min/1.73 square meters)    Stage 3A Moderate CKD (GFR = 45-59 mL/min/1.73 square meters)    Stage 3B Moderate CKD (GFR = 30-44 mL/min/1.73 square meters)    Stage 4 Severe CKD (GFR = 15-29 mL/min/1.73 square meters)    Stage 5 End Stage CKD (GFR <15 mL/min/1.73 square meters)  Note: GFR calculation is accurate only with a steady state creatinine          CT head wo contrast   Final Result by Terrance Laurent MD (02/08 1654)      1. No intracranial hemorrhage or calvarial fracture.      2. Scattered chronic microvascular ischemic change. Chronic appearing left paramedian pontine lacunar infarct.                  Resident: SHANTELLE SINGLETARY I, the attending radiologist, have reviewed the images and agree with the final report above.      Workstation performed: [id number]         XR shoulder 2+ views RIGHT   ED Interpretation by  Lauri Centeno MD (02/08 0354)   No acute osseous abnormality identified on my independent evaluation of right shoulder x-ray.            ED Course         Critical Care Time  Procedures

## 2025-02-09 ENCOUNTER — APPOINTMENT (OUTPATIENT)
Dept: CT IMAGING | Facility: HOSPITAL | Age: 75
DRG: 809 | End: 2025-02-09
Payer: MEDICARE

## 2025-02-09 LAB
ABO GROUP BLD BPU: NORMAL
ABO GROUP BLD BPU: NORMAL
ALBUMIN SERPL BCG-MCNC: 3 G/DL (ref 3.5–5)
ALP SERPL-CCNC: 64 U/L (ref 34–104)
ALT SERPL W P-5'-P-CCNC: 10 U/L (ref 7–52)
ANION GAP SERPL CALCULATED.3IONS-SCNC: 7 MMOL/L (ref 4–13)
ANISOCYTOSIS BLD QL SMEAR: PRESENT
AST SERPL W P-5'-P-CCNC: 11 U/L (ref 13–39)
BASOPHILS # BLD MANUAL: 0 THOUSAND/UL (ref 0–0.1)
BASOPHILS NFR MAR MANUAL: 0 % (ref 0–1)
BILIRUB SERPL-MCNC: 1.11 MG/DL (ref 0.2–1)
BPU ID: NORMAL
BPU ID: NORMAL
BUN SERPL-MCNC: 24 MG/DL (ref 5–25)
CALCIUM ALBUM COR SERPL-MCNC: 9.3 MG/DL (ref 8.3–10.1)
CALCIUM SERPL-MCNC: 8.5 MG/DL (ref 8.4–10.2)
CHLORIDE SERPL-SCNC: 106 MMOL/L (ref 96–108)
CO2 SERPL-SCNC: 27 MMOL/L (ref 21–32)
CREAT SERPL-MCNC: 0.83 MG/DL (ref 0.6–1.3)
CROSSMATCH: NORMAL
CROSSMATCH: NORMAL
EOSINOPHIL # BLD MANUAL: 0 THOUSAND/UL (ref 0–0.4)
EOSINOPHIL NFR BLD MANUAL: 0 % (ref 0–6)
ERYTHROCYTE [DISTWIDTH] IN BLOOD BY AUTOMATED COUNT: 20.4 % (ref 11.6–15.1)
FERRITIN SERPL-MCNC: 513 NG/ML (ref 11–307)
GFR SERPL CREATININE-BSD FRML MDRD: 70 ML/MIN/1.73SQ M
GIANT PLATELETS BLD QL SMEAR: PRESENT
GLUCOSE SERPL-MCNC: 95 MG/DL (ref 65–140)
HCT VFR BLD AUTO: 21.4 % (ref 34.8–46.1)
HGB BLD-MCNC: 7.3 G/DL (ref 11.5–15.4)
IRON SATN MFR SERPL: 83 % (ref 15–50)
IRON SERPL-MCNC: 172 UG/DL (ref 50–212)
LYMPHOCYTES # BLD AUTO: 1.75 THOUSAND/UL (ref 0.6–4.47)
LYMPHOCYTES # BLD AUTO: 84 % (ref 14–44)
MCH RBC QN AUTO: 33.5 PG (ref 26.8–34.3)
MCHC RBC AUTO-ENTMCNC: 34.1 G/DL (ref 31.4–37.4)
MCV RBC AUTO: 98 FL (ref 82–98)
MONOCYTES # BLD AUTO: 0.04 THOUSAND/UL (ref 0–1.22)
MONOCYTES NFR BLD: 2 % (ref 4–12)
NEUTROPHILS # BLD MANUAL: 0.29 THOUSAND/UL (ref 1.85–7.62)
NEUTS SEG NFR BLD AUTO: 14 % (ref 43–75)
NRBC BLD AUTO-RTO: 2 /100 WBC (ref 0–2)
OVALOCYTES BLD QL SMEAR: PRESENT
PLATELET # BLD AUTO: 77 THOUSANDS/UL (ref 149–390)
PLATELET BLD QL SMEAR: ABNORMAL
PMV BLD AUTO: 11 FL (ref 8.9–12.7)
POIKILOCYTOSIS BLD QL SMEAR: PRESENT
POLYCHROMASIA BLD QL SMEAR: PRESENT
POTASSIUM SERPL-SCNC: 3.9 MMOL/L (ref 3.5–5.3)
PROT SERPL-MCNC: 6.4 G/DL (ref 6.4–8.4)
RBC # BLD AUTO: 2.18 MILLION/UL (ref 3.81–5.12)
RBC MORPH BLD: PRESENT
SODIUM SERPL-SCNC: 140 MMOL/L (ref 135–147)
TIBC SERPL-MCNC: 207.2 UG/DL (ref 250–450)
TRANSFERRIN SERPL-MCNC: 148 MG/DL (ref 203–362)
UIBC SERPL-MCNC: <55 UG/DL (ref 155–355)
UNIT DISPENSE STATUS: NORMAL
UNIT DISPENSE STATUS: NORMAL
UNIT PRODUCT CODE: NORMAL
UNIT PRODUCT CODE: NORMAL
UNIT PRODUCT VOLUME: 350 ML
UNIT PRODUCT VOLUME: 350 ML
UNIT RH: NORMAL
UNIT RH: NORMAL
WBC # BLD AUTO: 2.08 THOUSAND/UL (ref 4.31–10.16)

## 2025-02-09 PROCEDURE — 72125 CT NECK SPINE W/O DYE: CPT

## 2025-02-09 PROCEDURE — 80053 COMPREHEN METABOLIC PANEL: CPT

## 2025-02-09 PROCEDURE — 99232 SBSQ HOSP IP/OBS MODERATE 35: CPT | Performed by: INTERNAL MEDICINE

## 2025-02-09 PROCEDURE — 74177 CT ABD & PELVIS W/CONTRAST: CPT

## 2025-02-09 PROCEDURE — 83550 IRON BINDING TEST: CPT | Performed by: INTERNAL MEDICINE

## 2025-02-09 PROCEDURE — 85027 COMPLETE CBC AUTOMATED: CPT

## 2025-02-09 PROCEDURE — 71260 CT THORAX DX C+: CPT

## 2025-02-09 PROCEDURE — 99223 1ST HOSP IP/OBS HIGH 75: CPT | Performed by: INTERNAL MEDICINE

## 2025-02-09 PROCEDURE — 85007 BL SMEAR W/DIFF WBC COUNT: CPT

## 2025-02-09 PROCEDURE — 30233N1 TRANSFUSION OF NONAUTOLOGOUS RED BLOOD CELLS INTO PERIPHERAL VEIN, PERCUTANEOUS APPROACH: ICD-10-PCS | Performed by: INTERNAL MEDICINE

## 2025-02-09 PROCEDURE — 86902 BLOOD TYPE ANTIGEN DONOR EA: CPT

## 2025-02-09 PROCEDURE — 87040 BLOOD CULTURE FOR BACTERIA: CPT

## 2025-02-09 PROCEDURE — 36415 COLL VENOUS BLD VENIPUNCTURE: CPT

## 2025-02-09 PROCEDURE — 83540 ASSAY OF IRON: CPT | Performed by: INTERNAL MEDICINE

## 2025-02-09 PROCEDURE — 82728 ASSAY OF FERRITIN: CPT

## 2025-02-09 PROCEDURE — P9016 RBC LEUKOCYTES REDUCED: HCPCS

## 2025-02-09 RX ORDER — DIPHENHYDRAMINE HYDROCHLORIDE 50 MG/ML
50 INJECTION INTRAMUSCULAR; INTRAVENOUS ONCE
Status: DISCONTINUED | OUTPATIENT
Start: 2025-02-09 | End: 2025-02-11

## 2025-02-09 RX ORDER — HYDROCORTISONE SODIUM SUCCINATE 100 MG/2ML
200 INJECTION INTRAMUSCULAR; INTRAVENOUS ONCE
Status: DISCONTINUED | OUTPATIENT
Start: 2025-02-09 | End: 2025-02-11

## 2025-02-09 RX ORDER — LORAZEPAM 0.5 MG/1
0.5 TABLET ORAL ONCE
Status: COMPLETED | OUTPATIENT
Start: 2025-02-09 | End: 2025-02-09

## 2025-02-09 RX ADMIN — ATORVASTATIN CALCIUM 10 MG: 10 TABLET, FILM COATED ORAL at 09:21

## 2025-02-09 RX ADMIN — IOHEXOL 75 ML: 350 INJECTION, SOLUTION INTRAVENOUS at 10:50

## 2025-02-09 RX ADMIN — CHOLECALCIFEROL (VITAMIN D3) 10 MCG (400 UNIT) TABLET 400 UNITS: at 09:22

## 2025-02-09 RX ADMIN — CITALOPRAM HYDROBROMIDE 20 MG: 20 TABLET ORAL at 09:21

## 2025-02-09 RX ADMIN — ANASTROZOLE 1 MG: 1 TABLET, COATED ORAL at 09:22

## 2025-02-09 RX ADMIN — Medication 1 TABLET: at 09:22

## 2025-02-09 RX ADMIN — PREDNISONE 1 MG: 1 TABLET ORAL at 09:21

## 2025-02-09 RX ADMIN — METOPROLOL SUCCINATE ER TABLETS 25 MG: 25 TABLET, FILM COATED, EXTENDED RELEASE ORAL at 09:22

## 2025-02-09 RX ADMIN — LORAZEPAM 0.5 MG: 0.5 TABLET ORAL at 00:28

## 2025-02-09 RX ADMIN — TRAMADOL HYDROCHLORIDE 50 MG: 50 TABLET, COATED ORAL at 09:25

## 2025-02-09 RX ADMIN — PREDNISONE 1 MG: 1 TABLET ORAL at 21:14

## 2025-02-09 RX ADMIN — FLUCONAZOLE 100 MG: 100 TABLET ORAL at 09:22

## 2025-02-09 RX ADMIN — ENOXAPARIN SODIUM 40 MG: 40 INJECTION SUBCUTANEOUS at 09:21

## 2025-02-09 RX ADMIN — PREDNISONE 1 MG: 1 TABLET ORAL at 16:26

## 2025-02-09 NOTE — ASSESSMENT & PLAN NOTE
Patient had 2 episodes of syncope at home  Had a head strike and trauma to right shoulder with 1 episode of loss of consciousness  Not on any blood thinners  CT head negative for any acute intracranial hemorrhage  Labs significant for pancytopenia    Results from last 7 days   Lab Units 02/08/25  1401   WBC Thousand/uL 1.02*   HEMOGLOBIN g/dL 5.8*   HEMATOCRIT % 17.1*   PLATELETS Thousands/uL 79*     Etiology likely due to symptomatic anemia  Troponins negative  BP was down at 77/46, improved to 88/60 after 1L fluid bolus  Patient asymptomatic at current time    Plan:  Patient consented and will received 2 units PRBCs in the ED  Will monitor on telemetry  Gave 1 L fluid bolus  Will continue to monitor BP closely  See anemia below for rest of plan.

## 2025-02-09 NOTE — ASSESSMENT & PLAN NOTE
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  IPSS-R score 8.5, very high risk  Initiated on treatment with azacitidine every 28 days on 1/20/2025  Evaluated at [facility] for allogenic transplant, unfortunately patient is not a candidate.  [facility] recommends adding venetoclax to current regimen. However recommends Aza for 5 days along with venetoclax 400 mg daily for 7 days  Will arrange for outpatient follow-up with Dr. Carranza sooner than planned to discuss these recommendations  Cycle 2 is scheduled to begin 2/17/2025  Continue prophylactic medications with Diflucan 100 mg daily, acyclovir 400 mg twice daily

## 2025-02-09 NOTE — ASSESSMENT & PLAN NOTE
Patient has baseline anemia with hemoglobin between 7.8-8.8  She gets frequent blood checks with hematology/oncology outpatient  12 days ago hemoglobin was 8.0  Patient and patient's family reports that patient went to the ED 2 days ago and in the ambulance had a moderate bleeding episode, although documentation of this cannot be found in that encounter in her chart.  Likely anemia after bleed, causing syncope    Plan:  Patient consented and will receive 2 units PRBCs  CBC daily  Can give fluid boluses instead of fluid infusion as needed for hypotension

## 2025-02-09 NOTE — PROGRESS NOTES
Progress Note - Hospitalist   Name: Pretty Aguila 74 y.o. female I MRN: [id number]  Unit/Bed#: ED-06 I Date of Admission: 2/8/2025   Date of Service: 2/9/2025 I Hospital Day: 0    Assessment & Plan  Syncope  Likely symptomatic anemia, however the patient was also hypotensive which is not entirely explained by anemia alone.  Could be in the setting of infection and leukopenia, we will get a peripheral smear.    Currently blood pressures are stable after 2 units of packed red blood cells, we will check orthostatics as well.    Monitor on telemetry, patient sustained a head strike and has oozing around both eyes, consult trauma.  Anemia  Responded well to 2 units of packed red blood cells currently her hemoglobin is 7.3.    Iron studies however SPECT anemia is in the setting of AML.    Not entirely rule out a bleed given that she had a fall and also a hip reduction after which she noted some swelling she also has bruising around her eyes.  We will consult trauma also check a fecal occult blood test and monitor her vitals closely      MDS (myelodysplastic syndrome), high grade (HCC)  Patient follows with [facility]brook and Dr. Carranza  ? Syncope 2/2 bleeding in the setting of pancytopenia     Discussed this am with Dr. Campos- she will see the patient today however patient is not a candidate for transplant as per [facility] outpatient notes but she may be a candidate for further treatment.    VTE Pharmacologic Prophylaxis: VTE Score: 5  hold of on heparin now.     Mobility:      I did not assess mobility.     Patient Centered Rounds: I performed bedside rounds with nursing staff today.   Discussions with Specialists or Other Care Team Provider: Discussed with hematology they will see patient.     Education and Discussions with Family / Patient: Updated  (daughter) at bedside.    Current Length of Stay: 0 day(s)  Current Patient Status: Observation   Certification Statement: The patient will continue to  "require additional inpatient hospital stay due to trauma eval and monitor vitals. d  Discharge Plan: Anticipate discharge in 24-48 hrs to home.    Code Status: Level 1 - Full Code    Subjective   Patient seen and examined  Feeling \"unwell and weak\"    Objective :  Temp:  [98.2 °F (36.8 °C)-98.6 °F (37 °C)] 98.3 °F (36.8 °C)  HR:  [78-94] 79  BP: ()/(43-61) 105/61  Resp:  [16-18] 18  SpO2:  [93 %-99 %] 94 %  O2 Device: None (Room air)    There is no height or weight on file to calculate BMI.     Input and Output Summary (last 24 hours):     Intake/Output Summary (Last 24 hours) at 2/9/2025 0915  Last data filed at 2/8/2025 2321  Gross per 24 hour   Intake 2200 ml   Output --   Net 2200 ml       Physical Exam  Constitutional:       General: She is not in acute distress.     Appearance: She is ill-appearing.   HENT:      Head:      Comments: Bruising around both eyes.   Eyes:      General: No scleral icterus.        Right eye: No discharge.         Left eye: No discharge.      Pupils: Pupils are equal, round, and reactive to light.   Cardiovascular:      Rate and Rhythm: Normal rate.      Heart sounds: No murmur heard.     No friction rub. No gallop.   Pulmonary:      Effort: Pulmonary effort is normal. No respiratory distress.      Breath sounds: No stridor. No wheezing, rhonchi or rales.   Chest:      Chest wall: No tenderness.   Abdominal:      General: There is no distension.      Palpations: There is no mass.      Tenderness: There is no abdominal tenderness. There is no right CVA tenderness, left CVA tenderness, guarding or rebound.      Hernia: No hernia is present.   Musculoskeletal:      Right lower leg: No edema.      Left lower leg: No edema.   Skin:     Capillary Refill: Capillary refill takes less than 2 seconds.      Coloration: Skin is pale.      Findings: Bruising present.   Neurological:      General: No focal deficit present.   Psychiatric:         Mood and Affect: Mood normal. "           Lines/Drains:        Telemetry:  Telemetry Orders (From admission, onward)               24 Hour Telemetry Monitoring  (ED Bridging Orders Panel)  Continuous x 24 Hours (Telem)        Expiring   Question:  Reason for 24 Hour Telemetry  Answer:  Syncope suspected to be cardiac in origin                     Telemetry Reviewed: Normal Sinus Rhythm  Indication for Continued Telemetry Use: Syncope               Lab Results: I have reviewed the following results:   Results from last 7 days   Lab Units 02/09/25  0540   WBC Thousand/uL 2.08*   HEMOGLOBIN g/dL 7.3*   HEMATOCRIT % 21.4*   PLATELETS Thousands/uL 77*   LYMPHO PCT % 84*   MONO PCT % 2*   EOS PCT % 0     Results from last 7 days   Lab Units 02/09/25  0540   SODIUM mmol/L 140   POTASSIUM mmol/L 3.9   CHLORIDE mmol/L 106   CO2 mmol/L 27   BUN mg/dL 24   CREATININE mg/dL 0.83   ANION GAP mmol/L 7   CALCIUM mg/dL 8.5   ALBUMIN g/dL 3.0*   TOTAL BILIRUBIN mg/dL 1.11*   ALK PHOS U/L 64   ALT U/L 10   AST U/L 11*   GLUCOSE RANDOM mg/dL 95                       Recent Cultures (last 7 days):         Imaging Results Review: No pertinent imaging studies reviewed.  Other Study Results Review: No additional pertinent studies reviewed.    Last 24 Hours Medication List:     Current Facility-Administered Medications:     anastrozole (ARIMIDEX) tablet 1 mg, Daily    atorvastatin (LIPITOR) tablet 10 mg, Daily    Cholecalciferol (VITAMIN D3) tablet 400 Units, Daily    citalopram (CeleXA) tablet 20 mg, Daily    enoxaparin (LOVENOX) subcutaneous injection 40 mg, Q24H SHRADDHA    fluconazole (DIFLUCAN) tablet 100 mg, Daily    metoprolol succinate (TOPROL-XL) 24 hr tablet 25 mg, Daily    multivitamin stress formula tablet 1 tablet, Daily    nystatin (MYCOSTATIN) cream, BID PRN    nystatin (MYCOSTATIN) powder, BID    predniSONE tablet 1 mg, TID    traMADol (ULTRAM) tablet 50 mg, Daily With Breakfast    Administrative Statements   Today, Patient Was Seen By: Yris nOeal MD  I  have spent a total time of 30 minutes in caring for this patient on the day of the visit/encounter including Diagnostic results, Prognosis, Risks and benefits of tx options, Instructions for management, Patient and family education, Importance of tx compliance, Risk factor reductions, Impressions, Counseling / Coordination of care, Documenting in the medical record, Reviewing / ordering tests, medicine, procedures  , Obtaining or reviewing history  , and Communicating with other healthcare professionals .    **Please Note: This note may have been constructed using a voice recognition system.**

## 2025-02-09 NOTE — ASSESSMENT & PLAN NOTE
Likely symptomatic anemia, however the patient was also hypotensive which is not entirely explained by anemia alone.  Could be in the setting of infection and leukopenia, we will get a peripheral smear.    Currently blood pressures are stable after 2 units of packed red blood cells, we will check orthostatics as well.    Monitor on telemetry, patient sustained a head strike and has oozing around both eyes, consult trauma.

## 2025-02-09 NOTE — ED NOTES
Orthostatic BP was performed, pt bp was noticed to continue trailing downward as the test continued.  Pt was not able to remain standing for 3 mins, pt was dizzy.      Jeaneth Saavedra  02/09/25 1157

## 2025-02-09 NOTE — ASSESSMENT & PLAN NOTE
Patient follows with Jadon Westbrook and Dr. Carranza  ? Syncope 2/2 bleeding in the setting of pancytopenia     Discussed this am with Dr. Campos- she will see the patient today however patient is not a candidate for transplant as per [facility] outpatient notes but she may be a candidate for further treatment.

## 2025-02-09 NOTE — CONSULTS
Consultation - Trauma   Name: Pretty Aguila 74 y.o. female I MRN: [id number]  Unit/Bed#: ED-07 I Date of Admission: 2/8/2025   Date of Service: 2/9/2025 I Hospital Day: 0   Consults  Physician Requesting Evaluation:    Reason for Evaluation / Principal Problem:     Assessment & Plan  MDS (myelodysplastic syndrome), high grade (HCC)  Pre-existing condition  Feels weak  Pancytopenia (HCC)    Syncope    Anemia    Platelets decreased (HCC)        Trauma Alert: Other Trauma consulted secondary to eccyhmosis and redness around orbits.  No other traumatic injuries noted.  Model of Arrival: Ambulance    Trauma Team: Attending El[place] and RAJ [place]  Consultants:     None     History of Present Illness   Chief Complaint: feels weak  Mechanism:Fall     Pretty Aguila is a 74 y.o. female who per Dr CARMEN Fleming has a history of Stage II carcinoma of left breast, MDS and recent diagnosis of AML.  Had reported syncope while brushing teeth and another in the kitchen.  She did fall and reported a head strike and shoulder pain left greater then right.  Trauma consulted, patient seen and no new traumatic injuries found.  Medicine remains primary care team,  Trauma will sign off, if any questions or concerns please contact us.    Review of Systems   Constitutional:  Positive for activity change.   Eyes: Negative.    Respiratory: Negative.     Cardiovascular: Negative.    Gastrointestinal: Negative.    Endocrine: Negative.    Genitourinary: Negative.    Musculoskeletal: Negative.    Allergic/Immunologic: Negative.    Neurological: Negative.    Hematological:  Bruises/bleeds easily.   Psychiatric/Behavioral: Negative.         Immunization History   Administered Date(s) Administered    COVID-19 PFIZER VACCINE 0.3 ML IM 01/09/2021, 02/03/2021, 12/21/2021    COVID-19 Pfizer mRNA vacc PF charley-sucrose 12 yr and older (Comirnaty) 03/06/2024    INFLUENZA 11/23/2011, 11/01/2012, 11/17/2016, 10/02/2017, 11/01/2017, 03/14/2022, 11/20/2023    Influenza  Split High Dose Preservative Free IM 12/18/2024    Influenza, high dose seasonal 0.7 mL 11/20/2020, 03/14/2022, 11/20/2023    Influenza, seasonal, injectable 11/01/2017    Pneumococcal Conjugate 13-Valent 10/05/2016, 11/07/2018    Pneumococcal Conjugate Vaccine 20-valent (Pcv20), Polysace 03/06/2024    Pneumococcal Polysaccharide PPV23 11/20/2020    Tdap 04/03/2017    influenza, trivalent, adjuvanted 09/09/2019     Last Tetanus: unkown    1. Before the illness or injury that brought you to the Emergency, did you need someone to help you on a regular basis? 1=Yes   2. Since the illness or injury that brought you to the Emergency, have you needed more help than usual to take care of yourself? 1=Yes   3. Have you been hospitalized for one or more nights during the past 6 months (excluding a stay in the Emergency Department)? 0=No   4. In general, do you see well? 0=Yes   5. In general, do you have serious problems with your memory? 0=No   6. Do you take more than three different medications everyday? 1=Yes   TOTAL   3     Did you order a geriatric consult if the score was 2 or greater?: yes       Objective :  Temp:  [98.2 °F (36.8 °C)-98.6 °F (37 °C)] 98.3 °F (36.8 °C)  HR:  [78-99] 79  BP: ()/(43-63) 106/61  Resp:  [16-20] 18  SpO2:  [93 %-99 %] 93 %  O2 Device: None (Room air)    Initial Vitals:   Temperature: 98.4 °F (36.9 °C) (02/08/25 1244)  Pulse: 89 (02/08/25 1244)  Respirations: 18 (02/08/25 1244)  Blood Pressure: 99/54 (02/08/25 1244)    Primary Survey:   Airway:        Status: patent;        Pre-hospital Interventions: none        Hospital Interventions: none  Breathing:        Pre-hospital Interventions: none       Effort: normal       Right breath sounds: normal       Left breath sounds: normal  Circulation:        Rhythm: regular       Rate: regular   Right Pulses Left Pulses    R radial: 2+  R femoral: 2+  R pedal: 2+     L radial: 2+  L femoral: 2+  L pedal: 2+       Disability:        GCS: Eye: 4;  Verbal: 5 Motor: 6 Total: 15       Right Pupil: round;  reactive         Left Pupil:  round;     R Motor Strength L Motor Strength    R : 5/5  R dorsiflex: 5/5  R plantarflex: 5/5 L : 5/5  L dorsiflex: 5/5  L plantarflex: 5/5        Sensory:  No sensory deficit  Exposure:           Secondary Survey:  Physical Exam  HENT:      Head: Normocephalic.      Right Ear: External ear normal.      Left Ear: External ear normal.      Nose: Nose normal.      Mouth/Throat:      Pharynx: Oropharynx is clear.   Eyes:      Extraocular Movements: Extraocular movements intact.      Pupils: Pupils are equal, round, and reactive to light.   Cardiovascular:      Rate and Rhythm: Normal rate and regular rhythm.      Pulses: Normal pulses.      Heart sounds: Normal heart sounds.   Pulmonary:      Effort: Pulmonary effort is normal.      Breath sounds: Normal breath sounds.   Abdominal:      Palpations: Abdomen is soft.   Genitourinary:     Comments: voiding  Musculoskeletal:      Cervical back: Normal range of motion.   Skin:     General: Skin is warm and dry.      Capillary Refill: Capillary refill takes less than 2 seconds.      Comments: Has redness and bruising around both eyes   Neurological:      General: No focal deficit present.      Mental Status: She is alert and oriented to person, place, and time.   Psychiatric:         Behavior: Behavior normal.             Lab Results: I have reviewed the following results:  Recent Labs     02/08/25  1401 02/08/25  1657 02/09/25  0540   WBC 1.02*  --  2.08*   HGB 5.8*  --  7.3*   HCT 17.1*  --  21.4*   PLT 79*  --  77*   SODIUM 137  --  140   K 3.5  --  3.9     --  106   CO2 25  --  27   BUN 23  --  24   CREATININE 1.01  --  0.83   GLUC 86  --  95   AST 12*  --  11*   ALT 11  --  10   ALB 3.5  --  3.0*   TBILI 0.82  --  1.11*   ALKPHOS 75  --  64   HSTNI0 4  --   --    HSTNI2  --  4  --        Imaging Results: I have personally reviewed pertinent images saved in PACS. CT scan  findings (and other pertinent positive findings on images) were discussed with radiology. My interpretation of the images/reports are as follows:  Chest Xray(s): N/A   FAST exam(s): N/A   CT Scan(s): CT C/A?P - no traumatic findings   Additional Xray(s): CT C-spine - neg     Other Studies: HCT - . No intracranial hemorrhage or calvarial fracture.     2. Scattered chronic microvascular ischemic change. Chronic appearing left paramedian pontine lacunar infarct.      No traumatic injuries noted.  Trauma will sign off, please contact if any concerns or questions

## 2025-02-09 NOTE — CONSULTS
Consultation - Oncology-Medical   Name: Pretty Aguila 74 y.o. female I MRN: [id number]  Unit/Bed#: ED-07 I Date of Admission: 2/8/2025   Date of Service: 2/9/2025 I Hospital Day: 0   Inpatient consult to Hematology  Consult performed by: JOCELYN Mays  Consult ordered by: Denise Johnson DO        Physician Requesting Evaluation: Yris Oneal MD   Reason for Evaluation / Principal Problem: Syncope, acute anemia    Assessment & Plan  Syncope  Syncope as a result of acute anemia  CT head, CT CAP, CT cervical spine all without evidence of traumatic injury.  No bleeding identified    MDS (myelodysplastic syndrome), high grade (HCC)  12/2024 patient noted to have progressive pancytopenia    12/11/2024 [facility]: Pathology consistent with high-grade myeloid neoplasm at least MDS/AML with complex karyotype including TP53 mutation and deletion, 20% blasts versus overt AML with T p53 mutation.  IPSS-R score 8.5, very high risk  Initiated on treatment with azacitidine every 28 days on 1/20/2025  Evaluated at [facility] for allogenic transplant, unfortunately patient is not a candidate.  [place] recommends adding venetoclax to current regimen. However recommends Aza for 5 days along with venetoclax 400 mg daily for 7 days  Will arrange for outpatient follow-up with Dr. Carranza sooner than planned to discuss these recommendations  Cycle 2 is scheduled to begin 2/17/2025  Continue prophylactic medications with Diflucan 100 mg daily, acyclovir 400 mg twice daily    Pancytopenia (HCC)  Secondary to high-grade MDS/AML  Counts essentially stable.  White count 2.08, hemoglobin 7.3 after blood transfusion, platelets 77.  ANC 0.29  Anemia  Hemoglobin on admission 5.8  Status post 2 units of blood  Hemoglobin improved 7.3  CT CAP negative for acute injury or source of bleeding  No occult bleeding, FOBT pending  Iron panel pending  Suspect acute anemia is secondary to high-grade MDS/AML  Please continue to monitor  CBC-D  Recommend additional blood transfusion at this time.  Discussed with primary team    I have discussed the above management plan in detail with the primary service.       History of Present Illness   Pretty Aguila is a 74 y.o. 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  She was recently evaluated at [facility] and is not a candidate for allogenic stem cell transplant because of her age due to increased risk of transplant related morbidity and mortality.  Pretty follows with Dr. Carranza at [facility].  She presents with syncope in the setting of anemia.  Hemoglobin on admission 5.8.  She is also noted to have worsening leukopenia with white count 1.02, platelet count stable 79.  ANC 0.29    She has been transfused with 2 units of blood, posttransfusion hemoglobin improved to 7.3    Patient seen and examined.  She states she felt suddenly weak at home, legs gave out on her and she passed out.  States she was out less than a minute.  First time she has experienced a syncopal episode.  She is feeling improved after receiving 2 units of blood but still has some lightheadedness.  She has been noted to be hypotensive with positive orthostatics  Denies any occult bleeding.  No melena, hematochezia.  No epistaxis or gingival bleeding    Review of Systems   Constitutional:  Positive for fatigue.   Neurological:  Positive for weakness and light-headedness.   Hematological:  Bruises/bleeds easily.   All other systems reviewed and are negative.    Historical Information   I have reviewed the patient's PMH, PSH, Social History, Family History, Meds, and Allergies    Oncology History:   Cancer Staging   Malignant neoplasm of lower-outer quadrant of left breast of female, estrogen receptor positive (HCC)  Staging form: Breast, AJCC 8th Edition  -  Pathologic stage from 11/20/2023: Stage IA (pT2, pN0(sn), cM0, G2, ER+, NJ+, HER2-) - Signed by Adrien Gabriel MD on 12/6/2023    Oncology History   Malignant neoplasm of lower-outer quadrant of left breast of female, estrogen receptor positive (HCC)   9/19/2023 Biopsy    Left breast ultrasound-guided biopsy  4 o'clock, 2 cm from nipple (VERENICE)  Invasive breast carcinoma of no special type (ductal NST)  Grade 2  ER 90, NJ 90, HER2 1+\  Lymphovascular invasion not definitively identified    Concordant. Malignancy appears unifocal; however, oval circumscribed mass in the upper outer left breast is not accounted for. Bilateral breast MRI is recommended. Biopsy-proven carcinoma measured 3.1 cm on US. Left axilla US negative. Right breast clear.     9/28/2023 Observation    Bilateral breast MRI: Unifocal carcinoma in the lower outer left breast with possible skin involvement. There are no suspicious enhancing masses or suspicious areas of non-mass enhancement in right breast. There is no axillary or internal mammary adenopathy.     10/17/2023 Genetic Testing    A total of 47 genes were evaluated, including: HUYEN, BRCA1, BRCA2, CDH1, CHEK2, PALB2, PTEN, STK11, TP53  Negative result. No pathogenic sequence variants identified  [employer]     11/20/2023 Surgery    Left VERENICE  directed mastectomy with SLN biopsy   Invasive carcinoma of no special type (ductal)  Grade 2  3.6 cm   Margins negative  0/1 Lymph node  Anatomic Stage IIA  Prognostic Stage IA    Immediate reconstruction with tissue expander and ADM (Dr. Jimenez)     11/20/2023 -  Cancer Staged    Staging form: Breast, AJCC 8th Edition  - Pathologic stage from 11/20/2023: Stage IA (pT2, pN0(sn), cM0, G2, ER+, NJ+, HER2-) - Signed by Adrien Gabriel MD on 12/6/2023  Stage prefix: Initial diagnosis  Method of lymph node assessment: [place] lymph node biopsy  Multigene prognostic tests performed: None  Histologic grading system: 3 grade system       MDS (myelodysplastic syndrome),  high grade (HCC)   12/30/2024 Initial Diagnosis    MDS (myelodysplastic syndrome), high grade (HCC)     1/20/2025 -  Chemotherapy    alteplase (CATHFLO), 2 mg, Intracatheter, Every 1 Minute as needed, 1 of 6 cycles  azaCITIDine (VIDAZA), 75 mg/m2 = 137.5 mg (100 % of original dose 75 mg/m2), Subcutaneous azaCITIDine, Once, 2 of 7 cycles  Dose modification: 75 mg/m2 (original dose 75 mg/m2, Cycle 0, Reason: Anticipated Tolerance)  Administration: 137.5 mg (1/20/2025), 137.5 mg (1/21/2025), 137.5 mg (1/22/2025), 137.5 mg (1/23/2025), 137.5 mg (1/24/2025)       Current Facility-Administered Medications   Medication Dose Route Frequency Provider Last Rate Last Admin    anastrozole (ARIMIDEX) tablet 1 mg  1 mg Oral Daily Ashok Napoleon, DO   1 mg at 02/09/25 0922    atorvastatin (LIPITOR) tablet 10 mg  10 mg Oral Daily Ashok Napoleon, DO   10 mg at 02/09/25 0921    Cholecalciferol (VITAMIN D3) tablet 400 Units  400 Units Oral Daily Ashok Napoleon, DO   400 Units at 02/09/25 0922    citalopram (CeleXA) tablet 20 mg  20 mg Oral Daily Ashok Napoleon, DO   20 mg at 02/09/25 0921    hydrocortisone (Solu-CORTEF) injection 200 mg  200 mg Intravenous Once Maribel Abbasi, DO        Followed by    diphenhydrAMINE (BENADRYL) injection 50 mg  50 mg Intravenous Once Maribel Abbasi, DO        enoxaparin (LOVENOX) subcutaneous injection 40 mg  40 mg Subcutaneous Q24H SHRADDHA Ashok Napoleon, DO   40 mg at 02/09/25 0921    fluconazole (DIFLUCAN) tablet 100 mg  100 mg Oral Daily Ashok Napoleon, DO   100 mg at 02/09/25 0922    metoprolol succinate (TOPROL-XL) 24 hr tablet 25 mg  25 mg Oral Daily Ashok Napoleon, DO   25 mg at 02/09/25 0922    multivitamin stress formula tablet 1 tablet  1 tablet Oral Daily Ashok Napoleon, DO   1 tablet at 02/09/25 0922    nystatin (MYCOSTATIN) cream   Topical BID PRN Ashok Martinezd, DO        nystatin (MYCOSTATIN) powder   Topical BID Ashok Martinezd, DO        predniSONE tablet 1 mg  1 mg Oral TID Ashok Bender, DO   1 mg at 02/09/25 0921     traMADol (ULTRAM) tablet 50 mg  50 mg Oral Daily With Breakfast Ashok Bender DO   50 mg at 02/09/25 0925     Current Outpatient Medications   Medication Sig Dispense Refill    acyclovir (ZOVIRAX) 400 MG tablet Take 1 tablet (400 mg total) by mouth 2 (two) times a day 60 tablet 4    anastrozole (ARIMIDEX) 1 mg tablet TAKE 1 TABLET (1 MG TOTAL) BY MOUTH DAILY 90 tablet 1    atorvastatin (LIPITOR) 10 mg tablet TAKE 1 TABLET BY MOUTH DAILY 90 tablet 1    buPROPion (WELLBUTRIN XL) 300 mg 24 hr tablet TAKE 1 TABLET (300 MG TOTAL) BY MOUTH EVERY MORNING 90 tablet 1    cholecalciferol (VITAMIN D3) 400 units tablet Take 400 Units by mouth daily      citalopram (CeleXA) 20 mg tablet Take 1 tablet (20 mg total) by mouth daily 90 tablet 2    fluconazole (DIFLUCAN) 100 mg tablet Take 1 tablet (100 mg total) by mouth daily 30 tablet 6    ixekizumab (Taltz) 80 MG/ML subcutaneous injection Inject 80 mg under the skin every 28 days (Patient not taking: Reported on 12/7/2024)      metFORMIN (GLUCOPHAGE-XR) 500 mg 24 hr tablet Take 1 tablet (500 mg total) by mouth daily with dinner 30 tablet 3    metoprolol succinate (Toprol XL) 25 mg 24 hr tablet Take 1 tablet (25 mg total) by mouth daily 30 tablet 5    Multiple Vitamin (multivitamin) tablet Take 1 tablet by mouth daily      naloxone (NARCAN) 4 mg/0.1 mL nasal spray Administer 1 spray into a nostril. If no response after 2-3 minutes, give another dose in the other nostril using a new spray. 1 each 1    nystatin (MYCOSTATIN) cream Apply topically 2 (two) times a day 30 g 0    nystatin (MYCOSTATIN) powder Apply topically 2 (two) times a day 60 g 0    ondansetron (ZOFRAN) 8 mg tablet Take 1 tablet (8 mg total) by mouth every 8 (eight) hours as needed for nausea or vomiting 30 tablet 3    predniSONE 1 mg tablet TAKE 1 TABLET (1 MG TOTAL) BY MOUTH 3 (THREE) TIMES A DAY.      prochlorperazine (COMPAZINE) 10 mg tablet Take 1 tablet (10 mg total) by mouth every 6 (six) hours as needed for  nausea or vomiting 30 tablet 3    Semaglutide-Weight Management (WEGOVY) 2.4 MG/0.75ML Inject 0.75 mL (2.4 mg total) under the skin once a week 3 mL 2    traMADol (ULTRAM) 50 mg tablet          Objective :  Temp:  [98.2 °F (36.8 °C)-98.6 °F (37 °C)] 98.3 °F (36.8 °C)  HR:  [78-99] 91  BP: ()/(43-63) 110/54  Resp:  [16-20] 18  SpO2:  [93 %-99 %] 98 %  O2 Device: None (Room air)    Physical Exam  Constitutional:       General: She is not in acute distress.  HENT:      Head: Normocephalic and atraumatic.      Mouth/Throat:      Pharynx: No oropharyngeal exudate.   Eyes:      General: No scleral icterus.     Comments: Ecchymosis surrounding both eye orbits   Cardiovascular:      Rate and Rhythm: Normal rate and regular rhythm.   Pulmonary:      Effort: Pulmonary effort is normal. No respiratory distress.   Musculoskeletal:      Cervical back: Normal range of motion.   Skin:     Findings: Bruising present.   Neurological:      General: No focal deficit present.      Mental Status: She is alert and oriented to person, place, and time.   Psychiatric:         Mood and Affect: Mood normal.         Behavior: Behavior normal.           Lab Results: I have reviewed the following results:  Lab Results   Component Value Date    K 3.9 02/09/2025     02/09/2025    CO2 27 02/09/2025    BUN 24 02/09/2025    CREATININE 0.83 02/09/2025    GLUF 88 12/31/2024    CALCIUM 8.5 02/09/2025    CORRECTEDCA 9.3 02/09/2025    AST 11 (L) 02/09/2025    ALT 10 02/09/2025    ALKPHOS 64 02/09/2025    EGFR 70 02/09/2025     Lab Results   Component Value Date    WBC 2.08 (L) 02/09/2025    HGB 7.3 (L) 02/09/2025    HCT 21.4 (L) 02/09/2025    MCV 98 02/09/2025    PLT 77 (L) 02/09/2025     Lab Results   Component Value Date    NEUTROABS 1.21 (L) 12/11/2024       Imaging Results Review: I reviewed radiology reports from this admission including: CT chest, CT abdomen/pelvis, CT head, and CT C-spine.      Administrative Statements   I have spent a  total time of 75 minutes in caring for this patient on the day of the visit/encounter including Diagnostic results, Instructions for management, Patient and family education, Counseling / Coordination of care, Documenting in the medical record, Reviewing / ordering tests, medicine, procedures  , Obtaining or reviewing history  , and Communicating with other healthcare professionals .

## 2025-02-09 NOTE — ASSESSMENT & PLAN NOTE
Syncope as a result of acute anemia  CT head, CT CAP, CT cervical spine all without evidence of traumatic injury.  No bleeding identified

## 2025-02-09 NOTE — ASSESSMENT & PLAN NOTE
Hemoglobin on admission 5.8  Status post 2 units of blood  Hemoglobin improved 7.3  CT CAP negative for acute injury or source of bleeding  No occult bleeding, FOBT pending  Iron panel pending  Suspect acute anemia is secondary to high-grade MDS/AML  Please continue to monitor CBC-D  Recommend additional blood transfusion at this time.  Discussed with primary team

## 2025-02-09 NOTE — ASSESSMENT & PLAN NOTE
Patient follows with Jadon Westbrook and Dr. Carranza  Myelodysplastic syndrome likely secondary to this  Continue outpatient follow-up

## 2025-02-09 NOTE — ASSESSMENT & PLAN NOTE
Responded well to 2 units of packed red blood cells currently her hemoglobin is 7.3.    Iron studies however SPECT anemia is in the setting of AML.    Not entirely rule out a bleed given that she had a fall and also a hip reduction after which she noted some swelling she also has bruising around her eyes.  We will consult trauma also check a fecal occult blood test and monitor her vitals closely

## 2025-02-09 NOTE — ASSESSMENT & PLAN NOTE
Secondary to high-grade MDS/AML  Counts essentially stable.  White count 2.08, hemoglobin 7.3 after blood transfusion, platelets 77.  ANC 0.29

## 2025-02-09 NOTE — H&P
H&P - Hospitalist   Name: Pretty Aguila 74 y.o. female I MRN: [id number]  Unit/Bed#: ED-06 I Date of Admission: 2/8/2025   Date of Service: 2/8/2025 I Hospital Day: 0     Assessment & Plan  Syncope  Patient had 2 episodes of syncope at home  Had a head strike and trauma to right shoulder with 1 episode of loss of consciousness  Not on any blood thinners  CT head negative for any acute intracranial hemorrhage  Labs significant for pancytopenia    Results from last 7 days   Lab Units 02/08/25  1401   WBC Thousand/uL 1.02*   HEMOGLOBIN g/dL 5.8*   HEMATOCRIT % 17.1*   PLATELETS Thousands/uL 79*     Etiology likely due to symptomatic anemia  Troponins negative  BP was down at 77/46, improved to 88/60 after 1L fluid bolus  Patient asymptomatic at current time    Plan:  Patient consented and will received 2 units PRBCs in the ED  Will monitor on telemetry  Gave 1 L fluid bolus  Will continue to monitor BP closely  See anemia below for rest of plan.   Anemia  Patient has baseline anemia with hemoglobin between 7.8-8.8  She gets frequent blood checks with hematology/oncology outpatient  12 days ago hemoglobin was 8.0  Patient and patient's family reports that patient went to the ED 2 days ago and in the ambulance had a moderate bleeding episode, although documentation of this cannot be found in that encounter in her chart.  Likely anemia after bleed, causing syncope    Plan:  Patient consented and will receive 2 units PRBCs  CBC daily  Can give fluid boluses instead of fluid infusion as needed for hypotension      MDS (myelodysplastic syndrome), high grade (HCC)  Patient follows with Jadon Westbrook and Dr. Carranza  Myelodysplastic syndrome likely secondary to this  Continue outpatient follow-up      VTE Pharmacologic Prophylaxis: VTE Score: 5 High Risk (Score >/= 5) - Pharmacological DVT Prophylaxis Ordered: enoxaparin (Lovenox). Sequential Compression Devices Ordered.  Code Status: Level 1 - Full Code   Discussion with family:  Updated  ( and daughter) at bedside.    Anticipated Length of Stay: Patient will be admitted on an inpatient basis with an anticipated length of stay of greater than 2 midnights secondary to symptomatic anemia.    History of Present Illness   Chief Complaint: abby Aguila is a 74 y.o. 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  Patient states that she was brushing her teeth when she felt lightheaded and weak in her knees.  She tried to walk to the toilet, but fell before getting there.  Her  came to the bathroom and found her lying down and states that she was passed out for approximately less than a minute.  He denies any seizure-like activity.  After that he was able to get her up and bring her out to the kitchen where she started preparing a bowl of special K cereal.  Patient had another episode of lightheadedness and falling down without syncope at this time.  She did have an episode of head strike and trauma to right shoulder with the episode of loss of consciousness.  This prompted her to come to the ER.    Of note, patient states that she went to the ER 2 days ago for an anterior dislocation of the hip.  She reports that in the ambulance, she had significant bleeding episode  from her left arm due to an IV.  She also reports moderate bleeding episode from her right hand, although there is no documentation or lab work available from this encounter in her chart.  This could potentially be the cause of her new anemia.    Patient also has a history of recently diagnosed MDS/AML and has had a consultation at [facility] [place].  She will be following up with Dr. Carranza locally for further treatment.    Review of Systems   Constitutional:  Positive for fatigue. Negative for appetite change, chills and fever.   Respiratory:   Positive for shortness of breath. Negative for cough, chest tightness and wheezing.    Cardiovascular:  Negative for chest pain, palpitations and leg swelling.   Gastrointestinal:  Negative for abdominal pain, blood in stool, constipation, diarrhea, nausea and vomiting.   Genitourinary:  Negative for dysuria, hematuria and urgency.   Musculoskeletal:  Negative for back pain and neck stiffness.   Neurological:  Positive for syncope and light-headedness. Negative for dizziness, weakness and headaches.   Psychiatric/Behavioral:  The patient is not nervous/anxious.        Historical Information   Past Medical History:   Diagnosis Date    Allergic     Anxiety     Arthritis     Breast cancer (HCC) 09/2023    CAD (coronary artery disease)     Coronary artery disease [id number]    Depression     Diabetes mellitus (HCC) 5/1/24    Mother-Diabetic    Dyslipidemia     Hiatal hernia     Hyperlipidemia     Hypertension     Obesity     Obesity (BMI 30-39.9)     Psoriatic arthritis (HCC)     Rheumatoid arthritis (HCC)     Scoliosis     SOB (shortness of breath)      Past Surgical History:   Procedure Laterality Date    BARIATRIC SURGERY  2014    BILE DUCT EXPLORATION      replacement per Steffanie    BREAST BIOPSY  9/23    CARPAL TUNNEL RELEASE Bilateral 2002    CORONARY ARTERY BYPASS GRAFT  2017    FOOT SURGERY Right     bone surgery to straighten toe.    HIP SURGERY Left 2015    IR BIOPSY BONE MARROW  12/11/2024    IR DRAINAGE TUBE PLACEMENT  12/19/2023    JOINT REPLACEMENT      LYMPH NODE BIOPSY Left 11/20/2023    Procedure: LEFT LYMPHATIC MAPPING WITH BLUE AND RADIOACTIVE DYES, SENTINEL LYMPH NODE BIOPSY;  Surgeon: Adrien Gabriel MD;  Location:  MAIN OR;  Service: Surgical Oncology    LYMPH NODE DISSECTION Left 11/20/2023    Procedure: POSSIBLE AXILLARY DISSECTION;  Surgeon: Adrien Gabriel MD;  Location:  MAIN OR;  Service: Surgical Oncology    RI BREAST REDUCTION Bilateral 01/16/2024    Procedure: RIGHT BREAST REDUCTION AND LEFT BREAST  EXPANDER EXCHANGE FOR PERMANENT IMPLANT. REVISION OF RECONSTRUCTED LEFT BREAST.;  Surgeon: Molina Jimenez MD;  Location: [facility]IN [facility];  Service: Plastics    UT SUCTION ASSISTED LIPECTOMY TRUNK Bilateral 2024    Procedure: LIPOSUCTION BREAST;  Surgeon: Molina Jimenez MD;  Location: [facility];  Service: Plastics    UT TISSUE EXPANDER PLACEMENT BREAST RECONSTRUCTION Left 2023    Procedure: IMMEDIATE LEFT BREAST RECONSTRUCTION WITH TISSUE EXPANDER AND ADM;  Surgeon: Molina Jimenez MD;  Location: [facility];  Service: Plastics    REPLACEMENT TOTAL KNEE Right 2017    SIMPLE MASTECTOMY Left 2023    Procedure: LEFT BREAST VERENICE  DIRECTED MASTECTOMY;  Surgeon: Adrien Gabriel MD;  Location: [facility];  Service: Surgical Oncology    SLEEVE GASTROPLASTY      TONSILLECTOMY      TOTAL HIP ARTHROPLASTY Right 2017    US GUIDED BREAST BIOPSY LEFT COMPLETE Left 2023     Social History     Tobacco Use    Smoking status: Former     Current packs/day: 0.00     Average packs/day: 0.5 packs/day for 7.0 years (3.5 ttl pk-yrs)     Types: Cigarettes     Start date: 2010     Quit date:      Years since quittin.1    Smokeless tobacco: Never    Tobacco comments:     Have already quit smoking in 2017   Vaping Use    Vaping status: Never Used   Substance and Sexual Activity    Alcohol use: Yes     Alcohol/week: 2.0 standard drinks of alcohol     Types: 2 Glasses of wine per week     Comment: social     Drug use: No    Sexual activity: Not Currently     Partners: Male     Birth control/protection: None     E-Cigarette/Vaping    E-Cigarette Use Never User      E-Cigarette/Vaping Substances    Nicotine No     THC No     CBD No     Flavoring No     Other No      Family History   Problem Relation Age of Onset    Hypertension Mother     Diabetes Mother     Heart disease Father         CABG x5    Arthritis Father     No Known Problems Sister     No Known Problems Sister     No Known Problems Daughter     No  Known Problems Daughter     No Known Problems Son     Breast cancer Neg Hx      Social History:  Marital Status: /Civil Union   Occupation: Retired  Patient Pre-hospital Living Situation: Home  Patient Pre-hospital Level of Mobility: walks  Patient Pre-hospital Diet Restrictions: None    Meds/Allergies   I have reviewed home medications with patient personally.  Prior to Admission medications    Medication Sig Start Date End Date Taking? Authorizing Provider   acyclovir (ZOVIRAX) 400 MG tablet Take 1 tablet (400 mg total) by mouth 2 (two) times a day 12/31/24 1/30/25  Geno Carranza MD   anastrozole (ARIMIDEX) 1 mg tablet TAKE 1 TABLET (1 MG TOTAL) BY MOUTH DAILY 6/27/24   Geno Carranza MD   atorvastatin (LIPITOR) 10 mg tablet TAKE 1 TABLET BY MOUTH DAILY 1/8/25   Cortney Conrad MD   buPROPion (WELLBUTRIN XL) 300 mg 24 hr tablet TAKE 1 TABLET (300 MG TOTAL) BY MOUTH EVERY MORNING 10/22/24 10/17/25  Lauren Dumont MD   cholecalciferol (VITAMIN D3) 400 units tablet Take 400 Units by mouth daily    Historical Provider, MD   citalopram (CeleXA) 20 mg tablet Take 1 tablet (20 mg total) by mouth daily 12/18/24   Lauren Dumont MD   fluconazole (DIFLUCAN) 100 mg tablet Take 1 tablet (100 mg total) by mouth daily 12/31/24 7/29/25  Geno Carranza MD   ixekizumab (Taltz) 80 MG/ML subcutaneous injection Inject 80 mg under the skin every 28 days  Patient not taking: Reported on 12/7/2024 10/22/24 8/27/25  Historical Provider, MD   metFORMIN (GLUCOPHAGE-XR) 500 mg 24 hr tablet Take 1 tablet (500 mg total) by mouth daily with dinner 8/9/24   Dagmar Jorgensen MD   metoprolol succinate (Toprol XL) 25 mg 24 hr tablet Take 1 tablet (25 mg total) by mouth daily 12/18/24   Lauren Dumont MD   Multiple Vitamin (multivitamin) tablet Take 1 tablet by mouth daily    Historical Provider, MD   naloxone (NARCAN) 4 mg/0.1 mL nasal spray Administer 1 spray into a nostril. If no response after 2-3 minutes, give another dose in  the other nostril using a new spray. 1/14/24 1/13/25  Molina Jimenez MD   nystatin (MYCOSTATIN) cream Apply topically 2 (two) times a day 3/22/23   JOCELYN Nunez   nystatin (MYCOSTATIN) powder Apply topically 2 (two) times a day 3/22/23   JOCELYN Nunez   ondansetron (ZOFRAN) 8 mg tablet Take 1 tablet (8 mg total) by mouth every 8 (eight) hours as needed for nausea or vomiting 12/31/24   Geno Carranza MD   predniSONE 1 mg tablet TAKE 1 TABLET (1 MG TOTAL) BY MOUTH 3 (THREE) TIMES A DAY. 3/20/24   Historical Provider, MD   prochlorperazine (COMPAZINE) 10 mg tablet Take 1 tablet (10 mg total) by mouth every 6 (six) hours as needed for nausea or vomiting 12/31/24   Geno Carranza MD   Semaglutide-Weight Management (WEGOVY) 2.4 MG/0.75ML Inject 0.75 mL (2.4 mg total) under the skin once a week 2/8/25   Dagmar Jorgensen MD   traMADol (ULTRAM) 50 mg tablet     Historical Provider, MD   Apremilast 30 MG TABS Take 30 mg by mouth 2 (two) times a day  Patient not taking: Reported on 8/9/2024 1/11/23 2/8/25  Historical Provider, MD   LORazepam (ATIVAN) 0.5 mg tablet TAKE 1 TABLET (0.5 MG TOTAL) BY MOUTH EVERY 8 (EIGHT) HOURS AS NEEDED FOR ANXIETY 3/28/24 2/8/25  Lauren Dmuont MD     Allergies   Allergen Reactions    Iodine - Food Allergy Anaphylaxis     Reaction Date: 07Jan2008;     Povidone Iodine Anaphylaxis    Shellfish-Derived Products - Food Allergy Anaphylaxis       Objective :  Temp:  [98.2 °F (36.8 °C)-98.6 °F (37 °C)] 98.2 °F (36.8 °C)  HR:  [79-94] 86  BP: ()/(43-59) 82/53  Resp:  [16-18] 16  SpO2:  [95 %-99 %] 96 %  O2 Device: None (Room air)    Physical Exam  Vitals reviewed.   Constitutional:       General: She is not in acute distress.     Appearance: Normal appearance. She is not ill-appearing, toxic-appearing or diaphoretic.   HENT:      Head: Normocephalic and atraumatic.      Mouth/Throat:      Mouth: Mucous membranes are moist.   Eyes:      General: No scleral  icterus.     Extraocular Movements: Extraocular movements intact.   Cardiovascular:      Rate and Rhythm: Normal rate and regular rhythm.      Pulses: Normal pulses.      Heart sounds: No murmur heard.     No friction rub. No gallop.   Pulmonary:      Effort: Pulmonary effort is normal. No respiratory distress.      Breath sounds: No wheezing, rhonchi or rales.   Abdominal:      General: Bowel sounds are normal. There is no distension.      Tenderness: There is no abdominal tenderness. There is no guarding or rebound.   Musculoskeletal:         General: No swelling. Normal range of motion.      Cervical back: Normal range of motion.      Right lower leg: No edema.      Left lower leg: No edema.   Skin:     General: Skin is warm.      Coloration: Skin is pale. Skin is not jaundiced.   Neurological:      General: No focal deficit present.      Mental Status: She is alert and oriented to person, place, and time.      Cranial Nerves: No cranial nerve deficit.   Psychiatric:         Mood and Affect: Mood normal.         Behavior: Behavior normal.         Thought Content: Thought content normal.         Judgment: Judgment normal.          Lines/Drains:            Lab Results: I have reviewed the following results:  Results from last 7 days   Lab Units 02/08/25  1401   WBC Thousand/uL 1.02*   HEMOGLOBIN g/dL 5.8*   HEMATOCRIT % 17.1*   PLATELETS Thousands/uL 79*     Results from last 7 days   Lab Units 02/08/25  1401   SODIUM mmol/L 137   POTASSIUM mmol/L 3.5   CHLORIDE mmol/L 101   CO2 mmol/L 25   BUN mg/dL 23   CREATININE mg/dL 1.01   ANION GAP mmol/L 11   CALCIUM mg/dL 8.9   ALBUMIN g/dL 3.5   TOTAL BILIRUBIN mg/dL 0.82   ALK PHOS U/L 75   ALT U/L 11   AST U/L 12*   GLUCOSE RANDOM mg/dL 86             Lab Results   Component Value Date    HGBA1C 5.9 (H) 10/26/2024    HGBA1C 5.7 03/27/2024           Imaging Results Review: I reviewed radiology reports from this admission including: CT head.  Other Study Results Review:  EKG was reviewed.     Administrative Statements       ** Please Note: This note has been constructed using a voice recognition system. **

## 2025-02-10 LAB
ABO GROUP BLD BPU: NORMAL
ANION GAP SERPL CALCULATED.3IONS-SCNC: 3 MMOL/L (ref 4–13)
ANION GAP SERPL CALCULATED.3IONS-SCNC: 6 MMOL/L (ref 4–13)
ANISOCYTOSIS BLD QL SMEAR: PRESENT
BASOPHILS # BLD MANUAL: 0 THOUSAND/UL (ref 0–0.1)
BASOPHILS NFR MAR MANUAL: 0 % (ref 0–1)
BPU ID: NORMAL
BUN SERPL-MCNC: 18 MG/DL (ref 5–25)
BUN SERPL-MCNC: 18 MG/DL (ref 5–25)
CALCIUM SERPL-MCNC: 8 MG/DL (ref 8.4–10.2)
CALCIUM SERPL-MCNC: 8 MG/DL (ref 8.4–10.2)
CHLORIDE SERPL-SCNC: 105 MMOL/L (ref 96–108)
CHLORIDE SERPL-SCNC: 106 MMOL/L (ref 96–108)
CO2 SERPL-SCNC: 24 MMOL/L (ref 21–32)
CO2 SERPL-SCNC: 25 MMOL/L (ref 21–32)
CREAT SERPL-MCNC: 0.7 MG/DL (ref 0.6–1.3)
CREAT SERPL-MCNC: 0.8 MG/DL (ref 0.6–1.3)
CROSSMATCH: NORMAL
EOSINOPHIL # BLD MANUAL: 0.03 THOUSAND/UL (ref 0–0.4)
EOSINOPHIL NFR BLD MANUAL: 2 % (ref 0–6)
ERYTHROCYTE [DISTWIDTH] IN BLOOD BY AUTOMATED COUNT: 18.5 % (ref 11.6–15.1)
GFR SERPL CREATININE-BSD FRML MDRD: 73 ML/MIN/1.73SQ M
GFR SERPL CREATININE-BSD FRML MDRD: 86 ML/MIN/1.73SQ M
GIANT PLATELETS BLD QL SMEAR: PRESENT
GLUCOSE SERPL-MCNC: 115 MG/DL (ref 65–140)
GLUCOSE SERPL-MCNC: 88 MG/DL (ref 65–140)
HCT VFR BLD AUTO: 20.8 % (ref 34.8–46.1)
HGB BLD-MCNC: 6.9 G/DL (ref 11.5–15.4)
HGB BLD-MCNC: 8.3 G/DL (ref 11.5–15.4)
LYMPHOCYTES # BLD AUTO: 1.26 THOUSAND/UL (ref 0.6–4.47)
LYMPHOCYTES # BLD AUTO: 79 % (ref 14–44)
MCH RBC QN AUTO: 33.2 PG (ref 26.8–34.3)
MCHC RBC AUTO-ENTMCNC: 33.2 G/DL (ref 31.4–37.4)
MCV RBC AUTO: 100 FL (ref 82–98)
MICROCYTES BLD QL AUTO: PRESENT
MONOCYTES # BLD AUTO: 0.03 THOUSAND/UL (ref 0–1.22)
MONOCYTES NFR BLD: 2 % (ref 4–12)
NEUTROPHILS # BLD MANUAL: 0.27 THOUSAND/UL (ref 1.85–7.62)
NEUTS SEG NFR BLD AUTO: 17 % (ref 43–75)
PLATELET # BLD AUTO: 76 THOUSANDS/UL (ref 149–390)
PLATELET BLD QL SMEAR: ABNORMAL
PLATELET CLUMP BLD QL SMEAR: PRESENT
PMV BLD AUTO: 11.3 FL (ref 8.9–12.7)
POIKILOCYTOSIS BLD QL SMEAR: PRESENT
POLYCHROMASIA BLD QL SMEAR: PRESENT
POTASSIUM SERPL-SCNC: 3.9 MMOL/L (ref 3.5–5.3)
POTASSIUM SERPL-SCNC: 5.9 MMOL/L (ref 3.5–5.3)
RBC # BLD AUTO: 2.08 MILLION/UL (ref 3.81–5.12)
RBC MORPH BLD: PRESENT
SODIUM SERPL-SCNC: 133 MMOL/L (ref 135–147)
SODIUM SERPL-SCNC: 136 MMOL/L (ref 135–147)
UNIT DISPENSE STATUS: NORMAL
UNIT PRODUCT CODE: NORMAL
UNIT PRODUCT VOLUME: 350 ML
UNIT RH: NORMAL
WBC # BLD AUTO: 1.59 THOUSAND/UL (ref 4.31–10.16)

## 2025-02-10 PROCEDURE — 99232 SBSQ HOSP IP/OBS MODERATE 35: CPT | Performed by: INTERNAL MEDICINE

## 2025-02-10 PROCEDURE — 85007 BL SMEAR W/DIFF WBC COUNT: CPT

## 2025-02-10 PROCEDURE — 80048 BASIC METABOLIC PNL TOTAL CA: CPT

## 2025-02-10 PROCEDURE — 86902 BLOOD TYPE ANTIGEN DONOR EA: CPT

## 2025-02-10 PROCEDURE — P9016 RBC LEUKOCYTES REDUCED: HCPCS

## 2025-02-10 PROCEDURE — 36415 COLL VENOUS BLD VENIPUNCTURE: CPT

## 2025-02-10 PROCEDURE — 85018 HEMOGLOBIN: CPT

## 2025-02-10 PROCEDURE — 85027 COMPLETE CBC AUTOMATED: CPT

## 2025-02-10 RX ORDER — ACETAMINOPHEN 160 MG/5ML
650 SUSPENSION ORAL ONCE
Status: COMPLETED | OUTPATIENT
Start: 2025-02-10 | End: 2025-02-10

## 2025-02-10 RX ADMIN — Medication 1 TABLET: at 08:32

## 2025-02-10 RX ADMIN — METOPROLOL SUCCINATE ER TABLETS 25 MG: 25 TABLET, FILM COATED, EXTENDED RELEASE ORAL at 08:32

## 2025-02-10 RX ADMIN — ANASTROZOLE 1 MG: 1 TABLET, COATED ORAL at 08:33

## 2025-02-10 RX ADMIN — ATORVASTATIN CALCIUM 10 MG: 10 TABLET, FILM COATED ORAL at 08:32

## 2025-02-10 RX ADMIN — PREDNISONE 1 MG: 1 TABLET ORAL at 08:32

## 2025-02-10 RX ADMIN — PREDNISONE 1 MG: 1 TABLET ORAL at 15:47

## 2025-02-10 RX ADMIN — CHOLECALCIFEROL (VITAMIN D3) 10 MCG (400 UNIT) TABLET 400 UNITS: at 08:33

## 2025-02-10 RX ADMIN — TRAMADOL HYDROCHLORIDE 50 MG: 50 TABLET, COATED ORAL at 07:38

## 2025-02-10 RX ADMIN — ENOXAPARIN SODIUM 40 MG: 40 INJECTION SUBCUTANEOUS at 08:33

## 2025-02-10 RX ADMIN — PREDNISONE 1 MG: 1 TABLET ORAL at 21:50

## 2025-02-10 RX ADMIN — ACETAMINOPHEN 650 MG: 650 SUSPENSION ORAL at 16:58

## 2025-02-10 RX ADMIN — CITALOPRAM HYDROBROMIDE 20 MG: 20 TABLET ORAL at 08:33

## 2025-02-10 RX ADMIN — FLUCONAZOLE 100 MG: 100 TABLET ORAL at 08:32

## 2025-02-10 NOTE — ASSESSMENT & PLAN NOTE
Patient follows with Jadon Westbrook and Dr. Carranza  ? Syncope 2/2 bleeding in the setting of pancytopenia   Was evaluated by Jadon Westbrook and deemed not to be a candidate for bone marrow transplant.  Has received 3 units PRBC with concern that she is not responding adequately.  Will follow with Dr. Carranza.  At the moment being treated with azacitidine every 28 days, initially given on 1/20/2025.  Recommended to start venetoclax 400 mg daily for 7 days.  This will likely be started after outpatient follow-up.  Leukopenic at 1.59.

## 2025-02-10 NOTE — ASSESSMENT & PLAN NOTE
Likely in the setting of AML.  Has gotten 3 units PRBC.  Has not had adequate response.  Repeat hemoglobin this afternoon 6.9.  Pending recheck  Will follow-up with Dr. Carranza

## 2025-02-10 NOTE — PROGRESS NOTES
Progress Note - Hospitalist   Name: Pretty Aguila 74 y.o. female I MRN: [id number]  Unit/Bed#: ED-07 I Date of Admission: 2/8/2025   Date of Service: 2/10/2025 I Hospital Day: 1    Assessment & Plan  Syncope  Likely symptomatic anemia, however the patient was also hypotensive which is not entirely explained by anemia alone.   Currently blood pressures are stable after 2 units of packed red blood cells, we will check orthostatics as well.  Trauma signed off.  Of note she had significant blood loss en route to [facility] after dislocation of right hip.'s have otherwise resolved since hemoglobin has stabilized.  Anemia  Likely in the setting of AML.  Has gotten 3 units PRBC.  Has not had adequate response.  Repeat hemoglobin this afternoon 6.9.  Pending recheck  Will follow-up with Dr. Carranza  MDS (myelodysplastic syndrome), high grade (HCC)  Patient follows with Jadon Westbrook and Dr. Carranza  ? Syncope 2/2 bleeding in the setting of pancytopenia   Was evaluated by Jadon Westbrook and deemed not to be a candidate for bone marrow transplant.  Has received 3 units PRBC with concern that she is not responding adequately.  Will follow with Dr. Carranza.  At the moment being treated with azacitidine every 28 days, initially given on 1/20/2025.  Recommended to start venetoclax 400 mg daily for 7 days.  This will likely be started after outpatient follow-up.  Leukopenic at 1.59.  Pancytopenia (HCC)  Platelets of 17,000.  Per hematology does not need transfusion for platelet until 11,000.    VTE Pharmacologic Prophylaxis: VTE Score: 5 High Risk (Score >/= 5) - Pharmacological DVT Prophylaxis Ordered: enoxaparin (Lovenox). Sequential Compression Devices Ordered.    Mobility:   Basic Mobility Inpatient Raw Score: 24  JH-HLM Goal: 8: Walk 250 feet or more  JH-HLM Achieved: 7: Walk 25 feet or more  I did not assess mobility.     Patient Centered Rounds: I performed bedside rounds with nursing staff today.   Discussions with  "Specialists or Other Care Team Provider: Discussed with hematology they will see patient.     Education and Discussions with Family / Patient: Updated  (daughter) at bedside.    Current Length of Stay: 1 day(s)  Current Patient Status: Inpatient   Certification Statement: The patient will continue to require additional inpatient hospital stay due to trauma eval and monitor vitals. d  Discharge Plan: Anticipate discharge in 24-48 hrs to home.    Code Status: Level 1 - Full Code    Subjective   Patient seen and examined  Feeling \"unwell and weak\"  Discussed her recent AML diagnosis.  Also discussed her blood loss en route to [facility] when she dislocated her right hip.    Objective :  Temp:  [97.5 °F (36.4 °C)-99.5 °F (37.5 °C)] 98 °F (36.7 °C)  HR:  [75-87] 79  BP: ()/(52-70) 106/65  Resp:  [18] 18  SpO2:  [92 %-98 %] 94 %  O2 Device: None (Room air)    Body mass index is 33.7 kg/m².     Input and Output Summary (last 24 hours):     Intake/Output Summary (Last 24 hours) at 2/10/2025 1449  Last data filed at 2/10/2025 1430  Gross per 24 hour   Intake 1275 ml   Output --   Net 1275 ml       Physical Exam  Constitutional:       General: She is not in acute distress.     Appearance: She is ill-appearing.   HENT:      Head:      Comments: Bruising around both eyes.   Eyes:      General: No scleral icterus.        Right eye: No discharge.         Left eye: No discharge.      Pupils: Pupils are equal, round, and reactive to light.   Cardiovascular:      Rate and Rhythm: Normal rate.      Heart sounds: No murmur heard.     No friction rub. No gallop.   Pulmonary:      Effort: Pulmonary effort is normal. No respiratory distress.      Breath sounds: No stridor. No wheezing, rhonchi or rales.   Chest:      Chest wall: No tenderness.   Abdominal:      General: There is no distension.      Palpations: There is no mass.      Tenderness: There is no abdominal tenderness. There is no right CVA " tenderness, left CVA tenderness, guarding or rebound.      Hernia: No hernia is present.   Musculoskeletal:      Right lower leg: No edema.      Left lower leg: No edema.   Skin:     Capillary Refill: Capillary refill takes less than 2 seconds.      Coloration: Skin is pale.      Findings: Bruising present.   Neurological:      General: No focal deficit present.   Psychiatric:         Mood and Affect: Mood normal.         Telemetry:  Telemetry Orders (From admission, onward)               24 Hour Telemetry Monitoring  (ED Bridging Orders Panel)  Continuous x 24 Hours (Telem)        Expiring   Question:  Reason for 24 Hour Telemetry  Answer:  Syncope suspected to be cardiac in origin                     Telemetry Reviewed: Normal Sinus Rhythm  Indication for Continued Telemetry Use: Syncope               Lab Results: I have reviewed the following results:   Results from last 7 days   Lab Units 02/10/25  0743   WBC Thousand/uL 1.59*   HEMOGLOBIN g/dL 6.9*   HEMATOCRIT % 20.8*   PLATELETS Thousands/uL 76*   LYMPHO PCT % 79*   MONO PCT % 2*   EOS PCT % 2     Results from last 7 days   Lab Units 02/10/25  0843 02/09/25  0540   SODIUM mmol/L 133* 140   POTASSIUM mmol/L 5.9* 3.9   CHLORIDE mmol/L 105 106   CO2 mmol/L 25 27   BUN mg/dL 18 24   CREATININE mg/dL 0.70 0.83   ANION GAP mmol/L 3* 7   CALCIUM mg/dL 8.0* 8.5   ALBUMIN g/dL  --  3.0*   TOTAL BILIRUBIN mg/dL  --  1.11*   ALK PHOS U/L  --  64   ALT U/L  --  10   AST U/L  --  11*   GLUCOSE RANDOM mg/dL 88 95                       Recent Cultures (last 7 days):   Results from last 7 days   Lab Units 02/09/25  0831   BLOOD CULTURE  No Growth at 24 hrs.  No Growth at 24 hrs.       Imaging Results Review: No pertinent imaging studies reviewed.  Other Study Results Review: No additional pertinent studies reviewed.    Last 24 Hours Medication List:     Current Facility-Administered Medications:     anastrozole (ARIMIDEX) tablet 1 mg, Daily    atorvastatin (LIPITOR) tablet  10 mg, Daily    Cholecalciferol (VITAMIN D3) tablet 400 Units, Daily    citalopram (CeleXA) tablet 20 mg, Daily    hydrocortisone (Solu-CORTEF) injection 200 mg, Once **FOLLOWED BY** diphenhydrAMINE (BENADRYL) injection 50 mg, Once    enoxaparin (LOVENOX) subcutaneous injection 40 mg, Q24H SHRADDHA    fluconazole (DIFLUCAN) tablet 100 mg, Daily    metoprolol succinate (TOPROL-XL) 24 hr tablet 25 mg, Daily    multivitamin stress formula tablet 1 tablet, Daily    nystatin (MYCOSTATIN) cream, BID PRN    nystatin (MYCOSTATIN) powder, BID    predniSONE tablet 1 mg, TID    traMADol (ULTRAM) tablet 50 mg, Daily With Breakfast    Administrative Statements   Today, Patient Was Seen By: Servando Cain MD  I have spent a total time of 30 minutes in caring for this patient on the day of the visit/encounter including Diagnostic results, Prognosis, Risks and benefits of tx options, Instructions for management, Patient and family education, Importance of tx compliance, Risk factor reductions, Impressions, Counseling / Coordination of care, Documenting in the medical record, Reviewing / ordering tests, medicine, procedures  , Obtaining or reviewing history  , and Communicating with other healthcare professionals .    **Please Note: This note may have been constructed using a voice recognition system.**

## 2025-02-10 NOTE — ASSESSMENT & PLAN NOTE
Likely symptomatic anemia, however the patient was also hypotensive which is not entirely explained by anemia alone.   Currently blood pressures are stable after 2 units of packed red blood cells, we will check orthostatics as well.  Trauma signed off.  Of note she had significant blood loss en route to [facility] after dislocation of right hip.'s have otherwise resolved since hemoglobin has stabilized.

## 2025-02-10 NOTE — PLAN OF CARE
Problem: Potential for Falls  Goal: Patient will remain free of falls  Description: INTERVENTIONS:  - Educate patient/family on patient safety including physical limitations  - Instruct patient to call for assistance with activity   - Consult OT/PT to assist with strengthening/mobility   - Keep Call bell within reach  - Keep bed low and locked with side rails adjusted as appropriate  - Keep care items and personal belongings within reach  - Initiate and maintain comfort rounds  - Make Fall Risk Sign visible to staff  2/10/2025 0828 by Landy Wheeler RN  Outcome: Progressing  2/10/2025 0313 by Landy Wheeler RN  Outcome: Progressing     Problem: PAIN - ADULT  Goal: Verbalizes/displays adequate comfort level or baseline comfort level  Description: Interventions:  - Encourage patient to monitor pain and request assistance  - Assess pain using appropriate pain scale  - Administer analgesics based on type and severity of pain and evaluate response  - Implement non-pharmacological measures as appropriate and evaluate response  - Consider cultural and social influences on pain and pain management  - Notify physician/advanced practitioner if interventions unsuccessful or patient reports new pain  2/10/2025 0828 by Landy Wheeler RN  Outcome: Progressing  2/10/2025 0313 by Landy Wheeler RN  Outcome: Progressing     Problem: INFECTION - ADULT  Goal: Absence or prevention of progression during hospitalization  Description: INTERVENTIONS:  - Assess and monitor for signs and symptoms of infection  - Monitor lab/diagnostic results  - Monitor all insertion sites, i.e. indwelling lines, tubes, and drains  - Monitor endotracheal if appropriate and nasal secretions for changes in amount and color  - [place] appropriate cooling/warming therapies per order  - Administer medications as ordered  - Instruct and encourage patient and family to use good hand hygiene technique  - Identify and instruct in appropriate  isolation precautions for identified infection/condition  2/10/2025 0828 by Landy Wheeler RN  Outcome: Progressing  2/10/2025 0313 by Landy Wheeler RN  Outcome: Progressing     Problem: DISCHARGE PLANNING  Goal: Discharge to home or other facility with appropriate resources  Description: INTERVENTIONS:  - Identify barriers to discharge w/patient and caregiver  - Arrange for needed discharge resources and transportation as appropriate  - Identify discharge learning needs (meds, wound care, etc.)  - Arrange for interpretive services to assist at discharge as needed  - Refer to Case Management Department for coordinating discharge planning if the patient needs post-hospital services based on physician/advanced practitioner order or complex needs related to functional status, cognitive ability, or social support system  2/10/2025 0828 by Landy Wheeler RN  Outcome: Progressing  2/10/2025 0313 by Landy Wheeler RN  Outcome: Progressing     Problem: Knowledge Deficit  Goal: Patient/family/caregiver demonstrates understanding of disease process, treatment plan, medications, and discharge instructions  Description: Complete learning assessment and assess knowledge base.  Interventions:  - Provide teaching at level of understanding  - Provide teaching via preferred learning methods  2/10/2025 0828 by Landy Wheeler RN  Outcome: Progressing  2/10/2025 0313 by Landy Wheeler RN  Outcome: Progressing

## 2025-02-10 NOTE — PLAN OF CARE
Problem: Potential for Falls  Goal: Patient will remain free of falls  Description: INTERVENTIONS:  - Educate patient/family on patient safety including physical limitations  - Instruct patient to call for assistance with activity   - Consult OT/PT to assist with strengthening/mobility   - Keep Call bell within reach  - Keep bed low and locked with side rails adjusted as appropriate  - Keep care items and personal belongings within reach  - Initiate and maintain comfort rounds  - Make Fall Risk Sign visible to staff  Outcome: Progressing     Problem: PAIN - ADULT  Goal: Verbalizes/displays adequate comfort level or baseline comfort level  Description: Interventions:  - Encourage patient to monitor pain and request assistance  - Assess pain using appropriate pain scale  - Administer analgesics based on type and severity of pain and evaluate response  - Implement non-pharmacological measures as appropriate and evaluate response  - Consider cultural and social influences on pain and pain management  - Notify physician/advanced practitioner if interventions unsuccessful or patient reports new pain  Outcome: Progressing     Problem: INFECTION - ADULT  Goal: Absence or prevention of progression during hospitalization  Description: INTERVENTIONS:  - Assess and monitor for signs and symptoms of infection  - Monitor lab/diagnostic results  - Monitor all insertion sites, i.e. indwelling lines, tubes, and drains  - Monitor endotracheal if appropriate and nasal secretions for changes in amount and color  - [place] appropriate cooling/warming therapies per order  - Administer medications as ordered  - Instruct and encourage patient and family to use good hand hygiene technique  - Identify and instruct in appropriate isolation precautions for identified infection/condition  Outcome: Progressing     Problem: DISCHARGE PLANNING  Goal: Discharge to home or other facility with appropriate resources  Description: INTERVENTIONS:  -  Identify barriers to discharge w/patient and caregiver  - Arrange for needed discharge resources and transportation as appropriate  - Identify discharge learning needs (meds, wound care, etc.)  - Arrange for interpretive services to assist at discharge as needed  - Refer to Case Management Department for coordinating discharge planning if the patient needs post-hospital services based on physician/advanced practitioner order or complex needs related to functional status, cognitive ability, or social support system  Outcome: Progressing     Problem: Knowledge Deficit  Goal: Patient/family/caregiver demonstrates understanding of disease process, treatment plan, medications, and discharge instructions  Description: Complete learning assessment and assess knowledge base.  Interventions:  - Provide teaching at level of understanding  - Provide teaching via preferred learning methods  Outcome: Progressing

## 2025-02-11 VITALS
RESPIRATION RATE: 18 BRPM | TEMPERATURE: 98 F | BODY MASS INDEX: 33.71 KG/M2 | OXYGEN SATURATION: 95 % | DIASTOLIC BLOOD PRESSURE: 69 MMHG | WEIGHT: 190.26 LBS | HEART RATE: 90 BPM | HEIGHT: 63 IN | SYSTOLIC BLOOD PRESSURE: 107 MMHG

## 2025-02-11 LAB
ABO GROUP BLD BPU: NORMAL
ANION GAP SERPL CALCULATED.3IONS-SCNC: 6 MMOL/L (ref 4–13)
ANISOCYTOSIS BLD QL SMEAR: PRESENT
BASOPHILS # BLD MANUAL: 0 THOUSAND/UL (ref 0–0.1)
BASOPHILS NFR MAR MANUAL: 0 % (ref 0–1)
BPU ID: NORMAL
BUN SERPL-MCNC: 17 MG/DL (ref 5–25)
CALCIUM SERPL-MCNC: 8.4 MG/DL (ref 8.4–10.2)
CHLORIDE SERPL-SCNC: 106 MMOL/L (ref 96–108)
CO2 SERPL-SCNC: 25 MMOL/L (ref 21–32)
CREAT SERPL-MCNC: 0.67 MG/DL (ref 0.6–1.3)
CROSSMATCH: NORMAL
DOHLE BOD BLD QL SMEAR: PRESENT
EOSINOPHIL # BLD MANUAL: 0.1 THOUSAND/UL (ref 0–0.4)
EOSINOPHIL NFR BLD MANUAL: 6 % (ref 0–6)
ERYTHROCYTE [DISTWIDTH] IN BLOOD BY AUTOMATED COUNT: 17.4 % (ref 11.6–15.1)
GFR SERPL CREATININE-BSD FRML MDRD: 88 ML/MIN/1.73SQ M
GLUCOSE SERPL-MCNC: 90 MG/DL (ref 65–140)
HCT VFR BLD AUTO: 24 % (ref 34.8–46.1)
HGB BLD-MCNC: 8.2 G/DL (ref 11.5–15.4)
LYMPHOCYTES # BLD AUTO: 1.07 THOUSAND/UL (ref 0.6–4.47)
LYMPHOCYTES # BLD AUTO: 62 % (ref 14–44)
MCH RBC QN AUTO: 33.1 PG (ref 26.8–34.3)
MCHC RBC AUTO-ENTMCNC: 34.2 G/DL (ref 31.4–37.4)
MCV RBC AUTO: 97 FL (ref 82–98)
METAMYELOCYTE ABSOLUTE CT: 0.02 THOUSAND/UL (ref 0–0.1)
METAMYELOCYTES NFR BLD MANUAL: 1 % (ref 0–1)
MONOCYTES # BLD AUTO: 0.03 THOUSAND/UL (ref 0–1.22)
MONOCYTES NFR BLD: 2 % (ref 4–12)
NEUTROPHILS # BLD MANUAL: 0.5 THOUSAND/UL (ref 1.85–7.62)
NEUTS BAND NFR BLD MANUAL: 5 % (ref 0–8)
NEUTS SEG NFR BLD AUTO: 24 % (ref 43–75)
NRBC BLD AUTO-RTO: 1 /100 WBC (ref 0–2)
OVALOCYTES BLD QL SMEAR: PRESENT
PLATELET # BLD AUTO: 93 THOUSANDS/UL (ref 149–390)
PLATELET BLD QL SMEAR: ABNORMAL
PMV BLD AUTO: 11 FL (ref 8.9–12.7)
POIKILOCYTOSIS BLD QL SMEAR: PRESENT
POLYCHROMASIA BLD QL SMEAR: PRESENT
POTASSIUM SERPL-SCNC: 3.9 MMOL/L (ref 3.5–5.3)
RBC # BLD AUTO: 2.48 MILLION/UL (ref 3.81–5.12)
RBC MORPH BLD: PRESENT
SODIUM SERPL-SCNC: 137 MMOL/L (ref 135–147)
UNIT DISPENSE STATUS: NORMAL
UNIT PRODUCT CODE: NORMAL
UNIT PRODUCT VOLUME: 350 ML
UNIT RH: NORMAL
WBC # BLD AUTO: 1.73 THOUSAND/UL (ref 4.31–10.16)

## 2025-02-11 PROCEDURE — 85007 BL SMEAR W/DIFF WBC COUNT: CPT

## 2025-02-11 PROCEDURE — 85027 COMPLETE CBC AUTOMATED: CPT

## 2025-02-11 PROCEDURE — 80048 BASIC METABOLIC PNL TOTAL CA: CPT

## 2025-02-11 PROCEDURE — 99238 HOSP IP/OBS DSCHRG MGMT 30/<: CPT | Performed by: HOSPITALIST

## 2025-02-11 RX ORDER — SODIUM CHLORIDE, SODIUM GLUCONATE, SODIUM ACETATE, POTASSIUM CHLORIDE, MAGNESIUM CHLORIDE, SODIUM PHOSPHATE, DIBASIC, AND POTASSIUM PHOSPHATE .53; .5; .37; .037; .03; .012; .00082 G/100ML; G/100ML; G/100ML; G/100ML; G/100ML; G/100ML; G/100ML
500 INJECTION, SOLUTION INTRAVENOUS ONCE
Status: COMPLETED | OUTPATIENT
Start: 2025-02-11 | End: 2025-02-11

## 2025-02-11 RX ADMIN — CHOLECALCIFEROL (VITAMIN D3) 10 MCG (400 UNIT) TABLET 400 UNITS: at 08:49

## 2025-02-11 RX ADMIN — PREDNISONE 1 MG: 1 TABLET ORAL at 16:34

## 2025-02-11 RX ADMIN — Medication 1 TABLET: at 08:49

## 2025-02-11 RX ADMIN — CITALOPRAM HYDROBROMIDE 20 MG: 20 TABLET ORAL at 08:49

## 2025-02-11 RX ADMIN — ANASTROZOLE 1 MG: 1 TABLET, COATED ORAL at 08:48

## 2025-02-11 RX ADMIN — FLUCONAZOLE 100 MG: 100 TABLET ORAL at 08:49

## 2025-02-11 RX ADMIN — ATORVASTATIN CALCIUM 10 MG: 10 TABLET, FILM COATED ORAL at 08:49

## 2025-02-11 RX ADMIN — PREDNISONE 1 MG: 1 TABLET ORAL at 08:48

## 2025-02-11 RX ADMIN — SODIUM CHLORIDE, SODIUM GLUCONATE, SODIUM ACETATE, POTASSIUM CHLORIDE, MAGNESIUM CHLORIDE, SODIUM PHOSPHATE, DIBASIC, AND POTASSIUM PHOSPHATE 500 ML: .53; .5; .37; .037; .03; .012; .00082 INJECTION, SOLUTION INTRAVENOUS at 16:30

## 2025-02-11 RX ADMIN — MELATONIN 3 MG: 3 TAB ORAL at 00:46

## 2025-02-11 RX ADMIN — ENOXAPARIN SODIUM 40 MG: 40 INJECTION SUBCUTANEOUS at 08:49

## 2025-02-11 NOTE — DISCHARGE INSTR - AVS FIRST PAGE
Dear Pretty Aguila,     It was our pleasure to care for you here at [facility].  It is our hope that we were always able to exceed the expected standards for your care during your stay.  You were hospitalized due to fall and anemia.  You were cared for on the fourth floor by Servando Cain MD under the service of Pedro Avitia MD with the [facility] who covers for your primary care physician (PCP), Lauren Dumont MD, while you were hospitalized.  If you have any questions or concerns related to this hospitalization, you may contact us at .  For follow up as well as any medication refills, we recommend that you follow up with your primary care physician.  A registered nurse will reach out to you by phone within a few days after your discharge to answer any additional questions that you may have after going home.  However, at this time we provide for you here, the most important instructions / recommendations at discharge:       Notable Medication Adjustments -   None  Testing Required after Discharge -   Weekly CBC per hematology oncology.  Please call hematology to set up weekly lab draw as they will follow this up.   Important follow up information -   Please follow-up with your PCP within a week of discharge.  Please follow up with your oncologist at your next scheduled appointment on Monday, February 17, 2025.   Other Instructions -   Please take all your medications regularly as directed.  Please hydrate well, use abdominal binder, use compression stockings, and transition from lying down to seated to standing position in a slow manner, so as to not get lightheaded and dizzy.   If symptoms return/persist contact your PCP if unable then visit to nearest ER    Please review this entire after visit summary as additional general instructions including medication list, appointments, activity, diet, any pertinent wound care, and other additional  recommendations from your care team that may be provided for you.      Sincerely,   Servando Cain MD

## 2025-02-11 NOTE — CASE MANAGEMENT
Case Management Discharge Planning Note    Patient name Pretty Aguila  Location S /S -01 MRN [id number]  : 1950 Date 2025       Current Admission Date: 2025  Current Admission Diagnosis:Syncope   Patient Active Problem List    Diagnosis Date Noted Date Diagnosed    Anemia 2025     Syncope 2025     MDS (myelodysplastic syndrome), high grade (HCC) 2024     Pancytopenia (HCC) 2024     Platelets decreased ([facility]) 2024     CKD (chronic kidney disease) 2024     Psoriatic arthritis (HCC) 2024     Osteoarthritis of multiple joints 2024     Class 1 obesity due to excess calories with serious comorbidity and body mass index (BMI) of 32.0 to 32.9 in adult 2024     Prediabetes 2024     Moderate episode of recurrent major depressive disorder (HCC) 2024     Anxiety and depression 2024     Restless leg syndrome 2024     Simple chronic bronchitis (HCC) 2024     Continuous opioid dependence (HCC) 2024     Malignant neoplasm of lower-outer quadrant of left breast of female, estrogen receptor positive ([facility]) 2023     Postmenopausal 2023     Annual physical exam 2023     Rheumatoid arthritis of multiple sites with negative rheumatoid factor ([facility]) 2023     Other fatigue 2023     Candida infection of flexural skin 2022     Chronic bilateral low back pain without sciatica 10/11/2021     Status post bariatric surgery 2021     At risk for sleep apnea 2021     Postsurgical malabsorption 2021     Vitamin D deficiency 2020     Dyspnea on exertion 2019     Coronary artery disease involving native coronary artery of native heart without angina pectoris 2018     Essential hypertension 2018     Dyslipidemia 2018     S/P CABG x 3 2018       LOS (days): 2  Geometric Mean LOS (GMLOS) (days): 4.4  Days to GMLOS:2.3     OBJECTIVE:  Risk of  Unplanned Readmission Score: 35.92     Current admission status: Inpatient   Preferred Pharmacy:   [facility], [facility] [place] - [address]  [address] [address]  Phone: [phone] Fax: [phone]    Primary Care Provider: Lauren Dumont MD    Primary Insurance: MEDICARE  Secondary Insurance: [facility]    DISCHARGE DETAILS:    Discharge planning discussed with:: Patient  Freedom of Choice: Yes      Treatment Team Recommendation: Home  Discharge Destination Plan:: Home  Transport at Discharge : Family      CM met with patient bedside to introduce self/role, complete assessment, and discuss discharge planning. Patient stated she is typically very independent and uses a cane at time as needed. Patient reports recently feel very weak and out of breath but since transfusion, she feels a lot better and it's easier to breath. Patient stated she completes all ADL's independently, lives with her  and has a lot of family support. Family provides transportation to patient's appointments/treatments.     Patient had no discharge questions or concerns at this time.CM will continue to follow patient for discharge needs.

## 2025-02-11 NOTE — ASSESSMENT & PLAN NOTE
Patient follows with Jadon Westbrook and Dr. Carranza  ? Syncope 2/2 bleeding in the setting of pancytopenia   Was evaluated by Jadon Westbrook and deemed not to be a candidate for bone marrow transplant.  At the moment being treated with azacitidine every 28 days, initially given on 1/20/2025.  Recommended to start venetoclax 400 mg daily for 7 days.  This will likely be started after outpatient follow-up.

## 2025-02-11 NOTE — DISCHARGE SUMMARY
Discharge Summary - Hospitalist   Name: Pretty Aguila 74 y.o. female I MRN: [id number]  Unit/Bed#: S -01 I Date of Admission: 2/8/2025   Date of Service: 2/11/2025 I Hospital Day: 2     Assessment & Plan  Syncope  Patient with right hip dislocation was being transported to [facility] from home.  Had IV dysfunction with copious amount of blood.  Went home same day following right hip dislocation correction.  24 hours later became weak and had a fall in the bathroom with head strike.  Trauma imaging was negative.  S/p 3 units PRBCs.  Symptoms of dizziness have improved.  Orthostatics positive today.  Given 500 cc bolus and abdominal binder.  Anemia  Likely in the setting of AML.  Has gotten 3 units PRBC.  Hemoglobin this morning stable at 8.2.  Will follow-up with Dr. Carranza on 2/17.  MDS (myelodysplastic syndrome), high grade (HCC)  Patient follows with Jadon Westbrook and Dr. Carranza  ? Syncope 2/2 bleeding in the setting of pancytopenia   Was evaluated by Jadon Westbrook and deemed not to be a candidate for bone marrow transplant.  At the moment being treated with azacitidine every 28 days, initially given on 1/20/2025.  Recommended to start venetoclax 400 mg daily for 7 days.  This will likely be started after outpatient follow-up.  Pancytopenia (HCC)  Platelets of 17,000.  Per hematology does not need transfusion for platelet until 11,000.     Medical Problems       Resolved Problems  Date Reviewed: 12/18/2024   None       Discharging Physician / Practitioner: Servando Cain MD  PCP: Lauren Dumont MD  Admission Date:   Admission Orders (From admission, onward)       Ordered        02/09/25 1403  INPATIENT ADMISSION  Once            02/08/25 1704  Place in Observation  Once                          Discharge Date: 02/11/25    Consultations During Hospital Stay:  Medical oncology    Procedures Performed:   None    Significant Findings / Test Results:   Hemoglobin 5.6    Incidental Findings:   None    Test Results Pending at  "Discharge (will require follow up):   None     Outpatient Tests Requested:  Repeat CBC in 1 week    Complications: None    Reason for Admission: Fatigue, pancytopenia, fall    Hospital Course:   Pretty Aguila is a 74 y.o. female patient with past medical history of AML who originally presented to the hospital on 2/8/2025 due to fall at home with loss of consciousness.  Of note she was seen at [facility] for right hip dislocation and had loss copious amounts of blood en route.  24 hours later she became fatigued and dizzy leading to her fall at home in the bathroom.  Hemoglobin on admission was 5.6.  S/p 3 unit PRBCs.  Recent diagnosis of AML and following with Dr. Carranza.  Hemoglobin has been stable at 8.2 this morning.  Orthostatics were positive.  Given 500 cc bolus and abdominal binder.  Ultimately however physical limitations are secondary to AML and deconditioning.  Hematology oncology were consulted.  Recommend she has outpatient follow-up on 2/17 with further discussion on how to seed with treatment of her underlying AML.  Daughter was at bedside and expressed understanding.      Condition at Discharge: fair    Discharge Day Visit / Exam:   Subjective:    Patient seen and examined at bedside today.  There were no overnight events.  Reports feeling generally well and is able to tolerate walking to the bathroom.  We discussed likely symptomatic anemia.  Otherwise denies any new symptoms.    Vitals: Blood Pressure: 107/69 (02/11/25 1450)  Pulse: 90 (02/11/25 1450)  Temperature: 98 °F (36.7 °C) (02/11/25 1450)  Temp Source: Oral (02/10/25 0830)  Respirations: 18 (02/11/25 1450)  Height: 5' 3\" (160 cm) (02/10/25 0500)  Weight - Scale: 86.3 kg (190 lb 4.1 oz) (02/10/25 0700)  SpO2: 95 % (02/11/25 1450)  Physical Exam  Constitutional:       General: She is not in acute distress.     Appearance: She is ill-appearing.   HENT:      Head:      Comments: Bruising around both eyes.   Eyes:      General: No scleral icterus.   "      Right eye: No discharge.         Left eye: No discharge.      Pupils: Pupils are equal, round, and reactive to light.   Cardiovascular:      Rate and Rhythm: Normal rate.      Heart sounds: No murmur heard.     No friction rub. No gallop.   Pulmonary:      Effort: Pulmonary effort is normal. No respiratory distress.      Breath sounds: No stridor. No wheezing, rhonchi or rales.   Chest:      Chest wall: No tenderness.   Abdominal:      General: There is no distension.      Palpations: There is no mass.      Tenderness: There is no abdominal tenderness. There is no right CVA tenderness, left CVA tenderness, guarding or rebound.      Hernia: No hernia is present.   Musculoskeletal:      Right lower leg: No edema.      Left lower leg: No edema.   Skin:     Capillary Refill: Capillary refill takes less than 2 seconds.      Coloration: Skin is pale.      Findings: Bruising present.   Neurological:      General: No focal deficit present.   Psychiatric:         Mood and Affect: Mood normal.          Discussion with Family: Updated  (daughter) at bedside.    Discharge instructions/Information to patient and family:   See after visit summary for information provided to patient and family.      Provisions for Follow-Up Care:  See after visit summary for information related to follow-up care and any pertinent home health orders.      Mobility at time of Discharge:   Basic Mobility Inpatient Raw Score: 24  JH-HLM Goal: 8: Walk 250 feet or more  JH-HLM Achieved: 8: Walk 250 feet ot more  HLM Goal achieved. Continue to encourage appropriate mobility.     Disposition:   Home    Planned Readmission: No    Discharge Medications:  See after visit summary for reconciled discharge medications provided to patient and/or family.      Administrative Statements   Discharge Statement:  I have spent a total time of 30 minutes in caring for this patient on the day of the visit/encounter. .    **Please Note: This note may  have been constructed using a voice recognition system**

## 2025-02-11 NOTE — ASSESSMENT & PLAN NOTE
Likely in the setting of AML.  Has gotten 3 units PRBC.  Hemoglobin this morning stable at 8.2.  Will follow-up with Dr. Carranza on 2/17.

## 2025-02-11 NOTE — ASSESSMENT & PLAN NOTE
Patient with right hip dislocation was being transported to hospitals from home.  Had IV dysfunction with copious amount of blood.  Went home same day following right hip dislocation correction.  24 hours later became weak and had a fall in the bathroom with head strike.  Trauma imaging was negative.  S/p 3 units PRBCs.  Symptoms of dizziness have improved.  Orthostatics positive today.  Given 500 cc bolus and abdominal binder.

## 2025-02-11 NOTE — PLAN OF CARE
Problem: Potential for Falls  Goal: Patient will remain free of falls  Description: INTERVENTIONS:  - Educate patient/family on patient safety including physical limitations  - Instruct patient to call for assistance with activity   - Consult OT/PT to assist with strengthening/mobility   - Keep Call bell within reach  - Keep bed low and locked with side rails adjusted as appropriate  - Keep care items and personal belongings within reach  - Initiate and maintain comfort rounds  - Make Fall Risk Sign visible to staff  Outcome: Progressing     Problem: PAIN - ADULT  Goal: Verbalizes/displays adequate comfort level or baseline comfort level  Description: Interventions:  - Encourage patient to monitor pain and request assistance  - Assess pain using appropriate pain scale  - Administer analgesics based on type and severity of pain and evaluate response  - Implement non-pharmacological measures as appropriate and evaluate response  - Consider cultural and social influences on pain and pain management  - Notify physician/advanced practitioner if interventions unsuccessful or patient reports new pain  Outcome: Progressing     Problem: INFECTION - ADULT  Goal: Absence or prevention of progression during hospitalization  Description: INTERVENTIONS:  - Assess and monitor for signs and symptoms of infection  - Monitor lab/diagnostic results  - Monitor all insertion sites, i.e. indwelling lines, tubes, and drains  - Monitor endotracheal if appropriate and nasal secretions for changes in amount and color  - [place] appropriate cooling/warming therapies per order  - Administer medications as ordered  - Instruct and encourage patient and family to use good hand hygiene technique  - Identify and instruct in appropriate isolation precautions for identified infection/condition  Outcome: Progressing     Problem: DISCHARGE PLANNING  Goal: Discharge to home or other facility with appropriate resources  Description: INTERVENTIONS:  -  Identify barriers to discharge w/patient and caregiver  - Arrange for needed discharge resources and transportation as appropriate  - Identify discharge learning needs (meds, wound care, etc.)  - Arrange for interpretive services to assist at discharge as needed  - Refer to Case Management Department for coordinating discharge planning if the patient needs post-hospital services based on physician/advanced practitioner order or complex needs related to functional status, cognitive ability, or social support system  Outcome: Progressing     Problem: Knowledge Deficit  Goal: Patient/family/caregiver demonstrates understanding of disease process, treatment plan, medications, and discharge instructions  Description: Complete learning assessment and assess knowledge base.  Interventions:  - Provide teaching at level of understanding  - Provide teaching via preferred learning methods  Outcome: Progressing

## 2025-02-13 ENCOUNTER — TRANSITIONAL CARE MANAGEMENT (OUTPATIENT)
Dept: FAMILY MEDICINE CLINIC | Facility: CLINIC | Age: 75
End: 2025-02-13

## 2025-02-13 ENCOUNTER — PATIENT OUTREACH (OUTPATIENT)
Dept: CASE MANAGEMENT | Facility: OTHER | Age: 75
End: 2025-02-13

## 2025-02-13 NOTE — PROGRESS NOTES
OSW placed outreach TC to pt this day. OSW reintroduced self, as we had spoken in the past. This writer expressed that her daughter had requested a TC regarding spa services. The pt states that she believes her daughter was told the services are within [facility]. This writer expressed that the only services that I am aware of is [facility], which is located in [place]. OSW educated her on some of the services provided, and also provided her with their contact information.  OSW asked how she was feeling, as she recently had a inpatient stay. She reports that she feels pretty good today. OSW expressed I am sorry to hear that she has a new diagnosis of MDS. She stated that you have to take the good with the bad in life. OSW praised her for having a positive attitude. Pt asked this writer for my contact information. OSW provided her my direct contact number, and encouraged her to reach out with any needs. She was appreciative of the outreach.

## 2025-02-14 ENCOUNTER — TELEPHONE (OUTPATIENT)
Age: 75
End: 2025-02-14

## 2025-02-14 ENCOUNTER — APPOINTMENT (OUTPATIENT)
Dept: LAB | Facility: CLINIC | Age: 75
End: 2025-02-14
Payer: MEDICARE

## 2025-02-14 DIAGNOSIS — D46.Z MDS (MYELODYSPLASTIC SYNDROME), HIGH GRADE (HCC): ICD-10-CM

## 2025-02-14 LAB
ALBUMIN SERPL BCG-MCNC: 3.2 G/DL (ref 3.5–5)
ALP SERPL-CCNC: 73 U/L (ref 34–104)
ALT SERPL W P-5'-P-CCNC: 17 U/L (ref 7–52)
ANION GAP SERPL CALCULATED.3IONS-SCNC: 6 MMOL/L (ref 4–13)
ANISOCYTOSIS BLD QL SMEAR: PRESENT
AST SERPL W P-5'-P-CCNC: 16 U/L (ref 13–39)
BACTERIA BLD CULT: NORMAL
BACTERIA BLD CULT: NORMAL
BASOPHILS # BLD MANUAL: 0 THOUSAND/UL (ref 0–0.1)
BASOPHILS NFR MAR MANUAL: 0 % (ref 0–1)
BILIRUB SERPL-MCNC: 0.45 MG/DL (ref 0.2–1)
BUN SERPL-MCNC: 21 MG/DL (ref 5–25)
CALCIUM ALBUM COR SERPL-MCNC: 9.4 MG/DL (ref 8.3–10.1)
CALCIUM SERPL-MCNC: 8.8 MG/DL (ref 8.4–10.2)
CHLORIDE SERPL-SCNC: 103 MMOL/L (ref 96–108)
CO2 SERPL-SCNC: 28 MMOL/L (ref 21–32)
CREAT SERPL-MCNC: 0.71 MG/DL (ref 0.6–1.3)
EOSINOPHIL # BLD MANUAL: 0.02 THOUSAND/UL (ref 0–0.4)
EOSINOPHIL NFR BLD MANUAL: 1 % (ref 0–6)
ERYTHROCYTE [DISTWIDTH] IN BLOOD BY AUTOMATED COUNT: 16.6 % (ref 11.6–15.1)
GFR SERPL CREATININE-BSD FRML MDRD: 85 ML/MIN/1.73SQ M
GLUCOSE SERPL-MCNC: 77 MG/DL (ref 65–140)
HCT VFR BLD AUTO: 27.4 % (ref 34.8–46.1)
HGB BLD-MCNC: 9 G/DL (ref 11.5–15.4)
LYMPHOCYTES # BLD AUTO: 1.03 THOUSAND/UL (ref 0.6–4.47)
LYMPHOCYTES # BLD AUTO: 48 % (ref 14–44)
MCH RBC QN AUTO: 32.7 PG (ref 26.8–34.3)
MCHC RBC AUTO-ENTMCNC: 32.8 G/DL (ref 31.4–37.4)
MCV RBC AUTO: 100 FL (ref 82–98)
MONOCYTES # BLD AUTO: 0 THOUSAND/UL (ref 0–1.22)
MONOCYTES NFR BLD: 0 % (ref 4–12)
NEUTROPHILS # BLD MANUAL: 1.1 THOUSAND/UL (ref 1.85–7.62)
NEUTS BAND NFR BLD MANUAL: 3 % (ref 0–8)
NEUTS SEG NFR BLD AUTO: 48 % (ref 43–75)
OVALOCYTES BLD QL SMEAR: PRESENT
PLATELET # BLD AUTO: 155 THOUSANDS/UL (ref 149–390)
PLATELET BLD QL SMEAR: ABNORMAL
PMV BLD AUTO: 10.4 FL (ref 8.9–12.7)
POIKILOCYTOSIS BLD QL SMEAR: PRESENT
POTASSIUM SERPL-SCNC: 4 MMOL/L (ref 3.5–5.3)
PROT SERPL-MCNC: 7.5 G/DL (ref 6.4–8.4)
RBC # BLD AUTO: 2.75 MILLION/UL (ref 3.81–5.12)
RBC MORPH BLD: PRESENT
SODIUM SERPL-SCNC: 137 MMOL/L (ref 135–147)
WBC # BLD AUTO: 2.15 THOUSAND/UL (ref 4.31–10.16)

## 2025-02-14 PROCEDURE — 80053 COMPREHEN METABOLIC PANEL: CPT

## 2025-02-14 PROCEDURE — 36415 COLL VENOUS BLD VENIPUNCTURE: CPT

## 2025-02-14 PROCEDURE — 85007 BL SMEAR W/DIFF WBC COUNT: CPT

## 2025-02-14 PROCEDURE — 85027 COMPLETE CBC AUTOMATED: CPT

## 2025-02-14 NOTE — TELEPHONE ENCOUNTER
Called and spoke to Pretty. Wanted to confirm she is getting her labs done today for treatment on Monday. Pretty states she will be getting the labs done today. I will follow up on the results and call her to let her know if she is okay for treatment on Monday or not. Pretty verbalized understanding.

## 2025-02-15 NOTE — PROGRESS NOTES
Name: Pretty Aguila      : 1950      MRN: [id number]  Encounter Provider: Geno Carranza MD  Encounter Date: 2025   Encounter department: [facility]  :  Assessment & Plan    Syncope  Syncope as a result of acute anemia  CT head, CT CAP, CT cervical spine all without evidence of traumatic injury.  No bleeding identified     MDS (myelodysplastic syndrome), high grade (HCC)  2024 patient noted to have progressive pancytopenia     2024 BMBX: Pathology consistent with high-grade myeloid neoplasm at least MDS/AML with complex karyotype including TP53 mutation and deletion, 20% blasts versus overt AML with T p53 mutation.  IPSS-R score 8.5, very high risk  Initiated on treatment with azacitidine every 28 days on 2025  Evaluated at [facility] for allogenic transplant, unfortunately patient is not a candidate.  [place] recommends adding venetoclax to current regimen. However recommends Aza for 5 days along with venetoclax 400 mg daily for 7 days  Will arrange for outpatient follow-up with Dr. Carranza sooner than planned to discuss these recommendations  Cycle 2 is scheduled to begin 2025  Continue prophylactic medications with Diflucan 100 mg daily, acyclovir 400 mg twice daily     Pancytopenia (HCC)  Secondary to high-grade MDS/AML  Counts essentially stable.  White count 2.08, hemoglobin 7.3 after blood transfusion, platelets 77.  ANC 0.29  Anemia  Hemoglobin on admission 5.8  Status post 2 units of blood  Hemoglobin improved 7.3  CT CAP negative for acute injury or source of bleeding  No occult bleeding, FOBT pending  Iron panel pending  Suspect acute anemia is secondary to high-grade MDS/AML  Please continue to monitor CBC-D  Recommend additional blood transfusion at this time.  Discussed with primary team    History of Present Illness {?Quick Links Encounters * My Last Note * Last Note in Specialty * Snapshot * Since Last Visit *  History :25414}  No chief complaint on file.  Pretty Aguila is a 74 y.o. female with a history of stage II left breast cancer ER/HI positive HER2/shannan negative on adjuvant anastrozole and high-grade MDS/AML with complex cytogenetics with 20% blasts and progressive pancytopenia.  She was recently evaluated at [facility] and is not a candidate for allogenic stem cell transplant because of her age due to increased risk of transplant related morbidity and mortality.       She presents with syncope in the setting of anemia.  Hemoglobin on admission 5.8.  She is also noted to have worsening leukopenia with white count 1.02, platelet count stable 79.  ANC 0.29     She has been transfused with 2 units of blood, posttransfusion hemoglobin improved to 7.3     Patient seen and examined.  She states she felt suddenly weak at home, legs gave out on her and she passed out.  States she was out less than a minute.  First time she has experienced a syncopal episode.  She is feeling improved after receiving 2 units of blood but still has some lightheadedness.  She has been noted to be hypotensive with positive orthostatics  Denies any occult bleeding.  No melena, hematochezia.  No epistaxis or gingival bleeding  Oncology History   Cancer Staging   Malignant neoplasm of lower-outer quadrant of left breast of female, estrogen receptor positive (HCC)  Staging form: Breast, AJCC 8th Edition  - Pathologic stage from 11/20/2023: Stage IA (pT2, pN0(sn), cM0, G2, ER+, HI+, HER2-) - Signed by Adrien Gabriel MD on 12/6/2023  Stage prefix: Initial diagnosis  Method of lymph node assessment: [place] lymph node biopsy  Multigene prognostic tests performed: None  Histologic grading system: 3 grade system  Oncology History   Malignant neoplasm of lower-outer quadrant of left breast of female, estrogen receptor positive (HCC)   9/19/2023 Biopsy    Left breast ultrasound-guided biopsy  4 o'clock, 2 cm from nipple (VERENICE)  Invasive breast  carcinoma of no special type (ductal NST)  Grade 2  ER 90, ND 90, HER2 1+\  Lymphovascular invasion not definitively identified    [place]. Malignancy appears unifocal; however, oval circumscribed mass in the upper outer left breast is not accounted for. Bilateral breast MRI is recommended. Biopsy-proven carcinoma measured 3.1 cm on US. Left axilla US negative. Right breast clear.     9/28/2023 Observation    Bilateral breast MRI: Unifocal carcinoma in the lower outer left breast with possible skin involvement. There are no suspicious enhancing masses or suspicious areas of non-mass enhancement in right breast. There is no axillary or internal mammary adenopathy.     10/17/2023 Genetic Testing    A total of 47 genes were evaluated, including: HUYEN, BRCA1, BRCA2, CDH1, CHEK2, PALB2, PTEN, STK11, TP53  Negative result. No pathogenic sequence variants identified  [employer]     11/20/2023 Surgery    Left VERENICE  directed mastectomy with SLN biopsy   Invasive carcinoma of no special type (ductal)  Grade 2  3.6 cm   Margins negative  0/1 Lymph node  Anatomic Stage IIA  Prognostic Stage IA    Immediate reconstruction with tissue expander and ADM (Dr. Jimenez)     11/20/2023 -  Cancer Staged    Staging form: Breast, AJCC 8th Edition  - Pathologic stage from 11/20/2023: Stage IA (pT2, pN0(sn), cM0, G2, ER+, ND+, HER2-) - Signed by Adrien Gabriel MD on 12/6/2023  Stage prefix: Initial diagnosis  Method of lymph node assessment: [place] lymph node biopsy  Multigene prognostic tests performed: None  Histologic grading system: 3 grade system       MDS (myelodysplastic syndrome), high grade (HCC)   12/30/2024 Initial Diagnosis    MDS (myelodysplastic syndrome), high grade (HCC)     1/20/2025 -  Chemotherapy    alteplase (CATHFLO), 2 mg, Intracatheter, Every 1 Minute as needed, 1 of 6 cycles  azaCITIDine (VIDAZA), 75 mg/m2 = 137.5 mg (100 % of original dose 75 mg/m2), Subcutaneous azaCITIDine, Once, 2 of 7 cycles  Dose modification:  75 mg/m2 (original dose 75 mg/m2, Cycle 0, Reason: Anticipated Tolerance)  Administration: 137.5 mg (1/20/2025), 137.5 mg (1/21/2025), 137.5 mg (1/22/2025), 137.5 mg (1/23/2025), 137.5 mg (1/24/2025)        Pertinent Medical History   02/15/25: ***  {There is no content from the last Pertinent Medical History section.}   Review of Systems  {Select to insert medical history sections (Optional):23030}      Objective {?Quick Links Trend Vitals * Enter New Vitals * Results Review * Timeline (Adult) * Labs * Imaging * Cardiology * Procedures * Lung Cancer Screening * Surgical eConsent :64013}  There were no vitals taken for this visit.    Pain Screening:     ECOG   ***  Physical Exam    Labs: I have reviewed the following labs:  Lab Results   Component Value Date/Time    WBC 2.15 (L) 02/14/2025 03:53 PM    RBC 2.75 (L) 02/14/2025 03:53 PM    Hemoglobin 9.0 (L) 02/14/2025 03:53 PM    Hematocrit 27.4 (L) 02/14/2025 03:53 PM     (H) 02/14/2025 03:53 PM    MCH 32.7 02/14/2025 03:53 PM    RDW 16.6 (H) 02/14/2025 03:53 PM    Platelets 155 02/14/2025 03:53 PM    Segmented % 35 (L) 12/11/2024 05:43 AM    Lymphocytes % 48 (H) 02/14/2025 03:53 PM    Lymphocytes % 57 (H) 12/11/2024 05:43 AM    Monocytes % 0 (L) 02/14/2025 03:53 PM    Monocytes % 5 12/11/2024 05:43 AM    Eosinophils % 1 02/14/2025 03:53 PM    Eosinophils Relative 3 12/11/2024 05:43 AM    Basophils % 0 02/14/2025 03:53 PM    Basophils Relative 0 12/11/2024 05:43 AM    Immature Grans % 0 12/11/2024 05:43 AM    Absolute Neutrophils 1.21 (L) 12/11/2024 05:43 AM     Lab Results   Component Value Date/Time    Potassium 4.0 02/14/2025 03:53 PM    Chloride 103 02/14/2025 03:53 PM    CO2 28 02/14/2025 03:53 PM    BUN 21 02/14/2025 03:53 PM    Creatinine 0.71 02/14/2025 03:53 PM    Glucose, Fasting 88 12/31/2024 11:15 AM    Calcium 8.8 02/14/2025 03:53 PM    Corrected Calcium 9.4 02/14/2025 03:53 PM    AST 16 02/14/2025 03:53 PM    ALT 17 02/14/2025 03:53 PM     Alkaline Phosphatase 73 02/14/2025 03:53 PM    Total Protein 7.5 02/14/2025 03:53 PM    Albumin 3.2 (L) 02/14/2025 03:53 PM    Total Bilirubin 0.45 02/14/2025 03:53 PM    eGFR 85 02/14/2025 03:53 PM     {SL AMB ONCOLOGY LABS (Optional):94303}    {Radiology Results Review (Optional):55011}    {Administrative / Billing Section (Optional):83887}

## 2025-02-17 ENCOUNTER — OFFICE VISIT (OUTPATIENT)
Dept: HEMATOLOGY ONCOLOGY | Facility: CLINIC | Age: 75
End: 2025-02-17
Payer: MEDICARE

## 2025-02-17 ENCOUNTER — TELEPHONE (OUTPATIENT)
Dept: HEMATOLOGY ONCOLOGY | Facility: CLINIC | Age: 75
End: 2025-02-17

## 2025-02-17 ENCOUNTER — HOSPITAL ENCOUNTER (OUTPATIENT)
Dept: INFUSION CENTER | Facility: CLINIC | Age: 75
Discharge: HOME | End: 2025-02-17
Payer: MEDICARE

## 2025-02-17 VITALS
HEIGHT: 63 IN | HEART RATE: 80 BPM | RESPIRATION RATE: 18 BRPM | SYSTOLIC BLOOD PRESSURE: 100 MMHG | BODY MASS INDEX: 30.83 KG/M2 | WEIGHT: 174 LBS | TEMPERATURE: 97.2 F | DIASTOLIC BLOOD PRESSURE: 60 MMHG | OXYGEN SATURATION: 98 %

## 2025-02-17 VITALS
HEIGHT: 63 IN | DIASTOLIC BLOOD PRESSURE: 66 MMHG | WEIGHT: 174.5 LBS | RESPIRATION RATE: 18 BRPM | OXYGEN SATURATION: 96 % | TEMPERATURE: 98.3 F | BODY MASS INDEX: 30.92 KG/M2 | SYSTOLIC BLOOD PRESSURE: 103 MMHG | HEART RATE: 78 BPM

## 2025-02-17 DIAGNOSIS — D46.Z MDS (MYELODYSPLASTIC SYNDROME), HIGH GRADE (HCC): Primary | ICD-10-CM

## 2025-02-17 DIAGNOSIS — D64.9 ANEMIA, UNSPECIFIED TYPE: Primary | ICD-10-CM

## 2025-02-17 DIAGNOSIS — E66.01 OBESITY, MORBID (HCC): ICD-10-CM

## 2025-02-17 DIAGNOSIS — D52.0 DIETARY FOLATE DEFICIENCY ANEMIA: ICD-10-CM

## 2025-02-17 DIAGNOSIS — N18.31 STAGE 3A CHRONIC KIDNEY DISEASE (HCC): ICD-10-CM

## 2025-02-17 PROCEDURE — 99215 OFFICE O/P EST HI 40 MIN: CPT | Performed by: INTERNAL MEDICINE

## 2025-02-17 PROCEDURE — 96372 THER/PROPH/DIAG INJ SC/IM: CPT

## 2025-02-17 PROCEDURE — 96401 CHEMO ANTI-NEOPL SQ/IM: CPT

## 2025-02-17 RX ORDER — ONDANSETRON 8 MG/1
16 TABLET, ORALLY DISINTEGRATING ORAL ONCE
Status: CANCELLED
Start: 2025-02-20 | End: 2025-02-20

## 2025-02-17 RX ORDER — CYANOCOBALAMIN 1000 UG/ML
1000 INJECTION, SOLUTION INTRAMUSCULAR; SUBCUTANEOUS
Status: CANCELLED
Start: 2025-02-17

## 2025-02-17 RX ORDER — DEXAMETHASONE 4 MG/1
10 TABLET ORAL ONCE
Status: CANCELLED
Start: 2025-02-17

## 2025-02-17 RX ORDER — DEXAMETHASONE 4 MG/1
10 TABLET ORAL ONCE
Status: CANCELLED
Start: 2025-02-21

## 2025-02-17 RX ORDER — CYANOCOBALAMIN 1000 UG/ML
1000 INJECTION, SOLUTION INTRAMUSCULAR; SUBCUTANEOUS
Status: DISCONTINUED | OUTPATIENT
Start: 2025-02-17 | End: 2025-02-20 | Stop reason: HOSPADM

## 2025-02-17 RX ORDER — DEXAMETHASONE 4 MG/1
10 TABLET ORAL ONCE
Status: CANCELLED
Start: 2025-02-24

## 2025-02-17 RX ORDER — SODIUM CHLORIDE 9 MG/ML
20 INJECTION, SOLUTION INTRAVENOUS ONCE
Status: CANCELLED | OUTPATIENT
Start: 2025-02-19

## 2025-02-17 RX ORDER — ONDANSETRON 8 MG/1
16 TABLET, ORALLY DISINTEGRATING ORAL ONCE
Status: CANCELLED
Start: 2025-02-18 | End: 2025-02-18

## 2025-02-17 RX ORDER — DEXAMETHASONE 4 MG/1
10 TABLET ORAL ONCE
Status: CANCELLED
Start: 2025-02-18

## 2025-02-17 RX ORDER — DEXAMETHASONE 4 MG/1
10 TABLET ORAL ONCE
Status: CANCELLED
Start: 2025-02-20

## 2025-02-17 RX ORDER — ONDANSETRON 8 MG/1
16 TABLET, ORALLY DISINTEGRATING ORAL ONCE
Status: CANCELLED
Start: 2025-02-21 | End: 2025-02-21

## 2025-02-17 RX ORDER — ONDANSETRON 8 MG/1
16 TABLET, ORALLY DISINTEGRATING ORAL ONCE
Status: CANCELLED
Start: 2025-02-19 | End: 2025-02-19

## 2025-02-17 RX ORDER — SODIUM CHLORIDE 9 MG/ML
20 INJECTION, SOLUTION INTRAVENOUS ONCE
Status: CANCELLED | OUTPATIENT
Start: 2025-02-17

## 2025-02-17 RX ORDER — SODIUM CHLORIDE 9 MG/ML
20 INJECTION, SOLUTION INTRAVENOUS ONCE
Status: CANCELLED | OUTPATIENT
Start: 2025-02-25

## 2025-02-17 RX ORDER — SODIUM CHLORIDE 9 MG/ML
20 INJECTION, SOLUTION INTRAVENOUS ONCE
Status: CANCELLED | OUTPATIENT
Start: 2025-02-21

## 2025-02-17 RX ORDER — SODIUM CHLORIDE 9 MG/ML
20 INJECTION, SOLUTION INTRAVENOUS ONCE
Status: CANCELLED | OUTPATIENT
Start: 2025-02-18

## 2025-02-17 RX ORDER — ONDANSETRON 8 MG/1
16 TABLET, ORALLY DISINTEGRATING ORAL ONCE
Status: CANCELLED
Start: 2025-02-24 | End: 2025-02-24

## 2025-02-17 RX ORDER — ONDANSETRON 8 MG/1
16 TABLET, ORALLY DISINTEGRATING ORAL ONCE
Status: CANCELLED
Start: 2025-02-25 | End: 2025-02-25

## 2025-02-17 RX ORDER — SODIUM CHLORIDE 9 MG/ML
20 INJECTION, SOLUTION INTRAVENOUS ONCE
Status: CANCELLED | OUTPATIENT
Start: 2025-02-20

## 2025-02-17 RX ORDER — ONDANSETRON 8 MG/1
16 TABLET, ORALLY DISINTEGRATING ORAL ONCE
Status: CANCELLED
Start: 2025-02-17 | End: 2025-02-17

## 2025-02-17 RX ORDER — ONDANSETRON 8 MG/1
16 TABLET, ORALLY DISINTEGRATING ORAL ONCE
Status: COMPLETED | OUTPATIENT
Start: 2025-02-17 | End: 2025-02-17

## 2025-02-17 RX ORDER — DEXAMETHASONE 4 MG/1
10 TABLET ORAL ONCE
Status: CANCELLED
Start: 2025-02-19

## 2025-02-17 RX ORDER — DEXAMETHASONE 4 MG/1
10 TABLET ORAL ONCE
Status: CANCELLED
Start: 2025-02-25

## 2025-02-17 RX ORDER — SODIUM CHLORIDE 9 MG/ML
20 INJECTION, SOLUTION INTRAVENOUS ONCE
Status: CANCELLED | OUTPATIENT
Start: 2025-02-24

## 2025-02-17 RX ADMIN — ONDANSETRON 16 MG: 8 TABLET, ORALLY DISINTEGRATING ORAL at 10:03

## 2025-02-17 RX ADMIN — AZACITIDINE 137.5 MG: 100 INJECTION, POWDER, LYOPHILIZED, FOR SOLUTION INTRAVENOUS; SUBCUTANEOUS at 10:24

## 2025-02-17 RX ADMIN — CYANOCOBALAMIN 1000 MCG: 1000 INJECTION, SOLUTION INTRAMUSCULAR at 10:22

## 2025-02-17 RX ADMIN — DEXAMETHASONE 10 MG: 2 TABLET ORAL at 10:03

## 2025-02-17 NOTE — PROGRESS NOTES
Oncology Outpatient Follow - Up Note  Pretty Aguila 74 y.o. female MRN: @ Encounter: [id number]        Date:  2/16/2025      Assessment/ Plan:      Anemia  Syncope  Anemia is likely in the setting of MDS/AML  Symptoms have improved since discharge home and has not had any repeat episodes of syncope  Hgb: 9 on 2/14, improved compared to 5.3 at the time of hospitalization     Myelodysplastic syndrome, high grade  Current regimen: azacitidine every 28 days starting on 1/20/2025  Plan for Cycle 2 on 2/17/25 with only Azacytidine  Will add on Venetoclax with ramp up to 400 mg daily  Referred to [facility] for allogenic transplant and was not deemed to be a candidate  Recommended Azacytidine for 5 days with daily Venetoclax 400 mg daily for 7 days  Clinical trials: no trials at [facility] or [facility] at this time, family reached out to MD Lindquist  Plan for B12 injection with today's cycle  Continue ppx with Diflucan 100 mg daily and Acyclovir 400 mg BID  Continue weekly lab work and added B12 and Folate levels  Ordered Echocardiogram for persistent dyspnea in the setting of known coronary disease    Labs and imaging studies are reviewed by ordering provider once results are available. If there are findings that need immediate attention, you will be contacted when results available.   Discussing results and the implication on your healthcare is best discussed in person at your follow-up visit.       HPI:    Pretty Aguila is a 73 y/o female with a medical history of DM, HTN, HLD, CAD s/p CABG, psoriatic/rheumatoid arthritis, left hip dislocation, and fibromyalgia. Her oncologic history is significant for stage II left breast cancer ER/VA positive HER2/shannan negative on adjuvant anastrozole and high-grade MDS/AML with complex cytogenetics with 20% blasts and progressive pancytopenia. She underwent a left mastectomy on 11/20/23 was initiated on anastrozole on 12/9/2023.    On 12/11/24 she had findings consistent with high  grade myeloid neoplasm when being work-up for anemia, at least MDS/AML with mutated TP53 on bone marrow biopsy. She was found to have normo-cellular 20-30%, with increased blasts 15-20%, Multilineage dysplasia, fibrosis 2-3 of 3, with complex karyotype including TP53 deletion and mutation, 5q deletion and monosomy 7. She was started on therapy with Azacytidine on 1/20/25 and has tolerated it relatively well.    Interval History:       was recently hospitalized from 2/8 to 2/11 for an fall with syncope. She was previously hospitalized for a left hip dislocation and had an IV dysfunction with some blood loss. After she was discharged home she became weak and had a fall in the bathroom with a head strike. Her trauma work-up was negative. She required a transfusion of 3 units pRBCs. Her symptoms had improved and she was found to be orthostatic positive.     She was recently evaluated at [facility] and is not a candidate for allogenic stem cell transplant because of her age due to increased risk of transplant related morbidity and mortality.     She reports that her energy levels are much improved at this time compared to before her hospitalization, but she is still very tired. She has difficulty with stairs mainly due to hip pain, and FPC through needs to stop due to SOB. Her daughters endorse that she still has dypnea after 5-10 steps, but she reports she is now able to walk from one room to another with much more ease than before. She denies any fevers, chills, weightloss, or night sweats. Her appetite has improved since she came back home from the hospital.        Test Results:    Imaging: CT spine cervical wo contrast  Result Date: 2/9/2025  Narrative: CT CERVICAL SPINE - WITHOUT CONTRAST INDICATION:   s/p fall, hypotension. Has contrast allergy will need pre medication. COMPARISON:  None. TECHNIQUE:  CT examination of the cervical spine was performed without intravenous contrast.  Contiguous  axial images were obtained. Multiplanar 2D reformatted images were created from the source data. Radiation dose length product (DLP) for this visit:  379 mGy-cm .  This examination, like all CT scans performed in the [facility], was performed utilizing techniques to minimize radiation dose exposure, including the use of iterative reconstruction and automated exposure control. IMAGE QUALITY:  Diagnostic. FINDINGS: ALIGNMENT:  Normal alignment of the cervical spine. No subluxation. VERTEBRAE:  No fracture. DEGENERATIVE CHANGES:  Moderate multilevel cervical degenerative changes are noted. No critical central canal stenosis. PREVERTEBRAL AND PARASPINAL SOFT TISSUES: Unremarkable THORACIC INLET:  Normal. There is a partially imaged but chronic appearing deformity of the right mandibular ramus     Impression: No cervical spine fracture or traumatic malalignment. I personally discussed this study with LINDEN JOHNSON on 2/9/2025 11:13 AM. Workstation performed: [id number]     CT chest abdomen pelvis w contrast  Result Date: 2/9/2025  Narrative: CT CHEST, ABDOMEN AND PELVIS WITH IV CONTRAST INDICATION: s/p fall, hypotension. Has contrast allergy will need pre medication. COMPARISON: Chest CT dated 12/31/2024 TECHNIQUE: CT examination of the chest, abdomen and pelvis was performed. Multiplanar 2D reformatted images were created from the source data. This examination, like all CT scans performed in the [facility], was performed utilizing techniques to minimize radiation dose exposure, including the use of iterative reconstruction and automated exposure control. Radiation dose length product (DLP) for this visit: 707 mGy-cm IV Contrast: 75 mL of iohexol Enteric Contrast: Not administered. FINDINGS: CHEST LUNGS: Lungs are clear. No tracheal or endobronchial lesion. PLEURA: Unremarkable. HEART/GREAT VESSELS: CABG is noted with extensive atherosclerosis of the native vessels. There is no  cardiomegaly.. No thoracic aortic aneurysm. MEDIASTINUM AND CORDELL: Unremarkable. CHEST WALL AND LOWER NECK: Left breast reconstruction is noted. ABDOMEN LIVER/BILIARY TREE: Prominence of common bile duct and central intrahepatic biliary tree, most likely sequela of prior cholecystectomy. Mild pneumobilia is also likely secondary to prior cholecystectomy. Liver is otherwise unremarkable. GALLBLADDER: Post cholecystectomy. SPLEEN: Unremarkable. PANCREAS: Unremarkable. ADRENAL GLANDS: Unremarkable. KIDNEYS/URETERS: Unremarkable. No hydronephrosis. STOMACH AND BOWEL: Gastric sleeve is noted with moderate, sliding hiatal hernia APPENDIX: No findings to suggest appendicitis. ABDOMINOPELVIC CAVITY: No ascites. No pneumoperitoneum. No lymphadenopathy. VESSELS: Atherosclerosis without abdominal aortic aneurysm. PELVIS REPRODUCTIVE ORGANS: Unremarkable for patient's age. URINARY BLADDER: Unremarkable. ABDOMINAL WALL/INGUINAL REGIONS: Unremarkable. BONES: No acute fracture or suspicious osseous lesion. There are multiple chronic left anterolateral rib fractures.     Impression: No findings of acute traumatic injury in the chest, abdomen or pelvis. Incidental findings as discussed above I personally discussed this study with LINDEN JOHNSON on 2/9/2025 11:12 AM. Workstation performed: [id number]     XR shoulder 2+ views RIGHT  Result Date: 2/9/2025  Narrative: XR SHOULDER 2+ VW RIGHT INDICATION: fall with trauma to right shoulder. COMPARISON: March 23, 2023 FINDINGS: No acute fracture or dislocation. Advanced glenohumeral degenerative change. No lytic or blastic osseous lesion. Unremarkable soft tissues.     Impression: No acute osseous abnormality. Computerized Assisted Algorithm (CAA) may have been used to analyze all applicable images. Workstation performed: [id number]     CT head wo contrast  Result Date: 2/8/2025  Narrative: CT BRAIN - WITHOUT CONTRAST INDICATION:   History of fall this morning with associated head strike.  COMPARISON: CT head without contrast 2/7/2004 TECHNIQUE:  CT examination of the brain was performed.  Multiplanar 2D reformatted images were created from the source data. Radiation dose length product (DLP) for this visit:  1141 mGy-cm .  This examination, like all CT scans performed in the [facility], was performed utilizing techniques to minimize radiation dose exposure, including the use of iterative reconstruction and automated exposure control. IMAGE QUALITY:  Diagnostic. FINDINGS: PARENCHYMA: Decreased attenuation is noted in periventricular and subcortical white matter demonstrating an appearance that is statistically most likely to represent mild microangiopathic change.     There is a chronic appearing left paramedian pontine lacunar infarct. No CT signs of acute infarction.  No intracranial mass, mass effect or midline shift.  No acute parenchymal hemorrhage. Atherosclerotic calcification of the intracranial carotid and vertebral arteries. VENTRICLES AND EXTRA-AXIAL SPACES:  Normal for the patient's age. VISUALIZED ORBITS: Normal visualized orbits. PARANASAL SINUSES: Normal visualized paranasal sinuses. CALVARIUM AND EXTRACRANIAL SOFT TISSUES: Normal.     Impression: 1. No intracranial hemorrhage or calvarial fracture. 2. Scattered chronic microvascular ischemic change. Chronic appearing left paramedian pontine lacunar infarct. Resident: SHANTELLE SINGLETARY I, the attending radiologist, have reviewed the images and agree with the final report above. Workstation performed: [id number]     XR hip/pelv 2-3 vws left if performed  Result Date: 2/6/2025  Narrative: XR HIP/PELV 2-3 VWS LEFT  W PELVIS IF PERFORMED INDICATION: left hip dislocation. COMPARISON: 12/22/2024 FINDINGS: Dislocation across a total left hip arthroplasty without fracture. Right hip arthroplasty incompletely evaluated but grossly intact. No lytic or blastic osseous lesion. Unremarkable soft tissues. Unremarkable visualized  lumbar spine.     Impression: Dislocation across a total left hip arthroplasty. Computerized Assisted Algorithm (CAA) may have been used to analyze all applicable images. Workstation performed: [id number]     XR hip/pelv 1 vw left if performed  Result Date: 2/6/2025  Narrative: XR HIP/PELV 1 VW LEFT W PELVIS IF PERFORMED INDICATION: Post reduction. COMPARISON: Same date plain film FINDINGS: Anatomic alignment status post HERVE after reduction. No lytic or blastic osseous lesion. Unremarkable soft tissues. Unremarkable visualized lumbar spine.     Impression: Anatomic alignment status post HERVE after reduction. Computerized Assisted Algorithm (CAA) may have been used to analyze all applicable images. Workstation performed: [id number]       Labs:   Lab Results   Component Value Date    WBC 2.15 (L) 02/14/2025    HGB 9.0 (L) 02/14/2025    HCT 27.4 (L) 02/14/2025     (H) 02/14/2025     02/14/2025     Lab Results   Component Value Date    K 4.0 02/14/2025     02/14/2025    CO2 28 02/14/2025    BUN 21 02/14/2025    CREATININE 0.71 02/14/2025    GLUF 88 12/31/2024    CALCIUM 8.8 02/14/2025    CORRECTEDCA 9.4 02/14/2025    AST 16 02/14/2025    ALT 17 02/14/2025    ALKPHOS 73 02/14/2025    EGFR 85 02/14/2025       Lab Results   Component Value Date    IRON 172 02/09/2025    TIBC 207.2 (L) 02/09/2025    FERRITIN 513 (H) 02/09/2025       Lab Results   Component Value Date    PIGHDEBQ78 544 12/19/2024         ROS: Review of Systems   Constitutional:  Positive for fatigue. Negative for appetite change, chills, fever and unexpected weight change.   Respiratory:  Positive for shortness of breath.    Cardiovascular:  Negative for chest pain, leg swelling and palpitations.   Gastrointestinal:  Negative for constipation, diarrhea, nausea and vomiting.   Musculoskeletal:  Positive for gait problem.   Skin:  Negative for rash.   Neurological:  Positive for gait problem. Negative for dizziness, headaches and  light-headedness.          Current Medications: Reviewed  Allergies: Reviewed  PMH/FH/SH:  Reviewed      Physical Exam:    There is no height or weight on file to calculate BSA.    Wt Readings from Last 3 Encounters:   02/10/25 86.3 kg (190 lb 4.1 oz)   01/28/25 89.4 kg (197 lb)   01/24/25 81.9 kg (180 lb 8 oz)        Temp Readings from Last 3 Encounters:   02/11/25 98 °F (36.7 °C)   02/06/25 98.5 °F (36.9 °C) (Oral)   01/28/25 97.7 °F (36.5 °C) (Temporal)        BP Readings from Last 3 Encounters:   02/11/25 107/69   02/06/25 93/54   01/28/25 100/62         Pulse Readings from Last 3 Encounters:   02/11/25 90   02/06/25 81   01/28/25 99        Physical Exam  Constitutional:       General: She is not in acute distress.     Appearance: She is not ill-appearing.   HENT:      Mouth/Throat:      Mouth: Mucous membranes are moist.   Eyes:      Extraocular Movements: Extraocular movements intact.      Conjunctiva/sclera: Conjunctivae normal.   Neck:      Comments: No axillary lymphadenopathy  Cardiovascular:      Rate and Rhythm: Normal rate and regular rhythm.      Heart sounds: Normal heart sounds. No murmur heard.  Pulmonary:      Effort: Pulmonary effort is normal. No respiratory distress.      Breath sounds: Normal breath sounds. No wheezing, rhonchi or rales.   Abdominal:      General: Bowel sounds are normal. There is no distension.      Palpations: Abdomen is soft.      Tenderness: There is no abdominal tenderness.   Musculoskeletal:         General: Normal range of motion.      Right lower leg: No edema.      Left lower leg: No edema.   Lymphadenopathy:      Cervical: No cervical adenopathy.   Skin:     General: Skin is warm and dry.   Neurological:      Mental Status: She is alert.   Psychiatric:         Mood and Affect: Mood normal.         Behavior: Behavior normal.         ECOG PS: 2

## 2025-02-17 NOTE — TELEPHONE ENCOUNTER
Dr. Carranza saw patient in office today. Treatment plan updated to only have 5 days of vidaza, not 7. Days 6 and 7 removed from treatment plan.

## 2025-02-17 NOTE — PROGRESS NOTES
Pt tolerated Vidaza and B12 injection without incident. Confirmed apt for tomorrow @ 2pm @ [facility].

## 2025-02-17 NOTE — PATIENT INSTRUCTIONS
Echo-    Labs weekly    Treatment today with Vidaza x 5 days    Will order Venetoclax for next cycle    Follow up 1 month

## 2025-02-18 ENCOUNTER — DOCUMENTATION (OUTPATIENT)
Dept: HEMATOLOGY ONCOLOGY | Facility: CLINIC | Age: 75
End: 2025-02-18

## 2025-02-18 ENCOUNTER — HOSPITAL ENCOUNTER (OUTPATIENT)
Dept: INFUSION CENTER | Facility: CLINIC | Age: 75
Discharge: HOME | End: 2025-02-18
Payer: MEDICARE

## 2025-02-18 VITALS
SYSTOLIC BLOOD PRESSURE: 112 MMHG | HEART RATE: 84 BPM | TEMPERATURE: 97.9 F | RESPIRATION RATE: 18 BRPM | BODY MASS INDEX: 30.83 KG/M2 | DIASTOLIC BLOOD PRESSURE: 73 MMHG | HEIGHT: 63 IN | WEIGHT: 174 LBS

## 2025-02-18 DIAGNOSIS — D46.Z MDS (MYELODYSPLASTIC SYNDROME), HIGH GRADE (HCC): Primary | ICD-10-CM

## 2025-02-18 PROCEDURE — 96401 CHEMO ANTI-NEOPL SQ/IM: CPT

## 2025-02-18 RX ORDER — ONDANSETRON 8 MG/1
16 TABLET, ORALLY DISINTEGRATING ORAL ONCE
Status: COMPLETED | OUTPATIENT
Start: 2025-02-18 | End: 2025-02-18

## 2025-02-18 RX ORDER — SODIUM CHLORIDE 9 MG/ML
20 INJECTION, SOLUTION INTRAVENOUS ONCE
Status: DISCONTINUED | OUTPATIENT
Start: 2025-02-18 | End: 2025-02-21 | Stop reason: HOSPADM

## 2025-02-18 RX ADMIN — AZACITIDINE 137.5 MG: 100 INJECTION, POWDER, LYOPHILIZED, FOR SOLUTION INTRAVENOUS; SUBCUTANEOUS at 14:48

## 2025-02-18 RX ADMIN — DEXAMETHASONE 10 MG: 2 TABLET ORAL at 14:16

## 2025-02-18 RX ADMIN — ONDANSETRON 16 MG: 8 TABLET, ORALLY DISINTEGRATING ORAL at 14:16

## 2025-02-18 NOTE — PROGRESS NOTES
Patient tolerated vidaza as ordered to right and left abdomen, dry dressings applied and CDI.  Patient will RTO tomorrow as scheduled for day 3.

## 2025-02-18 NOTE — PROGRESS NOTES
Received request from [facility] regarding no rx coverage found for patient.    Patient currently does not have RX coverage, I have reached out to her in the past regarding coverage.   [facility] free drug application completed and forwarded to provider for signature.   Call placed to patient to discuss free drug process, no response left message for return call.

## 2025-02-18 NOTE — PATIENT INSTRUCTIONS
February 2025 Sunday Monday Tuesday Wednesday Thursday Friday Saturday                                 1                2     3     4     5     6    Admission   1:22 AM   Erasmo Diaz MD   [facility] Emergency Department   (Discharge: 2/6/2025)    X-Ray General   1:40 AM   (15 min.)   BE XR 2   [facility]    X-Ray General   3:00 AM   (15 min.)   BE XR PORT 3   [facility] 7     8    Admission  12:37 PM   Yris Oneal MD   [facility] 4th Floor Med Surg Unit   (Discharge: 2/11/2025)    X-Ray General   1:50 PM   (15 min.)   AN XR PORT 2   [facility] Radiology    CT HEAD WO CONTRAST   1:55 PM   (30 min.)   AN CT ED   [facility] CAT Scan            9    CT SPINE CERVICAL WO CON  10:40 AM   (30 min.)   AN CT ED   [facility] CAT Scan 10     11     12     13     14    Laboratory Walk In   3:45 PM   (5 min.)   AN MOB LAB PSC [facility]IR   [facility]                16     17    Office Visit   7:45 AM   (20 min.)   Geno Carranza MD   [facility]    Oncology Treatment   9:30 AM   (140 min.)   AN INF CHAIR 10   [facility] 18    Oncology Treatment   2:00 PM   (140 min.)   AN INF BED 1   [facility] 19    Weight Management Office Visit   9:15 AM   (30 min.)   Dagmar Jorgensen MD   [facility] [place]    Oncology Treatment   2:00 PM   (140 min.)   AN INF CHAIR 16   [facility] 20    Oncology Treatment   1:00 PM   (140 min.)   AN INF CHAIR 2   [facility] 21    Oncology Treatment   1:00 PM   (140 min.)   AN INF CHAIR 17   [facility] 22        Cycle 2, Day 1 Cycle 2, Day 2 Cycle 2, Day 3 Cycle 2, Day 4 Cycle 2,  Day 5    23     24     25     26     27     28                            Treatment Details         2/17/2025 - Cycle 2, Day 1      Chemotherapy: cyanocobalamin, azaCITIDine (VIDAZA), ONCBCN PROVIDER COMMUNICATION5    2/18/2025 - Cycle 2, Day 2      Chemotherapy: azaCITIDine (VIDAZA), ONCBCN PROVIDER COMMUNICATION5    2/19/2025 - Cycle 2, Day 3      Chemotherapy: azaCITIDine (VIDAZA), ONCBCN PROVIDER COMMUNICATION5    2/20/2025 - Cycle 2, Day 4      Chemotherapy: azaCITIDine (VIDAZA), ONCBCN PROVIDER COMMUNICATION5    2/21/2025 - Cycle 2, Day 5      Chemotherapy: azaCITIDine (VIDAZA), ONCBCN PROVIDER COMMUNICATION5        March 2025 Sunday Monday Tuesday Wednesday Thursday Friday Saturday                                 1                2     3     4    OFFICE VISIT  10:45 AM   (20 min.)   Geno Carranza MD   [facility] [place] 5     6     7     8                9     10     11     12     13     14     15                16     17    Oncology Treatment  12:30 PM   (140 min.)   AN INF CHAIR 14   [facility] 18    Oncology Treatment   1:00 PM   (140 min.)   AN INF CHAIR 2   [facility] 19    Oncology Treatment   1:30 PM   (140 min.)   AN INF CHAIR 2   [facility] 20    Oncology Treatment   2:00 PM   (140 min.)   AN INF CHAIR 2   [facility] 21    Oncology Treatment   2:00 PM   (140 min.)   AN INF BED 23   [facility] 22          Cycle 3, Day 1 Cycle 3, Day 2 Cycle 3, Day 3 Cycle 3, Day 4   23  Happy Birthday!     24    Office Visit  11:25 AM   (20 min.)   Geno Carranza MD   [facility] [place] 25     26     27     28     29       Cycle 3, Day 5         30     31    Cardiology Office Visit  11:05 AM   (20 min.)   Cortney Conrad MD   [facility] Dr. Conrad                                             Treatment Details         3/19/2025 - Cycle 3, Day 1      Chemotherapy: azaCITIDine (VIDAZA), ONCBCN PROVIDER COMMUNICATION5    3/20/2025 - Cycle 3, Day 2      Chemotherapy: azaCITIDine (VIDAZA), ONCBCN PROVIDER COMMUNICATION5    3/21/2025 - Cycle 3, Day 3      Chemotherapy: azaCITIDine (VIDAZA), ONCBCN PROVIDER COMMUNICATION5    3/22/2025 - Cycle 3, Day 4      Chemotherapy: azaCITIDine (VIDAZA), ONCBCN PROVIDER COMMUNICATION5    3/23/2025 - Cycle 3, Day 5      Chemotherapy: azaCITIDine (VIDAZA), ONCBCN PROVIDER COMMUNICATION5

## 2025-02-19 ENCOUNTER — HOSPITAL ENCOUNTER (OUTPATIENT)
Dept: INFUSION CENTER | Facility: CLINIC | Age: 75
Discharge: HOME | End: 2025-02-19
Payer: MEDICARE

## 2025-02-19 ENCOUNTER — DOCUMENTATION (OUTPATIENT)
Dept: HEMATOLOGY ONCOLOGY | Facility: CLINIC | Age: 75
End: 2025-02-19

## 2025-02-19 VITALS
BODY MASS INDEX: 30.83 KG/M2 | OXYGEN SATURATION: 99 % | DIASTOLIC BLOOD PRESSURE: 66 MMHG | HEART RATE: 78 BPM | SYSTOLIC BLOOD PRESSURE: 95 MMHG | WEIGHT: 174 LBS | HEIGHT: 63 IN | TEMPERATURE: 97.5 F

## 2025-02-19 DIAGNOSIS — D46.Z MDS (MYELODYSPLASTIC SYNDROME), HIGH GRADE (HCC): Primary | ICD-10-CM

## 2025-02-19 PROCEDURE — 96401 CHEMO ANTI-NEOPL SQ/IM: CPT

## 2025-02-19 RX ORDER — SODIUM CHLORIDE 9 MG/ML
20 INJECTION, SOLUTION INTRAVENOUS ONCE
Status: DISCONTINUED | OUTPATIENT
Start: 2025-02-19 | End: 2025-02-22 | Stop reason: HOSPADM

## 2025-02-19 RX ORDER — ONDANSETRON 8 MG/1
16 TABLET, ORALLY DISINTEGRATING ORAL ONCE
Status: COMPLETED | OUTPATIENT
Start: 2025-02-19 | End: 2025-02-19

## 2025-02-19 RX ADMIN — DEXAMETHASONE 10 MG: 2 TABLET ORAL at 14:35

## 2025-02-19 RX ADMIN — AZACITIDINE 137.5 MG: 100 INJECTION, POWDER, LYOPHILIZED, FOR SOLUTION INTRAVENOUS; SUBCUTANEOUS at 14:59

## 2025-02-19 RX ADMIN — ONDANSETRON 16 MG: 8 TABLET, ORALLY DISINTEGRATING ORAL at 14:35

## 2025-02-19 NOTE — PROGRESS NOTES
Received new oral chemo start Venetoclax, Per [employer] no RX Coverage on file.   Call placed to patient, spoke with Pretty. She states she does have RX Coverage which she did receive card for. She stated she had not provided that information to anyone as of yet.  She will look for card and call me back with information.  Once call is returned and information is obtained I will forward to [facility] to run another test claim.

## 2025-02-19 NOTE — PROGRESS NOTES
Vidaza given via 2 injections, one in Left abd and one in right abd and she tolerated it well. Next appointment confirmed 2/20/25 at 1300 at [place]. Copy of her schedule given

## 2025-02-19 NOTE — PROGRESS NOTES
Received return call from patient regarding rx plan.  Patient states her [employer] RX medicare part D will be effective 3-1 confirmation # [id number]   Patient states per her understanding this medication is not to begin until next cycle in combination with Infusion treatment.  Patient does have  [employer] ed which will cover venetoclax medication.  Email to Oral Chemo Team to inform outcome of call. Per Raina next cycle is 3-17 and we can wait until RX plan has been verified.   Ed can be used at that time,  [employer] information:   ID# [id number]  CARD#[id number]  BIN#[id number]  PCN# [id number]  GRP# [id number]  EFF 12-7-24  THRU 12-6-25  AMT$10,000    Return call to patient to confirm free drug will not be needed at this time and she was very grateful for the assistance and follow up

## 2025-02-20 ENCOUNTER — HOSPITAL ENCOUNTER (OUTPATIENT)
Dept: INFUSION CENTER | Facility: CLINIC | Age: 75
Discharge: HOME | End: 2025-02-20
Payer: MEDICARE

## 2025-02-20 VITALS
BODY MASS INDEX: 31.1 KG/M2 | RESPIRATION RATE: 18 BRPM | HEIGHT: 63 IN | WEIGHT: 175.5 LBS | OXYGEN SATURATION: 95 % | SYSTOLIC BLOOD PRESSURE: 114 MMHG | DIASTOLIC BLOOD PRESSURE: 73 MMHG | HEART RATE: 82 BPM | TEMPERATURE: 97.5 F

## 2025-02-20 DIAGNOSIS — D46.Z MDS (MYELODYSPLASTIC SYNDROME), HIGH GRADE (HCC): Primary | ICD-10-CM

## 2025-02-20 PROCEDURE — 96401 CHEMO ANTI-NEOPL SQ/IM: CPT

## 2025-02-20 RX ORDER — SODIUM CHLORIDE 9 MG/ML
20 INJECTION, SOLUTION INTRAVENOUS ONCE
Status: DISCONTINUED | OUTPATIENT
Start: 2025-02-20 | End: 2025-02-23 | Stop reason: HOSPADM

## 2025-02-20 RX ORDER — ONDANSETRON 8 MG/1
16 TABLET, ORALLY DISINTEGRATING ORAL ONCE
Status: COMPLETED | OUTPATIENT
Start: 2025-02-20 | End: 2025-02-20

## 2025-02-20 RX ADMIN — DEXAMETHASONE 10 MG: 2 TABLET ORAL at 13:23

## 2025-02-20 RX ADMIN — AZACITIDINE 137.5 MG: 100 INJECTION, POWDER, LYOPHILIZED, FOR SOLUTION INTRAVENOUS; SUBCUTANEOUS at 13:51

## 2025-02-20 RX ADMIN — ONDANSETRON 16 MG: 8 TABLET, ORALLY DISINTEGRATING ORAL at 13:23

## 2025-02-20 NOTE — PROGRESS NOTES
Patient presents to [facility]fusion [facility] for Vidaza. Patient offers no complaints at this time. Labs reviewed from 2/14. Injections (x2) administered into b/l abdomen. Treatment tolerated without incident. Next appt confirmed with patient for 2/21 at 12pm at [place]. AVS declined.

## 2025-02-21 ENCOUNTER — HOSPITAL ENCOUNTER (OUTPATIENT)
Dept: INFUSION CENTER | Facility: CLINIC | Age: 75
End: 2025-02-21
Payer: MEDICARE

## 2025-02-21 VITALS
WEIGHT: 174.5 LBS | HEART RATE: 90 BPM | OXYGEN SATURATION: 98 % | TEMPERATURE: 98.1 F | BODY MASS INDEX: 30.92 KG/M2 | RESPIRATION RATE: 18 BRPM | SYSTOLIC BLOOD PRESSURE: 110 MMHG | DIASTOLIC BLOOD PRESSURE: 70 MMHG | HEIGHT: 63 IN

## 2025-02-21 DIAGNOSIS — D46.Z MDS (MYELODYSPLASTIC SYNDROME), HIGH GRADE (HCC): Primary | ICD-10-CM

## 2025-02-21 PROCEDURE — 96401 CHEMO ANTI-NEOPL SQ/IM: CPT

## 2025-02-21 RX ORDER — ONDANSETRON 8 MG/1
16 TABLET, ORALLY DISINTEGRATING ORAL ONCE
Status: COMPLETED | OUTPATIENT
Start: 2025-02-21 | End: 2025-02-21

## 2025-02-21 RX ORDER — SODIUM CHLORIDE 9 MG/ML
20 INJECTION, SOLUTION INTRAVENOUS ONCE
Status: DISPENSED | OUTPATIENT
Start: 2025-02-21

## 2025-02-21 RX ADMIN — AZACITIDINE 137.5 MG: 100 INJECTION, POWDER, LYOPHILIZED, FOR SOLUTION INTRAVENOUS; SUBCUTANEOUS at 13:16

## 2025-02-21 RX ADMIN — ONDANSETRON 16 MG: 8 TABLET, ORALLY DISINTEGRATING ORAL at 12:18

## 2025-02-21 RX ADMIN — DEXAMETHASONE 10 MG: 4 TABLET ORAL at 12:18

## 2025-02-21 NOTE — PROGRESS NOTES
Patient arrives for D5 C2 Vidaza today. Labs reviewed from 2/14 - no parameters ordered for treatment today. Premedicated as per orders. Patient resting on recliner chair, call bell within reach.

## 2025-02-21 NOTE — PROGRESS NOTES
Patient tolerated Vidaza x2 - x1 R, x1 L abdomen, no bleeding noted. Gauze and bandaid in place. Patient aware of next appt at [facility] on 3/17 at 1230, already has AVS.

## 2025-02-24 ENCOUNTER — TELEPHONE (OUTPATIENT)
Age: 75
End: 2025-02-24

## 2025-02-24 ENCOUNTER — NURSE TRIAGE (OUTPATIENT)
Age: 75
End: 2025-02-24

## 2025-02-24 ENCOUNTER — APPOINTMENT (OUTPATIENT)
Dept: LAB | Facility: CLINIC | Age: 75
DRG: 312 | End: 2025-02-24
Payer: MEDICARE

## 2025-02-24 ENCOUNTER — TELEPHONE (OUTPATIENT)
Dept: OTHER | Facility: OTHER | Age: 75
End: 2025-02-24

## 2025-02-24 ENCOUNTER — TELEPHONE (OUTPATIENT)
Dept: HEMATOLOGY ONCOLOGY | Facility: CLINIC | Age: 75
End: 2025-02-24

## 2025-02-24 DIAGNOSIS — D64.9 ANEMIA, UNSPECIFIED TYPE: ICD-10-CM

## 2025-02-24 DIAGNOSIS — D46.Z MDS (MYELODYSPLASTIC SYNDROME), HIGH GRADE (HCC): Primary | ICD-10-CM

## 2025-02-24 DIAGNOSIS — D61.818 PANCYTOPENIA (HCC): ICD-10-CM

## 2025-02-24 DIAGNOSIS — D52.0 DIETARY FOLATE DEFICIENCY ANEMIA: ICD-10-CM

## 2025-02-24 LAB
FOLATE SERPL-MCNC: 7.9 NG/ML
VIT B12 SERPL-MCNC: 476 PG/ML (ref 180–914)

## 2025-02-24 PROCEDURE — 82746 ASSAY OF FOLIC ACID SERUM: CPT

## 2025-02-24 PROCEDURE — 82607 VITAMIN B-12: CPT

## 2025-02-24 PROCEDURE — 36415 COLL VENOUS BLD VENIPUNCTURE: CPT

## 2025-02-24 NOTE — TELEPHONE ENCOUNTER
Received text for on-call hours, patient's daughter Rosamaria called inquiring about lab results.  She also requested called from on-call provider to discuss patient's symptoms reported shortness of breath and fatigue.  Per chart review patient is 74-year-old female with history of MDS, on venetoclax and azacitidine with noted pancytopenia.  This morning she had vitamin B 12 and folate drawn, although she has weekly CBC and CMP for unclear reasons were not drawn.  I tried calling back patient's daughter Rosamaria and was directed to voice message.  Called Ms. Pretty Aguila, and let her know that labs from this morning including B12 and folate are still pending, she further questioned about hemoglobin results which unfortunately were not drawn and patient was informed about same.  She does reports increasing fatigue and shortness of breath started yesterday, denies any abnormal bleeding, fever or chills.  She does bruise easily at baseline but no worsening noted.    Ordered CBC with differential and CMP to be done in morning, patient understood the plan and reported she would be able to go for labs first thing in morning with her daughter.  She was encouraged to monitor for signs of worsening shortness of breath, any abnormal bleeding, fever or chills in that case should visit the nearest ED.    Discussed with Dr Antony, and will route above conversation to patient's primary oncology team.

## 2025-02-24 NOTE — TELEPHONE ENCOUNTER
"Patient's daughter calling in to ask what her lab work is from today, I informed her that I do not see updated lab results..  She reports yesterday onset of lethargy with confusion and SOB.  She reports SOB with short distance.  Denies any pain.  She reports had blood work completed today and would like the results.  She reports patient is patient appeared sleepy did not sound herself but oriented x4.  Rosamaria would like the on call physician call her and access the lab work completed today.  On call physician notified via secure chat.     Reason for Disposition   [1] MODERATE weakness (i.e., interferes with work, school, normal activities) AND [2] persists > 3 days    Answer Assessment - Initial Assessment Questions  1. DESCRIPTION: \"Describe how you are feeling.\"    Daughter reports weakness onset yesterday.    2. SEVERITY: \"How bad is it?\"  \"Can you stand and walk?\"      Can only walk for very short distance before becoming SOB.    3. ONSET: \"When did these symptoms begin?\" (e.g., hours, days, weeks, months)      Yesterday    4. CAUSE: \"What do you think is causing the weakness or fatigue?\" (e.g., not drinking enough fluids, medical problem, trouble sleeping)      Unsure    5. NEW MEDICINES:  \"Have you started on any new medicines recently?\" (e.g., opioid pain medicines, benzodiazepines, muscle relaxants, antidepressants, antihistamines, neuroleptics, beta blockers)      No    6. OTHER SYMPTOMS: \"Do you have any other symptoms?\" (e.g., chest pain, fever, cough, SOB, vomiting, diarrhea, bleeding, other areas of pain)      No    Protocols used: Weakness (Generalized) and Fatigue-Adult-AH    "

## 2025-02-24 NOTE — TELEPHONE ENCOUNTER
Patient called, stating she was told to give us a call when she got her blood work done, so that Dr. Carranza can review them. She just left the lab now and had them drawn. She would like them reviewed and get a call back to discuss    Dressing: pressure dressing with telfa

## 2025-02-24 NOTE — TELEPHONE ENCOUNTER
Patient states she just spoke to Dr. Ibanez; went for blood work today and just found out that there was not a CBC ordered for her. States that she isn't feeling well and is having some shortness of breath. Patient did not wish to re-page Dr. Ibanez; verbalized that she has instructions to go to the Emergency Department if symptoms are worsening. She just wanted to speak with someone in the office regarding the CBC and why it was not ordered for today. Please follow up with patient when office opens in the morning.

## 2025-02-25 ENCOUNTER — HOSPITAL ENCOUNTER (INPATIENT)
Facility: HOSPITAL | Age: 75
LOS: 1 days | Discharge: HOME | DRG: 312 | End: 2025-02-27
Attending: EMERGENCY MEDICINE | Admitting: INTERNAL MEDICINE
Payer: MEDICARE

## 2025-02-25 ENCOUNTER — PATIENT OUTREACH (OUTPATIENT)
Dept: CASE MANAGEMENT | Facility: OTHER | Age: 75
End: 2025-02-25

## 2025-02-25 ENCOUNTER — APPOINTMENT (EMERGENCY)
Dept: RADIOLOGY | Facility: HOSPITAL | Age: 75
DRG: 312 | End: 2025-02-25
Payer: MEDICARE

## 2025-02-25 DIAGNOSIS — R06.09 EXERTIONAL DYSPNEA: ICD-10-CM

## 2025-02-25 DIAGNOSIS — D72.819 CHRONIC LEUKOPENIA: ICD-10-CM

## 2025-02-25 DIAGNOSIS — I10 ESSENTIAL HYPERTENSION: ICD-10-CM

## 2025-02-25 DIAGNOSIS — D64.9 CHRONIC ANEMIA: ICD-10-CM

## 2025-02-25 DIAGNOSIS — D46.9 MDS (MYELODYSPLASTIC SYNDROME) (HCC): ICD-10-CM

## 2025-02-25 DIAGNOSIS — R55 NEAR SYNCOPE: Primary | ICD-10-CM

## 2025-02-25 DIAGNOSIS — L40.50 PSORIATIC ARTHRITIS (HCC): ICD-10-CM

## 2025-02-25 LAB
2HR DELTA HS TROPONIN: 1 NG/L
4HR DELTA HS TROPONIN: 1 NG/L
ABO GROUP BLD: NORMAL
ALBUMIN SERPL BCG-MCNC: 3.5 G/DL (ref 3.5–5)
ALP SERPL-CCNC: 72 U/L (ref 34–104)
ALT SERPL W P-5'-P-CCNC: 15 U/L (ref 7–52)
ANION GAP SERPL CALCULATED.3IONS-SCNC: 8 MMOL/L (ref 4–13)
APTT PPP: 22 SECONDS (ref 23–34)
AST SERPL W P-5'-P-CCNC: 12 U/L (ref 13–39)
BASOPHILS # BLD AUTO: 0.01 THOUSANDS/ÂΜL (ref 0–0.1)
BASOPHILS NFR BLD AUTO: 0 % (ref 0–1)
BILIRUB SERPL-MCNC: 0.5 MG/DL (ref 0.2–1)
BLD GP AB SCN SERPL QL: POSITIVE
BLOOD GROUP ANTIBODIES SERPL: NORMAL
BUN SERPL-MCNC: 23 MG/DL (ref 5–25)
CALCIUM SERPL-MCNC: 8.8 MG/DL (ref 8.4–10.2)
CARDIAC TROPONIN I PNL SERPL HS: 5 NG/L (ref ?–50)
CARDIAC TROPONIN I PNL SERPL HS: 6 NG/L (ref ?–50)
CARDIAC TROPONIN I PNL SERPL HS: 6 NG/L (ref ?–50)
CHLORIDE SERPL-SCNC: 104 MMOL/L (ref 96–108)
CO2 SERPL-SCNC: 28 MMOL/L (ref 21–32)
CORTIS SERPL-MCNC: 5.1 UG/DL
CREAT SERPL-MCNC: 0.83 MG/DL (ref 0.6–1.3)
EOSINOPHIL # BLD AUTO: 0.07 THOUSAND/ÂΜL (ref 0–0.61)
EOSINOPHIL NFR BLD AUTO: 2 % (ref 0–6)
ERYTHROCYTE [DISTWIDTH] IN BLOOD BY AUTOMATED COUNT: 17.1 % (ref 11.6–15.1)
FLUAV AG UPPER RESP QL IA.RAPID: NEGATIVE
FLUBV AG UPPER RESP QL IA.RAPID: NEGATIVE
GFR SERPL CREATININE-BSD FRML MDRD: 70 ML/MIN/1.73SQ M
GLUCOSE SERPL-MCNC: 82 MG/DL (ref 65–140)
HCT VFR BLD AUTO: 30 % (ref 34.8–46.1)
HGB BLD-MCNC: 10 G/DL (ref 11.5–15.4)
IMM GRANULOCYTES # BLD AUTO: 0.02 THOUSAND/UL (ref 0–0.2)
IMM GRANULOCYTES NFR BLD AUTO: 1 % (ref 0–2)
INR PPP: 0.94 (ref 0.85–1.19)
LYMPHOCYTES # BLD AUTO: 2.07 THOUSANDS/ÂΜL (ref 0.6–4.47)
LYMPHOCYTES NFR BLD AUTO: 53 % (ref 14–44)
MCH RBC QN AUTO: 33.4 PG (ref 26.8–34.3)
MCHC RBC AUTO-ENTMCNC: 33.3 G/DL (ref 31.4–37.4)
MCV RBC AUTO: 100 FL (ref 82–98)
MONOCYTES # BLD AUTO: 0.07 THOUSAND/ÂΜL (ref 0.17–1.22)
MONOCYTES NFR BLD AUTO: 2 % (ref 4–12)
NEUTROPHILS # BLD AUTO: 1.61 THOUSANDS/ÂΜL (ref 1.85–7.62)
NEUTS SEG NFR BLD AUTO: 42 % (ref 43–75)
NRBC BLD AUTO-RTO: 1 /100 WBCS
PLATELET # BLD AUTO: 157 THOUSANDS/UL (ref 149–390)
PLATELET # BLD AUTO: 201 THOUSANDS/UL (ref 149–390)
PMV BLD AUTO: 10.3 FL (ref 8.9–12.7)
PMV BLD AUTO: 10.5 FL (ref 8.9–12.7)
POTASSIUM SERPL-SCNC: 3.4 MMOL/L (ref 3.5–5.3)
PROT SERPL-MCNC: 7.5 G/DL (ref 6.4–8.4)
PROTHROMBIN TIME: 13.3 SECONDS (ref 12.3–15)
RBC # BLD AUTO: 2.99 MILLION/UL (ref 3.81–5.12)
RH BLD: POSITIVE
SARS-COV+SARS-COV-2 AG RESP QL IA.RAPID: NEGATIVE
SODIUM SERPL-SCNC: 140 MMOL/L (ref 135–147)
SPECIMEN EXPIRATION DATE: NORMAL
WBC # BLD AUTO: 3.85 THOUSAND/UL (ref 4.31–10.16)

## 2025-02-25 PROCEDURE — 82024 ASSAY OF ACTH: CPT | Performed by: INTERNAL MEDICINE

## 2025-02-25 PROCEDURE — 99223 1ST HOSP IP/OBS HIGH 75: CPT | Performed by: INTERNAL MEDICINE

## 2025-02-25 PROCEDURE — 99285 EMERGENCY DEPT VISIT HI MDM: CPT | Performed by: EMERGENCY MEDICINE

## 2025-02-25 PROCEDURE — 86900 BLOOD TYPING SEROLOGIC ABO: CPT

## 2025-02-25 PROCEDURE — 86901 BLOOD TYPING SEROLOGIC RH(D): CPT

## 2025-02-25 PROCEDURE — 93005 ELECTROCARDIOGRAM TRACING: CPT

## 2025-02-25 PROCEDURE — 84484 ASSAY OF TROPONIN QUANT: CPT | Performed by: INTERNAL MEDICINE

## 2025-02-25 PROCEDURE — 82533 TOTAL CORTISOL: CPT | Performed by: INTERNAL MEDICINE

## 2025-02-25 PROCEDURE — 85049 AUTOMATED PLATELET COUNT: CPT | Performed by: INTERNAL MEDICINE

## 2025-02-25 PROCEDURE — 85025 COMPLETE CBC W/AUTO DIFF WBC: CPT

## 2025-02-25 PROCEDURE — 36415 COLL VENOUS BLD VENIPUNCTURE: CPT

## 2025-02-25 PROCEDURE — 85610 PROTHROMBIN TIME: CPT

## 2025-02-25 PROCEDURE — 80053 COMPREHEN METABOLIC PANEL: CPT

## 2025-02-25 PROCEDURE — 84484 ASSAY OF TROPONIN QUANT: CPT

## 2025-02-25 PROCEDURE — 84443 ASSAY THYROID STIM HORMONE: CPT | Performed by: INTERNAL MEDICINE

## 2025-02-25 PROCEDURE — 71046 X-RAY EXAM CHEST 2 VIEWS: CPT

## 2025-02-25 PROCEDURE — 87811 SARS-COV-2 COVID19 W/OPTIC: CPT

## 2025-02-25 PROCEDURE — 86850 RBC ANTIBODY SCREEN: CPT

## 2025-02-25 PROCEDURE — 99285 EMERGENCY DEPT VISIT HI MDM: CPT

## 2025-02-25 PROCEDURE — 85730 THROMBOPLASTIN TIME PARTIAL: CPT

## 2025-02-25 PROCEDURE — 83880 ASSAY OF NATRIURETIC PEPTIDE: CPT | Performed by: INTERNAL MEDICINE

## 2025-02-25 PROCEDURE — 87804 INFLUENZA ASSAY W/OPTIC: CPT

## 2025-02-25 PROCEDURE — 86870 RBC ANTIBODY IDENTIFICATION: CPT | Performed by: EMERGENCY MEDICINE

## 2025-02-25 RX ORDER — TRAMADOL HYDROCHLORIDE 50 MG/1
50 TABLET ORAL EVERY 6 HOURS PRN
Status: DISCONTINUED | OUTPATIENT
Start: 2025-02-25 | End: 2025-02-27 | Stop reason: HOSPADM

## 2025-02-25 RX ORDER — METOPROLOL SUCCINATE 25 MG/1
25 TABLET, EXTENDED RELEASE ORAL DAILY
Status: DISCONTINUED | OUTPATIENT
Start: 2025-02-25 | End: 2025-02-25

## 2025-02-25 RX ORDER — PREDNISONE 1 MG/1
3 TABLET ORAL DAILY
Status: DISCONTINUED | OUTPATIENT
Start: 2025-02-25 | End: 2025-02-25

## 2025-02-25 RX ORDER — HYDROCORTISONE SODIUM SUCCINATE 100 MG/2ML
50 INJECTION INTRAMUSCULAR; INTRAVENOUS EVERY 12 HOURS SCHEDULED
Status: DISCONTINUED | OUTPATIENT
Start: 2025-02-25 | End: 2025-02-27 | Stop reason: HOSPADM

## 2025-02-25 RX ORDER — POTASSIUM CHLORIDE 1500 MG/1
40 TABLET, EXTENDED RELEASE ORAL ONCE
Status: COMPLETED | OUTPATIENT
Start: 2025-02-25 | End: 2025-02-25

## 2025-02-25 RX ORDER — ANASTROZOLE 1 MG/1
1 TABLET ORAL DAILY
Status: DISCONTINUED | OUTPATIENT
Start: 2025-02-25 | End: 2025-02-27 | Stop reason: HOSPADM

## 2025-02-25 RX ORDER — ENOXAPARIN SODIUM 100 MG/ML
40 INJECTION SUBCUTANEOUS DAILY
Status: DISCONTINUED | OUTPATIENT
Start: 2025-02-25 | End: 2025-02-27 | Stop reason: HOSPADM

## 2025-02-25 RX ORDER — ATORVASTATIN CALCIUM 10 MG/1
10 TABLET, FILM COATED ORAL
Status: DISCONTINUED | OUTPATIENT
Start: 2025-02-25 | End: 2025-02-27 | Stop reason: HOSPADM

## 2025-02-25 RX ORDER — CITALOPRAM HYDROBROMIDE 20 MG/1
20 TABLET ORAL DAILY
Status: DISCONTINUED | OUTPATIENT
Start: 2025-02-25 | End: 2025-02-27 | Stop reason: HOSPADM

## 2025-02-25 RX ADMIN — SODIUM CHLORIDE, SODIUM LACTATE, POTASSIUM CHLORIDE, AND CALCIUM CHLORIDE 500 ML: .6; .31; .03; .02 INJECTION, SOLUTION INTRAVENOUS at 21:13

## 2025-02-25 RX ADMIN — POTASSIUM CHLORIDE 40 MEQ: 1500 TABLET, EXTENDED RELEASE ORAL at 13:26

## 2025-02-25 RX ADMIN — ENOXAPARIN SODIUM 40 MG: 40 INJECTION SUBCUTANEOUS at 15:53

## 2025-02-25 RX ADMIN — DEXTROSE AND SODIUM CHLORIDE 500 ML: 5; .9 INJECTION, SOLUTION INTRAVENOUS at 17:12

## 2025-02-25 RX ADMIN — HYDROCORTISONE SODIUM SUCCINATE 50 MG: 100 INJECTION, POWDER, FOR SOLUTION INTRAMUSCULAR; INTRAVENOUS at 20:55

## 2025-02-25 NOTE — ASSESSMENT & PLAN NOTE
Continue Arimidex  Patient follows with [facility]  With history of pancytopenia counts are stable  Platelet count over 200,000 will use Lovenox DVT prophylaxis

## 2025-02-25 NOTE — ED PROVIDER NOTES
Time reflects when diagnosis was documented in both MDM as applicable and the Disposition within this note       Time User Action Codes Description Comment    2/25/2025 11:50 AM Goins, Rosy Add [R06.09] Exertional dyspnea     2/25/2025 11:50 AM Goins, Rosy Add [D46.9] MDS (myelodysplastic syndrome) (HCC)     2/25/2025  1:25 PM Goins, Rosy Add [R55] Near syncope     2/25/2025  1:25 PM Goins, Rosy Modify [R06.09] Exertional dyspnea     2/25/2025  1:25 PM Goins, Rosy Modify [R55] Near syncope     2/25/2025  1:25 PM Goins, Rosy Add [D64.9] Chronic anemia     2/25/2025  1:25 PM Goins, Rosy Add [D72.819] Chronic leukopenia           ED Disposition       ED Disposition   Admit    Condition   Stable    Date/Time   Tue Feb 25, 2025  1:25 PM    Comment   --             Assessment & Plan       Medical Decision Making  Pretty Aguila is a 74 y.o. female presenting with worsening exertional dyspnea and near syncopal episodes today. No chest pain, no fevers, no bleeding symptoms. Vitals grossly unremarkable, at patient baseline except when patient got up at which point she became tachycardic. Exam remarkable for pallor but otherwise unremarkable.      DDX including but not limited to: pneumonia, pleural effusion, CHF, PTX, ACS, MI, asthma exacerbation, COPD exacerbation, anemia, metabolic abnormality, renal failure, arrhythmia.    See ED course for further updates and interpretation of results.    Based on these results and H&P, workup grossly at patient's baseline without specific findings to support etiology of patient's symptoms. ECG showing rhythm concerning for AV Type II (Mobitz I) block. Given she is having symptoms of near syncope while at rest, patient would most benefit from admission for monitoring of her hemodynamic status.    Results, clinical impressions, and plan were discussed with patient and family. They expressed understanding and were in agreement with plan.  Patient was given the opportunity to ask questions in ED. All questions and concerns were addressed in ED.    After evaluation and workup in the emergency department Discussed patient's case with [facility] regarding admission who accepted the patient for further evaluation and management.    Amount and/or Complexity of Data Reviewed  External Data Reviewed: labs, radiology, ECG and notes.  Labs: ordered. Decision-making details documented in ED Course.  Radiology: ordered and independent interpretation performed.  ECG/medicine tests: ordered and independent interpretation performed.    Risk  Prescription drug management.  Decision regarding hospitalization.        ED Course as of 02/25/25 1807 Tue Feb 25, 2025   1214 CBC and differential(!)  Leukopenic to 3.85 but improved from patient's prior. Anemic to 10.0 but also improved from patient's prior. Normal platelets.   1214 Hemoglobin(!): 10.0  Better than baseline   1229 Comprehensive metabolic panel(!)  Mild hypokalemia, otherwise unremarkable   1249 hs TnI 0hr: 5  No chest pain.   1301 FLU/COVID Rapid Antigen (30 min. TAT) - Preferred screening test in ED  negative   1312 POCT INR: 0.94   1312 PTT(!): 22       Medications   anastrozole (ARIMIDEX) tablet 1 mg (1 mg Oral Not Given 2/25/25 1547)   atorvastatin (LIPITOR) tablet 10 mg (10 mg Oral Not Given 2/25/25 1548)   citalopram (CeleXA) tablet 20 mg (20 mg Oral Not Given 2/25/25 1548)   predniSONE tablet 3 mg (3 mg Oral Not Given 2/25/25 1550)   traMADol (ULTRAM) tablet 50 mg (has no administration in time range)   enoxaparin (LOVENOX) subcutaneous injection 40 mg (40 mg Subcutaneous Given 2/25/25 1553)   dextrose 5 % and sodium chloride 0.9 % bolus 500 mL (500 mL Intravenous New Bag 2/25/25 1712)   potassium chloride (Klor-Con M20) CR tablet 40 mEq (40 mEq Oral Given 2/25/25 1326)       ED Risk Strat Scores   HEART Risk Score      Flowsheet Row Most Recent Value   Heart Score Risk Calculator    History 1 Filed at:  02/25/2025 1253   ECG 1 Filed at: 02/25/2025 1253   Age 2 Filed at: 02/25/2025 1253   Risk Factors 1 Filed at: 02/25/2025 1253   Troponin 0 Filed at: 02/25/2025 1253   HEART Score 5 Filed at: 02/25/2025 1253          HEART Risk Score      Flowsheet Row Most Recent Value   Heart Score Risk Calculator    History 1 Filed at: 02/25/2025 1253   ECG 1 Filed at: 02/25/2025 1253   Age 2 Filed at: 02/25/2025 1253   Risk Factors 1 Filed at: 02/25/2025 1253   Troponin 0 Filed at: 02/25/2025 1253   HEART Score 5 Filed at: 02/25/2025 1253                                                  History of Present Illness       Chief Complaint   Patient presents with    Shortness of Breath     Pt reports SOB, weakness, dizziness/felt likes he was going to pass out earlier. Pt was seen here recently for same.        Past Medical History:   Diagnosis Date    Allergic     Anxiety     Arthritis     Breast cancer (HCC) 09/2023    CAD (coronary artery disease)     Coronary artery disease [id number]    Depression     Diabetes mellitus (HCC) 5/1/24    Mother-Diabetic    Dyslipidemia     Hiatal hernia     Hyperlipidemia     Hypertension     Obesity     Obesity (BMI 30-39.9)     Psoriatic arthritis (HCC)     Rheumatoid arthritis (HCC)     Scoliosis     SOB (shortness of breath)       Past Surgical History:   Procedure Laterality Date    BARIATRIC SURGERY  2014    BILE DUCT EXPLORATION      replacement per Steffanie    BREAST BIOPSY  9/23    CARPAL TUNNEL RELEASE Bilateral 2002    CORONARY ARTERY BYPASS GRAFT  2017    FOOT SURGERY Right     bone surgery to straighten toe.    HIP SURGERY Left 2015    IR BIOPSY BONE MARROW  12/11/2024    IR DRAINAGE TUBE PLACEMENT  12/19/2023    JOINT REPLACEMENT      LYMPH NODE BIOPSY Left 11/20/2023    Procedure: LEFT LYMPHATIC MAPPING WITH BLUE AND RADIOACTIVE DYES, SENTINEL LYMPH NODE BIOPSY;  Surgeon: Adrien Gabriel MD;  Location:  MAIN OR;  Service: Surgical Oncology    LYMPH NODE DISSECTION Left 11/20/2023     Procedure: POSSIBLE AXILLARY DISSECTION;  Surgeon: Adrien Gabriel MD;  Location: [facility];  Service: Surgical Oncology    CA BREAST REDUCTION Bilateral 2024    Procedure: RIGHT BREAST REDUCTION AND LEFT BREAST EXPANDER EXCHANGE FOR PERMANENT IMPLANT. REVISION OF RECONSTRUCTED LEFT BREAST.;  Surgeon: Molina Jimenez MD;  Location: [facility];  Service: Plastics    CA SUCTION ASSISTED LIPECTOMY TRUNK Bilateral 2024    Procedure: LIPOSUCTION BREAST;  Surgeon: Molina Jimenez MD;  Location: [facility];  Service: Plastics    CA TISSUE EXPANDER PLACEMENT BREAST RECONSTRUCTION Left 2023    Procedure: IMMEDIATE LEFT BREAST RECONSTRUCTION WITH TISSUE EXPANDER AND ADM;  Surgeon: Molina Jimenez MD;  Location: [facility];  Service: Plastics    REPLACEMENT TOTAL KNEE Right 2017    SIMPLE MASTECTOMY Left 2023    Procedure: LEFT BREAST VERENICE  DIRECTED MASTECTOMY;  Surgeon: Adrien Gabriel MD;  Location:  MAIN OR;  Service: Surgical Oncology    SLEEVE GASTROPLASTY      TONSILLECTOMY      TOTAL HIP ARTHROPLASTY Right 2017    US GUIDED BREAST BIOPSY LEFT COMPLETE Left 2023      Family History   Problem Relation Age of Onset    Hypertension Mother     Diabetes Mother     Heart disease Father         CABG x5    Arthritis Father     No Known Problems Sister     No Known Problems Sister     No Known Problems Daughter     No Known Problems Daughter     No Known Problems Son     Breast cancer Neg Hx       Social History     Tobacco Use    Smoking status: Former     Current packs/day: 0.00     Average packs/day: 0.5 packs/day for 7.0 years (3.5 ttl pk-yrs)     Types: Cigarettes     Start date: 2010     Quit date: 2017     Years since quittin.1    Smokeless tobacco: Never    Tobacco comments:     Have already quit smoking in 2017   Vaping Use    Vaping status: Never Used   Substance Use Topics    Alcohol use: Yes     Alcohol/week: 2.0 standard drinks of alcohol     Types: 2 Glasses of wine per week      Comment: social     Drug use: No      E-Cigarette/Vaping    E-Cigarette Use Never User       E-Cigarette/Vaping Substances    Nicotine No     THC No     CBD No     Flavoring No     Other No       I have reviewed and agree with the history as documented.     HPI    Pretty Aguila is a 74 y.o. female with history of myelodysplastic syndrome, HTN, HLD, RA presenting for near syncopal episode.    Patient reports increasing dyspnea on exertion over the last week. Reports she had been trying to push through but while on the phone sitting today she had an episode of near syncope which scared her so she presented to the ED. She denies chest pain, palpitations, abdominal pain, or focal numbness, tingling or weakness with this event. Denies recent illnesses. Did receive infusion of azacitidine last week and felt fatigue afterward but had been feeling slightly improved. Last infusions had fatigue but she has not had similar near syncopal events since she was anemic to 5.8 earlier this month. Denies bleeding symptoms. Denies changes in bowel or bladder habits. Reports she had been eating and drinking normally.    Review of Systems   Constitutional:  Negative for chills and fever.   HENT:  Negative for congestion, hearing loss and trouble swallowing.    Eyes:  Negative for pain and visual disturbance.   Respiratory:  Positive for shortness of breath. Negative for cough.    Cardiovascular:  Negative for chest pain, palpitations and leg swelling.   Gastrointestinal:  Negative for abdominal pain, constipation, diarrhea, nausea and vomiting.   Genitourinary:  Negative for dysuria, frequency and hematuria.   Musculoskeletal:  Negative for arthralgias and back pain.   Skin:  Negative for color change and rash.   Neurological:  Positive for syncope (Near syncope). Negative for dizziness, seizures, weakness, light-headedness and headaches.   Psychiatric/Behavioral:  Negative for dysphoric mood.    All other systems reviewed and are  negative.          Objective       ED Triage Vitals   Temperature Pulse Blood Pressure Respirations SpO2 Patient Position - Orthostatic VS   02/25/25 1108 02/25/25 1108 02/25/25 1108 02/25/25 1108 02/25/25 1108 02/25/25 1108   98.2 °F (36.8 °C) 75 102/71 18 96 % Lying      Temp Source Heart Rate Source BP Location FiO2 (%) Pain Score    02/25/25 1108 02/25/25 1108 02/25/25 1555 -- --    Oral Monitor Left arm        Vitals      Date and Time Temp Pulse SpO2 Resp BP Pain Score FACES Pain Rating User   02/25/25 1745 -- 87 96 % 14 94/55 -- -- LO   02/25/25 1730 -- 92 96 % 14 95/56 -- -- LO   02/25/25 1715 -- 97 97 % 14 92/56 -- -- LO   02/25/25 1700 -- 98 95 % 14 86/53 -- -- LO   02/25/25 1600 -- 85 93 % 14 94/59 -- -- LO   02/25/25 1555 -- 83 98 % 16 106/65 -- -- LO   02/25/25 1200 -- 86 98 % 18 100/69 -- -- MD   02/25/25 1130 -- 109 100 % 18 111/74 -- -- MD   02/25/25 1108 98.2 °F (36.8 °C) 75 96 % 18 102/71 -- -- KD            Physical Exam  Vitals and nursing note reviewed.   Constitutional:       General: She is not in acute distress.     Appearance: She is well-developed.   HENT:      Head: Normocephalic and atraumatic.      Mouth/Throat:      Mouth: Mucous membranes are moist.      Pharynx: Oropharynx is clear.   Eyes:      Extraocular Movements: Extraocular movements intact.      Conjunctiva/sclera: Conjunctivae normal.      Pupils: Pupils are equal, round, and reactive to light.   Cardiovascular:      Rate and Rhythm: Normal rate and regular rhythm.      Pulses: Normal pulses.      Heart sounds: Normal heart sounds.   Pulmonary:      Effort: Pulmonary effort is normal. No respiratory distress.      Breath sounds: Normal breath sounds. No wheezing, rhonchi or rales.   Abdominal:      General: Abdomen is flat. There is no distension.      Palpations: Abdomen is soft.      Tenderness: There is no abdominal tenderness. There is no right CVA tenderness, left CVA tenderness, guarding or rebound.   Musculoskeletal:       Cervical back: Normal range of motion.   Skin:     General: Skin is warm and dry.      Capillary Refill: Capillary refill takes less than 2 seconds.      Coloration: Skin is pale.   Neurological:      General: No focal deficit present.      Mental Status: She is alert and oriented to person, place, and time.      Cranial Nerves: No cranial nerve deficit.      Sensory: No sensory deficit.      Motor: No weakness.   Psychiatric:         Mood and Affect: Mood normal.         Behavior: Behavior normal.         Results Reviewed       Procedure Component Value Units Date/Time    HS Troponin I 4hr [[id number]]  (Normal) Collected: 02/25/25 1654    Lab Status: Final result Specimen: Blood from Line, Venous Updated: 02/25/25 1735     hs TnI 4hr 6 ng/L      Delta 4hr hsTnI 1 ng/L     Platelet count [[id number]]  (Normal) Collected: 02/25/25 1703    Lab Status: Final result Specimen: Blood from Line, Venous Updated: 02/25/25 1712     Platelets 157 Thousands/uL      MPV 10.3 fL     HS Troponin I 2hr [[id number]]  (Normal) Collected: 02/25/25 1430    Lab Status: Final result Specimen: Blood from Arm, Right Updated: 02/25/25 1507     hs TnI 2hr 6 ng/L      Delta 2hr hsTnI 1 ng/L     Protime-INR [[id number]]  (Normal) Collected: 02/25/25 1157    Lab Status: Final result Specimen: Blood from Arm, Right Updated: 02/25/25 1309     Protime 13.3 seconds      INR 0.94    Narrative:      INR Therapeutic Range    Indication                                             INR Range      Atrial Fibrillation                                               2.0-3.0  Hypercoagulable State                                    2.0.2.3  Left Ventricular Asist Device                            2.0-3.0  Mechanical Heart Valve                                  -    Aortic(with afib, MI, embolism, HF, LA enlargement,    and/or coagulopathy)                                     2.0-3.0 (2.5-3.5)     Mitral                                                              2.5-3.5  Prosthetic/Bioprosthetic Heart Valve               2.0-3.0  Venous thromboembolism (VTE: VT, PE        2.0-3.0    APTT [[id number]]  (Abnormal) Collected: 02/25/25 1157    Lab Status: Final result Specimen: Blood from Arm, Right Updated: 02/25/25 1309     PTT 22 seconds     FLU/COVID Rapid Antigen (30 min. TAT) - Preferred screening test in ED [[id number]]  (Normal) Collected: 02/25/25 1237    Lab Status: Final result Specimen: Nares from Nose Updated: 02/25/25 1300     SARS COV Rapid Antigen Negative     Influenza A Rapid Antigen Negative     Influenza B Rapid Antigen Negative    Narrative:      This test has been performed using the [employer] [place] 2 FLU+SARS Antigen test under the Emergency Use Authorization (EUA). This test has been validated by the  and verified by the performing laboratory. The [place] uses lateral flow immunofluorescent sandwich assay to detect SARS-COV, Influenza A and Influenza B Antigen.     The [employer] [place] 2 SARS Antigen test does not differentiate between SARS-CoV and SARS-CoV-2.     Negative results are presumptive and may be confirmed with a molecular assay, if necessary, for patient management. Negative results do not rule out SARS-CoV-2 or influenza infection and should not be used as the sole basis for treatment or patient management decisions. A negative test result may occur if the level of antigen in a sample is below the limit of detection of this test.     Positive results are indicative of the presence of viral antigens, but do not rule out bacterial infection or co-infection with other viruses.     All test results should be used as an adjunct to clinical observations and other information available to the provider.    FOR PEDIATRIC PATIENTS - copy/paste COVID Guidelines URL to browser: [email or web address]    HS Troponin 0hr (reflex protocol) [[id number]]  (Normal) Collected: 02/25/25 1218    Lab Status:  Final result Specimen: Blood from Arm, Right Updated: 02/25/25 1248     hs TnI 0hr 5 ng/L     Comprehensive metabolic panel [[id number]]  (Abnormal) Collected: 02/25/25 1157    Lab Status: Final result Specimen: Blood from Arm, Right Updated: 02/25/25 1226     Sodium 140 mmol/L      Potassium 3.4 mmol/L      Chloride 104 mmol/L      CO2 28 mmol/L      ANION GAP 8 mmol/L      BUN 23 mg/dL      Creatinine 0.83 mg/dL      Glucose 82 mg/dL      Calcium 8.8 mg/dL      AST 12 U/L      ALT 15 U/L      Alkaline Phosphatase 72 U/L      Total Protein 7.5 g/dL      Albumin 3.5 g/dL      Total Bilirubin 0.50 mg/dL      eGFR 70 ml/min/1.73sq m     Narrative:      National Kid[employer] guidelines for Chronic Kidney Disease (CKD):     Stage 1 with normal or high GFR (GFR > 90 mL/min/1.73 square meters)    Stage 2 Mild CKD (GFR = 60-89 mL/min/1.73 square meters)    Stage 3A Moderate CKD (GFR = 45-59 mL/min/1.73 square meters)    Stage 3B Moderate CKD (GFR = 30-44 mL/min/1.73 square meters)    Stage 4 Severe CKD (GFR = 15-29 mL/min/1.73 square meters)    Stage 5 End Stage CKD (GFR <15 mL/min/1.73 square meters)  Note: GFR calculation is accurate only with a steady state creatinine    CBC and differential [[id number]]  (Abnormal) Collected: 02/25/25 1157    Lab Status: Final result Specimen: Blood from Arm, Right Updated: 02/25/25 1209     WBC 3.85 Thousand/uL      RBC 2.99 Million/uL      Hemoglobin 10.0 g/dL      Hematocrit 30.0 %       fL      MCH 33.4 pg      MCHC 33.3 g/dL      RDW 17.1 %      MPV 10.5 fL      Platelets 201 Thousands/uL      nRBC 1 /100 WBCs      Segmented % 42 %      Immature Grans % 1 %      Lymphocytes % 53 %      Monocytes % 2 %      Eosinophils Relative 2 %      Basophils Relative 0 %      Absolute Neutrophils 1.61 Thousands/µL      Absolute Immature Grans 0.02 Thousand/uL      Absolute Lymphocytes 2.07 Thousands/µL      Absolute Monocytes 0.07 Thousand/µL      Eosinophils Absolute 0.07  Thousand/µL      Basophils Absolute 0.01 Thousands/µL             XR chest 2 views   ED Interpretation by Rosy Goins MD (02/25 1250)   Per my interpretation: No acute cardiopulmonary disease      Final Interpretation by Armani Mccarty MD (02/25 1410)      No acute cardiopulmonary disease.      This report is in agreement with the preliminary interpretation.            Workstation performed: [id number]             ECG 12 Lead Documentation Only    Date/Time: 2/25/2025 12:49 PM    Performed by: Rosy Goins MD  Authorized by: Rosy Goins MD    Indications / Diagnosis:  Shortness of breath  Patient location:  ED  Previous ECG:     Previous ECG:  Compared to current    Comparison ECG info:  2/8/2025    Similarity:  No change  Interpretation:     Interpretation: abnormal    Rate:     ECG rate:  109    ECG rate assessment: tachycardic    Rhythm:     Rhythm: sinus rhythm    QRS:     QRS axis:  Normal    QRS intervals:  Normal  Conduction:     Conduction: abnormal      Abnormal conduction: 2nd degree (Mobitz 1)    ST segments:     ST segments:  Normal  T waves:     T waves: normal    Comments:      QTc 433      ED Medication and Procedure Management   Prior to Admission Medications   Prescriptions Last Dose Informant Patient Reported? Taking?   Multiple Vitamin (multivitamin) tablet  Self Yes No   Sig: Take 1 tablet by mouth daily   Semaglutide-Weight Management (WEGOVY) 2.4 MG/0.75ML   No No   Sig: Inject 0.75 mL (2.4 mg total) under the skin once a week   Venetoclax 100 MG TABS   No No   Sig: Take 100 mg daily by mouth for 7 days, followed by 200 mg daily for 7 days, followed by 300 my daily for 7 days, followed by 400 mg daily thereafter   Venetoclax 100 MG TABS   No No   Sig: Take 4 tablets (400 mg total) by mouth daily   acyclovir (ZOVIRAX) 400 MG tablet   No No   Sig: Take 1 tablet (400 mg total) by mouth 2 (two) times a day   anastrozole (ARIMIDEX) 1 mg tablet   No No   Sig: TAKE 1 TABLET (1  MG TOTAL) BY MOUTH DAILY   atorvastatin (LIPITOR) 10 mg tablet   No No   Sig: TAKE 1 TABLET BY MOUTH DAILY   buPROPion (WELLBUTRIN XL) 300 mg 24 hr tablet   No No   Sig: TAKE 1 TABLET (300 MG TOTAL) BY MOUTH EVERY MORNING   cholecalciferol (VITAMIN D3) 400 units tablet  Self Yes No   Sig: Take 400 Units by mouth daily   citalopram (CeleXA) 20 mg tablet   No No   Sig: Take 1 tablet (20 mg total) by mouth daily   fluconazole (DIFLUCAN) 100 mg tablet   No No   Sig: Take 1 tablet (100 mg total) by mouth daily   ixekizumab (Taltz) 80 MG/ML subcutaneous injection   Yes No   Sig: Inject 80 mg under the skin every 28 days   Patient not taking: Reported on 12/7/2024   metFORMIN (GLUCOPHAGE-XR) 500 mg 24 hr tablet   No No   Sig: Take 1 tablet (500 mg total) by mouth daily with dinner   metoprolol succinate (Toprol XL) 25 mg 24 hr tablet   No No   Sig: Take 1 tablet (25 mg total) by mouth daily   naloxone (NARCAN) 4 mg/0.1 mL nasal spray  Self No No   Sig: Administer 1 spray into a nostril. If no response after 2-3 minutes, give another dose in the other nostril using a new spray.   ondansetron (ZOFRAN) 8 mg tablet   No No   Sig: Take 1 tablet (8 mg total) by mouth every 8 (eight) hours as needed for nausea or vomiting   predniSONE 1 mg tablet   Yes No   Sig: TAKE 1 TABLET (1 MG TOTAL) BY MOUTH 3 (THREE) TIMES A DAY.   prochlorperazine (COMPAZINE) 10 mg tablet   No No   Sig: Take 1 tablet (10 mg total) by mouth every 6 (six) hours as needed for nausea or vomiting   traMADol (ULTRAM) 50 mg tablet   Yes No      Facility-Administered Medications: None     Current Discharge Medication List        CONTINUE these medications which have NOT CHANGED    Details   acyclovir (ZOVIRAX) 400 MG tablet Take 1 tablet (400 mg total) by mouth 2 (two) times a day  Qty: 60 tablet, Refills: 4    Associated Diagnoses: MDS (myelodysplastic syndrome), high grade (HCC)      anastrozole (ARIMIDEX) 1 mg tablet TAKE 1 TABLET (1 MG TOTAL) BY MOUTH  DAILY  Qty: 90 tablet, Refills: 1    Associated Diagnoses: Malignant neoplasm of lower-outer quadrant of left breast of female, estrogen receptor positive (HCC)      atorvastatin (LIPITOR) 10 mg tablet TAKE 1 TABLET BY MOUTH DAILY  Qty: 90 tablet, Refills: 1    Comments: Please send a replace/new response with 90-Day Supply if appropriate to maximize member benefit. Requesting 1 year supply.  Associated Diagnoses: Coronary artery disease involving native coronary artery of native heart without angina pectoris; Dyslipidemia      buPROPion (WELLBUTRIN XL) 300 mg 24 hr tablet TAKE 1 TABLET (300 MG TOTAL) BY MOUTH EVERY MORNING  Qty: 90 tablet, Refills: 1    Associated Diagnoses: Anxiety and depression      cholecalciferol (VITAMIN D3) 400 units tablet Take 400 Units by mouth daily      citalopram (CeleXA) 20 mg tablet Take 1 tablet (20 mg total) by mouth daily  Qty: 90 tablet, Refills: 2    Associated Diagnoses: Anxiety and depression      fluconazole (DIFLUCAN) 100 mg tablet Take 1 tablet (100 mg total) by mouth daily  Qty: 30 tablet, Refills: 6    Associated Diagnoses: MDS (myelodysplastic syndrome), high grade (HCC)      ixekizumab (Taltz) 80 MG/ML subcutaneous injection Inject 80 mg under the skin every 28 days      metFORMIN (GLUCOPHAGE-XR) 500 mg 24 hr tablet Take 1 tablet (500 mg total) by mouth daily with dinner  Qty: 30 tablet, Refills: 3    Associated Diagnoses: Class 2 obesity due to excess calories with body mass index (BMI) of 36.0 to 36.9 in adult      metoprolol succinate (Toprol XL) 25 mg 24 hr tablet Take 1 tablet (25 mg total) by mouth daily  Qty: 30 tablet, Refills: 5    Associated Diagnoses: Essential hypertension      Multiple Vitamin (multivitamin) tablet Take 1 tablet by mouth daily      naloxone (NARCAN) 4 mg/0.1 mL nasal spray Administer 1 spray into a nostril. If no response after 2-3 minutes, give another dose in the other nostril using a new spray.  Qty: 1 each, Refills: 1    Associated  Diagnoses: Malignant neoplasm of lower-outer quadrant of left breast of female, estrogen receptor positive (HCC)      ondansetron (ZOFRAN) 8 mg tablet Take 1 tablet (8 mg total) by mouth every 8 (eight) hours as needed for nausea or vomiting  Qty: 30 tablet, Refills: 3    Associated Diagnoses: MDS (myelodysplastic syndrome), high grade (HCC)      predniSONE 1 mg tablet TAKE 1 TABLET (1 MG TOTAL) BY MOUTH 3 (THREE) TIMES A DAY.      prochlorperazine (COMPAZINE) 10 mg tablet Take 1 tablet (10 mg total) by mouth every 6 (six) hours as needed for nausea or vomiting  Qty: 30 tablet, Refills: 3    Associated Diagnoses: MDS (myelodysplastic syndrome), high grade (HCC)      Semaglutide-Weight Management (WEGOVY) 2.4 MG/0.75ML Inject 0.75 mL (2.4 mg total) under the skin once a week  Qty: 3 mL, Refills: 2    Associated Diagnoses: Class 1 obesity due to excess calories with serious comorbidity and body mass index (BMI) of 32.0 to 32.9 in adult      traMADol (ULTRAM) 50 mg tablet       !! Venetoclax 100 MG TABS Take 100 mg daily by mouth for 7 days, followed by 200 mg daily for 7 days, followed by 300 my daily for 7 days, followed by 400 mg daily thereafter  Qty: 42 tablet, Refills: 0    Associated Diagnoses: MDS (myelodysplastic syndrome), high grade (HCC)      !! Venetoclax 100 MG TABS Take 4 tablets (400 mg total) by mouth daily  Qty: 120 tablet, Refills: 5    Associated Diagnoses: MDS (myelodysplastic syndrome), high grade (HCC)       !! - Potential duplicate medications found. Please discuss with provider.        No discharge procedures on file.  ED SEPSIS DOCUMENTATION   Time reflects when diagnosis was documented in both MDM as applicable and the Disposition within this note       Time User Action Codes Description Comment    2/25/2025 11:50 AM Rosy Goins [R06.09] Exertional dyspnea     2/25/2025 11:50 AM Rosy Goins [D46.9] MDS (myelodysplastic syndrome) (HCC)     2/25/2025  1:25 PM Briseida  Rosy Add [R55] Near syncope     2/25/2025  1:25 PM Rosy Goins Modify [R06.09] Exertional dyspnea     2/25/2025  1:25 PM Rosy Goins Modify [R55] Near syncope     2/25/2025  1:25 PM Rosy Goins Add [D64.9] Chronic anemia     2/25/2025  1:25 PM Rosy Goins [D72.819] Chronic leukopenia                  Rosy Goins MD  02/25/25 [id number]

## 2025-02-25 NOTE — ASSESSMENT & PLAN NOTE
Continue metoprolol.  May need to discontinue beta-blocker if higher grade AV block noted  Check TSH in a.m.

## 2025-02-25 NOTE — H&P
"H&P - Hospitalist   Name: Pretty Aguila 74 y.o. female I MRN: [id number]  Unit/Bed#: ED-26 I Date of Admission: 2/25/2025   Date of Service: 2/25/2025 I Hospital Day: 0     Assessment & Plan  Near syncope  Patient with near syncopal event while seated.  Otherwise review of systems are negative  Episode lasted approximately 5 to 6 seconds and resolved  Mobitz type I noted on EKG and patient scheduled for outpatient echocardiogram  Will admit for observation check echocardiogram and monitor on telemetry tonight.  MDS (myelodysplastic syndrome), high grade (HCC)  Continue Arimidex  Patient follows with [facility]  With history of pancytopenia counts are stable  Platelet count over 200,000 will use Lovenox DVT prophylaxis  Rheumatoid arthritis of multiple sites with negative rheumatoid factor (HCC)  Continue prednisone 3 mg daily  No signs or symptoms of adrenal insufficiency  Prediabetes  Hold outpatient metformin  No indication for fingersticks  Essential hypertension  Continue metoprolol.  May need to discontinue beta-blocker if higher grade AV block noted  Check TSH in a.m.      VTE Pharmacologic Prophylaxis: VTE Score: 3 Moderate Risk (Score 3-4) - Pharmacological DVT Prophylaxis Ordered: enoxaparin (Lovenox).  Code Status: Full code  Discussion with family: Updated  (son) at bedside.    Anticipated Length of Stay: Patient will be admitted on an observation basis with an anticipated length of stay of less than 2 midnights secondary to near syncopal event needs cardiac monitoring and echocardiogram.    History of Present Illness   Chief Complaint: \"I feel like I was going to pass out\"    Pretty Aguila is a 74 y.o. female with a PMH of myelodysplastic syndrome, prediabetes, hypertension, rheumatoid arthritis who presents with a near syncopal event..  Patient reports she was sitting in a chair.  She started getting short of breath.  She did note some shortness of breath over the last several " days.  But denies any PND orthopnea or chest pain.  She was sitting in the chair and had a transient episode where she felt flushed and hot and that her vision became slightly blurry.  No chest pain associated with this event no palpitations.  She felt like she might pass out.  Total episode lasted approximately 6 seconds and resolved spontaneously.    Review of Systems   Constitutional:  Negative for fatigue, fever and unexpected weight change.   HENT:  Negative for rhinorrhea and sore throat.    Eyes:  Negative for photophobia and visual disturbance.   Respiratory:  Positive for shortness of breath (Transient shortness of breath for 6 seconds today). Negative for cough, wheezing and stridor.    Cardiovascular:  Negative for chest pain, palpitations and leg swelling.   Gastrointestinal:  Negative for abdominal pain, constipation, diarrhea, nausea and vomiting.   Endocrine: Negative for polydipsia, polyphagia and polyuria.   Genitourinary:  Negative for dysuria, frequency and urgency.   Musculoskeletal:  Negative for gait problem and neck pain.   Skin:  Negative for color change and rash.   Neurological:  Positive for light-headedness. Negative for dizziness, tremors, seizures, syncope, facial asymmetry, speech difficulty, weakness and headaches.   Psychiatric/Behavioral:  Negative for confusion.        Historical Information   Past Medical History:   Diagnosis Date    Allergic     Anxiety     Arthritis     Breast cancer (HCC) 09/2023    CAD (coronary artery disease)     Coronary artery disease [id number]    Depression     Diabetes mellitus (HCC) 5/1/24    Mother-Diabetic    Dyslipidemia     Hiatal hernia     Hyperlipidemia     Hypertension     Obesity     Obesity (BMI 30-39.9)     Psoriatic arthritis (HCC)     Rheumatoid arthritis (HCC)     Scoliosis     SOB (shortness of breath)      Past Surgical History:   Procedure Laterality Date    BARIATRIC SURGERY  2014    BILE DUCT EXPLORATION      replacement per Steffanie     BREAST BIOPSY      CARPAL TUNNEL RELEASE Bilateral     CORONARY ARTERY BYPASS GRAFT  2017    FOOT SURGERY Right     bone surgery to straighten toe.    HIP SURGERY Left     IR BIOPSY BONE MARROW  2024    IR DRAINAGE TUBE PLACEMENT  2023    JOINT REPLACEMENT      LYMPH NODE BIOPSY Left 2023    Procedure: LEFT LYMPHATIC MAPPING WITH BLUE AND RADIOACTIVE DYES, SENTINEL LYMPH NODE BIOPSY;  Surgeon: Adrine Gabriel MD;  Location: [facility];  Service: Surgical Oncology    LYMPH NODE DISSECTION Left 2023    Procedure: POSSIBLE AXILLARY DISSECTION;  Surgeon: Adrien Gabriel MD;  Location: [facility];  Service: Surgical Oncology    WY BREAST REDUCTION Bilateral 2024    Procedure: RIGHT BREAST REDUCTION AND LEFT BREAST EXPANDER EXCHANGE FOR PERMANENT IMPLANT. REVISION OF RECONSTRUCTED LEFT BREAST.;  Surgeon: Molina Jimenez MD;  Location: [facility];  Service: Plastics    WY SUCTION ASSISTED LIPECTOMY TRUNK Bilateral 2024    Procedure: LIPOSUCTION BREAST;  Surgeon: Molina Jimenez MD;  Location: [facility];  Service: Plastics    WY TISSUE EXPANDER PLACEMENT BREAST RECONSTRUCTION Left 2023    Procedure: IMMEDIATE LEFT BREAST RECONSTRUCTION WITH TISSUE EXPANDER AND ADM;  Surgeon: Molina Jimenez MD;  Location: [facility];  Service: Plastics    REPLACEMENT TOTAL KNEE Right 2017    SIMPLE MASTECTOMY Left 2023    Procedure: LEFT BREAST VERENICE  DIRECTED MASTECTOMY;  Surgeon: Adrien Gabriel MD;  Location:  MAIN OR;  Service: Surgical Oncology    SLEEVE GASTROPLASTY      TONSILLECTOMY      TOTAL HIP ARTHROPLASTY Right     US GUIDED BREAST BIOPSY LEFT COMPLETE Left 2023     Social History     Tobacco Use    Smoking status: Former     Current packs/day: 0.00     Average packs/day: 0.5 packs/day for 7.0 years (3.5 ttl pk-yrs)     Types: Cigarettes     Start date: 2010     Quit date: 2017     Years since quittin.1    Smokeless tobacco: Never    Tobacco comments:      Have already quit smoking in 2017   Vaping Use    Vaping status: Never Used   Substance and Sexual Activity    Alcohol use: Yes     Alcohol/week: 2.0 standard drinks of alcohol     Types: 2 Glasses of wine per week     Comment: social     Drug use: No    Sexual activity: Not Currently     Partners: Male     Birth control/protection: None     E-Cigarette/Vaping    E-Cigarette Use Never User      E-Cigarette/Vaping Substances    Nicotine No     THC No     CBD No     Flavoring No     Other No      Family history non-contributory  Social History:  Marital Status: /Civil Union   Occupation:   Patient Pre-hospital Living Situation: Home  Patient Pre-hospital Level of Mobility: walks  Patient Pre-hospital Diet Restrictions:     Meds/Allergies   I have reviewed home medications with patient personally.  Prior to Admission medications    Medication Sig Start Date End Date Taking? Authorizing Provider   acyclovir (ZOVIRAX) 400 MG tablet Take 1 tablet (400 mg total) by mouth 2 (two) times a day 12/31/24 1/30/25  Geno Carranza MD   anastrozole (ARIMIDEX) 1 mg tablet TAKE 1 TABLET (1 MG TOTAL) BY MOUTH DAILY 6/27/24   Geno Carranza MD   atorvastatin (LIPITOR) 10 mg tablet TAKE 1 TABLET BY MOUTH DAILY 1/8/25   Cortney Conrad MD   buPROPion (WELLBUTRIN XL) 300 mg 24 hr tablet TAKE 1 TABLET (300 MG TOTAL) BY MOUTH EVERY MORNING 10/22/24 10/17/25  Lauren Dumont MD   cholecalciferol (VITAMIN D3) 400 units tablet Take 400 Units by mouth daily    Historical Provider, MD   citalopram (CeleXA) 20 mg tablet Take 1 tablet (20 mg total) by mouth daily 12/18/24   Lauren Dumont MD   fluconazole (DIFLUCAN) 100 mg tablet Take 1 tablet (100 mg total) by mouth daily 12/31/24 7/29/25  Geno Carranza MD   ixekizumab (Taltz) 80 MG/ML subcutaneous injection Inject 80 mg under the skin every 28 days  Patient not taking: Reported on 12/7/2024 10/22/24 8/27/25  Historical Provider, MD   metFORMIN (GLUCOPHAGE-XR) 500 mg 24 hr tablet  Take 1 tablet (500 mg total) by mouth daily with dinner 8/9/24   Dagmar Jorgnesen MD   metoprolol succinate (Toprol XL) 25 mg 24 hr tablet Take 1 tablet (25 mg total) by mouth daily 12/18/24   Lauren Dumont MD   Multiple Vitamin (multivitamin) tablet Take 1 tablet by mouth daily    Historical Provider, MD   naloxone (NARCAN) 4 mg/0.1 mL nasal spray Administer 1 spray into a nostril. If no response after 2-3 minutes, give another dose in the other nostril using a new spray. 1/14/24 1/13/25  Molina Jimenez MD   ondansetron (ZOFRAN) 8 mg tablet Take 1 tablet (8 mg total) by mouth every 8 (eight) hours as needed for nausea or vomiting 12/31/24   Geno Carranza MD   predniSONE 1 mg tablet TAKE 1 TABLET (1 MG TOTAL) BY MOUTH 3 (THREE) TIMES A DAY. 3/20/24   Historical Provider, MD   prochlorperazine (COMPAZINE) 10 mg tablet Take 1 tablet (10 mg total) by mouth every 6 (six) hours as needed for nausea or vomiting 12/31/24   Geno Carranza MD   Semaglutide-Weight Management (WEGOVY) 2.4 MG/0.75ML Inject 0.75 mL (2.4 mg total) under the skin once a week 2/8/25   Dagmar Jorgensen MD   traMADol (ULTRAM) 50 mg tablet     Historical Provider, MD   Venetoclax 100 MG TABS Take 100 mg daily by mouth for 7 days, followed by 200 mg daily for 7 days, followed by 300 my daily for 7 days, followed by 400 mg daily thereafter 2/17/25   Geno Carranza MD   Venetoclax 100 MG TABS Take 4 tablets (400 mg total) by mouth daily 2/17/25   Geno Carranza MD   nystatin (MYCOSTATIN) cream Apply topically 2 (two) times a day 3/22/23 2/25/25  JOCELYN Nunez   nystatin (MYCOSTATIN) powder Apply topically 2 (two) times a day 3/22/23 2/25/25  JOCELYN Nunez     Allergies   Allergen Reactions    Iodine - Food Allergy Anaphylaxis     Reaction Date: 07Jan2008;     Povidone Iodine Anaphylaxis    Shellfish-Derived Products - Food Allergy Anaphylaxis       Objective :  Temp:  [98.2 °F (36.8 °C)] 98.2 °F (36.8 °C)  HR:   [] 86  BP: (100-111)/(69-74) 100/69  Resp:  [18] 18  SpO2:  [96 %-100 %] 98 %  O2 Device: None (Room air)    Physical Exam  Constitutional:       General: She is not in acute distress.     Appearance: She is normal weight. She is not diaphoretic.   HENT:      Head: Atraumatic.   Eyes:      General: No scleral icterus.  Neck:      Vascular: No carotid bruit.   Cardiovascular:      Rate and Rhythm: Normal rate and regular rhythm.      Pulses: Normal pulses.      Heart sounds: No murmur heard.     No friction rub. No gallop.   Pulmonary:      Effort: Pulmonary effort is normal. No respiratory distress.      Breath sounds: Normal breath sounds. No stridor. No wheezing, rhonchi or rales.   Abdominal:      General: There is no distension.      Palpations: Abdomen is soft. There is no mass.      Tenderness: There is no abdominal tenderness. There is no guarding or rebound.   Musculoskeletal:      Cervical back: Neck supple. No rigidity or tenderness.      Right lower leg: No edema.      Left lower leg: No edema.   Lymphadenopathy:      Cervical: No cervical adenopathy.   Skin:     General: Skin is warm.      Coloration: Skin is not jaundiced.      Findings: No rash.   Neurological:      Mental Status: She is alert and oriented to person, place, and time.      Cranial Nerves: No cranial nerve deficit.      Sensory: No sensory deficit.      Motor: No weakness.   Psychiatric:         Mood and Affect: Mood normal.         Behavior: Behavior normal.          Lines/Drains:            Lab Results: I have reviewed the following results:  Results from last 7 days   Lab Units 02/25/25  1157   WBC Thousand/uL 3.85*   HEMOGLOBIN g/dL 10.0*   HEMATOCRIT % 30.0*   PLATELETS Thousands/uL 201   SEGS PCT % 42*   LYMPHO PCT % 53*   MONO PCT % 2*   EOS PCT % 2     Results from last 7 days   Lab Units 02/25/25  1157   SODIUM mmol/L 140   POTASSIUM mmol/L 3.4*   CHLORIDE mmol/L 104   CO2 mmol/L 28   BUN mg/dL 23   CREATININE mg/dL 0.83    ANION GAP mmol/L 8   CALCIUM mg/dL 8.8   ALBUMIN g/dL 3.5   TOTAL BILIRUBIN mg/dL 0.50   ALK PHOS U/L 72   ALT U/L 15   AST U/L 12*   GLUCOSE RANDOM mg/dL 82     Results from last 7 days   Lab Units 02/25/25  1157   INR  0.94         Lab Results   Component Value Date    HGBA1C 5.9 (H) 10/26/2024    HGBA1C 5.7 03/27/2024           Imaging Results Review: I personally reviewed the following image studies in PACS and associated radiology reports: chest xray. My interpretation of the radiology images/reports is: No acute infiltrate or effusion.  Other Study Results Review: EKG was reviewed.  EKG Mobitz type I per my read    Administrative Statements     ** Please Note: This note has been constructed using a voice recognition system. **

## 2025-02-25 NOTE — ASSESSMENT & PLAN NOTE
Patient with near syncopal event while seated.  Otherwise review of systems are negative  Episode lasted approximately 5 to 6 seconds and resolved  Mobitz type I noted on EKG and patient scheduled for outpatient echocardiogram  Will admit for observation check echocardiogram and monitor on telemetry tonight.

## 2025-02-25 NOTE — TELEPHONE ENCOUNTER
Patient is calling to let Dr Shell know that she will not be able to get the blood work drawn this morning.  She is on her way to the ER because she is not feeling well.  She declined to speak to CTS.

## 2025-02-25 NOTE — TELEPHONE ENCOUNTER
Called and spoke to Pretty. She does have a standing order for a CBC and CMP, so unsure why this was not drawn. She states she is feeling more short of breath than usual. She will be getting her labs drawn again today and will send a message in after to let us know when they are done. I will keep an eye out for the results and call her back later today. Pretty verbalized understanding.

## 2025-02-25 NOTE — PROGRESS NOTES
OSW placed outreach TC to pt this day. Her  answered the TC and stated that she is currently at the hospital. OSW will continue to follow and will outreach once discharged.

## 2025-02-25 NOTE — QUICK NOTE
"Called to see patient for some persistent hypotension.  Patient is resting comfortably no pain.  Given 500 cc bolus of saline with minimal improvement.  Of note patient has been on prednisone for \"years\".  She is currently on a dose of 3 mg a day which makes adrenal insufficiency unlikely.  I will check a random cortisol and ACTH level now.  Will start Solu-Cortef 50 mg every 12 hours for now.  If renal cortisol level is adequate will discontinue Solu-Cortef and place patient back on prednisone 3 mg daily.  Will hold Toprol for now as well.  "

## 2025-02-26 ENCOUNTER — APPOINTMENT (OUTPATIENT)
Dept: NON INVASIVE DIAGNOSTICS | Facility: HOSPITAL | Age: 75
DRG: 312 | End: 2025-02-26
Payer: MEDICARE

## 2025-02-26 LAB
ACTH PLAS-MCNC: 7 PG/ML (ref 7.2–63.3)
AORTIC ROOT: 2.8 CM
ASCENDING AORTA: 4.1 CM
BNP SERPL-MCNC: 55 PG/ML (ref 0–100)
BSA FOR ECHO PROCEDURE: 1.79 M2
FRACTIONAL SHORTENING: 43 (ref 28–44)
INTERVENTRICULAR SEPTUM IN DIASTOLE (PARASTERNAL SHORT AXIS VIEW): 1.2 CM
INTERVENTRICULAR SEPTUM: 1.2 CM (ref 0.6–1.1)
LAAS-AP2: 21 CM2
LAAS-AP4: 18.9 CM2
LEFT ATRIUM SIZE: 2.9 CM
LEFT ATRIUM VOLUME (MOD BIPLANE): 58 ML
LEFT ATRIUM VOLUME INDEX (MOD BIPLANE): 32 ML/M2
LEFT INTERNAL DIMENSION IN SYSTOLE: 2.4 CM (ref 2.1–4)
LEFT VENTRICLE DIASTOLIC VOLUME (MOD BIPLANE): 69 ML
LEFT VENTRICLE DIASTOLIC VOLUME INDEX (MOD BIPLANE): 38.5 ML/M2
LEFT VENTRICLE SYSTOLIC VOLUME (MOD BIPLANE): 28 ML
LEFT VENTRICLE SYSTOLIC VOLUME INDEX (MOD BIPLANE): 15.6 ML/M2
LEFT VENTRICULAR INTERNAL DIMENSION IN DIASTOLE: 4.2 CM (ref 3.5–6)
LEFT VENTRICULAR POSTERIOR WALL IN END DIASTOLE: 1.3 CM
LEFT VENTRICULAR STROKE VOLUME: 58 ML
LV EF BIPLANE MOD: 59 %
LV EF US.2D.A4C+ESTIMATED: 58 %
LVSV (TEICH): 58 ML
MV E'TISSUE VEL-SEP: 6 CM/S
RIGHT ATRIUM AREA SYSTOLE A4C: 12.2 CM2
RIGHT VENTRICLE ID DIMENSION: 3.3 CM
SL CV LEFT ATRIUM LENGTH A2C: 6.2 CM
SL CV LV EF: 60
SL CV PED ECHO LEFT VENTRICLE DIASTOLIC VOLUME (MOD BIPLANE) 2D: 79 ML
SL CV PED ECHO LEFT VENTRICLE SYSTOLIC VOLUME (MOD BIPLANE) 2D: 21 ML
TR MAX PG: 21 MMHG
TR PEAK VELOCITY: 2.3 M/S
TRICUSPID ANNULAR PLANE SYSTOLIC EXCURSION: 1.7 CM
TRICUSPID VALVE PEAK REGURGITATION VELOCITY: 2.3 M/S
TSH SERPL DL<=0.05 MIU/L-ACNC: 1.76 UIU/ML (ref 0.45–4.5)

## 2025-02-26 PROCEDURE — 93005 ELECTROCARDIOGRAM TRACING: CPT

## 2025-02-26 PROCEDURE — 93306 TTE W/DOPPLER COMPLETE: CPT

## 2025-02-26 PROCEDURE — 99232 SBSQ HOSP IP/OBS MODERATE 35: CPT | Performed by: INTERNAL MEDICINE

## 2025-02-26 PROCEDURE — 93306 TTE W/DOPPLER COMPLETE: CPT | Performed by: INTERNAL MEDICINE

## 2025-02-26 RX ORDER — SODIUM CHLORIDE, SODIUM LACTATE, POTASSIUM CHLORIDE, CALCIUM CHLORIDE 600; 310; 30; 20 MG/100ML; MG/100ML; MG/100ML; MG/100ML
75 INJECTION, SOLUTION INTRAVENOUS CONTINUOUS
Status: DISPENSED | OUTPATIENT
Start: 2025-02-26 | End: 2025-02-26

## 2025-02-26 RX ADMIN — HYDROCORTISONE SODIUM SUCCINATE 50 MG: 100 INJECTION, POWDER, FOR SOLUTION INTRAMUSCULAR; INTRAVENOUS at 22:28

## 2025-02-26 RX ADMIN — ATORVASTATIN CALCIUM 10 MG: 10 TABLET, FILM COATED ORAL at 16:26

## 2025-02-26 RX ADMIN — CITALOPRAM HYDROBROMIDE 20 MG: 20 TABLET ORAL at 08:19

## 2025-02-26 RX ADMIN — ENOXAPARIN SODIUM 40 MG: 40 INJECTION SUBCUTANEOUS at 08:20

## 2025-02-26 RX ADMIN — HYDROCORTISONE SODIUM SUCCINATE 50 MG: 100 INJECTION, POWDER, FOR SOLUTION INTRAMUSCULAR; INTRAVENOUS at 08:20

## 2025-02-26 RX ADMIN — SODIUM CHLORIDE, SODIUM LACTATE, POTASSIUM CHLORIDE, AND CALCIUM CHLORIDE 75 ML/HR: .6; .31; .03; .02 INJECTION, SOLUTION INTRAVENOUS at 13:03

## 2025-02-26 RX ADMIN — ANASTROZOLE 1 MG: 1 TABLET, COATED ORAL at 08:19

## 2025-02-26 NOTE — ASSESSMENT & PLAN NOTE
Continue Solu-Cortef 50 mg every 12hr  Random cortisol level of 5, no signs of adrenal insufficiency,  Patient states the blood pressure is usually on the lower side however Toprol-XL has been decreased from 50 mg to 25 mg and now currently off of beta-blocker since patient started chemo regimen with azacitidine pending   will check a.m. cortisol level,  PTA prednisone dose is 3 mg daily,

## 2025-02-26 NOTE — PROGRESS NOTES
Progress Note - Hospitalist   Name: Pretty Aguila 74 y.o. female I MRN: [id number]  Unit/Bed#: S -01 I Date of Admission: 2/25/2025   Date of Service: 2/26/2025 I Hospital Day: 0    Assessment & Plan  Near syncope  Patient with near syncopal event while seated.  Otherwise review of systems are negative  Episode lasted approximately 5 to 6 seconds and resolved  Mobitz type I noted on EKG and patient scheduled for outpatient echocardiogram  Repeat EKG shows sinus rhythm no telemetry events noted, 2D echocardiogram shows LVEF of 60% with mild concentric hypertrophy  Patient still becomes symptomatic with dizziness and mildly orthostatic positive,  Will give IV hydration and monitor  Tentative discharge in a.m.  MDS (myelodysplastic syndrome), high grade (HCC)  Continue Arimidex  Patient follows with [facility]  With history of pancytopenia counts are stable  Platelet count over 200,000 will use Lovenox DVT prophylaxis  Rheumatoid arthritis of multiple sites with negative rheumatoid factor (HCC)  Continue Solu-Cortef 50 mg every 12hr  Random cortisol level of 5, no signs of adrenal insufficiency,  Patient states the blood pressure is usually on the lower side however Toprol-XL has been decreased from 50 mg to 25 mg and now currently off of beta-blocker since patient started chemo regimen with azacitidine pending   will check a.m. cortisol level,  PTA prednisone dose is 3 mg daily,  Prediabetes  Hold outpatient metformin  No indication for fingersticks  Essential hypertension  Metoprolol has been discontinued, as blood pressure has been on the borderline low side,  Check TSH in a.m.    VTE Pharmacologic Prophylaxis: VTE Score: 3 Moderate Risk (Score 3-4) - Pharmacological DVT Prophylaxis Ordered: enoxaparin (Lovenox).    Mobility:      -[facility] Goal achieved. Continue to encourage appropriate mobility.    Patient Centered Rounds: I performed bedside rounds with nursing staff today.   Discussions with  Specialists or Other Care Team Provider: staff    Education and Discussions with Family / Patient: Patient declined call to .     Current Length of Stay: 0 day(s)  Current Patient Status: Inpatient   Certification Statement: The patient will continue to require additional inpatient hospital stay due to near syncope   Discharge Plan: Anticipate discharge tomorrow to home.    Code Status: Level 1 - Full Code    Subjective   Patient states she feels better however when she stands up she becomes short of breath and lightheaded.  Blood pressure on standing up noted to have a drop in systolic blood pressure however less than 15 mmHg, but noted to be tachycardic with heart rate of 110s,    Objective :  Temp:  [98.1 °F (36.7 °C)-99.3 °F (37.4 °C)] 98.6 °F (37 °C)  HR:  [83-98] 83  BP: ()/(42-73) 108/73  Resp:  [14-20] 20  SpO2:  [93 %-99 %] 99 %  O2 Device: None (Room air)    Body mass index is 31.28 kg/m².     Input and Output Summary (last 24 hours):     Intake/Output Summary (Last 24 hours) at 2/26/2025 1208  Last data filed at 2/26/2025 0500  Gross per 24 hour   Intake 700 ml   Output 250 ml   Net 450 ml       Physical Exam    HEENT-PERRLA, moist oral mucosa  Neck-supple, no JVD elevation   Respiratory-equal air entry bilaterally, no rales or rhonchi  Cardiovascular system-S1, S2 heard, no murmur or gallops or rubs  Abdomen-soft, nontender, no guarding or rigidity, bowel sounds heard  Extremities-no pedal edema  Peripheral pulses palpable  Musculoskeletal-no contractures  Central nervous system-no acute focal neurological deficit ,no sensory or motor deficit noted.  Skin-no rash noted       Lines/Drains:        Telemetry:  Telemetry Orders (From admission, onward)               24 Hour Telemetry Monitoring  Continuous x 24 Hours (Telem)        Expiring   Question:  Reason for 24 Hour Telemetry  Answer:  Syncope suspected to be cardiac in origin                     Telemetry Reviewed: Normal Sinus  Rhythm  Indication for Continued Telemetry Use: Syncope               Lab Results: I have reviewed the following results:   Results from last 7 days   Lab Units 02/25/25  1703 02/25/25  1157   WBC Thousand/uL  --  3.85*   HEMOGLOBIN g/dL  --  10.0*   HEMATOCRIT %  --  30.0*   PLATELETS Thousands/uL 157 201   SEGS PCT %  --  42*   LYMPHO PCT %  --  53*   MONO PCT %  --  2*   EOS PCT %  --  2     Results from last 7 days   Lab Units 02/25/25  1157   SODIUM mmol/L 140   POTASSIUM mmol/L 3.4*   CHLORIDE mmol/L 104   CO2 mmol/L 28   BUN mg/dL 23   CREATININE mg/dL 0.83   ANION GAP mmol/L 8   CALCIUM mg/dL 8.8   ALBUMIN g/dL 3.5   TOTAL BILIRUBIN mg/dL 0.50   ALK PHOS U/L 72   ALT U/L 15   AST U/L 12*   GLUCOSE RANDOM mg/dL 82     Results from last 7 days   Lab Units 02/25/25  1157   INR  0.94                   Recent Cultures (last 7 days):         Imaging Results Review: I personally reviewed the following image studies in PACS and associated radiology reports: chest xray. My interpretation of the radiology images/reports is: No acute infiltrate noted,.  Telemetry shows sinus rhythm, EKG was repeated shows sinus rhythm with prolonged QT of 505 ms,    Last 24 Hours Medication List:     Current Facility-Administered Medications:     anastrozole (ARIMIDEX) tablet 1 mg, Daily    atorvastatin (LIPITOR) tablet 10 mg, Daily With Dinner    citalopram (CeleXA) tablet 20 mg, Daily    enoxaparin (LOVENOX) subcutaneous injection 40 mg, Daily    hydrocortisone (Solu-CORTEF) injection 50 mg, Q12H SHRADDHA    lactated ringers infusion, Continuous    traMADol (ULTRAM) tablet 50 mg, Q6H PRN    Administrative Statements   Today, Patient Was Seen By: Heaven Christianson MD      **Please Note: This note may have been constructed using a voice recognition system.**

## 2025-02-26 NOTE — ASSESSMENT & PLAN NOTE
Continue Arimidex  Patient follows with [facility]  With history of pancytopenia counts are stable  Platelet count over 200,000 will use Lovenox DVT prophylaxis

## 2025-02-26 NOTE — ASSESSMENT & PLAN NOTE
Metoprolol has been discontinued, as blood pressure has been on the borderline low side,  Check TSH in a.m.

## 2025-02-26 NOTE — ASSESSMENT & PLAN NOTE
Patient with near syncopal event while seated.  Otherwise review of systems are negative  Episode lasted approximately 5 to 6 seconds and resolved  Mobitz type I noted on EKG and patient scheduled for outpatient echocardiogram  Repeat EKG shows sinus rhythm no telemetry events noted, 2D echocardiogram shows LVEF of 60% with mild concentric hypertrophy  Patient still becomes symptomatic with dizziness and mildly orthostatic positive,  Will give IV hydration and monitor  Tentative discharge in a.m.

## 2025-02-26 NOTE — ED ATTENDING ATTESTATION
2/25/2025  I, Helena Narayanan MD, saw and evaluated the patient. I have discussed the patient with the resident/non-physician practitioner and agree with the resident's/non-physician practitioner's findings, Plan of Care, and MDM as documented in the resident's/non-physician practitioner's note, except where noted. All available labs and Radiology studies were reviewed.  I was present for key portions of any procedure(s) performed by the resident/non-physician practitioner and I was immediately available to provide assistance.       At this point I agree with the current assessment done in the Emergency Department.  I have conducted an independent evaluation of this patient a history and physical is as follows:    Patient is a 74-year-old female who presents to the emergency department after feeling as though she might pass out.  While seated she suddenly felt flushed and as though things were closing around on her.  She has been experiencing dyspnea with exertion, marked fatigue and generalized sensation of heaviness.  She has been receiving treatments for myelodysplasia and recently required transfusion after hemoglobin was low at 5.8.  She had similar dyspnea with exertion and fatigue at that time.  No fevers.  No known sick contacts.  No vomiting, diarrhea or problems with urination.  Endorses fair appetite/intake.  Denies having had headaches, chest discomfort or palpitations.    On exam she is alert and comfortable appearing at rest with clear speech.  Mucous membranes are moist.  Heart sounds regular.  No murmur appreciated.  Lungs clear to auscultation diffusely.  Legs nontender and nonedematous.    Differential diagnosis includes but is not limited to symptomatic anemia, ACS, cardiomyopathy, valvular dysfunction, dysrhythmia, hypotension/orthostasis, pneumonia, PE side effect of treatment and/or myelodysplasia itself.    CBC with surprising improvement in hemoglobin.  This is 10 and much  higher than that on other occasions.  Worsening anemia not responsible for symptoms.  CXR clear.      Especially concerning was that today's syncope occurred at rest as opposed to with exertion or position change.  Chelsi 1 appreciated on EKG-new.  This could be responsible for symptoms.  Consider possibility of PE.  Given contrast allergy premedication would be necessary.  Will bring in for further evaluation.   ED Course         Critical Care Time  Procedures

## 2025-02-26 NOTE — CASE MANAGEMENT
Case Management Assessment    Patient name Pretty Aguila  Location S /S -01 MRN [id number]  : 1950 Date 2025       Current Admission Date: 2025  Current Admission Diagnosis:Near syncope   Patient Active Problem List    Diagnosis Date Noted Date Diagnosed    Near syncope 2025     Obesity, morbid ([facility]) 2025     Stage 3a chronic kidney disease ([facility]) 2025     Anemia 2025     Syncope 2025     MDS (myelodysplastic syndrome), high grade ([facility]) 2024     Pancytopenia ([facility]) 2024     Platelets decreased ([facility]) 2024     CKD (chronic kidney disease) 2024     Psoriatic arthritis ([facility]) 2024     Osteoarthritis of multiple joints 2024     Class 1 obesity due to excess calories with serious comorbidity and body mass index (BMI) of 32.0 to 32.9 in adult 2024     Prediabetes 2024     Moderate episode of recurrent major depressive disorder ([facility]) 2024     Anxiety and depression 2024     Restless leg syndrome 2024     Simple chronic bronchitis ([facility]) 2024     Continuous opioid dependence ([facility]) 2024     Malignant neoplasm of lower-outer quadrant of left breast of female, estrogen receptor positive ([facility]) 2023     Postmenopausal 2023     Annual physical exam 2023     Rheumatoid arthritis of multiple sites with negative rheumatoid factor ([facility]) 2023     Other fatigue 2023     Candida infection of flexural skin 2022     Chronic bilateral low back pain without sciatica 10/11/2021     Status post bariatric surgery 2021     At risk for sleep apnea 2021     Postsurgical malabsorption 2021     Vitamin D deficiency 2020     Dyspnea on exertion 2019     Coronary artery disease involving native coronary artery of native heart without angina pectoris 2018     Essential hypertension 2018     Dyslipidemia 2018     S/P CABG x 3 2018        LOS (days): 0  Geometric Mean LOS (GMLOS) (days):   Days to GMLOS:     OBJECTIVE:  PATIENT READMITTED TO HOSPITAL            Current admission status: Inpatient       Preferred Pharmacy:   [facility] - [place], [place] - [address]  [address]  [address]  [place] [address]  Phone: [phone] Fax: [phone]    [facility], [facility] [place] - [address]  [address] [address]  Phone: [phone] Fax: [phone]    Primary Care Provider: Lauren Dumont MD    Primary Insurance: MEDICARE  Secondary Insurance: [employer]    ASSESSMENT:  Active Health Care Proxies    There are no active Health Care Proxies on file.                      Patient Information  Admitted from:: Home  Mental Status: Alert  During Assessment patient was accompanied by: Friend (Gretchen)  Assessment information provided by:: Patient  Primary Caregiver: Self  Support Systems: Family members  County of Residence: [place]  What city do you live in?: [place]  Home entry access options. Select all that apply.: Stairs  Number of steps to enter home.: 2  Type of Current Residence: [address] [place] home  Upon entering residence, is there a bedroom on the main floor (no further steps)?: Yes  Upon entering residence, is there a bathroom on the main floor (no further steps)?: Yes  Living Arrangements: Lives w/ Spouse/significant other    Activities of Daily Living Prior to Admission  Functional Status: Independent  Completes ADLs independently?: Yes  Ambulates independently?: Yes  Does patient use assisted devices?: Yes  Assisted Devices (DME) used: Straight Cane (utilizes cane in community)  Does patient currently own DME?: Yes  What DME does the patient currently own?: Straight Cane, Walker  Does patient have a history of Outpatient Therapy (PT/OT)?: Yes  Does the patient have a history of Short-Term Rehab?: Yes  Does patient have a history of HHC?: Yes  Does patient currently have HHC?: No          Patient Information Continued  Does patient have prescription coverage?: Yes  Does patient receive dialysis treatments?: No         Means of Transportation  Means of Transport to Appts:: Drives Self    Patient confirmed PCP Tim and preferred phamacy [facility].    No CM d/c needs currently identified, CM remains available.     Social Determinants of Health (SDOH)      Flowsheet Row Most Recent Value   Housing Stability    In the last 12 months, was there a time when you were not able to pay the mortgage or rent on time? N   At any time in the past 12 months, were you homeless or living in a shelter (including now)? N   Transportation Needs    In the past 12 months, has lack of transportation kept you from medical appointments or from getting medications? no   In the past 12 months, has lack of transportation kept you from meetings, work, or from getting things needed for daily living? No   Food Insecurity    Within the past 12 months, you worried that your food would run out before you got the money to buy more. Never true   Within the past 12 months, the food you bought just didn't last and you didn't have money to get more. Never true   Utilities    In the past 12 months has the electric, gas, oil, or water company threatened to shut off services in your home? No

## 2025-02-27 VITALS
OXYGEN SATURATION: 99 % | TEMPERATURE: 98.7 F | HEIGHT: 62 IN | HEART RATE: 77 BPM | SYSTOLIC BLOOD PRESSURE: 110 MMHG | BODY MASS INDEX: 31.47 KG/M2 | RESPIRATION RATE: 20 BRPM | WEIGHT: 171 LBS | DIASTOLIC BLOOD PRESSURE: 78 MMHG

## 2025-02-27 LAB
ANION GAP SERPL CALCULATED.3IONS-SCNC: 5 MMOL/L (ref 4–13)
BUN SERPL-MCNC: 23 MG/DL (ref 5–25)
CALCIUM SERPL-MCNC: 8.1 MG/DL (ref 8.4–10.2)
CHLORIDE SERPL-SCNC: 110 MMOL/L (ref 96–108)
CO2 SERPL-SCNC: 26 MMOL/L (ref 21–32)
CORTIS AM PEAK SERPL-MCNC: 13.2 UG/DL (ref 6.7–22.6)
CREAT SERPL-MCNC: 0.68 MG/DL (ref 0.6–1.3)
ERYTHROCYTE [DISTWIDTH] IN BLOOD BY AUTOMATED COUNT: 17.2 % (ref 11.6–15.1)
GFR SERPL CREATININE-BSD FRML MDRD: 87 ML/MIN/1.73SQ M
GLUCOSE SERPL-MCNC: 113 MG/DL (ref 65–140)
HCT VFR BLD AUTO: 22.1 % (ref 34.8–46.1)
HGB BLD-MCNC: 7.2 G/DL (ref 11.5–15.4)
MCH RBC QN AUTO: 32.9 PG (ref 26.8–34.3)
MCHC RBC AUTO-ENTMCNC: 32.6 G/DL (ref 31.4–37.4)
MCV RBC AUTO: 101 FL (ref 82–98)
PLATELET # BLD AUTO: 130 THOUSANDS/UL (ref 149–390)
PMV BLD AUTO: 10.3 FL (ref 8.9–12.7)
POTASSIUM SERPL-SCNC: 4.1 MMOL/L (ref 3.5–5.3)
RBC # BLD AUTO: 2.19 MILLION/UL (ref 3.81–5.12)
SODIUM SERPL-SCNC: 141 MMOL/L (ref 135–147)
WBC # BLD AUTO: 3.51 THOUSAND/UL (ref 4.31–10.16)

## 2025-02-27 PROCEDURE — 80048 BASIC METABOLIC PNL TOTAL CA: CPT | Performed by: INTERNAL MEDICINE

## 2025-02-27 PROCEDURE — 82533 TOTAL CORTISOL: CPT | Performed by: INTERNAL MEDICINE

## 2025-02-27 PROCEDURE — 99239 HOSP IP/OBS DSCHRG MGMT >30: CPT | Performed by: INTERNAL MEDICINE

## 2025-02-27 PROCEDURE — 85027 COMPLETE CBC AUTOMATED: CPT | Performed by: INTERNAL MEDICINE

## 2025-02-27 RX ORDER — MIDODRINE HYDROCHLORIDE 2.5 MG/1
2.5 TABLET ORAL
Qty: 90 TABLET | Refills: 1 | Status: SHIPPED | OUTPATIENT
Start: 2025-02-27 | End: 2025-03-10

## 2025-02-27 RX ORDER — METOPROLOL SUCCINATE 25 MG/1
12.5 TABLET, EXTENDED RELEASE ORAL DAILY
Qty: 30 TABLET | Refills: 0 | Status: SHIPPED | OUTPATIENT
Start: 2025-02-27 | End: 2025-03-18

## 2025-02-27 RX ORDER — PREDNISONE 1 MG/1
3 TABLET ORAL DAILY
Qty: 90 TABLET | Refills: 1 | Status: SHIPPED | OUTPATIENT
Start: 2025-02-27

## 2025-02-27 RX ADMIN — CITALOPRAM HYDROBROMIDE 20 MG: 20 TABLET ORAL at 10:42

## 2025-02-27 RX ADMIN — ENOXAPARIN SODIUM 40 MG: 40 INJECTION SUBCUTANEOUS at 10:43

## 2025-02-27 RX ADMIN — ANASTROZOLE 1 MG: 1 TABLET, COATED ORAL at 10:43

## 2025-02-27 RX ADMIN — ATORVASTATIN CALCIUM 10 MG: 10 TABLET, FILM COATED ORAL at 17:23

## 2025-02-27 NOTE — CASE MANAGEMENT
Case Management Discharge Planning Note    Patient name Pretty Aguila  Location S /S -01 MRN [id number]  : 1950 Date 2025       Current Admission Date: 2025  Current Admission Diagnosis:Near syncope   Patient Active Problem List    Diagnosis Date Noted Date Diagnosed    Near syncope 2025     Obesity, morbid ([facility]) 2025     Stage 3a chronic kidney disease ([facility]) 2025     Anemia 2025     Syncope 2025     MDS (myelodysplastic syndrome), high grade ([facility]) 2024     Pancytopenia ([facility]) 2024     Platelets decreased ([facility]) 2024     CKD (chronic kidney disease) 2024     Psoriatic arthritis ([facility]) 2024     Osteoarthritis of multiple joints 2024     Class 1 obesity due to excess calories with serious comorbidity and body mass index (BMI) of 32.0 to 32.9 in adult 2024     Prediabetes 2024     Moderate episode of recurrent major depressive disorder ([facility]) 2024     Anxiety and depression 2024     Restless leg syndrome 2024     Simple chronic bronchitis ([facility]) 2024     Continuous opioid dependence ([facility]) 2024     Malignant neoplasm of lower-outer quadrant of left breast of female, estrogen receptor positive ([facility]) 2023     Postmenopausal 2023     Annual physical exam 2023     Rheumatoid arthritis of multiple sites with negative rheumatoid factor ([facility]) 2023     Other fatigue 2023     Candida infection of flexural skin 2022     Chronic bilateral low back pain without sciatica 10/11/2021     Status post bariatric surgery 2021     At risk for sleep apnea 2021     Postsurgical malabsorption 2021     Vitamin D deficiency 2020     Dyspnea on exertion 2019     Coronary artery disease involving native coronary artery of native heart without angina pectoris 2018     Essential hypertension 2018     Dyslipidemia 2018     S/P CABG x  3 05/29/2018       LOS (days): 1  Geometric Mean LOS (GMLOS) (days): 2.3  Days to GMLOS:1.2     OBJECTIVE:  Risk of Unplanned Readmission Score: 41.89         Current admission status: Inpatient   Preferred Pharmacy:   [facility], [facility] [place] - [address]  [address] [facility] [address]  Phone: [phone] Fax: [phone]    [facility] [place] [address] [place] [address]  [place] [address]  Phone: [phone] Fax: [phone]    Primary Care Provider: Lauren Dumont MD    Primary Insurance: MEDICARE  Secondary Insurance: [facility]    DISCHARGE DETAILS:                                                                                     IMM reviewed with patient, patient agrees with discharge determination.  IMM Given (Date):: 02/27/25  IMM Given to:: Patient

## 2025-02-27 NOTE — DISCHARGE SUMMARY
Discharge Summary - Hospitalist   Name: Pretty Aguila 74 y.o. female I MRN: [id number]  Unit/Bed#: S -01 I Date of Admission: 2/25/2025   Date of Service: 2/27/2025 I Hospital Day: 1     Assessment & Plan  Near syncope  Patient with near syncopal event while seated.  Otherwise review of systems are negative  Episode lasted approximately 5 to 6 seconds and resolved  Mobitz type I noted on EKG and patient scheduled for outpatient echocardiogram  Repeat EKG shows sinus rhythm no telemetry events noted, 2D echocardiogram shows LVEF of 60% with mild concentric hypertrophy  Patient still becomes symptomatic with dizziness and mildly orthostatic positive,  Will give IV hydration and monitor  Tentative discharge in a.m.  MDS (myelodysplastic syndrome), high grade ([facility])  Continue Arimidex  Patient follows with [facility]  With history of pancytopenia counts are stable  Platelet count over 200,000 will use Lovenox DVT prophylaxis  Rheumatoid arthritis of multiple sites with negative rheumatoid factor ([facility]CC)  Continue Solu-Cortef 50 mg every 12hr  Random cortisol level of 5, no signs of adrenal insufficiency,  Patient states the blood pressure is usually on the lower side however Toprol-XL has been decreased from 50 mg to 25 mg and now currently off of beta-blocker since patient started chemo regimen with azacitidine pending   will check a.m. cortisol level,  PTA prednisone dose is 3 mg daily,  Prediabetes  Hold outpatient metformin  No indication for fingersticks  Essential hypertension  Metoprolol has been discontinued, as blood pressure has been on the borderline low side,  Check TSH in a.m.     Medical Problems       Resolved Problems  Date Reviewed: 12/18/2024   None       Discharging Physician / Practitioner: Heaven Christianson MD  PCP: Lauren Dumont MD  Admission Date:   Admission Orders (From admission, onward)       Ordered        02/26/25 1208  INPATIENT ADMISSION  Once            02/25/25  1334  Place in Observation  Once                          Discharge Date: 02/27/25    Consultations During Hospital Stay:  nil    Procedures Performed:   nil    Significant Findings / Test Results:   Echo shows   Left Ventricle: Left ventricular cavity size is normal. Wall thickness is mildly increased. There is mild concentric hypertrophy. The left ventricular ejection fraction is 60%. Systolic function is normal. Wall motion is normal. Diastolic function is mildly abnormal, consistent with grade I (abnormal) relaxation.    IVS: There is abnormal septal motion consistent with post-operative status.    Mitral Valve: There is moderate annular calcification.    Tricuspid Valve: There is mild regurgitation.    Aorta: The aortic root is normal in size. The ascending aorta is mildly dilated. Asc Ao is 4.1 cm.    Incidental Findings:   nil      Test Results Pending at Discharge (will require follow up):   Am cortisol level     Outpatient Tests Requested:  Cbc and bmp in 1 week    Complications:  nil    Reason for Admission: syncope    Hospital Course:   Pretty Aguila is a 74 y.o. female patient who originally presented to the hospital on 2/25/2025 due to near syncope, patient has a history of myelodysplastic syndrome/AML on chemotherapy with azacitidine, rheumatoid arthritis on chronic prednisone therapy 3 mg daily, chronic anemia, hypertension, prediabetes, history of breast cancer, presented with syncopal episode, patient was admitted for observation, initial EKG showed possible type I Mobitz block but repeat EKG showed sinus rhythm , no acute arrhythmia noted on the telemetry, TSH within normal limit, random cortisol level and ACTH was borderline low, however has been on chronic prednisone therapy, awaiting a.m. cortisol level, patient however was noted to be orthostatic hypotension positive, was given IV fluid hydration and blood pressure improved, 2D echocardiogram shows EF of 60%, mild concentric hypertrophy noted,  "and mild diastolic dysfunction noted, ascending aortic root is mildly dilated 4.1 cm, patient noted to be tachycardic upon exertion, Toprol XL dose used to be 50 mg daily, and since patient started chemotherapy has been reduced to 25 mg daily and will decrease to 12.5 mg daily, likely patient has POTS, with underlying anemia secondary to myelofibrosis, baseline hemoglobin is around 8, now hemoglobin is 7.2 likely because of dilutional effect with IV hydration.  Recommend NATALIE stockings and abdominal binder and midodrine for orthostatic hypotension.  No clinical signs of adrenal insufficiency noted.      Please see above list of diagnoses and related plan for additional information.     Condition at Discharge: fair    Discharge Day Visit / Exam:   Subjective:  pt feels better.  Vitals: Blood Pressure: 110/78 (02/27/25 0730)  Pulse: 77 (02/27/25 0730)  Temperature: 98.7 °F (37.1 °C) (02/27/25 0730)  Temp Source: Oral (02/26/25 0716)  Respirations: 20 (02/26/25 0300)  Height: 5' 2\" (157.5 cm) (02/26/25 0925)  Weight - Scale: 77.6 kg (171 lb) (02/26/25 0925)  SpO2: 99 % (02/27/25 0730)  Physical Exam   HEENT-PERRLA, moist oral mucosa  Neck-supple, no JVD elevation   Respiratory-equal air entry bilaterally, no rales or rhonchi  Cardiovascular system-S1, S2 heard, no murmur or gallops or rubs  Abdomen-soft, nontender, no guarding or rigidity, bowel sounds heard  Extremities-no pedal edema  Peripheral pulses palpable  Musculoskeletal-no contractures  Central nervous system-no acute focal neurological deficit ,no sensory or motor deficit noted.  Skin-no rash noted       Discussion with Family: Updated  (daughter) via phone.    Discharge instructions/Information to patient and family:   See after visit summary for information provided to patient and family.      Provisions for Follow-Up Care:  See after visit summary for information related to follow-up care and any pertinent home health orders.      Mobility at " time of Discharge:   Basic Mobility Inpatient Raw Score: 24  JH-HLM Goal: 8: Walk 250 feet or more  JH-HLM Achieved: 8: Walk 250 feet ot more  HLM Goal achieved. Continue to encourage appropriate mobility.     Disposition:   Home    Planned Readmission: nil    Discharge Medications:  See after visit summary for reconciled discharge medications provided to patient and/or family.      Administrative Statements   Discharge Statement:      **Please Note: This note may have been constructed using a voice recognition system**

## 2025-02-27 NOTE — PLAN OF CARE
Problem: NEUROSENSORY - ADULT  Goal: Achieves stable or improved neurological status  Description: INTERVENTIONS  - Monitor and report changes in neurological status  - Monitor vital signs such as temperature, blood pressure, glucose, and any other labs ordered   - Initiate measures to prevent increased intracranial pressure  - Monitor for seizure activity and implement precautions if appropriate      Outcome: Progressing  Goal: Remains free of injury related to seizures activity  Description: INTERVENTIONS  - Maintain airway, patient safety  and administer oxygen as ordered  - Monitor patient for seizure activity, document and report duration and description of seizure to physician/advanced practitioner  - If seizure occurs,  ensure patient safety during seizure  - Reorient patient post seizure  - Seizure pads on all 4 side rails  - Instruct patient/family to notify RN of any seizure activity including if an aura is experienced  - Instruct patient/family to call for assistance with activity based on nursing assessment  - Administer anti-seizure medications if ordered    Outcome: Progressing  Goal: Achieves maximal functionality and self care  Description: INTERVENTIONS  - Monitor swallowing and airway patency with patient fatigue and changes in neurological status  - Encourage and assist patient to increase activity and self care.   - Encourage visually impaired, hearing impaired and aphasic patients to use assistive/communication devices  Outcome: Progressing     Problem: CARDIOVASCULAR - ADULT  Goal: Maintains optimal cardiac output and hemodynamic stability  Description: INTERVENTIONS:  - Monitor I/O, vital signs and rhythm  - Monitor for S/S and trends of decreased cardiac output  - Administer and titrate ordered vasoactive medications to optimize hemodynamic stability  - Assess quality of pulses, skin color and temperature  - Assess for signs of decreased coronary artery perfusion  - Instruct patient to  report change in severity of symptoms  Outcome: Progressing  Goal: Absence of cardiac dysrhythmias or at baseline rhythm  Description: INTERVENTIONS:  - Continuous cardiac monitoring, vital signs, obtain 12 lead EKG if ordered  - Administer antiarrhythmic and heart rate control medications as ordered  - Monitor electrolytes and administer replacement therapy as ordered  Outcome: Progressing     Problem: SAFETY ADULT  Goal: Patient will remain free of falls  Description: INTERVENTIONS:  - Educate patient/family on patient safety including physical limitations  - Instruct patient to call for assistance with activity   - Consult OT/PT to assist with strengthening/mobility   - Keep Call bell within reach  - Keep bed low and locked with side rails adjusted as appropriate  - Keep care items and personal belongings within reach  - Initiate and maintain comfort rounds  - Make Fall Risk Sign visible to staff  - Apply yellow socks and bracelet for high fall risk patients  - Consider moving patient to room near nurses station  Outcome: Progressing  Goal: Maintain or return to baseline ADL function  Description: INTERVENTIONS:  -  Assess patient's ability to carry out ADLs; assess patient's baseline for ADL function and identify physical deficits which impact ability to perform ADLs (bathing, care of mouth/teeth, toileting, grooming, dressing, etc.)  - Assess/evaluate cause of self-care deficits   - Assess range of motion  - Assess patient's mobility; develop plan if impaired  - Assess patient's need for assistive devices and provide as appropriate  - Encourage maximum independence but intervene and supervise when necessary  - Involve family in performance of ADLs  - Assess for home care needs following discharge   - Consider OT consult to assist with ADL evaluation and planning for discharge  - Provide patient education as appropriate  Outcome: Progressing  Goal: Maintains/Returns to pre admission functional  level  Description: INTERVENTIONS:  - Perform AM-PAC 6 Click Basic Mobility/ Daily Activity assessment daily.  - Set and communicate daily mobility goal to care team and patient/family/caregiver.   - Collaborate with rehabilitation services on mobility goals if consulted  - Out of bed for meals 3 times a day  - Out of bed for toileting  - Record patient progress and toleration of activity level   Outcome: Progressing

## 2025-02-27 NOTE — DISCHARGE INSTR - AVS FIRST PAGE
Dear Pretty Aguila,     It was our pleasure to care for you here at [facility].  It is our hope that we were always able to exceed the expected standards for your care during your stay.  You were hospitalized due to ***.  You were cared for on the *** floor under the service of Heaven Christianson MD with the [facility] who covers for your primary care physician (PCP), Lauren Dumont MD, while you were hospitalized.  If you have any questions or concerns related to this hospitalization, you may contact us at .  For follow up as well as medication refills, we recommend that you follow up with your primary care physician.  A registered nurse will reach out to you by phone within a few days after your discharge to answer any additional questions that you may have after going home.  However, at this time we provide for you here, the most important instructions / recommendations at discharge:     Notable Medication Adjustments -   Midodrine 2.5mg tid   Decrease toprol XL 12.5mg daily   Testing Required after Discharge -   Cbc, BMP in 3-5 days   ** Please contact your PCP to request testing orders for any of the testing recommended here **  Important follow up information -   F/u pcp  Other Instructions -   Abdominal binder and juani stockings  Please review this entire after visit summary as additional general instructions including medication list, appointments, activity, diet, any pertinent wound care, and other additional recommendations from your care team that may be provided for you.      Sincerely,     Heaven Christianson MD

## 2025-02-27 NOTE — ASSESSMENT & PLAN NOTE
Continue Arimidex  Patient follows with [facility]  With history of pancytopenia counts are stable  Platelet count over 200,000 will use Lovenox DVT prophylaxis

## 2025-02-27 NOTE — PLAN OF CARE
Problem: NEUROSENSORY - ADULT  Goal: Achieves stable or improved neurological status  Description: INTERVENTIONS  - Monitor and report changes in neurological status  - Monitor vital signs such as temperature, blood pressure, glucose, and any other labs ordered   - Initiate measures to prevent increased intracranial pressure  - Monitor for seizure activity and implement precautions if appropriate      Outcome: Completed  Goal: Remains free of injury related to seizures activity  Description: INTERVENTIONS  - Maintain airway, patient safety  and administer oxygen as ordered  - Monitor patient for seizure activity, document and report duration and description of seizure to physician/advanced practitioner  - If seizure occurs,  ensure patient safety during seizure  - Reorient patient post seizure  - Seizure pads on all 4 side rails  - Instruct patient/family to notify RN of any seizure activity including if an aura is experienced  - Instruct patient/family to call for assistance with activity based on nursing assessment  - Administer anti-seizure medications if ordered    Outcome: Completed  Goal: Achieves maximal functionality and self care  Description: INTERVENTIONS  - Monitor swallowing and airway patency with patient fatigue and changes in neurological status  - Encourage and assist patient to increase activity and self care.   - Encourage visually impaired, hearing impaired and aphasic patients to use assistive/communication devices  Outcome: Completed     Problem: CARDIOVASCULAR - ADULT  Goal: Maintains optimal cardiac output and hemodynamic stability  Description: INTERVENTIONS:  - Monitor I/O, vital signs and rhythm  - Monitor for S/S and trends of decreased cardiac output  - Administer and titrate ordered vasoactive medications to optimize hemodynamic stability  - Assess quality of pulses, skin color and temperature  - Assess for signs of decreased coronary artery perfusion  - Instruct patient to report  change in severity of symptoms  Outcome: Completed  Goal: Absence of cardiac dysrhythmias or at baseline rhythm  Description: INTERVENTIONS:  - Continuous cardiac monitoring, vital signs, obtain 12 lead EKG if ordered  - Administer antiarrhythmic and heart rate control medications as ordered  - Monitor electrolytes and administer replacement therapy as ordered  Outcome: Completed     Problem: SAFETY ADULT  Goal: Patient will remain free of falls  Description: INTERVENTIONS:  - Educate patient/family on patient safety including physical limitations  - Instruct patient to call for assistance with activity   - Consult OT/PT to assist with strengthening/mobility   - Keep Call bell within reach  - Keep bed low and locked with side rails adjusted as appropriate  - Keep care items and personal belongings within reach  - Initiate and maintain comfort rounds  - Make Fall Risk Sign visible to staff  - Offer Toileting every  Hours, in advance of need  - Initiate/Maintain alarm  - Obtain necessary fall risk management equipment:   - Apply yellow socks and bracelet for high fall risk patients  - Consider moving patient to room near nurses station  Outcome: Completed  Goal: Maintain or return to baseline ADL function  Description: INTERVENTIONS:  -  Assess patient's ability to carry out ADLs; assess patient's baseline for ADL function and identify physical deficits which impact ability to perform ADLs (bathing, care of mouth/teeth, toileting, grooming, dressing, etc.)  - Assess/evaluate cause of self-care deficits   - Assess range of motion  - Assess patient's mobility; develop plan if impaired  - Assess patient's need for assistive devices and provide as appropriate  - Encourage maximum independence but intervene and supervise when necessary  - Involve family in performance of ADLs  - Assess for home care needs following discharge   - Consider OT consult to assist with ADL evaluation and planning for discharge  - Provide patient  education as appropriate  Outcome: Completed  Goal: Maintains/Returns to pre admission functional level  Description: INTERVENTIONS:  - Perform AM-PAC 6 Click Basic Mobility/ Daily Activity assessment daily.  - Set and communicate daily mobility goal to care team and patient/family/caregiver.   - Collaborate with rehabilitation services on mobility goals if consulted  - Perform Range of Motion mes a day.  - Reposition patient every  hours.  - Dangle patient  times a day  - Stand patient  times a day  - Ambulate patient  times a day  - Out of bed to chair  times a day   - Out of bed for meals  times a day  - Out of bed for toileting  - Record patient progress and toleration of activity level   Outcome: Completed

## 2025-02-28 ENCOUNTER — APPOINTMENT (EMERGENCY)
Dept: RADIOLOGY | Facility: HOSPITAL | Age: 75
End: 2025-02-28
Payer: MEDICARE

## 2025-02-28 ENCOUNTER — HOSPITAL ENCOUNTER (INPATIENT)
Facility: HOSPITAL | Age: 75
LOS: 4 days | Discharge: HOME | End: 2025-03-04
Attending: EMERGENCY MEDICINE
Payer: MEDICARE

## 2025-02-28 ENCOUNTER — TRANSITIONAL CARE MANAGEMENT (OUTPATIENT)
Dept: FAMILY MEDICINE CLINIC | Facility: CLINIC | Age: 75
End: 2025-02-28

## 2025-02-28 DIAGNOSIS — R00.0 TACHYCARDIA: ICD-10-CM

## 2025-02-28 DIAGNOSIS — A41.9 SEPTIC SHOCK (HCC): ICD-10-CM

## 2025-02-28 DIAGNOSIS — I47.10 SVT (SUPRAVENTRICULAR TACHYCARDIA) (HCC): ICD-10-CM

## 2025-02-28 DIAGNOSIS — S73.005A CLOSED DISLOCATION OF LEFT HIP, INITIAL ENCOUNTER (HCC): ICD-10-CM

## 2025-02-28 DIAGNOSIS — D46.Z MDS (MYELODYSPLASTIC SYNDROME), HIGH GRADE (HCC): ICD-10-CM

## 2025-02-28 DIAGNOSIS — K52.9 GASTROENTERITIS: ICD-10-CM

## 2025-02-28 DIAGNOSIS — R78.81 POSITIVE BLOOD CULTURE: ICD-10-CM

## 2025-02-28 DIAGNOSIS — R19.7 DIARRHEA: ICD-10-CM

## 2025-02-28 DIAGNOSIS — R78.81 GRAM-POSITIVE COCCI BACTEREMIA: ICD-10-CM

## 2025-02-28 DIAGNOSIS — D61.818 PANCYTOPENIA (HCC): ICD-10-CM

## 2025-02-28 DIAGNOSIS — R65.21 SEPTIC SHOCK (HCC): ICD-10-CM

## 2025-02-28 DIAGNOSIS — R52 PAIN: ICD-10-CM

## 2025-02-28 DIAGNOSIS — A41.9 SEPSIS (HCC): Primary | ICD-10-CM

## 2025-02-28 LAB
2HR DELTA HS TROPONIN: 4 NG/L
4HR DELTA HS TROPONIN: 13 NG/L
ABO GROUP BLD: NORMAL
ALBUMIN SERPL BCG-MCNC: 3.6 G/DL (ref 3.5–5)
ALP SERPL-CCNC: 66 U/L (ref 34–104)
ALT SERPL W P-5'-P-CCNC: 34 U/L (ref 7–52)
ANION GAP SERPL CALCULATED.3IONS-SCNC: 13 MMOL/L (ref 4–13)
ANISOCYTOSIS BLD QL SMEAR: PRESENT
APTT PPP: 21 SECONDS (ref 23–34)
AST SERPL W P-5'-P-CCNC: 27 U/L (ref 13–39)
BASOPHILS # BLD MANUAL: 0 THOUSAND/UL (ref 0–0.1)
BASOPHILS NFR MAR MANUAL: 0 % (ref 0–1)
BILIRUB SERPL-MCNC: 0.94 MG/DL (ref 0.2–1)
BILIRUB UR QL STRIP: NEGATIVE
BLD GP AB SCN SERPL QL: POSITIVE
BUN SERPL-MCNC: 29 MG/DL (ref 5–25)
CALCIUM SERPL-MCNC: 8.5 MG/DL (ref 8.4–10.2)
CARDIAC TROPONIN I PNL SERPL HS: 12 NG/L (ref ?–50)
CARDIAC TROPONIN I PNL SERPL HS: 16 NG/L (ref ?–50)
CARDIAC TROPONIN I PNL SERPL HS: 25 NG/L (ref ?–50)
CHLORIDE SERPL-SCNC: 108 MMOL/L (ref 96–108)
CLARITY UR: CLEAR
CO2 SERPL-SCNC: 20 MMOL/L (ref 21–32)
COLOR UR: NORMAL
CREAT SERPL-MCNC: 0.82 MG/DL (ref 0.6–1.3)
EOSINOPHIL # BLD MANUAL: 0.12 THOUSAND/UL (ref 0–0.4)
EOSINOPHIL NFR BLD MANUAL: 8 % (ref 0–6)
ERYTHROCYTE [DISTWIDTH] IN BLOOD BY AUTOMATED COUNT: 18 % (ref 11.6–15.1)
EST. AVERAGE GLUCOSE BLD GHB EST-MCNC: 128 MG/DL
GFR SERPL CREATININE-BSD FRML MDRD: 71 ML/MIN/1.73SQ M
GLUCOSE SERPL-MCNC: 115 MG/DL (ref 65–140)
GLUCOSE SERPL-MCNC: 186 MG/DL (ref 65–140)
GLUCOSE SERPL-MCNC: 96 MG/DL (ref 65–140)
GLUCOSE UR STRIP-MCNC: NEGATIVE MG/DL
HBA1C MFR BLD: 6.1 %
HCT VFR BLD AUTO: 30.3 % (ref 34.8–46.1)
HGB BLD-MCNC: 9.9 G/DL (ref 11.5–15.4)
HGB UR QL STRIP.AUTO: NEGATIVE
INR PPP: 0.99 (ref 0.85–1.19)
KETONES UR STRIP-MCNC: NEGATIVE MG/DL
LACTATE SERPL-SCNC: 1.4 MMOL/L (ref 0.5–2)
LACTATE SERPL-SCNC: 2.5 MMOL/L (ref 0.5–2)
LACTATE SERPL-SCNC: 3.4 MMOL/L (ref 0.5–2)
LACTATE SERPL-SCNC: 3.5 MMOL/L (ref 0.5–2)
LEUKOCYTE ESTERASE UR QL STRIP: NEGATIVE
LYMPHOCYTES # BLD AUTO: 0.31 THOUSAND/UL (ref 0.6–4.47)
LYMPHOCYTES # BLD AUTO: 21 % (ref 14–44)
MACROCYTES BLD QL AUTO: PRESENT
MAGNESIUM SERPL-MCNC: 1.7 MG/DL (ref 1.9–2.7)
MCH RBC QN AUTO: 33.4 PG (ref 26.8–34.3)
MCHC RBC AUTO-ENTMCNC: 32.7 G/DL (ref 31.4–37.4)
MCV RBC AUTO: 102 FL (ref 82–98)
MICROCYTES BLD QL AUTO: PRESENT
MONOCYTES # BLD AUTO: 0.04 THOUSAND/UL (ref 0–1.22)
MONOCYTES NFR BLD: 3 % (ref 4–12)
NEUTROPHILS # BLD MANUAL: 1.01 THOUSAND/UL (ref 1.85–7.62)
NEUTS BAND NFR BLD MANUAL: 1 % (ref 0–8)
NEUTS SEG NFR BLD AUTO: 67 % (ref 43–75)
NITRITE UR QL STRIP: NEGATIVE
NRBC BLD AUTO-RTO: 2 /100 WBC (ref 0–2)
OVALOCYTES BLD QL SMEAR: PRESENT
PH UR STRIP.AUTO: 5 [PH]
PLATELET # BLD AUTO: 146 THOUSANDS/UL (ref 149–390)
PLATELET BLD QL SMEAR: ADEQUATE
PMV BLD AUTO: 11 FL (ref 8.9–12.7)
POIKILOCYTOSIS BLD QL SMEAR: PRESENT
POTASSIUM SERPL-SCNC: 4.1 MMOL/L (ref 3.5–5.3)
PROCALCITONIN SERPL-MCNC: 0.5 NG/ML
PROT SERPL-MCNC: 7.6 G/DL (ref 6.4–8.4)
PROT UR STRIP-MCNC: NEGATIVE MG/DL
PROTHROMBIN TIME: 13.8 SECONDS (ref 12.3–15)
RBC # BLD AUTO: 2.96 MILLION/UL (ref 3.81–5.12)
RBC MORPH BLD: PRESENT
RH BLD: POSITIVE
SODIUM SERPL-SCNC: 141 MMOL/L (ref 135–147)
SP GR UR STRIP.AUTO: 1.02 (ref 1–1.03)
SPECIMEN EXPIRATION DATE: NORMAL
UROBILINOGEN UR STRIP-ACNC: <2 MG/DL
WBC # BLD AUTO: 1.48 THOUSAND/UL (ref 4.31–10.16)

## 2025-02-28 PROCEDURE — 83605 ASSAY OF LACTIC ACID: CPT

## 2025-02-28 PROCEDURE — 73502 X-RAY EXAM HIP UNI 2-3 VIEWS: CPT

## 2025-02-28 PROCEDURE — 80053 COMPREHEN METABOLIC PANEL: CPT

## 2025-02-28 PROCEDURE — 99291 CRITICAL CARE FIRST HOUR: CPT | Performed by: EMERGENCY MEDICINE

## 2025-02-28 PROCEDURE — 85610 PROTHROMBIN TIME: CPT

## 2025-02-28 PROCEDURE — 84484 ASSAY OF TROPONIN QUANT: CPT

## 2025-02-28 PROCEDURE — 87077 CULTURE AEROBIC IDENTIFY: CPT

## 2025-02-28 PROCEDURE — 96375 TX/PRO/DX INJ NEW DRUG ADDON: CPT

## 2025-02-28 PROCEDURE — 99223 1ST HOSP IP/OBS HIGH 75: CPT

## 2025-02-28 PROCEDURE — 86901 BLOOD TYPING SEROLOGIC RH(D): CPT

## 2025-02-28 PROCEDURE — 83735 ASSAY OF MAGNESIUM: CPT

## 2025-02-28 PROCEDURE — 86922 COMPATIBILITY TEST ANTIGLOB: CPT

## 2025-02-28 PROCEDURE — 86850 RBC ANTIBODY SCREEN: CPT

## 2025-02-28 PROCEDURE — 87154 CUL TYP ID BLD PTHGN 6+ TRGT: CPT

## 2025-02-28 PROCEDURE — 86900 BLOOD TYPING SEROLOGIC ABO: CPT

## 2025-02-28 PROCEDURE — 36415 COLL VENOUS BLD VENIPUNCTURE: CPT

## 2025-02-28 PROCEDURE — 84145 PROCALCITONIN (PCT): CPT

## 2025-02-28 PROCEDURE — 71045 X-RAY EXAM CHEST 1 VIEW: CPT

## 2025-02-28 PROCEDURE — 82948 REAGENT STRIP/BLOOD GLUCOSE: CPT

## 2025-02-28 PROCEDURE — 27265 TREAT HIP DISLOCATION: CPT | Performed by: PHYSICIAN ASSISTANT

## 2025-02-28 PROCEDURE — 99285 EMERGENCY DEPT VISIT HI MDM: CPT

## 2025-02-28 PROCEDURE — 96376 TX/PRO/DX INJ SAME DRUG ADON: CPT

## 2025-02-28 PROCEDURE — 96361 HYDRATE IV INFUSION ADD-ON: CPT

## 2025-02-28 PROCEDURE — 83036 HEMOGLOBIN GLYCOSYLATED A1C: CPT

## 2025-02-28 PROCEDURE — 96365 THER/PROPH/DIAG IV INF INIT: CPT

## 2025-02-28 PROCEDURE — 97763 ORTHC/PROSTC MGMT SBSQ ENC: CPT

## 2025-02-28 PROCEDURE — 85730 THROMBOPLASTIN TIME PARTIAL: CPT

## 2025-02-28 PROCEDURE — 81003 URINALYSIS AUTO W/O SCOPE: CPT

## 2025-02-28 PROCEDURE — 87040 BLOOD CULTURE FOR BACTERIA: CPT

## 2025-02-28 PROCEDURE — 86921 COMPATIBILITY TEST INCUBATE: CPT

## 2025-02-28 PROCEDURE — 85007 BL SMEAR W/DIFF WBC COUNT: CPT

## 2025-02-28 PROCEDURE — 96368 THER/DIAG CONCURRENT INF: CPT

## 2025-02-28 PROCEDURE — 99222 1ST HOSP IP/OBS MODERATE 55: CPT | Performed by: PHYSICIAN ASSISTANT

## 2025-02-28 PROCEDURE — 93005 ELECTROCARDIOGRAM TRACING: CPT

## 2025-02-28 PROCEDURE — 72170 X-RAY EXAM OF PELVIS: CPT

## 2025-02-28 PROCEDURE — 85027 COMPLETE CBC AUTOMATED: CPT

## 2025-02-28 PROCEDURE — 96367 TX/PROPH/DG ADDL SEQ IV INF: CPT

## 2025-02-28 PROCEDURE — 96366 THER/PROPH/DIAG IV INF ADDON: CPT

## 2025-02-28 RX ORDER — MIDODRINE HYDROCHLORIDE 2.5 MG/1
2.5 TABLET ORAL
Status: DISCONTINUED | OUTPATIENT
Start: 2025-03-01 | End: 2025-03-04 | Stop reason: HOSPADM

## 2025-02-28 RX ORDER — PREDNISONE 1 MG/1
3 TABLET ORAL DAILY
Status: DISCONTINUED | OUTPATIENT
Start: 2025-02-28 | End: 2025-03-04 | Stop reason: HOSPADM

## 2025-02-28 RX ORDER — ANASTROZOLE 1 MG/1
1 TABLET ORAL DAILY
Status: DISCONTINUED | OUTPATIENT
Start: 2025-02-28 | End: 2025-03-04 | Stop reason: HOSPADM

## 2025-02-28 RX ORDER — METOPROLOL SUCCINATE 25 MG/1
12.5 TABLET, EXTENDED RELEASE ORAL DAILY
Status: DISCONTINUED | OUTPATIENT
Start: 2025-02-28 | End: 2025-03-04 | Stop reason: HOSPADM

## 2025-02-28 RX ORDER — MIDODRINE HYDROCHLORIDE 2.5 MG/1
2.5 TABLET ORAL
Status: DISCONTINUED | OUTPATIENT
Start: 2025-02-28 | End: 2025-02-28

## 2025-02-28 RX ORDER — FENTANYL CITRATE 50 UG/ML
50 INJECTION, SOLUTION INTRAMUSCULAR; INTRAVENOUS ONCE
Refills: 0 | Status: COMPLETED | OUTPATIENT
Start: 2025-02-28 | End: 2025-02-28

## 2025-02-28 RX ORDER — ONDANSETRON 2 MG/ML
4 INJECTION INTRAMUSCULAR; INTRAVENOUS ONCE
Status: COMPLETED | OUTPATIENT
Start: 2025-02-28 | End: 2025-02-28

## 2025-02-28 RX ORDER — ONDANSETRON 2 MG/ML
4 INJECTION INTRAMUSCULAR; INTRAVENOUS EVERY 4 HOURS PRN
Status: DISCONTINUED | OUTPATIENT
Start: 2025-02-28 | End: 2025-03-04 | Stop reason: HOSPADM

## 2025-02-28 RX ORDER — HEPARIN SODIUM 5000 [USP'U]/ML
5000 INJECTION, SOLUTION INTRAVENOUS; SUBCUTANEOUS EVERY 8 HOURS SCHEDULED
Status: DISCONTINUED | OUTPATIENT
Start: 2025-02-28 | End: 2025-03-04 | Stop reason: HOSPADM

## 2025-02-28 RX ORDER — CITALOPRAM HYDROBROMIDE 20 MG/1
20 TABLET ORAL DAILY
Status: DISCONTINUED | OUTPATIENT
Start: 2025-02-28 | End: 2025-03-04 | Stop reason: HOSPADM

## 2025-02-28 RX ORDER — ACETAMINOPHEN 10 MG/ML
1000 INJECTION, SOLUTION INTRAVENOUS EVERY 6 HOURS PRN
Status: DISCONTINUED | OUTPATIENT
Start: 2025-02-28 | End: 2025-03-03

## 2025-02-28 RX ORDER — CHLORHEXIDINE GLUCONATE ORAL RINSE 1.2 MG/ML
15 SOLUTION DENTAL EVERY 12 HOURS SCHEDULED
Status: DISCONTINUED | OUTPATIENT
Start: 2025-02-28 | End: 2025-03-04 | Stop reason: HOSPADM

## 2025-02-28 RX ORDER — KETAMINE HCL IN NACL, ISO-OSM 100MG/10ML
100 SYRINGE (ML) INJECTION ONCE
Status: COMPLETED | OUTPATIENT
Start: 2025-02-28 | End: 2025-02-28

## 2025-02-28 RX ORDER — HYDROMORPHONE HCL IN WATER/PF 6 MG/30 ML
0.2 PATIENT CONTROLLED ANALGESIA SYRINGE INTRAVENOUS EVERY 2 HOUR PRN
Refills: 0 | Status: DISCONTINUED | OUTPATIENT
Start: 2025-02-28 | End: 2025-03-04 | Stop reason: HOSPADM

## 2025-02-28 RX ORDER — SODIUM CHLORIDE, SODIUM GLUCONATE, SODIUM ACETATE, POTASSIUM CHLORIDE, MAGNESIUM CHLORIDE, SODIUM PHOSPHATE, DIBASIC, AND POTASSIUM PHOSPHATE .53; .5; .37; .037; .03; .012; .00082 G/100ML; G/100ML; G/100ML; G/100ML; G/100ML; G/100ML; G/100ML
75 INJECTION, SOLUTION INTRAVENOUS CONTINUOUS
Status: DISCONTINUED | OUTPATIENT
Start: 2025-02-28 | End: 2025-03-02

## 2025-02-28 RX ORDER — KETAMINE HCL IN NACL, ISO-OSM 100MG/10ML
50 SYRINGE (ML) INJECTION ONCE
Status: COMPLETED | OUTPATIENT
Start: 2025-02-28 | End: 2025-02-28

## 2025-02-28 RX ORDER — METRONIDAZOLE 500 MG/100ML
500 INJECTION, SOLUTION INTRAVENOUS ONCE
Status: COMPLETED | OUTPATIENT
Start: 2025-02-28 | End: 2025-02-28

## 2025-02-28 RX ORDER — SODIUM CHLORIDE, SODIUM GLUCONATE, SODIUM ACETATE, POTASSIUM CHLORIDE, MAGNESIUM CHLORIDE, SODIUM PHOSPHATE, DIBASIC, AND POTASSIUM PHOSPHATE .53; .5; .37; .037; .03; .012; .00082 G/100ML; G/100ML; G/100ML; G/100ML; G/100ML; G/100ML; G/100ML
1000 INJECTION, SOLUTION INTRAVENOUS ONCE
Status: COMPLETED | OUTPATIENT
Start: 2025-02-28 | End: 2025-02-28

## 2025-02-28 RX ORDER — ATORVASTATIN CALCIUM 10 MG/1
10 TABLET, FILM COATED ORAL DAILY
Status: DISCONTINUED | OUTPATIENT
Start: 2025-02-28 | End: 2025-03-04 | Stop reason: HOSPADM

## 2025-02-28 RX ORDER — NOREPINEPHRINE BITARTRATE 1 MG/ML
INJECTION, SOLUTION INTRAVENOUS
Status: DISCONTINUED
Start: 2025-02-28 | End: 2025-02-28 | Stop reason: WASHOUT

## 2025-02-28 RX ORDER — MAGNESIUM SULFATE HEPTAHYDRATE 40 MG/ML
2 INJECTION, SOLUTION INTRAVENOUS ONCE
Status: COMPLETED | OUTPATIENT
Start: 2025-02-28 | End: 2025-02-28

## 2025-02-28 RX ORDER — ACYCLOVIR 200 MG/1
200 CAPSULE ORAL 2 TIMES DAILY
Status: DISCONTINUED | OUTPATIENT
Start: 2025-02-28 | End: 2025-03-04 | Stop reason: HOSPADM

## 2025-02-28 RX ORDER — ACYCLOVIR 200 MG/1
1 CAPSULE ORAL 2 TIMES DAILY
COMMUNITY
Start: 2024-12-31

## 2025-02-28 RX ORDER — LIDOCAINE HYDROCHLORIDE 20 MG/ML
JELLY TOPICAL ONCE
Status: COMPLETED | OUTPATIENT
Start: 2025-02-28 | End: 2025-02-28

## 2025-02-28 RX ORDER — ETOMIDATE 2 MG/ML
20 INJECTION INTRAVENOUS ONCE
Status: DISCONTINUED | OUTPATIENT
Start: 2025-02-28 | End: 2025-02-28

## 2025-02-28 RX ORDER — LIDOCAINE 50 MG/G
1 PATCH TOPICAL DAILY
Status: DISCONTINUED | OUTPATIENT
Start: 2025-02-28 | End: 2025-03-04 | Stop reason: HOSPADM

## 2025-02-28 RX ORDER — VECURONIUM BROMIDE 1 MG/ML
10 INJECTION, POWDER, LYOPHILIZED, FOR SOLUTION INTRAVENOUS ONCE
Status: DISCONTINUED | OUTPATIENT
Start: 2025-02-28 | End: 2025-02-28

## 2025-02-28 RX ORDER — INSULIN LISPRO 100 [IU]/ML
1-6 INJECTION, SOLUTION INTRAVENOUS; SUBCUTANEOUS
Status: DISCONTINUED | OUTPATIENT
Start: 2025-02-28 | End: 2025-03-04 | Stop reason: HOSPADM

## 2025-02-28 RX ADMIN — HEPARIN SODIUM 5000 UNITS: 5000 INJECTION INTRAVENOUS; SUBCUTANEOUS at 17:06

## 2025-02-28 RX ADMIN — Medication 50 MG: at 11:19

## 2025-02-28 RX ADMIN — HEPARIN SODIUM 5000 UNITS: 5000 INJECTION INTRAVENOUS; SUBCUTANEOUS at 21:58

## 2025-02-28 RX ADMIN — PREDNISONE 3 MG: 1 TABLET ORAL at 17:52

## 2025-02-28 RX ADMIN — CHOLECALCIFEROL (VITAMIN D3) 10 MCG (400 UNIT) TABLET 400 UNITS: at 17:52

## 2025-02-28 RX ADMIN — CHLORHEXIDINE GLUCONATE 15 ML: 1.2 RINSE ORAL at 20:34

## 2025-02-28 RX ADMIN — ACYCLOVIR 200 MG: 200 CAPSULE ORAL at 20:33

## 2025-02-28 RX ADMIN — CITALOPRAM HYDROBROMIDE 20 MG: 20 TABLET ORAL at 17:52

## 2025-02-28 RX ADMIN — ANASTROZOLE 1 MG: 1 TABLET, COATED ORAL at 17:03

## 2025-02-28 RX ADMIN — ATORVASTATIN CALCIUM 10 MG: 10 TABLET, FILM COATED ORAL at 17:52

## 2025-02-28 RX ADMIN — SODIUM CHLORIDE 1000 ML: 0.9 INJECTION, SOLUTION INTRAVENOUS at 08:15

## 2025-02-28 RX ADMIN — ONDANSETRON 4 MG: 2 INJECTION, SOLUTION INTRAMUSCULAR; INTRAVENOUS at 07:35

## 2025-02-28 RX ADMIN — SODIUM CHLORIDE, SODIUM GLUCONATE, SODIUM ACETATE, POTASSIUM CHLORIDE, MAGNESIUM CHLORIDE, SODIUM PHOSPHATE, DIBASIC, AND POTASSIUM PHOSPHATE 100 ML/HR: .53; .5; .37; .037; .03; .012; .00082 INJECTION, SOLUTION INTRAVENOUS at 17:03

## 2025-02-28 RX ADMIN — SODIUM CHLORIDE 1000 ML: 0.9 INJECTION, SOLUTION INTRAVENOUS at 07:34

## 2025-02-28 RX ADMIN — CEFEPIME 2000 MG: 2 INJECTION, POWDER, FOR SOLUTION INTRAVENOUS at 08:57

## 2025-02-28 RX ADMIN — MAGNESIUM SULFATE HEPTAHYDRATE 2 G: 40 INJECTION, SOLUTION INTRAVENOUS at 08:27

## 2025-02-28 RX ADMIN — LIDOCAINE HYDROCHLORIDE 5 APPLICATION: 20 JELLY TOPICAL at 08:34

## 2025-02-28 RX ADMIN — FENTANYL CITRATE 50 MCG: 50 INJECTION INTRAMUSCULAR; INTRAVENOUS at 09:36

## 2025-02-28 RX ADMIN — LIDOCAINE 1 PATCH: 50 PATCH CUTANEOUS at 09:43

## 2025-02-28 RX ADMIN — SODIUM CHLORIDE 500 ML: 0.9 INJECTION, SOLUTION INTRAVENOUS at 09:58

## 2025-02-28 RX ADMIN — METRONIDAZOLE 500 MG: 500 INJECTION, SOLUTION INTRAVENOUS at 09:36

## 2025-02-28 RX ADMIN — SODIUM CHLORIDE 500 ML: 0.9 INJECTION, SOLUTION INTRAVENOUS at 12:00

## 2025-02-28 RX ADMIN — FENTANYL CITRATE 50 MCG: 50 INJECTION INTRAMUSCULAR; INTRAVENOUS at 07:36

## 2025-02-28 RX ADMIN — Medication 1 TABLET: at 17:04

## 2025-02-28 RX ADMIN — NOREPINEPHRINE BITARTRATE 2 MCG/MIN: 1 INJECTION INTRAVENOUS at 12:37

## 2025-02-28 RX ADMIN — FENTANYL CITRATE 50 MCG: 50 INJECTION INTRAMUSCULAR; INTRAVENOUS at 11:17

## 2025-02-28 RX ADMIN — MIDODRINE HYDROCHLORIDE 2.5 MG: 2.5 TABLET ORAL at 14:41

## 2025-02-28 RX ADMIN — Medication 100 MG: at 11:00

## 2025-02-28 RX ADMIN — SODIUM CHLORIDE, SODIUM GLUCONATE, SODIUM ACETATE, POTASSIUM CHLORIDE, MAGNESIUM CHLORIDE, SODIUM PHOSPHATE, DIBASIC, AND POTASSIUM PHOSPHATE 1000 ML: .53; .5; .37; .037; .03; .012; .00082 INJECTION, SOLUTION INTRAVENOUS at 13:11

## 2025-02-28 NOTE — CONSULTS
"Consultation - Orthopedics   Name: Pretty Aguila 74 y.o. female I MRN: [id number]  Unit/Bed#: ED-04 I Date of Admission: 2/28/2025   Date of Service: 2/28/2025 I Hospital Day: 0   Inpatient consult to Orthopedic Surgery  Consult performed by: Kitty Becerril PA-C  Consult ordered by: Raúl Villeda MD        Physician Requesting Evaluation: Raúl Villeda MD   Reason for Evaluation / Principal Problem: left hip dislocation     Assessment & Plan  Closed dislocation of left hip (HCC)  Closed prosthetic hip dislocation s/p reduction by orthopedics in the ER.   Abduction pillow while in bed.   Abduction brace while ambulating.   No immediate orthopedic intervention indicated at this time.   Weight bearing as tolerated.   Pain control per primary team.   PT/OT.   Medical management per primary team.   Recommend outpatient follow up with total joint/adult reconstruction upon discharge for consideration towards revision arthroplasty.   I have discussed the above management plan in detail with the primary service.   Ok for discharge from Orthopedics service perspective.  Orthopedics service signing off.  Case reviewed and discussed with Dr. Thornton.     History of Present Illness   HPI: Pretty Aguila is a 74 y.o. year old female with multiple medical problems including CAD, breast cancer, DM, depression, arthritis, hyperlipidemia, HTN, hx prior bilateral hip arthroplasties, left performed many years ago at an outside faicility who presents with nausea/vomiting, diarrhea, currently undergoing sepsis workup, who has also been found to have a left hip dislocation for which orthopedics has been engaged. At time of consultation she reports that she leaned over this AM and felt her hip dislocate. She notes that this has happened several times before, and that she often \"puts them back in by herself\" at home.  She currently complains of significant pain to the left hip. She has no pain in any other " location.   No other concerns regarding her hip.   Has not followed up with hip surgeons about revision.     Review of Systems   Musculoskeletal:  Positive for arthralgias and joint swelling.   All other systems reviewed and are negative.   significant for findings described in the HPI.  Historical Information   Past Medical History:   Diagnosis Date    Allergic     Anxiety     Arthritis     Breast cancer (HCC) 09/2023    CAD (coronary artery disease)     Coronary artery disease [id number]    Depression     Diabetes mellitus (HCC) 5/1/24    Mother-Diabetic    Dyslipidemia     Hiatal hernia     Hyperlipidemia     Hypertension     Obesity     Obesity (BMI 30-39.9)     Psoriatic arthritis (HCC)     Rheumatoid arthritis (HCC)     Scoliosis     SOB (shortness of breath)      Past Surgical History:   Procedure Laterality Date    BARIATRIC SURGERY  2014    BILE DUCT EXPLORATION      replacement per Steffanie    BREAST BIOPSY  9/23    CARPAL TUNNEL RELEASE Bilateral 2002    CORONARY ARTERY BYPASS GRAFT  2017    FOOT SURGERY Right     bone surgery to straighten toe.    HIP SURGERY Left 2015    IR BIOPSY BONE MARROW  12/11/2024    IR DRAINAGE TUBE PLACEMENT  12/19/2023    JOINT REPLACEMENT      LYMPH NODE BIOPSY Left 11/20/2023    Procedure: LEFT LYMPHATIC MAPPING WITH BLUE AND RADIOACTIVE DYES, SENTINEL LYMPH NODE BIOPSY;  Surgeon: Adrien Gabriel MD;  Location:  MAIN OR;  Service: Surgical Oncology    LYMPH NODE DISSECTION Left 11/20/2023    Procedure: POSSIBLE AXILLARY DISSECTION;  Surgeon: Adrien Gabriel MD;  Location:  MAIN OR;  Service: Surgical Oncology    MI BREAST REDUCTION Bilateral 01/16/2024    Procedure: RIGHT BREAST REDUCTION AND LEFT BREAST EXPANDER EXCHANGE FOR PERMANENT IMPLANT. REVISION OF RECONSTRUCTED LEFT BREAST.;  Surgeon: Molina Jimenez MD;  Location:  MAIN OR;  Service: Plastics    MI SUCTION ASSISTED LIPECTOMY TRUNK Bilateral 01/16/2024    Procedure: LIPOSUCTION BREAST;  Surgeon: Molina Jimenez MD;   Location: [facility];  Service: Plastics    NM TISSUE EXPANDER PLACEMENT BREAST RECONSTRUCTION Left 2023    Procedure: IMMEDIATE LEFT BREAST RECONSTRUCTION WITH TISSUE EXPANDER AND ADM;  Surgeon: Molina Jimenez MD;  Location: [facility];  Service: Plastics    REPLACEMENT TOTAL KNEE Right 2017    SIMPLE MASTECTOMY Left 2023    Procedure: LEFT BREAST VERENICE  DIRECTED MASTECTOMY;  Surgeon: Adrien Gabriel MD;  Location: [facility];  Service: Surgical Oncology    SLEEVE GASTROPLASTY      TONSILLECTOMY      TOTAL HIP ARTHROPLASTY Right 2017    US GUIDED BREAST BIOPSY LEFT COMPLETE Left 2023     Social History     Tobacco Use    Smoking status: Former     Current packs/day: 0.00     Average packs/day: 0.5 packs/day for 7.0 years (3.5 ttl pk-yrs)     Types: Cigarettes     Start date: 2010     Quit date: 2017     Years since quittin.1    Smokeless tobacco: Never    Tobacco comments:     Have already quit smoking in 2017   Vaping Use    Vaping status: Never Used   Substance and Sexual Activity    Alcohol use: Yes     Alcohol/week: 2.0 standard drinks of alcohol     Types: 2 Glasses of wine per week     Comment: social     Drug use: No    Sexual activity: Not Currently     Partners: Male     Birth control/protection: None     E-Cigarette/Vaping    E-Cigarette Use Never User      E-Cigarette/Vaping Substances    Nicotine No     THC No     CBD No     Flavoring No     Other No      Family history non-contributory    Objective :  Temp:  [98.1 °F (36.7 °C)] 98.1 °F (36.7 °C)  HR:  [106-157] 129  BP: ()/(55-77) 99/59  Resp:  [18-20] 18  SpO2:  [95 %-100 %] 100 %  O2 Device: Nasal cannula  Nasal Cannula O2 Flow Rate (L/min):  [6 L/min] 6 L/min  Physical Exam  Vitals and nursing note reviewed. Exam conducted with a chaperone present (ER team present at bedside).   Constitutional:       Appearance: Normal appearance.   HENT:      Head: Normocephalic and atraumatic.      Nose: Nose normal.       Mouth/Throat:      Mouth: Mucous membranes are moist.      Pharynx: Oropharynx is clear.   Eyes:      Conjunctiva/sclera: Conjunctivae normal.      Pupils: Pupils are equal, round, and reactive to light.   Cardiovascular:      Rate and Rhythm: Tachycardia present.   Pulmonary:      Effort: Pulmonary effort is normal.   Abdominal:      General: Abdomen is flat.   Musculoskeletal:      Cervical back: Normal range of motion.      Comments: Musculoskeletal: bilateral upper extremity  Skin dry and intact, no erythema or ecchymosis  No ttp over bilateral clavicles, shoulders, upper arms, elbows, forearms, wrists and hands.   Moving upper extremities without difficulty.   Motor intact to radial, ulnar, median, musculocutaneous, and axillary nerve distributions  SILT m/r/u.   Motor intact ain/pin/m/r/u  2+ radial and ulnar pulse  Musculature is soft and compressible, no pain with passive stretch    Musculoskeletal: bilateral lower extremity  Skin dry and intact, no erythema or ecchymosis  TTP over the left hip with visible deformity.   No ttp over the right hip, bilateral femurs, knees, lower legs, ankles or feet.   SILT s/s/sp/dp/t.   + ankle dorsi/plantar flexion, EHL/FHL  2+ DP/PT pulse  Musculature is soft and compressible, no pain with passive stretch  Left leg shortened.     Tertiary: all other noninjured extremities were palpated and ranged looking for secondary injuries. Patient had no pain with range of motion of her other major joints. No tenderness over all other joints/long bones as except already stated.       Skin:     General: Skin is warm and dry.      Capillary Refill: Capillary refill takes less than 2 seconds.   Neurological:      General: No focal deficit present.      Mental Status: She is alert and oriented to person, place, and time.   Psychiatric:         Mood and Affect: Mood normal.         Behavior: Behavior normal.         Thought Content: Thought content normal.         Judgment: Judgment  "normal.           Lab Results: I have reviewed the following results:   Recent Labs     02/25/25  1157 02/25/25  1703 02/27/25  0459 02/28/25  0650   WBC 3.85*  --  3.51* 1.48*   HGB 10.0*  --  7.2* 9.9*   HCT 30.0*  --  22.1* 30.3*      < > 130* 146*   BANDSPCT  --   --   --  1   BUN 23  --  23 29*   CREATININE 0.83  --  0.68 0.82   PTT 22*  --   --  21*   INR 0.94  --   --  0.99    < > = values in this interval not displayed.     Blood Culture:   Lab Results   Component Value Date    BLOODCX No Growth After 5 Days. 02/09/2025    BLOODCX No Growth After 5 Days. 02/09/2025     Wound Culture: No results found for: \"WOUNDCULT\"    Imaging Results Review: I personally reviewed the following image studies in PACS and associated radiology reports: XRAY pelvis. My interpretation of the radiology images/reports is: left hip dislocation.  Other Study Results Review: No additional pertinent studies reviewed.    Procedure: left hip reduction  Once adequate analgesia was provided, a gentle closed reduction maneuver was performed and a palpable clunk was heard/felt. Post reduction films revealed reduction of left hip dislocation. Patient was neurovascularly intact both pre and post procedure. Dr. Thornton was present and performed reduction.   "

## 2025-02-28 NOTE — OCCUPATIONAL THERAPY NOTE
Occupational Therapy Orthotic Note        Patient Name: Pretty Aguila  Today's Date: 2/28/2025 02/28/25 1340   OT Last Visit   OT Visit Date 02/28/25   Note Type   Note type Orthotic Management/Training   Additional Comments Pt measured and fit for L hip abduction brace while patient in sitting.  Per ortho RAJ Tang's orders 0-45* forward flexion- ROM dial set to correct setting. Pt and RN Alivia provided with education on safe techniques for don/doffing brace. Pt requires mod A assistance for donning/doffing brace at this time, as well as min VC for instruction/carryover.   Restrictions/Precautions   Weight Bearing Precautions Per Order Yes   LLE Weight Bearing Per Order (S)  WBAT  (posterior hip precautions)   Other Precautions   (hip abduction wedge)   Type of Brace   Brace Applied (S)  Hip Abduction Brace  (to be worn only when OOB. While supine in bed to have hip abduction wedge in place)   Patient Position When Brace Applied Seated     Pt would benefit from formal OT/PT evaluation for education on THPs, functional activities, functional mobility and use of LH AE. Messagefausto Tang for placing therapy orders          Shilpa Cavanaugh MS, OTR/L

## 2025-02-28 NOTE — SEPSIS NOTE
"  Sepsis Note   Pretty Aguila 74 y.o. female MRN: [id number]  Unit/Bed#: ED-04 Encounter: [id number]       Initial Sepsis Screening       Row Name 02/28/25 1004 02/28/25 0845             Is the patient's history suggestive of a new or worsening infection? Yes (Proceed)  -CL Yes (Proceed)  -GS       Suspected source of infection suspect infection, source unknown  -CL suspect infection, source unknown  -GS       Indicate SIRS criteria Tachycardia > 90 bpm;Leukocytosis (WBC > 54216 IJL) OR Leukopenia (WBC <4000 IJL) OR Bandemia (WBC >10% bands)  -CL Tachycardia > 90 bpm;Leukocytosis (WBC > 75663 IJL) OR Leukopenia (WBC <4000 IJL) OR Bandemia (WBC >10% bands)  -GS       Are two or more of the above signs & symptoms of infection both present and new to the patient? Yes (Proceed)  -CL Yes (Proceed)  -GS       Assess for evidence of organ dysfunction: Are any of the below criteria present within 6 hours of suspected infection and SIRS criteria that are NOT considered to be chronic conditions? Lactate > 2.0  -CL Lactate > 2.0  -GS       Date of presentation of severe sepsis 02/28/25  -CL 02/28/25  -GS       Time of presentation of severe sepsis 1004  -CL 0842  -GS       Sepsis Note: Click \"NEXT\" below (NOT \"close\") to generate sepsis note based on above information. YES (proceed by clicking \"NEXT\")  -CL YES (proceed by clicking \"NEXT\")  -GS                 User Key  (r) = Recorded By, (t) = Taken By, (c) = Cosigned By      Initials Name Provider Type    CL Raúl Villeda MD Physician     Rosy Goins MD Resident                    Default Flowsheet Data (Last 720 Hours)       Sepsis Reassess       Row Name 02/28/25 1001                   Repeat Volume Status and Tissue Perfusion Assessment Performed    Date of Reassessment: 02/28/25  -        Time of Reassessment: 1001  -GS        Sepsis Reassessment Note: Click \"NEXT\" below (NOT \"close\") to generate sepsis reassessment note. YES (proceed by clicking " "\"NEXT\")  -        Repeat Volume Status and Tissue Perfusion Assessment Performed --                  User Key  (r) = Recorded By, (t) = Taken By, (c) = Cosigned By      Initials Name Provider Type    GS Rosy Goins MD Resident                    There is no height or weight on file to calculate BMI.  Wt Readings from Last 1 Encounters:   02/26/25 77.6 kg (171 lb)        Ideal body weight: 50.1 kg (110 lb 7.2 oz)  Adjusted ideal body weight: 61.1 kg (134 lb 10.7 oz)    "

## 2025-02-28 NOTE — SEPSIS NOTE
"  Sepsis Note   Pretty Aguila 74 y.o. female MRN: [id number]  Unit/Bed#: ED-04 Encounter: [id number]       Initial Sepsis Screening       Row Name 02/28/25 1229 02/28/25 1004 02/28/25 0845          Is the patient's history suggestive of a new or worsening infection? Yes (Proceed)  -CL Yes (Proceed)  -CL Yes (Proceed)  -GS      Suspected source of infection suspect infection, source unknown  -CL suspect infection, source unknown  -CL suspect infection, source unknown  -GS      Indicate SIRS criteria Tachycardia > 90 bpm;Leukocytosis (WBC > 24567 IJL) OR Leukopenia (WBC <4000 IJL) OR Bandemia (WBC >10% bands)  -CL Tachycardia > 90 bpm;Leukocytosis (WBC > 14570 IJL) OR Leukopenia (WBC <4000 IJL) OR Bandemia (WBC >10% bands)  -CL Tachycardia > 90 bpm;Leukocytosis (WBC > 55868 IJL) OR Leukopenia (WBC <4000 IJL) OR Bandemia (WBC >10% bands)  -GS      Are two or more of the above signs & symptoms of infection both present and new to the patient? Yes (Proceed)  -CL Yes (Proceed)  -CL Yes (Proceed)  -GS      Assess for evidence of organ dysfunction: Are any of the below criteria present within 6 hours of suspected infection and SIRS criteria that are NOT considered to be chronic conditions? SBP < 90  -CL Lactate > 2.0  -CL Lactate > 2.0  -GS      Date of presentation of severe sepsis -- 02/28/25  -CL 02/28/25  -GS      Time of presentation of severe sepsis -- 1004  -CL 0842  -GS      Date of presentation of septic shock 02/28/25  -CL -- --      Time of presentation of septic shock 1229  -CL -- --      Fluid Resuscitation: 30 ml/kg IV fluid bolus will be given based on actual body weight  -CL -- --      Is the patient is persistently hypotensive in the hour after fluid bolus administration? If yes, patient meets criteria for vasopressor use. YES  -CL -- --      Sepsis Note: Click \"NEXT\" below (NOT \"close\") to generate sepsis note based on above information. YES (proceed by clicking \"NEXT\")  -CL YES (proceed by clicking " "\"NEXT\")  -CL YES (proceed by clicking \"NEXT\")  -GS                User Key  (r) = Recorded By, (t) = Taken By, (c) = Cosigned By      Initials Name Provider Type    CL Raúl Villeda MD Physician     Rosy Goins MD Resident                    Default Flowsheet Data (Last 720 Hours)       Sepsis Reassess       Row Name 02/28/25 1222 02/28/25 1005 02/28/25 1001             Repeat Volume Status and Tissue Perfusion Assessment Performed    Date of Reassessment: 02/28/25  -GS 02/28/25  -CL 02/28/25  -GS      Time of Reassessment: 1222  -GS 1005  -CL 1001  -GS      Sepsis Reassessment Note: Click \"NEXT\" below (NOT \"close\") to generate sepsis reassessment note. YES (proceed by clicking \"NEXT\")  -GS YES (proceed by clicking \"NEXT\")  -CL YES (proceed by clicking \"NEXT\")  -GS      Repeat Volume Status and Tissue Perfusion Assessment Performed -- -- --                User Key  (r) = Recorded By, (t) = Taken By, (c) = Cosigned By      Initials Name Provider Type    SHANE Villeda MD Physician     Rosy Goins MD Resident                    Body mass index is 33.87 kg/m².  Wt Readings from Last 1 Encounters:   02/28/25 84 kg (185 lb 3 oz)        Ideal body weight: 50.1 kg (110 lb 7.2 oz)  Adjusted ideal body weight: 63.7 kg (140 lb 5.5 oz)    "

## 2025-02-28 NOTE — SEPSIS NOTE
"  Sepsis Note   Pretty Aguila 74 y.o. female MRN: [id number]  Unit/Bed#: ED-04 Encounter: [id number]       Initial Sepsis Screening       Row Name 02/28/25 0845                Is the patient's history suggestive of a new or worsening infection? Yes (Proceed)  -GS        Suspected source of infection suspect infection, source unknown  -GS        Indicate SIRS criteria Tachycardia > 90 bpm;Leukocytosis (WBC > 98697 IJL) OR Leukopenia (WBC <4000 IJL) OR Bandemia (WBC >10% bands)  -GS        Are two or more of the above signs & symptoms of infection both present and new to the patient? Yes (Proceed)  -GS        Assess for evidence of organ dysfunction: Are any of the below criteria present within 6 hours of suspected infection and SIRS criteria that are NOT considered to be chronic conditions? Lactate > 2.0  -GS        Date of presentation of severe sepsis 02/28/25  -        Time of presentation of severe sepsis 0842  -GS        Sepsis Note: Click \"NEXT\" below (NOT \"close\") to generate sepsis note based on above information. YES (proceed by clicking \"NEXT\")  -GS                  User Key  (r) = Recorded By, (t) = Taken By, (c) = Cosigned By      Initials Name Provider Type     Rosy Goins MD Resident                    Default Flowsheet Data (Last 720 Hours)       Sepsis Reassess       Row Name 02/28/25 1001                   Repeat Volume Status and Tissue Perfusion Assessment Performed    Date of Reassessment: 02/28/25  -        Time of Reassessment: 1001  -GS        Sepsis Reassessment Note: Click \"NEXT\" below (NOT \"close\") to generate sepsis reassessment note. YES (proceed by clicking \"NEXT\")  -GS        Repeat Volume Status and Tissue Perfusion Assessment Performed --                  User Key  (r) = Recorded By, (t) = Taken By, (c) = Cosigned By      Initials Name Provider Type     Rosy Goins MD Resident                    There is no height or weight on file to calculate BMI.  Wt Readings " from Last 1 Encounters:   02/26/25 77.6 kg (171 lb)        Ideal body weight: 50.1 kg (110 lb 7.2 oz)  Adjusted ideal body weight: 61.1 kg (134 lb 10.7 oz)

## 2025-02-28 NOTE — ASSESSMENT & PLAN NOTE
Closed prosthetic hip dislocation s/p reduction by orthopedics in the ER.   Abduction pillow while in bed.   Abduction brace while ambulating.   No immediate orthopedic intervention indicated at this time.   Weight bearing as tolerated.   Pain control per primary team.   PT/OT.   Medical management per primary team.   Recommend outpatient follow up with total joint/adult reconstruction upon discharge for consideration towards revision arthroplasty.

## 2025-02-28 NOTE — SEPSIS NOTE
"  Sepsis Note   Pretty Aguila 74 y.o. female MRN: [id number]  Unit/Bed#: ED-04 Encounter: [id number]       Initial Sepsis Screening       Row Name 02/28/25 1004 02/28/25 0845             Is the patient's history suggestive of a new or worsening infection? Yes (Proceed)  -CL Yes (Proceed)  -GS       Suspected source of infection suspect infection, source unknown  -CL suspect infection, source unknown  -GS       Indicate SIRS criteria Tachycardia > 90 bpm;Leukocytosis (WBC > 17757 IJL) OR Leukopenia (WBC <4000 IJL) OR Bandemia (WBC >10% bands)  -CL Tachycardia > 90 bpm;Leukocytosis (WBC > 32514 IJL) OR Leukopenia (WBC <4000 IJL) OR Bandemia (WBC >10% bands)  -GS       Are two or more of the above signs & symptoms of infection both present and new to the patient? Yes (Proceed)  -CL Yes (Proceed)  -GS       Assess for evidence of organ dysfunction: Are any of the below criteria present within 6 hours of suspected infection and SIRS criteria that are NOT considered to be chronic conditions? Lactate > 2.0  -CL Lactate > 2.0  -GS       Date of presentation of severe sepsis 02/28/25  -CL 02/28/25  -GS       Time of presentation of severe sepsis 1004  -CL 0842  -GS       Sepsis Note: Click \"NEXT\" below (NOT \"close\") to generate sepsis note based on above information. YES (proceed by clicking \"NEXT\")  -CL YES (proceed by clicking \"NEXT\")  -GS                 User Key  (r) = Recorded By, (t) = Taken By, (c) = Cosigned By      Initials Name Provider Type    CL Raúl Villeda MD Physician     Rosy Goins MD Resident                    Default Flowsheet Data (Last 720 Hours)       Sepsis Reassess       Row Name 02/28/25 1005 02/28/25 1001                Repeat Volume Status and Tissue Perfusion Assessment Performed    Date of Reassessment: 02/28/25  -CL 02/28/25  -GS       Time of Reassessment: 1005  -CL 1001  -GS       Sepsis Reassessment Note: Click \"NEXT\" below (NOT \"close\") to generate sepsis reassessment " "note. YES (proceed by clicking \"NEXT\")  -CL YES (proceed by clicking \"NEXT\")  -GS       Repeat Volume Status and Tissue Perfusion Assessment Performed -- --                 User Key  (r) = Recorded By, (t) = Taken By, (c) = Cosigned By      Initials Name Provider Type    CL Raúl Villeda MD Physician    GS Rosy Goins MD Resident                    There is no height or weight on file to calculate BMI.  Wt Readings from Last 1 Encounters:   02/26/25 77.6 kg (171 lb)        Ideal body weight: 50.1 kg (110 lb 7.2 oz)  Adjusted ideal body weight: 61.1 kg (134 lb 10.7 oz)    "

## 2025-02-28 NOTE — ASSESSMENT & PLAN NOTE
Met criteria with tachycardia, low WBC, suspected infection  Lactic acid 3.4, Pro-Jaron 0.51  Urine analysis negative for infection  Flu COVID antigen negative  Chest x-ray normal  X-ray hip left shows dislocation of hip arthroplasty  Has history of MDS on azacitidine, breast cancer on anastrozole, RA on prednisone 3 mg daily  Received 3 L of fluid, 1 dose of cefepime and metronidazole  Meeting sepsis criteria with some confounding factor like dehydration from diarrhea, low WBC count from chemotherapy, will rule out infection    Plan:  Pending stool enteric panel   Pending C. difficile  Monitor off antibiotic today  Trend procalcitonin tomorrow morning  Trend lactic acid until normalized   Continue continuous hydration with fluid 100 mL/h  If any worsening abdominal pain, diarrhea can consider CT abdomen pelvis

## 2025-02-28 NOTE — ED ATTENDING ATTESTATION
2/28/2025  I, Raúl Villeda MD, saw and evaluated the patient. I have discussed the patient with the resident/non-physician practitioner and agree with the resident's/non-physician practitioner's findings, Plan of Care, and MDM as documented in the resident's/non-physician practitioner's note, except where noted. All available labs and Radiology studies were reviewed.  I was present for key portions of any procedure(s) performed by the resident/non-physician practitioner and I was immediately available to provide assistance.       At this point I agree with the current assessment done in the Emergency Department.  I have conducted an independent evaluation of this patient a history and physical is as follows:    History    Patient is a 74-year-old female, with a history significant for left prosthetic hip, CAD, RA, multiple left hip dislocations per my review the medical record, who presents to the ED today, via EMS, for evaluation of left hip pain.  Patient states that she felt lightheaded/near syncopal this morning and has also been having diarrhea/nausea/vomiting.  The diarrhea is nonbloody and she denies history of A-fib/recent antibiotics but she was recently hospitalized.  She attempted to rush to the bathroom about an hour prior to arrival when she squatted past and felt her hip dislocate.  She attempted to self reduce it but was unsuccessful.  Patient denies new numbness since the event but does report generalized weakness.  Patient also describes a fever up to 101 this morning but she denies dysphagia, vision change, chest pain, abdominal pain, dyspnea.  Walking test exacerbates her symptoms.  No clear remitting factors.    Notably, from review of medical record, patient was admitted from 2/25 to 2/27 for near syncope and myelodysplastic syndrome.  Patient follows with [facility] and deemed not a candidate for bone marrow transplant.    Patient is without other concerns at this time.      ROS  Patient denies: Fever; dysphagia; vision change; chest pain;  abdominal pain; polyuria; dysuria; rash; numbness; confusion    Physical Exam    GENERAL APPEARANCE: Ill-appearing, uncomfortable appearing  NEURO: Patient is speaking clearly in complete sentences.  Patient is answering appropriately and able follow commands.  Patient is moving all four extremities spontaneously.  No facial droop.  Tongue midline.  Intact sensation in the superficial and deep peroneal nerves, dorsal lateral cutaneous nerve, saphenous nerve distributions bilaterally   HEENT: PERRL, Moist mucous membranes, external ears normal, nose normal  Neck: No cervical adenopathy  CV: Tachycardic rate, RR. No murmurs, rubs, gallops.  2+ DP pulses bilaterally  LUNGS: Clear to auscultation: No wheezes, stridor, rhonchi, rales  GI: Abdomen non-distended. Soft. Non-tender and without rebound or guarding   : Deferred at this time  MSK: Shortened and internally rotated left lower extremity  Skin: Warm and dry  Capillary refill: <2 seconds    Patient is currently tachycardic, afebrile, otherwise stable    Assessment/Plan/MDM  Hip pain, hip deformity, near syncope, dyspnea, diarrhea, reported fever  -This presentation is concerning for: ACS, anemia, electrolyte abnormality, LAURO, hip dislocation, fracture.  Patient at risk for C. difficile, UTI, pneumonia, viral syndrome.  No reason to suspect neurovascular injury, mesenteric ischemia based on history physical exam  -Will investigate with sepsis panel order set, cardiac workup, stool studies, x-ray hip  -Will manage with fentanyl, fluids, Zofran, further based on workup/response    ED Course  ED Course as of 02/28/25 1230   Fri Feb 28, 2025   0737 Hemoglobin(!): 9.9  Low, Improving from prior   0743 WBC(!): 1.48  Low, patient meets SIRS. Abx ordered    0816 ECG per my independent interpretation: Tachycardic rate, regular rhythm, no ectopy, normal axis, no ST elevations or depressions     0837  LACTIC ACID(!): 3.4  High    0843 On reevaluation, persistently tachycardic after fluids   1014 Patient's daughter is now present in room.  Clinical course/workup reviewed with her with patient's permission.  Questions answered their satisfaction.  In agreement with no sedation/reduction at this time patient risks.  Will have discussion with orthopedic surgery   1029 Discussion with on-call orthopedic surgery: Recommendation for sedation and reduction in the emergency department as lower lip is out the hardware will be to put back in.  Will have a discussion of risks and benefits with patient and daughter   1128 Hip reduced with orthopaedic surgery    1153 BP fell. Patient no longer sedated. Alert and conversational, feeling ok on reevaluation. Patient has received >30cc/kg crystalloid to this point. Remains tachycardic    1228 Patient again hypotensive with a systolic of 89.  As patient has received greater than 30 cc/kg crystalloid, will administer vasopressors   1230 Critical Care Time Statement: Upon my evaluation, this patient had a high probability of imminent or life-threatening deterioration due to sepsis, hip dislocation, which required my direct attention, intervention, and personal management.  I spent a total of 52 minutes directly providing critical care services, including interpretation of complex medical databases, evaluating for the presence of life-threatening injuries or illnesses, management of organ system failure(s) , complex medical decision making (to support/prevent further life-threatening deterioration)., and interpretation of hemodynamic data. This time is exclusive of procedures, teaching, treating other patients, family meetings, and any prior time recorded by providers other than myself.          Critical Care Time  Procedures

## 2025-02-28 NOTE — SEPSIS NOTE
"  Sepsis Note   Pretty Aguila 74 y.o. female MRN: [id number]  Unit/Bed#: ED-04 Encounter: [id number]       Initial Sepsis Screening       Row Name 02/28/25 1229 02/28/25 1004 02/28/25 0845          Is the patient's history suggestive of a new or worsening infection? Yes (Proceed)  -CL Yes (Proceed)  -CL Yes (Proceed)  -GS      Suspected source of infection suspect infection, source unknown  -CL suspect infection, source unknown  -CL suspect infection, source unknown  -GS      Indicate SIRS criteria Tachycardia > 90 bpm;Leukocytosis (WBC > 25905 IJL) OR Leukopenia (WBC <4000 IJL) OR Bandemia (WBC >10% bands)  -CL Tachycardia > 90 bpm;Leukocytosis (WBC > 18239 IJL) OR Leukopenia (WBC <4000 IJL) OR Bandemia (WBC >10% bands)  -CL Tachycardia > 90 bpm;Leukocytosis (WBC > 76608 IJL) OR Leukopenia (WBC <4000 IJL) OR Bandemia (WBC >10% bands)  -GS      Are two or more of the above signs & symptoms of infection both present and new to the patient? Yes (Proceed)  -CL Yes (Proceed)  -CL Yes (Proceed)  -GS      Assess for evidence of organ dysfunction: Are any of the below criteria present within 6 hours of suspected infection and SIRS criteria that are NOT considered to be chronic conditions? SBP < 90  -CL Lactate > 2.0  -CL Lactate > 2.0  -GS      Date of presentation of severe sepsis -- 02/28/25  -CL 02/28/25  -GS      Time of presentation of severe sepsis -- 1004  -CL 0842  -GS      Date of presentation of septic shock 02/28/25  -CL -- --      Time of presentation of septic shock 1229  -CL -- --      Fluid Resuscitation: 30 ml/kg IV fluid bolus will be given based on actual body weight  -CL -- --      Is the patient is persistently hypotensive in the hour after fluid bolus administration? If yes, patient meets criteria for vasopressor use. YES  -CL -- --      Sepsis Note: Click \"NEXT\" below (NOT \"close\") to generate sepsis note based on above information. YES (proceed by clicking \"NEXT\")  -CL YES (proceed by clicking " "\"NEXT\")  -CL YES (proceed by clicking \"NEXT\")  -GS                User Key  (r) = Recorded By, (t) = Taken By, (c) = Cosigned By      Initials Name Provider Type    CL Raúl Villeda MD Physician     Rosy Goins MD Resident                    Default Flowsheet Data (Last 720 Hours)       Sepsis Reassess       Row Name 02/28/25 1230 02/28/25 1222 02/28/25 1005 02/28/25 1001          Repeat Volume Status and Tissue Perfusion Assessment Performed    Date of Reassessment: 02/28/25  -CL 02/28/25  -GS 02/28/25  -CL 02/28/25  -GS     Time of Reassessment: 1230  -CL 1222  -GS 1005  -CL 1001  -GS     Sepsis Reassessment Note: Click \"NEXT\" below (NOT \"close\") to generate sepsis reassessment note. YES (proceed by clicking \"NEXT\")  -CL YES (proceed by clicking \"NEXT\")  -GS YES (proceed by clicking \"NEXT\")  -CL YES (proceed by clicking \"NEXT\")  -GS     Repeat Volume Status and Tissue Perfusion Assessment Performed -- -- -- --               User Key  (r) = Recorded By, (t) = Taken By, (c) = Cosigned By      Initials Name Provider Type    CL Raúl Villeda MD Physician     Rosy oGins MD Resident                    Body mass index is 33.87 kg/m².  Wt Readings from Last 1 Encounters:   02/28/25 84 kg (185 lb 3 oz)        Ideal body weight: 50.1 kg (110 lb 7.2 oz)  Adjusted ideal body weight: 63.7 kg (140 lb 5.5 oz)    "

## 2025-02-28 NOTE — ASSESSMENT & PLAN NOTE
Has history of dislocation of left hip  X-ray left hip shows dislocation of left hip arthroplasty  Orthopedics consulted who did relocation  Currently stable    Plan:  Orthopedics consulted, would appreciate recommendation  Abduction pillow while in bed  Abduction brace while ambulating next 1 weightbearing as tolerated  Pain control  PT OT evaluation and treatment

## 2025-02-28 NOTE — ASSESSMENT & PLAN NOTE
Has history of MDS currently on azacitidine completed 2 cycles last Friday  [facility] recommends adding venetoclax to current regimen with added acitretin for 5 days and venetoclax for 7 days  They are planning to add venetoclax during third cycle  On prophylactic medication Diflucan 100 mg daily, acyclovir 400 mg twice daily    Plan:  Monitor CBC with differential to trend absolute neutrophil count daily  Continue Diflucan 100 mg daily, acyclovir 400 mg twice daily  Follow-up with outpatient oncology

## 2025-02-28 NOTE — ED PROVIDER NOTES
Time reflects when diagnosis was documented in both MDM as applicable and the Disposition within this note       Time User Action Codes Description Comment    2/28/2025  9:07 AM GoinsZaida dorantesve Add [S73.005A] Closed dislocation of left hip, initial encounter (HCC)     2/28/2025  9:07 AM Goins, Rosy Add [K52.9] Gastroenteritis     2/28/2025  9:08 AM Goins Rosy Add [A41.9] Sepsis ([facility])     2/28/2025  9:08 AM Goins, Rosy Add [R00.0] Tachycardia     2/28/2025  9:08 AM Goins, Rosy Modify [S73.005A] Closed dislocation of left hip, initial encounter ([facility])     2/28/2025  9:08 AM GoinsZaida dorantesve Modify [A41.9] Sepsis ([facility])     2/28/2025 12:29 PM Legare, Christopher A Add [A41.9,  R65.21] Septic shock ([facility])     2/28/2025 12:29 PM Legare, Christopher A Modify [A41.9] Sepsis ([facility])     2/28/2025 12:29 PM Legare, Christopher A Modify [A41.9,  R65.21] Septic shock ([facility])     2/28/2025  2:10 PM Chandan Villedaer A Add [I47.10] SVT (supraventricular tachycardia) ([facility])     2/28/2025  4:11 PM Chandan Villedaer A Add [R19.7] Diarrhea           ED Disposition       ED Disposition   Admit    Condition   Stable    Date/Time   Fri Feb 28, 2025  9:07 AM    Comment   --             Assessment & Plan       Medical Decision Making  Pretty Aguila is a 74 y.o. female presenting with left hip pain. Also having lightheadedness, nausea, non-bloody, non-bilious vomiting, copious non-bloody diarrhea. Vitals remarkable for tachycardia. Exam remarkable for Left hip internally rotated and slightly shortened. Able to dorsi and flantar flex, and invert and simon left ankle. Intact and symmetric sensation in bilateral lower extremities. Otherwise grossly normal.    Differential diagnosis including but not limited to: dislocation, fracture, sprain, strain, contusion; doubt compartment syndrome. As for vomiting and diarrhea, ddx including but not limited to viral illness, infectious diarrhea, food poisoning, colitis,  enteritis, metabolic abnormality, IBS, IBD, toxic megacolon, UTI. Doubt ileus, bowel obstruction, appendicitis, pancreatitis, hepatitis, cholecystitis, biliary colic, pyelonephritis, renal colic.    See ED course for further updates and interpretation of results.    Based on these results and H&P, patient's hip was relocated with orthopedics at bedside. Lab work concerning for infection, possibly GI in origin. During procedure patient was tachycardic to the 150s. Following the procedure, patient remained tachycardic in the 150s though she returned to her cognitive baseline. Ultimately did return to a slower sinus beat in the 100s. In setting of nausea, vomiting diarrhea, and near syncope, patient would most benefit from admission for further management of her symptoms and monitoring of her tachycardia.    Results, clinical impressions, and plan were discussed with patient and family. They expressed understanding and were in agreement with plan. Patient was given the opportunity to ask questions in ED. All questions and concerns were addressed in ED.    After evaluation and workup in the emergency department Discussed patient's case with [facility] regarding admission who accepted the patient for further evaluation and management under Dr. Steve ([facility]).    Amount and/or Complexity of Data Reviewed  Independent Historian:      Details: Daughter  External Data Reviewed: labs, radiology, ECG and notes.  Labs: ordered. Decision-making details documented in ED Course.  Radiology: ordered and independent interpretation performed. Decision-making details documented in ED Course.  ECG/medicine tests: ordered and independent interpretation performed.    Risk  Prescription drug management.  Decision regarding hospitalization.        ED Course as of 02/28/25 2123 Fri Feb 28, 2025   0710 Exam: Left hip internally rotated and slightly shortened. Able to dorsi and flantar flex, and invert and simon left ankle. Intact and  symmetric sensation in bilateral lower extremities.     Abdomen soft, non-tender. Heart tachycardic but regular. Lungs clear bilaterally.   0711 Blood Pressure: 130/77  130/77, , RR 20, SpO2 98% RA, 98.1 F   0712 Xrays of left hip for dislocation with plan for reduction if it's dislocated.    Will give fentanyl for pain  Will give Zofran and IVF for nausea, vomiting and lightheadedness.  Will get lab work in setting of vomiting and diarrhea, as well as cardiac labs in setting of near syncope and in anticipation of possible reduction.  Will give Cefepime and flagyl, though no clear source of infection at this time.   0736 WBC(!): 1.48  Down by 2 points from yesterday   0736 Hemoglobin(!): 9.9  No indication for transfusion at this time. Slightly improved    0736 Platelet Count(!): 146  Low, no indication for transfusion at this time   0749 XR hip/pelv 2-3 vws left if performed  Xray showing left hip dislocation   0754 Called reading room about expediting formal read of left hip   0804 Comprehensive metabolic panel(!)  Grossly normal   0804 MAGNESIUM(!): 1.7  Low, will replete   0811 XR chest 1 view portable  Per my interpretation: no acute cardiopulmonary disease   0820 hs TnI 0hr: 12  Elevated, will need 2 hour, no chest pain   0840 LACTIC ACID(!): 3.4   0907 PTT(!): 21   0907 Procalcitonin(!): 0.50   0907 PROTIME: 13.8   0907 POCT INR: 0.99   0948 UA w Reflex to Microscopic w Reflex to Culture  Unlikely to be UTI   1005 Reached out to Orthopedics   1037 Spoke with orthopedics who will be performing reeducation at bedside    [facility] to admit unless she decompensates during procedure   1128 Hip relocated at bedside with assistance of orthopedics   1131 LACTIC ACID(!): 3.5  Still elevated   1156 Informed that BP was low. Evaluated at bedside, patient A&Ox4, responding to questions appropriately, feeling overall well.    She continues to be tachycardic.  Has received over 30 cc/kg IVF. In setting of hypotension will  reach out to critical care, and likely order levophed.   1219 Re-evaluated Continues to be mentating    Xray reviewed, appears to be relocated   1304 Critical care evaluated patient: Will try [facility] of IVF. If still needs pressors, will accept patient to their service.       Medications   HYDROmorphone HCl (DILAUDID) injection 0.2 mg (has no administration in time range)   lidocaine (LIDODERM) 5 % patch 1 patch (1 patch Topical Medication Applied 2/28/25 0941)   naloxone (NARCAN) 0.04 mg/mL syringe 0.04 mg (has no administration in time range)   ondansetron (ZOFRAN) injection 4 mg (has no administration in time range)   acetaminophen (Ofirmev) injection 1,000 mg (has no administration in time range)   acyclovir (ZOVIRAX) capsule 200 mg (200 mg Oral Given 2/28/25 2033)   anastrozole (ARIMIDEX) tablet 1 mg (1 mg Oral Given 2/28/25 1703)   atorvastatin (LIPITOR) tablet 10 mg (10 mg Oral Given 2/28/25 1752)   Cholecalciferol (VITAMIN D3) tablet 400 Units (400 Units Oral Given 2/28/25 1752)   citalopram (CeleXA) tablet 20 mg (20 mg Oral Given 2/28/25 1752)   metoprolol succinate (TOPROL-XL) 24 hr tablet 12.5 mg (25 mg Oral Not Given 2/28/25 1705)   midodrine (PROAMATINE) tablet 2.5 mg (has no administration in time range)   multivitamin stress formula tablet 1 tablet (1 tablet Oral Given 2/28/25 1704)   predniSONE tablet 3 mg (3 mg Oral Given 2/28/25 1752)   multi-electrolyte (PLASMALYTE-A/ISOLYTE-S PH 7.4) IV solution (100 mL/hr Intravenous New Bag 2/28/25 1703)   heparin (porcine) subcutaneous injection 5,000 Units (5,000 Units Subcutaneous Given 2/28/25 1706)   insulin lispro (HumALOG/ADMELOG) 100 units/mL subcutaneous injection 1-6 Units ( Subcutaneous Not Given 2/28/25 1747)   chlorhexidine (PERIDEX) 0.12 % oral rinse 15 mL (15 mL Mouth/Throat Given 2/28/25 2034)   fentaNYL injection 50 mcg (50 mcg Intravenous Given 2/28/25 0736)   ondansetron (ZOFRAN) injection 4 mg (4 mg Intravenous Given 2/28/25 0735)   sodium  chloride 0.9 % bolus 1,000 mL (0 mL Intravenous Stopped 2/28/25 0958)   lidocaine (URO-JET) 2 % urethral/mucosal gel (5 Applications Topical Given 2/28/25 0834)   sodium chloride 0.9 % bolus 1,000 mL (0 mL Intravenous Stopped 2/28/25 1014)     Followed by   sodium chloride 0.9 % bolus 500 mL (0 mL Intravenous Stopped 2/28/25 1034)   cefepime (MAXIPIME) 2 g/50 mL dextrose IVPB (0 mg Intravenous Stopped 2/28/25 0931)   metroNIDAZOLE (FLAGYL) IVPB (premix) 500 mg 100 mL (0 mg Intravenous Stopped 2/28/25 1014)   magnesium sulfate 2 g/50 mL IVPB (premix) 2 g (0 g Intravenous Stopped 2/28/25 1034)   fentaNYL injection 50 mcg (50 mcg Intravenous Given 2/28/25 0936)   Ketamine HCl 100 mg (100 mg Intravenous Given by Other 2/28/25 1100)   fentaNYL injection 50 mcg (50 mcg Intravenous Given 2/28/25 1117)   Ketamine HCl 50 mg (50 mg Intravenous Given by Other 2/28/25 1119)   sodium chloride 0.9 % bolus 500 mL (0 mL Intravenous Stopped 2/28/25 1230)   multi-electrolyte (ISOLYTE-S PH 7.4) bolus 1,000 mL (0 mL Intravenous Stopped 2/28/25 1430)       ED Risk Strat Scores   HEART Risk Score      Flowsheet Row Most Recent Value   Heart Score Risk Calculator    History 1 Filed at: 02/28/2025 0828   ECG 1 Filed at: 02/28/2025 0828   Age 2 Filed at: 02/28/2025 0828   Risk Factors 1 Filed at: 02/28/2025 0828   Troponin 0 Filed at: 02/28/2025 0828   HEART Score 5 Filed at: 02/28/2025 0828          HEART Risk Score      Flowsheet Row Most Recent Value   Heart Score Risk Calculator    History 1 Filed at: 02/28/2025 0828   ECG 1 Filed at: 02/28/2025 0828   Age 2 Filed at: 02/28/2025 0828   Risk Factors 1 Filed at: 02/28/2025 0828   Troponin 0 Filed at: 02/28/2025 0828   HEART Score 5 Filed at: 02/28/2025 0828                                                  History of Present Illness       Chief Complaint   Patient presents with    Hip Pain     Pt arrives ems from home after trying to sit on toilet when her hip dislocated. Hx of hip  dislocations. Pt is N/V/D 1x hour. Pt was discharged around 7pm last night for generalized and sob.       Past Medical History:   Diagnosis Date    Allergic     Anxiety     Arthritis     Breast cancer (HCC) 09/2023    CAD (coronary artery disease)     Coronary artery disease [id number]    Depression     Diabetes mellitus (HCC) 5/1/24    Mother-Diabetic    Dyslipidemia     Hiatal hernia     Hyperlipidemia     Hypertension     Obesity     Obesity (BMI 30-39.9)     Psoriatic arthritis (HCC)     Rheumatoid arthritis (HCC)     Scoliosis     SOB (shortness of breath)       Past Surgical History:   Procedure Laterality Date    BARIATRIC SURGERY  2014    BILE DUCT EXPLORATION      replacement per [place]    BREAST BIOPSY  9/23    CARPAL TUNNEL RELEASE Bilateral 2002    CORONARY ARTERY BYPASS GRAFT  2017    FOOT SURGERY Right     bone surgery to straighten toe.    HIP SURGERY Left 2015    IR BIOPSY BONE MARROW  12/11/2024    IR DRAINAGE TUBE PLACEMENT  12/19/2023    JOINT REPLACEMENT      LYMPH NODE BIOPSY Left 11/20/2023    Procedure: LEFT LYMPHATIC MAPPING WITH BLUE AND RADIOACTIVE DYES, SENTINEL LYMPH NODE BIOPSY;  Surgeon: Adrien Gabriel MD;  Location:  MAIN OR;  Service: Surgical Oncology    LYMPH NODE DISSECTION Left 11/20/2023    Procedure: POSSIBLE AXILLARY DISSECTION;  Surgeon: Adrien Gabriel MD;  Location:  MAIN OR;  Service: Surgical Oncology    AR BREAST REDUCTION Bilateral 01/16/2024    Procedure: RIGHT BREAST REDUCTION AND LEFT BREAST EXPANDER EXCHANGE FOR PERMANENT IMPLANT. REVISION OF RECONSTRUCTED LEFT BREAST.;  Surgeon: Molina Jimenez MD;  Location:  MAIN OR;  Service: Plastics    AR SUCTION ASSISTED LIPECTOMY TRUNK Bilateral 01/16/2024    Procedure: LIPOSUCTION BREAST;  Surgeon: Molina Jimenez MD;  Location:  MAIN OR;  Service: Plastics    AR TISSUE EXPANDER PLACEMENT BREAST RECONSTRUCTION Left 11/20/2023    Procedure: IMMEDIATE LEFT BREAST RECONSTRUCTION WITH TISSUE EXPANDER AND ADM;  Surgeon: Molina GONZALEZ  MD Tony;  Location:  MAIN OR;  Service: Plastics    REPLACEMENT TOTAL KNEE Right 2017    SIMPLE MASTECTOMY Left 2023    Procedure: LEFT BREAST VERENICE  DIRECTED MASTECTOMY;  Surgeon: Adrien Gabriel MD;  Location:  MAIN OR;  Service: Surgical Oncology    SLEEVE GASTROPLASTY      TONSILLECTOMY      TOTAL HIP ARTHROPLASTY Right     US GUIDED BREAST BIOPSY LEFT COMPLETE Left 2023      Family History   Problem Relation Age of Onset    Hypertension Mother     Diabetes Mother     Heart disease Father         CABG x5    Arthritis Father     No Known Problems Sister     No Known Problems Sister     No Known Problems Daughter     No Known Problems Daughter     No Known Problems Son     Breast cancer Neg Hx       Social History     Tobacco Use    Smoking status: Former     Current packs/day: 0.00     Average packs/day: 0.5 packs/day for 7.0 years (3.5 ttl pk-yrs)     Types: Cigarettes     Start date: 2010     Quit date:      Years since quittin.1    Smokeless tobacco: Never    Tobacco comments:     Have already quit smoking in 2017   Vaping Use    Vaping status: Never Used   Substance Use Topics    Alcohol use: Yes     Alcohol/week: 2.0 standard drinks of alcohol     Types: 2 Glasses of wine per week     Comment: social     Drug use: No      E-Cigarette/Vaping    E-Cigarette Use Never User       E-Cigarette/Vaping Substances    Nicotine No     THC No     CBD No     Flavoring No     Other No       I have reviewed and agree with the history as documented.       Hip Pain  Associated symptoms: diarrhea, fatigue, fever, nausea, shortness of breath (Exertional) and vomiting    Associated symptoms: no abdominal pain, no chest pain, no cough, no headaches and no rash      Pretty Aguila is a 74 y.o. female with history of myelodysplastic syndrome, hypertension, hyperlipidemia, rheumatoid arthritis presenting for left hip pain.    Patient reports that she was attempting to sit down and lean over near the  toilet, went down faster than she usually does, and felt her left hip pop out of place.  Patient reports that this has happened to her multiple times in the past.  Feels similar.  Patient reports that she landed on the toilet, did not fall, did not hit her head, did not hit any other part of her body.    Patient reports that she was moving fast because she was standing to sit down because she felt lightheaded like she was going to pass out.  Also had sudden onset of nonbloody nonbilious vomiting, as well as copious watery diarrhea.  Continues to feel nauseous, had episode of vomiting, as well as diarrhea in the ED.  She also reports a fever of 100 Fahrenheit this morning.  No shortness of breath, dysuria. No chest pain, palpitations, abdominal pain, focal numbness, tingling or weakness.    Review of Systems   Constitutional:  Positive for fatigue and fever. Negative for chills.   Eyes:  Negative for pain and visual disturbance.   Respiratory:  Positive for shortness of breath (Exertional). Negative for cough.    Cardiovascular:  Negative for chest pain.   Gastrointestinal:  Positive for diarrhea, nausea and vomiting. Negative for abdominal pain and constipation.   Genitourinary:  Negative for dysuria, frequency and hematuria.   Musculoskeletal:  Negative for arthralgias and back pain.   Skin:  Negative for color change and rash.   Neurological:  Positive for weakness (Generalized). Negative for dizziness, seizures, syncope, light-headedness and headaches.   Psychiatric/Behavioral:  Negative for dysphoric mood.    All other systems reviewed and are negative.          Objective       ED Triage Vitals   Temperature Pulse Blood Pressure Respirations SpO2 Patient Position - Orthostatic VS   02/28/25 0630 02/28/25 0630 02/28/25 0645 02/28/25 0630 02/28/25 0630 --   98.1 °F (36.7 °C) (!) 110 130/77 20 98 %       Temp Source Heart Rate Source BP Location FiO2 (%) Pain Score    02/28/25 0630 02/28/25 0630 -- -- 02/28/25 0736     Oral Monitor   10 - Worst Possible Pain      Vitals      Date and Time Temp Pulse SpO2 Resp BP Pain Score FACES Pain Rating User   02/28/25 2121 -- 100 97 % -- 111/58 -- -- KT   02/28/25 2112 -- 101 96 % -- 92/57 -- -- KT   02/28/25 2042 -- 100 95 % -- 100/57 -- -- KT   02/28/25 2012 -- 102 -- -- 98/60 -- -- KT   02/28/25 1940 -- 104 92 % -- 96/59 -- -- KT   02/28/25 1925 -- 105 92 % -- 96/58 -- -- KT   02/28/25 1910 -- 105 92 % -- 103/58 -- -- KT   02/28/25 1840 -- 101 95 % -- 93/55 -- -- KT   02/28/25 1810 -- 102 83 % -- 107/57 -- -- KT   02/28/25 1755 -- 107 96 % -- 106/59 -- -- KT   02/28/25 1703 -- 116 -- -- 96/60 -- -- KT   02/28/25 1400 -- 99 98 % 16 106/61 -- -- JDT   02/28/25 1343 -- 98 93 % -- 109/60 -- -- GS   02/28/25 1315 -- 105 99 % 18 104/60 -- -- NR   02/28/25 1237 -- 107 -- -- 91/53 -- -- JDT   02/28/25 1200 -- 153 98 % 18 92/55 -- -- JDT   02/28/25 1155 -- 153 100 % 16 100/60 -- -- JDT   02/28/25 1154 -- -- -- -- 92/60 -- -- HONG   02/28/25 1153 -- -- -- -- 89/53 -- -- HONG   02/28/25 1135 -- 129 100 % 18 99/59 -- -- JDT   02/28/25 1130 -- 156 100 % 18 133/76 -- -- JDT   02/28/25 1120 -- 116 100 % 20 140/69 -- -- JDT   02/28/25 1105 -- 157 100 % 20 102/60 -- -- JDT   02/28/25 1100 -- 153 100 % 16 130/65 -- -- JDT   02/28/25 1057 -- 106 100 % 18 122/55 -- -- JDT   02/28/25 1053 -- 107 99 % 18 104/68 -- -- JDT   02/28/25 0936 -- -- -- -- -- 10 - Worst Possible Pain -- JDT   02/28/25 0930 -- 107 99 % 18 112/61 -- -- JDT   02/28/25 0900 -- 109 95 % 18 111/60 -- -- JDT   02/28/25 0740 -- 116 97 % -- -- -- --    02/28/25 0736 -- -- -- -- -- 10 - Worst Possible Pain -- JDT   02/28/25 0645 -- -- -- -- 130/77 -- -- MD   02/28/25 0630 98.1 °F (36.7 °C) 110 98 % 20 -- -- -- MD            Physical Exam  Vitals and nursing note reviewed.   Constitutional:       General: She is not in acute distress.     Appearance: She is well-developed.   HENT:      Head: Normocephalic and atraumatic.      Mouth/Throat:       Mouth: Mucous membranes are moist.      Pharynx: Oropharynx is clear.   Eyes:      Extraocular Movements: Extraocular movements intact.      Conjunctiva/sclera: Conjunctivae normal.   Cardiovascular:      Rate and Rhythm: Normal rate and regular rhythm.      Pulses: Normal pulses.      Heart sounds: Normal heart sounds.   Pulmonary:      Effort: Pulmonary effort is normal. No respiratory distress.      Breath sounds: Normal breath sounds. No wheezing, rhonchi or rales.   Abdominal:      General: Abdomen is flat.      Palpations: Abdomen is soft.      Tenderness: There is no abdominal tenderness.   Musculoskeletal:         General: Tenderness (Left hip) and deformity (Shortening of left hip]) present.      Cervical back: Normal range of motion.      Comments: Left hip internally rotated and slightly shortened. Able to dorsi and flantar flex, and invert and simon left ankle. Intact and symmetric sensation and pulses in bilateral lower extremities.   Skin:     General: Skin is warm and dry.      Capillary Refill: Capillary refill takes less than 2 seconds.      Coloration: Skin is pale.   Neurological:      General: No focal deficit present.      Mental Status: She is alert and oriented to person, place, and time.      Cranial Nerves: No cranial nerve deficit.      Sensory: No sensory deficit.      Motor: No weakness.   Psychiatric:         Mood and Affect: Mood normal.         Behavior: Behavior normal.         Results Reviewed       Procedure Component Value Units Date/Time    Lactic acid 2 Hours [[id number]]  (Normal) Collected: 02/28/25 1859    Lab Status: Final result Specimen: Blood from Hand, Right Updated: 02/28/25 1924     LACTIC ACID 1.4 mmol/L     Narrative:      Result may be elevated if tourniquet was used during collection.    Fingerstick Glucose (POCT) [[id number]]  (Normal) Collected: 02/28/25 1727    Lab Status: Final result Specimen: Blood Updated: 02/28/25 1728     POC Glucose 96 mg/dl     HS Troponin  I 4hr [[id number]]  (Normal) Collected: 02/28/25 1558    Lab Status: Final result Specimen: Blood from Arm, Left Updated: 02/28/25 1631     hs TnI 4hr 25 ng/L      Delta 4hr hsTnI 13 ng/L     Lactic acid, plasma (w/reflex if result > 2.0) [[id number]]  (Abnormal) Collected: 02/28/25 1558    Lab Status: Final result Specimen: Blood from Arm, Left Updated: 02/28/25 1627     LACTIC ACID 2.5 mmol/L     Narrative:      Result may be elevated if tourniquet was used during collection.    Hemoglobin A1c w/EAG Estimation (Prechecked if no A1C within 90 days) [[id number]] Collected: 02/28/25 0650    Lab Status: In process Specimen: Blood from Arm, Right Updated: 02/28/25 1610    Blood culture #2 [[id number]] Collected: 02/28/25 0802    Lab Status: Preliminary result Specimen: Blood from Arm, Right Updated: 02/28/25 1519     Blood Culture Received in Microbiology Lab. Culture in Progress.    Blood culture #1 [[id number]] Collected: 02/28/25 0650    Lab Status: Preliminary result Specimen: Blood from Arm, Right Updated: 02/28/25 1501     Blood Culture Received in Microbiology Lab. Culture in Progress.    HS Troponin I 2hr [[id number]]  (Normal) Collected: 02/28/25 1035    Lab Status: Final result Specimen: Blood from Hand, Right Updated: 02/28/25 1108     hs TnI 2hr 16 ng/L      Delta 2hr hsTnI 4 ng/L     Lactic acid 2 Hours [[id number]]  (Abnormal) Collected: 02/28/25 1035    Lab Status: Final result Specimen: Blood from Hand, Right Updated: 02/28/25 1059     LACTIC ACID 3.5 mmol/L     Narrative:      Result may be elevated if tourniquet was used during collection.    UA w Reflex to Microscopic w Reflex to Culture [[id number]] Collected: 02/28/25 0851    Lab Status: Final result Specimen: Urine, Indwelling Gaspar Catheter Updated: 02/28/25 0945     Color, UA Light Yellow     Clarity, UA Clear     Specific Gravity, UA 1.022     pH, UA 5.0     Leukocytes, UA Negative     Nitrite, UA Negative     Protein, UA Negative mg/dl      Glucose,  UA Negative mg/dl      Ketones, UA Negative mg/dl      Urobilinogen, UA <2.0 mg/dl      Bilirubin, UA Negative     Occult Blood, UA Negative    RBC Morphology Reflex Test [[id number]] Collected: 02/28/25 0650    Lab Status: Final result Specimen: Blood from Arm, Right Updated: 02/28/25 0901    Procalcitonin [[id number]]  (Abnormal) Collected: 02/28/25 0650    Lab Status: Final result Specimen: Blood from Arm, Right Updated: 02/28/25 0849     Procalcitonin 0.50 ng/ml     Protime-INR [[id number]]  (Normal) Collected: 02/28/25 0650    Lab Status: Final result Specimen: Blood from Arm, Right Updated: 02/28/25 0842     Protime 13.8 seconds      INR 0.99    Narrative:      INR Therapeutic Range    Indication                                             INR Range      Atrial Fibrillation                                               2.0-3.0  Hypercoagulable State                                    2.0.2.3  Left Ventricular Asist Device                            2.0-3.0  Mechanical Heart Valve                                  -    Aortic(with afib, MI, embolism, HF, LA enlargement,    and/or coagulopathy)                                     2.0-3.0 (2.5-3.5)     Mitral                                                             2.5-3.5  Prosthetic/Bioprosthetic Heart Valve               2.0-3.0  Venous thromboembolism (VTE: VT, PE        2.0-3.0    APTT [[id number]]  (Abnormal) Collected: 02/28/25 0650    Lab Status: Final result Specimen: Blood from Arm, Right Updated: 02/28/25 0842     PTT 21 seconds     Lactic acid, plasma (w/reflex if result > 2.0) [[id number]]  (Abnormal) Collected: 02/28/25 0802    Lab Status: Final result Specimen: Blood from Arm, Right Updated: 02/28/25 0836     LACTIC ACID 3.4 mmol/L     Narrative:      Result may be elevated if tourniquet was used during collection.    CBC and differential [[id number]]  (Abnormal) Collected: 02/28/25 0650    Lab Status: Final result Specimen: Blood from Arm, Right  Updated: 02/28/25 0834     WBC 1.48 Thousand/uL      RBC 2.96 Million/uL      Hemoglobin 9.9 g/dL      Hematocrit 30.3 %       fL      MCH 33.4 pg      MCHC 32.7 g/dL      RDW 18.0 %      MPV 11.0 fL      Platelets 146 Thousands/uL     Narrative:      This is an appended report.  These results have been appended to a previously verified report.    Manual Differential(PHLEBS Do Not Order) [[id number]]  (Abnormal) Collected: 02/28/25 0650    Lab Status: Final result Specimen: Blood from Arm, Right Updated: 02/28/25 0834     Segmented % 67 %      Bands % 1 %      Lymphocytes % 21 %      Monocytes % 3 %      Eosinophils % 8 %      Basophils % 0 %      Absolute Neutrophils 1.01 Thousand/uL      Absolute Lymphocytes 0.31 Thousand/uL      Absolute Monocytes 0.04 Thousand/uL      Absolute Eosinophils 0.12 Thousand/uL      Absolute Basophils 0.00 Thousand/uL      Total Counted --     nRBC 2 /100 WBC      RBC Morphology Present     Platelet Estimate Adequate     Anisocytosis Present     Macrocytes Present     Microcytes Present     Ovalocytes Present     Poikilocytes Present    HS Troponin 0hr (reflex protocol) [[id number]]  (Normal) Collected: 02/28/25 0650    Lab Status: Final result Specimen: Blood from Arm, Right Updated: 02/28/25 0817     hs TnI 0hr 12 ng/L     Comprehensive metabolic panel [[id number]]  (Abnormal) Collected: 02/28/25 0650    Lab Status: Final result Specimen: Blood from Arm, Right Updated: 02/28/25 0758     Sodium 141 mmol/L      Potassium 4.1 mmol/L      Chloride 108 mmol/L      CO2 20 mmol/L      ANION GAP 13 mmol/L      BUN 29 mg/dL      Creatinine 0.82 mg/dL      Glucose 186 mg/dL      Calcium 8.5 mg/dL      AST 27 U/L      ALT 34 U/L      Alkaline Phosphatase 66 U/L      Total Protein 7.6 g/dL      Albumin 3.6 g/dL      Total Bilirubin 0.94 mg/dL      eGFR 71 ml/min/1.73sq m     Narrative:      [employer] guidelines for Chronic Kidney Disease (CKD):     Stage 1 with  normal or high GFR (GFR > 90 mL/min/1.73 square meters)    Stage 2 Mild CKD (GFR = 60-89 mL/min/1.73 square meters)    Stage 3A Moderate CKD (GFR = 45-59 mL/min/1.73 square meters)    Stage 3B Moderate CKD (GFR = 30-44 mL/min/1.73 square meters)    Stage 4 Severe CKD (GFR = 15-29 mL/min/1.73 square meters)    Stage 5 End Stage CKD (GFR <15 mL/min/1.73 square meters)  Note: GFR calculation is accurate only with a steady state creatinine    Magnesium [[id number]]  (Abnormal) Collected: 02/28/25 0650    Lab Status: Final result Specimen: Blood from Arm, Right Updated: 02/28/25 0758     Magnesium 1.7 mg/dL     Stool Enteric Bacterial Panel by PCR [[id number]]     Lab Status: No result Specimen: Stool     Clostridium difficile toxin by PCR with EIA [[id number]]     Lab Status: No result Specimen: Stool             XR pelvis ap only 1 or 2 vw   ED Interpretation by Raúl Villeda MD (02/28 1137)   Per my independent interpretation: Hip reduced      Final Interpretation by Salbador Cervantes MD (02/28 1153)      Interval relocation of the left total hip arthroplasty components between the reviewed hip radiographs and subsequent pelvis radiographs.         Computerized Assisted Algorithm (CAA) may have been used to analyze all applicable images.            Workstation performed: [id number]         XR hip/pelv 2-3 vws left if performed   Final Interpretation by Salbador Cervantes MD (02/28 1153)      Interval relocation of the left total hip arthroplasty components between the reviewed hip radiographs and subsequent pelvis radiographs.         Computerized Assisted Algorithm (CAA) may have been used to analyze all applicable images.            Workstation performed: [id number]         XR chest 1 view portable   Final Interpretation by Carlos Barros DO (02/28 0809)      No acute cardiopulmonary disease.            Workstation performed: [id number]         XR hip/pelv 2-3 vws left if performed   ED Interpretation by  Raúl Villeda MD (02/28 4086)   Per my independent interpretation: Left hip dislocation.  Concern for cortical defect lateral      Final Interpretation by Carlos Barros DO (02/28 5096)      Dislocation of the left hip arthroplasty.         Computerized Assisted Algorithm (CAA) may have been used to analyze all applicable images.            Workstation performed: [id number]             ECG 12 Lead Documentation Only    Date/Time: 2/28/2025 8:29 AM    Performed by: Rosy Goins MD  Authorized by: Rosy Goins MD    Indications / Diagnosis:  Vomiting  ECG reviewed by me, the ED Provider: yes    Patient location:  ED  Previous ECG:     Previous ECG:  Compared to current    Comparison ECG info:  2/25/2025    Similarity:  No change  Interpretation:     Interpretation: non-specific    Rate:     ECG rate:  92    ECG rate assessment: normal    Rhythm:     Rhythm: sinus rhythm    Ectopy:     Ectopy: none    QRS:     QRS axis:  Normal    QRS intervals:  Normal  Conduction:     Conduction: normal    ST segments:     ST segments:  Normal  T waves:     T waves: non-specific    Comments:      QTc 465  Pre-Procedural Sedation    Performed by: Rosy Goins MD  Authorized by: Rosy Goins MD    Consent:     Consent obtained:  Written    Consent given by:  Patient    Risks discussed:  Allergic reaction, dysrhythmia, prolonged hypoxia resulting in organ damage, prolonged sedation necessitating reversal, inadequate sedation, respiratory compromise necessitating ventilatory assistance and intubation, nausea and vomiting    Alternatives discussed:  Analgesia without sedation and anxiolysis  Universal protocol:     Procedure explained and questions answered to patient or proxy's satisfaction: yes      Relevant documents present and verified: yes      Radiology Images displayed and confirmed.  If images not available, report reviewed: yes      Required blood products, implants, devices, and special  equipment available: yes      Immediately prior to procedure a time out was called: yes      Patient identity confirmation method:  Verbally with patient and arm band  Indications:     Sedation purpose:  Dislocation reduction    Procedure necessitating sedation performed by:  Different physician    Intended level of sedation:  Moderate (conscious sedation)  Pre-sedation assessment:     Time since last food or drink:  6 hours    NPO status caution: urgency dictates proceeding with non-ideal NPO status      ASA classification: class 2 - patient with mild systemic disease      Neck mobility: normal      Mouth opening:  3 or more finger widths    Thyromental distance:  4 finger widths    Mallampati score:  II - soft palate, uvula, fauces visible    Pre-sedation assessments completed and reviewed: airway patency, cardiovascular function, hydration status, mental status, nausea/vomiting, pain level, respiratory function and temperature      History of difficult intubation: no      Pre-sedation assessment completed:  2/28/2025 1:30 PM  Procedural Sedation    Date/Time: 2/28/2025 11:32 AM    Performed by: Rosy Goins MD  Authorized by: Rosy Goins MD    Immediate pre-procedure details:     Reassessment: Patient reassessed immediately prior to procedure      Reviewed: vital signs, relevant labs/tests and NPO status      Verified: bag valve mask available, emergency equipment available, intubation equipment available, IV patency confirmed and oxygen available    Procedure details (see MAR for exact dosages):     Sedation start time:  2/28/2025 11:05 AM    Preoxygenation:  Room air and nasal cannula    Sedation:  Ketamine    Analgesia:  Fentanyl    Intra-procedure monitoring:  Blood pressure monitoring, cardiac monitor, continuous pulse oximetry, continuous capnometry, frequent LOC assessments and frequent vital sign checks    Intra-procedure events: none      Sedation end time:  2/28/2025 11:35 AM    Total  sedation time (minutes):  30  Post-procedure details:     Post-sedation assessment completed:  2/28/2025 11:42 AM    Attendance: Constant attendance by certified staff until patient recovered      Recovery: Patient returned to pre-procedure baseline      Post-sedation assessments completed and reviewed: airway patency, cardiovascular function, hydration status, mental status, nausea/vomiting, pain level, respiratory function and temperature      Patient is stable for discharge or admission: yes      Patient tolerance:  Tolerated well, no immediate complications      ED Medication and Procedure Management   Prior to Admission Medications   Prescriptions Last Dose Informant Patient Reported? Taking?   Multiple Vitamin (multivitamin) tablet  Self Yes No   Sig: Take 1 tablet by mouth daily   Semaglutide-Weight Management (WEGOVY) 2.4 MG/0.75ML   No No   Sig: Inject 0.75 mL (2.4 mg total) under the skin once a week   Venetoclax 100 MG TABS   No No   Sig: Take 4 tablets (400 mg total) by mouth daily   acyclovir (ZOVIRAX) 200 mg capsule   Yes Yes   Sig: Take 1 capsule by mouth 2 (two) times a day   anastrozole (ARIMIDEX) 1 mg tablet   No No   Sig: TAKE 1 TABLET (1 MG TOTAL) BY MOUTH DAILY   atorvastatin (LIPITOR) 10 mg tablet   No No   Sig: TAKE 1 TABLET BY MOUTH DAILY   cholecalciferol (VITAMIN D3) 400 units tablet  Self Yes No   Sig: Take 400 Units by mouth daily   citalopram (CeleXA) 20 mg tablet   No No   Sig: Take 1 tablet (20 mg total) by mouth daily   metFORMIN (GLUCOPHAGE-XR) 500 mg 24 hr tablet   No No   Sig: Take 1 tablet (500 mg total) by mouth daily with dinner   metoprolol succinate (Toprol XL) 25 mg 24 hr tablet   No No   Sig: Take 0.5 tablets (12.5 mg total) by mouth daily   midodrine (PROAMATINE) 2.5 mg tablet   No No   Sig: Take 1 tablet (2.5 mg total) by mouth 3 (three) times a day before meals   predniSONE 1 mg tablet   No No   Sig: Take 3 tablets (3 mg total) by mouth daily      Facility-Administered  Medications: None     Patient's Medications   Discharge Prescriptions    No medications on file     No discharge procedures on file.  ED SEPSIS DOCUMENTATION   Time reflects when diagnosis was documented in both MDM as applicable and the Disposition within this note       Time User Action Codes Description Comment    2/28/2025  9:07 AM GoinsZaida dorantesve Add [S73.005A] Closed dislocation of left hip, initial encounter (HCC)     2/28/2025  9:07 AM GoinsZaidave Add [K52.9] Gastroenteritis     2/28/2025  9:08 AM GoinsCarlosRosy Add [A41.9] Sepsis (HCC)     2/28/2025  9:08 AM Goins Rosy Add [R00.0] Tachycardia     2/28/2025  9:08 AM GoinsZaidave Modify [S73.005A] Closed dislocation of left hip, initial encounter (HCC)     2/28/2025  9:08 AM GoinsZadia dorantesve Modify [A41.9] Sepsis ([facility])     2/28/2025 12:29 PM Chandan Villedaer A Add [A41.9,  R65.21] Septic shock ([facility])     2/28/2025 12:29 PM Chandan Villedaer A Modify [A41.9] Sepsis ([facility])     2/28/2025 12:29 PM Chandan Villedaer A Modify [A41.9,  R65.21] Septic shock ([facility])     2/28/2025  2:10 PM Raúl Villeda A Add [I47.10] SVT (supraventricular tachycardia) ([facility])     2/28/2025  4:11 PM Raúl Villeda Add [R19.7] Diarrhea            Initial Sepsis Screening       Row Name 02/28/25 1229 02/28/25 1004 02/28/25 0845          Is the patient's history suggestive of a new or worsening infection? Yes (Proceed)  -CL Yes (Proceed)  -CL Yes (Proceed)  -GS      Suspected source of infection suspect infection, source unknown  -CL suspect infection, source unknown  -CL suspect infection, source unknown  -GS      Indicate SIRS criteria Tachycardia > 90 bpm;Leukocytosis (WBC > 22441 IJL) OR Leukopenia (WBC <4000 IJL) OR Bandemia (WBC >10% bands)  -CL Tachycardia > 90 bpm;Leukocytosis (WBC > 67996 IJL) OR Leukopenia (WBC <4000 IJL) OR Bandemia (WBC >10% bands)  -CL Tachycardia > 90 bpm;Leukocytosis (WBC > 70520 IJL) OR Leukopenia (WBC <4000 IJL)  "OR Bandemia (WBC >10% bands)  -GS      Are two or more of the above signs & symptoms of infection both present and new to the patient? Yes (Proceed)  -CL Yes (Proceed)  -CL Yes (Proceed)  -GS      Assess for evidence of organ dysfunction: Are any of the below criteria present within 6 hours of suspected infection and SIRS criteria that are NOT considered to be chronic conditions? SBP < 90  -CL Lactate > 2.0  -CL Lactate > 2.0  -GS      Date of presentation of severe sepsis -- 02/28/25  -CL 02/28/25  -GS      Time of presentation of severe sepsis -- 1004  -CL 0842  -GS      Date of presentation of septic shock 02/28/25  -CL -- --      Time of presentation of septic shock 1229  -CL -- --      Fluid Resuscitation: 30 ml/kg IV fluid bolus will be given based on actual body weight  -CL -- --      Is the patient is persistently hypotensive in the hour after fluid bolus administration? If yes, patient meets criteria for vasopressor use. YES  -CL -- --      Sepsis Note: Click \"NEXT\" below (NOT \"close\") to generate sepsis note based on above information. YES (proceed by clicking \"NEXT\")  -CL YES (proceed by clicking \"NEXT\")  -CL YES (proceed by clicking \"NEXT\")  -GS                User Key  (r) = Recorded By, (t) = Taken By, (c) = Cosigned By      Initials Name Provider Type    CL Raúl Villeda MD Physician     Rosy Goins MD Resident                  Default Flowsheet Data (Last 720 Hours)       Sepsis Reassess       Row Name 02/28/25 1335 02/28/25 1230 02/28/25 1222 02/28/25 1005 02/28/25 1001       Repeat Volume Status and Tissue Perfusion Assessment Performed    Date of Reassessment: 02/28/25  -GS 02/28/25  -CL 02/28/25  -GS 02/28/25  -CL 02/28/25  -GS    Time of Reassessment: 1335  -GS 1230  -CL 1222  -GS 1005  -CL 1001  -GS    Sepsis Reassessment Note: Click \"NEXT\" below (NOT \"close\") to generate sepsis reassessment note. YES (proceed by clicking \"NEXT\")  -GS YES (proceed by clicking \"NEXT\")  -CL YES " "(proceed by clicking \"NEXT\")  -GS YES (proceed by clicking \"NEXT\")  -CL YES (proceed by clicking \"NEXT\")  -GS    Repeat Volume Status and Tissue Perfusion Assessment Performed -- -- -- -- --              User Key  (r) = Recorded By, (t) = Taken By, (c) = Cosigned By      Initials Name Provider Type    CL Raúl Villeda MD Physician    GS Rosy Goins MD Resident                     Rosy Goins MD  02/28/25 2123    "

## 2025-02-28 NOTE — SEPSIS NOTE
"  Sepsis Note   Pretty Aguila 74 y.o. female MRN: [id number]  Unit/Bed#: ED-04 Encounter: [id number]       Initial Sepsis Screening       Row Name 02/28/25 1004 02/28/25 0845             Is the patient's history suggestive of a new or worsening infection? Yes (Proceed)  -CL Yes (Proceed)  -GS       Suspected source of infection suspect infection, source unknown  -CL suspect infection, source unknown  -GS       Indicate SIRS criteria Tachycardia > 90 bpm;Leukocytosis (WBC > 92493 IJL) OR Leukopenia (WBC <4000 IJL) OR Bandemia (WBC >10% bands)  -CL Tachycardia > 90 bpm;Leukocytosis (WBC > 18080 IJL) OR Leukopenia (WBC <4000 IJL) OR Bandemia (WBC >10% bands)  -GS       Are two or more of the above signs & symptoms of infection both present and new to the patient? Yes (Proceed)  -CL Yes (Proceed)  -GS       Assess for evidence of organ dysfunction: Are any of the below criteria present within 6 hours of suspected infection and SIRS criteria that are NOT considered to be chronic conditions? Lactate > 2.0  -CL Lactate > 2.0  -GS       Date of presentation of severe sepsis 02/28/25  -CL 02/28/25  -GS       Time of presentation of severe sepsis 1004  -CL 0842  -GS       Sepsis Note: Click \"NEXT\" below (NOT \"close\") to generate sepsis note based on above information. YES (proceed by clicking \"NEXT\")  -CL YES (proceed by clicking \"NEXT\")  -GS                 User Key  (r) = Recorded By, (t) = Taken By, (c) = Cosigned By      Initials Name Provider Type    CL Raúl Villeda MD Physician     Rosy Goins MD Resident                    Default Flowsheet Data (Last 720 Hours)       Sepsis Reassess       Row Name 02/28/25 1222 02/28/25 1005 02/28/25 1001             Repeat Volume Status and Tissue Perfusion Assessment Performed    Date of Reassessment: 02/28/25  -GS 02/28/25  -CL 02/28/25  -GS      Time of Reassessment: 1222  -GS 1005  -CL 1001  -GS      Sepsis Reassessment Note: Click \"NEXT\" below (NOT \"close\") to " "generate sepsis reassessment note. YES (proceed by clicking \"NEXT\")  -GS YES (proceed by clicking \"NEXT\")  -CL YES (proceed by clicking \"NEXT\")  -GS      Repeat Volume Status and Tissue Perfusion Assessment Performed -- -- --                User Key  (r) = Recorded By, (t) = Taken By, (c) = Cosigned By      Initials Name Provider Type    CL Raúl Villeda MD Physician    GS Rosy Goins MD Resident                    Body mass index is 33.87 kg/m².  Wt Readings from Last 1 Encounters:   02/28/25 84 kg (185 lb 3 oz)        Ideal body weight: 50.1 kg (110 lb 7.2 oz)  Adjusted ideal body weight: 63.7 kg (140 lb 5.5 oz)    "

## 2025-02-28 NOTE — SEPSIS NOTE
"  Sepsis Note   Pretty Aguila 74 y.o. female MRN: [id number]  Unit/Bed#: ED-04 Encounter: [id number]       Initial Sepsis Screening       Row Name 02/28/25 0845                Is the patient's history suggestive of a new or worsening infection? Yes (Proceed)  -GS        Suspected source of infection suspect infection, source unknown  -GS        Indicate SIRS criteria Tachycardia > 90 bpm;Leukocytosis (WBC > 66498 IJL) OR Leukopenia (WBC <4000 IJL) OR Bandemia (WBC >10% bands)  -GS        Are two or more of the above signs & symptoms of infection both present and new to the patient? Yes (Proceed)  -GS        Assess for evidence of organ dysfunction: Are any of the below criteria present within 6 hours of suspected infection and SIRS criteria that are NOT considered to be chronic conditions? Lactate > 2.0  -GS        Date of presentation of severe sepsis 02/28/25  -GS        Time of presentation of severe sepsis 0842  -GS        Sepsis Note: Click \"NEXT\" below (NOT \"close\") to generate sepsis note based on above information. YES (proceed by clicking \"NEXT\")  -GS                  User Key  (r) = Recorded By, (t) = Taken By, (c) = Cosigned By      Initials Name Provider Type    GS Rosy Goins MD Resident                        There is no height or weight on file to calculate BMI.  Wt Readings from Last 1 Encounters:   02/26/25 77.6 kg (171 lb)        Ideal body weight: 50.1 kg (110 lb 7.2 oz)  Adjusted ideal body weight: 61.1 kg (134 lb 10.7 oz)    "

## 2025-02-28 NOTE — ASSESSMENT & PLAN NOTE
Presented today with 4 episodes of diarrhea at home started at 3 AM mostly loose foul-smelling  Had turkey sandwich at home at 10 PM  Is on Azacitidine chemotherapy for her MDS  Possibly secondary to viral gastroenteritis, bacterial, C. difficile, chemotherapy induced    Plan:  Continue hydration with 100 mL/h Isolyte  Pending stool enteric panel  Pending C. Difficile  Monitor of antibiotic today  Trend procalcitonin tomorrow morning and CBC

## 2025-02-28 NOTE — DISCHARGE INSTR - AVS FIRST PAGE
Dear Pretty Aguila,     It was our pleasure to care for you here at [facility].  It is our hope that we were always able to exceed the expected standards for your care during your stay.  You were hospitalized due to .  You were cared for on the second floor by Dagmar Brewer MD under the service of Alejandrina Gardner MD with the [facility] who covers for your primary care physician (PCP), Lauren Dumont MD, while you were hospitalized.  If you have any questions or concerns related to this hospitalization, you may contact us at .  For follow up as well as any medication refills, we recommend that you follow up with your primary care physician.  A registered nurse will reach out to you by phone within a few days after your discharge to answer any additional questions that you may have after going home.  However, at this time we provide for you here, the most important instructions / recommendations at discharge:     Notable Medication Adjustments -   You were treated this admission with antibiotics. [facility] services will come to your house from   to help administer  the antibiotic, ceftriaxone, tomorrow. You will receive this medication through 3/14/2025  During your inpatient stay you had low blood pressure for which your home medication metoprolol was held.  Blood pressures have improved.  Please resume metoprolol starting from tomorrow.  Measure your blood pressure before taking metoprolol.  Do Not take metoprolol if your  systolic blood pressure systolic is less than 110.  Testing Required after Discharge -   BMP, CBC  ** Please contact your PCP to request testing orders for any of the testing recommended here **  Important follow up information -   Follow-up with your primary care provider within 1 week of discharge  Please follow up with Orthopedics   Other Instructions -   Please see bellow for instructions from Orthopedics in  regards to your left hip disolocation  Please see sanjana for Orthopedics recommendations sanjana  Please review this entire after visit summary as additional general instructions including medication list, appointments, activity, diet, any pertinent wound care, and other additional recommendations from your care team that may be provided for you.      Sincerely,     Dagmar Brewer MD     Discharge Instructions - Orthopedics  Pretty Aguila 74 y.o. female MRN: [id number]  Unit/Bed#: ED-04    Weight Bearing Status:                                           Weight bearing as tolerated to left lower extremity with abduction brace in place.   Should sleep with abduction pillow in place.     Pain:  Continue analgesics as directed    Appt Instructions:   If you do not have your appointment, please call the clinic at [phone] to schedule with adult reconstruction.   Otherwise follow up as scheduled.    Contact the office sooner if you experience any increased numbness/tingling in the extremities.

## 2025-02-28 NOTE — QUICK NOTE
Progress Note - Triage Asssessment   Pretty Aguila 74 y.o. female MRN: [id number]    Time Called ( Time): 2  Date Called: 02/28/25  Room#: 4  Person requesting evaluation: Dr. 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 She reportedly was just discharged from [facility] yesterday (2/25-2/27) for a syncope workup which was felt to be due to orthostasis. Cortisol/TSH/cardiac workup nl. Her beta blocker was reduced and prescribed midodrine.     Patient  reports that she leaned over this morning and felt her left hip dislocate which, reportedly has had multiple hip dislocations in the past. In ED, sepsis workup/treatment in place without an identifiable source--? GI, started on cefepime/flagyl, 2.5 liters crystalloid and her left posterior hip dislocation was reduced under conscious sedation via ketamine.     Post procedurally, patient has been tachycardic and hypotensive with SBP in 80's. Lactic 3.5. She was started on low dose peripheral norepinephrine by ED. Critical care was requested to evaluate the patient to determine placement     Interventions:   Trial  holding norepinephrine and given clinical history of vomiting/diarrhea, recommend challenging with additional volume resuscitation.   Recommend restarting home midodrine  Monitor Endpoints (lactic, UO, Renal indices)       Addendum 1345: Notified by ED that patient's blood pressure/HR responding to additional fluid. Recommend SD2 under SLIM. Please notify critical care should patient's condition decline.          Triage Assessment:     Patient to be admitted to step down level of care. SD2 SLIM    Recommendations discussed with Dr. Goins

## 2025-02-28 NOTE — ASSESSMENT & PLAN NOTE
Has history of pancytopenia  During this admission her CBC shows WBC 1.48, hemoglobin 9.9, platelet 1 46K, absolute neutrophil 1.01  Currently on azacitidine chemotherapy  Most likely from underlying MDS with azacitidine treatment plan:    Plan:  Monitor CBC with differentials  Transfuse RBC if hemoglobin less than 7  Transfuse platelet if less than 10,000 without bleeding and less than 20,000 with bleeding  If there is drop in absolute neutrophil less than 500 will discuss with oncology if can give Neulasta or filgrastim

## 2025-02-28 NOTE — H&P
H&P - Hospitalist   Name: Pretty Aguila 74 y.o. female I MRN: [id number]  Unit/Bed#: ED-04 I Date of Admission: 2/28/2025   Date of Service: 2/28/2025 I Hospital Day: 0     Assessment & Plan  Sepsis (HCC)  Met criteria with tachycardia, low WBC, suspected infection  Lactic acid 3.4, Pro-Jaron 0.51  Urine analysis negative for infection  Flu COVID antigen negative  Chest x-ray normal  X-ray hip left shows dislocation of hip arthroplasty  Has history of MDS on azacitidine, breast cancer on anastrozole, RA on prednisone 3 mg daily  Received 3 L of fluid, 1 dose of cefepime and metronidazole  Meeting sepsis criteria with some confounding factor like dehydration from diarrhea, low WBC count from chemotherapy, will rule out infection    Plan:  Pending stool enteric panel   Pending C. difficile  Monitor off antibiotic today  Trend procalcitonin tomorrow morning  Trend lactic acid until normalized   Continue continuous hydration with fluid 100 mL/h  If any worsening abdominal pain, diarrhea can consider CT abdomen pelvis  MDS (myelodysplastic syndrome), high grade (HCC)  Has history of MDS currently on azacitidine completed 2 cycles last Friday  Jadon waters recommends adding venetoclax to current regimen with added acitretin for 5 days and venetoclax for 7 days  They are planning to add venetoclax during third cycle  On prophylactic medication Diflucan 100 mg daily, acyclovir 400 mg twice daily    Plan:  Monitor CBC with differential to trend absolute neutrophil count daily  Continue Diflucan 100 mg daily, acyclovir 400 mg twice daily  Follow-up with outpatient oncology   Diarrhea  Presented today with 4 episodes of diarrhea at home started at 3 AM mostly loose foul-smelling  Had turkey sandwich at home at 10 PM  Is on Azacitidine chemotherapy for her MDS  Possibly secondary to viral gastroenteritis, bacterial, C. difficile, chemotherapy induced    Plan:  Continue hydration with 100 mL/h Isolyte  Pending stool enteric  panel  Pending C. Difficile  Monitor of antibiotic today  Trend procalcitonin tomorrow morning and CBC  Closed dislocation of left hip (HCC)  Has history of dislocation of left hip  X-ray left hip shows dislocation of left hip arthroplasty  Orthopedics consulted who did relocation  Currently stable    Plan:  Orthopedics consulted, would appreciate recommendation  Abduction pillow while in bed  Abduction brace while ambulating next 1 weightbearing as tolerated  Pain control  PT OT evaluation and treatment  Malignant neoplasm of lower-outer quadrant of left breast of female, estrogen receptor positive (HCC)  Currently on anastrozole daily  Continue anastrozole  Pancytopenia (HCC)  Has history of pancytopenia  During this admission her CBC shows WBC 1.48, hemoglobin 9.9, platelet 1 46K, absolute neutrophil 1.01  Currently on azacitidine chemotherapy  Most likely from underlying MDS with azacitidine treatment plan:    Plan:  Monitor CBC with differentials  Transfuse RBC if hemoglobin less than 7  Transfuse platelet if less than 10,000 without bleeding and less than 20,000 with bleeding  If there is drop in absolute neutrophil less than 500 will discuss with oncology if can give Neulasta or filgrastim      VTE Pharmacologic Prophylaxis: VTE Score: 8 High Risk (Score >/= 5) - Pharmacological DVT Prophylaxis Ordered: heparin. Sequential Compression Devices Ordered.  Code Status: Level 1 - Full Code Per patient  Discussion with family: Updated  (daughter) at bedside.    Anticipated Length of Stay: Patient will be admitted on an inpatient basis with an anticipated length of stay of greater than 2 midnights secondary to sepsis.    History of Present Illness   Chief Complaint: nausea, diarrhea    Pretty Aguila is a 74 y.o. female with a PMH of left hip arthroplasty, dislocation, breast cancer on anastrozole, MDS on azacitidine, syncope, diabetes, hypertension, hyperlipidemia, rheumatoid arthritis, who presents  with nausea, 4 episodes of diarrhea started last night at 3 AM.  Patient mentioned that he got discharge from hospital last night and went home.  He bought a turkey sandwich from the hospital and had it at 10 PM.  At 3 AM she is to get feeling sick with nausea, having 4 episodes of loose bowel movement.  Initially the stool was loose and then it was watery but no blood, mucus.  Denied any vomiting but did feel nauseated.  She was trying to reach out to her cane and then she dislocated her left hip.  Denies any fever, chills, chest pain, cough, palpitation, shortness of breath, urinary symptoms, abdominal pain.  Only mention that with bowel movement she has slight abdominal discomfort.  In the ED her vitals shows tachycardia.  Blood work shows WBC 1.48 with absolute neutrophil 1.01, hemoglobin 9.9, platelet 146.  COVID flu antigen negative.  Lactic acid was 3.4 and procalcitonin 0.50.  Urinalysis was negative for infection.  Chest x-ray was normal and x-ray left hip shows dislocation.  She was given 3 L of fluid in the ED and orthopedic did relocate her left hip prosthetic.  She has been admitted under same service for further evaluation and management of her sepsis and diarrheal illness.    Review of Systems   Constitutional:  Positive for activity change, appetite change and chills. Negative for diaphoresis, fatigue and fever.   HENT:  Negative for congestion, ear pain and sore throat.    Eyes:  Negative for pain and visual disturbance.   Respiratory:  Negative for cough, shortness of breath and wheezing.    Cardiovascular:  Negative for chest pain, palpitations and leg swelling.   Gastrointestinal:  Positive for diarrhea and nausea. Negative for abdominal pain, blood in stool and vomiting.   Genitourinary:  Negative for dysuria and hematuria.   Musculoskeletal:  Positive for arthralgias and gait problem. Negative for back pain.   Skin:  Negative for color change and rash.   Neurological:  Negative for dizziness,  seizures, syncope and light-headedness.   Psychiatric/Behavioral:  Negative for confusion. The patient is not nervous/anxious.    All other systems reviewed and are negative.      Historical Information   Past Medical History:   Diagnosis Date    Allergic     Anxiety     Arthritis     Breast cancer (HCC) 09/2023    CAD (coronary artery disease)     Coronary artery disease [id number]    Depression     Diabetes mellitus (HCC) 5/1/24    Mother-Diabetic    Dyslipidemia     Hiatal hernia     Hyperlipidemia     Hypertension     Obesity     Obesity (BMI 30-39.9)     Psoriatic arthritis (HCC)     Rheumatoid arthritis (HCC)     Scoliosis     SOB (shortness of breath)      Past Surgical History:   Procedure Laterality Date    BARIATRIC SURGERY  2014    BILE DUCT EXPLORATION      replacement per Victor    BREAST BIOPSY  9/23    CARPAL TUNNEL RELEASE Bilateral 2002    CORONARY ARTERY BYPASS GRAFT  2017    FOOT SURGERY Right     bone surgery to straighten toe.    HIP SURGERY Left 2015    IR BIOPSY BONE MARROW  12/11/2024    IR DRAINAGE TUBE PLACEMENT  12/19/2023    JOINT REPLACEMENT      LYMPH NODE BIOPSY Left 11/20/2023    Procedure: LEFT LYMPHATIC MAPPING WITH BLUE AND RADIOACTIVE DYES, SENTINEL LYMPH NODE BIOPSY;  Surgeon: Adrien Gabriel MD;  Location:  MAIN OR;  Service: Surgical Oncology    LYMPH NODE DISSECTION Left 11/20/2023    Procedure: POSSIBLE AXILLARY DISSECTION;  Surgeon: Adrien Gabriel MD;  Location:  MAIN OR;  Service: Surgical Oncology    NE BREAST REDUCTION Bilateral 01/16/2024    Procedure: RIGHT BREAST REDUCTION AND LEFT BREAST EXPANDER EXCHANGE FOR PERMANENT IMPLANT. REVISION OF RECONSTRUCTED LEFT BREAST.;  Surgeon: Molina Jimenez MD;  Location:  MAIN OR;  Service: Plastics    NE SUCTION ASSISTED LIPECTOMY TRUNK Bilateral 01/16/2024    Procedure: LIPOSUCTION BREAST;  Surgeon: Molina Jimenez MD;  Location:  MAIN OR;  Service: Plastics    NE TISSUE EXPANDER PLACEMENT BREAST RECONSTRUCTION Left 11/20/2023     Procedure: IMMEDIATE LEFT BREAST RECONSTRUCTION WITH TISSUE EXPANDER AND ADM;  Surgeon: Molina Jimenez MD;  Location:  MAIN OR;  Service: Plastics    REPLACEMENT TOTAL KNEE Right 2017    SIMPLE MASTECTOMY Left 2023    Procedure: LEFT BREAST VERENICE  DIRECTED MASTECTOMY;  Surgeon: Adrien Gabriel MD;  Location:  MAIN OR;  Service: Surgical Oncology    SLEEVE GASTROPLASTY      TONSILLECTOMY      TOTAL HIP ARTHROPLASTY Right 2017    US GUIDED BREAST BIOPSY LEFT COMPLETE Left 2023     Social History     Tobacco Use    Smoking status: Former     Current packs/day: 0.00     Average packs/day: 0.5 packs/day for 7.0 years (3.5 ttl pk-yrs)     Types: Cigarettes     Start date: 2010     Quit date:      Years since quittin.1    Smokeless tobacco: Never    Tobacco comments:     Have already quit smoking in 2017   Vaping Use    Vaping status: Never Used   Substance and Sexual Activity    Alcohol use: Yes     Alcohol/week: 2.0 standard drinks of alcohol     Types: 2 Glasses of wine per week     Comment: social     Drug use: No    Sexual activity: Not Currently     Partners: Male     Birth control/protection: None     E-Cigarette/Vaping    E-Cigarette Use Never User      E-Cigarette/Vaping Substances    Nicotine No     THC No     CBD No     Flavoring No     Other No      Family History   Problem Relation Age of Onset    Hypertension Mother     Diabetes Mother     Heart disease Father         CABG x5    Arthritis Father     No Known Problems Sister     No Known Problems Sister     No Known Problems Daughter     No Known Problems Daughter     No Known Problems Son     Breast cancer Neg Hx      Social History:  Marital Status: /Civil Union   Occupation: None  Patient Pre-hospital Living Situation: Home  Patient Pre-hospital Level of Mobility: walks with cane  Patient Pre-hospital Diet Restrictions: CHO    Meds/Allergies   I have reviewed home medications with patient personally.  Prior to Admission  medications    Medication Sig Start Date End Date Taking? Authorizing Provider   acyclovir (ZOVIRAX) 200 mg capsule Take 1 capsule by mouth 2 (two) times a day 12/31/24  Yes Historical Provider, MD   anastrozole (ARIMIDEX) 1 mg tablet TAKE 1 TABLET (1 MG TOTAL) BY MOUTH DAILY 6/27/24   Geno Carranza MD   atorvastatin (LIPITOR) 10 mg tablet TAKE 1 TABLET BY MOUTH DAILY 1/8/25   Cortney Conrad MD   cholecalciferol (VITAMIN D3) 400 units tablet Take 400 Units by mouth daily    Historical Provider, MD   citalopram (CeleXA) 20 mg tablet Take 1 tablet (20 mg total) by mouth daily 12/18/24   Lauren Dumont MD   metFORMIN (GLUCOPHAGE-XR) 500 mg 24 hr tablet Take 1 tablet (500 mg total) by mouth daily with dinner 8/9/24   Dagmar Jorgensen MD   metoprolol succinate (Toprol XL) 25 mg 24 hr tablet Take 0.5 tablets (12.5 mg total) by mouth daily 2/27/25   Heaven Christianson MD   midodrine (PROAMATINE) 2.5 mg tablet Take 1 tablet (2.5 mg total) by mouth 3 (three) times a day before meals 2/27/25   Heaven Christianson MD   Multiple Vitamin (multivitamin) tablet Take 1 tablet by mouth daily    Historical Provider, MD   predniSONE 1 mg tablet Take 3 tablets (3 mg total) by mouth daily 2/27/25   Heaven Christianson MD   Semaglutide-Weight Management (WEGOVY) 2.4 MG/0.75ML Inject 0.75 mL (2.4 mg total) under the skin once a week 2/8/25   Dagmar Jorgensen MD   Venetoclax 100 MG TABS Take 4 tablets (400 mg total) by mouth daily 2/17/25   Geno Carranza MD     Allergies   Allergen Reactions    Iodine - Food Allergy Anaphylaxis     Reaction Date: 07Jan2008;     Povidone Iodine Anaphylaxis    Shellfish-Derived Products - Food Allergy Anaphylaxis       Objective :  Temp:  [98.1 °F (36.7 °C)] 98.1 °F (36.7 °C)  HR:  [] 99  BP: ()/(53-77) 106/61  Resp:  [16-20] 16  SpO2:  [93 %-100 %] 98 %  O2 Device: Nasal cannula  Nasal Cannula O2 Flow Rate (L/min):  [2 L/min-6 L/min] 2 L/min    Physical  Exam  Vitals and nursing note reviewed.   Constitutional:       General: She is not in acute distress.     Appearance: She is well-developed. She is ill-appearing.   HENT:      Head: Normocephalic and atraumatic.      Mouth/Throat:      Mouth: Mucous membranes are dry.      Pharynx: Oropharynx is clear.   Eyes:      Extraocular Movements: Extraocular movements intact.      Conjunctiva/sclera: Conjunctivae normal.      Pupils: Pupils are equal, round, and reactive to light.   Cardiovascular:      Rate and Rhythm: Regular rhythm. Tachycardia present.      Heart sounds: Normal heart sounds. No murmur heard.  Pulmonary:      Effort: Pulmonary effort is normal. No respiratory distress.      Breath sounds: Normal breath sounds. No stridor. No wheezing, rhonchi or rales.   Abdominal:      General: Bowel sounds are normal. There is no distension.      Palpations: Abdomen is soft.      Tenderness: There is no abdominal tenderness. There is no guarding.   Musculoskeletal:         General: No swelling or tenderness.      Cervical back: Normal range of motion and neck supple.      Right lower leg: No edema.      Left lower leg: No edema.   Skin:     General: Skin is warm and dry.      Capillary Refill: Capillary refill takes less than 2 seconds.      Findings: No lesion or rash.   Neurological:      General: No focal deficit present.      Mental Status: She is alert and oriented to person, place, and time. Mental status is at baseline.      Cranial Nerves: No cranial nerve deficit.      Motor: No weakness.   Psychiatric:         Mood and Affect: Mood normal.         Behavior: Behavior normal.          Lines/Drains:  Lines/Drains/Airways       Active Status       Name Placement date Placement time Site Days    Urethral Catheter 16 Fr. 02/28/25  0852  --  less than 1                  Urinary Catheter:  Goal for removal: Voiding trial when ambulation improves               Lab Results: I have reviewed the following  results:  Results from last 7 days   Lab Units 02/28/25  0650 02/25/25  1703 02/25/25  1157   WBC Thousand/uL 1.48*   < > 3.85*   HEMOGLOBIN g/dL 9.9*   < > 10.0*   HEMATOCRIT % 30.3*   < > 30.0*   PLATELETS Thousands/uL 146*   < > 201   BANDS PCT % 1  --   --    SEGS PCT %  --   --  42*   LYMPHO PCT % 21  --  53*   MONO PCT % 3*  --  2*   EOS PCT % 8*  --  2    < > = values in this interval not displayed.     Results from last 7 days   Lab Units 02/28/25  0650   SODIUM mmol/L 141   POTASSIUM mmol/L 4.1   CHLORIDE mmol/L 108   CO2 mmol/L 20*   BUN mg/dL 29*   CREATININE mg/dL 0.82   ANION GAP mmol/L 13   CALCIUM mg/dL 8.5   ALBUMIN g/dL 3.6   TOTAL BILIRUBIN mg/dL 0.94   ALK PHOS U/L 66   ALT U/L 34   AST U/L 27   GLUCOSE RANDOM mg/dL 186*     Results from last 7 days   Lab Units 02/28/25  0650   INR  0.99         Lab Results   Component Value Date    HGBA1C 5.9 (H) 10/26/2024    HGBA1C 5.7 03/27/2024     Results from last 7 days   Lab Units 02/28/25  1035 02/28/25  0802 02/28/25  0650   LACTIC ACID mmol/L 3.5* 3.4*  --    PROCALCITONIN ng/ml  --   --  0.50*       Imaging Results Review: I personally reviewed the following image studies/reports in PACS and discussed pertinent findings with Radiology: chest xray and xray(s). My interpretation of the radiology images/reports is: Below.  XR pelvis ap only 1 or 2 vw   ED Interpretation by Raúl Villeda MD (02/28 4029)   Per my independent interpretation: Hip reduced      Final Result by Salbador Cervantes MD (02/28 1153)      Interval relocation of the left total hip arthroplasty components between the reviewed hip radiographs and subsequent pelvis radiographs.         Computerized Assisted Algorithm (CAA) may have been used to analyze all applicable images.            Workstation performed: [id number]         XR hip/pelv 2-3 vws left if performed   Final Result by Salbador Cervantes MD (02/28 1824)      Interval relocation of the left total hip arthroplasty  components between the reviewed hip radiographs and subsequent pelvis radiographs.         Computerized Assisted Algorithm (CAA) may have been used to analyze all applicable images.            Workstation performed: [id number]         XR chest 1 view portable   Final Result by Carlos Barros DO (02/28 0809)      No acute cardiopulmonary disease.            Workstation performed: [id number]         XR hip/pelv 2-3 vws left if performed   ED Interpretation by Raúl Villeda MD (02/28 0750)   Per my independent interpretation: Left hip dislocation.  Concern for cortical defect lateral      Final Result by Carlos Barros DO (02/28 0808)      Dislocation of the left hip arthroplasty.         Computerized Assisted Algorithm (CAA) may have been used to analyze all applicable images.            Workstation performed: [id number]             Other Study Results Review: EKG was reviewed.     Administrative Statements   I have spent a total time of 35 minutes in caring for this patient on the day of the visit/encounter including Diagnostic results, Prognosis, Risks and benefits of tx options, Instructions for management, Patient and family education, Importance of tx compliance, Risk factor reductions, Impressions, Counseling / Coordination of care, Documenting in the medical record, Reviewing/placing orders in the medical record (including tests, medications, and/or procedures), Obtaining or reviewing history  , and Communicating with other healthcare professionals .    ** Please Note: This note has been constructed using a voice recognition system. **

## 2025-02-28 NOTE — SEPSIS NOTE
"  Sepsis Note   Pretty Aguila 74 y.o. female MRN: [id number]  Unit/Bed#: ED-04 Encounter: [id number]       Initial Sepsis Screening       Row Name 02/28/25 1229 02/28/25 1004 02/28/25 0845          Is the patient's history suggestive of a new or worsening infection? Yes (Proceed)  -CL Yes (Proceed)  -CL Yes (Proceed)  -GS      Suspected source of infection suspect infection, source unknown  -CL suspect infection, source unknown  -CL suspect infection, source unknown  -GS      Indicate SIRS criteria Tachycardia > 90 bpm;Leukocytosis (WBC > 05076 IJL) OR Leukopenia (WBC <4000 IJL) OR Bandemia (WBC >10% bands)  -CL Tachycardia > 90 bpm;Leukocytosis (WBC > 51932 IJL) OR Leukopenia (WBC <4000 IJL) OR Bandemia (WBC >10% bands)  -CL Tachycardia > 90 bpm;Leukocytosis (WBC > 01277 IJL) OR Leukopenia (WBC <4000 IJL) OR Bandemia (WBC >10% bands)  -GS      Are two or more of the above signs & symptoms of infection both present and new to the patient? Yes (Proceed)  -CL Yes (Proceed)  -CL Yes (Proceed)  -GS      Assess for evidence of organ dysfunction: Are any of the below criteria present within 6 hours of suspected infection and SIRS criteria that are NOT considered to be chronic conditions? SBP < 90  -CL Lactate > 2.0  -CL Lactate > 2.0  -GS      Date of presentation of severe sepsis -- 02/28/25  -CL 02/28/25  -GS      Time of presentation of severe sepsis -- 1004  -CL 0842  -GS      Date of presentation of septic shock 02/28/25  -CL -- --      Time of presentation of septic shock 1229  -CL -- --      Fluid Resuscitation: 30 ml/kg IV fluid bolus will be given based on actual body weight  -CL -- --      Is the patient is persistently hypotensive in the hour after fluid bolus administration? If yes, patient meets criteria for vasopressor use. YES  -CL -- --      Sepsis Note: Click \"NEXT\" below (NOT \"close\") to generate sepsis note based on above information. YES (proceed by clicking \"NEXT\")  -CL YES (proceed by clicking " "\"NEXT\")  -CL YES (proceed by clicking \"NEXT\")  -GS                User Key  (r) = Recorded By, (t) = Taken By, (c) = Cosigned By      Initials Name Provider Type    CL Raúl Villeda MD Physician     Rosy Goins MD Resident                    Default Flowsheet Data (Last 720 Hours)       Sepsis Reassess       Row Name 02/28/25 1335 02/28/25 1230 02/28/25 1222 02/28/25 1005 02/28/25 1001       Repeat Volume Status and Tissue Perfusion Assessment Performed    Date of Reassessment: 02/28/25  -GS 02/28/25  -CL 02/28/25  -GS 02/28/25  -CL 02/28/25  -GS    Time of Reassessment: 1335  -GS 1230  -CL 1222  -GS 1005  -CL 1001  -GS    Sepsis Reassessment Note: Click \"NEXT\" below (NOT \"close\") to generate sepsis reassessment note. YES (proceed by clicking \"NEXT\")  -GS YES (proceed by clicking \"NEXT\")  -CL YES (proceed by clicking \"NEXT\")  -GS YES (proceed by clicking \"NEXT\")  -CL YES (proceed by clicking \"NEXT\")  -GS    Repeat Volume Status and Tissue Perfusion Assessment Performed -- -- -- -- --              User Key  (r) = Recorded By, (t) = Taken By, (c) = Cosigned By      Initials Name Provider Type    CL Raúl Villeda MD Physician     Rosy Goins MD Resident                    Body mass index is 33.87 kg/m².  Wt Readings from Last 1 Encounters:   02/28/25 84 kg (185 lb 3 oz)        Ideal body weight: 50.1 kg (110 lb 7.2 oz)  Adjusted ideal body weight: 63.7 kg (140 lb 5.5 oz)    "

## 2025-03-01 PROBLEM — R78.81 GRAM-POSITIVE COCCI BACTEREMIA: Status: ACTIVE | Noted: 2025-03-01

## 2025-03-01 PROBLEM — M06.9 RHEUMATOID ARTHRITIS (HCC): Status: ACTIVE | Noted: 2023-07-07

## 2025-03-01 LAB
ANION GAP SERPL CALCULATED.3IONS-SCNC: 4 MMOL/L (ref 4–13)
ANISOCYTOSIS BLD QL SMEAR: PRESENT
BASOPHILS # BLD MANUAL: 0 THOUSAND/UL (ref 0–0.1)
BASOPHILS NFR MAR MANUAL: 0 % (ref 0–1)
BUN SERPL-MCNC: 15 MG/DL (ref 5–25)
CALCIUM SERPL-MCNC: 7.2 MG/DL (ref 8.4–10.2)
CHLORIDE SERPL-SCNC: 111 MMOL/L (ref 96–108)
CO2 SERPL-SCNC: 22 MMOL/L (ref 21–32)
CREAT SERPL-MCNC: 0.52 MG/DL (ref 0.6–1.3)
EOSINOPHIL # BLD MANUAL: 0 THOUSAND/UL (ref 0–0.4)
EOSINOPHIL NFR BLD MANUAL: 0 % (ref 0–6)
ERYTHROCYTE [DISTWIDTH] IN BLOOD BY AUTOMATED COUNT: 18.3 % (ref 11.6–15.1)
GFR SERPL CREATININE-BSD FRML MDRD: 95 ML/MIN/1.73SQ M
GLUCOSE SERPL-MCNC: 100 MG/DL (ref 65–140)
GLUCOSE SERPL-MCNC: 82 MG/DL (ref 65–140)
GLUCOSE SERPL-MCNC: 86 MG/DL (ref 65–140)
GLUCOSE SERPL-MCNC: 89 MG/DL (ref 65–140)
GLUCOSE SERPL-MCNC: 94 MG/DL (ref 65–140)
HCT VFR BLD AUTO: 20.7 % (ref 34.8–46.1)
HGB BLD-MCNC: 6.7 G/DL (ref 11.5–15.4)
HGB BLD-MCNC: 8.7 G/DL (ref 11.5–15.4)
LYMPHOCYTES # BLD AUTO: 0.72 THOUSAND/UL (ref 0.6–4.47)
LYMPHOCYTES # BLD AUTO: 42 % (ref 14–44)
MACROCYTES BLD QL AUTO: PRESENT
MCH RBC QN AUTO: 33.5 PG (ref 26.8–34.3)
MCHC RBC AUTO-ENTMCNC: 32.4 G/DL (ref 31.4–37.4)
MCV RBC AUTO: 104 FL (ref 82–98)
MONOCYTES # BLD AUTO: 0.05 THOUSAND/UL (ref 0–1.22)
MONOCYTES NFR BLD: 3 % (ref 4–12)
NEUTROPHILS # BLD MANUAL: 0.95 THOUSAND/UL (ref 1.85–7.62)
NEUTS BAND NFR BLD MANUAL: 1 % (ref 0–8)
NEUTS SEG NFR BLD AUTO: 54 % (ref 43–75)
OVALOCYTES BLD QL SMEAR: PRESENT
PLATELET # BLD AUTO: 87 THOUSANDS/UL (ref 149–390)
PLATELET BLD QL SMEAR: ABNORMAL
PMV BLD AUTO: 10.3 FL (ref 8.9–12.7)
POIKILOCYTOSIS BLD QL SMEAR: PRESENT
POTASSIUM SERPL-SCNC: 3.8 MMOL/L (ref 3.5–5.3)
PROCALCITONIN SERPL-MCNC: 2.7 NG/ML
RBC # BLD AUTO: 2 MILLION/UL (ref 3.81–5.12)
RBC MORPH BLD: PRESENT
SODIUM SERPL-SCNC: 137 MMOL/L (ref 135–147)
WBC # BLD AUTO: 1.72 THOUSAND/UL (ref 4.31–10.16)

## 2025-03-01 PROCEDURE — 80048 BASIC METABOLIC PNL TOTAL CA: CPT | Performed by: PHYSICIAN ASSISTANT

## 2025-03-01 PROCEDURE — 85018 HEMOGLOBIN: CPT

## 2025-03-01 PROCEDURE — 97167 OT EVAL HIGH COMPLEX 60 MIN: CPT

## 2025-03-01 PROCEDURE — 97535 SELF CARE MNGMENT TRAINING: CPT

## 2025-03-01 PROCEDURE — P9016 RBC LEUKOCYTES REDUCED: HCPCS

## 2025-03-01 PROCEDURE — 36415 COLL VENOUS BLD VENIPUNCTURE: CPT | Performed by: PHYSICIAN ASSISTANT

## 2025-03-01 PROCEDURE — G0545 PR INHERENT VISIT TO INPT: HCPCS | Performed by: INTERNAL MEDICINE

## 2025-03-01 PROCEDURE — 85007 BL SMEAR W/DIFF WBC COUNT: CPT

## 2025-03-01 PROCEDURE — 82948 REAGENT STRIP/BLOOD GLUCOSE: CPT

## 2025-03-01 PROCEDURE — 97116 GAIT TRAINING THERAPY: CPT

## 2025-03-01 PROCEDURE — 86902 BLOOD TYPE ANTIGEN DONOR EA: CPT

## 2025-03-01 PROCEDURE — 84145 PROCALCITONIN (PCT): CPT

## 2025-03-01 PROCEDURE — 97163 PT EVAL HIGH COMPLEX 45 MIN: CPT

## 2025-03-01 PROCEDURE — 87040 BLOOD CULTURE FOR BACTERIA: CPT | Performed by: INTERNAL MEDICINE

## 2025-03-01 PROCEDURE — 99232 SBSQ HOSP IP/OBS MODERATE 35: CPT | Performed by: INTERNAL MEDICINE

## 2025-03-01 PROCEDURE — 30233N1 TRANSFUSION OF NONAUTOLOGOUS RED BLOOD CELLS INTO PERIPHERAL VEIN, PERCUTANEOUS APPROACH: ICD-10-PCS | Performed by: INTERNAL MEDICINE

## 2025-03-01 PROCEDURE — 85027 COMPLETE CBC AUTOMATED: CPT

## 2025-03-01 PROCEDURE — 99223 1ST HOSP IP/OBS HIGH 75: CPT | Performed by: INTERNAL MEDICINE

## 2025-03-01 PROCEDURE — 97763 ORTHC/PROSTC MGMT SBSQ ENC: CPT

## 2025-03-01 RX ORDER — CALCIUM GLUCONATE 20 MG/ML
2 INJECTION, SOLUTION INTRAVENOUS ONCE
Status: COMPLETED | OUTPATIENT
Start: 2025-03-01 | End: 2025-03-01

## 2025-03-01 RX ORDER — ALBUMIN (HUMAN) 12.5 G/50ML
12.5 SOLUTION INTRAVENOUS ONCE
Status: DISCONTINUED | OUTPATIENT
Start: 2025-03-01 | End: 2025-03-01

## 2025-03-01 RX ORDER — CALCIUM GLUCONATE 20 MG/ML
1 INJECTION, SOLUTION INTRAVENOUS ONCE
Status: DISCONTINUED | OUTPATIENT
Start: 2025-03-01 | End: 2025-03-01

## 2025-03-01 RX ADMIN — PREDNISONE 3 MG: 1 TABLET ORAL at 08:24

## 2025-03-01 RX ADMIN — CHOLECALCIFEROL (VITAMIN D3) 10 MCG (400 UNIT) TABLET 400 UNITS: at 08:23

## 2025-03-01 RX ADMIN — VANCOMYCIN HYDROCHLORIDE 1250 MG: 5 INJECTION, POWDER, LYOPHILIZED, FOR SOLUTION INTRAVENOUS at 20:50

## 2025-03-01 RX ADMIN — ACYCLOVIR 200 MG: 200 CAPSULE ORAL at 22:14

## 2025-03-01 RX ADMIN — ANASTROZOLE 1 MG: 1 TABLET, COATED ORAL at 08:24

## 2025-03-01 RX ADMIN — CHLORHEXIDINE GLUCONATE 15 ML: 1.2 RINSE ORAL at 20:59

## 2025-03-01 RX ADMIN — CHLORHEXIDINE GLUCONATE 15 ML: 1.2 RINSE ORAL at 08:28

## 2025-03-01 RX ADMIN — CITALOPRAM HYDROBROMIDE 20 MG: 20 TABLET ORAL at 08:23

## 2025-03-01 RX ADMIN — ACYCLOVIR 200 MG: 200 CAPSULE ORAL at 08:24

## 2025-03-01 RX ADMIN — HEPARIN SODIUM 5000 UNITS: 5000 INJECTION INTRAVENOUS; SUBCUTANEOUS at 05:47

## 2025-03-01 RX ADMIN — MIDODRINE HYDROCHLORIDE 2.5 MG: 2.5 TABLET ORAL at 11:42

## 2025-03-01 RX ADMIN — MIDODRINE HYDROCHLORIDE 2.5 MG: 2.5 TABLET ORAL at 16:31

## 2025-03-01 RX ADMIN — ONDANSETRON 4 MG: 2 INJECTION, SOLUTION INTRAMUSCULAR; INTRAVENOUS at 02:30

## 2025-03-01 RX ADMIN — HEPARIN SODIUM 5000 UNITS: 5000 INJECTION INTRAVENOUS; SUBCUTANEOUS at 21:00

## 2025-03-01 RX ADMIN — MIDODRINE HYDROCHLORIDE 2.5 MG: 2.5 TABLET ORAL at 07:43

## 2025-03-01 RX ADMIN — VANCOMYCIN HYDROCHLORIDE 1500 MG: 5 INJECTION, POWDER, LYOPHILIZED, FOR SOLUTION INTRAVENOUS at 13:31

## 2025-03-01 RX ADMIN — HEPARIN SODIUM 5000 UNITS: 5000 INJECTION INTRAVENOUS; SUBCUTANEOUS at 14:35

## 2025-03-01 RX ADMIN — LIDOCAINE 1 PATCH: 50 PATCH CUTANEOUS at 08:28

## 2025-03-01 RX ADMIN — Medication 1 TABLET: at 08:25

## 2025-03-01 RX ADMIN — CALCIUM GLUCONATE 2 G: 20 INJECTION, SOLUTION INTRAVENOUS at 07:41

## 2025-03-01 RX ADMIN — SODIUM CHLORIDE, SODIUM GLUCONATE, SODIUM ACETATE, POTASSIUM CHLORIDE, MAGNESIUM CHLORIDE, SODIUM PHOSPHATE, DIBASIC, AND POTASSIUM PHOSPHATE 75 ML/HR: .53; .5; .37; .037; .03; .012; .00082 INJECTION, SOLUTION INTRAVENOUS at 17:32

## 2025-03-01 RX ADMIN — ATORVASTATIN CALCIUM 10 MG: 10 TABLET, FILM COATED ORAL at 08:23

## 2025-03-01 RX ADMIN — HYDROMORPHONE HYDROCHLORIDE 0.2 MG: 0.2 INJECTION, SOLUTION INTRAMUSCULAR; INTRAVENOUS; SUBCUTANEOUS at 02:29

## 2025-03-01 NOTE — ASSESSMENT & PLAN NOTE
Patient is on chronic prednisone.  Continue outpatient steroid.    Prior hospitalization and outpatient records reviewed in detail.  Discussed with patient in detail regarding the above plan.

## 2025-03-01 NOTE — ASSESSMENT & PLAN NOTE
Met criteria with tachycardia, low WBC, suspected infection  Lactic acid up to 3.5, Pro-Jaron 0.51  UA negative for infection; Flu COVID antigen negative; CXR normal.   Immunocompromised with history of MDS on azacitidine, breast cancer on anastrozole, RA on prednisone 3 mg daily  Received a total of 4 L of fluid, 1 dose of IV cefepime 2 g and IV metronidazole 500 mg, and placed on continuous IV fluids at 100 cc/hr   Meeting sepsis criteria with some confounding factors, such as dehydration from diarrhea, low WBC count from chemotherapy/MDS, will r/o infection  3/1 pt has been mildly hypotensive despite fluid resuscitation with MAPs in the low 60s. Required 1 unit pRBCs. Lactic acid down to 1.4  3/1 Symptoms have since resolved and pt denies any nausea, vomiting, or diarrhea. Stool studies unable to be obtained  1/2 blood cultures positive for gram positive cocci in pairs and chains, identified as streptococcal    Plan:  ID consulted, appreciate recommendations  Repeat blood cx prior to initiating additional IV abx  Given her positive blood cx and immunocompromised state, will initiate IV abx with vancomycin and IV cefepime 2 g q8h  Follow-up repeat procalcitonin  Continue continuous hydration with fluid 75 mL/h

## 2025-03-01 NOTE — ASSESSMENT & PLAN NOTE
There is growth of GPC in pairs and chains, unidentified by BC[facility], in 1 out of 2 admission blood cultures.  Patient has no indwelling intravascular foreign body.  She has no central venous catheter.  I suspect that this is contaminated blood draw.  However, given acute diarrhea, we also need to consider the possibility of translocation.  Will repeat blood cultures.  Given leukopenia, after repeat blood cultures, will start patient on IV vancomycin empirically.  Repeat blood cultures x 2.  Start IV vancomycin afterwards.  No further need for IV cefepime.

## 2025-03-01 NOTE — PHYSICAL THERAPY NOTE
Physical Therapy Evaluation:    2 forms of pt ID verified:name,birthdate and pt ID teresita    Patient's Name: Pretty Aguila    Admitting Diagnosis  Hip pain [M25.559]    Problem List  Patient Active Problem List   Diagnosis    Coronary artery disease involving native coronary artery of native heart without angina pectoris    Essential hypertension    Dyslipidemia    S/P CABG x 3    Dyspnea on exertion    Vitamin D deficiency    Postsurgical malabsorption    Status post bariatric surgery    At risk for sleep apnea    Chronic bilateral low back pain without sciatica    Candida infection of flexural skin    Postmenopausal    Annual physical exam    Rheumatoid arthritis (HCC)    Other fatigue    Malignant neoplasm of lower-outer quadrant of left breast of female, estrogen receptor positive (HCC)    Moderate episode of recurrent major depressive disorder (HCC)    Anxiety and depression    Restless leg syndrome    Simple chronic bronchitis (HCC)    Continuous opioid dependence (HCC)    Prediabetes    CKD (chronic kidney disease)    Psoriatic arthritis (HCC)    Osteoarthritis of multiple joints    Class 1 obesity due to excess calories with serious comorbidity and body mass index (BMI) of 32.0 to 32.9 in adult    Platelets decreased (HCC)    Pancytopenia (HCC)    MDS (myelodysplastic syndrome), high grade (HCC)    Anemia    Syncope    Obesity, morbid (HCC)    Stage 3a chronic kidney disease (HCC)    Near syncope    Closed dislocation of left hip (HCC)    Sepsis (HCC)    Diarrhea    Gram-positive cocci bacteremia       Past Medical History  Past Medical History:   Diagnosis Date    Allergic     Anxiety     Arthritis     Breast cancer (HCC) 09/2023    CAD (coronary artery disease)     Coronary artery disease [id number]    Depression     Diabetes mellitus (HCC) 5/1/24    Mother-Diabetic    Dyslipidemia     Hiatal hernia     Hyperlipidemia     Hypertension     Obesity     Obesity (BMI 30-39.9)     Psoriatic arthritis (HCC)      Rheumatoid arthritis (HCC)     Scoliosis     SOB (shortness of breath)        Past Surgical History  Past Surgical History:   Procedure Laterality Date    BARIATRIC SURGERY  2014    BILE DUCT EXPLORATION      replacement per [place]    BREAST BIOPSY  9/23    CARPAL TUNNEL RELEASE Bilateral 2002    CORONARY ARTERY BYPASS GRAFT  2017    FOOT SURGERY Right     bone surgery to straighten toe.    HIP SURGERY Left 2015    IR BIOPSY BONE MARROW  12/11/2024    IR DRAINAGE TUBE PLACEMENT  12/19/2023    JOINT REPLACEMENT      LYMPH NODE BIOPSY Left 11/20/2023    Procedure: LEFT LYMPHATIC MAPPING WITH BLUE AND RADIOACTIVE DYES, SENTINEL LYMPH NODE BIOPSY;  Surgeon: Adrien Gabriel MD;  Location: [facility];  Service: Surgical Oncology    LYMPH NODE DISSECTION Left 11/20/2023    Procedure: POSSIBLE AXILLARY DISSECTION;  Surgeon: Adrien Gabriel MD;  Location: [facility];  Service: Surgical Oncology    CO BREAST REDUCTION Bilateral 01/16/2024    Procedure: RIGHT BREAST REDUCTION AND LEFT BREAST EXPANDER EXCHANGE FOR PERMANENT IMPLANT. REVISION OF RECONSTRUCTED LEFT BREAST.;  Surgeon: Molina Jimenez MD;  Location:  MAIN OR;  Service: Plastics    CO SUCTION ASSISTED LIPECTOMY TRUNK Bilateral 01/16/2024    Procedure: LIPOSUCTION BREAST;  Surgeon: Molina Jimenez MD;  Location: [facility];  Service: Plastics    CO TISSUE EXPANDER PLACEMENT BREAST RECONSTRUCTION Left 11/20/2023    Procedure: IMMEDIATE LEFT BREAST RECONSTRUCTION WITH TISSUE EXPANDER AND ADM;  Surgeon: Molina Jimenez MD;  Location: [facility];  Service: Plastics    REPLACEMENT TOTAL KNEE Right 04/2017    SIMPLE MASTECTOMY Left 11/20/2023    Procedure: LEFT BREAST VERENICE  DIRECTED MASTECTOMY;  Surgeon: Adrien Gabriel MD;  Location:  MAIN OR;  Service: Surgical Oncology    SLEEVE GASTROPLASTY      TONSILLECTOMY      TOTAL HIP ARTHROPLASTY Right 2017    US GUIDED BREAST BIOPSY LEFT COMPLETE Left 09/19/2023 03/01/25 1025   PT Last Visit   PT Visit Date 03/01/25   Note  "Type   Note type Evaluation  (plus additional PT treatment session following PT IE)   Pain Assessment   Pain Assessment Tool 0-10   Pain Score No Pain   Restrictions/Precautions   Weight Bearing Precautions Per Order Yes   LLE Weight Bearing Per Order WBAT   Braces or Orthoses Other (Comment)  (hip ABD brace)   Other Precautions Fall Risk;Multiple lines;Telemetry;WBS;Chair Alarm;Bed Alarm, contact precautions  (WBAT LLE with hip ABD brace, posterior total hip precautions)   Home Living   Type of Home House   Home Layout Two level;Other (Comment);Bed/bath upstairs  (pt reports able to stay on main floor second floor, 7+5 steps with HR available)   Home Equipment Walker;Cane   Additional Comments pt reports no use of DME PTA, lives with supportive  who is willing and agreeable to A pt as needed upon DC   Prior Function   Level of [place] Independent with ADLs;Independent with functional mobility   Lives With Spouse   Receives Help From Family  (daughter and son are local)   IADLs Independent with driving;Independent with meal prep;Independent with medication management   Falls in the last 6 months 1 to 4   Vocational Retired   General   Additional Pertinent History hip dislocation, sepsis,r/o CDiff   Family/Caregiver Present No   Cognition   Overall Cognitive Status WFL   Arousal/Participation Cooperative   Attention Within functional limits   Orientation Level Oriented X4   Following Commands Follows all commands and directions without difficulty   Subjective   Subjective pt supine on ED stretcher resting comfortably;pt willing and agreeable to work with PT and to participate in therapy intervention,\"I just need to get up and move\".   RLE Assessment   RLE Assessment   (at least 4/5 grossly throughout)   LLE Assessment   LLE Assessment   (at least 3/5 grossly throughout)   Coordination   Movements are Fluid and Coordinated 0   Coordination and Movement Description ataxic and unsteady gait and movement " "pattern,dec WB LLE,dec BLE step length   Sensation WFL   Light Touch   RLE Light Touch Grossly intact   LLE Light Touch Grossly intact   Bed Mobility   Supine to Sit 5  Supervision   Additional items Assist x 1;HOB elevated;Increased time required;Verbal cues   Transfers   Sit to Stand 5  Supervision   Additional items Assist x 1;Bedrails;Increased time required;Verbal cues   Stand to Sit 5  Supervision   Additional items Assist x 1;Bedrails;Increased time required;Verbal cues   Ambulation/Elevation   Gait pattern Improper Weight shift;Narrow NADINE;Forward Flexion;Decreased L stance;Foward flexed;Short stride;Ataxia;Step to   Gait Assistance 5  Supervision   Additional items Assist x 1;Verbal cues   Assistive Device Rolling walker   Distance 120 feet with use of RW and LLE hip ABD brace on tile surface;no LOB noted and/or observed during upright mobility and gait. \"This feels good to be up and moving and I really like this brace\".   Balance   Static Sitting Fair +  (at edge of ED stretcher pt needed modAx1 for donning LLE hip ABD brace with A for velcro attachment and proper placement)   Dynamic Sitting Fair   Static Standing Fair   Dynamic Standing Fair   Ambulatory Fair   Endurance Deficit   Endurance Deficit Yes   Endurance Deficit Description fatigue, pt able to recall 3/3 total hip posterior following instruction   Activity Tolerance   Activity Tolerance Patient limited by fatigue  (fair->good)   Medical Staff Made Aware OTR Shilpa   Nurse Made Aware yes   Assessment   Prognosis Fair   Problem List Decreased strength;Decreased range of motion;Decreased endurance;Impaired balance;Decreased mobility;Decreased safety awareness;Decreased skin integrity;Orthopedic restrictions  (WBAT LLE with hip ABD brace and posterior total hip precautions)   Assessment Pt is 74 y.o. female seen for PT evaluation s/p admit to [facility] on 2/28/2025 w/ Sepsis (HCC). PT consulted to assess pt's functional mobility and d/c " needs. Order placed for PT eval and tx, w/  PT  order. Comorbidities affecting pt's physical performance at time of assessment include: sepsis, dislocation of LLE hip. PTA, pt was independent w/ all functional mobility w/ out DME, ambulates community distances and elevations, ambulates unrestricted distances and all terrain, ambulates household distances, lives in multi-level home, retired, and lives with supportive  . Personal factors affecting pt at time of IE include: lives in multilevel story house, unable to perform dynamic tasks in community, positive fall history, impulsivity, inability to perform IADLs, and inability to perform ADLs. Please find objective findings from PT assessment regarding body systems outlined above with impairments and limitations including weakness, impaired balance, decreased endurance, gait deviations, decreased activity tolerance, decreased functional mobility tolerance, decreased safety awareness, impaired judgement, fall risk, orthopedic restrictions, SOB upon exertion, decreased skin integrity, and total posterior hip precautions, WBAT LLE with hip ABD brace . The following objective measures performed on IE also reveal limitations: AM-PAC 6-Clicks: 18/24. Pt's clinical presentation is currently unstable/unpredictable seen in pt's presentation of multiple lines, telemetry monitoring,WBAT LLE with hip ABD brace, impulsive at times, dec WB LLE,fatigues and fast selina. Pt to benefit from continued PT tx to address deficits as defined above and maximize level of functional independent mobility and consistency. From PT/mobility standpoint, recommendation at time of d/c would be home with outpatient rehabilitation pending progress in order to facilitate return to PLOF. Level 3 recommended at this time upon DC   Barriers to Discharge Inaccessible home environment  (steps)   Goals   Patient Goals to get home   STG Expiration Date 03/10/25   Short Term Goal #1 in 7-10 days:  (1)  Pt will be able to ambulate greater than 200 feet with use of RW and LLE hip ABD brace on various surfaces needing S to mod (I) level of A in order to A pt to return to PLOF, (2) activity tolerance:45 mins/45mins, (3) pt will be able to perform sit to stand transfers needing mod (I) level of A to and from various surfaces consistently in order to return to PLOF, (4) pt will be able to perform BM needing mod (I) level of A with ability to abide to total posterior hip precautions to A pt to return to PLOF, (5) (I) with BLE therapeutic ex HEP in various positions to A pt to inc balance,strength,mobility,endurance and to A to dec pain, (6) inc balance 1/2 grade in order to dec fall risk, (7) pt will be able to go up and down 12 steps  needing S level of A and use of available HR in order to navigate ANTONIO and bilevel style home as able and as needed prior to D/C, (8) cont to provide pt and pt family education for safe D/C planning, (9) inc BLE strength 1/2 to 1 full grade in order to A pt to inc balance,strength,mobility,endurance and to A to dec pain, (10) pt able to abide to total posterior hip precautions 3/3 verbally and throughout all mobility   PT Treatment Day 1  (additional PT tx session following PT IE)   Plan   Treatment/Interventions Functional transfer training;LE strengthening/ROM;Elevations;Therapeutic exercise;Endurance training;Patient/family training;Equipment eval/education;Bed mobility;Gait training;Continued evaluation;Spoke to nursing;OT   PT Frequency 3-5x/wk   Discharge Recommendation   Rehab Resource Intensity Level, PT III (Minimum Resource Intensity)   Equipment Recommended Walker  (pt reports owning RW at home for use)   AM-PAC Basic Mobility Inpatient   Turning in Flat Bed Without Bedrails 3   Lying on Back to Sitting on Edge of Flat Bed Without Bedrails 3   Moving Bed to Chair 3   Standing Up From Chair Using Arms 3   Walk in Room 3   Climb 3-5 Stairs With Railing 3   Basic Mobility Inpatient  "Raw Score 18   Basic Mobility Standardized Score 41.05   [facility] Highest Level Of Mobility   -HL Goal 6: Walk 10 steps or more   -HLM Achieved 7: Walk 25 feet or more     Time In:1025  Time Out:1040  Total Time: 15 mins      S:  Pt willing and agreeable to peform single curb step stool several times following PT IE in order to mock and reinact home environment setup prior to DC. \"I think I can manage this\".  O:  Pt able to go up and down single curb step stool x5 with support of RW needing min to S level of A. Pt reports having HR available to use on steps at home. Pt fatigues at end of ST and observable SOB present. Education and instruction for gait sequence and safety during ST.  A:  Pt would cont to benefit from skilled inpt PT services to maximize functional independence and to dec caregiver burden upon being DC from the hospital. Pt tolerates ST with fatigue occurring following last stepping trial. Returned pt to edge of ED stretcher to rest.  P:  cont skilled inpt PT services 3-5xs per week for mobility, gait,endurance, education,balance and strength    Deborah Gutierrez, PT         @Deborah Gutierrez PT, DPT@   "

## 2025-03-01 NOTE — PROGRESS NOTES
Progress Note - Hospitalist   Name: Pretty Aguila 74 y.o. female I MRN: [id number]  Unit/Bed#: ED-04 I Date of Admission: 2/28/2025   Date of Service: 3/1/2025 I Hospital Day: 1    Assessment & Plan  Sepsis (HCC)  Met criteria with tachycardia, low WBC, suspected infection  Lactic acid up to 3.5, Pro-Jaron 0.51  UA negative for infection; Flu COVID antigen negative; CXR normal.   Immunocompromised with history of MDS on azacitidine, breast cancer on anastrozole, RA on prednisone 3 mg daily  Received a total of 4 L of fluid, 1 dose of IV cefepime 2 g and IV metronidazole 500 mg, and placed on continuous IV fluids at 100 cc/hr   Meeting sepsis criteria with some confounding factors, such as dehydration from diarrhea, low WBC count from chemotherapy/MDS, will r/o infection  3/1 pt has been mildly hypotensive despite fluid resuscitation with MAPs in the low 60s. Required 1 unit pRBCs. Lactic acid down to 1.4  3/1 Symptoms have since resolved and pt denies any nausea, vomiting, or diarrhea. Stool studies unable to be obtained  1/2 blood cultures positive for gram positive cocci in pairs and chains, identified as streptococcal    Plan:  ID consulted, appreciate recommendations  Repeat blood cx prior to initiating additional IV abx  Given her positive blood cx and immunocompromised state, will initiate IV abx with vancomycin and IV cefepime 2 g q8h  Follow-up repeat procalcitonin  Continue continuous hydration with fluid 75 mL/h  MDS (myelodysplastic syndrome), high grade (HCC)  Has history of MDS currently on azacitidine completed 2 cycles last Friday  Jadon waters recommends adding venetoclax to current regimen with added acitretin for 5 days and venetoclax for 7 days  Plan is to add venetoclax during third cycle  On prophylactic medication Diflucan 100 mg daily, acyclovir 400 mg twice daily    Plan:  Monitor CBC with differential to trend absolute neutrophil count daily  Continue Diflucan 100 mg daily, acyclovir 400 mg twice  daily  Follow-up with outpatient oncology   Diarrhea  Presented on 2/28 with 4 episodes of diarrhea at home started at 3 AM mostly loose foul-smelling  Had turkey sandwich at home at 10 PM  Is on Azacitidine chemotherapy for her MDS  Possibly secondary to viral gastroenteritis, bacterial, C. difficile, chemotherapy induced  3/1 Patient is asymptomatic    Plan:  Continue hydration with 75 mL/h Isolyte  Closed dislocation of left hip (HCC)  Has history of dislocation of left hip  X-ray left hip shows dislocation of left hip arthroplasty  Orthopedics consulted who did relocation  Currently stable, and pt endorses being able to ambulate    Plan:  Orthopedics consulted, would appreciate recommendations  Abduction pillow while in bed  Abduction brace while ambulating  Weightbearing as tolerated  Pain control  PT/OT evaluation and treatment  Malignant neoplasm of lower-outer quadrant of left breast of female, estrogen receptor positive (HCC)  Currently on anastrozole 1 mg daily    Plan:  Continue anastrozole  Pancytopenia (HCC)  Has history of pancytopenia  Initial CBC showed WBC 1.48, hemoglobin 9.9, platelet 146K, absolute neutrophil 1.01  Currently on azacitidine chemotherapy  Most likely from underlying MDS with azacitidine treatment  Repeat CBC this morning with similar findings, except for low Hgb of 6.7. Given 1 unit pRBCs     Plan:  Monitor CBC with differentials  Follow-up repeat Hgb at 4 PM  Transfuse RBC if hemoglobin less than 7  Transfuse platelet if less than 10,000 without bleeding and less than 20,000 with bleeding  If there is drop in ANC to less than 500 will discuss with oncology if can give Neulasta or filgrastim  Gram-positive cocci bacteremia      VTE Pharmacologic Prophylaxis: VTE Score: 8 High Risk (Score >/= 5) - Pharmacological DVT Prophylaxis Ordered: heparin. Sequential Compression Devices Ordered.    Mobility:          Patient Centered Rounds: I performed bedside rounds with nursing staff  today.   Discussions with Specialists or Other Care Team Provider: Infectious Disease    Education and Discussions with Family / Patient: Updated  (daughter) at bedside.    Current Length of Stay: 1 day(s)  Current Patient Status: Inpatient   Certification Statement: The patient will continue to require additional inpatient hospital stay due to treatment of bacteremia  Discharge Plan: Anticipate discharge in 48-72 hrs to home.    Code Status: Level 1 - Full Code    Tolu Olsen was seen and examined at the bedside. This morning, she appeared to be in good spirits, though she was pale. She endorsed feeling better, denying any nausea, vomiting, or diarrhea since admission.     Objective :  Temp:  [98.4 °F (36.9 °C)-98.7 °F (37.1 °C)] 98.4 °F (36.9 °C)  HR:  [] 78  BP: ()/(54-70) 94/55  Resp:  [16-18] 18  SpO2:  [83 %-100 %] 99 %  O2 Device: None (Room air)  Nasal Cannula O2 Flow Rate (L/min):  [2 L/min] 2 L/min    Body mass index is 33.87 kg/m².     Input and Output Summary (last 24 hours):     Intake/Output Summary (Last 24 hours) at 3/1/2025 1312  Last data filed at 3/1/2025 1106  Gross per 24 hour   Intake --   Output 2675 ml   Net -2675 ml       Physical Exam  Vitals reviewed.   Constitutional:       General: She is not in acute distress.     Appearance: She is not ill-appearing or diaphoretic.   HENT:      Head: Normocephalic and atraumatic.      Mouth/Throat:      Mouth: Mucous membranes are dry.      Pharynx: Oropharynx is clear.   Eyes:      General: No scleral icterus.     Extraocular Movements: Extraocular movements intact.      Conjunctiva/sclera: Conjunctivae normal.   Cardiovascular:      Rate and Rhythm: Normal rate and regular rhythm.      Heart sounds: Normal heart sounds. No murmur heard.     No friction rub. No gallop.   Pulmonary:      Effort: Pulmonary effort is normal. No respiratory distress.      Breath sounds: Normal breath sounds. No stridor. No wheezing,  rhonchi or rales.   Abdominal:      General: Bowel sounds are normal. There is no distension.      Palpations: Abdomen is soft.      Tenderness: There is no abdominal tenderness. There is no guarding.   Musculoskeletal:      Right lower leg: No edema.      Left lower leg: No edema.   Skin:     General: Skin is warm and dry.      Coloration: Skin is pale.   Neurological:      General: No focal deficit present.      Mental Status: She is alert and oriented to person, place, and time.   Psychiatric:         Mood and Affect: Mood normal.         Behavior: Behavior normal.           Lines/Drains:  Lines/Drains/Airways       Active Status       Name Placement date Placement time Site Days    Urethral Catheter 16 Fr. 02/28/25  0852  --  1                  Urinary Catheter:  Goal for removal: Voiding trial when ambulation improves           Telemetry:  Telemetry Orders (From admission, onward)               24 Hour Telemetry Monitoring  Continuous x 24 Hours (Telem)        Expiring   Question:  Reason for 24 Hour Telemetry  Answer:  Arrhythmias requiring acute medical intervention / PPM or ICD malfunction                     Telemetry Reviewed: Normal Sinus Rhythm  Indication for Continued Telemetry Use: No indication for continued use. Will discontinue.                Lab Results: I have reviewed the following results:   Results from last 7 days   Lab Units 03/01/25  0745 02/25/25  1703 02/25/25  1157   WBC Thousand/uL 1.72*   < > 3.85*   HEMOGLOBIN g/dL 6.7*   < > 10.0*   HEMATOCRIT % 20.7*   < > 30.0*   PLATELETS Thousands/uL 87*   < > 201   BANDS PCT % 1   < >  --    SEGS PCT %  --   --  42*   LYMPHO PCT % 42   < > 53*   MONO PCT % 3*   < > 2*   EOS PCT % 0   < > 2    < > = values in this interval not displayed.     Results from last 7 days   Lab Units 03/01/25  0553 02/28/25  0650   SODIUM mmol/L 137 141   POTASSIUM mmol/L 3.8 4.1   CHLORIDE mmol/L 111* 108   CO2 mmol/L 22 20*   BUN mg/dL 15 29*   CREATININE mg/dL  0.52* 0.82   ANION GAP mmol/L 4 13   CALCIUM mg/dL 7.2* 8.5   ALBUMIN g/dL  --  3.6   TOTAL BILIRUBIN mg/dL  --  0.94   ALK PHOS U/L  --  66   ALT U/L  --  34   AST U/L  --  27   GLUCOSE RANDOM mg/dL 86 186*     Results from last 7 days   Lab Units 02/28/25  0650   INR  0.99     Results from last 7 days   Lab Units 03/01/25  0708 02/28/25  2150 02/28/25  1727   POC GLUCOSE mg/dl 89 115 96     Results from last 7 days   Lab Units 02/28/25  0650   HEMOGLOBIN A1C % 6.1*     Results from last 7 days   Lab Units 02/28/25  1859 02/28/25  1558 02/28/25  1035 02/28/25  0802 02/28/25  0650   LACTIC ACID mmol/L 1.4 2.5* 3.5* 3.4*  --    PROCALCITONIN ng/ml  --   --   --   --  0.50*       Recent Cultures (last 7 days):   Results from last 7 days   Lab Units 02/28/25  0802 02/28/25  0650   BLOOD CULTURE   --  Received in Microbiology Lab. Culture in Progress.   GRAM STAIN RESULT  Gram positive cocci in pairs and chains*  --        Imaging Results Review: I reviewed radiology reports from this admission including: chest xray and xray(s).      Last 24 Hours Medication List:     Current Facility-Administered Medications:     acetaminophen (Ofirmev) injection 1,000 mg, Q6H PRN    acyclovir (ZOVIRAX) capsule 200 mg, BID    anastrozole (ARIMIDEX) tablet 1 mg, Daily    atorvastatin (LIPITOR) tablet 10 mg, Daily    cefepime (MAXIPIME) 2 g/50 mL dextrose IVPB, Q8H    chlorhexidine (PERIDEX) 0.12 % oral rinse 15 mL, Q12H SHRADDHA    Cholecalciferol (VITAMIN D3) tablet 400 Units, Daily    citalopram (CeleXA) tablet 20 mg, Daily    heparin (porcine) subcutaneous injection 5,000 Units, Q8H SHRADDHA    HYDROmorphone HCl (DILAUDID) injection 0.2 mg, Q2H PRN    insulin lispro (HumALOG/ADMELOG) 100 units/mL subcutaneous injection 1-6 Units, 4x Daily (AC & HS) **AND** Fingerstick Glucose (POCT), 4x Daily AC and at bedtime    lidocaine (LIDODERM) 5 % patch 1 patch, Daily    [Held by provider] metoprolol succinate (TOPROL-XL) 24 hr tablet 12.5 mg, Daily     midodrine (PROAMATINE) tablet 2.5 mg, TID AC    multi-electrolyte (PLASMALYTE-A/ISOLYTE-S PH 7.4) IV solution, Continuous, Last Rate: 100 mL/hr (02/28/25 1703)    multivitamin stress formula tablet 1 tablet, Daily    naloxone (NARCAN) 0.04 mg/mL syringe 0.04 mg, Q1MIN PRN    ondansetron (ZOFRAN) injection 4 mg, Q4H PRN    predniSONE tablet 3 mg, Daily    vancomycin (VANCOCIN) 1500 mg in sodium chloride 0.9% 250 mL IVPB, Once    Administrative Statements   Today, Patient Was Seen By: Dagmar Carbajal DO      **Please Note: This note may have been constructed using a voice recognition system.**

## 2025-03-01 NOTE — PLAN OF CARE
Problem: PHYSICAL THERAPY ADULT  Goal: Performs mobility at highest level of function for planned discharge setting.  See evaluation for individualized goals.  Description: Treatment/Interventions: Functional transfer training, LE strengthening/ROM, Elevations, Therapeutic exercise, Endurance training, Patient/family training, Equipment eval/education, Bed mobility, Gait training, Continued evaluation, Spoke to nursing, OT  Equipment Recommended: Walker (pt reports owning RW at home for use)       See flowsheet documentation for full assessment, interventions and recommendations.  Note: Prognosis: Fair  Problem List: Decreased strength, Decreased range of motion, Decreased endurance, Impaired balance, Decreased mobility, Decreased safety awareness, Decreased skin integrity, Orthopedic restrictions (WBAT LLE with hip ABD brace and posterior total hip precautions)  Assessment: Pt is 74 y.o. female seen for PT evaluation s/p admit to [facility] on 2/28/2025 w/ Sepsis ([facility]). PT consulted to assess pt's functional mobility and d/c needs. Order placed for PT eval and tx, w/  PT  order. Comorbidities affecting pt's physical performance at time of assessment include: sepsis, dislocation of LLE hip. PTA, pt was independent w/ all functional mobility w/ out DME, ambulates community distances and elevations, ambulates unrestricted distances and all terrain, ambulates household distances, lives in multi-level home, retired, and lives with supportive  . Personal factors affecting pt at time of IE include: lives in multilevel story house, unable to perform dynamic tasks in community, positive fall history, impulsivity, inability to perform IADLs, and inability to perform ADLs. Please find objective findings from PT assessment regarding body systems outlined above with impairments and limitations including weakness, impaired balance, decreased endurance, gait deviations, decreased activity tolerance, decreased  functional mobility tolerance, decreased safety awareness, impaired judgement, fall risk, orthopedic restrictions, SOB upon exertion, decreased skin integrity, and total posterior hip precautions, WBAT LLE with hip ABD brace . The following objective measures performed on IE also reveal limitations: AM-PAC 6-Clicks: 18/24. Pt's clinical presentation is currently unstable/unpredictable seen in pt's presentation of multiple lines, telemetry monitoring,WBAT LLE with hip ABD brace, impulsive at times, dec WB LLE,fatigues and fast selina. Pt to benefit from continued PT tx to address deficits as defined above and maximize level of functional independent mobility and consistency. From PT/mobility standpoint, recommendation at time of d/c would be home with outpatient rehabilitation pending progress in order to facilitate return to PLOF. Level 3 recommended at this time upon DC  Barriers to Discharge: Inaccessible home environment (steps)     Rehab Resource Intensity Level, PT: III (Minimum Resource Intensity)    See flowsheet documentation for full assessment.

## 2025-03-01 NOTE — ASSESSMENT & PLAN NOTE
Presented on 2/28 with 4 episodes of diarrhea at home started at 3 AM mostly loose foul-smelling  Had turkey sandwich at home at 10 PM  Is on Azacitidine chemotherapy for her MDS  Possibly secondary to viral gastroenteritis, bacterial, C. difficile, chemotherapy induced  3/1 Patient is asymptomatic    Plan:  Continue hydration with 75 mL/h [place]te

## 2025-03-01 NOTE — ASSESSMENT & PLAN NOTE
Has history of dislocation of left hip  X-ray left hip shows dislocation of left hip arthroplasty  Orthopedics consulted who did relocation  Currently stable, and pt endorses being able to ambulate    Plan:  Orthopedics consulted, would appreciate recommendations  Abduction pillow while in bed  Abduction brace while ambulating  Weightbearing as tolerated  Pain control  PT/OT evaluation and treatment

## 2025-03-01 NOTE — OCCUPATIONAL THERAPY NOTE
Occupational Therapy Evaluation     Patient Name: Pretty Aguila  Today's Date: 3/1/2025  Problem List  Principal Problem:    Sepsis (HCC)  Active Problems:    Malignant neoplasm of lower-outer quadrant of left breast of female, estrogen receptor positive (HCC)    Pancytopenia (HCC)    MDS (myelodysplastic syndrome), high grade (HCC)    Closed dislocation of left hip (HCC)    Diarrhea    Past Medical History  Past Medical History:   Diagnosis Date    Allergic     Anxiety     Arthritis     Breast cancer (HCC) 09/2023    CAD (coronary artery disease)     Coronary artery disease [id number]    Depression     Diabetes mellitus (HCC) 5/1/24    Mother-Diabetic    Dyslipidemia     Hiatal hernia     Hyperlipidemia     Hypertension     Obesity     Obesity (BMI 30-39.9)     Psoriatic arthritis (HCC)     Rheumatoid arthritis (HCC)     Scoliosis     SOB (shortness of breath)      Past Surgical History  Past Surgical History:   Procedure Laterality Date    BARIATRIC SURGERY  2014    BILE DUCT EXPLORATION      replacement per Steffanie    BREAST BIOPSY  9/23    CARPAL TUNNEL RELEASE Bilateral 2002    CORONARY ARTERY BYPASS GRAFT  2017    FOOT SURGERY Right     bone surgery to straighten toe.    HIP SURGERY Left 2015    IR BIOPSY BONE MARROW  12/11/2024    IR DRAINAGE TUBE PLACEMENT  12/19/2023    JOINT REPLACEMENT      LYMPH NODE BIOPSY Left 11/20/2023    Procedure: LEFT LYMPHATIC MAPPING WITH BLUE AND RADIOACTIVE DYES, SENTINEL LYMPH NODE BIOPSY;  Surgeon: Adrien Gabriel MD;  Location:  MAIN OR;  Service: Surgical Oncology    LYMPH NODE DISSECTION Left 11/20/2023    Procedure: POSSIBLE AXILLARY DISSECTION;  Surgeon: Adrien Gabriel MD;  Location:  MAIN OR;  Service: Surgical Oncology    SD BREAST REDUCTION Bilateral 01/16/2024    Procedure: RIGHT BREAST REDUCTION AND LEFT BREAST EXPANDER EXCHANGE FOR PERMANENT IMPLANT. REVISION OF RECONSTRUCTED LEFT BREAST.;  Surgeon: Molina Jimenez MD;  Location: [facility] MAIN OR;  Service: Plastics    SD  SUCTION ASSISTED LIPECTOMY TRUNK Bilateral 01/16/2024    Procedure: LIPOSUCTION BREAST;  Surgeon: Molina Jimenez MD;  Location: [facility]IN OR;  Service: Plastics    WV TISSUE EXPANDER PLACEMENT BREAST RECONSTRUCTION Left 11/20/2023    Procedure: IMMEDIATE LEFT BREAST RECONSTRUCTION WITH TISSUE EXPANDER AND ADM;  Surgeon: Molina Jimenez MD;  Location: [facility]IN OR;  Service: Plastics    REPLACEMENT TOTAL KNEE Right 04/2017    SIMPLE MASTECTOMY Left 11/20/2023    Procedure: LEFT BREAST VERENICE  DIRECTED MASTECTOMY;  Surgeon: Adrien Gabriel MD;  Location: [facility];  Service: Surgical Oncology    SLEEVE GASTROPLASTY      TONSILLECTOMY      TOTAL HIP ARTHROPLASTY Right 2017    US GUIDED BREAST BIOPSY LEFT COMPLETE Left 09/19/2023 03/01/25 0915   OT Last Visit   OT Visit Date 03/01/25   Note Type   Note type Evaluation   Pain Assessment   Pain Assessment Tool 0-10   Pain Score No Pain   Restrictions/Precautions   Weight Bearing Precautions Per Order Yes   LLE Weight Bearing Per Order (S)  WBAT  (posterior hip precautions + abduction brace to be worn when OOB)   Other Precautions (S)    (hip abduction brace and hip abduction wedge)   Home Living   Type of Home House   Home Layout Two level  (bilevel 7+5, able to stay on 2nd floor)   Bathroom Shower/Tub Tub/shower unit  (does have walk in but downstairs)   Bathroom Toilet Raised   Bathroom Equipment Grab bars in shower;Shower chair   Bathroom Accessibility Accessible   Home Equipment Walker;Cane   Additional Comments no use of AD at baseline   Prior Function   Level of [place] Independent with ADLs   Lives With Spouse   Receives Help From Family  (daughter + son are local)   IADLs Independent with driving;Independent with meal prep;Independent with medication management   Falls in the last 6 months 1 to 4   Vocational Retired   Lifestyle   Autonomy PTA pt living with  in 2SH, pt (I) with ADLs and IADLs, (+)falls, (+)drives, no use of AD at baseline  "  Reciprocal Relationships supportive    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 Pt does report that she often relocates her own hip at home when it dislocates. Provided with hip abduction brace to be worn when OOB. PMH: MDS, diarrhea, DM, syncope.   Family/Caregiver Present No   Subjective   Subjective \"It feels good in this brace\"   ADL   Eating Assistance 7  Independent   Grooming Assistance 7  Independent   UB Bathing Assistance 5  Supervision/Setup   LB Bathing Assistance 5  Supervision/Setup   UB Dressing Assistance 5  Supervision/Setup   UB Dressing Deficit Thread RUE;Thread LUE;Pull around back   LB Dressing Assistance 5  Supervision/Setup   LB Dressing Deficit Increased time to complete;Verbal cueing;Supervision/safety  (don/doffing brace. See treatment note for edu on LH AE)   Toileting Assistance  5  Supervision/Setup   Bed Mobility   Supine to Sit 5  Supervision   Additional items Increased time required;Verbal cues;LE management   Transfers   Sit to Stand 5  Supervision   Additional items Increased time required;Verbal cues   Stand to Sit 5  Supervision   Additional items Increased time required;Verbal cues   Additional Comments use of RW   Functional Mobility   Functional Mobility 5  Supervision   Additional Comments functional household distance   Additional items Rolling walker   Balance   Static Sitting Good   Dynamic Sitting Good   Static Standing Fair +   Dynamic Standing Fair   Ambulatory Fair   Activity Tolerance   Activity Tolerance Patient tolerated treatment well   Medical Staff Made Aware PT BROOKE Philippe Assessment   RUE Assessment WFL  (noted slight swelling in forearm - RN made aware)   LUE Assessment   LUE Assessment WFL "   Hand Function   Gross Motor Coordination Functional   Fine Motor Coordination Functional   Cognition   Overall Cognitive Status WFL   Arousal/Participation Alert;Cooperative   Attention Within functional limits   Orientation Level Oriented X4   Memory Within functional limits   Following Commands Follows all commands and directions without difficulty   Comments pleasant and cooperative   Assessment   Limitation Decreased ADL status;Decreased Safe judgement during ADL;Decreased endurance;Decreased self-care trans;Decreased high-level ADLs  (impaired balance, fxnl mobility, act mary, standing mary, strength, pacing)   Prognosis Good   Assessment Pt is a 74 y.o. female seen for OT evaluation s/p admission to [facility] on 2/28/2025 due to hip dislocation. Diagnosed with Sepsis (HCC). Personal and env factors supporting pt at time of IE include (I) PLOF, supportive . Personal and env factors inhibiting engagement in occupations include  ANTONIO home . Performance deficits that affect the pt’s occupational performance can be seen above. Due to pt's current functional limitations and medical complications pt is functioning below baseline. Pt would benefit from continued skilled OT treatment in order to maximize safety, independence and overall performance with ADLs, functional mobility, and functional transfers in order to achieve highest level of function.   Goals   Patient Goals to go home   LTG Time Frame 10-14   Long Term Goal see goals listed below   Plan   Treatment Interventions ADL retraining;Functional transfer training;Endurance training;Patient/family training;Equipment evaluation/education;Compensatory technique education;Energy conservation;Activityengagement   Goal Expiration Date 03/11/25   OT Treatment Day 1   OT Frequency 3-5x/wk   Discharge Recommendation   Rehab Resource Intensity Level, OT III (Minimum Resource Intensity)   Equipment Recommended Hip Kit ($);Shower transfer bench ($$)   Additional Comments   edu pt on equipment for home - declining purchasing from hospital reports that she will buy on [employer] or  will assist   AM-PAC Daily Activity Inpatient   Lower Body Dressing 3   Bathing 3   Toileting 3   Upper Body Dressing 4   Grooming 4   Eating 4   Daily Activity Raw Score 21   Daily Activity Standardized Score (Calc for Raw Score >=11) 44.27   AM-PAC Applied Cognition Inpatient   Following a Speech/Presentation 4   Understanding Ordinary Conversation 4   Taking Medications 4   Remembering Where Things Are Placed or Put Away 4   Remembering List of 4-5 Errands 4   Taking Care of Complicated Tasks 4   Applied Cognition Raw Score 24   Applied Cognition Standardized Score 62.21   Additional Treatment Session   Start Time 0931   End Time 0955   Treatment Assessment Pt seen for additional skilled OT treatment to address THPs + LB ADL tasks. Pt able to recall 3/3 THPs at start and end of session. Provided wit handout for future reminders. Pt provided with demonstration on use of reacher, sock aid, LH sponge, shoehorn and edu on elastic shoe laces. Pt with good carryover and demonstrating + verbalizing understanding of education. Pt reports that she plans to purchase hip kit on [employer]. Pt demonstrating good understanding of how to don/doff hip abduction brace for ease of dressing/bathing. Pt also educated on techniques for in/out of shower, pt verbalizing understanding.   Additional Treatment Day 1   End of Consult   Patient Position at End of Consult Bedside chair;All needs within reach        GOALS:      -Patient will be Mod I with LB dressing with use of AE and AD as needed in order to increase (I) with ADLs    -Patient will be Mod I with LB bathing with use of AE and AD as needed in order to increase (I) with ADLs    -Patient will complete toileting w/ Mod I w/ G hygiene/thoroughness in order to reduce caregiver burden    -Patient will demonstrate Mod I with bed mobility for ability to manage own comfort and  initiate OOB tasks.     -Patient will perform functional transfers with Mod I to/from all surfaces using DME as needed in order to increase (I) with functional tasks    -Patient will be Mod I with functional mobility to/from bathroom for increased independence with toileting tasks    -Patient will independently integrate THPs precautions into dressing activity 100% of the time without VC.     -Patient will demonstrate proper use of LH AE during 100% of tx sessions in order to increase ADL performance and safety following discharge    -Patient will complete simulated tub/shower transfer with overall mod I in order to safely participate in bathing tasks upon d/c home      The patient's raw score on the -PAC Daily Activity Inpatient Short Form is 21. A raw score of less than 19 suggests the patient may benefit from discharge to post-acute rehabilitation services. HOWEVER please refer to the recommendation of the Occupational Therapist for safe discharge planning.    This session, pt required and most appropriately benefited from skilled OT/PT co-eval due to unpredictable medical and/or functional status. OT and PT goals were addressed separately as seen in documentation.     Shilpa Cavanaugh MS, OTR/L

## 2025-03-01 NOTE — ASSESSMENT & PLAN NOTE
Patient presented with leukopenia, tachycardia, with lactic acidosis.  She was also briefly hypotension.  I suspect this is all secondary to gastroenteritis.  Her brief hypotension and lactic acidosis are most likely secondary to prerenal state from diarrhea.  Lactic acidosis has resolved.  Hypotension much improved.  Spite all above, patient remains systemically well, without toxicity.  Antibiotic plan as in below.  Monitor temperature/WBC.  Monitor hemodynamics.

## 2025-03-01 NOTE — PLAN OF CARE
Problem: Prexisting or High Potential for Compromised Skin Integrity  Goal: Skin integrity is maintained or improved  Description: INTERVENTIONS:  - Identify patients at risk for skin breakdown  - Assess and monitor skin integrity  - Assess and monitor nutrition and hydration status  - Monitor labs   - Assess for incontinence   - Turn and reposition patient  - Assist with mobility/ambulation  - Relieve pressure over bony prominences  - Avoid friction and shearing  - Provide appropriate hygiene as needed including keeping skin clean and dry  - Evaluate need for skin moisturizer/barrier cream  - Collaborate with interdisciplinary team   - Patient/family teaching  - Consider wound care consult   3/1/2025 1753 by Sandee Bush RN  Outcome: Progressing  3/1/2025 1753 by Sandee Bush RN  Outcome: Progressing     Problem: PAIN - ADULT  Goal: Verbalizes/displays adequate comfort level or baseline comfort level  Description: Interventions:  - Encourage patient to monitor pain and request assistance  - Assess pain using appropriate pain scale  - Administer analgesics based on type and severity of pain and evaluate response  - Implement non-pharmacological measures as appropriate and evaluate response  - Consider cultural and social influences on pain and pain management  - Notify physician/advanced practitioner if interventions unsuccessful or patient reports new pain  Outcome: Progressing

## 2025-03-01 NOTE — PROGRESS NOTES
Pretty Aguila is a 74 y.o. female who is currently ordered Vancomycin IV with management by the Pharmacy Consult service.  Relevant clinical data and objective / subjective history reviewed.  Vancomycin Assessment:  Indication and Goal AUC/Trough: Bacteremia (goal -600, trough >10)  Clinical Status:  new start  Micro:     Renal Function:  SCr: 0.52 mg/dL  CrCl: 96.9 mL/min  Renal replacement: Not on dialysis  Days of Therapy: 1  Current Dose: 1500 mg IV x 1 load  Vancomycin Plan:  New Dosin mg IV q12h  Estimated AUC: 541 mcg*hr/mL  Estimated Trough: 14.8 mcg/mL  Next Level: 3/2 @ 0600  Renal Function Monitoring: Daily BMP and UOP  Pharmacy will continue to follow closely for s/sx of nephrotoxicity, infusion reactions and appropriateness of therapy.  BMP and CBC will be ordered per protocol. We will continue to follow the patient’s culture results and clinical progress daily.    Ebonie Vale, Pharmacist   No

## 2025-03-01 NOTE — ASSESSMENT & PLAN NOTE
Patient is status post reduction of her dislocation.  No evidence of hip infection clinically.  Orthopedic surgery follow-up.

## 2025-03-01 NOTE — ASSESSMENT & PLAN NOTE
Has history of MDS currently on azacitidine completed 2 cycles last Friday  Jadon waters recommends adding venetoclax to current regimen with added acitretin for 5 days and venetoclax for 7 days  Plan is to add venetoclax during third cycle  On prophylactic medication Diflucan 100 mg daily, acyclovir 400 mg twice daily    Plan:  Monitor CBC with differential to trend absolute neutrophil count daily  Continue Diflucan 100 mg daily, acyclovir 400 mg twice daily  Follow-up with outpatient oncology

## 2025-03-01 NOTE — ASSESSMENT & PLAN NOTE
Patient had acute nausea/vomiting with diarrhea, which resolved spontaneously after a few hours.  Abdominal exam is benign.  This is most likely viral gastroenteritis.  Monitor for recurrent diarrhea.  Serial abdominal exams.

## 2025-03-01 NOTE — PLAN OF CARE
Problem: OCCUPATIONAL THERAPY ADULT  Goal: Performs self-care activities at highest level of function for planned discharge setting.  See evaluation for individualized goals.  Description: Treatment Interventions: ADL retraining, Functional transfer training, Endurance training, Patient/family training, Equipment evaluation/education, Compensatory technique education, Energy conservation, Activityengagement  Equipment Recommended: Hip Kit ($), Shower transfer bench ($$)       See flowsheet documentation for full assessment, interventions and recommendations.   Note: Limitation: Decreased ADL status, Decreased Safe judgement during ADL, Decreased endurance, Decreased self-care trans, Decreased high-level ADLs (impaired balance, fxnl mobility, act mary, standing mary, strength, pacing)  Prognosis: Good  Assessment: Pt is a 74 y.o. female seen for OT evaluation s/p admission to [facility] on 2/28/2025 due to hip dislocation. Diagnosed with Sepsis (HCC). Personal and env factors supporting pt at time of IE include (I) PLOF, supportive . Personal and env factors inhibiting engagement in occupations include  [place] home . Performance deficits that affect the pt’s occupational performance can be seen above. Due to pt's current functional limitations and medical complications pt is functioning below baseline. Pt would benefit from continued skilled OT treatment in order to maximize safety, independence and overall performance with ADLs, functional mobility, and functional transfers in order to achieve highest level of function.     Rehab Resource Intensity Level, OT: III (Minimum Resource Intensity)

## 2025-03-01 NOTE — ASSESSMENT & PLAN NOTE
Has history of pancytopenia  Initial CBC showed WBC 1.48, hemoglobin 9.9, platelet 146K, absolute neutrophil 1.01  Currently on azacitidine chemotherapy  Most likely from underlying MDS with azacitidine treatment  Repeat CBC this morning with similar findings, except for low Hgb of 6.7. Given 1 unit pRBCs     Plan:  Monitor CBC with differentials  Follow-up repeat Hgb at 4 PM  Transfuse RBC if hemoglobin less than 7  Transfuse platelet if less than 10,000 without bleeding and less than 20,000 with bleeding  If there is drop in ANC to less than 500 will discuss with oncology if can give Neulasta or filgrastim

## 2025-03-01 NOTE — CONSULTS
Consultation - Infectious Disease   Name: Pretty Aguila 74 y.o. female I MRN: [id number]  Unit/Bed#: ED-04 I Date of Admission: 2/28/2025   Date of Service: 3/1/2025 I Hospital Day: 1   Inpatient consult to Infectious Diseases  Consult performed by: Stanislav Pacheco MD  Consult ordered by: Dagmar Carbajal DO        Physician Requesting Evaluation: Alejandrina Gardner MD   Reason for Evaluation / Principal Problem: Bacteremia.    Assessment & Plan  Sepsis (HCC)  Patient presented with leukopenia, tachycardia, with lactic acidosis.  She was also briefly hypotension.  I suspect this is all secondary to gastroenteritis.  Her brief hypotension and lactic acidosis are most likely secondary to prerenal state from diarrhea.  Lactic acidosis has resolved.  Hypotension much improved.  Spite all above, patient remains systemically well, without toxicity.  Antibiotic plan as in below.  Monitor temperature/WBC.  Monitor hemodynamics.  Gram-positive cocci bacteremia  There is growth of GPC in pairs and chains, unidentified by BCID, in 1 out of 2 admission blood cultures.  Patient has no indwelling intravascular foreign body.  She has no central venous catheter.  I suspect that this is contaminated blood draw.  However, given acute diarrhea, we also need to consider the possibility of translocation.  Will repeat blood cultures.  Given leukopenia, after repeat blood cultures, will start patient on IV vancomycin empirically.  Repeat blood cultures x 2.  Start IV vancomycin afterwards.  No further need for IV cefepime.  Diarrhea  Patient had acute nausea/vomiting with diarrhea, which resolved spontaneously after a few hours.  Abdominal exam is benign.  This is most likely viral gastroenteritis.  Monitor for recurrent diarrhea.  Serial abdominal exams.  Pancytopenia (HCC)  Send has chronic pancytopenia, including leukopenia, without meera neutropenia, with ANC almost 1000.  Monitor CBC/ANC.  MDS (myelodysplastic syndrome), high grade  (HCC)  Patient is currently on azacitidine for MDS via subcutaneous injection.  She has no PICC or port in place.  She has chronic pancytopenia but without meera neutropenia.  Monitor CBC.  Hematology/oncology follow-up.  Closed dislocation of left hip (HCC)  Patient is status post reduction of her dislocation.  No evidence of hip infection clinically.  Orthopedic surgery follow-up.  Malignant neoplasm of lower-outer quadrant of left breast of female, estrogen receptor positive (HCC)  Patient is currently on anastrozole.  Oncology follow-up.  Rheumatoid arthritis (HCC)  Patient is on chronic prednisone.  Continue outpatient steroid.    Prior hospitalization and outpatient records reviewed in detail.  Discussed with patient in detail regarding the above plan.  I have discussed with Dr. Carbajal from primary service regarding the above plan to start IV vancomycin.  Team agrees with the plan.    Antibiotics:  Cefepime    History of Present Illness   Pretty Aguila is a 74 y.o. year old female, with multiple medical problems including MDS on azacitidine, breast cancer on anastrozole, DM, RA, bilateral HERVE, who presented to ER early yesterday morning with acute nausea/vomiting with diarrhea the night before.  While trying to get out of bed, she dislocated her hip also.  On presentation, patient did not have fever.  She was leukopenic.  Lactic acid was elevated.  Today, there is growth of GPC in pairs and chains in 1 out of 2 admission blood cultures.  For these reasons, we are asked to evaluate the patient.    Patient's hip location was reduced.  At present, she feels quite well.  Hip pain minimal at rest.  She has no further nausea, vomiting or diarrhea.  No abdominal pain.  No chills.  Of note, patient was recently admitted from 2/25 until 2/27 for near syncope.  Symptoms resolved and did not recur during hospitalization.    As in above, patient has MDS, on azacitidine.  She has chronic leukopenia.  Patient denies any  skin lesion.  No presence of PICC or port.  She had colonoscopy in 2023, benign except for internal hemorrhoids.    A complete review of systems is negative other than that noted in the HPI.    Medical History Review: I have reviewed the patient's PMH, PSH, Social History, Family History, Meds, and Allergies     Objective :  Temp:  [98.4 °F (36.9 °C)-98.7 °F (37.1 °C)] 98.4 °F (36.9 °C)  HR:  [] 78  BP: ()/(54-70) 94/55  Resp:  [16-18] 18  SpO2:  [83 %-100 %] 99 %  O2 Device: None (Room air)  Nasal Cannula O2 Flow Rate (L/min):  [2 L/min] 2 L/min    General: Chronically ill-appearing, nontoxic.  No acute distress  Psychiatric:  Awake and alert  Pulmonary:  Normal respiratory excursion, no rales, no wheezing, without accessory muscle use  Heart: Regular rate and rhythm.  No murmur, rub or gallop.  Abdomen:  Soft, nontender, nondistended, normal bowel sounds  Extremities:  No edema  Skin:  No rashes      Lab Results: I have reviewed the following results:  Results from last 7 days   Lab Units 03/01/25  0745 02/28/25  0650 02/27/25  0459   WBC Thousand/uL 1.72* 1.48* 3.51*   HEMOGLOBIN g/dL 6.7* 9.9* 7.2*   PLATELETS Thousands/uL 87* 146* 130*     Results from last 7 days   Lab Units 03/01/25  0553 02/28/25  0650 02/27/25  0459 02/25/25  1157   SODIUM mmol/L 137 141 141 140   POTASSIUM mmol/L 3.8 4.1 4.1 3.4*   CHLORIDE mmol/L 111* 108 110* 104   CO2 mmol/L 22 20* 26 28   BUN mg/dL 15 29* 23 23   CREATININE mg/dL 0.52* 0.82 0.68 0.83   EGFR ml/min/1.73sq m 95 71 87 70   CALCIUM mg/dL 7.2* 8.5 8.1* 8.8   AST U/L  --  27  --  12*   ALT U/L  --  34  --  15   ALK PHOS U/L  --  66  --  72   ALBUMIN g/dL  --  3.6  --  3.5     Results from last 7 days   Lab Units 02/28/25  0802 02/28/25  0650   BLOOD CULTURE   --  Received in Microbiology Lab. Culture in Progress.   GRAM STAIN RESULT  Gram positive cocci in pairs and chains*  --      Results from last 7 days   Lab Units 02/28/25  0650   PROCALCITONIN ng/ml 0.50*                    Imaging Results Review: I personally reviewed the following image studies in PACS and associated radiology reports: chest xray. My interpretation of the radiology images/reports is: Hip x-ray with hardware in place, without acute abnormalities.

## 2025-03-01 NOTE — ASSESSMENT & PLAN NOTE
Patient is currently on azacitidine for MDS via subcutaneous injection.  She has no PICC or port in place.  She has chronic pancytopenia but without meera neutropenia.  Monitor CBC.  Hematology/oncology follow-up.

## 2025-03-02 LAB
ABO GROUP BLD BPU: NORMAL
ANION GAP SERPL CALCULATED.3IONS-SCNC: 5 MMOL/L (ref 4–13)
ANISOCYTOSIS BLD QL SMEAR: PRESENT
ATRIAL RATE: 114 BPM
ATRIAL RATE: 141 BPM
ATRIAL RATE: 92 BPM
BASOPHILS # BLD MANUAL: 0 THOUSAND/UL (ref 0–0.1)
BASOPHILS NFR MAR MANUAL: 0 % (ref 0–1)
BPU ID: NORMAL
BUN SERPL-MCNC: 11 MG/DL (ref 5–25)
CALCIUM SERPL-MCNC: 7.6 MG/DL (ref 8.4–10.2)
CHLORIDE SERPL-SCNC: 112 MMOL/L (ref 96–108)
CO2 SERPL-SCNC: 26 MMOL/L (ref 21–32)
CREAT SERPL-MCNC: 0.57 MG/DL (ref 0.6–1.3)
CROSSMATCH: NORMAL
EOSINOPHIL # BLD MANUAL: 0.08 THOUSAND/UL (ref 0–0.4)
EOSINOPHIL NFR BLD MANUAL: 3 % (ref 0–6)
ERYTHROCYTE [DISTWIDTH] IN BLOOD BY AUTOMATED COUNT: 20.6 % (ref 11.6–15.1)
GFR SERPL CREATININE-BSD FRML MDRD: 92 ML/MIN/1.73SQ M
GLUCOSE SERPL-MCNC: 112 MG/DL (ref 65–140)
GLUCOSE SERPL-MCNC: 125 MG/DL (ref 65–140)
GLUCOSE SERPL-MCNC: 75 MG/DL (ref 65–140)
GLUCOSE SERPL-MCNC: 81 MG/DL (ref 65–140)
GLUCOSE SERPL-MCNC: 96 MG/DL (ref 65–140)
HCT VFR BLD AUTO: 24.7 % (ref 34.8–46.1)
HGB BLD-MCNC: 8.1 G/DL (ref 11.5–15.4)
LYMPHOCYTES # BLD AUTO: 1.84 THOUSAND/UL (ref 0.6–4.47)
LYMPHOCYTES # BLD AUTO: 71 % (ref 14–44)
MAGNESIUM SERPL-MCNC: 2 MG/DL (ref 1.9–2.7)
MCH RBC QN AUTO: 31.5 PG (ref 26.8–34.3)
MCHC RBC AUTO-ENTMCNC: 32.8 G/DL (ref 31.4–37.4)
MCV RBC AUTO: 96 FL (ref 82–98)
MICROCYTES BLD QL AUTO: PRESENT
MONOCYTES # BLD AUTO: 0.08 THOUSAND/UL (ref 0–1.22)
MONOCYTES NFR BLD: 3 % (ref 4–12)
NEUTROPHILS # BLD MANUAL: 0.6 THOUSAND/UL (ref 1.85–7.62)
NEUTS SEG NFR BLD AUTO: 23 % (ref 43–75)
OVALOCYTES BLD QL SMEAR: PRESENT
P AXIS: 66 DEGREES
P AXIS: 71 DEGREES
P AXIS: 75 DEGREES
PLATELET # BLD AUTO: 85 THOUSANDS/UL (ref 149–390)
PLATELET BLD QL SMEAR: ABNORMAL
PMV BLD AUTO: 10.8 FL (ref 8.9–12.7)
POIKILOCYTOSIS BLD QL SMEAR: PRESENT
POTASSIUM SERPL-SCNC: 3.5 MMOL/L (ref 3.5–5.3)
PR INTERVAL: 166 MS
PR INTERVAL: 178 MS
PROCALCITONIN SERPL-MCNC: 1.46 NG/ML
QRS AXIS: 24 DEGREES
QRS AXIS: 40 DEGREES
QRS AXIS: 40 DEGREES
QRSD INTERVAL: 92 MS
QRSD INTERVAL: 96 MS
QRSD INTERVAL: 98 MS
QT INTERVAL: 322 MS
QT INTERVAL: 338 MS
QT INTERVAL: 408 MS
QTC INTERVAL: 433 MS
QTC INTERVAL: 465 MS
QTC INTERVAL: 505 MS
RBC # BLD AUTO: 2.57 MILLION/UL (ref 3.81–5.12)
RBC MORPH BLD: PRESENT
SODIUM SERPL-SCNC: 143 MMOL/L (ref 135–147)
T WAVE AXIS: 108 DEGREES
T WAVE AXIS: 89 DEGREES
T WAVE AXIS: 95 DEGREES
UNIT DISPENSE STATUS: NORMAL
UNIT PRODUCT CODE: NORMAL
UNIT PRODUCT VOLUME: 300 ML
UNIT RH: NORMAL
VANCOMYCIN SERPL-MCNC: 16.2 UG/ML (ref 10–20)
VENTRICULAR RATE: 109 BPM
VENTRICULAR RATE: 114 BPM
VENTRICULAR RATE: 92 BPM
WBC # BLD AUTO: 2.59 THOUSAND/UL (ref 4.31–10.16)

## 2025-03-02 PROCEDURE — 80048 BASIC METABOLIC PNL TOTAL CA: CPT

## 2025-03-02 PROCEDURE — 93010 ELECTROCARDIOGRAM REPORT: CPT | Performed by: INTERNAL MEDICINE

## 2025-03-02 PROCEDURE — 84145 PROCALCITONIN (PCT): CPT | Performed by: INTERNAL MEDICINE

## 2025-03-02 PROCEDURE — 82948 REAGENT STRIP/BLOOD GLUCOSE: CPT

## 2025-03-02 PROCEDURE — G0545 PR INHERENT VISIT TO INPT: HCPCS | Performed by: INTERNAL MEDICINE

## 2025-03-02 PROCEDURE — 83735 ASSAY OF MAGNESIUM: CPT

## 2025-03-02 PROCEDURE — 99232 SBSQ HOSP IP/OBS MODERATE 35: CPT | Performed by: INTERNAL MEDICINE

## 2025-03-02 PROCEDURE — 80202 ASSAY OF VANCOMYCIN: CPT

## 2025-03-02 PROCEDURE — 99024 POSTOP FOLLOW-UP VISIT: CPT

## 2025-03-02 PROCEDURE — 99233 SBSQ HOSP IP/OBS HIGH 50: CPT | Performed by: INTERNAL MEDICINE

## 2025-03-02 PROCEDURE — 85007 BL SMEAR W/DIFF WBC COUNT: CPT

## 2025-03-02 PROCEDURE — 85027 COMPLETE CBC AUTOMATED: CPT

## 2025-03-02 RX ORDER — VANCOMYCIN HYDROCHLORIDE 1 G/200ML
1000 INJECTION, SOLUTION INTRAVENOUS EVERY 12 HOURS
Status: DISCONTINUED | OUTPATIENT
Start: 2025-03-02 | End: 2025-03-02

## 2025-03-02 RX ADMIN — VANCOMYCIN HYDROCHLORIDE 1000 MG: 5 INJECTION, POWDER, LYOPHILIZED, FOR SOLUTION INTRAVENOUS at 08:34

## 2025-03-02 RX ADMIN — PREDNISONE 3 MG: 1 TABLET ORAL at 08:31

## 2025-03-02 RX ADMIN — ATORVASTATIN CALCIUM 10 MG: 10 TABLET, FILM COATED ORAL at 08:32

## 2025-03-02 RX ADMIN — MIDODRINE HYDROCHLORIDE 2.5 MG: 2.5 TABLET ORAL at 16:04

## 2025-03-02 RX ADMIN — MIDODRINE HYDROCHLORIDE 2.5 MG: 2.5 TABLET ORAL at 11:49

## 2025-03-02 RX ADMIN — CEFTRIAXONE SODIUM 2000 MG: 10 INJECTION, POWDER, FOR SOLUTION INTRAVENOUS at 22:39

## 2025-03-02 RX ADMIN — SODIUM CHLORIDE, SODIUM GLUCONATE, SODIUM ACETATE, POTASSIUM CHLORIDE, MAGNESIUM CHLORIDE, SODIUM PHOSPHATE, DIBASIC, AND POTASSIUM PHOSPHATE 75 ML/HR: .53; .5; .37; .037; .03; .012; .00082 INJECTION, SOLUTION INTRAVENOUS at 06:01

## 2025-03-02 RX ADMIN — ANASTROZOLE 1 MG: 1 TABLET, COATED ORAL at 08:34

## 2025-03-02 RX ADMIN — HEPARIN SODIUM 5000 UNITS: 5000 INJECTION INTRAVENOUS; SUBCUTANEOUS at 21:47

## 2025-03-02 RX ADMIN — ACYCLOVIR 200 MG: 200 CAPSULE ORAL at 09:19

## 2025-03-02 RX ADMIN — HEPARIN SODIUM 5000 UNITS: 5000 INJECTION INTRAVENOUS; SUBCUTANEOUS at 16:04

## 2025-03-02 RX ADMIN — Medication 1 TABLET: at 08:32

## 2025-03-02 RX ADMIN — CHLORHEXIDINE GLUCONATE 15 ML: 1.2 RINSE ORAL at 08:31

## 2025-03-02 RX ADMIN — CHLORHEXIDINE GLUCONATE 15 ML: 1.2 RINSE ORAL at 21:47

## 2025-03-02 RX ADMIN — ACYCLOVIR 200 MG: 200 CAPSULE ORAL at 21:47

## 2025-03-02 RX ADMIN — CITALOPRAM HYDROBROMIDE 20 MG: 20 TABLET ORAL at 08:32

## 2025-03-02 RX ADMIN — HEPARIN SODIUM 5000 UNITS: 5000 INJECTION INTRAVENOUS; SUBCUTANEOUS at 05:57

## 2025-03-02 RX ADMIN — CHOLECALCIFEROL (VITAMIN D3) 10 MCG (400 UNIT) TABLET 400 UNITS: at 08:32

## 2025-03-02 RX ADMIN — LIDOCAINE 1 PATCH: 50 PATCH CUTANEOUS at 08:33

## 2025-03-02 RX ADMIN — MIDODRINE HYDROCHLORIDE 2.5 MG: 2.5 TABLET ORAL at 05:57

## 2025-03-02 NOTE — ASSESSMENT & PLAN NOTE
Assessment:  Met sepsis criteria on admission (tachycardia, leukopenia, with suspected source of infection)  Elevated procalcitonin and lactic acid.  [place] UA  Negative flu/COVID.  Chest x-ray with no acute findings.  In the ED, received a total of 4 L of fluid.  IV cefepime and IV Flagyl.  Blood culture positive x 1 for Streptococcus.  3/2/2025- patient remains afebrile.  Her diarrhea has stopped.  Calcitonin is trending down.    Plan:  ID consult appreciated.  You recommended treating with IV vancomycin given diarrhea possibility of translocation.  Follow-up repeated blood cultures on 3/1/2025  Maintenance IV Isolyte fluids 75 mL/hour.  Trend fever curve.  A.m. CBC with differentials.  No need to trend procalcitonin.

## 2025-03-02 NOTE — ASSESSMENT & PLAN NOTE
Closed prosthetic hip dislocation s/p reduction by orthopedics in the ER on 2/28/2025. Doing well today. No significant left hip pain. Has been ambulating without much issue.    Weight bearing as tolerated.   Abduction pillow while in bed. Abduction brace while ambulating.  Discussed with patient in detail importance of maintaining hip abduction pillow and brace to prevent re-dislocation of her left hip  No immediate orthopedic intervention indicated at this time.   Pain control per primary team.   PT/OT.  Medical management per primary team.   Recommend outpatient follow up with total joint/adult reconstruction upon discharge for consideration towards revision arthroplasty.

## 2025-03-02 NOTE — ASSESSMENT & PLAN NOTE
Assessment:  History of pancytopenia.  S/p 1 unit PRBCs on 3/1/2025.  Neutrophil count is trending down today.    Plan:  Monitor CBC with differential daily.  Transfuse if hemoglobin less than 7.  Transfuse if platelets less than 1000 without bleeding or 20,000 with bleeding.  If her neutrophil count drops to less than 500 might consider consulting oncology for Neulasta.

## 2025-03-02 NOTE — ASSESSMENT & PLAN NOTE
Assessment:  Patient has a history of left hip dislocation.  X-ray showed dislocation of left hip arthroplasty.  In the ED, orthopedics did a closed reduction.  PT/OT recommended level 3.  3/2/2025- patient denies any pain and has not requested any pain medications.    Plan:  Orthopedics is following.  Appreciate recommendations.  Abduction pillow while in bed  Abduction brace while ambulating  Weightbearing as tolerated  Pain control

## 2025-03-02 NOTE — CASE MANAGEMENT
Case Management Discharge Planning Note    Patient name Pretty Aguila  Location S /S -01 MRN [id number]  : 1950 Date 3/2/2025       Current Admission Date: 2025  Current Admission Diagnosis:Sepsis ([facility])   Patient Active Problem List    Diagnosis Date Noted Date Diagnosed    Gram-positive cocci bacteremia 2025     Closed dislocation of left hip (HCC) 2025     Sepsis ([facility]) 2025     Diarrhea 2025     Near syncope 2025     Obesity, morbid ([facility]) 2025     Stage 3a chronic kidney disease ([facility]) 2025     Anemia 2025     Syncope 2025     MDS (myelodysplastic syndrome), high grade ([facility]) 2024     Pancytopenia ([facility]) 2024     Platelets decreased ([facility]) 2024     CKD (chronic kidney disease) 2024     Psoriatic arthritis ([facility]) 2024     Osteoarthritis of multiple joints 2024     Class 1 obesity due to excess calories with serious comorbidity and body mass index (BMI) of 32.0 to 32.9 in adult 2024     Prediabetes 2024     Moderate episode of recurrent major depressive disorder ([facility]) 2024     Anxiety and depression 2024     Restless leg syndrome 2024     Simple chronic bronchitis ([facility]) 2024     Continuous opioid dependence ([facility]) 2024     Malignant neoplasm of lower-outer quadrant of left breast of female, estrogen receptor positive ([facility]) 2023     Postmenopausal 2023     Annual physical exam 2023     Rheumatoid arthritis ([facility]) 2023     Other fatigue 2023     Candida infection of flexural skin 2022     Chronic bilateral low back pain without sciatica 10/11/2021     Status post bariatric surgery 2021     At risk for sleep apnea 2021     Postsurgical malabsorption 2021     Vitamin D deficiency 2020     Dyspnea on exertion 2019     Coronary artery disease involving native coronary artery of native heart without angina  pectoris 05/29/2018     Essential hypertension 05/29/2018     Dyslipidemia 05/29/2018     S/P CABG x 3 05/29/2018       LOS (days): 2  Geometric Mean LOS (GMLOS) (days): 3  Days to GMLOS:1     OBJECTIVE:  Risk of Unplanned Readmission Score: 48.8         Current admission status: Inpatient   Preferred Pharmacy:   [facility], [facility] [address]  [address] [facility] [address]  Phone: [phone] Fax: [phone]    [facility] - [place], [place] - [address] [facility]  [address] [facility] [address]  [address]  [place] [address]  Phone: [phone] Fax: [phone]    Primary Care Provider: Lauren Dumont MD    Primary Insurance: MEDICARE  Secondary Insurance: [facility]    DISCHARGE DETAILS:  CM spoke with patient at the bedside. Introduced self and role. CM reviewed with patient her PT/OT notes/recommendation. CM discussed with patient the difference between Home PT/OT and Outpatient PT/OT. Provided a copy of the Outpatient PT list at bedside for patient to review. Patient aware if she is interested in Home PT/OT, referrals would be made to see who is available and accepts her insurance. Patient would like to think it over and get back to CM with a decision.     Referrals for Home PT/OT made for options if patient decides Home therapy over Outpatient therapy.

## 2025-03-02 NOTE — CONSULTS
Consultation - Orthopedics   Name: Pretty Aguila 74 y.o. female I MRN: [id number]  Unit/Bed#: S -01 I Date of Admission: 2/28/2025   Date of Service: 3/2/2025 I Hospital Day: 2   Inpatient consult to Orthopedic Surgery  Consult performed by: Ebonie Gracia PA-C  Consult ordered by: Shanel Demarco MD        Physician Requesting Evaluation: Alejandrina Gardner MD   Reason for Evaluation / Principal Problem: left hip dislocation s/p reduction    Assessment & Plan  Closed dislocation of left hip (HCC)  Closed prosthetic hip dislocation s/p reduction by orthopedics in the ER on 2/28/2025. Doing well today. No significant left hip pain. Has been ambulating without much issue.    Weight bearing as tolerated.   Abduction pillow while in bed. Abduction brace while ambulating.  Discussed with patient in detail importance of maintaining hip abduction pillow and brace to prevent re-dislocation of her left hip  No immediate orthopedic intervention indicated at this time.   Pain control per primary team.   PT/OT.  Medical management per primary team.   Recommend outpatient follow up with total joint/adult reconstruction upon discharge for consideration towards revision arthroplasty.   Orthopedics service signing off.  Please contact the SecureChat role for the Orthopedics service with any questions/concerns.    History of Present Illness   HPI: Pretty Aguila is a 74 y.o. year old female who is currently admitted and being treated for sepsis. Multiple medical problems including CAD, breast cancer, DM, depression, arthritis, hyperlipidemia, HTN, hx prior bilateral hip arthroplasties, left performed many years ago at an outside facility. Patient was initially seen by ortho on 2/28/2025 after being found to have a left hip dislocation. Hip was reduced in the ED by ortho. No acute orthopedic intervention was recommended at that time. Orthopedics re-engaged to evaluate hip during admission. Patient reports minimal hip pain  currently. Notes pain is significantly improved compared to her arrival in the ED on 2/28/2025. Has been able to ambulate and weight-bear without issue. Has been using abduction pillow and abduction brace. Offers no additional complaints at this time. No new numbness or tingling.    Review of Systems   All other systems reviewed and are negative.   significant for findings described in the HPI.    Historical Information   Past Medical History:   Diagnosis Date    Allergic     Anxiety     Arthritis     Breast cancer (HCC) 09/2023    CAD (coronary artery disease)     Coronary artery disease [id number]    Depression     Diabetes mellitus (HCC) 5/1/24    Mother-Diabetic    Dyslipidemia     Hiatal hernia     Hyperlipidemia     Hypertension     Obesity     Obesity (BMI 30-39.9)     Psoriatic arthritis (HCC)     Rheumatoid arthritis (HCC)     Scoliosis     SOB (shortness of breath)      Past Surgical History:   Procedure Laterality Date    BARIATRIC SURGERY  2014    BILE DUCT EXPLORATION      replacement per Steffanie    BREAST BIOPSY  9/23    CARPAL TUNNEL RELEASE Bilateral 2002    CORONARY ARTERY BYPASS GRAFT  2017    FOOT SURGERY Right     bone surgery to straighten toe.    HIP SURGERY Left 2015    IR BIOPSY BONE MARROW  12/11/2024    IR DRAINAGE TUBE PLACEMENT  12/19/2023    JOINT REPLACEMENT      LYMPH NODE BIOPSY Left 11/20/2023    Procedure: LEFT LYMPHATIC MAPPING WITH BLUE AND RADIOACTIVE DYES, SENTINEL LYMPH NODE BIOPSY;  Surgeon: Adrien Gabriel MD;  Location:  MAIN OR;  Service: Surgical Oncology    LYMPH NODE DISSECTION Left 11/20/2023    Procedure: POSSIBLE AXILLARY DISSECTION;  Surgeon: Adrien Gabriel MD;  Location:  MAIN OR;  Service: Surgical Oncology    CA BREAST REDUCTION Bilateral 01/16/2024    Procedure: RIGHT BREAST REDUCTION AND LEFT BREAST EXPANDER EXCHANGE FOR PERMANENT IMPLANT. REVISION OF RECONSTRUCTED LEFT BREAST.;  Surgeon: Molina Jimenez MD;  Location:  MAIN OR;  Service: Plastics    CA SUCTION  ASSISTED LIPECTOMY TRUNK Bilateral 2024    Procedure: LIPOSUCTION BREAST;  Surgeon: Molina Jimenez MD;  Location: [facility] MAIN OR;  Service: Plastics    MT TISSUE EXPANDER PLACEMENT BREAST RECONSTRUCTION Left 2023    Procedure: IMMEDIATE LEFT BREAST RECONSTRUCTION WITH TISSUE EXPANDER AND ADM;  Surgeon: Molina Jimenez MD;  Location: [facility];  Service: Plastics    REPLACEMENT TOTAL KNEE Right 2017    SIMPLE MASTECTOMY Left 2023    Procedure: LEFT BREAST VERENICE  DIRECTED MASTECTOMY;  Surgeon: Adrien Gabriel MD;  Location: [facility];  Service: Surgical Oncology    SLEEVE GASTROPLASTY      TONSILLECTOMY      TOTAL HIP ARTHROPLASTY Right 2017    US GUIDED BREAST BIOPSY LEFT COMPLETE Left 2023     Social History     Tobacco Use    Smoking status: Former     Current packs/day: 0.00     Average packs/day: 0.5 packs/day for 7.0 years (3.5 ttl pk-yrs)     Types: Cigarettes     Start date: 2010     Quit date: 2017     Years since quittin.1    Smokeless tobacco: Never    Tobacco comments:     Have already quit smoking in 2017   Vaping Use    Vaping status: Never Used   Substance and Sexual Activity    Alcohol use: Yes     Alcohol/week: 2.0 standard drinks of alcohol     Types: 2 Glasses of wine per week     Comment: social     Drug use: No    Sexual activity: Not Currently     Partners: Male     Birth control/protection: None     E-Cigarette/Vaping    E-Cigarette Use Never User      E-Cigarette/Vaping Substances    Nicotine No     THC No     CBD No     Flavoring No     Other No      Family history non-contributory    Objective :  Temp:  [98.4 °F (36.9 °C)-99.2 °F (37.3 °C)] 98.6 °F (37 °C)  HR:  [74-85] 74  BP: ()/(55-68) 112/68  Resp:  [16-18] 16  SpO2:  [96 %-100 %] 96 %  O2 Device: None (Room air)  Physical Exam  Vitals and nursing note reviewed.   Constitutional:       General: She is not in acute distress.     Appearance: She is not ill-appearing or toxic-appearing.  "  Musculoskeletal:         General: No tenderness or deformity.   Skin:     Findings: No bruising.   Neurological:      Mental Status: She is alert.       Musculoskeletal: Left lower extremity  Skin intact. No erythema or ecchymosis. No significant swelling.  Lidocaine patch over lateral left hip.  Abduction pillow in place  No significant tenderness to palpation  Motor intact to +FHL/EHL, +ankle dorsi/plantar flexion  Sensation intact to saphenous, sural, tibial, superficial peroneal nerve, and deep peroneal  2+ DP pulse, digits warm and well perfused  No calf swelling or tenderness to palpation      Lab Results: I have reviewed the following results:   Recent Labs     02/28/25  0650 03/01/25  0553 03/01/25  0745 03/01/25  1614 03/02/25  0547   WBC 1.48*  --  1.72*  --  2.59*   HGB 9.9*  --  6.7* 8.7* 8.1*   HCT 30.3*  --  20.7*  --  24.7*   *  --  87*  --  85*   BANDSPCT 1  --  1  --   --    BUN 29* 15  --   --  11   CREATININE 0.82 0.52*  --   --  0.57*   PTT 21*  --   --   --   --    INR 0.99  --   --   --   --      Blood Culture:   Lab Results   Component Value Date    BLOODCX Received in Microbiology Lab. Culture in Progress. 03/01/2025    BLOODCX Received in Microbiology Lab. Culture in Progress. 03/01/2025     Wound Culture: No results found for: \"WOUNDCULT\"    Imaging Results Review: No pertinent imaging studies reviewed.  Other Study Results Review: No additional pertinent studies reviewed.  "

## 2025-03-02 NOTE — ASSESSMENT & PLAN NOTE
Assessment:  Presented with 4 episodes of loose nonbloody foul-smelling diarrhea on 2/28  Reported that she had a turkey sandwich at home the night before admission.  She is on Azacitidine chemotherapy for her MDS  Her diarrhea has completely stopped.    Plan:  Stop maintenance fluids.  Follow-up stool C. difficile and enteric panel.  Were not collected yet given that her diarrhea has stopped.

## 2025-03-02 NOTE — PROGRESS NOTES
Progress Note - Hospitalist   Name: Pretty Aguila 74 y.o. female I MRN: [id number]  Unit/Bed#: S -01 I Date of Admission: 2/28/2025   Date of Service: 3/2/2025 I Hospital Day: 2    Assessment & Plan  Sepsis (HCC)  Assessment:  Met sepsis criteria on admission (tachycardia, leukopenia, with suspected source of infection)  Elevated procalcitonin and lactic acid.  Crisp UA  Negative flu/COVID.  Chest x-ray with no acute findings.  In the ED, received a total of 4 L of fluid.  IV cefepime and IV Flagyl.  Blood culture positive x 1 for Streptococcus.  3/2/2025- patient remains afebrile.  Her diarrhea has stopped.  Calcitonin is trending down.    Plan:  ID consult appreciated.  You recommended treating with IV vancomycin given diarrhea possibility of translocation.  Follow-up repeated blood cultures on 3/1/2025  Maintenance IV Isolyte fluids 75 mL/hour.  Trend fever curve.  A.m. CBC with differentials.  No need to trend procalcitonin.  Diarrhea  Assessment:  Presented with 4 episodes of loose nonbloody foul-smelling diarrhea on 2/28  Reported that she had a turkey sandwich at home the night before admission.  She is on Azacitidine chemotherapy for her MDS  Her diarrhea has completely stopped.    Plan:  Stop maintenance fluids.  Follow-up stool C. difficile and enteric panel.  Were not collected yet given that her diarrhea has stopped.  MDS (myelodysplastic syndrome), high grade (HCC)  Has history of MDS currently on azacitidine completed 2 cycles last Friday  Jadon waters recommends adding venetoclax to current regimen with added acitretin for 5 days and venetoclax for 7 days  Plan is to add venetoclax during third cycle  On prophylactic medication Diflucan 100 mg daily, acyclovir 400 mg twice daily    Plan:  CBC with differentials daily.  Continue Diflucan 100 mg daily, acyclovir 400 mg twice daily  Follow-up with outpatient oncology -patient has appointment with Dr. Carranza on Tuesday, 3/4/2025 and requested that   Tere gets notified that she is being hospitalized and might miss her appointment if not discharged tomorrow.  Please make sure you notify her tomorrow.   Closed dislocation of left hip (HCC)  Assessment:  Patient has a history of left hip dislocation.  X-ray showed dislocation of left hip arthroplasty.  In the ED, orthopedics did a closed reduction.  PT/OT recommended level 3.  3/2/2025- patient denies any pain and has not requested any pain medications.    Plan:  Orthopedics is following.  Appreciate recommendations.  Abduction pillow while in bed  Abduction brace while ambulating  Weightbearing as tolerated  Pain control  Malignant neoplasm of lower-outer quadrant of left breast of female, estrogen receptor positive (HCC)  Currently on anastrozole 1 mg daily    Plan:  Continue anastrozole  Pancytopenia (HCC)  Assessment:  History of pancytopenia.  S/p 1 unit PRBCs on 3/1/2025.  Neutrophil count is trending down today.    Plan:  Monitor CBC with differential daily.  Transfuse if hemoglobin less than 7.  Transfuse if platelets less than 1000 without bleeding or 20,000 with bleeding.  If her neutrophil count drops to less than 500 might consider consulting oncology for Neulasta.  Gram-positive cocci bacteremia  Plan as sepsis above.    VTE Pharmacologic Prophylaxis: VTE Score: 8 High Risk (Score >/= 5) - Pharmacological DVT Prophylaxis Ordered: heparin. Sequential Compression Devices Ordered.    Mobility:   Basic Mobility Inpatient Raw Score: 18  JH-HLM Goal: 6: Walk 10 steps or more  JH-HLM Achieved: 6: Walk 10 steps or more  JH-HLM Goal achieved. Continue to encourage appropriate mobility.    Patient Centered Rounds: I performed bedside rounds with nursing staff today.   Discussions with Specialists or Other Care Team Provider: Orthopedics and nursing    Education and Discussions with Family / Patient: Patient declined call to .     Current Length of Stay: 2 day(s)  Current Patient Status:  Inpatient     Discharge Plan: Anticipate discharge in 24-48 hrs to home.    Code Status: Level 1 - Full Code    Subjective   Patient was seen and examined this morning.  States that her hip pain has significantly improved.  Also reports that her diarrhea has stopped and she has not had a bowel movement.  No abdominal pain or NVD.  Tolerated her breakfast.  Requested that we talk to her oncologist.    Objective :  Temp:  [98.4 °F (36.9 °C)-99.2 °F (37.3 °C)] 98.6 °F (37 °C)  HR:  [74-85] 76  BP: ()/(55-70) 101/65  Resp:  [16-18] 16  SpO2:  [96 %-100 %] 96 %  O2 Device: None (Room air)    Body mass index is 33.87 kg/m².     Input and Output Summary (last 24 hours):     Intake/Output Summary (Last 24 hours) at 3/2/2025 0634  Last data filed at 3/2/2025 0501  Gross per 24 hour   Intake 250 ml   Output 2910 ml   Net -2660 ml       Physical Exam  Vitals and nursing note reviewed.   Constitutional:       General: She is not in acute distress.     Appearance: She is well-developed.   HENT:      Head: Normocephalic and atraumatic.   Eyes:      Conjunctiva/sclera: Conjunctivae normal.   Cardiovascular:      Rate and Rhythm: Normal rate and regular rhythm.      Heart sounds: No murmur heard.  Pulmonary:      Effort: Pulmonary effort is normal. No respiratory distress.      Breath sounds: Normal breath sounds.   Abdominal:      Palpations: Abdomen is soft.      Tenderness: There is no abdominal tenderness.   Musculoskeletal:         General: No swelling.      Cervical back: Neck supple.   Skin:     General: Skin is warm and dry.      Capillary Refill: Capillary refill takes less than 2 seconds.   Neurological:      Mental Status: She is alert.   Psychiatric:         Mood and Affect: Mood normal.           Lines/Drains:  Lines/Drains/Airways       Active Status       Name Placement date Placement time Site Days    Urethral Catheter 16 Fr. 02/28/25  0852  --  1                  Urinary Catheter:  Goal for removal: No longer  needed. Will place order to discontinue                 Lab Results: I have reviewed the following results:   Results from last 7 days   Lab Units 03/01/25  1614 03/01/25  0745 02/25/25  1703 02/25/25  1157   WBC Thousand/uL  --  1.72*   < > 3.85*   HEMOGLOBIN g/dL 8.7* 6.7*   < > 10.0*   HEMATOCRIT %  --  20.7*   < > 30.0*   PLATELETS Thousands/uL  --  87*   < > 201   BANDS PCT %  --  1   < >  --    SEGS PCT %  --   --   --  42*   LYMPHO PCT %  --  42   < > 53*   MONO PCT %  --  3*   < > 2*   EOS PCT %  --  0   < > 2    < > = values in this interval not displayed.     Results from last 7 days   Lab Units 03/01/25  0553 02/28/25  0650   SODIUM mmol/L 137 141   POTASSIUM mmol/L 3.8 4.1   CHLORIDE mmol/L 111* 108   CO2 mmol/L 22 20*   BUN mg/dL 15 29*   CREATININE mg/dL 0.52* 0.82   ANION GAP mmol/L 4 13   CALCIUM mg/dL 7.2* 8.5   ALBUMIN g/dL  --  3.6   TOTAL BILIRUBIN mg/dL  --  0.94   ALK PHOS U/L  --  66   ALT U/L  --  34   AST U/L  --  27   GLUCOSE RANDOM mg/dL 86 186*     Results from last 7 days   Lab Units 02/28/25  0650   INR  0.99     Results from last 7 days   Lab Units 03/01/25  2053 03/01/25  1731 03/01/25  1402 03/01/25  0708 02/28/25  2150 02/28/25  1727   POC GLUCOSE mg/dl 100 94 82 89 115 96     Results from last 7 days   Lab Units 02/28/25  0650   HEMOGLOBIN A1C % 6.1*     Results from last 7 days   Lab Units 03/01/25  0553 02/28/25  1859 02/28/25  1558 02/28/25  1035 02/28/25  0802 02/28/25  0650   LACTIC ACID mmol/L  --  1.4 2.5* 3.5* 3.4*  --    PROCALCITONIN ng/ml 2.70*  --   --   --   --  0.50*       Recent Cultures (last 7 days):   Results from last 7 days   Lab Units 03/01/25  1246 02/28/25  0802 02/28/25  0650   BLOOD CULTURE  Received in Microbiology Lab. Culture in Progress.  Received in Microbiology Lab. Culture in Progress.  --  No Growth at 24 hrs.   GRAM STAIN RESULT   --  Gram positive cocci in pairs and chains*  --        Imaging Results Review: No pertinent imaging studies  reviewed.  Other Study Results Review: No additional pertinent studies reviewed.    Last 24 Hours Medication List:     Current Facility-Administered Medications:     acetaminophen (Ofirmev) injection 1,000 mg, Q6H PRN    acyclovir (ZOVIRAX) capsule 200 mg, BID    anastrozole (ARIMIDEX) tablet 1 mg, Daily    atorvastatin (LIPITOR) tablet 10 mg, Daily    chlorhexidine (PERIDEX) 0.12 % oral rinse 15 mL, Q12H SHRADDHA    Cholecalciferol (VITAMIN D3) tablet 400 Units, Daily    citalopram (CeleXA) tablet 20 mg, Daily    heparin (porcine) subcutaneous injection 5,000 Units, Q8H SHRADDHA    HYDROmorphone HCl (DILAUDID) injection 0.2 mg, Q2H PRN    insulin lispro (HumALOG/ADMELOG) 100 units/mL subcutaneous injection 1-6 Units, 4x Daily (AC & HS) **AND** Fingerstick Glucose (POCT), 4x Daily AC and at bedtime    lidocaine (LIDODERM) 5 % patch 1 patch, Daily    [Held by provider] metoprolol succinate (TOPROL-XL) 24 hr tablet 12.5 mg, Daily    midodrine (PROAMATINE) tablet 2.5 mg, TID AC    multi-electrolyte (PLASMALYTE-A/ISOLYTE-S PH 7.4) IV solution, Continuous, Last Rate: 75 mL/hr (03/02/25 0601)    multivitamin stress formula tablet 1 tablet, Daily    naloxone (NARCAN) 0.04 mg/mL syringe 0.04 mg, Q1MIN PRN    ondansetron (ZOFRAN) injection 4 mg, Q4H PRN    predniSONE tablet 3 mg, Daily    vancomycin (VANCOCIN) 1,250 mg in sodium chloride 0.9 % 250 mL IVPB, Q12H, Last Rate: 1,250 mg (03/01/25 2050)    Administrative Statements   Today, Patient Was Seen By: Shanel Demarco MD  I have spent a total time of 35 minutes in caring for this patient on the day of the visit/encounter including Diagnostic results, Prognosis, and Risks and benefits of tx options.    **Please Note: This note may have been constructed using a voice recognition system.**

## 2025-03-02 NOTE — PROGRESS NOTES
Pretty Aguila is a 74 y.o. female who is currently ordered Vancomycin IV with management by the Pharmacy Consult service.  Relevant clinical data and objective / subjective history reviewed.  Vancomycin Assessment:  Indication and Goal AUC/Trough: Bacteremia (goal -600, trough >10)  Clinical Status: stable  Micro:     Renal Function:  SCr: 0.57 mg/dL  CrCl: 88.4 mL/min  Renal replacement: Not on dialysis  Days of Therapy: 2  Current Dose: 1250 mg IV q12h  Vancomycin Plan:  New Dosin mg IV q12h  Estimated AUC: 491 mcg*hr/mL  Estimated Trough: 14 mcg/mL  Next Level:  at 0600  Renal Function Monitoring: Daily BMP and UOP  Pharmacy will continue to follow closely for s/sx of nephrotoxicity, infusion reactions and appropriateness of therapy.  BMP and CBC will be ordered per protocol. We will continue to follow the patient’s culture results and clinical progress daily.    Niya Conway, Pharmacist

## 2025-03-03 LAB
ACANTHOCYTES BLD QL SMEAR: PRESENT
ANION GAP SERPL CALCULATED.3IONS-SCNC: 7 MMOL/L (ref 4–13)
ANISOCYTOSIS BLD QL SMEAR: PRESENT
BACTERIA BLD CULT: ABNORMAL
BASOPHILS # BLD MANUAL: 0 THOUSAND/UL (ref 0–0.1)
BASOPHILS NFR MAR MANUAL: 0 % (ref 0–1)
BUN SERPL-MCNC: 16 MG/DL (ref 5–25)
CALCIUM SERPL-MCNC: 7.8 MG/DL (ref 8.4–10.2)
CHLORIDE SERPL-SCNC: 111 MMOL/L (ref 96–108)
CO2 SERPL-SCNC: 24 MMOL/L (ref 21–32)
CREAT SERPL-MCNC: 0.65 MG/DL (ref 0.6–1.3)
EOSINOPHIL # BLD MANUAL: 0.06 THOUSAND/UL (ref 0–0.4)
EOSINOPHIL NFR BLD MANUAL: 2 % (ref 0–6)
ERYTHROCYTE [DISTWIDTH] IN BLOOD BY AUTOMATED COUNT: 20 % (ref 11.6–15.1)
GFR SERPL CREATININE-BSD FRML MDRD: 88 ML/MIN/1.73SQ M
GLUCOSE SERPL-MCNC: 111 MG/DL (ref 65–140)
GLUCOSE SERPL-MCNC: 116 MG/DL (ref 65–140)
GLUCOSE SERPL-MCNC: 138 MG/DL (ref 65–140)
GLUCOSE SERPL-MCNC: 76 MG/DL (ref 65–140)
GLUCOSE SERPL-MCNC: 77 MG/DL (ref 65–140)
GRAM STN SPEC: ABNORMAL
HCT VFR BLD AUTO: 26.4 % (ref 34.8–46.1)
HELMET CELLS BLD QL SMEAR: PRESENT
HGB BLD-MCNC: 8.6 G/DL (ref 11.5–15.4)
LG PLATELETS BLD QL SMEAR: PRESENT
LYMPHOCYTES # BLD AUTO: 2.55 THOUSAND/UL (ref 0.6–4.47)
LYMPHOCYTES # BLD AUTO: 78 % (ref 14–44)
MCH RBC QN AUTO: 31.3 PG (ref 26.8–34.3)
MCHC RBC AUTO-ENTMCNC: 32.6 G/DL (ref 31.4–37.4)
MCV RBC AUTO: 96 FL (ref 82–98)
MONOCYTES # BLD AUTO: 0.03 THOUSAND/UL (ref 0–1.22)
MONOCYTES NFR BLD: 1 % (ref 4–12)
NEUTROPHILS # BLD MANUAL: 0.58 THOUSAND/UL (ref 1.85–7.62)
NEUTS SEG NFR BLD AUTO: 18 % (ref 43–75)
OVALOCYTES BLD QL SMEAR: PRESENT
PLATELET # BLD AUTO: 99 THOUSANDS/UL (ref 149–390)
PLATELET BLD QL SMEAR: ABNORMAL
PMV BLD AUTO: 10.3 FL (ref 8.9–12.7)
POIKILOCYTOSIS BLD QL SMEAR: PRESENT
POTASSIUM SERPL-SCNC: 3.4 MMOL/L (ref 3.5–5.3)
RBC # BLD AUTO: 2.75 MILLION/UL (ref 3.81–5.12)
RBC MORPH BLD: PRESENT
SODIUM SERPL-SCNC: 142 MMOL/L (ref 135–147)
STREPTOCOCCUS DNA BLD POS NAA+NON-PROBE: DETECTED
VARIANT LYMPHS # BLD AUTO: 1 %
WBC # BLD AUTO: 3.23 THOUSAND/UL (ref 4.31–10.16)

## 2025-03-03 PROCEDURE — G0545 PR INHERENT VISIT TO INPT: HCPCS | Performed by: INTERNAL MEDICINE

## 2025-03-03 PROCEDURE — 82948 REAGENT STRIP/BLOOD GLUCOSE: CPT

## 2025-03-03 PROCEDURE — C1751 CATH, INF, PER/CENT/MIDLINE: HCPCS

## 2025-03-03 PROCEDURE — 85027 COMPLETE CBC AUTOMATED: CPT | Performed by: INTERNAL MEDICINE

## 2025-03-03 PROCEDURE — 80048 BASIC METABOLIC PNL TOTAL CA: CPT | Performed by: INTERNAL MEDICINE

## 2025-03-03 PROCEDURE — 85007 BL SMEAR W/DIFF WBC COUNT: CPT | Performed by: INTERNAL MEDICINE

## 2025-03-03 PROCEDURE — 99232 SBSQ HOSP IP/OBS MODERATE 35: CPT | Performed by: INTERNAL MEDICINE

## 2025-03-03 PROCEDURE — 99233 SBSQ HOSP IP/OBS HIGH 50: CPT | Performed by: INTERNAL MEDICINE

## 2025-03-03 RX ORDER — ACETAMINOPHEN 325 MG/1
975 TABLET ORAL EVERY 6 HOURS PRN
Status: DISCONTINUED | OUTPATIENT
Start: 2025-03-03 | End: 2025-03-04 | Stop reason: HOSPADM

## 2025-03-03 RX ORDER — POTASSIUM CHLORIDE 14.9 MG/ML
20 INJECTION INTRAVENOUS ONCE
Status: COMPLETED | OUTPATIENT
Start: 2025-03-03 | End: 2025-03-03

## 2025-03-03 RX ADMIN — Medication 1 TABLET: at 08:35

## 2025-03-03 RX ADMIN — POTASSIUM CHLORIDE 20 MEQ: 14.9 INJECTION, SOLUTION INTRAVENOUS at 08:35

## 2025-03-03 RX ADMIN — CHLORHEXIDINE GLUCONATE 15 ML: 1.2 RINSE ORAL at 20:08

## 2025-03-03 RX ADMIN — ANASTROZOLE 1 MG: 1 TABLET, COATED ORAL at 08:36

## 2025-03-03 RX ADMIN — HEPARIN SODIUM 5000 UNITS: 5000 INJECTION INTRAVENOUS; SUBCUTANEOUS at 12:37

## 2025-03-03 RX ADMIN — HEPARIN SODIUM 5000 UNITS: 5000 INJECTION INTRAVENOUS; SUBCUTANEOUS at 20:53

## 2025-03-03 RX ADMIN — CHLORHEXIDINE GLUCONATE 15 ML: 1.2 RINSE ORAL at 08:35

## 2025-03-03 RX ADMIN — CHOLECALCIFEROL (VITAMIN D3) 10 MCG (400 UNIT) TABLET 400 UNITS: at 08:34

## 2025-03-03 RX ADMIN — ACYCLOVIR 200 MG: 200 CAPSULE ORAL at 20:52

## 2025-03-03 RX ADMIN — MIDODRINE HYDROCHLORIDE 2.5 MG: 2.5 TABLET ORAL at 06:07

## 2025-03-03 RX ADMIN — CITALOPRAM HYDROBROMIDE 20 MG: 20 TABLET ORAL at 08:35

## 2025-03-03 RX ADMIN — ATORVASTATIN CALCIUM 10 MG: 10 TABLET, FILM COATED ORAL at 08:34

## 2025-03-03 RX ADMIN — CEFTRIAXONE SODIUM 2000 MG: 10 INJECTION, POWDER, FOR SOLUTION INTRAVENOUS at 20:08

## 2025-03-03 RX ADMIN — MIDODRINE HYDROCHLORIDE 2.5 MG: 2.5 TABLET ORAL at 12:36

## 2025-03-03 RX ADMIN — HEPARIN SODIUM 5000 UNITS: 5000 INJECTION INTRAVENOUS; SUBCUTANEOUS at 06:07

## 2025-03-03 RX ADMIN — MIDODRINE HYDROCHLORIDE 2.5 MG: 2.5 TABLET ORAL at 16:27

## 2025-03-03 RX ADMIN — PREDNISONE 3 MG: 1 TABLET ORAL at 08:34

## 2025-03-03 RX ADMIN — ACYCLOVIR 200 MG: 200 CAPSULE ORAL at 08:36

## 2025-03-03 NOTE — PROCEDURES
Insert Complex Venous Access Line    Date/Time: 3/3/2025 11:35 AM    Performed by: Keisha Loomis RN  Authorized by: Shagufta Lovett MD    Patient location:  Bedside  Consent:     Consent obtained:  Verbal and written (obtained by md)    Consent given by:  Patient (obtained by md)    Procedural risks discussed: discussed with md.    Alternatives discussed: discussed with md.  Universal protocol:     Procedure explained and questions answered to patient or proxy's satisfaction: yes      Immediately prior to procedure, a time out was called: yes      Patient identity confirmed:  Verbally with patient and arm band  Pre-procedure details:     Hand hygiene: Hand hygiene performed prior to insertion      Sterile barrier technique: All elements of maximal sterile technique followed      Skin preparation:  ChloraPrep    Skin preparation agent: Skin preparation agent completely dried prior to procedure    Procedure details:     Complex Venous Access Line Type: PICC      Complex Venous Access Line Indications: long term antibiotics      Location:  Basilic    Procedural supplies:  Single lumen    Catheter size:  4 Fr    Total catheter length (cm):  40    Catheter out on skin (cm):  0    Max flow rate:  999ml/hr    Arm circumference:  30cm    Patient evaluated for contraindications to access (i.e. fistula, thrombosis, etc): Yes      Approach: percutaneous technique used      Patient position:  Flat    Ultrasound image availability:  Still images obtained    Sterile ultrasound techniques: Sterile gel and sterile probe covers were used      Number of attempts:  1    Successful placement: yes      Landmarks identified: yes      Vessel of catheter tip end:  Sherlock 3CG confirmed  Anesthesia (see MAR for exact dosages):     Anesthesia method:  Local infiltration    Local anesthetic:  Lidocaine 1% w/o epi (2ml used)  Post-procedure details:     Post-procedure:  Dressing applied and securement device placed    Assessment:  Blood return  through all ports    Post-procedure complications: none      Patient tolerance of procedure:  Tolerated well, no immediate complications

## 2025-03-03 NOTE — ASSESSMENT & PLAN NOTE
Patient has chronic pancytopenia, including leukopenia, without meera neutropenia WBC/ANC stable.  Monitor CBC/ANC.

## 2025-03-03 NOTE — ASSESSMENT & PLAN NOTE
Patient has chronic pancytopenia, including leukopenia, without meera neutropenia, with ANC almost 1000.  Monitor CBC/ANC.

## 2025-03-03 NOTE — ASSESSMENT & PLAN NOTE
There is growth of Streptococcus salivarius in 1 out of 2 admission blood cultures.  Patient has no indwelling intravascular foreign body.  She has no central venous catheter.  Although this may be contaminant blood draw, given acute diarrhea, we also need to consider the possibility of translocation.  She remains clinically and systemically well.  Recent TTE last week benign.  Change antibiotic to high-dose IV ceftriaxone.  Repeat blood cultures.

## 2025-03-03 NOTE — CASE MANAGEMENT
Case Management Assessment & Discharge Planning Note    Patient name Pretty Aguila  Location S /S -01 MRN [id number]  : 1950 Date 3/3/2025       Current Admission Date: 2025  Current Admission Diagnosis:Sepsis ([facility])   Patient Active Problem List    Diagnosis Date Noted Date Diagnosed    Gram-positive cocci bacteremia 2025     Closed dislocation of left hip (HCC) 2025     Sepsis ([facility]) 2025     Diarrhea 2025     Near syncope 2025     Obesity, morbid ([facility]) 2025     Stage 3a chronic kidney disease ([facility]) 2025     Anemia 2025     Syncope 2025     MDS (myelodysplastic syndrome), high grade ([facility]) 2024     Pancytopenia ([facility]) 2024     Platelets decreased ([facility]) 2024     CKD (chronic kidney disease) 2024     Psoriatic arthritis ([facility]) 2024     Osteoarthritis of multiple joints 2024     Class 1 obesity due to excess calories with serious comorbidity and body mass index (BMI) of 32.0 to 32.9 in adult 2024     Prediabetes 2024     Moderate episode of recurrent major depressive disorder ([facility]) 2024     Anxiety and depression 2024     Restless leg syndrome 2024     Simple chronic bronchitis ([facility]) 2024     Continuous opioid dependence ([facility]) 2024     Malignant neoplasm of lower-outer quadrant of left breast of female, estrogen receptor positive ([facility]) 2023     Postmenopausal 2023     Annual physical exam 2023     Rheumatoid arthritis ([facility]) 2023     Other fatigue 2023     Candida infection of flexural skin 2022     Chronic bilateral low back pain without sciatica 10/11/2021     Status post bariatric surgery 2021     At risk for sleep apnea 2021     Postsurgical malabsorption 2021     Vitamin D deficiency 2020     Dyspnea on exertion 2019     Coronary artery disease involving native coronary artery of native heart  without angina pectoris 05/29/2018     Essential hypertension 05/29/2018     Dyslipidemia 05/29/2018     S/P CABG x 3 05/29/2018       LOS (days): 3  Geometric Mean LOS (GMLOS) (days): 3  Days to GMLOS:-0.1     OBJECTIVE:  PATIENT READMITTED TO HOSPITAL  Risk of Unplanned Readmission Score: 49.31         Current admission status: Inpatient       Preferred Pharmacy:   [facility], [facility] - [place] - [address]  [address] [facility] [address]  Phone: [phone] Fax: [phone]    [facility] - [place], [place] - [address] [facility]  [address]  [address]  [place] [address]  Phone: [phone] Fax: [phone]    Primary Care Provider: Lauren Dumont MD    Primary Insurance: MEDICARE  Secondary Insurance: AARP    ASSESSMENT:  Active Health Care Proxies    There are no active Health Care Proxies on file.    Readmission Root Cause  30 Day Readmission: Yes  During your hospital stay, did someone (provider, nurse, ) explain your care to you in a way you could understand?: Yes  Did you feel medically stable to leave the hospital?: Yes  Were you able to pay for your medication at the pharmacy?: Yes  Did you have reliable transportation to take you to your appointments?: Yes    Patient Information  Admitted from:: Home  Mental Status: Alert  During Assessment patient was accompanied by: Not accompanied during assessment  Assessment information provided by:: Patient  Primary Caregiver: Self  Support Systems: Children, Spouse/significant other  County of Residence: [place]  What city do you live in?: [place]    Activities of Daily Living Prior to Admission  Functional Status: Independent    Current Home Health Care  Home Health Agency Name:: Other (pending referrals)    DISCHARGE DETAILS:    Discharge planning discussed with:: patient- at bedside, VM left for daughterDenzel  Freedom of Choice: Yes  Comments - Freedom of Choice: IV abx, HHC  CM contacted  family/caregiver?: Yes  Were Treatment Team discharge recommendations reviewed with patient/caregiver?: Yes  Did patient/caregiver verbalize understanding of patient care needs?: Yes  Were patient/caregiver advised of the risks associated with not following Treatment Team discharge recommendations?: Yes    Contacts  Patient Contacts: Denzel  Relationship to Patient:: Family (daughter)  Contact Method: Phone  Phone Number: [phone]  Reason/Outcome: Continuity of Care, Emergency Contact, Referral, Discharge Planning    Requested Home Health Care         Is the patient interested in [facility] at discharge?: Yes  Home Health Discipline requested:: Nursing, Occupational Therapy, Physical Therapy  Home Health Agency Name:: Other (pending referrals)  Home Health Follow-Up Provider:: PCP  Home Health Services Needed:: Gait/ADL Training, Administration of IV, IM or SC Medications, Strengthening/Theraputic Exercises to Improve Function, Evaluate Functional Status and Safety  Homebound Criteria Met:: Requires the Assistance of Another Person for Safe Ambulation or to Leave the Home  Supporting Clincal Findings:: Limited Endurance    DME Referral Provided  Referral made for DME?: No    Other Referral/Resources/Interventions Provided:  Interventions: C  Referral Comments: Patient needing long term IV abx. Met with patient at bedside to discuss infusion center vs home w/ HHC. Patient stating she would prefer home, stating she believes her  and daughter can learn how to administer meds. Infusion referral placed via [facility]-- script attached, awaiting pricing. Message sent to available [facility] agencies notifying patient will need SOC for 03/05 (can receive med while IP tomorrow). VM left for daughter, Denzel, to discuss above. CM to follow up as able to continue with dcp.    Would you like to participate in our [facility] service program?  : No - Declined    Treatment Team Recommendation: Home with Home Health Care  Discharge  Destination Plan:: Home with Home Health Care  Transport at Discharge : Family

## 2025-03-03 NOTE — ASSESSMENT & PLAN NOTE
There is growth of Streptococcus salivarius in 1 out of 2 admission blood cultures.  Patient has no indwelling intravascular foreign body.  She has no central venous catheter.  Repeat blood culture from 3/1 have no growth thus far.  Although this may be contaminant blood draw, given acute diarrhea, we also need to consider the possibility of translocation.  She remains clinically and systemically well.  Recent TTE last week benign.  With patient's chronic immunosuppressed state, the most prudent course of action is to treat her for presumptive translocation.  Continue high-dose IV ceftriaxone.  Follow-up repeat blood cultures.  If repeat blood cultures have no growth, treat x 14 days total antibiotic, through 3/14.  PICC placement when repeat blood cultures have no growth x 72 hours.

## 2025-03-03 NOTE — PROGRESS NOTES
Vancomycin IV Pharmacy-to-Dose Consultation     Vancomycin has been discontinued.  Pharmacy will sign off.  Please contact or re-consult with questions.    Alyssa Jacinto, Pharmacist

## 2025-03-03 NOTE — PLAN OF CARE
Problem: Prexisting or High Potential for Compromised Skin Integrity  Goal: Skin integrity is maintained or improved  Description: INTERVENTIONS:  - Identify patients at risk for skin breakdown  - Assess and monitor skin integrity  - Assess and monitor nutrition and hydration status  - Monitor labs   - Assess for incontinence   - Turn and reposition patient  - Assist with mobility/ambulation  - Relieve pressure over bony prominences  - Avoid friction and shearing  - Provide appropriate hygiene as needed including keeping skin clean and dry  - Evaluate need for skin moisturizer/barrier cream  - Collaborate with interdisciplinary team   - Patient/family teaching  - Consider wound care consult   Outcome: Progressing     Problem: INFECTION - ADULT  Goal: Absence or prevention of progression during hospitalization  Description: INTERVENTIONS:  - Assess and monitor for signs and symptoms of infection  - Monitor lab/diagnostic results  - Monitor all insertion sites, i.e. indwelling lines, tubes, and drains  - Monitor endotracheal if appropriate and nasal secretions for changes in amount and color  - [place] appropriate cooling/warming therapies per order  - Administer medications as ordered  - Instruct and encourage patient and family to use good hand hygiene technique  - Identify and instruct in appropriate isolation precautions for identified infection/condition  Outcome: Progressing

## 2025-03-03 NOTE — PROGRESS NOTES
The acetaminophen has / have been converted to Oral per [facility] IV-to-PO Auto-Conversion Protocol for Adults as approved by the Pharmacy and Therapeutics Committee. The patient met all eligible criteria:  1) Age = 18 years old   2) Received at least one dose of the IV form   3) Receiving at least one other scheduled oral/enteral medication   4) Tolerating an oral/enteral diet   and did not have any exclusions:   1) Critical care patient   2) Active GI bleed (IF assessing H2RAs or PPIs)   3) Continuous tube feeding (IF assessing cipro, doxycycline, levofloxacin, minocycline, rifampin, or voriconazole)   4) Receiving PO vancomycin (IF assessing metronidazole)   5) Persistent nausea and/or vomiting   6) Ileus or gastrointestinal obstruction   7) Otilio/nasogastric tube set for continuous suction   8) Specific order not to automatically convert to PO (in the order's comments or if discussed in the most recent Infectious Disease or primary team's progress notes).

## 2025-03-03 NOTE — ASSESSMENT & PLAN NOTE
Patient presented with leukopenia, tachycardia, with lactic acidosis.  She was also briefly hypotension.  I suspect this is all secondary to gastroenteritis.  Her brief hypotension and lactic acidosis are most likely secondary to prerenal state from diarrhea.  Lactic acidosis has resolved.  Hypotension resolved.  Despite all above, patient remains systemically well, without toxicity.  Antibiotic plan as in below.  Monitor temperature/WBC.  Monitor hemodynamics.

## 2025-03-03 NOTE — PROGRESS NOTES
Progress Note - Infectious Disease   Name: Pretty Aguila 74 y.o. female I MRN: [id number]  Unit/Bed#: S -01 I Date of Admission: 2/28/2025   Date of Service: 3/2/2025 I Hospital Day: 2    Assessment & Plan  Sepsis (HCC)  Patient presented with leukopenia, tachycardia, with lactic acidosis.  She was also briefly hypotension.  I suspect this is all secondary to gastroenteritis.  Her brief hypotension and lactic acidosis are most likely secondary to prerenal state from diarrhea.  Lactic acidosis has resolved.  Hypotension much improved.  Spite all above, patient remains systemically well, without toxicity.  Antibiotic plan as in below.  Monitor temperature/WBC.  Monitor hemodynamics.  Gram-positive cocci bacteremia  There is growth of Streptococcus salivarius in 1 out of 2 admission blood cultures.  Patient has no indwelling intravascular foreign body.  She has no central venous catheter.  Although this may be contaminant blood draw, given acute diarrhea, we also need to consider the possibility of translocation.  She remains clinically and systemically well.  Recent TTE last week benign.  Change antibiotic to high-dose IV ceftriaxone.  Repeat blood cultures.  Diarrhea  Patient had acute nausea/vomiting with diarrhea, which resolved spontaneously after a few hours.  Abdominal exam is benign.  This is most likely viral gastroenteritis.  Monitor for recurrent diarrhea.  Serial abdominal exams.  Pancytopenia (HCC)  Patient has chronic pancytopenia, including leukopenia, without meera neutropenia, with ANC almost 1000.  Monitor CBC/ANC.  MDS (myelodysplastic syndrome), high grade (HCC)  Patient is currently on azacitidine for MDS via subcutaneous injection.  She has no PICC or port in place.  She has chronic pancytopenia but without meera neutropenia.  Monitor CBC.  Hematology/oncology follow-up.  Closed dislocation of left hip (HCC)  Patient is status post reduction of her dislocation.  No evidence of hip infection  clinically.  Orthopedic surgery follow-up.  Malignant neoplasm of lower-outer quadrant of left breast of female, estrogen receptor positive (HCC)  Patient is currently on anastrozole.  Oncology follow-up.  Rheumatoid arthritis (HCC)  Patient is on chronic prednisone.  Continue outpatient steroid.    Discussed with patient in detail regarding the above plan.      Antibiotics:  Vancomycin # 2    Subjective   Patient feels well.  No further nausea, vomiting or diarrhea.  Temperature stays down.  No chills.  She is tolerating antibiotic well.    Objective :  Temp:  [98.4 °F (36.9 °C)-98.6 °F (37 °C)] 98.4 °F (36.9 °C)  HR:  [74-76] 74  BP: (101-112)/(65-70) 108/70  Resp:  [16-17] 17  SpO2:  [96 %-98 %] 98 %  O2 Device: None (Room air)    General:  No acute distress  Psychiatric:  Awake and alert  Pulmonary:  Normal respiratory excursion, no rales, no wheezing, without accessory muscle use  Heart: Regular rate and rhythm.  No murmur, rub or gallop.  Abdomen:  Soft, nontender nondistended, normal bowel sounds  Extremities:  No edema  Skin:  No rashes      Lab Results: I have reviewed the following results:  Results from last 7 days   Lab Units 03/02/25  0547 03/01/25  1614 03/01/25  0745 02/28/25  0650   WBC Thousand/uL 2.59*  --  1.72* 1.48*   HEMOGLOBIN g/dL 8.1* 8.7* 6.7* 9.9*   PLATELETS Thousands/uL 85*  --  87* 146*     Results from last 7 days   Lab Units 03/02/25  0547 03/01/25  0553 02/28/25  0650 02/27/25  0459 02/25/25  1157   SODIUM mmol/L 143 137 141   < > 140   POTASSIUM mmol/L 3.5 3.8 4.1   < > 3.4*   CHLORIDE mmol/L 112* 111* 108   < > 104   CO2 mmol/L 26 22 20*   < > 28   BUN mg/dL 11 15 29*   < > 23   CREATININE mg/dL 0.57* 0.52* 0.82   < > 0.83   EGFR ml/min/1.73sq m 92 95 71   < > 70   CALCIUM mg/dL 7.6* 7.2* 8.5   < > 8.8   AST U/L  --   --  27  --  12*   ALT U/L  --   --  34  --  15   ALK PHOS U/L  --   --  66  --  72   ALBUMIN g/dL  --   --  3.6  --  3.5    < > = values in this interval not  displayed.     Results from last 7 days   Lab Units 03/01/25  1246 02/28/25  0802 02/28/25  0650   BLOOD CULTURE  No Growth at 24 hrs.  No Growth at 24 hrs. Streptococcus salivarius* No Growth at 48 hrs.   GRAM STAIN RESULT   --  Gram positive cocci in pairs and chains*  --      Results from last 7 days   Lab Units 03/02/25  0547 03/01/25  0553 02/28/25  0650   PROCALCITONIN ng/ml 1.46* 2.70* 0.50*

## 2025-03-03 NOTE — HOSPITAL COURSE
who presents with nausea, 4 episodes of diarrhea soon after she was discharged from hospital on 2/27.  She had a turkey sandwich from the hospital before going home. Denied any vomiting but did feel nauseated.  She was trying to reach out to her cane and then she dislocated her left hip.  In the ED her vitals shows tachycardia.  Blood work showed WBC 1.48 with absolute neutrophil 1.01, hemoglobin 9.9, platelet 146.  COVID flu antigen negative.  Lactic acid was 3.4 and procalcitonin 0.50.  Urinalysis was negative for infection.  Chest x-ray was normal and x-ray left hip shows dislocation.  She was given 3 L of fluid in the ED and orthopedic did relocate her left hip prosthetic.  She was admitted for sepsis and diarrheal illness. 1 out of 2 blood cultures were positive for Streptococcus. She was started on ceftriaxone and ID recommended to treat for 14 days through 3/14. New set of blood cultures drawn have been negative 24 hours and PICC line was placed for IV antibiotics on 3/3/2025. She is medically stable to be discharged home. She will follow up with ID and orthopedic outpatient.

## 2025-03-03 NOTE — ASSESSMENT & PLAN NOTE
Patient is on chronic prednisone.  Continue outpatient steroid.    Discussed with patient in detail regarding the above plan.

## 2025-03-03 NOTE — PROGRESS NOTES
Progress Note - Infectious Disease   Name: Pretty Aguila 74 y.o. female I MRN: [id number]  Unit/Bed#: S -01 I Date of Admission: 2/28/2025   Date of Service: 3/3/2025 I Hospital Day: 3    Assessment & Plan  Sepsis (HCC)  Patient presented with leukopenia, tachycardia, with lactic acidosis.  She was also briefly hypotension.  I suspect this is all secondary to gastroenteritis.  Her brief hypotension and lactic acidosis are most likely secondary to prerenal state from diarrhea.  Lactic acidosis has resolved.  Hypotension resolved.  Despite all above, patient remains systemically well, without toxicity.  Antibiotic plan as in below.  Monitor temperature/WBC.  Monitor hemodynamics.  Gram-positive cocci bacteremia  There is growth of Streptococcus salivarius in 1 out of 2 admission blood cultures.  Patient has no indwelling intravascular foreign body.  She has no central venous catheter.  Repeat blood culture from 3/1 have no growth thus far.  Although this may be contaminant blood draw, given acute diarrhea, we also need to consider the possibility of translocation.  She remains clinically and systemically well.  Recent TTE last week benign.  With patient's chronic immunosuppressed state, the most prudent course of action is to treat her for presumptive translocation.  Continue high-dose IV ceftriaxone.  Follow-up repeat blood cultures.  If repeat blood cultures have no growth, treat x 14 days total antibiotic, through 3/14.  PICC placement when repeat blood cultures have no growth x 72 hours.  Diarrhea  Patient had acute nausea/vomiting with diarrhea, which resolved spontaneously after a few hours.  Abdominal exam is benign.  This is most likely viral gastroenteritis.  Monitor for recurrent diarrhea.  Serial abdominal exams.  Pancytopenia (HCC)  Patient has chronic pancytopenia, including leukopenia, without meera neutropenia WBC/ANC stable.  Monitor CBC/ANC.  MDS (myelodysplastic syndrome), high grade  (HCC)  Patient is currently on azacitidine for MDS via subcutaneous injection.  She has no PICC or port in place.  She has chronic pancytopenia but without meera neutropenia.  Monitor CBC.  Hematology/oncology follow-up.  Closed dislocation of left hip (HCC)  Patient is status post reduction of her dislocation.  No evidence of hip infection clinically.  Orthopedic surgery follow-up.  Malignant neoplasm of lower-outer quadrant of left breast of female, estrogen receptor positive (HCC)  Patient is currently on anastrozole.  Oncology follow-up.  Rheumatoid arthritis (HCC)  Patient is on chronic prednisone.  Continue outpatient steroid.    Discussed with patient in detail regarding the above plan.  Discussed with patient's daughter via telephone, per patient's request.  I have discussed with Dr. Lovett from primary service regarding the above plan to continue IV ceftriaxone.  Team agrees with the plan.    Antibiotics:  Ceftriaxone  Antibiotic # 3    Subjective   Patient feels well.  No abdominal pain.  No further diarrhea.  Temperature stays down.  No chills.  She is tolerate antibiotic well.  No nausea or vomiting.    Objective :  Temp:  [98.4 °F (36.9 °C)-98.6 °F (37 °C)] 98.6 °F (37 °C)  HR:  [73-75] 75  BP: (108-123)/(70-76) 123/76  Resp:  [16-17] 16  SpO2:  [97 %-98 %] 97 %  O2 Device: None (Room air)    General:  No acute distress  Psychiatric:  Awake and alert  Pulmonary:  Normal respiratory excursion, no rales, no wheezing, without accessory muscle use  Heart: Regular rate and rhythm.  No murmur, rubs or gallops.  Abdomen:  Soft, nontender, nondistended, normal bowel sounds  Extremities:  No edema  Skin:  No rashes      Lab Results: I have reviewed the following results:  Results from last 7 days   Lab Units 03/03/25  0316 03/02/25  0547 03/01/25  1614 03/01/25  0745   WBC Thousand/uL 3.23* 2.59*  --  1.72*   HEMOGLOBIN g/dL 8.6* 8.1* 8.7* 6.7*   PLATELETS Thousands/uL 99* 85*  --  87*     Results from last 7  days   Lab Units 03/03/25  0316 03/02/25  0547 03/01/25  0553 02/28/25  0650 02/27/25  0459 02/25/25  1157   SODIUM mmol/L 142 143 137 141   < > 140   POTASSIUM mmol/L 3.4* 3.5 3.8 4.1   < > 3.4*   CHLORIDE mmol/L 111* 112* 111* 108   < > 104   CO2 mmol/L 24 26 22 20*   < > 28   BUN mg/dL 16 11 15 29*   < > 23   CREATININE mg/dL 0.65 0.57* 0.52* 0.82   < > 0.83   EGFR ml/min/1.73sq m 88 92 95 71   < > 70   CALCIUM mg/dL 7.8* 7.6* 7.2* 8.5   < > 8.8   AST U/L  --   --   --  27  --  12*   ALT U/L  --   --   --  34  --  15   ALK PHOS U/L  --   --   --  66  --  72   ALBUMIN g/dL  --   --   --  3.6  --  3.5    < > = values in this interval not displayed.     Results from last 7 days   Lab Units 03/01/25  1246 02/28/25  0802 02/28/25  0650   BLOOD CULTURE  No Growth at 24 hrs.  No Growth at 24 hrs. Streptococcus salivarius* No Growth at 48 hrs.   GRAM STAIN RESULT   --  Gram positive cocci in pairs and chains*  --      Results from last 7 days   Lab Units 03/02/25  0547 03/01/25  0553 02/28/25  0650   PROCALCITONIN ng/ml 1.46* 2.70* 0.50*

## 2025-03-04 ENCOUNTER — HOME HEALTH ADMISSION (OUTPATIENT)
Dept: HOME HEALTH SERVICES | Facility: HOME HEALTHCARE | Age: 75
End: 2025-03-04
Payer: MEDICARE

## 2025-03-04 VITALS
RESPIRATION RATE: 15 BRPM | BODY MASS INDEX: 34.08 KG/M2 | HEIGHT: 62 IN | SYSTOLIC BLOOD PRESSURE: 98 MMHG | WEIGHT: 185.19 LBS | HEART RATE: 82 BPM | DIASTOLIC BLOOD PRESSURE: 59 MMHG | TEMPERATURE: 98.3 F | OXYGEN SATURATION: 94 %

## 2025-03-04 PROBLEM — R52 PAIN: Status: ACTIVE | Noted: 2025-03-04

## 2025-03-04 PROBLEM — A41.9 SEPSIS (HCC): Status: RESOLVED | Noted: 2025-02-28 | Resolved: 2025-03-04

## 2025-03-04 PROBLEM — R19.7 DIARRHEA: Status: RESOLVED | Noted: 2025-02-28 | Resolved: 2025-03-04

## 2025-03-04 LAB
CA-I BLD-SCNC: 1.13 MMOL/L (ref 1.12–1.32)
GLUCOSE SERPL-MCNC: 129 MG/DL (ref 65–140)
GLUCOSE SERPL-MCNC: 82 MG/DL (ref 65–140)
GLUCOSE SERPL-MCNC: 83 MG/DL (ref 65–140)

## 2025-03-04 PROCEDURE — 82330 ASSAY OF CALCIUM: CPT

## 2025-03-04 PROCEDURE — G0545 PR INHERENT VISIT TO INPT: HCPCS | Performed by: INTERNAL MEDICINE

## 2025-03-04 PROCEDURE — 99239 HOSP IP/OBS DSCHRG MGMT >30: CPT | Performed by: INTERNAL MEDICINE

## 2025-03-04 PROCEDURE — 99232 SBSQ HOSP IP/OBS MODERATE 35: CPT | Performed by: INTERNAL MEDICINE

## 2025-03-04 PROCEDURE — 82948 REAGENT STRIP/BLOOD GLUCOSE: CPT

## 2025-03-04 RX ORDER — LIDOCAINE 50 MG/G
1 PATCH TOPICAL DAILY
Qty: 15 PATCH | Refills: 0 | Status: SHIPPED | OUTPATIENT
Start: 2025-03-05

## 2025-03-04 RX ADMIN — ACYCLOVIR 200 MG: 200 CAPSULE ORAL at 08:48

## 2025-03-04 RX ADMIN — CEFTRIAXONE SODIUM 2000 MG: 10 INJECTION, POWDER, FOR SOLUTION INTRAVENOUS at 15:03

## 2025-03-04 RX ADMIN — Medication 1 TABLET: at 08:47

## 2025-03-04 RX ADMIN — MIDODRINE HYDROCHLORIDE 2.5 MG: 2.5 TABLET ORAL at 12:27

## 2025-03-04 RX ADMIN — CITALOPRAM HYDROBROMIDE 20 MG: 20 TABLET ORAL at 08:47

## 2025-03-04 RX ADMIN — PREDNISONE 3 MG: 1 TABLET ORAL at 08:47

## 2025-03-04 RX ADMIN — HEPARIN SODIUM 5000 UNITS: 5000 INJECTION INTRAVENOUS; SUBCUTANEOUS at 05:09

## 2025-03-04 RX ADMIN — ATORVASTATIN CALCIUM 10 MG: 10 TABLET, FILM COATED ORAL at 08:47

## 2025-03-04 RX ADMIN — MIDODRINE HYDROCHLORIDE 2.5 MG: 2.5 TABLET ORAL at 05:09

## 2025-03-04 RX ADMIN — ANASTROZOLE 1 MG: 1 TABLET, COATED ORAL at 08:48

## 2025-03-04 RX ADMIN — CHOLECALCIFEROL (VITAMIN D3) 10 MCG (400 UNIT) TABLET 400 UNITS: at 08:47

## 2025-03-04 NOTE — ASSESSMENT & PLAN NOTE
Assessment:  Met sepsis criteria on admission (tachycardia, leukopenia, with suspected source of infection)  Elevated procalcitonin and lactic acid.  [place] UA  Negative flu/COVID.  Chest x-ray with no acute findings.  In the ED, received a total of 4 L of fluid.  IV cefepime and IV Flagyl.  Blood culture positive x 1 for Streptococcus.  3/2/2025- patient remains afebrile.  Her diarrhea has stopped.  Calcitonin is trending down.  ID is following. IV vancomycin given diarrhea possibility of translocation.Was treated with vancomycin  Tranisitoned to IV rocephin on 3/2/2025   Will need PICC line for IV rocephin through 3/14   Repeat blood cultures negative on 3/1/2025    Plan:  Consented patient for PICC line on 3/4/2025. Awaiting final scripts. Discharge tomorrow.   Trend fever curve.  Lab holiday

## 2025-03-04 NOTE — ASSESSMENT & PLAN NOTE
Has history of MDS currently on azacitidine completed 2 cycles last Friday  Jadon waters recommends adding venetoclax to current regimen with added acitretin for 5 days and venetoclax for 7 days  Plan is to add venetoclax during third cycle  On prophylactic medication Diflucan 100 mg daily, acyclovir 400 mg twice daily    Plan:  CBC with differentials daily.  Continue Diflucan 100 mg daily, acyclovir 400 mg twice daily  Follow-up with outpatient oncology -patient has appointment with Dr. Carranza on Tuesday, 3/4/2025 however PICC line placement and scripts patient will have to reschedule.  She was sent to Dr. Garcia via [employer] chat

## 2025-03-04 NOTE — CASE MANAGEMENT
Case Management Progress Note    Patient name Pretty Aguila  Location S /S -01 MRN [id number]  : 1950 Date 3/4/2025       LOS (days): 4  Geometric Mean LOS (GMLOS) (days): 3.5  Days to GMLOS:-0.4        OBJECTIVE:        Current admission status: Inpatient  Preferred Pharmacy:   [facility], [place], [place] [address] [facility]   [facility] [address]  Phone: [phone] Fax: [phone]    [facility] - [place], [place] - [address]  [address]  [address]  [place] [address]  Phone: [phone] Fax: [phone]    Primary Care Provider: Lauren Dumont MD    Primary Insurance: MEDICARE  Secondary Insurance: AARP    PROGRESS NOTE:    Weekly Care Management Length of Stay Review     Current LOS: 4 Days    Most Recent Labs:     Lab Results   Component Value Date/Time    WBC 3.23 (L) 2025 03:16 AM    HGB 8.6 (L) 2025 03:16 AM    HCT 26.4 (L) 2025 03:16 AM    PLT 99 (L) 2025 03:16 AM    SODIUM 142 2025 03:16 AM    K 3.4 (L) 2025 03:16 AM     (H) 2025 03:16 AM    CO2 24 2025 03:16 AM    BUN 16 2025 03:16 AM    CREATININE 0.65 2025 03:16 AM    GLUC 76 2025 03:16 AM       Most Recent Vitals:   Vitals:    25 0702   BP: 130/81   Pulse: 77   Resp: 17   Temp: 98.5 °F (36.9 °C)   SpO2: 96%        Identified Barriers to Discharge/Discharge Goals/Care Management Interventions: sepsis. IV ABX at home    Intended Discharge Disposition: [facility] with SOC-3/.     Expected Discharge Date:  today vs 24hrs

## 2025-03-04 NOTE — PROGRESS NOTES
Patient:    MRN:  [id number]    Ashish Request ID:  [id number]    Level of care reserved:  Home Health Agency    [facility] [place] [address] [phone]    Clinical needs requested:    Geography searched:  [id number]    Start of Service:    Request sent:  2:45pm EST on 3/2/2025 by Danielle Haley    Partner reserved:  10:09am EST on 3/4/2025 by Neri Garcia    Choice list shared:  9:48am EST on 3/4/2025 by Neri Garcia

## 2025-03-04 NOTE — PROGRESS NOTES
Progress Note - Hospitalist   Name: Pretty Aguila 74 y.o. female I MRN: [id number]  Unit/Bed#: S -01 I Date of Admission: 2/28/2025   Date of Service: 3/4/2025 I Hospital Day: 4    Assessment & Plan  Sepsis (HCC)  Assessment:  Met sepsis criteria on admission (tachycardia, leukopenia, with suspected source of infection)  Elevated procalcitonin and lactic acid.  [place] UA  Negative flu/COVID.  Chest x-ray with no acute findings.  In the ED, received a total of 4 L of fluid.  IV cefepime and IV Flagyl.  Blood culture positive x 1 for Streptococcus.  3/2/2025- patient remains afebrile.  Her diarrhea has stopped.  Calcitonin is trending down.  ID is following. IV vancomycin given diarrhea possibility of translocation.Was treated with vancomycin  Tranisitoned to IV rocephin on 3/2/2025   Will need PICC line for IV rocephin through 3/14   Repeat blood cultures negative on 3/1/2025    Plan:  Consented patient for PICC line on 3/4/2025. Awaiting final scripts. Discharge tomorrow.   Trend fever curve.  Lab holiday   Diarrhea  Assessment:  Presented with 4 episodes of loose nonbloody foul-smelling diarrhea on 2/28  Reported that she had a turkey sandwich at home the night before admission.  She is on Azacitidine chemotherapy for her MDS  Her diarrhea has completely stopped.    Plan:  Stop maintenance fluids.  Follow-up stool C. difficile and enteric panel.  Were not collected yet given that her diarrhea has stopped.  MDS (myelodysplastic syndrome), high grade (HCC)  Has history of MDS currently on azacitidine completed 2 cycles last Friday  Jadon waters recommends adding venetoclax to current regimen with added acitretin for 5 days and venetoclax for 7 days  Plan is to add venetoclax during third cycle  On prophylactic medication Diflucan 100 mg daily, acyclovir 400 mg twice daily    Plan:  CBC with differentials daily.  Continue Diflucan 100 mg daily, acyclovir 400 mg twice daily  Follow-up with outpatient oncology -patient  has appointment with Dr. Carranza on Tuesday, 3/4/2025 however PICC line placement and scripts patient will have to reschedule.  She was sent to Dr. Garcia via [employer] chat  Closed dislocation of left hip (HCC)  Assessment:  Patient has a history of left hip dislocation.  X-ray showed dislocation of left hip arthroplasty.  In the ED, orthopedics did a closed reduction.  PT/OT recommended level 3.  3/2/2025- patient denies any pain and has not requested any pain medications.  Orthopedics is following.     Plan:  Orthopedics  Follow-up outpatient  Continue Abduction pillow while in bed  Abduction brace while ambulating  Weightbearing as tolerated  Pain control  Malignant neoplasm of lower-outer quadrant of left breast of female, estrogen receptor positive (HCC)  Currently on anastrozole 1 mg daily    Plan:  Continue anastrozole  Pancytopenia (HCC)  Assessment:  History of pancytopenia.  S/p 1 unit PRBCs on 3/1/2025.  Neutrophil count is trending down today.    Plan:  Transfuse if hemoglobin less than 7.  Transfuse if platelets less than 1000 without bleeding or 20,000 with bleeding.  If her neutrophil count drops to less than 500 might consider consulting oncology for Neulasta.  Gram-positive cocci bacteremia  Plan as sepsis above.  Rheumatoid arthritis (HCC)      VTE Pharmacologic Prophylaxis: VTE Score: 8 High Risk (Score >/= 5) - Pharmacological DVT Prophylaxis Ordered: heparin. Sequential Compression Devices Ordered.    Mobility:   Basic Mobility Inpatient Raw Score: 18  JH-HLM Goal: 6: Walk 10 steps or more  JH-HLM Achieved: 7: Walk 25 feet or more  JH-HLM Goal achieved. Continue to encourage appropriate mobility.    Patient Centered Rounds: I performed bedside rounds with nursing staff today.   Discussions with Specialists or Other Care Team Provider: ID, Orthopedics and nursing    Education and Discussions with Family / Patient: Patient declined call to .     Current Length of Stay: 4 day(s)  Current  Patient Status: Inpatient     Discharge Plan: Anticipated discharge tomorrow to home    Code Status: Level 1 - Full Code    Subjective   Patient was seen and examined this morning while she was watching TV.  States that her hip pain has significantly improved.  No diarrhea, No abdominal pain or NVD.  Spoke to patient about PICC line for IV antibiotic treatment     Later in the morning, patient was agreeable for staying 1 more day for finalizing PICC line and scripts.    Objective :  Temp:  [98.6 °F (37 °C)-99 °F (37.2 °C)] 98.6 °F (37 °C)  HR:  [75-81] 79  BP: ()/(66-76) 108/69  Resp:  [16-18] 18  SpO2:  [94 %-97 %] 95 %  O2 Device: None (Room air)    Body mass index is 33.87 kg/m².     Input and Output Summary (last 24 hours):   No intake or output data in the 24 hours ending 03/04/25 0638      Physical Exam  Vitals and nursing note reviewed.   Constitutional:       General: She is not in acute distress.     Appearance: She is well-developed.   HENT:      Head: Normocephalic and atraumatic.   Eyes:      Conjunctiva/sclera: Conjunctivae normal.   Cardiovascular:      Rate and Rhythm: Normal rate and regular rhythm.      Heart sounds: No murmur heard.  Pulmonary:      Effort: Pulmonary effort is normal. No respiratory distress.      Breath sounds: Normal breath sounds.   Abdominal:      Palpations: Abdomen is soft.      Tenderness: There is no abdominal tenderness.   Musculoskeletal:         General: No swelling.      Cervical back: Neck supple.   Skin:     General: Skin is warm and dry.      Capillary Refill: Capillary refill takes less than 2 seconds.   Neurological:      Mental Status: She is alert.   Psychiatric:         Mood and Affect: Mood normal.           Lines/Drains:  Lines/Drains/Airways       Active Status       Name Placement date Placement time Site Days    PICC Line 03/03/25 Right Basilic 03/03/25  1303  Basilic  less than 1                  Urinary Catheter:  Goal for removal: No longer needed.  Will place order to discontinue                 Lab Results: I have reviewed the following results:   Results from last 7 days   Lab Units 03/03/25  0316 03/01/25  1614 03/01/25  0745 02/25/25  1703 02/25/25  1157   WBC Thousand/uL 3.23*   < > 1.72*   < > 3.85*   HEMOGLOBIN g/dL 8.6*   < > 6.7*   < > 10.0*   HEMATOCRIT % 26.4*   < > 20.7*   < > 30.0*   PLATELETS Thousands/uL 99*   < > 87*   < > 201   BANDS PCT %  --   --  1   < >  --    SEGS PCT %  --   --   --   --  42*   LYMPHO PCT % 78*   < > 42   < > 53*   MONO PCT % 1*   < > 3*   < > 2*   EOS PCT % 2   < > 0   < > 2    < > = values in this interval not displayed.     Results from last 7 days   Lab Units 03/03/25  0316 03/01/25  0553 02/28/25  0650   SODIUM mmol/L 142   < > 141   POTASSIUM mmol/L 3.4*   < > 4.1   CHLORIDE mmol/L 111*   < > 108   CO2 mmol/L 24   < > 20*   BUN mg/dL 16   < > 29*   CREATININE mg/dL 0.65   < > 0.82   ANION GAP mmol/L 7   < > 13   CALCIUM mg/dL 7.8*   < > 8.5   ALBUMIN g/dL  --   --  3.6   TOTAL BILIRUBIN mg/dL  --   --  0.94   ALK PHOS U/L  --   --  66   ALT U/L  --   --  34   AST U/L  --   --  27   GLUCOSE RANDOM mg/dL 76   < > 186*    < > = values in this interval not displayed.     Results from last 7 days   Lab Units 02/28/25  0650   INR  0.99     Results from last 7 days   Lab Units 03/03/25 2052 03/03/25  1537 03/03/25  1155 03/03/25  0748 03/02/25  2111 03/02/25  1625 03/02/25  1059 03/02/25  0725 03/01/25  2053 03/01/25  1731 03/01/25  1402 03/01/25  0708   POC GLUCOSE mg/dl 116 111 138 77 112 125 96 81 100 94 82 89     Results from last 7 days   Lab Units 02/28/25  0650   HEMOGLOBIN A1C % 6.1*     Results from last 7 days   Lab Units 03/02/25  0547 03/01/25  0553 02/28/25  1859 02/28/25  1558 02/28/25  1035 02/28/25  0802 02/28/25  0650   LACTIC ACID mmol/L  --   --  1.4 2.5* 3.5* 3.4*  --    PROCALCITONIN ng/ml 1.46* 2.70*  --   --   --   --  0.50*       Recent Cultures (last 7 days):   Results from last 7 days   Lab  Units 03/01/25  1246 02/28/25  0802 02/28/25  0650   BLOOD CULTURE  No Growth at 48 hrs.  No Growth at 48 hrs. Streptococcus salivarius* No Growth at 72 hrs.   GRAM STAIN RESULT   --  Gram positive cocci in pairs and chains*  --        Imaging Results Review: No pertinent imaging studies reviewed.  Other Study Results Review: No additional pertinent studies reviewed.    Last 24 Hours Medication List:     Current Facility-Administered Medications:     acetaminophen (TYLENOL) tablet 975 mg, Q6H PRN    acyclovir (ZOVIRAX) capsule 200 mg, BID    anastrozole (ARIMIDEX) tablet 1 mg, Daily    atorvastatin (LIPITOR) tablet 10 mg, Daily    cefTRIAXone (ROCEPHIN) 2,000 mg in dextrose 5 % 50 mL IVPB, Q24H, Last Rate: 2,000 mg (03/03/25 2008)    chlorhexidine (PERIDEX) 0.12 % oral rinse 15 mL, Q12H SHRADDHA    Cholecalciferol (VITAMIN D3) tablet 400 Units, Daily    citalopram (CeleXA) tablet 20 mg, Daily    heparin (porcine) subcutaneous injection 5,000 Units, Q8H SHRADDHA    HYDROmorphone HCl (DILAUDID) injection 0.2 mg, Q2H PRN    insulin lispro (HumALOG/ADMELOG) 100 units/mL subcutaneous injection 1-6 Units, 4x Daily (AC & HS) **AND** Fingerstick Glucose (POCT), 4x Daily AC and at bedtime    lidocaine (LIDODERM) 5 % patch 1 patch, Daily    [Held by provider] metoprolol succinate (TOPROL-XL) 24 hr tablet 12.5 mg, Daily    midodrine (PROAMATINE) tablet 2.5 mg, TID AC    multivitamin stress formula tablet 1 tablet, Daily    naloxone (NARCAN) 0.04 mg/mL syringe 0.04 mg, Q1MIN PRN    ondansetron (ZOFRAN) injection 4 mg, Q4H PRN    predniSONE tablet 3 mg, Daily    Administrative Statements   Today, Patient Was Seen By: Dagmar Brewer MD  I have spent a total time of 35 minutes in caring for this patient on the day of the visit/encounter including Diagnostic results, Prognosis, and Risks and benefits of tx options.    **Please Note: This note may have been constructed using a voice recognition system.**

## 2025-03-04 NOTE — ASSESSMENT & PLAN NOTE
Has history of MDS currently on azacitidine completed 2 cycles last Friday  Jadon waters recommends adding venetoclax to current regimen with added acitretin for 5 days and venetoclax for 7 days  Plan is to add venetoclax during third cycle  On prophylactic medication Diflucan 100 mg daily, acyclovir 400 mg twice daily    Plan:  Continue Diflucan 100 mg daily, acyclovir 400 mg twice daily  Follow-up with outpatient oncology -patient had an outpatient appointment with Dr. Carranza on Tuesday, 3/4/2025 however PICC line placement and scripts patient will have to reschedule.  She was sent to Dr. Garcia via epic chat.   Follow up placed with Dr. Carranza-central scheduling will call to schedule

## 2025-03-04 NOTE — ASSESSMENT & PLAN NOTE
Assessment:  Patient has a history of left hip dislocation.  X-ray showed dislocation of left hip arthroplasty.  In the ED, orthopedics did a closed reduction.  PT/OT recommended level 3.  3/2/2025- patient denies any pain and has not requested any pain medications.  Orthopedics is following.   Plan:  Orthopedics  Follow-up outpatient with Dr. Alison martin.   Continue Abduction pillow while in bed  Abduction brace while ambulating  Weightbearing as tolerated  Pain control

## 2025-03-04 NOTE — ASSESSMENT & PLAN NOTE
Post left hip closed reduction patient reported pain readings as high as 9-10 at times.  Was managed with inpatient oxycodone and Tylenol.  On day of discharge patient reported no pain.  Plan  Continue to monitor  Continue supportive therapy

## 2025-03-04 NOTE — ASSESSMENT & PLAN NOTE
Assessment:  Presented with 4 episodes of loose nonbloody foul-smelling diarrhea on 2/28  Reported that she had a turkey sandwich at home the night before admission.  She is on Azacitidine chemotherapy for her MDS  Her diarrhea has completely stopped.   C. difficile and enteric panel  Were not collected  given that her diarrhea has stopped.

## 2025-03-04 NOTE — ASSESSMENT & PLAN NOTE
Currently on anastrozole 1 mg daily  Plan:  Continue anastrozole   Continue outpaitent management

## 2025-03-04 NOTE — CASE MANAGEMENT
Case Management Discharge Planning Note    Patient name Pretty Aguila  Location S /S -01 MRN [id number]  : 1950 Date 3/4/2025       Current Admission Date: 2025  Current Admission Diagnosis:Sepsis ([facility])   Patient Active Problem List    Diagnosis Date Noted Date Diagnosed    Gram-positive cocci bacteremia 2025     Closed dislocation of left hip (HCC) 2025     Sepsis ([facility]) 2025     Diarrhea 2025     Near syncope 2025     Obesity, morbid ([facility]) 2025     Stage 3a chronic kidney disease ([facility]) 2025     Anemia 2025     Syncope 2025     MDS (myelodysplastic syndrome), high grade ([facility]) 2024     Pancytopenia ([facility]) 2024     Platelets decreased ([facility]) 2024     CKD (chronic kidney disease) 2024     Psoriatic arthritis ([facility]) 2024     Osteoarthritis of multiple joints 2024     Class 1 obesity due to excess calories with serious comorbidity and body mass index (BMI) of 32.0 to 32.9 in adult 2024     Prediabetes 2024     Moderate episode of recurrent major depressive disorder ([facility]) 2024     Anxiety and depression 2024     Restless leg syndrome 2024     Simple chronic bronchitis ([facility]) 2024     Continuous opioid dependence ([facility]) 2024     Malignant neoplasm of lower-outer quadrant of left breast of female, estrogen receptor positive ([facility]) 2023     Postmenopausal 2023     Annual physical exam 2023     Rheumatoid arthritis ([facility]) 2023     Other fatigue 2023     Candida infection of flexural skin 2022     Chronic bilateral low back pain without sciatica 10/11/2021     Status post bariatric surgery 2021     At risk for sleep apnea 2021     Postsurgical malabsorption 2021     Vitamin D deficiency 2020     Dyspnea on exertion 2019     Coronary artery disease involving native coronary artery of native heart without angina  pectoris 05/29/2018     Essential hypertension 05/29/2018     Dyslipidemia 05/29/2018     S/P CABG x 3 05/29/2018       LOS (days): 4  Geometric Mean LOS (GMLOS) (days): 3.5  Days to GMLOS:-0.5     OBJECTIVE:  Risk of Unplanned Readmission Score: 49.36         Current admission status: Inpatient   Preferred Pharmacy:   [facility], [facility] [address]  [address] [address]  Phone: [phone] Fax: [phone]    [facility] - [place], [place] - [address]  [address]  [address]  [place] [address]  Phone: [phone] Fax: [phone]    Primary Care Provider: Lauren Dumont MD    Primary Insurance: MEDICARE  Secondary Insurance: [facility]    DISCHARGE DETAILS:    Discharge planning discussed with:: patient and daughter, Denzel- at bedside  Freedom of Choice: Yes  Comments - Freedom of Choice: [facility], IV abx  CM contacted family/caregiver?: Yes  Were Treatment Team discharge recommendations reviewed with patient/caregiver?: Yes  Did patient/caregiver verbalize understanding of patient care needs?: Yes  Were patient/caregiver advised of the risks associated with not following Treatment Team discharge recommendations?: Yes    Contacts  Patient Contacts: Denzel  Relationship to Patient:: Family (daughter)  Contact Method: In Person  Reason/Outcome: Continuity of Care, Emergency Contact, Referral, Discharge Planning    Requested Home Health Care         Is the patient interested in HHC at discharge?: Yes  Home Health Discipline requested:: Occupational Therapy, Nursing, Physical Therapy  Home Health Agency Name:: [facility]  Home Health Follow-Up Provider:: PCP  Home Health Services Needed:: Gait/ADL Training, Evaluate Functional Status and Safety, Strengthening/Theraputic Exercises to Improve Function, Administration of IV, IM or SC Medications  Homebound Criteria Met:: Requires the Assistance of Another Person for Safe Ambulation or to Leave the  Home  Supporting Clincal Findings:: Limited Endurance    DME Referral Provided  Referral made for DME?: No    Other Referral/Resources/Interventions Provided:  Interventions: [facility], Home Infusion  Referral Comments: CM met with patient and daughter, Denzel at bedside to discuss dcp. CM notified [facility] can accept for SOC 3/5 at 10:00AM. Daughter asking if [facility] would be an option, as she works for the network. New referral placed to [facility], who confirms they can accommodate for SOC 3/5 at 12:00PM-- agency reserved in [facility] and timing and details added to AVS. Confirmed with ID moving time of antibiotic dose is okay.     Per [facility]-- drug copay is $43.00. Supply charge is $35/day total for 10 days is $393.  Half ($196.00) would be owed up front. Patient made aware and agreeable to payment. Medication to be delivered to bedside this afternoon.     No further CM needs anticipated at this time.     Would you like to participate in our [facility] service program?  : No - Declined    Treatment Team Recommendation: Home with Home Health Care  Discharge Destination Plan:: Home with Home Health Care  Transport at Discharge : Family    IMM Given (Date):: 03/04/25  IMM Given to:: Family (reviewed with patient and daughter, at bedside)  IMM reviewed with patient and caregiver, patient and caregiver agrees with discharge determination.

## 2025-03-04 NOTE — ASSESSMENT & PLAN NOTE
Assessment:  History of pancytopenia.  S/p 1 unit PRBCs on 3/1/2025.  Neutrophil count is trending down today.    Plan:  Transfuse if hemoglobin less than 7.  Transfuse if platelets less than 1000 without bleeding or 20,000 with bleeding.  If her neutrophil count drops to less than 500 might consider consulting oncology for Neulasta.

## 2025-03-04 NOTE — ASSESSMENT & PLAN NOTE
Assessment:  History of pancytopenia.  S/p 1 unit PRBCs on 3/1/2025.  Neutrophil count of .58   Plan:  Repeat CBC at primary care office  If her neutrophil count drops to less than 500 might consider consulting oncology for Neulasta.   11

## 2025-03-04 NOTE — ASSESSMENT & PLAN NOTE
Assessment:  Patient has a history of left hip dislocation.  X-ray showed dislocation of left hip arthroplasty.  In the ED, orthopedics did a closed reduction.  PT/OT recommended level 3.  3/2/2025- patient denies any pain and has not requested any pain medications.  Orthopedics is following.     Plan:  Orthopedics  Follow-up outpatient  Continue Abduction pillow while in bed  Abduction brace while ambulating  Weightbearing as tolerated  Pain control

## 2025-03-04 NOTE — ASSESSMENT & PLAN NOTE
Assessment:  Met sepsis criteria on admission (tachycardia, leukopenia, with suspected source of infection)  Elevated procalcitonin and lactic acid.  [place] UA  Negative flu/COVID.  Chest x-ray with no acute findings.  In the ED, received a total of 4 L of fluid.  IV cefepime and IV Flagyl.  Blood culture positive x 1 for Streptococcus.  3/2/2025- patient remains afebrile.  Her diarrhea has stopped.  Calcitonin is trending down.  ID is following. IV vancomycin given diarrhea possibility of translocation.Was treated with vancomycin  Tranisitoned to IV rocephin on 3/2/2025   Will need PICC line for IV rocephin through 3/14   Repeat blood cultures negative on 3/1/2025  No longer meeting SIRS criteria of tachycardia. Leukocyte count was uptrending.  [place] was likely in the setting of malignancy and a component of dehydration  Lactic acid and procal downtrended   PICC line placed on right arm on 3/4/2025. Infectious disease sent final scripts.No complications  PT recommended level III     Plan:  Discharged today   Continue Rocephin and through 3/14 assistance with visiting facility to help with antibiotic administration

## 2025-03-04 NOTE — PROGRESS NOTES
Progress Note - Infectious Disease   Name: Pretty Aguila 74 y.o. female I MRN: [id number]  Unit/Bed#: S -01 I Date of Admission: 2/28/2025   Date of Service: 3/4/2025 I Hospital Day: 4    Assessment & Plan  Sepsis (HCC)  Patient presented with leukopenia, tachycardia, with lactic acidosis.  She was also briefly hypotension.  I suspect this is all secondary to gastroenteritis.  Her brief hypotension and lactic acidosis are most likely secondary to prerenal state from diarrhea.  Lactic acidosis has resolved.  Hypotension resolved.  Despite all above, patient remains systemically well, without toxicity.  Antibiotic plan as in below.  Monitor temperature/WBC.  Monitor hemodynamics.  Gram-positive cocci bacteremia  There is growth of Streptococcus salivarius in 1 out of 2 admission blood cultures.  Patient has no indwelling intravascular foreign body.  She has no central venous catheter.  Repeat blood culture from 3/1 have no growth thus far.  Although this may be contaminant blood draw, given acute diarrhea, we also need to consider the possibility of translocation.  She remains clinically and systemically well.  Recent TTE last week benign.  With patient's chronic immunosuppressed state, the most prudent course of action is to treat her for presumptive translocation.  Continue high-dose IV ceftriaxone.  Follow-up repeat blood cultures.  Treat x 14 days total antibiotic, through 3/14.  Diarrhea  Patient had acute nausea/vomiting with diarrhea, which resolved spontaneously after a few hours.  Abdominal exam is benign.  This is most likely viral gastroenteritis.  Monitor for recurrent diarrhea.  Serial abdominal exams.  Pancytopenia (HCC)  Patient has chronic pancytopenia, including leukopenia, without meera neutropenia WBC/ANC stable.  Monitor CBC/ANC.  MDS (myelodysplastic syndrome), high grade (HCC)  Patient is currently on azacitidine for MDS via subcutaneous injection.  She has no PICC or port in place.  She  has chronic pancytopenia but without meera neutropenia.  Monitor CBC.  Hematology/oncology follow-up.  Closed dislocation of left hip (HCC)  Patient is status post reduction of her dislocation.  No evidence of hip infection clinically.  Orthopedic surgery follow-up.  Malignant neoplasm of lower-outer quadrant of left breast of female, estrogen receptor positive (HCC)  Patient is currently on anastrozole.  Oncology follow-up.  Rheumatoid arthritis (HCC)  Patient is on chronic prednisone.  Continue outpatient steroid.    Discussed with patient in detail regarding the above plan.  I have discussed with Dr. Brewer from primary service regarding the above plan to continue IV ceftriaxone.  Team agrees with the plan.  Rx on chart for home IV antibiotic.  Discussed with .  Plan for discharge after today's IV ceftriaxone dose noted.    Antibiotics:  Ceftriaxone  Antibiotic # 3    Subjective   Patient is well.  No further diarrhea.  No further abdominal pain.  Temperature stays down.  No chills.  She is tolerating antibiotic well.  No nausea, vomiting or diarrhea.    Objective :  Temp:  [98.5 °F (36.9 °C)-99 °F (37.2 °C)] 98.5 °F (36.9 °C)  HR:  [77-81] 77  BP: ()/(66-81) 130/81  Resp:  [17-18] 17  SpO2:  [94 %-96 %] 96 %    General:  No acute distress  Psychiatric:  Awake and alert  Pulmonary:  Normal respiratory excursion no rales, no wheezing, without accessory muscle use  Heart: Regular rate and rhythm.  No murmur, rub or gallop.  Abdomen:  Soft, nontender nondistended, normal bowel sounds  Extremities:  No edema  Skin:  No rashes      Lab Results: I have reviewed the following results:  Results from last 7 days   Lab Units 03/03/25  0316 03/02/25  0547 03/01/25  1614 03/01/25  0745   WBC Thousand/uL 3.23* 2.59*  --  1.72*   HEMOGLOBIN g/dL 8.6* 8.1* 8.7* 6.7*   PLATELETS Thousands/uL 99* 85*  --  87*     Results from last 7 days   Lab Units 03/03/25  0316 03/02/25  0547 03/01/25  0553 02/28/25  0650  02/27/25  0459 02/25/25  1157   SODIUM mmol/L 142 143 137 141   < > 140   POTASSIUM mmol/L 3.4* 3.5 3.8 4.1   < > 3.4*   CHLORIDE mmol/L 111* 112* 111* 108   < > 104   CO2 mmol/L 24 26 22 20*   < > 28   BUN mg/dL 16 11 15 29*   < > 23   CREATININE mg/dL 0.65 0.57* 0.52* 0.82   < > 0.83   EGFR ml/min/1.73sq m 88 92 95 71   < > 70   CALCIUM mg/dL 7.8* 7.6* 7.2* 8.5   < > 8.8   AST U/L  --   --   --  27  --  12*   ALT U/L  --   --   --  34  --  15   ALK PHOS U/L  --   --   --  66  --  72   ALBUMIN g/dL  --   --   --  3.6  --  3.5    < > = values in this interval not displayed.     Results from last 7 days   Lab Units 03/01/25  1246 02/28/25  0802 02/28/25  0650   BLOOD CULTURE  No Growth at 48 hrs.  No Growth at 48 hrs. Streptococcus salivarius* No Growth at 72 hrs.   GRAM STAIN RESULT   --  Gram positive cocci in pairs and chains*  --      Results from last 7 days   Lab Units 03/02/25  0547 03/01/25  0553 02/28/25  0650   PROCALCITONIN ng/ml 1.46* 2.70* 0.50*

## 2025-03-04 NOTE — PLAN OF CARE
Problem: Prexisting or High Potential for Compromised Skin Integrity  Goal: Skin integrity is maintained or improved  Description: INTERVENTIONS:  - Identify patients at risk for skin breakdown  - Assess and monitor skin integrity  - Assess and monitor nutrition and hydration status  - Monitor labs   - Assess for incontinence   - Turn and reposition patient  - Assist with mobility/ambulation  - Relieve pressure over bony prominences  - Avoid friction and shearing  - Provide appropriate hygiene as needed including keeping skin clean and dry  - Evaluate need for skin moisturizer/barrier cream  - Collaborate with interdisciplinary team   - Patient/family teaching  - Consider wound care consult   Outcome: Progressing     Problem: PAIN - ADULT  Goal: Verbalizes/displays adequate comfort level or baseline comfort level  Description: Interventions:  - Encourage patient to monitor pain and request assistance  - Assess pain using appropriate pain scale  - Administer analgesics based on type and severity of pain and evaluate response  - Implement non-pharmacological measures as appropriate and evaluate response  - Consider cultural and social influences on pain and pain management  - Notify physician/advanced practitioner if interventions unsuccessful or patient reports new pain  Outcome: Progressing     Problem: INFECTION - ADULT  Goal: Absence or prevention of progression during hospitalization  Description: INTERVENTIONS:  - Assess and monitor for signs and symptoms of infection  - Monitor lab/diagnostic results  - Monitor all insertion sites, i.e. indwelling lines, tubes, and drains  - Monitor endotracheal if appropriate and nasal secretions for changes in amount and color  - [place] appropriate cooling/warming therapies per order  - Administer medications as ordered  - Instruct and encourage patient and family to use good hand hygiene technique  - Identify and instruct in appropriate isolation precautions for  identified infection/condition  Outcome: Progressing  Goal: Absence of fever/infection during neutropenic period  Description: INTERVENTIONS:  - Monitor WBC    Outcome: Progressing     Problem: SAFETY ADULT  Goal: Patient will remain free of falls  Description: INTERVENTIONS:  - Educate patient/family on patient safety including physical limitations  - Instruct patient to call for assistance with activity   - Consult OT/PT to assist with strengthening/mobility   - Keep Call bell within reach  - Keep bed low and locked with side rails adjusted as appropriate  - Keep care items and personal belongings within reach  - Initiate and maintain comfort rounds  - Make Fall Risk Sign visible to staff  - Initiate/Maintain bed/chair alarm  - Obtain necessary fall risk management equipment  - Apply yellow socks and bracelet for high fall risk patients  - Consider moving patient to room near nurses station  Outcome: Progressing  Goal: Maintain or return to baseline ADL function  Description: INTERVENTIONS:  -  Assess patient's ability to carry out ADLs; assess patient's baseline for ADL function and identify physical deficits which impact ability to perform ADLs (bathing, care of mouth/teeth, toileting, grooming, dressing, etc.)  - Assess/evaluate cause of self-care deficits   - Assess range of motion  - Assess patient's mobility; develop plan if impaired  - Assess patient's need for assistive devices and provide as appropriate  - Encourage maximum independence but intervene and supervise when necessary  - Involve family in performance of ADLs  - Assess for home care needs following discharge   - Consider OT consult to assist with ADL evaluation and planning for discharge  - Provide patient education as appropriate  Outcome: Progressing  Goal: Maintains/Returns to pre admission functional level  Description: INTERVENTIONS:  - Perform AM-PAC 6 Click Basic Mobility/ Daily Activity assessment daily.  - Set and communicate daily  mobility goal to care team and patient/family/caregiver.   - Collaborate with rehabilitation services on mobility goals if consulted  - Ambulate patient 4 times a day  - Out of bed for meals 3 times a day  - Out of bed for toileting  - Record patient progress and toleration of activity level   Outcome: Progressing

## 2025-03-04 NOTE — PROGRESS NOTES
Patient:    MRN:  [id number]    Ashish Request ID:  [id number]    Level of care reserved:  Infusion    Partner Reserved:  [facility], [place] [address] [phone]    Clinical needs requested:    Geography searched:  [id number]    Start of Service:    Request sent:  3:30pm EST on 3/3/2025 by Neri Garcia    Partner reserved:  10:08am EST on 3/4/2025 by Neri Garcia    Choice list shared:

## 2025-03-04 NOTE — ASSESSMENT & PLAN NOTE
Has a history of hypertension  She had lower blood pressures this admission for which her home medication metoprolol was held.   Blood pressure up trended toward admission course  Plan  Can resume metoprolol.  Recommended to measure blood pressure before resuming.  Recommended to hold metoprolol if systolic blood pressures less than 110.

## 2025-03-04 NOTE — PROGRESS NOTES
Patient:    MRN:  [id number]    Ashish Request ID:  [id number]    Level of care reserved:  Home Health Agency    Partner Reserved:  [facility], [place] [address] [phone]    Clinical needs requested:    Geography searched:  [phone]    Start of Service:    Request sent:  10:50am EST on 3/4/2025 by Neri Garcia    Partner reserved:  12:51pm EST on 3/4/2025 by Neri Garcia    Choice list shared:  12:51pm EST on 3/4/2025 by Neri Garcia

## 2025-03-04 NOTE — ASSESSMENT & PLAN NOTE
There is growth of Streptococcus salivarius in 1 out of 2 admission blood cultures.  Patient has no indwelling intravascular foreign body.  She has no central venous catheter.  Repeat blood culture from 3/1 have no growth thus far.  Although this may be contaminant blood draw, given acute diarrhea, we also need to consider the possibility of translocation.  She remains clinically and systemically well.  Recent TTE last week benign.  With patient's chronic immunosuppressed state, the most prudent course of action is to treat her for presumptive translocation.  Continue high-dose IV ceftriaxone.  Follow-up repeat blood cultures.  Treat x 14 days total antibiotic, through 3/14.

## 2025-03-04 NOTE — DISCHARGE SUMMARY
Discharge Summary - Hospitalist   Name: Pretty Aguila 74 y.o. female I MRN: [id number]  Unit/Bed#: S -01 I Date of Admission: 2/28/2025   Date of Service: 3/4/2025 I Hospital Day: 4     Assessment & Plan  Sepsis (HCC) (Resolved: 3/4/2025)  Assessment:  Met sepsis criteria on admission (tachycardia, leukopenia, with suspected source of infection)  Elevated procalcitonin and lactic acid.  [place] UA  Negative flu/COVID.  Chest x-ray with no acute findings.  In the ED, received a total of 4 L of fluid.  IV cefepime and IV Flagyl.  Blood culture positive x 1 for Streptococcus.  3/2/2025- patient remains afebrile.  Her diarrhea has stopped.  Calcitonin is trending down.  ID is following. IV vancomycin given diarrhea possibility of translocation.Was treated with vancomycin  Tranisitoned to IV rocephin on 3/2/2025   Will need PICC line for IV rocephin through 3/14   Repeat blood cultures negative on 3/1/2025  No longer meeting SIRS criteria of tachycardia. Leukocyte count was uptrending.  [place] was likely in the setting of malignancy and a component of dehydration  Lactic acid and procal downtrended   PICC line placed on right arm on 3/4/2025. Infectious disease sent final scripts.No complications  PT recommended level III     Plan:  Discharged today   Continue Rocephin and through 3/14 assistance with visiting facility to help with antibiotic administration    Diarrhea (Resolved: 3/4/2025)  Assessment:  Presented with 4 episodes of loose nonbloody foul-smelling diarrhea on 2/28  Reported that she had a turkey sandwich at home the night before admission.  She is on Azacitidine chemotherapy for her MDS  Her diarrhea has completely stopped.   C. difficile and enteric panel  Were not collected  given that her diarrhea has stopped.  MDS (myelodysplastic syndrome), high grade (HCC)  Has history of MDS currently on azacitidine completed 2 cycles last Friday  Jadon waters recommends adding venetoclax to current regimen with added  acitretin for 5 days and venetoclax for 7 days  Plan is to add venetoclax during third cycle  On prophylactic medication Diflucan 100 mg daily, acyclovir 400 mg twice daily    Plan:  Continue Diflucan 100 mg daily, acyclovir 400 mg twice daily  Follow-up with outpatient oncology -patient had an outpatient appointment with Dr. Carranza on Tuesday, 3/4/2025 however PICC line placement and scripts patient will have to reschedule.  She was sent to Dr. Garcia via epic chat.   Follow up placed with Dr. Carranza-central scheduling will call to schedule   Closed dislocation of left hip (HCC)  Assessment:  Patient has a history of left hip dislocation.  X-ray showed dislocation of left hip arthroplasty.  In the ED, orthopedics did a closed reduction.  PT/OT recommended level 3.  3/2/2025- patient denies any pain and has not requested any pain medications.  Orthopedics is following.   Plan:  Orthopedics  Follow-up outpatient with Dr. Rosas placed.   Continue Abduction pillow while in bed  Abduction brace while ambulating  Weightbearing as tolerated  Pain control  Hypertension  Has a history of hypertension  She had lower blood pressures this admission for which her home medication metoprolol was held.   Blood pressure up trended toward admission course  Plan  Can resume metoprolol.  Recommended to measure blood pressure before resuming.  Recommended to hold metoprolol if systolic blood pressures less than 110.  Malignant neoplasm of lower-outer quadrant of left breast of female, estrogen receptor positive (HCC)  Currently on anastrozole 1 mg daily  Plan:  Continue anastrozole   Continue outpaitent management   Pancytopenia (HCC)  Assessment:  History of pancytopenia.  S/p 1 unit PRBCs on 3/1/2025.  Neutrophil count of .58   Plan:  Repeat CBC at primary care office  If her neutrophil count drops to less than 500 might consider consulting oncology for Neulasta.  Rheumatoid arthritis (HCC)  On daily prednisone 3 mg   Pain  Post  left hip closed reduction patient reported pain readings as high as 9-10 at times.  Was managed with inpatient oxycodone and Tylenol.  On day of discharge patient reported no pain.  Plan  Continue to monitor  Continue supportive therapy     Medical Problems       Resolved Problems  Date Reviewed: 2/28/2025          Resolved    * (Principal) Sepsis (HCC) 3/4/2025     Resolved by  Dagmar Brewer MD    Diarrhea 3/4/2025     Resolved by  Dagmar Brewer MD        Discharging Physician / Practitioner: Dagmar Brewer MD  PCP: Lauren Dumont MD  Admission Date:   Admission Orders (From admission, onward)       Ordered        02/28/25 1356  INPATIENT ADMISSION  Once                          Discharge Date: 03/04/25    Consultations During Hospital Stay:  Infectious disease   Orthopedics    Procedures Performed:   Closed reduction of left hip in the ER     Significant Findings / Test Results:   XR pelvis ap only 1 or 2 vw   ED Interpretation by Raúl Villeda MD (02/28 1137)   Per my independent interpretation: Hip reduced      Final Result by Salbador Cervantes MD (02/28 1153)      Interval relocation of the left total hip arthroplasty components between the reviewed hip radiographs and subsequent pelvis radiographs.         Computerized Assisted Algorithm (CAA) may have been used to analyze all applicable images.            Workstation performed: [id number]         XR hip/pelv 2-3 vws left if performed   Final Result by Salbador Cervantes MD (02/28 1153)      Interval relocation of the left total hip arthroplasty components between the reviewed hip radiographs and subsequent pelvis radiographs.         Computerized Assisted Algorithm (CAA) may have been used to analyze all applicable images.            Workstation performed: [id number]         XR chest 1 view portable   Final Result by Carlos Barros DO (02/28 0809)      No acute cardiopulmonary disease.            Workstation performed: [id number]         XR  hip/pelv 2-3 vws left if performed   ED Interpretation by Raúl Villeda MD (02/28 0750)   Per my independent interpretation: Left hip dislocation.  Concern for cortical defect lateral      Final Result by Carlos Barros DO (02/28 0808)      Dislocation of the left hip arthroplasty.         Computerized Assisted Algorithm (CAA) may have been used to analyze all applicable images.            Workstation performed: [id number]              Incidental Findings:   none     Test Results Pending at Discharge (will require follow up):   none     Outpatient Tests Requested:  CBC and BMP repeat at PCP office    Complications:  none    Reason for Admission:     Hospital Course:   Pretty Aguila is a 74 y.o. female patient with breast cancer, MDS, syncope, pancytopenia, rheumatoid arthritis  who originally presented to the hospital on 2/28/2025 due to who presents with nausea, 4 episodes of diarrhea soon after she was discharged from hospital on 2/27.  She had a turkey sandwich from the hospital before going home. Denied any vomiting but did feel nauseated.  She was trying to reach out to her cane and then she dislocated her left hip.  In the ED her vitals shows tachycardia.  Blood work showed WBC 1.48 with absolute neutrophil 1.01, hemoglobin 9.9, platelet 146.  COVID flu antigen negative.  Lactic acid was 3.4 and procalcitonin 0.50.  Urinalysis was negative for infection.  Chest x-ray was normal and x-ray left hip shows dislocation.  She was given 3 L of fluid in the ED and orthopedic did relocate her left hip prosthetic.  She was admitted for sepsis and diarrheal illness. 1 out of 2 blood cultures were positive for Streptococcus. She was started on ceftriaxone and ID recommended to treat for 14 days through 3/14. New set of blood cultures drawn have been negative 24 hours and PICC line was placed for IV antibiotics on 3/3/2025. She is medically stable to be discharged home. She will follow up with orthopedic  "outpatient. No need for infectious disease outpatient follow up     Patient was deemed medically stable for discharge on 3/4/2025.           Please see above list of diagnoses and related plan for additional information.     Condition at Discharge: stable    Discharge Day Visit / Exam:   Subjective:   Acute overnight events.  Used to have no episodes of diarrhea  Patient reports pain level has 0 today.  Is looking forward to continuing treatment at home.   Denies fever, chills, chest pain, shortness of breath, or abdominal pain.  Vitals: Blood Pressure: 98/59 (03/04/25 1450)  Pulse: 82 (03/04/25 1450)  Temperature: 98.3 °F (36.8 °C) (03/04/25 1450)  Temp Source: Oral (03/01/25 1110)  Respirations: 15 (03/04/25 1450)  Height: 5' 2\" (157.5 cm) (03/01/25 1703)  Weight - Scale: 84 kg (185 lb 3 oz) (03/01/25 1703)  SpO2: 94 % (03/04/25 1450)  Physical Exam  Vitals and nursing note reviewed.   Constitutional:       General: She is not in acute distress.     Appearance: She is well-developed.   HENT:      Head: Normocephalic and atraumatic.      Mouth/Throat:      Mouth: Mucous membranes are moist.   Eyes:      Conjunctiva/sclera: Conjunctivae normal.   Cardiovascular:      Rate and Rhythm: Normal rate and regular rhythm.      Pulses: Normal pulses.      Heart sounds: Normal heart sounds. No murmur heard.  Pulmonary:      Effort: Pulmonary effort is normal. No respiratory distress.      Breath sounds: Normal breath sounds.   Abdominal:      General: Abdomen is flat. Bowel sounds are normal.      Palpations: Abdomen is soft.      Tenderness: There is no abdominal tenderness.   Musculoskeletal:         General: No swelling.      Cervical back: Normal range of motion and neck supple.   Skin:     General: Skin is warm and dry.      Capillary Refill: Capillary refill takes less than 2 seconds.   Neurological:      General: No focal deficit present.      Mental Status: She is alert and oriented to person, place, and time. Mental " status is at baseline.   Psychiatric:         Mood and Affect: Mood normal.         Behavior: Behavior normal.         Thought Content: Thought content normal.         Judgment: Judgment normal.            Discussion with Family: Patient declined call to . Patient informed me that she will update family.     Discharge instructions/Information to patient and family:   See after visit summary for information provided to patient and family.      Provisions for Follow-Up Care:  See after visit summary for information related to follow-up care and any pertinent home health orders.      Mobility at time of Discharge:   Basic Mobility Inpatient Raw Score: 22  JH-HLM Goal: 7: Walk 25 feet or more  JH-HLM Achieved: 7: Walk 25 feet or more  HLM Goal achieved. Continue to encourage appropriate mobility.     Disposition:   Home with Antibiotic care     Planned Readmission: no    Discharge Medications:  See after visit summary for reconciled discharge medications provided to patient and/or family.      Administrative Statements   Discharge Statement:  I have spent a total time of 40 minutes in caring for this patient on the day of the visit/encounter. .    **Please Note: This note may have been constructed using a voice recognition system**

## 2025-03-05 ENCOUNTER — HOME CARE VISIT (OUTPATIENT)
Dept: HOME HEALTH SERVICES | Facility: HOME HEALTHCARE | Age: 75
End: 2025-03-05
Payer: MEDICARE

## 2025-03-05 ENCOUNTER — TELEPHONE (OUTPATIENT)
Age: 75
End: 2025-03-05

## 2025-03-05 VITALS
WEIGHT: 179.6 LBS | SYSTOLIC BLOOD PRESSURE: 104 MMHG | DIASTOLIC BLOOD PRESSURE: 66 MMHG | OXYGEN SATURATION: 98 % | HEART RATE: 87 BPM | TEMPERATURE: 98.2 F | RESPIRATION RATE: 18 BRPM | BODY MASS INDEX: 32.85 KG/M2

## 2025-03-05 DIAGNOSIS — R78.81 GRAM-POSITIVE COCCI BACTEREMIA: Primary | ICD-10-CM

## 2025-03-05 LAB — BACTERIA BLD CULT: NORMAL

## 2025-03-05 PROCEDURE — 400013 VN SOC

## 2025-03-05 PROCEDURE — G0299 HHS/HOSPICE OF RN EA 15 MIN: HCPCS

## 2025-03-05 PROCEDURE — 10330081 VN NO-PAY CLAIM PROCEDURE

## 2025-03-05 NOTE — PROGRESS NOTES
OPAT NOTE    AP ONLY CAMPUSES ARE: [place], [place], and [place].   In these cases, physician is only cosigning notes.    Supervising/Discharge provider:     Diagnosis:Gram Positive Bacteremia    Drug: Ceftriaxone 2g IV q24h    Labs/Frequency:weekly CBCd, CMP    End Date: 3/14/25    Infusion/VNA/SNF contact:  Beverly BARROSO    Next appointment: No follow up

## 2025-03-05 NOTE — TELEPHONE ENCOUNTER
Called and spoke to Pretty. She was just discharged from the hospital yesterday and Dr. Carranza would like to see her prior to her next treatment and starting venetoclax. Pretty is willing to go to [facility]. Rescheduled for 3/12 at 4 pm.

## 2025-03-06 ENCOUNTER — TRANSITIONAL CARE MANAGEMENT (OUTPATIENT)
Dept: FAMILY MEDICINE CLINIC | Facility: CLINIC | Age: 75
End: 2025-03-06

## 2025-03-06 ENCOUNTER — TELEPHONE (OUTPATIENT)
Age: 75
End: 2025-03-06

## 2025-03-06 DIAGNOSIS — I10 PRIMARY HYPERTENSION: Primary | ICD-10-CM

## 2025-03-06 DIAGNOSIS — D46.Z MDS (MYELODYSPLASTIC SYNDROME), HIGH GRADE (HCC): ICD-10-CM

## 2025-03-06 LAB
BACTERIA BLD CULT: NORMAL
BACTERIA BLD CULT: NORMAL

## 2025-03-06 NOTE — TELEPHONE ENCOUNTER
Pt called to make a TCM appt as she was d/c from her most recent hospital stay on 3/4/25. I tried a warm transfer to the office but no one was available.

## 2025-03-10 ENCOUNTER — OFFICE VISIT (OUTPATIENT)
Dept: FAMILY MEDICINE CLINIC | Facility: CLINIC | Age: 75
End: 2025-03-10
Payer: MEDICARE

## 2025-03-10 ENCOUNTER — LAB REQUISITION (OUTPATIENT)
Dept: LAB | Facility: HOSPITAL | Age: 75
End: 2025-03-10
Payer: MEDICARE

## 2025-03-10 ENCOUNTER — HOME CARE VISIT (OUTPATIENT)
Dept: HOME HEALTH SERVICES | Facility: HOME HEALTHCARE | Age: 75
End: 2025-03-10
Payer: MEDICARE

## 2025-03-10 ENCOUNTER — TELEPHONE (OUTPATIENT)
Age: 75
End: 2025-03-10

## 2025-03-10 VITALS
TEMPERATURE: 98.3 F | SYSTOLIC BLOOD PRESSURE: 118 MMHG | RESPIRATION RATE: 16 BRPM | WEIGHT: 180 LBS | OXYGEN SATURATION: 96 % | HEIGHT: 62 IN | HEART RATE: 92 BPM | BODY MASS INDEX: 33.13 KG/M2 | DIASTOLIC BLOOD PRESSURE: 72 MMHG

## 2025-03-10 VITALS
DIASTOLIC BLOOD PRESSURE: 62 MMHG | RESPIRATION RATE: 18 BRPM | OXYGEN SATURATION: 99 % | HEART RATE: 92 BPM | SYSTOLIC BLOOD PRESSURE: 104 MMHG | TEMPERATURE: 98.8 F

## 2025-03-10 DIAGNOSIS — S73.005D DISLOCATION OF LEFT HIP, SUBSEQUENT ENCOUNTER: ICD-10-CM

## 2025-03-10 DIAGNOSIS — R78.81 BACTEREMIA: ICD-10-CM

## 2025-03-10 DIAGNOSIS — I25.10 CORONARY ARTERY DISEASE INVOLVING NATIVE CORONARY ARTERY OF NATIVE HEART WITHOUT ANGINA PECTORIS: ICD-10-CM

## 2025-03-10 DIAGNOSIS — I10 PRIMARY HYPERTENSION: ICD-10-CM

## 2025-03-10 DIAGNOSIS — F33.1 MODERATE EPISODE OF RECURRENT MAJOR DEPRESSIVE DISORDER (HCC): ICD-10-CM

## 2025-03-10 DIAGNOSIS — F32.A ANXIETY AND DEPRESSION: ICD-10-CM

## 2025-03-10 DIAGNOSIS — E78.5 DYSLIPIDEMIA: ICD-10-CM

## 2025-03-10 DIAGNOSIS — R78.81 GRAM-POSITIVE COCCI BACTEREMIA: ICD-10-CM

## 2025-03-10 DIAGNOSIS — Z00.00 MEDICARE ANNUAL WELLNESS VISIT, SUBSEQUENT: Primary | ICD-10-CM

## 2025-03-10 DIAGNOSIS — I10 ESSENTIAL (PRIMARY) HYPERTENSION: ICD-10-CM

## 2025-03-10 DIAGNOSIS — B37.31 CANDIDA VAGINITIS: ICD-10-CM

## 2025-03-10 DIAGNOSIS — D46.Z MDS (MYELODYSPLASTIC SYNDROME), HIGH GRADE (HCC): ICD-10-CM

## 2025-03-10 DIAGNOSIS — E83.51 HYPOCALCEMIA: ICD-10-CM

## 2025-03-10 DIAGNOSIS — F41.9 ANXIETY AND DEPRESSION: ICD-10-CM

## 2025-03-10 PROBLEM — M25.552 LEFT HIP PAIN: Status: ACTIVE | Noted: 2025-03-10

## 2025-03-10 LAB
ACANTHOCYTES BLD QL SMEAR: PRESENT
ALBUMIN SERPL BCG-MCNC: 3.4 G/DL (ref 3.5–5)
ALP SERPL-CCNC: 60 U/L (ref 34–104)
ALT SERPL W P-5'-P-CCNC: 39 U/L (ref 7–52)
ANION GAP SERPL CALCULATED.3IONS-SCNC: 6 MMOL/L (ref 4–13)
ANISOCYTOSIS BLD QL SMEAR: PRESENT
AST SERPL W P-5'-P-CCNC: 34 U/L (ref 13–39)
BASOPHILS # BLD MANUAL: 0 THOUSAND/UL (ref 0–0.1)
BASOPHILS NFR MAR MANUAL: 0 % (ref 0–1)
BILIRUB SERPL-MCNC: 0.57 MG/DL (ref 0.2–1)
BUN SERPL-MCNC: 19 MG/DL (ref 5–25)
CALCIUM ALBUM COR SERPL-MCNC: 9.4 MG/DL (ref 8.3–10.1)
CALCIUM SERPL-MCNC: 8.9 MG/DL (ref 8.4–10.2)
CHLORIDE SERPL-SCNC: 107 MMOL/L (ref 96–108)
CO2 SERPL-SCNC: 27 MMOL/L (ref 21–32)
CREAT SERPL-MCNC: 0.68 MG/DL (ref 0.6–1.3)
EOSINOPHIL # BLD MANUAL: 0.11 THOUSAND/UL (ref 0–0.4)
EOSINOPHIL NFR BLD MANUAL: 4 % (ref 0–6)
ERYTHROCYTE [DISTWIDTH] IN BLOOD BY AUTOMATED COUNT: 22 % (ref 11.6–15.1)
GFR SERPL CREATININE-BSD FRML MDRD: 87 ML/MIN/1.73SQ M
GIANT PLATELETS BLD QL SMEAR: PRESENT
GLUCOSE SERPL-MCNC: 87 MG/DL (ref 65–140)
HCT VFR BLD AUTO: 27.7 % (ref 34.8–46.1)
HGB BLD-MCNC: 9 G/DL (ref 11.5–15.4)
LG PLATELETS BLD QL SMEAR: PRESENT
LYMPHOCYTES # BLD AUTO: 1.99 THOUSAND/UL (ref 0.6–4.47)
LYMPHOCYTES # BLD AUTO: 70 % (ref 14–44)
MCH RBC QN AUTO: 32.4 PG (ref 26.8–34.3)
MCHC RBC AUTO-ENTMCNC: 32.5 G/DL (ref 31.4–37.4)
MCV RBC AUTO: 100 FL (ref 82–98)
MONOCYTES # BLD AUTO: 0 THOUSAND/UL (ref 0–1.22)
MONOCYTES NFR BLD: 0 % (ref 4–12)
NEUTROPHILS # BLD MANUAL: 0.66 THOUSAND/UL (ref 1.85–7.62)
NEUTS SEG NFR BLD AUTO: 24 % (ref 43–75)
OVALOCYTES BLD QL SMEAR: PRESENT
PLATELET # BLD AUTO: 263 THOUSANDS/UL (ref 149–390)
PLATELET BLD QL SMEAR: ADEQUATE
PLATELET CLUMP BLD QL SMEAR: PRESENT
PMV BLD AUTO: 10.9 FL (ref 8.9–12.7)
POIKILOCYTOSIS BLD QL SMEAR: PRESENT
POTASSIUM SERPL-SCNC: 3.9 MMOL/L (ref 3.5–5.3)
PROT SERPL-MCNC: 5.7 G/DL (ref 6.4–8.4)
RBC # BLD AUTO: 2.78 MILLION/UL (ref 3.81–5.12)
RBC MORPH BLD: PRESENT
SODIUM SERPL-SCNC: 140 MMOL/L (ref 135–147)
VARIANT LYMPHS # BLD AUTO: 2 %
WBC # BLD AUTO: 2.77 THOUSAND/UL (ref 4.31–10.16)

## 2025-03-10 PROCEDURE — 85027 COMPLETE CBC AUTOMATED: CPT | Performed by: FAMILY MEDICINE

## 2025-03-10 PROCEDURE — G2211 COMPLEX E/M VISIT ADD ON: HCPCS | Performed by: FAMILY MEDICINE

## 2025-03-10 PROCEDURE — 99214 OFFICE O/P EST MOD 30 MIN: CPT | Performed by: FAMILY MEDICINE

## 2025-03-10 PROCEDURE — G0402 INITIAL PREVENTIVE EXAM: HCPCS | Performed by: FAMILY MEDICINE

## 2025-03-10 PROCEDURE — G0299 HHS/HOSPICE OF RN EA 15 MIN: HCPCS

## 2025-03-10 PROCEDURE — 85007 BL SMEAR W/DIFF WBC COUNT: CPT | Performed by: FAMILY MEDICINE

## 2025-03-10 PROCEDURE — 80053 COMPREHEN METABOLIC PANEL: CPT | Performed by: FAMILY MEDICINE

## 2025-03-10 RX ORDER — FLUCONAZOLE 150 MG/1
150 TABLET ORAL ONCE
Qty: 1 TABLET | Refills: 1 | Status: SHIPPED | OUTPATIENT
Start: 2025-03-10 | End: 2025-03-10

## 2025-03-10 NOTE — PATIENT INSTRUCTIONS

## 2025-03-10 NOTE — PROGRESS NOTES
Name: Pretty Aguila      : 1950      MRN: [id number]  Encounter Provider: Lauren Dumont MD  Encounter Date: 3/10/2025   Encounter department: [facility] FAMILY MEDICINE  :  Assessment & Plan  Medicare annual wellness visit, subsequent  Reviewed and diagnostics ordered       MDS (myelodysplastic syndrome), high grade (HCC)  Low wbc platelets and hg followed by oncology on chemo       Primary hypertension  Well controlled on current therapy continue with current medications and will reassess next visit         Gram-positive cocci bacteremia  On antibiotics PIC line per ID       Hypocalcemia  Check ionized calcium       Anxiety and depression  Ok on celexa       Coronary artery disease involving native coronary artery of native heart without angina pectoris  No chest pain         Dyslipidemia  Ok on  lipitor       Dislocation of left hip, subsequent encounter    Orders:  •  Ambulatory Referral to Orthopedic Surgery; Future    Moderate episode of recurrent major depressive disorder (HCC)         Candida vaginitis  Fluconazole   Orders:  •  fluconazole (DIFLUCAN) 150 mg tablet; Take 1 tablet (150 mg total) by mouth once for 1 dose           History of Present Illness   Patient here for post hospitalization follow up also due for Medicare wellness. Patient was in the hospital for sepsis. She was neutropenic and had diarrhea illness. Blood cultures grew out streptococcus and pick line was placed. Patient also had dislocation of her hip prosthesis which orthopedics remedied.      Review of Systems   Respiratory:  Negative for cough, chest tightness and shortness of breath.    Cardiovascular:  Negative for chest pain, palpitations and leg swelling.   Genitourinary:         Vaginal itching after antibiotics   Neurological:  Negative for dizziness, weakness, light-headedness and headaches.   Psychiatric/Behavioral:  Negative for dysphoric mood. The patient is not nervous/anxious.        Objective   BP  "118/72 (BP Location: Left arm, Patient Position: Sitting, Cuff Size: Standard)   Pulse 92   Temp 98.3 °F (36.8 °C) (Tympanic)   Resp 16   Ht 5' 2\" (1.575 m)   Wt 81.6 kg (180 lb)   SpO2 96%   BMI 32.92 kg/m²      Physical Exam  Constitutional:       Appearance: Normal appearance. She is well-developed.   HENT:      Head: Normocephalic.   Cardiovascular:      Rate and Rhythm: Normal rate and regular rhythm.      Heart sounds: Normal heart sounds.   Pulmonary:      Effort: Pulmonary effort is normal. No respiratory distress.      Breath sounds: Normal breath sounds.   Musculoskeletal:      Right lower leg: No edema.      Left lower leg: No edema.   Neurological:      General: No focal deficit present.      Mental Status: She is alert and oriented to person, place, and time. Mental status is at baseline.   Psychiatric:         Mood and Affect: Mood normal.         Behavior: Behavior normal.         Thought Content: Thought content normal.         Judgment: Judgment normal.          "

## 2025-03-10 NOTE — ASSESSMENT & PLAN NOTE
Fluconazole   Orders:  •  fluconazole (DIFLUCAN) 150 mg tablet; Take 1 tablet (150 mg total) by mouth once for 1 dose

## 2025-03-10 NOTE — PROGRESS NOTES
Name: Pretty Aguila      : 1950      MRN: [id number]  Encounter Provider: Lauren Dumont MD  Encounter Date: 3/10/2025   Encounter department: [facility] MEDICINE    Assessment & Plan  Medicare annual wellness visit, subsequent         MDS (myelodysplastic syndrome), high grade (HCC)         Primary hypertension         Gram-positive cocci bacteremia         Hypocalcemia         Anxiety and depression         Coronary artery disease involving native coronary artery of native heart without angina pectoris         Dyslipidemia         Dislocation of left hip, subsequent encounter    Orders:  •  Ambulatory Referral to Orthopedic Surgery; Future    Moderate episode of recurrent major depressive disorder (HCC)         Candida vaginitis    Orders:  •  fluconazole (DIFLUCAN) 150 mg tablet; Take 1 tablet (150 mg total) by mouth once for 1 dose       Preventive health issues were discussed with patient, and age appropriate screening tests were ordered as noted in patient's After Visit Summary. Personalized health advice and appropriate referrals for health education or preventive services given if needed, as noted in patient's After Visit Summary.    History of Present Illness     HPI   Patient Care Team:  Lauren Duomnt MD as PCP - General (Family Medicine)  Adrien Gabriel MD (Inactive) as Surgeon (Surgical Oncology)  Molina Jimenez MD (Plastic Surgery)  JOCELYN Mckinney as Nurse Practitioner (Surgical Oncology)  Geno Carranza MD (Medical Oncology)  KAREEM Larose as  Care Manager (Oncology)    Review of Systems  Medical History Reviewed by provider this encounter:  Tobacco  Allergies  Meds  Problems  Med Hx  Surg Hx  Fam Hx       Annual Wellness Visit Questionnaire   Pretty is here for her Welcome to Medicare visit.     Health Risk Assessment:   Patient rates overall health as poor. Patient feels that their physical health rating is slightly worse. Patient is very  satisfied with their life. Eyesight was rated as same. Hearing was rated as same. Patient feels that their emotional and mental health rating is same. Patients states they are sometimes angry. Patient states they are always unusually tired/fatigued. Pain experienced in the last 7 days has been none. Patient states that she has experienced no weight loss or gain in last 6 months.     Depression Screening:   PHQ-9 Score: 0      Fall Risk Screening:   In the past year, patient has experienced: history of falling in past year    Number of falls: 2 or more  Injured during fall?: Yes    Feels unsteady when standing or walking?: Yes    Worried about falling?: No      Urinary Incontinence Screening:   Patient has leaked urine accidently in the last six months.     Home Safety:  Patient does not have trouble with stairs inside or outside of their home. Patient has working smoke alarms and has working carbon monoxide detector. Home safety hazards include: none.     Nutrition:   Current diet is Regular.     Medications:   Patient is currently taking over-the-counter supplements. OTC medications include: see medication list. Patient is able to manage medications.     Activities of Daily Living (ADLs)/Instrumental Activities of Daily Living (IADLs):   Walk and transfer into and out of bed and chair?: Yes  Dress and groom yourself?: Yes    Bathe or shower yourself?: Yes    Feed yourself? Yes  Do your laundry/housekeeping?: No  Manage your money, pay your bills and track your expenses?: No  Make your own meals?: No    Do your own shopping?: No    Previous Hospitalizations:   Any hospitalizations or ED visits within the last 12 months?: Yes    How many hospitalizations have you had in the last year?: 3-4    Advance Care Planning:   Living will: No    Durable POA for healthcare: No    Advanced directive: No      Cognitive Screening:   Provider or family/friend/caregiver concerned regarding cognition?: No    PREVENTIVE SCREENINGS       Cardiovascular Screening:    General: Risks and Benefits Discussed    Due for: Lipid Panel      Diabetes Screening:     General: Screening Current      Colorectal Cancer Screening:     General: Screening Current      Breast Cancer Screening:     General: History Breast Cancer and Screening Current      Cervical Cancer Screening:    General: Screening Not Indicated      Osteoporosis Screening:    General: Screening Current      Abdominal Aortic Aneurysm (AAA) Screening:        General: Screening Not Indicated      Lung Cancer Screening:     General: Screening Not Indicated      Hepatitis C Screening:    General: Screening Current    Screening, Brief Intervention, and Referral to Treatment (SBIRT)     Screening      AUDIT-C Screenin) How often did you have a drink containing alcohol in the past year? monthly or less  2) How many drinks did you have on a typical day when you were drinking in the past year? 1 to 2    Single Item Drug Screening:  How often have you used an illegal drug (including marijuana) or a prescription medication for non-medical reasons in the past year? never    Single Item Drug Screen Score: 0  Interpretation: Negative screen for possible drug use disorder    Social Drivers of Health     Food Insecurity: No Food Insecurity (3/1/2025)    Nursing - Inadequate Food Risk Classification    • Worried About Running Out of Food in the Last Year: Never true    • Ran Out of Food in the Last Year: Never true    • Ran Out of Food in the Last Year: Never true   Transportation Needs: No Transportation Needs (3/5/2025)    OASIS : Transportation    • Lack of Transportation (Medical): No    • Lack of Transportation (Non-Medical): No    • Patient Unable or Declines to Respond: No   Housing Stability: Unknown (3/1/2025)    Nursing: Inadequate Housing Risk Classification    • Unable to Pay for Housing in the Last Year: No    • Has Housin   Utilities: Not At Risk (3/1/2025)    Nursing - Utilities Risk  "Classification    • Threatened with loss of utilities: No     No results found.    Objective   /72 (BP Location: Left arm, Patient Position: Sitting, Cuff Size: Standard)   Pulse 92   Temp 98.3 °F (36.8 °C) (Tympanic)   Resp 16   Ht 5' 2\" (1.575 m)   Wt 81.6 kg (180 lb)   SpO2 96%   BMI 32.92 kg/m²     Physical Exam    "

## 2025-03-10 NOTE — TELEPHONE ENCOUNTER
Caller: Patient    Doctor: Dr. Stanley    Reason for call:     Patient will be dropping off her surgery records (previous surgeries, knee replacements) to the [place] office for the doctor to review once she has received them.     Call back#: n/a

## 2025-03-10 NOTE — ASSESSMENT & PLAN NOTE
Orders:  •  fluconazole (DIFLUCAN) 150 mg tablet; Take 1 tablet (150 mg total) by mouth once for 1 dose

## 2025-03-11 NOTE — PROGRESS NOTES
Name: Pretty Aguila      : 1950      MRN: [id number]  Encounter Provider: Geno Carranza MD  Encounter Date: 3/12/2025   Encounter department: [facility]  :  Assessment & Plan  MDS (myelodysplastic syndrome), high grade (HCC)  Treatment    Vidaza 75mg/m2 days 1-5     C1  1/3/2025  C2   2025  C3   3/17/2025 to start Venetoclax 100 mg daily x 7 days      Prophylaxis     Acyclovir  Voriconazole   Consider adding levaquin     Malignant neoplasm of lower-outer quadrant of left breast of female, estrogen receptor positive (HCC)      1.  Stage IIA (pT2 pN0(sn) cM0) left breast invasive ductal carcinoma.  Grade 2.  ER positive (%), DC positive (%), HER2 negative (score 1+) by IHC.  Diagnosed 2023.  2.  BRCA negative   3.  Adjuvant anastrozole OncoDx score 9       Breast Cancer        Pretty Aguila is 74-year-old postmenopausal female who initially noted a mass in the lower outer quadrant of her left breast.  She underwent diagnostic mammogram and ultrasound which demonstrated a unifocal lesion at the 4 o'clock position 2 cm from the nipple.  This was biopsied and shown to be invasive ductal carcinoma, grade 2, ER 90%, DC 90%, HER2/shannan negative (1+).  She underwent an MRI which demonstrated unifocal mass measuring approximately 4.5 cm with questionable slight skin involvement and no adenopathy.  Her axillary ultrasound demonstrated no adenopathy. She underwent left mastectomy with SLN biopsy.  Final pathology was consistent with an invasive carcinoma, negative margins, 0/1 lymph node, grade 2.  She underwent immediate left breast reconstruction with submuscular tissue expander with acellular dermal matrix on 2023.  Her final stage is IIA (pT2 pN0 cM0) left breast invasive ductal carcinoma, grade 2, ER positive, DC positive HER2 negative.       Patient with favorable risk breast cancer.  Oncotype DX recurrence score  9 so due to size  "of tumor to assess candidacy of chemotherapy.  We discussed adjuvant endocrine therapy with anastrozole if Oncotype DX recurrence score is low.  She will continue to follow with breast surgery.  She will start treatment with anastrozole after her Oncotype resulted and follow-up in 3 months.     We reviewed the side effects of aromatase inhibitors including, but not limited to hot flashes, vaginal dryness, mood swings, weight gain, difficulty sleeping, decreased sexual interest, arthralgias/myalgias and worsening of osteopenia/osteoporosis.  She will obtain a baseline DEXA scan.  I recommended she take calcium and vitamin D on a regular basis.       Continue Anastrozole  Annual R Mammogram; done 9/19/24 \"No evidence of malignancy\".  DEXA every 2 years; done 12/20/23 \"Normal Bone Density\", due for repeat DEXA but will defer at this time given her active chemotherapy treatment and significant fatigue in getting to appointments   Continue calcium and Vit D       MDS (myelodysplastic syndrome), high grade (HCC)  High grade myeloid neoplasm at least MDS/AML with complex karyotype including TP53 mutation and deletion, and 15-20% blasts vs overt AML with TP53 mutation. IPSS-R score of 8.5, very high risk. Complicated with progressive pancytopenia, most recent hemoglobin 8.0, PLT 64, and WBC 2.3 with .  Above diagnosis, for medically fit patients, preferred treatment would be bridging therapy (Azacytidine) vs reduced intensity conditioning followed by Allogenic Stem Cell Transplantation. If patient is deemed not ASCT candidate, HMA based regimen, preferably subcutaneous Azacytidine is to be considered given improved OS compared to other HMA e.g. decitabine. Will avoid adding Venetoclax given P53 deletion.      Referral to [facility], Dr. Lacey for ASCT evaluation, patient is willing to travel to [facility] if needed  Continue SC Azacytidine, may consider dose reduction for C2 75 mg/m2 IV or subcutaneously daily for " 7 days, repeat cycles every 4 weeks which would be indicated either as definitive therapy if non ASCT candidate or as bridging therapy prior to conditioning and transplant. Benefits/side effects reviewed and consent is obtained.   Weekly CBC w diff & CMP  Hb transfusion threshold of <7.5  Follow up on 3/4/25 weeks or sooner if needed   Continue prophylactic antifungal/antibacterial/antiviral meds + PRN anti emetic   Ambulatory referral to pulmonology given CAMPOS/SOB        3/12/2025    Since last visit was seen at [facility] with the following:      Recommend Vidaza daily x 5 days and Venetoclax 400mg daily for 7 days to increase chance of achiving response/CR and to decrease anticipated myelosuppression.     Venetoclax, a BCL2 inhibitor, has shown promise in AML when combined with HMA, translating into a significant shift in the management of older AML patients unfit for chemotherapy.   In the phase 3 VIALE-A trial, a combination of venetoclax with azacitidine improved survival from 9.6 months in the azacitidine-only group to 14.7 months.   The CR rates were encouraging for the [id number]-vzfxelu subgroup--about 55% compared with 0% in the azacitidine group. However, in patients achieving CR, SP88-bxczved patients (13% vs approximately 42% in the cohort) had comparatively fewer MRD-negative remissions. Consequently, achievement of CR alone was not associated with improved survival, as only 8% of those who achieved CR in the HX69-gewjboh subgroup survived >24 months compared with 34% in the cohort.   In a follow-up pooled analysis of poor- and intermediate-risk cytogenetics in patients with AML from VIALE-A and the preceding single-arm phase 1b study of HMA/venetoclax, the median survival for BZ89-praonah (with poor-risk cytogenetics) patients on venetoclax + azacitidine and azacitidine only was 5.17 months (CI 2.17-6.83) and 4.9 months (CI 2.14-9.30), respectively.         However, this patient has had issues with  leucopenia/neutropenia, prolonged admissions, most recently for sepsis with no evidence pneumonia but did have diarrhea    Has been on IV Rocephin since 28th Feb and will complete 3/14/2025    Her ANC 3/10/2025 was 660 with WBC 2.77    Her prophylactic agents were stopped in house    Will restart Acyclovir 400 mg bid and will start Voriconazole 200 mg bid rather than diflucan    Thus based on the DDI with this agent and her neutropenia, will start Venetoclax 100 mg daily for 7 days to start-if tolerated may increase to 10-14 days with vidaza 75 mg/m2 days 1-5    Will have CBC repeated on 3/14/2025     May have to hold vidaza if ANC isn't approaching 1000     Hgb stable at 9.0; was 5.8 on admission    Patient is feeling improved, less SOB        Pancytopenia (HCC)  Due to MDS/AML with TP53 mutation - see above.     Anemia, unspecified type  Due to MDS/AML with TP53 mutation - see above.     Platelets decreased (HCC)  Due to MDS/AML with TP53 mutation - see above  Pancytopenia (HCC)         Anemia/Leukocytosis      Labs from 1/17/2024 with WBC 15.1 Hgb 11.4 plts 228, no diff done     Has no evidence bleeding, destruction RBCs; concern with elevated WBC as well     Will do heme workup repeat CBC with diff, retic, iron panel/B12/folate, SPEP although MCV normal.       WBC may be related to underlying fibromyalgia      Anemia may be AOCD but will rule out     11/8/2024     Recent ER visit 11/1/2024 due to increased fatigue/SOB/CAMPOS     Found to be COVID positive without symptoms     Labs 10/26/2024 and 11/1/2024 as follows     WBC 3.98 Hgb 9.8 plts 109  ANC 1820      WBC 3.9 Hgb 9.5  plts 96 ANC 1890      Today repeated WBC 4.52 Hgb 10  plts 99 ANC 1840     Other labs done 10/26/2024      Had SPEP no M spike     FLC not done     Total iron 92 ferritin 119 % sat 27 folate 18 B12 733 zinc 68      Flow cytometry pending     May all be COVID related although anemic past visit      Sepsis (HCC) (Resolved:  3/4/2025)  Assessment:  Met sepsis criteria on admission (tachycardia, leukopenia, with suspected source of infection)  Elevated procalcitonin and lactic acid.  [place] UA  Negative flu/COVID.  Chest x-ray with no acute findings.  In the ED, received a total of 4 L of fluid.  IV cefepime and IV Flagyl.  Blood culture positive x 1 for Streptococcus.  3/2/2025- patient remains afebrile.  Her diarrhea has stopped.  Calcitonin is trending down.  ID is following. IV vancomycin given diarrhea possibility of translocation.Was treated with vancomycin  Tranisitoned to IV rocephin on 3/2/2025   Will need PICC line for IV rocephin through 3/14   Repeat blood cultures negative on 3/1/2025  No longer meeting SIRS criteria of tachycardia. Leukocyte count was uptrending.  [place] was likely in the setting of malignancy and a component of dehydration  Lactic acid and procal downtrended   PICC line placed on right arm on 3/4/2025. Infectious disease sent final scripts.No complications  PT recommended level III      Summary/Recommendations    C3 Vidaza 3/17-3/21/2025   To add Venetocalx 100 mg daily days 1-7    However, WBC/ANC 2.2 and 660 as of 3/10/2025-too low for vidaza-will repeat 3/14/2025 and make treatment decision     Follow counts weekly    Follow up 2 weeks       History of Present Illness   No chief complaint on file.    Pretty Aguila is a 74 y.o. female with medical hx remarkable of DM, HTN, HLD, CAD s/p CABG, Psoriatic/Rheumatoid Arthritis, fibromyalgia, Hiatal Hernia.      In 9/2023 had left breast mass biopsy revealed invasive ductal carcinoma, G 2, ER 90%, LA 90%, HER2 1+, with suspected skin involvement on the MRI.      Patient underwent left mastectomy with SLNB on 11/20/23, pathology consistent with stage IIA (pT2 pN0 cM0) invasive carcinoma, G2, ER %, LA %, HER2 1+, tumor size 36 mm, negative margins, total one SLN was negative, no LVI identified.      Genetic testing was negative. Oncotype DX recurrence  score was 9. Initiated adjuvant anastrozole on 12/09/2023.      11/1/24 ER visit with fatigue & SOB, tested COVID positive      12/07 - 12/12/24 hospital admission due to progressive CAMPOS and near syncope event, during which had progressive pancytopenia. Folate, B12, EBV, HIV, and hepatitis labs all unremarkable.      12/11/24 Bone Marrow Biopsy: normo-cellular 20-30%, with increased blasts 15-20%, Multilineage dysplasia, fibrosis 2-3 of 3, with complex karyotype including TP53 deletion and mutation, 5q deletion and monosomy 7; findings consistent with high grade myeloid neoplasm, at least MDS/AML with mutated TP53 (Int. Consensus Classification) / MDS with Bi-allelic TP53 inactivation (WHO Classification). The focus that approaches 20% blasts by IHC also raises the possibility of classifying it as overt AML with mutated TP53. The combination of TP53 mutation and deletion, as well as the complex karyotype, is associated with a very poor prognosis, and as such, may best be classified and treated as AML with mutated TP53 - as clinically indicated. There is no evidence of metastatic carcinoma.      12/22/24 ER visit with left hip pain, diagnosed with L hip dislocation successfully reduced in the ER.        Ref 12/11/24 12/12/24 12/19/24 12/22/24    WBC 4.31 - 10.16 Thousand/uL 3.49 (L) 3.13 (L) 3.04 (L) 2.90 (L)   Hemoglobin 11.5 - 15.4 g/dL 8.6 (L) 8.0 (L) 8.6 (L) 8.4 (L)   Platelet Count 149 - 390 Thousands/uL 64 (L) 60 (L) 66 (L) 64 (L)   Monocytes % 4 - 12 % 5   1 (L) 1 (L)   Atypical Lymphocytes % <=0 %     4 (H)     Absolute Neutrophils 1.85 - 7.62 Thousand/uL 1.21 (L)   1.76 (L) 1.71 (L)        Feb 2025     She completed C1-D1-5 of azacitidine on 1/24/25.  CTA Chest was neg for PE.  Labs (1/27/25) show roughly stable cell counts with Hb 8.8 > 8.0, WBC 2.6 > 2.3 (lymph 69, neut 25), , and Plt 58 > 64.  CMP shows SCr 0.86 and all other aspects are wnl.  .     SOB even with standing.  Fatigue has  worsened.  Feels symptoms consistent with asthenia. Denied palpitaions.  Has decreased appetite and decreased taste.         3/12/2025    Feeling much improved since admission-had diarrhea, noted strep salivarius which may have been contaminant vs GI source but was treated  Less SOB.  Has PICC completing Ceftriaxone for total 14 days-will complete 3/14/2025.    Labs 3/10/2025 with Na 140 K 3.9 Cr 0.68 ALT/AST 39//34 TB 0.57 WBC 2.8 Hgb 9.0 plts 263        Oncology History   Cancer Staging   Malignant neoplasm of lower-outer quadrant of left breast of female, estrogen receptor positive (HCC)  Staging form: Breast, AJCC 8th Edition  - Pathologic stage from 11/20/2023: Stage IA (pT2, pN0(sn), cM0, G2, ER+, WY+, HER2-) - Signed by Adrien Gabriel MD on 12/6/2023  Stage prefix: Initial diagnosis  Method of lymph node assessment: [place] lymph node biopsy  Multigene prognostic tests performed: None  Histologic grading system: 3 grade system  Oncology History   Malignant neoplasm of lower-outer quadrant of left breast of female, estrogen receptor positive (HCC)   9/19/2023 Biopsy    Left breast ultrasound-guided biopsy  4 o'clock, 2 cm from nipple (VERENICE)  Invasive breast carcinoma of no special type (ductal NST)  Grade 2  ER 90, WY 90, HER2 1+\  Lymphovascular invasion not definitively identified    Concordant. Malignancy appears unifocal; however, oval circumscribed mass in the upper outer left breast is not accounted for. Bilateral breast MRI is recommended. Biopsy-proven carcinoma measured 3.1 cm on US. Left axilla US negative. Right breast clear.     9/28/2023 Observation    Bilateral breast MRI: Unifocal carcinoma in the lower outer left breast with possible skin involvement. There are no suspicious enhancing masses or suspicious areas of non-mass enhancement in right breast. There is no axillary or internal mammary adenopathy.     10/17/2023 Genetic Testing    A total of 47 genes were evaluated, including: HUYEN,  BRCA1, BRCA2, CDH1, CHEK2, PALB2, PTEN, STK11, TP53  Negative result. No pathogenic sequence variants identified  [employer]     11/20/2023 Surgery    Left VERENICE  directed mastectomy with SLN biopsy   Invasive carcinoma of no special type (ductal)  Grade 2  3.6 cm   Margins negative  0/1 Lymph node  Anatomic Stage IIA  Prognostic Stage IA    Immediate reconstruction with tissue expander and ADM (Dr. Jimenez)     11/20/2023 -  Cancer Staged    Staging form: Breast, AJCC 8th Edition  - Pathologic stage from 11/20/2023: Stage IA (pT2, pN0(sn), cM0, G2, ER+, DE+, HER2-) - Signed by Adrien Gabriel MD on 12/6/2023  Stage prefix: Initial diagnosis  Method of lymph node assessment: [place] lymph node biopsy  Multigene prognostic tests performed: None  Histologic grading system: 3 grade system       MDS (myelodysplastic syndrome), high grade (HCC)   12/30/2024 Initial Diagnosis    MDS (myelodysplastic syndrome), high grade (HCC)     1/20/2025 -  Chemotherapy    cyanocobalamin, 1,000 mcg, Intramuscular, Every 30 days, 1 of 1 cycle  Administration: 1,000 mcg (2/17/2025)  alteplase (CATHFLO), 2 mg, Intracatheter, Every 1 Minute as needed, 2 of 6 cycles  azaCITIDine (VIDAZA), 75 mg/m2 = 137.5 mg (100 % of original dose 75 mg/m2), Subcutaneous azaCITIDine, Once, 3 of 7 cycles  Dose modification: 75 mg/m2 (original dose 75 mg/m2, Cycle 0, Reason: Anticipated Tolerance)  Administration: 137.5 mg (1/20/2025), 137.5 mg (1/21/2025), 137.5 mg (2/17/2025), 137.5 mg (1/22/2025), 137.5 mg (1/23/2025), 137.5 mg (1/24/2025), 137.5 mg (2/18/2025), 137.5 mg (2/19/2025), 137.5 mg (2/20/2025), 137.5 mg (2/21/2025)        Pertinent Medical History        Review of Systems   Constitutional:  Positive for fatigue.   HENT: Negative.     Respiratory: Negative.     Cardiovascular: Negative.    Gastrointestinal: Negative.    Genitourinary: Negative.    Musculoskeletal: Negative.    Skin: Negative.    Neurological: Negative.    Hematological: Negative.     Psychiatric/Behavioral: Negative.             Objective   There were no vitals taken for this visit.    Pain Screening:     ECOG   1  Physical Exam  Vitals reviewed.   HENT:      Head: Normocephalic.      Nose: Nose normal.      Mouth/Throat:      Mouth: Mucous membranes are moist.   Eyes:      Pupils: Pupils are equal, round, and reactive to light.   Cardiovascular:      Rate and Rhythm: Normal rate.      Pulses: Normal pulses.   Pulmonary:      Effort: Pulmonary effort is normal.   Abdominal:      General: Abdomen is flat.   Musculoskeletal:         General: Normal range of motion.   Skin:     General: Skin is warm.   Neurological:      General: No focal deficit present.      Mental Status: She is alert.   Psychiatric:         Mood and Affect: Mood normal.         Labs: I have reviewed the following labs:  Lab Results   Component Value Date/Time    WBC 2.77 (L) 03/10/2025 09:30 AM    RBC 2.78 (L) 03/10/2025 09:30 AM    Hemoglobin 9.0 (L) 03/10/2025 09:30 AM    Hematocrit 27.7 (L) 03/10/2025 09:30 AM     (H) 03/10/2025 09:30 AM    MCH 32.4 03/10/2025 09:30 AM    RDW 22.0 (H) 03/10/2025 09:30 AM    Platelets 263 03/10/2025 09:30 AM    Segmented % 42 (L) 02/25/2025 11:57 AM    Lymphocytes % 70 (H) 03/10/2025 09:30 AM    Lymphocytes % 53 (H) 02/25/2025 11:57 AM    Monocytes % 0 (L) 03/10/2025 09:30 AM    Monocytes % 2 (L) 02/25/2025 11:57 AM    Eosinophils % 4 03/10/2025 09:30 AM    Eosinophils Relative 2 02/25/2025 11:57 AM    Basophils % 0 03/10/2025 09:30 AM    Basophils Relative 0 02/25/2025 11:57 AM    Immature Grans % 1 02/25/2025 11:57 AM    Absolute Neutrophils 1.61 (L) 02/25/2025 11:57 AM     Lab Results   Component Value Date/Time    Potassium 3.9 03/10/2025 09:30 AM    Chloride 107 03/10/2025 09:30 AM    CO2 27 03/10/2025 09:30 AM    BUN 19 03/10/2025 09:30 AM    Creatinine 0.68 03/10/2025 09:30 AM    Glucose, Fasting 88 12/31/2024 11:15 AM    Calcium 8.9 03/10/2025 09:30 AM    Corrected Calcium  9.4 03/10/2025 09:30 AM    AST 34 03/10/2025 09:30 AM    ALT 39 03/10/2025 09:30 AM    Alkaline Phosphatase 60 03/10/2025 09:30 AM    Total Protein 5.7 (L) 03/10/2025 09:30 AM    Albumin 3.4 (L) 03/10/2025 09:30 AM    Total Bilirubin 0.57 03/10/2025 09:30 AM    eGFR 87 03/10/2025 09:30 AM           Administrative Statements   I have spent a total time of 40 minutes in caring for this patient on the day of the visit/encounter including Impressions, Counseling / Coordination of care, Documenting in the medical record, Reviewing/placing orders in the medical record (including tests, medications, and/or procedures), and Obtaining or reviewing history  .

## 2025-03-11 NOTE — ASSESSMENT & PLAN NOTE
Treatment    Vidaza 75mg/m2 days 1-5     C1  1/3/2025  C2   2/17/2025  C3   3/17/2025 to start Venetoclax 100 mg daily x 7 days      Prophylaxis     Acyclovir  Voriconazole   Consider adding levaquin

## 2025-03-11 NOTE — ASSESSMENT & PLAN NOTE
1.  Stage IIA (pT2 pN0(sn) cM0) left breast invasive ductal carcinoma.  Grade 2.  ER positive (%), CO positive (%), HER2 negative (score 1+) by IHC.  Diagnosed November 2023.  2.  BRCA negative   3.  Adjuvant anastrozole OncoDx score 9       Breast Cancer        Pretty Aguila is 74-year-old postmenopausal female who initially noted a mass in the lower outer quadrant of her left breast.  She underwent diagnostic mammogram and ultrasound which demonstrated a unifocal lesion at the 4 o'clock position 2 cm from the nipple.  This was biopsied and shown to be invasive ductal carcinoma, grade 2, ER 90%, CO 90%, HER2/shannan negative (1+).  She underwent an MRI which demonstrated unifocal mass measuring approximately 4.5 cm with questionable slight skin involvement and no adenopathy.  Her axillary ultrasound demonstrated no adenopathy. She underwent left mastectomy with SLN biopsy.  Final pathology was consistent with an invasive carcinoma, negative margins, 0/1 lymph node, grade 2.  She underwent immediate left breast reconstruction with submuscular tissue expander with acellular dermal matrix on 11/20/2023.  Her final stage is IIA (pT2 pN0 cM0) left breast invasive ductal carcinoma, grade 2, ER positive, CO positive HER2 negative.       Patient with favorable risk breast cancer.  Oncotype DX recurrence score  9 so due to size of tumor to assess candidacy of chemotherapy.  We discussed adjuvant endocrine therapy with anastrozole if Oncotype DX recurrence score is low.  She will continue to follow with breast surgery.  She will start treatment with anastrozole after her Oncotype resulted and follow-up in 3 months.     We reviewed the side effects of aromatase inhibitors including, but not limited to hot flashes, vaginal dryness, mood swings, weight gain, difficulty sleeping, decreased sexual interest, arthralgias/myalgias and worsening of osteopenia/osteoporosis.  She will obtain a baseline DEXA scan.  I  "recommended she take calcium and vitamin D on a regular basis.       Continue Anastrozole  Annual R Mammogram; done 9/19/24 \"No evidence of malignancy\".  DEXA every 2 years; done 12/20/23 \"Normal Bone Density\", due for repeat DEXA but will defer at this time given her active chemotherapy treatment and significant fatigue in getting to appointments   Continue calcium and Vit D       MDS (myelodysplastic syndrome), high grade (HCC)  High grade myeloid neoplasm at least MDS/AML with complex karyotype including TP53 mutation and deletion, and 15-20% blasts vs overt AML with TP53 mutation. IPSS-R score of 8.5, very high risk. Complicated with progressive pancytopenia, most recent hemoglobin 8.0, PLT 64, and WBC 2.3 with .  Above diagnosis, for medically fit patients, preferred treatment would be bridging therapy (Azacytidine) vs reduced intensity conditioning followed by Allogenic Stem Cell Transplantation. If patient is deemed not ASCT candidate, HMA based regimen, preferably subcutaneous Azacytidine is to be considered given improved OS compared to other HMA e.g. decitabine. Will avoid adding Venetoclax given P53 deletion.      Referral to [facility], Dr. Lacey for ASCT evaluation, patient is willing to travel to [facility] if needed  Continue SC Azacytidine, may consider dose reduction for C2 75 mg/m2 IV or subcutaneously daily for 7 days, repeat cycles every 4 weeks which would be indicated either as definitive therapy if non ASCT candidate or as bridging therapy prior to conditioning and transplant. Benefits/side effects reviewed and consent is obtained.   Weekly CBC w diff & CMP  Hb transfusion threshold of <7.5  Follow up on 3/4/25 weeks or sooner if needed   Continue prophylactic antifungal/antibacterial/antiviral meds + PRN anti emetic   Ambulatory referral to pulmonology given CAMPOS/SOB        3/12/2025    Since last visit was seen at [facility] with the following:      Recommend Vidaza daily x 5 days and " Venetoclax 400mg daily for 7 days to increase chance of achiving response/CR and to decrease anticipated myelosuppression.     Venetoclax, a BCL2 inhibitor, has shown promise in AML when combined with HMA, translating into a significant shift in the management of older AML patients unfit for chemotherapy.   In the phase 3 VIALE-A trial, a combination of venetoclax with azacitidine improved survival from 9.6 months in the azacitidine-only group to 14.7 months.   The CR rates were encouraging for the [id number] subgroup--about 55% compared with 0% in the azacitidine group. However, in patients achieving CR, [id number] patients (13% vs approximately 42% in the cohort) had comparatively fewer MRD-negative remissions. Consequently, achievement of CR alone was not associated with improved survival, as only 8% of those who achieved CR in the [id number] subgroup survived >24 months compared with 34% in the cohort.   In a follow-up pooled analysis of poor- and intermediate-risk cytogenetics in patients with AML from VIALE-A and the preceding single-arm phase 1b study of HMA/venetoclax, the median survival for [id number] (with poor-risk cytogenetics) patients on venetoclax + azacitidine and azacitidine only was 5.17 months (CI 2.17-6.83) and 4.9 months (CI 2.14-9.30), respectively.         However, this patient has had issues with leucopenia/neutropenia, prolonged admissions, most recently for sepsis with no evidence pneumonia but did have diarrhea    Has been on IV Rocephin since 28th Feb and will complete 3/14/2025    Her ANC 3/10/2025 was 660 with WBC 2.77    Her prophylactic agents were stopped in house    Will restart Acyclovir 400 mg bid and will start Voriconazole 200 mg bid rather than diflucan    Thus based on the DDI with this agent and her neutropenia, will start Venetoclax 100 mg daily for 7 days to start-if tolerated may increase to 10-14 days with vidaza 75 mg/m2 days 1-5    Will have CBC repeated on  3/14/2025     May have to hold vidaza if ANC isn't approaching 1000     Hgb stable at 9.0; was 5.8 on admission    Patient is feeling improved, less SOB        Pancytopenia (HCC)  Due to MDS/AML with TP53 mutation - see above.     Anemia, unspecified type  Due to MDS/AML with TP53 mutation - see above.     Platelets decreased (HCC)  Due to MDS/AML with TP53 mutation - see above

## 2025-03-12 ENCOUNTER — OFFICE VISIT (OUTPATIENT)
Age: 75
End: 2025-03-12
Payer: MEDICARE

## 2025-03-12 VITALS
HEIGHT: 62 IN | TEMPERATURE: 98.2 F | BODY MASS INDEX: 33.31 KG/M2 | OXYGEN SATURATION: 96 % | DIASTOLIC BLOOD PRESSURE: 71 MMHG | SYSTOLIC BLOOD PRESSURE: 119 MMHG | RESPIRATION RATE: 16 BRPM | HEART RATE: 98 BPM | WEIGHT: 181 LBS

## 2025-03-12 DIAGNOSIS — Z17.0 MALIGNANT NEOPLASM OF LOWER-OUTER QUADRANT OF LEFT BREAST OF FEMALE, ESTROGEN RECEPTOR POSITIVE (HCC): ICD-10-CM

## 2025-03-12 DIAGNOSIS — C50.512 MALIGNANT NEOPLASM OF LOWER-OUTER QUADRANT OF LEFT BREAST OF FEMALE, ESTROGEN RECEPTOR POSITIVE (HCC): ICD-10-CM

## 2025-03-12 DIAGNOSIS — D61.818 PANCYTOPENIA (HCC): ICD-10-CM

## 2025-03-12 DIAGNOSIS — D46.Z MDS (MYELODYSPLASTIC SYNDROME), HIGH GRADE (HCC): Primary | ICD-10-CM

## 2025-03-12 PROCEDURE — 99215 OFFICE O/P EST HI 40 MIN: CPT | Performed by: INTERNAL MEDICINE

## 2025-03-12 RX ORDER — ACYCLOVIR 400 MG/1
400 TABLET ORAL 2 TIMES DAILY
Qty: 60 TABLET | Refills: 4 | Status: SHIPPED | OUTPATIENT
Start: 2025-03-12 | End: 2025-04-11

## 2025-03-12 RX ORDER — VORICONAZOLE 200 MG/1
200 TABLET, FILM COATED ORAL 2 TIMES DAILY
Qty: 60 TABLET | Refills: 4 | Status: SHIPPED | OUTPATIENT
Start: 2025-03-12 | End: 2025-03-13

## 2025-03-12 NOTE — PATIENT INSTRUCTIONS
Will order venetoclax to start at 100 mg daily    Changed diflucan to voriconazole twice a day for anti-fungal treatment    Continue acyclovir twice a day prevention viral infections    Complete IV antibiotics     CBC with differential only on Friday 3/14/2025     Follow up 2 weeks

## 2025-03-13 ENCOUNTER — DOCUMENTATION (OUTPATIENT)
Age: 75
End: 2025-03-13

## 2025-03-13 ENCOUNTER — TELEPHONE (OUTPATIENT)
Age: 75
End: 2025-03-13

## 2025-03-13 DIAGNOSIS — D46.Z MDS (MYELODYSPLASTIC SYNDROME), HIGH GRADE (HCC): Primary | ICD-10-CM

## 2025-03-13 NOTE — TELEPHONE ENCOUNTER
Pt called to report that she was able to  the prescription for the acyclovir but not for the vfend due to have a copay with the insurance of $450. Would like to know if there is another medication she can take to help her. Pt would like a call back with the name of the medication that replaces vfend.

## 2025-03-13 NOTE — PROGRESS NOTES
Received request from clinical for patient to start on Venclexta. Auth has been submitted via [employer] and this has been approved  Authorized from March 1, 2025 to March 13, 2026

## 2025-03-14 ENCOUNTER — TELEPHONE (OUTPATIENT)
Age: 75
End: 2025-03-14

## 2025-03-14 ENCOUNTER — APPOINTMENT (OUTPATIENT)
Dept: LAB | Facility: CLINIC | Age: 75
End: 2025-03-14
Payer: MEDICARE

## 2025-03-14 DIAGNOSIS — D61.818 PANCYTOPENIA (HCC): ICD-10-CM

## 2025-03-14 DIAGNOSIS — R78.81 GRAM-POSITIVE COCCI BACTEREMIA: ICD-10-CM

## 2025-03-14 DIAGNOSIS — I10 PRIMARY HYPERTENSION: ICD-10-CM

## 2025-03-14 DIAGNOSIS — D46.Z MDS (MYELODYSPLASTIC SYNDROME), HIGH GRADE (HCC): Primary | ICD-10-CM

## 2025-03-14 DIAGNOSIS — D46.Z MDS (MYELODYSPLASTIC SYNDROME), HIGH GRADE (HCC): ICD-10-CM

## 2025-03-14 LAB
ANISOCYTOSIS BLD QL SMEAR: PRESENT
BASOPHILS # BLD MANUAL: 0.07 THOUSAND/UL (ref 0–0.1)
BASOPHILS NFR MAR MANUAL: 3 % (ref 0–1)
EOSINOPHIL # BLD MANUAL: 0.19 THOUSAND/UL (ref 0–0.4)
EOSINOPHIL NFR BLD MANUAL: 8 % (ref 0–6)
ERYTHROCYTE [DISTWIDTH] IN BLOOD BY AUTOMATED COUNT: 22.6 % (ref 11.6–15.1)
GIANT PLATELETS BLD QL SMEAR: PRESENT
HCT VFR BLD AUTO: 28.3 % (ref 34.8–46.1)
HGB BLD-MCNC: 9.1 G/DL (ref 11.5–15.4)
LYMPHOCYTES # BLD AUTO: 1.18 THOUSAND/UL (ref 0.6–4.47)
LYMPHOCYTES # BLD AUTO: 49 % (ref 14–44)
MCH RBC QN AUTO: 32.5 PG (ref 26.8–34.3)
MCHC RBC AUTO-ENTMCNC: 32.2 G/DL (ref 31.4–37.4)
MCV RBC AUTO: 101 FL (ref 82–98)
METAMYELOCYTE ABSOLUTE CT: 0.02 THOUSAND/UL (ref 0–0.1)
METAMYELOCYTES NFR BLD MANUAL: 1 % (ref 0–1)
MONOCYTES # BLD AUTO: 0.05 THOUSAND/UL (ref 0–1.22)
MONOCYTES NFR BLD: 2 % (ref 4–12)
MYELOCYTE ABSOLUTE CT: 0.05 THOUSAND/UL (ref 0–0.1)
MYELOCYTES NFR BLD MANUAL: 2 % (ref 0–1)
NEUTROPHILS # BLD MANUAL: 0.84 THOUSAND/UL (ref 1.85–7.62)
NEUTS BAND NFR BLD MANUAL: 2 % (ref 0–8)
NEUTS SEG NFR BLD AUTO: 33 % (ref 43–75)
OVALOCYTES BLD QL SMEAR: PRESENT
PLATELET # BLD AUTO: 316 THOUSANDS/UL (ref 149–390)
PLATELET BLD QL SMEAR: ADEQUATE
PLATELET CLUMP BLD QL SMEAR: PRESENT
PMV BLD AUTO: 10.5 FL (ref 8.9–12.7)
POIKILOCYTOSIS BLD QL SMEAR: PRESENT
POLYCHROMASIA BLD QL SMEAR: PRESENT
RBC # BLD AUTO: 2.8 MILLION/UL (ref 3.81–5.12)
RBC MORPH BLD: PRESENT
WBC # BLD AUTO: 2.4 THOUSAND/UL (ref 4.31–10.16)

## 2025-03-14 PROCEDURE — 36415 COLL VENOUS BLD VENIPUNCTURE: CPT

## 2025-03-14 PROCEDURE — 85007 BL SMEAR W/DIFF WBC COUNT: CPT

## 2025-03-14 PROCEDURE — G0180 MD CERTIFICATION HHA PATIENT: HCPCS | Performed by: INTERNAL MEDICINE

## 2025-03-14 PROCEDURE — 85027 COMPLETE CBC AUTOMATED: CPT

## 2025-03-14 NOTE — TELEPHONE ENCOUNTER
Called and spoke to Pretty. Dr. Carranza reviewed  her lab results and her WBC/ANC is still too low for treatment next week. I am going to defer her treatment until the following week and requested she gets her labs repeated on Thursday. Pretty confirmed that she has not started the venetoclax yet and has not yet received it. I will review her labs next week and let her know if she is okay to be treated and start taking the venetoclax, if it is delivered by then. Pretty verbalized understanding.    Called and spoke to [facility] to cancel appt on Monday, 3/17

## 2025-03-15 ENCOUNTER — APPOINTMENT (EMERGENCY)
Dept: CT IMAGING | Facility: HOSPITAL | Age: 75
End: 2025-03-15
Payer: MEDICARE

## 2025-03-15 ENCOUNTER — HOME CARE VISIT (OUTPATIENT)
Dept: HOME HEALTH SERVICES | Facility: HOME HEALTHCARE | Age: 75
End: 2025-03-15
Payer: MEDICARE

## 2025-03-15 ENCOUNTER — APPOINTMENT (EMERGENCY)
Dept: RADIOLOGY | Facility: HOSPITAL | Age: 75
End: 2025-03-15
Payer: MEDICARE

## 2025-03-15 ENCOUNTER — HOSPITAL ENCOUNTER (EMERGENCY)
Facility: HOSPITAL | Age: 75
Discharge: HOME | End: 2025-03-16
Attending: EMERGENCY MEDICINE | Admitting: EMERGENCY MEDICINE
Payer: MEDICARE

## 2025-03-15 VITALS
SYSTOLIC BLOOD PRESSURE: 76 MMHG | TEMPERATURE: 98.5 F | RESPIRATION RATE: 18 BRPM | HEART RATE: 96 BPM | DIASTOLIC BLOOD PRESSURE: 48 MMHG | OXYGEN SATURATION: 98 %

## 2025-03-15 DIAGNOSIS — D64.9 ANEMIA: ICD-10-CM

## 2025-03-15 DIAGNOSIS — R79.89 ELEVATED BRAIN NATRIURETIC PEPTIDE (BNP) LEVEL: ICD-10-CM

## 2025-03-15 DIAGNOSIS — R79.89 POSITIVE D DIMER: ICD-10-CM

## 2025-03-15 DIAGNOSIS — I95.9 HYPOTENSION: Primary | ICD-10-CM

## 2025-03-15 LAB
2HR DELTA HS TROPONIN: -1 NG/L
4HR DELTA HS TROPONIN: -2 NG/L
ALBUMIN SERPL BCG-MCNC: 3.3 G/DL (ref 3.5–5)
ALP SERPL-CCNC: 61 U/L (ref 34–104)
ALT SERPL W P-5'-P-CCNC: 19 U/L (ref 7–52)
ANION GAP SERPL CALCULATED.3IONS-SCNC: 8 MMOL/L (ref 4–13)
ANISOCYTOSIS BLD QL SMEAR: PRESENT
AST SERPL W P-5'-P-CCNC: 22 U/L (ref 13–39)
BASOPHILS # BLD MANUAL: 0 THOUSAND/UL (ref 0–0.1)
BASOPHILS NFR MAR MANUAL: 0 % (ref 0–1)
BILIRUB SERPL-MCNC: 0.5 MG/DL (ref 0.2–1)
BNP SERPL-MCNC: 162 PG/ML (ref 0–100)
BUN SERPL-MCNC: 20 MG/DL (ref 5–25)
BURR CELLS BLD QL SMEAR: PRESENT
CALCIUM ALBUM COR SERPL-MCNC: 9.3 MG/DL (ref 8.3–10.1)
CALCIUM SERPL-MCNC: 8.7 MG/DL (ref 8.4–10.2)
CARDIAC TROPONIN I PNL SERPL HS: 5 NG/L (ref ?–50)
CARDIAC TROPONIN I PNL SERPL HS: 6 NG/L (ref ?–50)
CARDIAC TROPONIN I PNL SERPL HS: 7 NG/L (ref ?–50)
CHLORIDE SERPL-SCNC: 103 MMOL/L (ref 96–108)
CO2 SERPL-SCNC: 26 MMOL/L (ref 21–32)
CREAT SERPL-MCNC: 0.8 MG/DL (ref 0.6–1.3)
D DIMER PPP FEU-MCNC: 2.53 UG/ML FEU
EOSINOPHIL # BLD MANUAL: 0.34 THOUSAND/UL (ref 0–0.4)
EOSINOPHIL NFR BLD MANUAL: 12 % (ref 0–6)
ERYTHROCYTE [DISTWIDTH] IN BLOOD BY AUTOMATED COUNT: 22.6 % (ref 11.6–15.1)
GFR SERPL CREATININE-BSD FRML MDRD: 73 ML/MIN/1.73SQ M
GIANT PLATELETS BLD QL SMEAR: PRESENT
GLUCOSE SERPL-MCNC: 83 MG/DL (ref 65–140)
HCT VFR BLD AUTO: 26.7 % (ref 34.8–46.1)
HELMET CELLS BLD QL SMEAR: PRESENT
HGB BLD-MCNC: 8.5 G/DL (ref 11.5–15.4)
LYMPHOCYTES # BLD AUTO: 1.41 THOUSAND/UL (ref 0.6–4.47)
LYMPHOCYTES # BLD AUTO: 39 % (ref 14–44)
MACROCYTES BLD QL AUTO: PRESENT
MAGNESIUM SERPL-MCNC: 2 MG/DL (ref 1.9–2.7)
MCH RBC QN AUTO: 32.3 PG (ref 26.8–34.3)
MCHC RBC AUTO-ENTMCNC: 31.8 G/DL (ref 31.4–37.4)
MCV RBC AUTO: 102 FL (ref 82–98)
METAMYELOCYTE ABSOLUTE CT: 0.06 THOUSAND/UL (ref 0–0.1)
METAMYELOCYTES NFR BLD MANUAL: 2 % (ref 0–1)
MONOCYTES # BLD AUTO: 0.03 THOUSAND/UL (ref 0–1.22)
MONOCYTES NFR BLD: 1 % (ref 4–12)
MYELOCYTE ABSOLUTE CT: 0.03 THOUSAND/UL (ref 0–0.1)
MYELOCYTES NFR BLD MANUAL: 1 % (ref 0–1)
NEUTROPHILS # BLD MANUAL: 1 THOUSAND/UL (ref 1.85–7.62)
NEUTS BAND NFR BLD MANUAL: 9 % (ref 0–8)
NEUTS SEG NFR BLD AUTO: 26 % (ref 43–75)
OVALOCYTES BLD QL SMEAR: PRESENT
PLATELET # BLD AUTO: 291 THOUSANDS/UL (ref 149–390)
PLATELET BLD QL SMEAR: ABNORMAL
PLATELET CLUMP BLD QL SMEAR: PRESENT
PMV BLD AUTO: 10.8 FL (ref 8.9–12.7)
POIKILOCYTOSIS BLD QL SMEAR: PRESENT
POLYCHROMASIA BLD QL SMEAR: PRESENT
POTASSIUM SERPL-SCNC: 4.1 MMOL/L (ref 3.5–5.3)
PROT SERPL-MCNC: 6.4 G/DL (ref 6.4–8.4)
RBC # BLD AUTO: 2.63 MILLION/UL (ref 3.81–5.12)
RBC MORPH BLD: PRESENT
SCHISTOCYTES BLD QL SMEAR: PRESENT
SMUDGE CELLS BLD QL SMEAR: PRESENT
SODIUM SERPL-SCNC: 137 MMOL/L (ref 135–147)
TSH SERPL DL<=0.05 MIU/L-ACNC: 1.07 UIU/ML (ref 0.45–4.5)
VARIANT LYMPHS # BLD AUTO: 10 %
WBC # BLD AUTO: 2.87 THOUSAND/UL (ref 4.31–10.16)

## 2025-03-15 PROCEDURE — 99285 EMERGENCY DEPT VISIT HI MDM: CPT

## 2025-03-15 PROCEDURE — G0299 HHS/HOSPICE OF RN EA 15 MIN: HCPCS

## 2025-03-15 PROCEDURE — 83735 ASSAY OF MAGNESIUM: CPT

## 2025-03-15 PROCEDURE — 96374 THER/PROPH/DIAG INJ IV PUSH: CPT

## 2025-03-15 PROCEDURE — 83880 ASSAY OF NATRIURETIC PEPTIDE: CPT

## 2025-03-15 PROCEDURE — 93005 ELECTROCARDIOGRAM TRACING: CPT

## 2025-03-15 PROCEDURE — 85007 BL SMEAR W/DIFF WBC COUNT: CPT

## 2025-03-15 PROCEDURE — 80053 COMPREHEN METABOLIC PANEL: CPT

## 2025-03-15 PROCEDURE — 84484 ASSAY OF TROPONIN QUANT: CPT

## 2025-03-15 PROCEDURE — 96361 HYDRATE IV INFUSION ADD-ON: CPT

## 2025-03-15 PROCEDURE — 84443 ASSAY THYROID STIM HORMONE: CPT

## 2025-03-15 PROCEDURE — 36415 COLL VENOUS BLD VENIPUNCTURE: CPT

## 2025-03-15 PROCEDURE — 85027 COMPLETE CBC AUTOMATED: CPT

## 2025-03-15 PROCEDURE — 85379 FIBRIN DEGRADATION QUANT: CPT

## 2025-03-15 PROCEDURE — 99285 EMERGENCY DEPT VISIT HI MDM: CPT | Performed by: EMERGENCY MEDICINE

## 2025-03-15 PROCEDURE — 96375 TX/PRO/DX INJ NEW DRUG ADDON: CPT

## 2025-03-15 PROCEDURE — 71275 CT ANGIOGRAPHY CHEST: CPT

## 2025-03-15 PROCEDURE — 71045 X-RAY EXAM CHEST 1 VIEW: CPT

## 2025-03-15 RX ORDER — DIPHENHYDRAMINE HYDROCHLORIDE 50 MG/ML
50 INJECTION, SOLUTION INTRAMUSCULAR; INTRAVENOUS ONCE
Status: COMPLETED | OUTPATIENT
Start: 2025-03-15 | End: 2025-03-15

## 2025-03-15 RX ORDER — HYDROCORTISONE SODIUM SUCCINATE 100 MG/2ML
200 INJECTION INTRAMUSCULAR; INTRAVENOUS ONCE
Status: COMPLETED | OUTPATIENT
Start: 2025-03-15 | End: 2025-03-15

## 2025-03-15 RX ADMIN — SODIUM CHLORIDE 500 ML: 0.9 INJECTION, SOLUTION INTRAVENOUS at 16:43

## 2025-03-15 RX ADMIN — DIPHENHYDRAMINE HYDROCHLORIDE 50 MG: 50 INJECTION, SOLUTION INTRAMUSCULAR; INTRAVENOUS at 21:44

## 2025-03-15 RX ADMIN — IOHEXOL 70 ML: 350 INJECTION, SOLUTION INTRAVENOUS at 22:48

## 2025-03-15 RX ADMIN — SODIUM CHLORIDE 1000 ML: 0.9 INJECTION, SOLUTION INTRAVENOUS at 19:32

## 2025-03-15 RX ADMIN — HYDROCORTISONE SODIUM SUCCINATE 200 MG: 100 INJECTION, POWDER, FOR SOLUTION INTRAMUSCULAR; INTRAVENOUS at 18:44

## 2025-03-15 NOTE — ED CARE HANDOFF
Emergency Department Sign Out Note        Sign out and transfer of care from Dr. Siegel . See Separate Emergency Department note.     The patient, Pretty Aguila, was evaluated by the previous provider for SOB/dyspnea, suspected PE. Also having intermittent hypotension- on midodrine.    Workup Completed:  Miguel maradiaga    ED Course / Workup Pending (followup):  Awaiting PE scan, has to do allergy prep. Prep initiated at: 18:45. Benadryl should be given at 9:45.  Scan with short prep approved  by .                                  ED Course as of 03/16/25 0126   Sat Mar 15, 2025   1901 D-Dimer, Quant(!): 2.53   1901 Delta 2hr hsTnI: -1   Sun Mar 16, 2025   0123 Discussion had with patient in regards to her low blood pressures.  Recommended that she discontinue her home blood pressure medications.  Patient reports that she has a blood pressure machine at home, and she is agreeable to keeping a log and contacting her cardiologist/primary care Monday morning to schedule a close follow-up appointment in regards to addressing this issue.   0125 Patient's CT was negative for acute cardiopulmonary process per V rad read.  Informed the patient of these results, and let them know that if our radiology department disagreed with that read they would be called.  They report understanding and are comfortable discharge.  Strict return precautions given.     Procedures  Medical Decision Making  See ED course for MDM.    Amount and/or Complexity of Data Reviewed  Labs: ordered. Decision-making details documented in ED Course.  Radiology: ordered.    Risk  Prescription drug management.            Disposition  Final diagnoses:   Hypotension   Positive D dimer   Anemia   Elevated brain natriuretic peptide (BNP) level     Time reflects when diagnosis was documented in both MDM as applicable and the Disposition within this note       Time User Action Codes Description Comment    3/16/2025  1:12 AM Leeann Granados Add [I95.9]  Hypotension     3/16/2025  1:13 AM Leeann Granados [R79.89] Positive D dimer     3/16/2025  1:13 AM Leeann Granados [D64.9] Anemia     3/16/2025  1:13 AM Leeann Granados Add [R79.89] Elevated brain natriuretic peptide (BNP) level           ED Disposition       ED Disposition   Discharge    Condition   Stable    Date/Time   Sun Mar 16, 2025  1:12 AM    Comment   Pretty Aguila discharge to home/self care.                   Follow-up Information       Follow up With Specialties Details Why Contact Info    Lauren Dumont MD Family Medicine Schedule an appointment as soon as possible for a visit in 3 days Please schedule an appointment with your primary care to discuss your medication regimen and how it could potentially be the cause of your visit today. [address] [address]  [phone]            Patient's Medications   Discharge Prescriptions    No medications on file     No discharge procedures on file.       ED Provider  Electronically Signed by     Leeann Granados MD  03/16/25 0126

## 2025-03-15 NOTE — ED PROVIDER NOTES
Time reflects when diagnosis was documented in both MDM as applicable and the Disposition within this note       Time User Action Codes Description Comment    3/16/2025  1:12 AM Leeann Granados Add [I95.9] Hypotension     3/16/2025  1:13 AM Leeann Granados Add [R79.89] Positive D dimer     3/16/2025  1:13 AM Jordan Granadosna Add [D64.9] Anemia     3/16/2025  1:13 AM Jordan Granadosna Add [R79.89] Elevated brain natriuretic peptide (BNP) level           ED Disposition       ED Disposition   Discharge    Condition   Stable    Date/Time   Sun Mar 16, 2025  1:12 AM    Comment   Pretty Aguila discharge to home/self care.                   Assessment & Plan       Medical Decision Making  Patient is a 74-year-old female with MDS who presented to the ED for hypotension and dyspnea.  Patient stated that for the last few days she has had increased dyspnea on exertion which she has also had at times after being diagnosed with MDS but appeared a little worse than usual recently.  She denies any chest pain or palpitations.  She denies any leg swelling.  She also endorses feeling lightheaded today and decided to come into the ED when a home health nurse found that the patient had SBP 70s at home during the episode of lightheadedness.  Notably, the patient has had multiple episodes of hypotension recently and has been started on midodrine for this.  Patient states that she is also on metoprolol which may be contributing to the hypotension. No fevers, chills, or other systemic symptoms.  Last had chemotherapy approximately 1 month ago and was supposed to get another treatment last week but this was postponed due to low WBC count. Well appearing on exam.    Ddx includes but is not limited to anemia, arrhythmia, dehydration, malnutrition, ACS, CHF, pneumonia, viral URI, PE.  EKG nonischemic and troponin initial and delta 2 are both negative.  , likely nonspecific.  TSH within normal range.  CXR without acute process.  CBC  with series of abnormalities which all appear to be at or near baseline of this patient with MDS.  She may be experiencing the worsening dyspnea on exertion due to anemia caused by MDS, however given the hypercoagulable state that comes of malignancy, cannot exclude PE.  Thus, dimer was collected which was elevated and the patient is to have CTA chest PE study to rule out PE. The patient has never had anaphylaxis or allergic response to contrast, however, the patient has severe shellfish allergy and was told to avoid contrast studies without pretreatment, and thus has only had studies with contrast with pretreatment. I spoke with , attending radiologist who stated that if she did not have anaphylaxis or severe allergy to contrast study itself then the patient would be appropriate for the pretreatment. Given this, the patient was ordered 4 hour pretreatment for CTA PE study. At this time, I am signing out patient care to Dr. Granados. Please see her note for further events and eventual disposition.    Amount and/or Complexity of Data Reviewed  Labs: ordered.  Radiology: ordered.    Risk  Prescription drug management.             Medications   sodium chloride 0.9 % bolus 500 mL (0 mL Intravenous Stopped 3/15/25 1852)   hydrocortisone (Solu-CORTEF) injection 200 mg (200 mg Intravenous Given 3/15/25 1844)     Followed by   diphenhydrAMINE (BENADRYL) injection 50 mg (50 mg Intravenous Given 3/15/25 2144)   sodium chloride 0.9 % bolus 1,000 mL (0 mL Intravenous Stopped 3/15/25 2055)   iohexol (OMNIPAQUE) 350 MG/ML injection (MULTI-DOSE) 70 mL (70 mL Intravenous Given 3/15/25 2248)       ED Risk Strat Scores                            SBIRT 20yo+      Flowsheet Row Most Recent Value   Initial Alcohol Screen: US AUDIT-C     1. How often do you have a drink containing alcohol? 0 Filed at: 03/15/2025 1527   2. How many drinks containing alcohol do you have on a typical day you are drinking?  0 Filed at: 03/15/2025  1525   3b. FEMALE Any Age, or MALE 65+: How often do you have 4 or more drinks on one occassion? 0 Filed at: 03/15/2025 1525   Audit-C Score 0 Filed at: 03/15/2025 1525   CHANI: How many times in the past year have you...    Used an illegal drug or used a prescription medication for non-medical reasons? Never Filed at: 03/15/2025 1525                            History of Present Illness       Chief Complaint   Patient presents with    Hypotension     Patient referred to the ED for hypotension 78's given midodrine 2.5 mg  , Pt c/o dizziness and SOB that started last. Recently d/c home with IV abt for sepsis- last dose yesterday.        Past Medical History:   Diagnosis Date    Allergic     Anxiety     Arthritis     Breast cancer (HCC) 09/2023    CAD (coronary artery disease)     Coronary artery disease [id number]    Depression     Diabetes mellitus (HCC) 5/1/24    Mother-Diabetic    Dyslipidemia     Hiatal hernia     Hyperlipidemia     Hypertension     Obesity     Obesity (BMI 30-39.9)     Psoriatic arthritis (HCC)     Rheumatoid arthritis (HCC)     Scoliosis     SOB (shortness of breath)       Past Surgical History:   Procedure Laterality Date    BARIATRIC SURGERY  2014    BILE DUCT EXPLORATION      replacement per [facility]    BREAST BIOPSY  9/23    CARPAL TUNNEL RELEASE Bilateral 2002    CORONARY ARTERY BYPASS GRAFT  2017    FOOT SURGERY Right     bone surgery to straighten toe.    HIP SURGERY Left 2015    IR BIOPSY BONE MARROW  12/11/2024    IR DRAINAGE TUBE PLACEMENT  12/19/2023    JOINT REPLACEMENT      LYMPH NODE BIOPSY Left 11/20/2023    Procedure: LEFT LYMPHATIC MAPPING WITH BLUE AND RADIOACTIVE DYES, SENTINEL LYMPH NODE BIOPSY;  Surgeon: Adrien Gabriel MD;  Location:  MAIN OR;  Service: Surgical Oncology    LYMPH NODE DISSECTION Left 11/20/2023    Procedure: POSSIBLE AXILLARY DISSECTION;  Surgeon: Adrien Gabriel MD;  Location:  MAIN OR;  Service: Surgical Oncology    VA BREAST REDUCTION Bilateral 01/16/2024     Procedure: RIGHT BREAST REDUCTION AND LEFT BREAST EXPANDER EXCHANGE FOR PERMANENT IMPLANT. REVISION OF RECONSTRUCTED LEFT BREAST.;  Surgeon: Molina Jimenez MD;  Location: [facility];  Service: Plastics    MO SUCTION ASSISTED LIPECTOMY TRUNK Bilateral 2024    Procedure: LIPOSUCTION BREAST;  Surgeon: Molina Jimenez MD;  Location: [facility];  Service: Plastics    MO TISSUE EXPANDER PLACEMENT BREAST RECONSTRUCTION Left 2023    Procedure: IMMEDIATE LEFT BREAST RECONSTRUCTION WITH TISSUE EXPANDER AND ADM;  Surgeon: Molina Jimenez MD;  Location: [facility];  Service: Plastics    REPLACEMENT TOTAL KNEE Right 2017    SIMPLE MASTECTOMY Left 2023    Procedure: LEFT BREAST VERENICE  DIRECTED MASTECTOMY;  Surgeon: Adrien Gabriel MD;  Location: [facility];  Service: Surgical Oncology    SLEEVE GASTROPLASTY      TONSILLECTOMY      TOTAL HIP ARTHROPLASTY Right     US GUIDED BREAST BIOPSY LEFT COMPLETE Left 2023      Family History   Problem Relation Age of Onset    Hypertension Mother     Diabetes Mother     Heart disease Father         CABG x5    Arthritis Father     No Known Problems Sister     No Known Problems Sister     No Known Problems Daughter     No Known Problems Daughter     No Known Problems Son     Breast cancer Neg Hx       Social History     Tobacco Use    Smoking status: Former     Current packs/day: 0.00     Average packs/day: 0.5 packs/day for 7.0 years (3.5 ttl pk-yrs)     Types: Cigarettes     Start date: 2010     Quit date: 2017     Years since quittin.2    Smokeless tobacco: Never    Tobacco comments:     Have already quit smoking in 2017   Vaping Use    Vaping status: Never Used   Substance Use Topics    Alcohol use: Yes     Alcohol/week: 2.0 standard drinks of alcohol     Types: 2 Glasses of wine per week     Comment: social     Drug use: No      E-Cigarette/Vaping    E-Cigarette Use Never User       E-Cigarette/Vaping Substances    Nicotine No     THC No     CBD No      Flavoring No     Other No       I have reviewed and agree with the history as documented.     Patient is a 74-year-old female with MDS who presented to the ED for hypotension and dyspnea.  Patient stated that for the last few days she has had increased dyspnea on exertion which she has also had at times after being diagnosed with MDS but appeared a little worse than usual recently.  She denies any chest pain or palpitations.  She denies any leg swelling.  She also endorses feeling lightheaded today and decided to come into the ED when a home health nurse found that the patient had SBP 70s at home during the episode of lightheadedness.  Notably, the patient has had multiple episodes of hypotension recently and has been started on midodrine for this.  Patient states that she is also on metoprolol which may be contributing to the hypotension. No fevers, chills, or other systemic symptoms.  Last had chemotherapy approximately 1 month ago and was supposed to get another treatment last week but this was postponed due to low WBC count. Well appearing on exam.        Review of Systems   Constitutional:  Positive for fatigue. Negative for chills and fever.   HENT: Negative.     Respiratory:  Positive for shortness of breath. Negative for cough.    Cardiovascular:  Negative for chest pain and palpitations.   Gastrointestinal:  Negative for abdominal pain, nausea and vomiting.   Genitourinary: Negative.    Skin:  Negative for color change and pallor.   Neurological:  Positive for light-headedness. Negative for syncope.   Psychiatric/Behavioral: Negative.     All other systems reviewed and are negative.          Objective       ED Triage Vitals [03/15/25 1521]   Temperature Pulse Blood Pressure Respirations SpO2 Patient Position - Orthostatic VS   97.9 °F (36.6 °C) 76 104/68 16 100 % Lying      Temp Source Heart Rate Source BP Location FiO2 (%) Pain Score    Oral Monitor Right arm -- No Pain      Vitals      Date and Time  Temp Pulse SpO2 Resp BP Pain Score FACES Pain Rating User   03/16/25 0111 -- 75 93 % -- 106/66 -- -- AN   03/16/25 0056 -- 75 93 % 16 101/64 No Pain -- AN   03/15/25 2240 -- 87 91 % -- 102/63 -- -- AN   03/15/25 2200 -- 85 95 % -- 127/70 -- -- SM   03/15/25 2130 -- 79 93 % 14 94/55 No Pain -- AN   03/15/25 2045 -- 83 90 % -- 108/65 -- -- AN   03/15/25 2030 -- 84 90 % -- 109/59 -- -- AN   03/15/25 1915 -- 87 96 % 14 96/53 No Pain -- AN   03/15/25 1845 -- 89 97 % -- 93/62 -- -- AN   03/15/25 1815 -- 86 94 % 16 104/62 -- --    03/15/25 1755 -- 81 96 % 16 107/54 No Pain -- AN   03/15/25 1715 98 °F (36.7 °C) 78 97 % 16 108/57 No Pain -- AN   03/15/25 1619 -- -- -- -- -- No Pain -- AN   03/15/25 1521 97.9 °F (36.6 °C) 76 100 % 16 104/68 No Pain -- AN            Physical Exam  On examination:  The patient is awake, alert and oriented  HEENT: Normocephalic/atraumatic  External examination of the ears is unremarkable  Pupils are equal round and reactive to light, there is no conjunctival injection or scleral icterus noted  Nares are patent without rhinorrhea.  The oropharynx is moist without injection  The neck is supple  Lungs: Clear to auscultation bilaterally  Heart: Regular without murmurs rubs or gallops  Abdomen: Soft and nontender. There are positive bowel sounds. there is no rebound or guarding  Musculoskeletal: Normal range of motion with grossly normal strength  Neuro: Cranial nerves II through XII grossly intact. Nonfocal exam  Skin: No rash noted  Psych: Mood and affect normal    Results Reviewed       Procedure Component Value Units Date/Time    HS Troponin I 4hr [[id number]]  (Normal) Collected: 03/15/25 2057    Lab Status: Final result Specimen: Blood from Arm, Right Updated: 03/15/25 2154     hs TnI 4hr 5 ng/L      Delta 4hr hsTnI -2 ng/L     HS Troponin I 2hr [[id number]]  (Normal) Collected: 03/15/25 1755    Lab Status: Final result Specimen: Blood from Arm, Right Updated: 03/15/25 1835     hs TnI 2hr 6  ng/L      Delta 2hr hsTnI -1 ng/L     D-dimer, quantitative [[id number]]  (Abnormal) Collected: 03/15/25 1755    Lab Status: Final result Specimen: Blood from Arm, Right Updated: 03/15/25 1829     D-Dimer, Quant 2.53 ug/ml FEU     Narrative:      In the evaluation for possible pulmonary embolism, in the appropriate (Well's Score of 4 or less) patient, the age adjusted d-dimer cutoff for this patient can be calculated as:    Age x 0.01 (in ug/mL) for Age-adjusted D-dimer exclusion threshold for a patient over 50 years.    RBC Morphology Reflex Test [[id number]] Collected: 03/15/25 1611    Lab Status: Final result Specimen: Blood from Arm, Left Updated: 03/15/25 1801    HS Troponin 0hr (reflex protocol) [[id number]]  (Normal) Collected: 03/15/25 1611    Lab Status: Final result Specimen: Blood from Arm, Left Updated: 03/15/25 1741     hs TnI 0hr 7 ng/L     Comprehensive metabolic panel [[id number]]  (Abnormal) Collected: 03/15/25 1611    Lab Status: Final result Specimen: Blood from Arm, Left Updated: 03/15/25 1736     Sodium 137 mmol/L      Potassium 4.1 mmol/L      Chloride 103 mmol/L      CO2 26 mmol/L      ANION GAP 8 mmol/L      BUN 20 mg/dL      Creatinine 0.80 mg/dL      Glucose 83 mg/dL      Calcium 8.7 mg/dL      Corrected Calcium 9.3 mg/dL      AST 22 U/L      ALT 19 U/L      Alkaline Phosphatase 61 U/L      Total Protein 6.4 g/dL      Albumin 3.3 g/dL      Total Bilirubin 0.50 mg/dL      eGFR 73 ml/min/1.73sq m     Narrative:      [employer] guidelines for Chronic Kidney Disease (CKD):     Stage 1 with normal or high GFR (GFR > 90 mL/min/1.73 square meters)    Stage 2 Mild CKD (GFR = 60-89 mL/min/1.73 square meters)    Stage 3A Moderate CKD (GFR = 45-59 mL/min/1.73 square meters)    Stage 3B Moderate CKD (GFR = 30-44 mL/min/1.73 square meters)    Stage 4 Severe CKD (GFR = 15-29 mL/min/1.73 square meters)    Stage 5 End Stage CKD (GFR <15 mL/min/1.73 square meters)  Note: GFR calculation  is accurate only with a steady state creatinine    CBC and differential [[id number]]  (Abnormal) Collected: 03/15/25 1611    Lab Status: Final result Specimen: Blood from Arm, Left Updated: 03/15/25 1711     WBC 2.87 Thousand/uL      RBC 2.63 Million/uL      Hemoglobin 8.5 g/dL      Hematocrit 26.7 %       fL      MCH 32.3 pg      MCHC 31.8 g/dL      RDW 22.6 %      MPV 10.8 fL      Platelets 291 Thousands/uL     Narrative:      This is an appended report.  These results have been appended to a previously verified report.    Manual Differential(PHLEBS Do Not Order) [[id number]]  (Abnormal) Collected: 03/15/25 1611    Lab Status: Final result Specimen: Blood from Arm, Left Updated: 03/15/25 1711     Segmented % 26 %      Bands % 9 %      Lymphocytes % 39 %      Monocytes % 1 %      Eosinophils % 12 %      Basophils % 0 %      Metamyelocytes % 2 %      Myelocytes % 1 %      Atypical Lymphocytes % 10 %      Absolute Neutrophils 1.00 Thousand/uL      Absolute Lymphocytes 1.41 Thousand/uL      Absolute Monocytes 0.03 Thousand/uL      Absolute Eosinophils 0.34 Thousand/uL      Absolute Basophils 0.00 Thousand/uL      Absolute Metamyelocytes 0.06 Thousand/uL      Absolute Myelocytes 0.03 Thousand/uL      Total Counted --     Smudge Cells Present     RBC Morphology Present     Platelet Estimate Increased     Clumped Platelets Present     Giant PLTs Present     Anisocytosis Present     [place] Cells Present     Helmet Cells Present     Macrocytes Present     Ovalocytes Present     Poikilocytes Present     Polychromasia Present     Schistocytes Present    TSH, 3rd generation with Free T4 reflex [[id number]]  (Normal) Collected: 03/15/25 1611    Lab Status: Final result Specimen: Blood from Arm, Left Updated: 03/15/25 1700     TSH 3RD GENERATON 1.072 uIU/mL     Magnesium [[id number]]  (Normal) Collected: 03/15/25 1611    Lab Status: Final result Specimen: Blood from Arm, Left Updated: 03/15/25 1656     Magnesium 2.0 mg/dL      B-Type Natriuretic Peptide(BNP) [[id number]]  (Abnormal) Collected: 03/15/25 1611    Lab Status: Final result Specimen: Blood from Arm, Left Updated: 03/15/25 1651      pg/mL             CTA chest pe study   Final Interpretation by Teo Cavazos MD (03/16 0943)      1.  No acute pulmonary embolism.   2.  Ectasia of the ascending thoracic aorta measuring up to 4.1 cm in diameter, stable. Calcific atherosclerosis of the thoracic aorta, proximal thoracic vessels, and coronary arteries.   3.  No acute intrathoracic abnormalities.   4.  Stable small hiatal hernia containing the proximal stomach and sequelae of sleeve gastrectomy. Additional ancillary findings as described above.                  Workstation performed: [id number]         XR chest 1 view portable   Final Interpretation by Garo De La Rosa MD (03/16 0906)      No acute cardiopulmonary disease.            Workstation performed: [id number]             ECG 12 Lead Documentation Only    Date/Time: 3/15/2025 3:21 PM    Performed by: Lauri Siegel MD  Authorized by: Lauri Siegel MD    Indications / Diagnosis:  Hypotension  ECG reviewed by me, the ED Provider: yes    Patient location:  ED  Previous ECG:     Comparison to cardiac monitor: Yes    Interpretation:     Interpretation: normal    Rate:     ECG rate:  76    ECG rate assessment: normal    Rhythm:     Rhythm: sinus rhythm    Ectopy:     Ectopy: none    QRS:     QRS axis:  Normal    QRS intervals:  Normal  Conduction:     Conduction: normal    ST segments:     ST segments:  Normal  T waves:     T waves: normal    Other findings:     Other findings: prolonged qTc interval    Comments:      Qtc 483      ED Medication and Procedure Management   Prior to Admission Medications   Prescriptions Last Dose Informant Patient Reported? Taking?   Ceftriaxone Sodium 2 g SOLR   Yes No   Sig: Inject 2 g into a catheter in a vein daily. Infuse Ceftriaxone 2gm (20ml) slow IV push over 5 minutes every  24 hours as directed. Flush catheter with saline before and after medication as directed.   Multiple Vitamin (multivitamin) tablet  Self Yes No   Sig: Take 1 tablet by mouth daily   Semaglutide-Weight Management (WEGOVY) 2.4 MG/0.75ML   No No   Sig: Inject 0.75 mL (2.4 mg total) under the skin once a week   Sodium Chloride Flush (Normal Saline Flush) 0.9 % SOLN   Yes No   Sig: Inject 10 mL into a catheter in a vein 2 (two) times a day. Flush prior to ABX administration and flush after ABX administration   Venetoclax 100 MG TABS   No No   Sig: Take 1 tablet (100 mg total) by mouth daily Days 1 through 7 every 28 days   acyclovir (ZOVIRAX) 200 mg capsule   Yes No   Sig: Take 1 capsule by mouth 2 (two) times a day   Patient not taking: Reported on 3/12/2025   acyclovir (ZOVIRAX) 400 MG tablet   No No   Sig: Take 1 tablet (400 mg total) by mouth 2 (two) times a day   anastrozole (ARIMIDEX) 1 mg tablet   No No   Sig: TAKE 1 TABLET (1 MG TOTAL) BY MOUTH DAILY   atorvastatin (LIPITOR) 10 mg tablet   No No   Sig: TAKE 1 TABLET BY MOUTH DAILY   cholecalciferol (VITAMIN D3) 400 units tablet  Self Yes No   Sig: Take 1,000 Units by mouth daily   citalopram (CeleXA) 20 mg tablet   No No   Sig: Take 1 tablet (20 mg total) by mouth daily   lidocaine (LIDODERM) 5 %   No No   Sig: Apply 1 patch topically over 12 hours daily Remove & Discard patch within 12 hours or as directed by MD   metFORMIN (GLUCOPHAGE-XR) 500 mg 24 hr tablet   No No   Sig: Take 1 tablet (500 mg total) by mouth daily with dinner   metoprolol succinate (Toprol XL) 25 mg 24 hr tablet   No No   Sig: Take 0.5 tablets (12.5 mg total) by mouth daily   predniSONE 1 mg tablet   No No   Sig: Take 3 tablets (3 mg total) by mouth daily      Facility-Administered Medications: None     Discharge Medication List as of 3/16/2025  1:16 AM        CONTINUE these medications which have NOT CHANGED    Details   acyclovir (ZOVIRAX) 200 mg capsule Take 1 capsule by mouth 2 (two)  times a day, Starting Tue 12/31/2024, Historical Med      acyclovir (ZOVIRAX) 400 MG tablet Take 1 tablet (400 mg total) by mouth 2 (two) times a day, Starting Wed 3/12/2025, Until Fri 4/11/2025, Normal      anastrozole (ARIMIDEX) 1 mg tablet TAKE 1 TABLET (1 MG TOTAL) BY MOUTH DAILY, Starting Thu 6/27/2024, Normal      atorvastatin (LIPITOR) 10 mg tablet TAKE 1 TABLET BY MOUTH DAILY, Starting Wed 1/8/2025, Normal      cholecalciferol (VITAMIN D3) 400 units tablet Take 1,000 Units by mouth daily, Historical Med      citalopram (CeleXA) 20 mg tablet Take 1 tablet (20 mg total) by mouth daily, Starting Wed 12/18/2024, Normal      lidocaine (LIDODERM) 5 % Apply 1 patch topically over 12 hours daily Remove & Discard patch within 12 hours or as directed by MD, Starting Wed 3/5/2025, Normal      metFORMIN (GLUCOPHAGE-XR) 500 mg 24 hr tablet Take 1 tablet (500 mg total) by mouth daily with dinner, Starting Fri 8/9/2024, Normal      metoprolol succinate (Toprol XL) 25 mg 24 hr tablet Take 0.5 tablets (12.5 mg total) by mouth daily, Starting Thu 2/27/2025, Normal      Multiple Vitamin (multivitamin) tablet Take 1 tablet by mouth daily, Historical Med      predniSONE 1 mg tablet Take 3 tablets (3 mg total) by mouth daily, Starting Thu 2/27/2025, Normal      Semaglutide-Weight Management (WEGOVY) 2.4 MG/0.75ML Inject 0.75 mL (2.4 mg total) under the skin once a week, Starting Sat 2/8/2025, Normal      Sodium Chloride Flush (Normal Saline Flush) 0.9 % SOLN Inject 10 mL into a catheter in a vein 2 (two) times a day. Flush prior to ABX administration and flush after ABX administration, Historical Med      Venetoclax 100 MG TABS Take 1 tablet (100 mg total) by mouth daily Days 1 through 7 every 28 days, Starting Thu 3/13/2025, Normal           STOP taking these medications       Ceftriaxone Sodium 2 g SOLR Comments:   Reason for Stopping:             No discharge procedures on file.  ED SEPSIS DOCUMENTATION   Time reflects when  diagnosis was documented in both MDM as applicable and the Disposition within this note       Time User Action Codes Description Comment    3/16/2025  1:12 AM Leeann Granados [I95.9] Hypotension     3/16/2025  1:13 AM Leeann Granados [R79.89] Positive D dimer     3/16/2025  1:13 AM Leeann Granados [D64.9] Anemia     3/16/2025  1:13 AM Leeann Granados [R79.89] Elevated brain natriuretic peptide (BNP) level                  Lauri Siegel MD  03/16/25 6315

## 2025-03-16 VITALS
HEART RATE: 75 BPM | SYSTOLIC BLOOD PRESSURE: 106 MMHG | RESPIRATION RATE: 16 BRPM | TEMPERATURE: 98 F | DIASTOLIC BLOOD PRESSURE: 66 MMHG | OXYGEN SATURATION: 93 %

## 2025-03-16 NOTE — ED NOTES
Patient ambulated out of the ED, AAOx4 resp even and unlabored with no S$S of distress.       Rossi Kearns RN  03/16/25 0118

## 2025-03-17 ENCOUNTER — HOSPITAL ENCOUNTER (OUTPATIENT)
Dept: INFUSION CENTER | Facility: CLINIC | Age: 75
End: 2025-03-17

## 2025-03-17 ENCOUNTER — NURSE TRIAGE (OUTPATIENT)
Age: 75
End: 2025-03-17

## 2025-03-17 NOTE — TELEPHONE ENCOUNTER
"Pt experiencing hypotension issues since starting chemo.   The hospital stopped Toprol and BP's improving  /79, 109/80  Pt scheduled to see Dr Conrad tomorrow.   Answer Assessment - Initial Assessment Questions  1. BLOOD PRESSURE: \"What is your blood pressure?\" \"Did you take at least two measurements 5 minutes apart?\"      103/79  2. ONSET: \"When did you take your blood pressure?\"      today  3. HOW: \"How did you take your blood pressure?\" (e.g., visiting nurse, automatic home BP monitor)      automatic  4. HISTORY: \"Do you have a history of low blood pressure?\" \"What is your blood pressure normally?\"      yes  5. MEDICINES: \"Are you taking any medicines for blood pressure?\" If Yes, ask: \"Have they been changed recently?\"      Hospital stopped Toprol  6. PULSE RATE: \"Do you know what your pulse rate is?\"       unsure  7. OTHER SYMPTOMS: \"Have you been sick recently?\" \"Have you had a recent injury?\"      no    Protocols used: Blood Pressure - Low-Adult-OH    "

## 2025-03-18 ENCOUNTER — TELEPHONE (OUTPATIENT)
Age: 75
End: 2025-03-18

## 2025-03-18 ENCOUNTER — OFFICE VISIT (OUTPATIENT)
Dept: CARDIOLOGY CLINIC | Facility: CLINIC | Age: 75
End: 2025-03-18
Payer: MEDICARE

## 2025-03-18 VITALS
WEIGHT: 179.4 LBS | HEART RATE: 80 BPM | BODY MASS INDEX: 33.01 KG/M2 | DIASTOLIC BLOOD PRESSURE: 70 MMHG | SYSTOLIC BLOOD PRESSURE: 118 MMHG | OXYGEN SATURATION: 100 % | HEIGHT: 62 IN

## 2025-03-18 DIAGNOSIS — Z95.1 S/P CABG X 3: ICD-10-CM

## 2025-03-18 DIAGNOSIS — I95.1 ORTHOSTATIC HYPOTENSION: ICD-10-CM

## 2025-03-18 DIAGNOSIS — I10 PRIMARY HYPERTENSION: ICD-10-CM

## 2025-03-18 DIAGNOSIS — I25.10 CORONARY ARTERY DISEASE INVOLVING NATIVE CORONARY ARTERY OF NATIVE HEART WITHOUT ANGINA PECTORIS: Primary | ICD-10-CM

## 2025-03-18 LAB
ATRIAL RATE: 76 BPM
P AXIS: 70 DEGREES
PR INTERVAL: 186 MS
QRS AXIS: 45 DEGREES
QRSD INTERVAL: 96 MS
QT INTERVAL: 430 MS
QTC INTERVAL: 483 MS
T WAVE AXIS: 67 DEGREES
VENTRICULAR RATE: 76 BPM

## 2025-03-18 PROCEDURE — 99214 OFFICE O/P EST MOD 30 MIN: CPT | Performed by: INTERNAL MEDICINE

## 2025-03-18 PROCEDURE — 93010 ELECTROCARDIOGRAM REPORT: CPT | Performed by: INTERNAL MEDICINE

## 2025-03-18 RX ORDER — MIDODRINE HYDROCHLORIDE 5 MG/1
5 TABLET ORAL
Qty: 90 TABLET | Refills: 3 | Status: SHIPPED | OUTPATIENT
Start: 2025-03-18

## 2025-03-18 RX ORDER — BUPROPION HYDROCHLORIDE 300 MG/1
300 TABLET ORAL EVERY MORNING
COMMUNITY
Start: 2025-03-10

## 2025-03-18 NOTE — ASSESSMENT & PLAN NOTE
The patient has exhibited orthostatic hypotension with symptoms of fatigue and feeling faint with some shortness of breath.  This is most probably related to autonomic neuropathy associated with chemotherapy.  I am asking the patient to restart midodrine 5 mg twice a day.  The patient exhibited tendency for adequate blood pressure in the morning, she will therefore be starting her first dose of the day in the late morning.  She will be monitoring her blood pressure as well.  Orders:    midodrine (PROAMATINE) 5 mg tablet; Take 1 tablet (5 mg total) by mouth 3 (three) times a day before meals Or as directed.

## 2025-03-18 NOTE — PROGRESS NOTES
Name: Pretty Aguila      : 1950      MRN: [id number]  Encounter Provider: Cortney Conrad MD  Encounter Date: 3/18/2025   Encounter department: [facility] CONRAD  :  Assessment & Plan  Coronary artery disease involving native coronary artery of native heart without angina pectoris  Coronary artery disease, stable.  No symptoms of angina or signs of heart failure.       Primary hypertension  History of hypertension this seems to be stable without medication.       S/P CABG x 3         Orthostatic hypotension  The patient has exhibited orthostatic hypotension with symptoms of fatigue and feeling faint with some shortness of breath.  This is most probably related to autonomic neuropathy associated with chemotherapy.  I am asking the patient to restart midodrine 5 mg twice a day.  The patient exhibited tendency for adequate blood pressure in the morning, she will therefore be starting her first dose of the day in the late morning.  She will be monitoring her blood pressure as well.  Orders:    midodrine (PROAMATINE) 5 mg tablet; Take 1 tablet (5 mg total) by mouth 3 (three) times a day before meals Or as directed.        History of Present Illness   The patient presented to this office for the purpose of cardiac follow-up.  She is known to have a history of coronary artery disease status post coronary bypass surgery.  She was diagnosed with MDS.  She is undergoing chemotherapy.  She was noted to have relatively low blood pressure associated with feeling of fatigue and lightheadedness.  She was tried on midodrine but this was discontinued because of the patient being on the metoprolol.  Since then metoprolol has been discontinued.  She denies any symptom of chest pain.  She has some shortness of breath.  She has no significant lower extremity edema.      Pretty Aguila is a 74 y.o. female   History obtained from: patient    Review of Systems   Constitutional: Negative.    Respiratory:  "Negative.     Cardiovascular: Negative.    Psychiatric/Behavioral: Negative.            Objective   /70 (BP Location: Left arm, Patient Position: Sitting, Cuff Size: Standard)   Pulse 80   Ht 5' 2\" (1.575 m)   Wt 81.4 kg (179 lb 6.4 oz)   SpO2 100%   BMI 32.81 kg/m²      Physical Exam  Vitals reviewed.   Constitutional:       Appearance: Normal appearance.   HENT:      Head: Normocephalic and atraumatic.   Cardiovascular:      Rate and Rhythm: Normal rate and regular rhythm.      Heart sounds: Murmur heard.      Systolic murmur is present with a grade of 1/6.   Pulmonary:      Effort: Pulmonary effort is normal.      Breath sounds: Normal breath sounds.   Musculoskeletal:      Right lower leg: No edema.      Left lower leg: No edema.   Skin:     General: Skin is warm and dry.   Neurological:      Mental Status: She is alert and oriented to person, place, and time.   Psychiatric:         Mood and Affect: Mood normal.         Behavior: Behavior normal.           "

## 2025-03-18 NOTE — PATIENT INSTRUCTIONS
The patient was restarted on midodrine 5 mg 3 times daily or as directed according to her blood pressure.  Other medications will remain the same.

## 2025-03-18 NOTE — TELEPHONE ENCOUNTER
FOLLOW UP: [phone]    REASON FOR CONVERSATION: Medication Problem and Appointment    SYMPTOMS: PT reports that insurance company will no longer cover Wegovy 2.4mg, asking for a different medication. Advised PT that medication changes are done during office visits.     OTHER: PT scheduled for office visit today. PT requesting to reschedule. Called [facility] office clerical backline and spoke with Emi. Call warm transferred.    DISPOSITION: No disposition on file.

## 2025-03-19 ENCOUNTER — HOSPITAL ENCOUNTER (OUTPATIENT)
Dept: INFUSION CENTER | Facility: CLINIC | Age: 75
Discharge: HOME | End: 2025-03-19

## 2025-03-20 ENCOUNTER — OFFICE VISIT (OUTPATIENT)
Dept: BARIATRICS | Facility: CLINIC | Age: 75
End: 2025-03-20
Payer: MEDICARE

## 2025-03-20 ENCOUNTER — APPOINTMENT (OUTPATIENT)
Dept: LAB | Facility: CLINIC | Age: 75
End: 2025-03-20
Payer: MEDICARE

## 2025-03-20 VITALS
DIASTOLIC BLOOD PRESSURE: 70 MMHG | BODY MASS INDEX: 30.93 KG/M2 | SYSTOLIC BLOOD PRESSURE: 128 MMHG | HEART RATE: 89 BPM | HEIGHT: 64 IN | WEIGHT: 181.2 LBS

## 2025-03-20 DIAGNOSIS — D46.Z MDS (MYELODYSPLASTIC SYNDROME), HIGH GRADE (HCC): ICD-10-CM

## 2025-03-20 DIAGNOSIS — E66.09 CLASS 2 OBESITY DUE TO EXCESS CALORIES WITH BODY MASS INDEX (BMI) OF 36.0 TO 36.9 IN ADULT: ICD-10-CM

## 2025-03-20 DIAGNOSIS — E66.811 CLASS 1 OBESITY DUE TO EXCESS CALORIES WITH SERIOUS COMORBIDITY AND BODY MASS INDEX (BMI) OF 31.0 TO 31.9 IN ADULT: Primary | ICD-10-CM

## 2025-03-20 DIAGNOSIS — E66.812 CLASS 2 OBESITY DUE TO EXCESS CALORIES WITH BODY MASS INDEX (BMI) OF 36.0 TO 36.9 IN ADULT: ICD-10-CM

## 2025-03-20 DIAGNOSIS — E66.09 CLASS 1 OBESITY DUE TO EXCESS CALORIES WITH SERIOUS COMORBIDITY AND BODY MASS INDEX (BMI) OF 31.0 TO 31.9 IN ADULT: Primary | ICD-10-CM

## 2025-03-20 LAB
ALBUMIN SERPL BCG-MCNC: 3.5 G/DL (ref 3.5–5)
ALP SERPL-CCNC: 64 U/L (ref 34–104)
ALT SERPL W P-5'-P-CCNC: 29 U/L (ref 7–52)
ANION GAP SERPL CALCULATED.3IONS-SCNC: 8 MMOL/L (ref 4–13)
AST SERPL W P-5'-P-CCNC: 21 U/L (ref 13–39)
BASOPHILS # BLD AUTO: 0.02 THOUSANDS/ÂΜL (ref 0–0.1)
BASOPHILS NFR BLD AUTO: 1 % (ref 0–1)
BILIRUB SERPL-MCNC: 0.46 MG/DL (ref 0.2–1)
BUN SERPL-MCNC: 18 MG/DL (ref 5–25)
CALCIUM SERPL-MCNC: 8.6 MG/DL (ref 8.4–10.2)
CHLORIDE SERPL-SCNC: 108 MMOL/L (ref 96–108)
CO2 SERPL-SCNC: 26 MMOL/L (ref 21–32)
CREAT SERPL-MCNC: 0.65 MG/DL (ref 0.6–1.3)
EOSINOPHIL # BLD AUTO: 0.04 THOUSAND/ÂΜL (ref 0–0.61)
EOSINOPHIL NFR BLD AUTO: 1 % (ref 0–6)
ERYTHROCYTE [DISTWIDTH] IN BLOOD BY AUTOMATED COUNT: 22.5 % (ref 11.6–15.1)
GFR SERPL CREATININE-BSD FRML MDRD: 88 ML/MIN/1.73SQ M
GLUCOSE SERPL-MCNC: 84 MG/DL (ref 65–140)
HCT VFR BLD AUTO: 28.1 % (ref 34.8–46.1)
HGB BLD-MCNC: 8.9 G/DL (ref 11.5–15.4)
IMM GRANULOCYTES # BLD AUTO: 0.03 THOUSAND/UL (ref 0–0.2)
IMM GRANULOCYTES NFR BLD AUTO: 1 % (ref 0–2)
LDH SERPL-CCNC: 266 U/L (ref 140–271)
LYMPHOCYTES # BLD AUTO: 1.69 THOUSANDS/ÂΜL (ref 0.6–4.47)
LYMPHOCYTES NFR BLD AUTO: 52 % (ref 14–44)
MAGNESIUM SERPL-MCNC: 2 MG/DL (ref 1.9–2.7)
MCH RBC QN AUTO: 33 PG (ref 26.8–34.3)
MCHC RBC AUTO-ENTMCNC: 31.7 G/DL (ref 31.4–37.4)
MCV RBC AUTO: 104 FL (ref 82–98)
MONOCYTES # BLD AUTO: 0.24 THOUSAND/ÂΜL (ref 0.17–1.22)
MONOCYTES NFR BLD AUTO: 8 % (ref 4–12)
NEUTROPHILS # BLD AUTO: 1.17 THOUSANDS/ÂΜL (ref 1.85–7.62)
NEUTS SEG NFR BLD AUTO: 37 % (ref 43–75)
NRBC BLD AUTO-RTO: 0 /100 WBCS
PLATELET # BLD AUTO: 311 THOUSANDS/UL (ref 149–390)
PMV BLD AUTO: 11.5 FL (ref 8.9–12.7)
POTASSIUM SERPL-SCNC: 3.9 MMOL/L (ref 3.5–5.3)
PROT SERPL-MCNC: 6.4 G/DL (ref 6.4–8.4)
RBC # BLD AUTO: 2.7 MILLION/UL (ref 3.81–5.12)
SODIUM SERPL-SCNC: 142 MMOL/L (ref 135–147)
WBC # BLD AUTO: 3.19 THOUSAND/UL (ref 4.31–10.16)

## 2025-03-20 PROCEDURE — 36415 COLL VENOUS BLD VENIPUNCTURE: CPT

## 2025-03-20 PROCEDURE — 85025 COMPLETE CBC W/AUTO DIFF WBC: CPT

## 2025-03-20 PROCEDURE — 80053 COMPREHEN METABOLIC PANEL: CPT

## 2025-03-20 PROCEDURE — 99215 OFFICE O/P EST HI 40 MIN: CPT | Performed by: INTERNAL MEDICINE

## 2025-03-20 PROCEDURE — 83735 ASSAY OF MAGNESIUM: CPT

## 2025-03-20 PROCEDURE — G2211 COMPLEX E/M VISIT ADD ON: HCPCS | Performed by: INTERNAL MEDICINE

## 2025-03-20 PROCEDURE — 83615 LACTATE (LD) (LDH) ENZYME: CPT

## 2025-03-20 RX ORDER — DEXAMETHASONE 4 MG/1
10 TABLET ORAL ONCE
Status: CANCELLED
Start: 2025-03-25

## 2025-03-20 RX ORDER — SODIUM CHLORIDE 9 MG/ML
20 INJECTION, SOLUTION INTRAVENOUS ONCE
Status: CANCELLED | OUTPATIENT
Start: 2025-03-25

## 2025-03-20 RX ORDER — SODIUM CHLORIDE 9 MG/ML
20 INJECTION, SOLUTION INTRAVENOUS ONCE
Status: CANCELLED | OUTPATIENT
Start: 2025-03-28

## 2025-03-20 RX ORDER — DEXAMETHASONE 4 MG/1
10 TABLET ORAL ONCE
Status: CANCELLED
Start: 2025-03-27

## 2025-03-20 RX ORDER — ONDANSETRON 4 MG/1
16 TABLET, ORALLY DISINTEGRATING ORAL ONCE
Status: CANCELLED
Start: 2025-03-26 | End: 2025-03-26

## 2025-03-20 RX ORDER — METFORMIN HYDROCHLORIDE 500 MG/1
TABLET, EXTENDED RELEASE ORAL
Qty: 180 TABLET | Refills: 4 | Status: SHIPPED | OUTPATIENT
Start: 2025-03-20

## 2025-03-20 RX ORDER — DEXAMETHASONE 4 MG/1
10 TABLET ORAL ONCE
Status: CANCELLED
Start: 2025-03-28

## 2025-03-20 RX ORDER — DEXAMETHASONE 4 MG/1
10 TABLET ORAL ONCE
Status: CANCELLED
Start: 2025-03-26

## 2025-03-20 RX ORDER — ONDANSETRON 4 MG/1
16 TABLET, ORALLY DISINTEGRATING ORAL ONCE
Status: CANCELLED
Start: 2025-03-24 | End: 2025-03-24

## 2025-03-20 RX ORDER — ONDANSETRON 4 MG/1
16 TABLET, ORALLY DISINTEGRATING ORAL ONCE
Status: CANCELLED
Start: 2025-03-28 | End: 2025-03-28

## 2025-03-20 RX ORDER — SODIUM CHLORIDE 9 MG/ML
20 INJECTION, SOLUTION INTRAVENOUS ONCE
Status: CANCELLED | OUTPATIENT
Start: 2025-03-26

## 2025-03-20 RX ORDER — SODIUM CHLORIDE 9 MG/ML
20 INJECTION, SOLUTION INTRAVENOUS ONCE
Status: CANCELLED | OUTPATIENT
Start: 2025-03-24

## 2025-03-20 RX ORDER — DEXAMETHASONE 4 MG/1
10 TABLET ORAL ONCE
Status: CANCELLED
Start: 2025-03-24

## 2025-03-20 RX ORDER — SODIUM CHLORIDE 9 MG/ML
20 INJECTION, SOLUTION INTRAVENOUS ONCE
Status: CANCELLED | OUTPATIENT
Start: 2025-03-27

## 2025-03-20 RX ORDER — ONDANSETRON 4 MG/1
16 TABLET, ORALLY DISINTEGRATING ORAL ONCE
Status: CANCELLED
Start: 2025-03-25 | End: 2025-03-25

## 2025-03-20 RX ORDER — ONDANSETRON 4 MG/1
16 TABLET, ORALLY DISINTEGRATING ORAL ONCE
Status: CANCELLED
Start: 2025-03-27 | End: 2025-03-27

## 2025-03-20 NOTE — PROGRESS NOTES
Program: [facility] Program    Assessment/Plan     Pretty Aguila  is a 74 y.o. female with  returns to follow up  for treatment of excess body weight and associated risk factors/co-morbidities.     Class 1 obesity due to excess calories with serious comorbidity and body mass index (BMI) of 31.0 to 31.9 in adult  Since last office visit patient unfortunately has been through many stressors.  She was recently diagnosed with AML and is undergoing chemotherapy and just finished her second round.  Per patient's report her prognosis is poor.  She was deemed not to be a candidate for bone marrow transplant.  Of note patient is a breast cancer survivor and had 2 surgeries for breast cancer along with chemoradiation that she just finished early part of 2024.  She lost her job and had to switch insurances to Medicare as she lost employer health insurance.  She suffered 3 hip dislocations and is wearing a brace    Antiobesity Medications/Medical --  Patient's last dose of Wegovy was last week.  She already reports increase in hunger and cravings off the medication.  It is unclear with her her new insurance will cover Wegovy.  Patient does have a history of prior coronary artery bypass grafting in 2018.  Patient discontinued Wellbutrin as she felt overwhelmed by the number of pills that she was taking.  We discussed resuming this medication as it might help her face the challenges that she is currently facing.  We discussed holding off on adding naltrexone to simulate Contrave.  It is unclear if while she is going through chemotherapy, if there would be a question of how she would tolerate new medications.  Patient currently not on tramadol  Would avoid phentermine given her age  We discussed at this point the safest option would just be titrate her dose of metformin to 1500 mg  We could attempt Wegovy next office visit  Patient is on multiple weight promoting medicine such as Arimidex, Celexa, prednisone    Nutrition:   "  Patient's daughter mentioned that they will try to attend a class Mediterranean diet for patients undergoing chemotherapy    Physical Activity:   Currently limited due to hip dislocation    Sleep: -Currently compromised due to multiple stressors-likely impacting her weight      Food Behaviors/Stress/pyschosocial: Likely going through a lot of stress hormone response due to all factors stated above.  Encouraged patient to deal with each of her medical conditions now and to focus less on her weight.  Reframing focused to healthy eating, guided meditations for better coping strategies, maintaining physical mobility is much as possible.  Patient was concerned about her increased appetite and stated that weight had always been a focus through her life.       Most recent notes and records were reviewed.         Return visit:  2-3 months     Nutrition   Do not skip meals. Avoid sugary beverages. At least 64oz of water daily.    Behavioral/Stress   Food log via jesenia or provided paper log (jesenia options include www.[employer]Mob.com, sparkpeople.com, loseit.com, calorieking.com, [employer]). Encouraged mindful eating    Physical Activity  Increase physical activity by 10 minutes daily. Gradually increase physical activity to a goal of 5 days per week for 30 minutes of MODERATE intensity ( ( should be able to pass the \"talk test\" but should not be able to sing.  target 150-300 minutes  PLUS 2-3 days per week of FULL BODY resistance training. Progression will be addressed at follow up visits. Encouraged planning regarding establishing physical activity routine    Sleep  Provided sleep hygiene counseling and importance of having adequate sleep duration          Pretty was seen today for follow-up.    Diagnoses and all orders for this visit:    Class 1 obesity due to excess calories with serious comorbidity and body mass index (BMI) of 31.0 to 31.9 in adult    Class 2 obesity due to excess calories with body mass index (BMI) of " 36.0 to 36.9 in adult  -     metFORMIN (GLUCOPHAGE-XR) 500 mg 24 hr tablet; Take 1 tablet with breakfast and 2 tablets with dinner daily                  Total time spent reviewing chart, interviewing patient, examining patient, discussing plan, answering all questions, and documentin minutes with >50% face-to-face time with the patient.            Weight trajectory     Wt Readings from Last 20 Encounters:   25 82.2 kg (181 lb 3.2 oz)   25 81.4 kg (179 lb 6.4 oz)   25 82.1 kg (181 lb)   03/10/25 81.6 kg (180 lb)   25 81.5 kg (179 lb 9.6 oz)   25 84 kg (185 lb 3 oz)   25 77.6 kg (171 lb)   25 79.2 kg (174 lb 8 oz)   25 79.6 kg (175 lb 8 oz)   25 78.9 kg (174 lb)   25 78.9 kg (174 lb)   25 79.2 kg (174 lb 8 oz)   25 78.9 kg (174 lb)   02/10/25 86.3 kg (190 lb 4.1 oz)   25 89.4 kg (197 lb)   25 81.9 kg (180 lb 8 oz)   25 81.6 kg (180 lb)   25 81.6 kg (180 lb)   25 80.7 kg (178 lb)   25 79.8 kg (176 lb)         Lifestyle questionnaire     Diet recall:  B - [employer] breakfast bar and 1 cup coffee w/ Tbsp of creamer and sweet and low  Snack/grazes  - decpretzels and candy and crackers  L - dinner leftovers or slice pizza or green salad with chicken   Snack -pretzels  D - chicken or pork roast or pasta or hamburgers and veggies   HS - pretzels or ice cream         Eating out vs cooking at home- none     Beverages  Water--  32 oz crystal lite   Caffeine/tea--half cup of coffee     SSB none     Alcohol: rare glass of wine  Smoking: no  Drug use: no     Physical Activity -- loves to walk ,   Sleep -- STOP- BANG-7 to 8 hours     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 30.0-34.9- Obesity Class I  Difference: -23 lbs             Gyneac (Menopausal status/periods/contraception)- MP           Colonoscopy: UTD  Mammogram: UTD           Anti obesity Medications/ Medical---    Weight loss medication and dose: Wegovy  Date initiated: Aug 2024               Objective         The following portions of the patient's history were reviewed and updated as appropriate: allergies, current medications, past family history, past medical history, past social history, past surgical history, and problem list.      /70 (Patient Position: Sitting, Cuff Size: Large)   Pulse 89   Ht 5' 4\" (1.626 m)   Wt 82.2 kg (181 lb 3.2 oz) Comment: Shoes & Brace  BMI 31.10 kg/m²             Review of Systems   Constitutional:  Negative for fatigue.   HENT:  Negative for sore throat.    Respiratory:  Negative for cough and shortness of breath.    Cardiovascular:  Negative for chest pain, palpitations and leg swelling.   Gastrointestinal:  Negative for abdominal pain, constipation, diarrhea and nausea.   Genitourinary:  Negative for dysuria.   Musculoskeletal:  Negative for arthralgias and back pain.   Skin:  Negative for rash.   Neurological:  Negative for headaches.   Psychiatric/Behavioral:  Negative for dysphoric mood. The patient is not nervous/anxious.          Physical Exam  Vitals and nursing note reviewed.   Constitutional:       Appearance: Normal appearance.   HENT:      Head: Normocephalic.   Pulmonary:      Effort: Pulmonary effort is normal.   Neurological:      General: No focal deficit present.      Mental Status: She is alert and oriented to person, place, and time.   Psychiatric:         Mood and Affect: Mood normal.         Behavior: Behavior normal.         Thought Content: Thought content normal.         Judgment: Judgment normal.          Current " medications       Current Outpatient Medications:     anastrozole (ARIMIDEX) 1 mg tablet, TAKE 1 TABLET (1 MG TOTAL) BY MOUTH DAILY, Disp: 90 tablet, Rfl: 1    atorvastatin (LIPITOR) 10 mg tablet, TAKE 1 TABLET BY MOUTH DAILY, Disp: 90 tablet, Rfl: 1    cholecalciferol (VITAMIN D3) 400 units tablet, Take 1,000 Units by mouth daily, Disp: , Rfl:     citalopram (CeleXA) 20 mg tablet, Take 1 tablet (20 mg total) by mouth daily, Disp: 90 tablet, Rfl: 2    lidocaine (LIDODERM) 5 %, Apply 1 patch topically over 12 hours daily Remove & Discard patch within 12 hours or as directed by MD, Disp: 15 patch, Rfl: 0    metFORMIN (GLUCOPHAGE-XR) 500 mg 24 hr tablet, Take 1 tablet with breakfast and 2 tablets with dinner daily, Disp: 180 tablet, Rfl: 4    midodrine (PROAMATINE) 5 mg tablet, Take 1 tablet (5 mg total) by mouth 3 (three) times a day before meals Or as directed., Disp: 90 tablet, Rfl: 3    Multiple Vitamin (multivitamin) tablet, Take 1 tablet by mouth daily, Disp: , Rfl:     predniSONE 1 mg tablet, Take 3 tablets (3 mg total) by mouth daily, Disp: 90 tablet, Rfl: 1    Semaglutide-Weight Management (WEGOVY) 2.4 MG/0.75ML, Inject 0.75 mL (2.4 mg total) under the skin once a week, Disp: 3 mL, Rfl: 2    acyclovir (ZOVIRAX) 200 mg capsule, Take 1 capsule by mouth 2 (two) times a day (Patient not taking: Reported on 3/10/2025), Disp: , Rfl:     acyclovir (ZOVIRAX) 400 MG tablet, Take 1 tablet (400 mg total) by mouth 2 (two) times a day (Patient not taking: Reported on 3/18/2025), Disp: 60 tablet, Rfl: 4    buPROPion (WELLBUTRIN XL) 300 mg 24 hr tablet, Take 300 mg by mouth every morning (Patient not taking: Reported on 3/18/2025), Disp: , Rfl:     Sodium Chloride Flush (Normal Saline Flush) 0.9 % SOLN, Inject 10 mL into a catheter in a vein 2 (two) times a day. Flush prior to ABX administration and flush after ABX administration (Patient not taking: Reported on 3/20/2025), Disp: , Rfl:     Venetoclax 100 MG TABS, Take 1  "tablet (100 mg total) by mouth daily Days 1 through 7 every 28 days (Patient not taking: Reported on 3/18/2025), Disp: 7 tablet, Rfl: 5         Medication considerations/contraindications     -Patient denies personal history of pancreatitis. Patient also denies personal and family history of medullary thyroid cancer, and multiple endocrine neoplasia type 2 (MEN 2 tumor). -Patient denies any history of kidney stones, seizures, or glaucoma, diabetic retinopathy, gall bladder disease, gastroparesis, hyperthyroidism.  -Denies Hx of CAD, PAD, palpitations, arrhythmia, uncontrolled hypertension  -Denies uncontrolled anxiety or depression, suicidal behavior or thinking , insomnia or sleep disturbance.         Labs     Most recent labs reviewed   Lab Results   Component Value Date    SODIUM 137 03/15/2025    K 4.1 03/15/2025     03/15/2025    CO2 26 03/15/2025    AGAP 8 03/15/2025    BUN 20 03/15/2025    CREATININE 0.80 03/15/2025    GLUC 83 03/15/2025    GLUF 88 12/31/2024    CALCIUM 8.7 03/15/2025    AST 22 03/15/2025    ALT 19 03/15/2025    ALKPHOS 61 03/15/2025    TP 6.4 03/15/2025    TBILI 0.50 03/15/2025    EGFR 73 03/15/2025     Lab Results   Component Value Date    HGBA1C 6.1 (H) 02/28/2025     Lab Results   Component Value Date    FDY5ZRYAAXCN 1.072 03/15/2025    TSH 1.620 02/24/2021     Lab Results   Component Value Date    CHOLESTEROL 141 10/26/2024     Lab Results   Component Value Date    HDL 49 (L) 10/26/2024     Lab Results   Component Value Date    TRIG 83 10/26/2024     Lab Results   Component Value Date    LDLCALC 75 10/26/2024     No results found for: \"VITD\"  No components found for: \"FASTINS\"   "

## 2025-03-20 NOTE — ASSESSMENT & PLAN NOTE
Since last office visit patient unfortunately has been through many stressors.  She was recently diagnosed with AML and is undergoing chemotherapy and just finished her second round.  Per patient's report her prognosis is poor.  She was deemed not to be a candidate for bone marrow transplant.  Of note patient is a breast cancer survivor and had 2 surgeries for breast cancer along with chemoradiation that she just finished early part of 2024.  She lost her job and had to switch insurances to Medicare as she lost employer health insurance.  She suffered 3 hip dislocations and is wearing a brace    Antiobesity Medications/Medical --  Patient's last dose of Wegovy was last week.  She already reports increase in hunger and cravings off the medication.  It is unclear with her her new insurance will cover Wegovy.  Patient does have a history of prior coronary artery bypass grafting in 2018.  Patient discontinued Wellbutrin as she felt overwhelmed by the number of pills that she was taking.  We discussed resuming this medication as it might help her face the challenges that she is currently facing.  We discussed holding off on adding naltrexone to simulate Contrave.  It is unclear if while she is going through chemotherapy, if there would be a question of how she would tolerate new medications.  Patient currently not on tramadol  Would avoid phentermine given her age  We discussed at this point the safest option would just be titrate her dose of metformin to 1500 mg  We could attempt Wegovy next office visit  Patient is on multiple weight promoting medicine such as Arimidex, Celexa, prednisone    Nutrition:    Patient's daughter mentioned that they will try to attend a class Mediterranean diet for patients undergoing chemotherapy    Physical Activity:   Currently limited due to hip dislocation    Sleep: -Currently compromised due to multiple stressors-likely impacting her weight      Food Behaviors/Stress/pyschosocial:  Likely going through a lot of stress hormone response due to all factors stated above.  Encouraged patient to deal with each of her medical conditions now and to focus less on her weight.  Reframing focused to healthy eating, guided meditations for better coping strategies, maintaining physical mobility is much as possible.  Patient was concerned about her increased appetite and stated that weight had always been a focus through her life.

## 2025-03-21 ENCOUNTER — TELEPHONE (OUTPATIENT)
Age: 75
End: 2025-03-21

## 2025-03-23 ENCOUNTER — TELEPHONE (OUTPATIENT)
Dept: OTHER | Facility: HOSPITAL | Age: 75
End: 2025-03-23

## 2025-03-23 ENCOUNTER — TELEPHONE (OUTPATIENT)
Dept: OTHER | Facility: OTHER | Age: 75
End: 2025-03-23

## 2025-03-23 NOTE — TELEPHONE ENCOUNTER
Pt called because she has an infusion appt coming up tomorrow and she had bloodwork done. She said no one call her to tell her if she is able to get the infusion done based on the results of her labs and would like to know.    On call notified via epic chat

## 2025-03-24 ENCOUNTER — HOSPITAL ENCOUNTER (OUTPATIENT)
Dept: INFUSION CENTER | Facility: CLINIC | Age: 75
Discharge: HOME | End: 2025-03-24
Payer: MEDICARE

## 2025-03-24 VITALS
WEIGHT: 182.5 LBS | OXYGEN SATURATION: 99 % | HEIGHT: 64 IN | DIASTOLIC BLOOD PRESSURE: 75 MMHG | RESPIRATION RATE: 18 BRPM | SYSTOLIC BLOOD PRESSURE: 113 MMHG | BODY MASS INDEX: 31.16 KG/M2 | HEART RATE: 76 BPM | TEMPERATURE: 97.6 F

## 2025-03-24 DIAGNOSIS — D46.Z MDS (MYELODYSPLASTIC SYNDROME), HIGH GRADE (HCC): Primary | ICD-10-CM

## 2025-03-24 PROCEDURE — 96401 CHEMO ANTI-NEOPL SQ/IM: CPT

## 2025-03-24 RX ORDER — ONDANSETRON 4 MG/1
16 TABLET, ORALLY DISINTEGRATING ORAL ONCE
Status: COMPLETED | OUTPATIENT
Start: 2025-03-24 | End: 2025-03-24

## 2025-03-24 RX ORDER — DEXAMETHASONE 4 MG/1
10 TABLET ORAL ONCE
Status: COMPLETED | OUTPATIENT
Start: 2025-03-24 | End: 2025-03-24

## 2025-03-24 RX ORDER — SODIUM CHLORIDE 9 MG/ML
20 INJECTION, SOLUTION INTRAVENOUS ONCE
Status: DISCONTINUED | OUTPATIENT
Start: 2025-03-24 | End: 2025-03-27 | Stop reason: HOSPADM

## 2025-03-24 RX ADMIN — AZACITIDINE 137.5 MG: 100 INJECTION, POWDER, LYOPHILIZED, FOR SOLUTION INTRAVENOUS; SUBCUTANEOUS at 12:15

## 2025-03-24 RX ADMIN — ONDANSETRON 16 MG: 4 TABLET, ORALLY DISINTEGRATING ORAL at 11:30

## 2025-03-24 RX ADMIN — DEXAMETHASONE 10 MG: 4 TABLET ORAL at 11:30

## 2025-03-24 NOTE — TELEPHONE ENCOUNTER
Called and spoke to Pretty. Her WBC/ANC has improved enough that she is okay to be treated today. I also let her know that she can start taking her venetoclax today. She will need to take it daily for 7 days. Pretty verbalized understanding and appreciated the call.

## 2025-03-24 NOTE — PROGRESS NOTES
Patient to [facility]fusion [facility] for [facility]: Offers no complaints at present time: Lab work ( 03/20/25 ) reviewed: Within parameters to treat

## 2025-03-24 NOTE — TELEPHONE ENCOUNTER
Patient returning call that was received on her Cell phone and home phone. Patient is available if needed today at both numbers on file

## 2025-03-24 NOTE — PROGRESS NOTES
Kailyn per MD order: Injections ( x2 ) given in Abdomen without incident: No adverse reactions noted: Verified follow up appt with patient ( 03/25/25 ): AVS offered and declined

## 2025-03-25 ENCOUNTER — HOSPITAL ENCOUNTER (OUTPATIENT)
Dept: INFUSION CENTER | Facility: CLINIC | Age: 75
Discharge: HOME | End: 2025-03-25
Payer: MEDICARE

## 2025-03-25 ENCOUNTER — TELEPHONE (OUTPATIENT)
Age: 75
End: 2025-03-25

## 2025-03-25 VITALS
DIASTOLIC BLOOD PRESSURE: 61 MMHG | RESPIRATION RATE: 18 BRPM | TEMPERATURE: 97.7 F | OXYGEN SATURATION: 99 % | HEIGHT: 64 IN | WEIGHT: 182 LBS | HEART RATE: 88 BPM | SYSTOLIC BLOOD PRESSURE: 94 MMHG | BODY MASS INDEX: 31.07 KG/M2

## 2025-03-25 DIAGNOSIS — D46.Z MDS (MYELODYSPLASTIC SYNDROME), HIGH GRADE (HCC): Primary | ICD-10-CM

## 2025-03-25 RX ORDER — ONDANSETRON 4 MG/1
16 TABLET, ORALLY DISINTEGRATING ORAL ONCE
Status: COMPLETED | OUTPATIENT
Start: 2025-03-25 | End: 2025-03-25

## 2025-03-25 RX ORDER — DEXAMETHASONE 4 MG/1
10 TABLET ORAL ONCE
Status: COMPLETED | OUTPATIENT
Start: 2025-03-25 | End: 2025-03-25

## 2025-03-25 RX ADMIN — ONDANSETRON 16 MG: 4 TABLET, ORALLY DISINTEGRATING ORAL at 12:22

## 2025-03-25 RX ADMIN — AZACITIDINE 137.5 MG: 100 INJECTION, POWDER, LYOPHILIZED, FOR SOLUTION INTRAVENOUS; SUBCUTANEOUS at 12:44

## 2025-03-25 RX ADMIN — DEXAMETHASONE 10 MG: 4 TABLET ORAL at 12:22

## 2025-03-25 NOTE — PROGRESS NOTES
Name: Pretty Agulia      : 1950      MRN: [id number]  Encounter Provider: Geno Carranza MD  Encounter Date: 3/26/2025   Encounter department: [facility]  :  Assessment & Plan  MDS (myelodysplastic syndrome), high grade (HCC)         Malignant neoplasm of lower-outer quadrant of left breast of female, estrogen receptor positive (HCC)         Anemia, unspecified type         Dyspnea on exertion           MDS (myelodysplastic syndrome), high grade (HCC)        Treatment     Vidaza 75mg/m2 days 1-5      C1  1/3/2025  C2   2025    C3 delayed due to neutropenia/ ANC 1170     C3   3/24/2025 to started Venetoclax 100 mg daily x 7 days         Prophylaxis      Acyclovir  Voriconazole -has not started due to copay-social work aware   Consider adding levaquin-will hold for now       Malignant neoplasm of lower-outer quadrant of left breast of female, estrogen receptor positive (HCC)       1.  Stage IIA (pT2 pN0(sn) cM0) left breast invasive ductal carcinoma.  Grade 2.  ER positive (%), OH positive (%), HER2 negative (score 1+) by IHC.  Diagnosed 2023.  2.  BRCA negative   3.  Adjuvant anastrozole OncoDx score 9       Breast Cancer        Pretty Aguila is 74-year-old postmenopausal female who initially noted a mass in the lower outer quadrant of her left breast.  She underwent diagnostic mammogram and ultrasound which demonstrated a unifocal lesion at the 4 o'clock position 2 cm from the nipple.  This was biopsied and shown to be invasive ductal carcinoma, grade 2, ER 90%, OH 90%, HER2/shannan negative (1+).  She underwent an MRI which demonstrated unifocal mass measuring approximately 4.5 cm with questionable slight skin involvement and no adenopathy.  Her axillary ultrasound demonstrated no adenopathy. She underwent left mastectomy with SLN biopsy.  Final pathology was consistent with an invasive carcinoma, negative margins, 0/1 lymph node,  "grade 2.  She underwent immediate left breast reconstruction with submuscular tissue expander with acellular dermal matrix on 11/20/2023.  Her final stage is IIA (pT2 pN0 cM0) left breast invasive ductal carcinoma, grade 2, ER positive, MO positive HER2 negative.       Patient with favorable risk breast cancer.  Oncotype DX recurrence score  9 so due to size of tumor to assess candidacy of chemotherapy.  We discussed adjuvant endocrine therapy with anastrozole if Oncotype DX recurrence score is low.  She will continue to follow with breast surgery.  She will start treatment with anastrozole after her Oncotype resulted and follow-up in 3 months.     We reviewed the side effects of aromatase inhibitors including, but not limited to hot flashes, vaginal dryness, mood swings, weight gain, difficulty sleeping, decreased sexual interest, arthralgias/myalgias and worsening of osteopenia/osteoporosis.  She will obtain a baseline DEXA scan.  I recommended she take calcium and vitamin D on a regular basis.        Continue Anastrozole  Annual R Mammogram; done 9/19/24 \"No evidence of malignancy\".  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 IPSS-R score of 8.5, very high risk. Complicated with progressive pancytopenia, most recent hemoglobin 8.0, PLT 64, and WBC 2.3 with .  Above diagnosis, for medically fit patients, preferred treatment would be bridging therapy (Azacytidine) vs reduced intensity conditioning followed by Allogenic Stem Cell Transplantation. If patient is deemed not ASCT candidate, HMA based regimen, preferably " subcutaneous Azacytidine is to be considered given improved OS compared to other HMA e.g. decitabine. Will avoid adding Venetoclax given P53 deletion.      Referral to [facility], Dr. Lacey for ASCT evaluation, patient is willing to travel to [facility] if needed  Continue SC Azacytidine, may consider dose reduction for C2 75 mg/m2 IV or subcutaneously daily for 7 days, repeat cycles every 4 weeks which would be indicated either as definitive therapy if non ASCT candidate or as bridging therapy prior to conditioning and transplant. Benefits/side effects reviewed and consent is obtained.   Weekly CBC w diff & CMP  Hb transfusion threshold of <7.5  Follow up on 3/4/25 weeks or sooner if needed   Continue prophylactic antifungal/antibacterial/antiviral meds + PRN anti emetic   Ambulatory referral to pulmonology given CAMPOS/SOB           3/12/2025     Since last visit was seen at [facility] with the following:        Recommend Vidaza daily x 5 days and Venetoclax 400mg daily for 7 days to increase chance of achiving response/CR and to decrease anticipated myelosuppression.     Venetoclax, a BCL2 inhibitor, has shown promise in AML when combined with HMA, translating into a significant shift in the management of older AML patients unfit for chemotherapy.   In the phase 3 VIALE-A trial, a combination of venetoclax with azacitidine improved survival from 9.6 months in the azacitidine-only group to 14.7 months.   The CR rates were encouraging for the [id number]-[id number] subgroup--about 55% compared with 0% in the azacitidine group. However, in patients achieving CR, [id number] patients (13% vs approximately 42% in the cohort) had comparatively fewer MRD-negative remissions. Consequently, achievement of CR alone was not associated with improved survival, as only 8% of those who achieved CR in the UE32-xhapeal subgroup survived >24 months compared with 34% in the cohort.   In a follow-up pooled analysis of poor- and intermediate-risk  cytogenetics in patients with AML from VIAL-A and the preceding single-arm phase 1b study of HMA/venetoclax, the median survival for [id number] (with poor-risk cytogenetics) patients on venetoclax + azacitidine and azacitidine only was 5.17 months (CI 2.17-6.83) and 4.9 months (CI 2.14-9.30), respectively.          However, this patient has had issues with leucopenia/neutropenia, prolonged admissions, most recently for sepsis with no evidence pneumonia but did have diarrhea     Has been on IV Rocephin since 28th Feb and will complete 3/14/2025     Her ANC 3/10/2025 was 660 with WBC 2.77     Her prophylactic agents were stopped in house     Will restart Acyclovir 400 mg bid and will start Voriconazole 200 mg bid rather than diflucan     Thus based on the DDI with this agent and her neutropenia, will start Venetoclax 100 mg daily for 7 days to start-if tolerated may increase to 10-14 days with vidaza 75 mg/m2 days 1-5     Will have CBC repeated on 3/14/2025      May have to hold vidaza if ANC isn't approaching 1000      Hgb stable at 9.0; was 5.8 on admission     Patient is feeling improved, less SOB      3/26/2025    Doing much better; seen in ER 15-16 March for SOB     CTA negative for PE    Was seen by cardiology-metoprolol was stopped and since then doing much better     On midodrine when BP goes too low    Labs 3/20/2025 with Na 142 k 3.9 Cr 0.65 Ca 8.6 ALT/AST 29/21 TB 0.46  Mg 2.0 WBC 3.2 Hgb 8.9  plts 3311 ANC 1170     Will continue with Vidaza days 1-5     Tolerating venetoclax 100 mg daily for 7 days     Follow labs weekly         Pancytopenia (HCC)  Due to MDS/AML with TP53 mutation - see above.     Anemia, unspecified type  Due to MDS/AML with TP53 mutation - see above.     Platelets decreased (HCC)  Due to MDS/AML with TP53 mutation - see above  Pancytopenia (HCC)           Anemia/Leukocytosis      Labs from 1/17/2024 with WBC 15.1 Hgb 11.4 plts 228, no diff done     Has no evidence  bleeding, destruction RBCs; concern with elevated WBC as well     Will do heme workup repeat CBC with diff, retic, iron panel/B12/folate, SPEP although MCV normal.       WBC may be related to underlying fibromyalgia      Anemia may be AOCD but will rule out     11/8/2024     Recent ER visit 11/1/2024 due to increased fatigue/SOB/CAMPOS     Found to be COVID positive without symptoms     Labs 10/26/2024 and 11/1/2024 as follows     WBC 3.98 Hgb 9.8 plts 109  ANC 1820      WBC 3.9 Hgb 9.5  plts 96 ANC 1890      Today repeated WBC 4.52 Hgb 10  plts 99 ANC 1840     Other labs done 10/26/2024      Had SPEP no M spike     FLC not done     Total iron 92 ferritin 119 % sat 27 folate 18 B12 733 zinc 68      Flow cytometry pending     May all be COVID related although anemic past visit        Sepsis (HCC) (Resolved: 3/4/2025)  Assessment:  Met sepsis criteria on admission (tachycardia, leukopenia, with suspected source of infection)  Elevated procalcitonin and lactic acid.  [place] UA  Negative flu/COVID.  Chest x-ray with no acute findings.  In the ED, received a total of 4 L of fluid.  IV cefepime and IV Flagyl.  Blood culture positive x 1 for Streptococcus.  3/2/2025- patient remains afebrile.  Her diarrhea has stopped.  Calcitonin is trending down.  ID is following. IV vancomycin given diarrhea possibility of translocation.Was treated with vancomycin  Tranisitoned to IV rocephin on 3/2/2025   Will need PICC line for IV rocephin through 3/14   Repeat blood cultures negative on 3/1/2025  No longer meeting SIRS criteria of tachycardia. Leukocyte count was uptrending.  [place] was likely in the setting of malignancy and a component of dehydration  Lactic acid and procal downtrended   PICC line placed on right arm on 3/4/2025. Infectious disease sent final scripts.No complications  PT recommended level III       Summary/Recommendations     C3 Vidaza 3/24 -3/28/2025 delayed due to neutropenia   Venetoclax 100 mg  daily days 1-7-     Will not increase dose of Venetoclax but may increase to 10-14 days depending on counts     Plan to start Voriconazole if can get medication/lower copay     Breathing better since stopping metoprolol    Seeing cardiology/will adjust meds    BP stable 126/76        Follow counts weekly     Follow up late April 2025        History of Present Illness   No chief complaint on file.    Pretty Aguila is a 74 y.o. female with medical hx remarkable of DM, HTN, HLD, CAD s/p CABG, Psoriatic/Rheumatoid Arthritis, fibromyalgia, Hiatal Hernia.      In 9/2023 had left breast mass biopsy revealed invasive ductal carcinoma, G 2, ER 90%, AK 90%, HER2 1+, with suspected skin involvement on the MRI.      Patient underwent left mastectomy with SLNB on 11/20/23, pathology consistent with stage IIA (pT2 pN0 cM0) invasive carcinoma, G2, ER %, AK %, HER2 1+, tumor size 36 mm, negative margins, total one SLN was negative, no LVI identified.      Genetic testing was negative. Oncotype DX recurrence score was 9. Initiated adjuvant anastrozole on 12/09/2023.      11/1/24 ER visit with fatigue & SOB, tested COVID positive      12/07 - 12/12/24 hospital admission due to progressive CAMPOS and near syncope event, during which had progressive pancytopenia. Folate, B12, EBV, HIV, and hepatitis labs all unremarkable.      12/11/24 Bone Marrow Biopsy: normo-cellular 20-30%, with increased blasts 15-20%, Multilineage dysplasia, fibrosis 2-3 of 3, with complex karyotype including TP53 deletion and mutation, 5q deletion and monosomy 7; findings consistent with high grade myeloid neoplasm, at least MDS/AML with mutated TP53 (Int. Consensus Classification) / MDS with Bi-allelic TP53 inactivation (WHO Classification). The focus that approaches 20% blasts by IHC also raises the possibility of classifying it as overt AML with mutated TP53. The combination of TP53 mutation and deletion, as well as the complex karyotype, is  associated with a very poor prognosis, and as such, may best be classified and treated as AML with mutated TP53 - as clinically indicated. There is no evidence of metastatic carcinoma.      12/22/24 ER visit with left hip pain, diagnosed with L hip dislocation successfully reduced in the ER.        Ref 12/11/24 12/12/24 12/19/24 12/22/24    WBC 4.31 - 10.16 Thousand/uL 3.49 (L) 3.13 (L) 3.04 (L) 2.90 (L)   Hemoglobin 11.5 - 15.4 g/dL 8.6 (L) 8.0 (L) 8.6 (L) 8.4 (L)   Platelet Count 149 - 390 Thousands/uL 64 (L) 60 (L) 66 (L) 64 (L)   Monocytes % 4 - 12 % 5   1 (L) 1 (L)   Atypical Lymphocytes % <=0 %     4 (H)     Absolute Neutrophils 1.85 - 7.62 Thousand/uL 1.21 (L)   1.76 (L) 1.71 (L)         Feb 2025      She completed C1-D1-5 of azacitidine on 1/24/25.  CTA Chest was neg for PE.  Labs (1/27/25) show roughly stable cell counts with Hb 8.8 > 8.0, WBC 2.6 > 2.3 (lymph 69, neut 25), , and Plt 58 > 64.  CMP shows SCr 0.86 and all other aspects are wnl.  .     SOB even with standing.  Fatigue has worsened.  Feels symptoms consistent with asthenia. Denied palpitaions.  Has decreased appetite and decreased taste.           3/12/2025     Feeling much improved since admission-had diarrhea, noted strep salivarius which may have been contaminant vs GI source but was treated  Less SOB.  Has PICC completing Ceftriaxone for total 14 days-will complete 3/14/2025.     Labs 3/10/2025 with Na 140 K 3.9 Cr 0.68 ALT/AST 39//34 TB 0.57 WBC 2.8 Hgb 9.0 plts 263      3/26/2025    Seen in ER 3/15/2025 due to hypotension/dyspnea-workup negative no change in CBC  CTA negative for PE          Oncology History   Cancer Staging   Malignant neoplasm of lower-outer quadrant of left breast of female, estrogen receptor positive (HCC)  Staging form: Breast, AJCC 8th Edition  - Pathologic stage from 11/20/2023: Stage IA (pT2, pN0(sn), cM0, G2, ER+, OH+, HER2-) - Signed by Adrien Gabriel MD on 12/6/2023  Stage prefix: Initial  diagnosis  Method of lymph node assessment: [place] lymph node biopsy  Multigene prognostic tests performed: None  Histologic grading system: 3 grade system  Oncology History   Malignant neoplasm of lower-outer quadrant of left breast of female, estrogen receptor positive (HCC)   9/19/2023 Biopsy    Left breast ultrasound-guided biopsy  4 o'clock, 2 cm from nipple (VERENICE)  Invasive breast carcinoma of no special type (ductal NST)  Grade 2  ER 90, MI 90, HER2 1+\  Lymphovascular invasion not definitively identified    Concordant. Malignancy appears unifocal; however, oval circumscribed mass in the upper outer left breast is not accounted for. Bilateral breast MRI is recommended. Biopsy-proven carcinoma measured 3.1 cm on US. Left axilla US negative. Right breast clear.     9/28/2023 Observation    Bilateral breast MRI: Unifocal carcinoma in the lower outer left breast with possible skin involvement. There are no suspicious enhancing masses or suspicious areas of non-mass enhancement in right breast. There is no axillary or internal mammary adenopathy.     10/17/2023 Genetic Testing    A total of 47 genes were evaluated, including: HUYEN, BRCA1, BRCA2, CDH1, CHEK2, PALB2, PTEN, STK11, TP53  Negative result. No pathogenic sequence variants identified  [employer]     11/20/2023 Surgery    Left VERENICE  directed mastectomy with SLN biopsy   Invasive carcinoma of no special type (ductal)  Grade 2  3.6 cm   Margins negative  0/1 Lymph node  Anatomic Stage IIA  Prognostic Stage IA    Immediate reconstruction with tissue expander and ADM (Dr. Jimenez)     11/20/2023 -  Cancer Staged    Staging form: Breast, AJCC 8th Edition  - Pathologic stage from 11/20/2023: Stage IA (pT2, pN0(sn), cM0, G2, ER+, MI+, HER2-) - Signed by Adrien Gabriel MD on 12/6/2023  Stage prefix: Initial diagnosis  Method of lymph node assessment: [place] lymph node biopsy  Multigene prognostic tests performed: None  Histologic grading system: 3 grade system        MDS (myelodysplastic syndrome), high grade (HCC)   12/30/2024 Initial Diagnosis    MDS (myelodysplastic syndrome), high grade (HCC)     1/20/2025 -  Chemotherapy    cyanocobalamin, 1,000 mcg, Intramuscular, Every 30 days, 1 of 1 cycle  Administration: 1,000 mcg (2/17/2025)  alteplase (CATHFLO), 2 mg, Intracatheter, Every 1 Minute as needed, 3 of 6 cycles  azaCITIDine (VIDAZA), 75 mg/m2 = 137.5 mg (100 % of original dose 75 mg/m2), Subcutaneous azaCITIDine, Once, 4 of 7 cycles  Dose modification: 75 mg/m2 (original dose 75 mg/m2, Cycle 0, Reason: Anticipated Tolerance)  Administration: 137.5 mg (1/20/2025), 137.5 mg (1/21/2025), 137.5 mg (2/17/2025), 137.5 mg (1/22/2025), 137.5 mg (1/23/2025), 137.5 mg (1/24/2025), 137.5 mg (2/18/2025), 137.5 mg (2/19/2025), 137.5 mg (2/20/2025), 137.5 mg (2/21/2025), 137.5 mg (3/24/2025)        Pertinent Medical History      Review of Systems   Constitutional:  Positive for fatigue.   HENT: Negative.     Respiratory: Negative.     Cardiovascular: Negative.    Gastrointestinal: Negative.    Genitourinary: Negative.    Musculoskeletal: Negative.    Neurological: Negative.    Hematological: Negative.    Psychiatric/Behavioral: Negative.             Objective   There were no vitals taken for this visit.    Pain Screening:     ECOG   0-1  Physical Exam  Vitals reviewed.   Constitutional:       Appearance: Normal appearance.   HENT:      Head: Normocephalic.      Nose: Nose normal.      Mouth/Throat:      Mouth: Mucous membranes are moist.      Pharynx: Oropharynx is clear.   Eyes:      Pupils: Pupils are equal, round, and reactive to light.   Cardiovascular:      Rate and Rhythm: Normal rate.      Pulses: Normal pulses.   Pulmonary:      Effort: Pulmonary effort is normal.   Abdominal:      General: Bowel sounds are normal.   Musculoskeletal:         General: Normal range of motion.   Skin:     General: Skin is warm.   Neurological:      General: No focal deficit present.      Mental  Status: She is alert.   Psychiatric:         Mood and Affect: Mood normal.         Labs: I have reviewed the following labs:  Lab Results   Component Value Date/Time    WBC 3.19 (L) 03/20/2025 12:10 PM    RBC 2.70 (L) 03/20/2025 12:10 PM    Hemoglobin 8.9 (L) 03/20/2025 12:10 PM    Hematocrit 28.1 (L) 03/20/2025 12:10 PM     (H) 03/20/2025 12:10 PM    MCH 33.0 03/20/2025 12:10 PM    RDW 22.5 (H) 03/20/2025 12:10 PM    Platelets 311 03/20/2025 12:10 PM    Segmented % 37 (L) 03/20/2025 12:10 PM    Lymphocytes % 52 (H) 03/20/2025 12:10 PM    Monocytes % 8 03/20/2025 12:10 PM    Eosinophils Relative 1 03/20/2025 12:10 PM    Basophils Relative 1 03/20/2025 12:10 PM    Immature Grans % 1 03/20/2025 12:10 PM    Absolute Neutrophils 1.17 (L) 03/20/2025 12:10 PM     Lab Results   Component Value Date/Time    Potassium 3.9 03/20/2025 12:10 PM    Chloride 108 03/20/2025 12:10 PM    CO2 26 03/20/2025 12:10 PM    BUN 18 03/20/2025 12:10 PM    Creatinine 0.65 03/20/2025 12:10 PM    Glucose, Fasting 88 12/31/2024 11:15 AM    Calcium 8.6 03/20/2025 12:10 PM    Corrected Calcium 9.3 03/15/2025 04:11 PM    AST 21 03/20/2025 12:10 PM    ALT 29 03/20/2025 12:10 PM    Alkaline Phosphatase 64 03/20/2025 12:10 PM    Total Protein 6.4 03/20/2025 12:10 PM    Albumin 3.5 03/20/2025 12:10 PM    Total Bilirubin 0.46 03/20/2025 12:10 PM    eGFR 88 03/20/2025 12:10 PM           Administrative Statements   I have spent a total time of 40 minutes in caring for this patient on the day of the visit/encounter including Impressions, Counseling / Coordination of care, Documenting in the medical record, Reviewing/placing orders in the medical record (including tests, medications, and/or procedures), and Obtaining or reviewing history  .

## 2025-03-25 NOTE — PROGRESS NOTES
Pt given Vidaza in abdomen as ordered, confirmed apt for tomorrow @ 11:30am @ [facility] Micaela THAPAS.

## 2025-03-25 NOTE — TELEPHONE ENCOUNTER
Patient called in about disability paperwork.      She is asking if Dr. Dumont can fill it out, or will she need to take it to her oncologist.    Please advise.

## 2025-03-26 ENCOUNTER — HOSPITAL ENCOUNTER (OUTPATIENT)
Dept: INFUSION CENTER | Facility: CLINIC | Age: 75
Discharge: HOME | End: 2025-03-26
Payer: MEDICARE

## 2025-03-26 ENCOUNTER — OFFICE VISIT (OUTPATIENT)
Age: 75
End: 2025-03-26
Payer: MEDICARE

## 2025-03-26 VITALS
HEIGHT: 64 IN | RESPIRATION RATE: 18 BRPM | OXYGEN SATURATION: 100 % | DIASTOLIC BLOOD PRESSURE: 79 MMHG | TEMPERATURE: 97.7 F | HEART RATE: 85 BPM | BODY MASS INDEX: 30.9 KG/M2 | WEIGHT: 181 LBS | SYSTOLIC BLOOD PRESSURE: 119 MMHG

## 2025-03-26 VITALS
WEIGHT: 181 LBS | BODY MASS INDEX: 30.9 KG/M2 | DIASTOLIC BLOOD PRESSURE: 76 MMHG | TEMPERATURE: 97.7 F | HEIGHT: 64 IN | HEART RATE: 85 BPM | SYSTOLIC BLOOD PRESSURE: 126 MMHG | RESPIRATION RATE: 18 BRPM | OXYGEN SATURATION: 98 %

## 2025-03-26 DIAGNOSIS — D46.Z MDS (MYELODYSPLASTIC SYNDROME), HIGH GRADE (HCC): Primary | ICD-10-CM

## 2025-03-26 DIAGNOSIS — D64.9 ANEMIA, UNSPECIFIED TYPE: ICD-10-CM

## 2025-03-26 DIAGNOSIS — R06.09 DYSPNEA ON EXERTION: ICD-10-CM

## 2025-03-26 DIAGNOSIS — C50.512 MALIGNANT NEOPLASM OF LOWER-OUTER QUADRANT OF LEFT BREAST OF FEMALE, ESTROGEN RECEPTOR POSITIVE (HCC): ICD-10-CM

## 2025-03-26 DIAGNOSIS — Z17.0 MALIGNANT NEOPLASM OF LOWER-OUTER QUADRANT OF LEFT BREAST OF FEMALE, ESTROGEN RECEPTOR POSITIVE (HCC): ICD-10-CM

## 2025-03-26 PROCEDURE — 96401 CHEMO ANTI-NEOPL SQ/IM: CPT

## 2025-03-26 PROCEDURE — 99215 OFFICE O/P EST HI 40 MIN: CPT | Performed by: INTERNAL MEDICINE

## 2025-03-26 RX ORDER — ONDANSETRON 4 MG/1
16 TABLET, ORALLY DISINTEGRATING ORAL ONCE
Status: COMPLETED | OUTPATIENT
Start: 2025-03-26 | End: 2025-03-26

## 2025-03-26 RX ORDER — DEXAMETHASONE 4 MG/1
10 TABLET ORAL ONCE
Status: COMPLETED | OUTPATIENT
Start: 2025-03-26 | End: 2025-03-26

## 2025-03-26 RX ORDER — SODIUM CHLORIDE 9 MG/ML
20 INJECTION, SOLUTION INTRAVENOUS ONCE
Status: DISCONTINUED | OUTPATIENT
Start: 2025-03-26 | End: 2025-03-29 | Stop reason: HOSPADM

## 2025-03-26 RX ADMIN — AZACITIDINE 137.5 MG: 100 INJECTION, POWDER, LYOPHILIZED, FOR SOLUTION INTRAVENOUS; SUBCUTANEOUS at 12:47

## 2025-03-26 RX ADMIN — ONDANSETRON 16 MG: 4 TABLET, ORALLY DISINTEGRATING ORAL at 12:14

## 2025-03-26 RX ADMIN — DEXAMETHASONE 10 MG: 4 TABLET ORAL at 12:14

## 2025-03-26 NOTE — PROGRESS NOTES
Pt tolerated  vidaza injections well. She does have multiple red areas on belly. Aware of next appt.tomorrow at 1300.  AVS declined.

## 2025-03-27 ENCOUNTER — HOSPITAL ENCOUNTER (OUTPATIENT)
Dept: INFUSION CENTER | Facility: CLINIC | Age: 75
Discharge: HOME | End: 2025-03-27
Payer: MEDICARE

## 2025-03-27 VITALS
RESPIRATION RATE: 16 BRPM | OXYGEN SATURATION: 98 % | WEIGHT: 183 LBS | HEIGHT: 64 IN | SYSTOLIC BLOOD PRESSURE: 120 MMHG | BODY MASS INDEX: 31.24 KG/M2 | TEMPERATURE: 97.3 F | DIASTOLIC BLOOD PRESSURE: 76 MMHG | HEART RATE: 85 BPM

## 2025-03-27 DIAGNOSIS — D46.Z MDS (MYELODYSPLASTIC SYNDROME), HIGH GRADE (HCC): Primary | ICD-10-CM

## 2025-03-27 PROCEDURE — 96401 CHEMO ANTI-NEOPL SQ/IM: CPT

## 2025-03-27 RX ORDER — ONDANSETRON 4 MG/1
16 TABLET, ORALLY DISINTEGRATING ORAL ONCE
Status: COMPLETED | OUTPATIENT
Start: 2025-03-27 | End: 2025-03-27

## 2025-03-27 RX ORDER — DEXAMETHASONE 4 MG/1
10 TABLET ORAL ONCE
Status: COMPLETED | OUTPATIENT
Start: 2025-03-27 | End: 2025-03-27

## 2025-03-27 RX ORDER — SODIUM CHLORIDE 9 MG/ML
20 INJECTION, SOLUTION INTRAVENOUS ONCE
Status: DISCONTINUED | OUTPATIENT
Start: 2025-03-27 | End: 2025-03-30 | Stop reason: HOSPADM

## 2025-03-27 RX ADMIN — ONDANSETRON 16 MG: 4 TABLET, ORALLY DISINTEGRATING ORAL at 13:06

## 2025-03-27 RX ADMIN — AZACITIDINE 137.5 MG: 100 INJECTION, POWDER, LYOPHILIZED, FOR SOLUTION INTRAVENOUS; SUBCUTANEOUS at 13:35

## 2025-03-27 RX ADMIN — DEXAMETHASONE 10 MG: 4 TABLET ORAL at 13:07

## 2025-03-27 NOTE — PROGRESS NOTES
Pt here for vidaza, injections given in BL abdomen. Pt tolerated tx well with no complaints at this time. Next appt 3/28 at 1130 at [facility]. Declined AVS.

## 2025-03-28 ENCOUNTER — HOSPITAL ENCOUNTER (OUTPATIENT)
Dept: INFUSION CENTER | Facility: CLINIC | Age: 75
End: 2025-03-28
Payer: MEDICARE

## 2025-03-28 VITALS
HEART RATE: 74 BPM | DIASTOLIC BLOOD PRESSURE: 74 MMHG | BODY MASS INDEX: 31.24 KG/M2 | OXYGEN SATURATION: 99 % | TEMPERATURE: 98.1 F | SYSTOLIC BLOOD PRESSURE: 134 MMHG | HEIGHT: 64 IN | WEIGHT: 183 LBS

## 2025-03-28 DIAGNOSIS — Z17.0 MALIGNANT NEOPLASM OF LOWER-OUTER QUADRANT OF LEFT BREAST OF FEMALE, ESTROGEN RECEPTOR POSITIVE (HCC): ICD-10-CM

## 2025-03-28 DIAGNOSIS — C50.512 MALIGNANT NEOPLASM OF LOWER-OUTER QUADRANT OF LEFT BREAST OF FEMALE, ESTROGEN RECEPTOR POSITIVE (HCC): ICD-10-CM

## 2025-03-28 DIAGNOSIS — D46.Z MDS (MYELODYSPLASTIC SYNDROME), HIGH GRADE (HCC): Primary | ICD-10-CM

## 2025-03-28 PROCEDURE — 96401 CHEMO ANTI-NEOPL SQ/IM: CPT

## 2025-03-28 RX ORDER — SODIUM CHLORIDE 9 MG/ML
20 INJECTION, SOLUTION INTRAVENOUS ONCE
Status: DISPENSED | OUTPATIENT
Start: 2025-03-28

## 2025-03-28 RX ORDER — ANASTROZOLE 1 MG/1
1 TABLET ORAL DAILY
Qty: 90 TABLET | Refills: 0 | Status: SHIPPED | OUTPATIENT
Start: 2025-03-28

## 2025-03-28 RX ORDER — DEXAMETHASONE 4 MG/1
10 TABLET ORAL ONCE
Status: COMPLETED | OUTPATIENT
Start: 2025-03-28 | End: 2025-03-28

## 2025-03-28 RX ORDER — ONDANSETRON 4 MG/1
16 TABLET, ORALLY DISINTEGRATING ORAL ONCE
Status: COMPLETED | OUTPATIENT
Start: 2025-03-28 | End: 2025-03-28

## 2025-03-28 RX ADMIN — DEXAMETHASONE 10 MG: 4 TABLET ORAL at 11:53

## 2025-03-28 RX ADMIN — ONDANSETRON 16 MG: 4 TABLET, ORALLY DISINTEGRATING ORAL at 11:56

## 2025-03-28 RX ADMIN — AZACITIDINE 137.5 MG: 100 INJECTION, POWDER, LYOPHILIZED, FOR SOLUTION INTRAVENOUS; SUBCUTANEOUS at 12:59

## 2025-03-28 NOTE — PROGRESS NOTES
Patient tolerated Vidaza injections x2 - x1 RA, x1 LA, bandaids in place. Patient aware of next appt at [facility] infusion on 4/21 at 1000, declines AVS.

## 2025-04-01 ENCOUNTER — PATIENT OUTREACH (OUTPATIENT)
Dept: CASE MANAGEMENT | Facility: OTHER | Age: 75
End: 2025-04-01

## 2025-04-01 DIAGNOSIS — D46.Z MDS (MYELODYSPLASTIC SYNDROME), HIGH GRADE (HCC): ICD-10-CM

## 2025-04-01 RX ORDER — PROCHLORPERAZINE MALEATE 10 MG
10 TABLET ORAL EVERY 6 HOURS PRN
Qty: 30 TABLET | Refills: 3 | Status: SHIPPED | OUTPATIENT
Start: 2025-04-01

## 2025-04-01 NOTE — PROGRESS NOTES
OSW placed outreach TC to pt this day. Pt reports that she has a few difficult days of feeling unwell after her treatments. We reviewed her next infusion appointment, as she wanted to know when to get the blood work completed. Pt was appreciative of the assistance. Pt does not have any SW needs at this time, therefore the referral will be closed. OSW encouraged her to reach out with any needs.

## 2025-04-08 ENCOUNTER — OFFICE VISIT (OUTPATIENT)
Dept: OBGYN CLINIC | Facility: CLINIC | Age: 75
End: 2025-04-08
Payer: MEDICARE

## 2025-04-08 ENCOUNTER — HOSPITAL ENCOUNTER (OUTPATIENT)
Dept: RADIOLOGY | Facility: HOSPITAL | Age: 75
Discharge: HOME | End: 2025-04-08
Attending: ORTHOPAEDIC SURGERY
Payer: MEDICARE

## 2025-04-08 VITALS — BODY MASS INDEX: 32.1 KG/M2 | HEIGHT: 64 IN | WEIGHT: 188 LBS

## 2025-04-08 DIAGNOSIS — D46.Z MDS (MYELODYSPLASTIC SYNDROME), HIGH GRADE (HCC): ICD-10-CM

## 2025-04-08 DIAGNOSIS — M25.552 LEFT HIP PAIN: ICD-10-CM

## 2025-04-08 DIAGNOSIS — B37.31 CANDIDA VAGINITIS: ICD-10-CM

## 2025-04-08 DIAGNOSIS — Z96.642 HISTORY OF TOTAL LEFT HIP ARTHROPLASTY: ICD-10-CM

## 2025-04-08 DIAGNOSIS — T84.021A FAILURE OF LEFT TOTAL HIP ARTHROPLASTY WITH DISLOCATION OF HIP, INITIAL ENCOUNTER (HCC): Primary | ICD-10-CM

## 2025-04-08 PROCEDURE — 99215 OFFICE O/P EST HI 40 MIN: CPT | Performed by: ORTHOPAEDIC SURGERY

## 2025-04-08 PROCEDURE — 73502 X-RAY EXAM HIP UNI 2-3 VIEWS: CPT

## 2025-04-08 RX ORDER — CHLORHEXIDINE GLUCONATE ORAL RINSE 1.2 MG/ML
15 SOLUTION DENTAL ONCE
OUTPATIENT
Start: 2025-04-08 | End: 2025-04-08

## 2025-04-08 RX ORDER — SODIUM CHLORIDE, SODIUM LACTATE, POTASSIUM CHLORIDE, CALCIUM CHLORIDE 600; 310; 30; 20 MG/100ML; MG/100ML; MG/100ML; MG/100ML
125 INJECTION, SOLUTION INTRAVENOUS CONTINUOUS
OUTPATIENT
Start: 2025-04-08

## 2025-04-08 RX ORDER — ASCORBIC ACID 500 MG
500 TABLET ORAL 2 TIMES DAILY
Qty: 60 TABLET | Refills: 1 | Status: SHIPPED | OUTPATIENT
Start: 2025-04-08

## 2025-04-08 RX ORDER — CHLORHEXIDINE GLUCONATE 40 MG/ML
SOLUTION TOPICAL DAILY PRN
OUTPATIENT
Start: 2025-04-08

## 2025-04-08 RX ORDER — ACETAMINOPHEN 325 MG/1
975 TABLET ORAL ONCE
OUTPATIENT
Start: 2025-04-08 | End: 2025-04-08

## 2025-04-08 RX ORDER — FOLIC ACID 1 MG/1
1 TABLET ORAL DAILY
Qty: 30 TABLET | Refills: 1 | Status: SHIPPED | OUTPATIENT
Start: 2025-04-08

## 2025-04-08 RX ORDER — FERROUS SULFATE 324(65)MG
324 TABLET, DELAYED RELEASE (ENTERIC COATED) ORAL
Qty: 60 TABLET | Refills: 1 | Status: SHIPPED | OUTPATIENT
Start: 2025-04-08

## 2025-04-08 NOTE — ASSESSMENT & PLAN NOTE
Orders:    ascorbic acid (VITAMIN C) 500 MG tablet; Take 1 tablet (500 mg total) by mouth 2 (two) times a day    ferrous sulfate 324 (65 Fe) mg; Take 1 tablet (324 mg total) by mouth daily before breakfast    folic acid (FOLVITE) 1 mg tablet; Take 1 tablet (1 mg total) by mouth daily    Comprehensive metabolic panel; Future    Hemoglobin A1C W/EAG Estimation; Future    CBC and differential; Future    MRSA culture; Future    Protime-INR; Future    APTT; Future    Type and screen; Future    Ambulatory Referral to [facility]; Future    Ambulatory referral to Physical Therapy; Future    Ambulatory referral to surgical optimization; Future    EKG 12 lead; Future    Ambulatory referral to Hematology / Oncology; Future

## 2025-04-08 NOTE — ASSESSMENT & PLAN NOTE
History of left posterior total hip arthroplasty from 2012, Dr. Long  Orders:    CT lower extremity wo contrast left; Future    Case request operating room: ARTHROPLASTY HIP TOTAL REVISION; Standing    ascorbic acid (VITAMIN C) 500 MG tablet; Take 1 tablet (500 mg total) by mouth 2 (two) times a day    ferrous sulfate 324 (65 Fe) mg; Take 1 tablet (324 mg total) by mouth daily before breakfast    folic acid (FOLVITE) 1 mg tablet; Take 1 tablet (1 mg total) by mouth daily    Comprehensive metabolic panel; Future    Hemoglobin A1C W/EAG Estimation; Future    CBC and differential; Future    MRSA culture; Future    Protime-INR; Future    APTT; Future    Type and screen; Future    Ambulatory Referral to [facility]; Future    Ambulatory referral to Physical Therapy; Future    Ambulatory referral to surgical optimization; Future    EKG 12 lead; Future    Ambulatory referral to Hematology / Oncology; Future

## 2025-04-08 NOTE — PROGRESS NOTES
Assessment:  Assessment & Plan  Left hip pain    Orders:    XR hip/pelv 2-3 vws left if performed; Future    MDS (myelodysplastic syndrome), high grade (HCC)    Orders:    ascorbic acid (VITAMIN C) 500 MG tablet; Take 1 tablet (500 mg total) by mouth 2 (two) times a day    ferrous sulfate 324 (65 Fe) mg; Take 1 tablet (324 mg total) by mouth daily before breakfast    folic acid (FOLVITE) 1 mg tablet; Take 1 tablet (1 mg total) by mouth daily    Comprehensive metabolic panel; Future    Hemoglobin A1C W/EAG Estimation; Future    CBC and differential; Future    MRSA culture; Future    Protime-INR; Future    APTT; Future    Type and screen; Future    Ambulatory Referral to Center for [facility]; Future    Ambulatory referral to Physical Therapy; Future    Ambulatory referral to surgical optimization; Future    EKG 12 lead; Future    Ambulatory referral to Hematology / Oncology; Future    History of total left hip arthroplasty  History of left posterior total hip arthroplasty from 2012, Dr. Long  Orders:    CT lower extremity wo contrast left; Future    Case request operating room: ARTHROPLASTY HIP TOTAL REVISION; Standing    ascorbic acid (VITAMIN C) 500 MG tablet; Take 1 tablet (500 mg total) by mouth 2 (two) times a day    ferrous sulfate 324 (65 Fe) mg; Take 1 tablet (324 mg total) by mouth daily before breakfast    folic acid (FOLVITE) 1 mg tablet; Take 1 tablet (1 mg total) by mouth daily    Comprehensive metabolic panel; Future    Hemoglobin A1C W/EAG Estimation; Future    CBC and differential; Future    MRSA culture; Future    Protime-INR; Future    APTT; Future    Type and screen; Future    Ambulatory Referral to [facility]; Future    Ambulatory referral to Physical Therapy; Future    Ambulatory referral to surgical optimization; Future    EKG 12 lead; Future    Ambulatory referral to Hematology / Oncology; Future    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  Discussed that once a posterior total hip dislocates more than twice, it will continue to dislocate without surgical intervention with a revision.  At this time is recommended to obtain a CT scan to evaluate the position of the cup and femoral component for surgical planning.  The benefits and purpose of the operation and or procedure have been explained to me in language I understand by Dr. Stanley as well the risks, alternatives and complications pertaining to the above procedure/surgery which include but is not limited to : infections, deep vein thrombosis, blood clots to the lungs, wound healing problems, complications, for extensive blood loss, including shock, possible death, leg length discrepancy, limp hip dislocations, fracture, loss of limb, persistent pain, failure of hardware/implant, damage of blood vessels and or nerves, heart attack, stroke and potential need for further surgery/revision surgery.    Patient wishes to proceed with a left posterior total hip arthroplasty revision same day at [facility].  Previous implant information: [employer], 12/8/2014 [place]. Information is located in encounters tap under Ext scan 6/25/2021 third document.   Patient is on Wegovy prescribed by weight management at [place]  Patient is currently being treated for leukemia and is an active treatment with hem-onc Dr. Geno Carranza.  Patient has no history of DVT/PE and will be on Eliquis due to hx of Leukemia.  Case management:   Patient will be assessed by physical therapy prior to discharge and will continue outpatient physical therapy  Patient's extremity will be wrapped in an Ace wrap/sleeve from the toes up to the knee down with postoperative swelling.  Patient will then be assessed 2-3 weeks postoperatively with incision check, new x-rays by either Gonzalez Herron PA-C or  Dr. Stanley.  At that time it is reasonable for the patient to be on an ambulatory device such as a walker or cane, but at the 3-month kayla these ambulatory devices should be discontinued.   Patient shows understanding and agree with this plan of care.   We will follow up post op.   Orders:    CT lower extremity wo contrast left; Future    Case request operating room: ARTHROPLASTY HIP TOTAL REVISION; Standing    ascorbic acid (VITAMIN C) 500 MG tablet; Take 1 tablet (500 mg total) by mouth 2 (two) times a day    ferrous sulfate 324 (65 Fe) mg; Take 1 tablet (324 mg total) by mouth daily before breakfast    folic acid (FOLVITE) 1 mg tablet; Take 1 tablet (1 mg total) by mouth daily    Comprehensive metabolic panel; Future    Hemoglobin A1C W/EAG Estimation; Future    CBC and differential; Future    MRSA culture; Future    Protime-INR; Future    APTT; Future    Type and screen; Future    Ambulatory Referral to Center [facility] Per[facility]operative [facility]; Future    Ambulatory referral to Physical Therapy; Future    Ambulatory referral to surgical optimization; Future    EKG 12 lead; Future    Ambulatory referral to Hematology / Oncology; Future      To do next visit:  Return for Follow up post op.    The above stated was discussed in layman's terms and the patient expressed understanding.  All questions were answered to the patient's satisfaction.       Scribe Attestation      I,:  Carmela Quiroz am acting as a scribe while in the presence of the attending physician.:       I,:  Nayana Stanley MD personally performed the services described in this documentation    as scribed in my presence.:               Subjective:   Pretty Aguila is a 75 y.o. female who presents today for a consultation for left hip dislocation referred by Dr. Lauren Dumont.  She reports that she had the left total hip arthroplasty, by Dr. Saleem around 12/8/2014. She reports that she had multiple dislocations; 3x/in the last 3 months. She notes from the  start the hip didn't feel right. She doesn't feel that her legs are even. The left is shorter than the right. She is accompanied with her daughter and the use of a single point cane and adduction brace.   Hx of a left total knee arthroplasty 2015.     Patient was seen in the ED on 2/28/2025 after being hospitalized for sepsis and discharged on IV antibiotics and during evaluation noted dislocation of left total hip arthroplasty closed reduction.      Review of systems negative unless otherwise specified in HPI  Review of Systems   Constitutional:  Negative for chills, fever and unexpected weight change.   HENT:  Negative for hearing loss, nosebleeds and sore throat.    Eyes:  Negative for pain, redness and visual disturbance.   Respiratory:  Negative for cough, shortness of breath and wheezing.    Cardiovascular:  Negative for chest pain, palpitations and leg swelling.   Gastrointestinal:  Negative for abdominal pain and nausea.   Genitourinary:  Negative for dyspareunia, dysuria and frequency.   Musculoskeletal:  Positive for gait problem.   Skin:  Negative for rash and wound.   Neurological:  Negative for dizziness, numbness and headaches.   Psychiatric/Behavioral:  Negative for decreased concentration and suicidal ideas. The patient is not nervous/anxious.        Past Medical History:   Diagnosis Date    Allergic     Anxiety     Arthritis     Breast cancer (HCC) 09/2023    CAD (coronary artery disease)     Coronary artery disease [id number]    Depression     Diabetes mellitus (HCC) 5/1/24    Mother-Diabetic    Dyslipidemia     Hiatal hernia     Hyperlipidemia     Hypertension     Obesity     Obesity (BMI 30-39.9)     Psoriatic arthritis (HCC)     Rheumatoid arthritis (HCC)     Scoliosis     SOB (shortness of breath)        Past Surgical History:   Procedure Laterality Date    BARIATRIC SURGERY  2014    BILE DUCT EXPLORATION      replacement per Steffanie    BREAST BIOPSY  9/23    CARPAL TUNNEL RELEASE Bilateral 2002     CORONARY ARTERY BYPASS GRAFT  2017    FOOT SURGERY Right     bone surgery to straighten toe.    HIP SURGERY Left 2015    IR BIOPSY BONE MARROW  12/11/2024    IR DRAINAGE TUBE PLACEMENT  12/19/2023    JOINT REPLACEMENT      LYMPH NODE BIOPSY Left 11/20/2023    Procedure: LEFT LYMPHATIC MAPPING WITH BLUE AND RADIOACTIVE DYES, SENTINEL LYMPH NODE BIOPSY;  Surgeon: Adrien Gabriel MD;  Location:  MAIN OR;  Service: Surgical Oncology    LYMPH NODE DISSECTION Left 11/20/2023    Procedure: POSSIBLE AXILLARY DISSECTION;  Surgeon: Adrien Gabriel MD;  Location:  MAIN OR;  Service: Surgical Oncology    MO BREAST REDUCTION Bilateral 01/16/2024    Procedure: RIGHT BREAST REDUCTION AND LEFT BREAST EXPANDER EXCHANGE FOR PERMANENT IMPLANT. REVISION OF RECONSTRUCTED LEFT BREAST.;  Surgeon: Molina Jimenez MD;  Location:  MAIN OR;  Service: Plastics    MO SUCTION ASSISTED LIPECTOMY TRUNK Bilateral 01/16/2024    Procedure: LIPOSUCTION BREAST;  Surgeon: Molina Jimenez MD;  Location:  MAIN OR;  Service: Plastics    MO TISSUE EXPANDER PLACEMENT BREAST RECONSTRUCTION Left 11/20/2023    Procedure: IMMEDIATE LEFT BREAST RECONSTRUCTION WITH TISSUE EXPANDER AND ADM;  Surgeon: Molina Jimenez MD;  Location:  MAIN OR;  Service: Plastics    REPLACEMENT TOTAL KNEE Right 04/2017    SIMPLE MASTECTOMY Left 11/20/2023    Procedure: LEFT BREAST VERENICE  DIRECTED MASTECTOMY;  Surgeon: Adrien Gabriel MD;  Location:  MAIN OR;  Service: Surgical Oncology    SLEEVE GASTROPLASTY      TONSILLECTOMY      TOTAL HIP ARTHROPLASTY Right 2017    US GUIDED BREAST BIOPSY LEFT COMPLETE Left 09/19/2023       Family History   Problem Relation Age of Onset    Hypertension Mother     Diabetes Mother     Heart disease Father         CABG x5    Arthritis Father     No Known Problems Sister     No Known Problems Sister     No Known Problems Daughter     No Known Problems Daughter     No Known Problems Son     Breast cancer Neg Hx        Social History     Occupational  History    Not on file   Tobacco Use    Smoking status: Former     Current packs/day: 0.00     Average packs/day: 0.5 packs/day for 7.0 years (3.5 ttl pk-yrs)     Types: Cigarettes     Start date: 2010     Quit date: 2017     Years since quittin.2    Smokeless tobacco: Never    Tobacco comments:     Have already quit smoking in 2017   Vaping Use    Vaping status: Never Used   Substance and Sexual Activity    Alcohol use: Yes     Alcohol/week: 2.0 standard drinks of alcohol     Types: 2 Glasses of wine per week     Comment: social     Drug use: No    Sexual activity: Not Currently     Partners: Male     Birth control/protection: None         Current Outpatient Medications:     acyclovir (ZOVIRAX) 200 mg capsule, Take 1 capsule by mouth 2 (two) times a day, Disp: , Rfl:     acyclovir (ZOVIRAX) 400 MG tablet, Take 1 tablet (400 mg total) by mouth 2 (two) times a day, Disp: 60 tablet, Rfl: 4    anastrozole (ARIMIDEX) 1 mg tablet, Take 1 tablet (1 mg total) by mouth daily, Disp: 90 tablet, Rfl: 0    atorvastatin (LIPITOR) 10 mg tablet, TAKE 1 TABLET BY MOUTH DAILY, Disp: 90 tablet, Rfl: 1    cholecalciferol (VITAMIN D3) 400 units tablet, Take 1,000 Units by mouth daily, Disp: , Rfl:     citalopram (CeleXA) 20 mg tablet, Take 1 tablet (20 mg total) by mouth daily, Disp: 90 tablet, Rfl: 2    lidocaine (LIDODERM) 5 %, Apply 1 patch topically over 12 hours daily Remove & Discard patch within 12 hours or as directed by MD, Disp: 15 patch, Rfl: 0    metFORMIN (GLUCOPHAGE-XR) 500 mg 24 hr tablet, Take 1 tablet with breakfast and 2 tablets with dinner daily, Disp: 180 tablet, Rfl: 4    midodrine (PROAMATINE) 5 mg tablet, Take 1 tablet (5 mg total) by mouth 3 (three) times a day before meals Or as directed., Disp: 90 tablet, Rfl: 3    Multiple Vitamin (multivitamin) tablet, Take 1 tablet by mouth daily, Disp: , Rfl:     predniSONE 1 mg tablet, Take 3 tablets (3 mg total) by mouth daily, Disp: 90 tablet, Rfl: 1     prochlorperazine (COMPAZINE) 10 mg tablet, TAKE 1 TABLET (10 MG TOTAL) BY MOUTH EVERY 6 (SIX) HOURS AS NEEDED FOR NAUSEA OR VOMITING, Disp: 30 tablet, Rfl: 3    Semaglutide-Weight Management (WEGOVY) 2.4 MG/0.75ML, Inject 0.75 mL (2.4 mg total) under the skin once a week, Disp: 3 mL, Rfl: 2    Venetoclax 100 MG TABS, Take 1 tablet (100 mg total) by mouth daily Days 1 through 7 every 28 days, Disp: 7 tablet, Rfl: 5    buPROPion (WELLBUTRIN XL) 300 mg 24 hr tablet, Take 300 mg by mouth every morning (Patient not taking: Reported on 3/18/2025), Disp: , Rfl:     Sodium Chloride Flush (Normal Saline Flush) 0.9 % SOLN, Inject 10 mL into a catheter in a vein 2 (two) times a day. Flush prior to ABX administration and flush after ABX administration (Patient not taking: Reported on 3/20/2025), Disp: , Rfl:     Allergies   Allergen Reactions    Contrast Dye [Iodinated Contrast Media] Anaphylaxis    Iodine - Food Allergy Anaphylaxis     Reaction Date: 07Jan2008;     Povidone Iodine Anaphylaxis    Shellfish-Derived Products - Food Allergy Anaphylaxis          There were no vitals filed for this visit.    Body mass index is 32.27 kg/m².  Wt Readings from Last 3 Encounters:   04/08/25 85.3 kg (188 lb)   03/28/25 83 kg (183 lb)   03/27/25 83 kg (183 lb)       Objective:                    Left Hip Exam     Range of Motion   Left hip flexion: Active hip flexion 80 degrees.     Other   Erythema: absent  Sensation: normal  Pulse: present    Comments:  Left leg shorter than right  Calf is soft and non tender    No provocative positions tested secondary to multiple hip dislocations        Distally neurovascularly intact    Diagnostics, reviewed and taken today if performed as documented:    The attending physician has personally reviewed the pertinent films in PACS and interpretation is as follows:  Xrays of the left hip/pelvis 2-3 views: Radiographs show implants in place without any sign of loosening and reduced femoral acetabular  "joint  No fractures or dislocations    Procedures, if performed today:    Procedures    None performed      Portions of the record may have been created with voice recognition software.  Occasional wrong word or \"sound a like\" substitutions may have occurred due to the inherent limitations of voice recognition software.  Read the chart carefully and recognize, using context, where substitutions have occurred.  "

## 2025-04-09 ENCOUNTER — TELEPHONE (OUTPATIENT)
Dept: FAMILY MEDICINE CLINIC | Facility: CLINIC | Age: 75
End: 2025-04-09

## 2025-04-09 ENCOUNTER — NURSE TRIAGE (OUTPATIENT)
Age: 75
End: 2025-04-09

## 2025-04-09 DIAGNOSIS — F41.9 ANXIETY: Primary | ICD-10-CM

## 2025-04-09 PROBLEM — Z00.00 MEDICARE ANNUAL WELLNESS VISIT, SUBSEQUENT: Status: RESOLVED | Noted: 2025-03-10 | Resolved: 2025-04-09

## 2025-04-09 RX ORDER — FLUCONAZOLE 150 MG/1
150 TABLET ORAL DAILY
Qty: 1 TABLET | Refills: 1 | Status: SHIPPED | OUTPATIENT
Start: 2025-04-09 | End: 2025-04-11

## 2025-04-09 RX ORDER — LORAZEPAM 0.5 MG/1
0.5 TABLET ORAL DAILY PRN
Qty: 20 TABLET | Refills: 0 | Status: SHIPPED | OUTPATIENT
Start: 2025-04-09

## 2025-04-09 NOTE — TELEPHONE ENCOUNTER
Regarding: Chest Congestion  ----- Message from MELVIN SKINNER sent at 4/9/2025 11:21 AM EDT -----  Patient thinks she has bronchitis, has a lot of chest congestion.  Asking if there is something that could please be sent into the pharmacy.

## 2025-04-09 NOTE — TELEPHONE ENCOUNTER
Patient called the RX Refill Line. Message is being forwarded to the office.     Patient is requesting a refill of Lorazepam 0.5 mg every 8 hours as needed be sent to  [facility], [facility] [place] - [address] [facility] patient is out of medication.

## 2025-04-09 NOTE — TELEPHONE ENCOUNTER
"FOLLOW : No OV available. High risk patient with myelodysplastic syndrome. Please f/u with patient for PCP response/possible antibiotics.    REASON FOR CONVERSATION: Cough    FOLLOW UP:  No OV available. High risk patient with myelodysplastic syndrome. Please f/u with patient for PCP response/possible antibiotics.    SYMPTOMS: Started 4/6 as an upper respiratory infection. Reports congested, sinus pressure. Now c/o chest discomfort and cough. Nonproductive cough. Denies fever, SOB, or wheezing. Is concerned with her history of myelodysplastic syndrome she should be on an antibiotic.      OTHER: Patient will call back with worsening s/s.     DISPOSITION: See Today or Tomorrow in Office      Reason for Disposition   Patient wants to be seen    Answer Assessment - Initial Assessment Questions  1. ONSET: \"When did the cough begin?\"       About 3 days ago according to the patient, started as congestion, in her head and then past day or so traveled to her chest  2. SEVERITY: \"How bad is the cough today?\"       Moderate- keeping patient up at night   3. SPUTUM: \"Describe the color of your sputum\" (e.g., none, dry cough; clear, white, yellow, green)      Unsure   4. HEMOPTYSIS: \"Are you coughing up any blood?\" If Yes, ask: \"How much?\" (e.g., flecks, streaks, tablespoons, etc.)      Denies   5. DIFFICULTY BREATHING: \"Are you having difficulty breathing?\" If Yes, ask: \"How bad is it?\" (e.g., mild, moderate, severe)       Denies   6. FEVER: \"Do you have a fever?\" If Yes, ask: \"What is your temperature, how was it measured, and when did it start?\"      Denies   7. CARDIAC HISTORY: \"Do you have any history of heart disease?\" (e.g., heart attack, congestive heart failure)       CAD, HTN, CABG,   8. LUNG HISTORY: \"Do you have any history of lung disease?\"  (e.g., pulmonary embolus, asthma, emphysema)      History of  MDS  9. PE RISK FACTORS: \"Do you have a history of blood clots?\" (or: recent major surgery, recent prolonged " "travel, bedridden)      denies  10. OTHER SYMPTOMS: \"Do you have any other symptoms?\" (e.g., runny nose, wheezing, chest pain)       Chest discomfort   11. PREGNANCY: \"Is there any chance you are pregnant?\" \"When was your last menstrual period?\"        Denies   12. TRAVEL: \"Have you traveled out of the country in the last month?\" (e.g., travel history, exposures)        NA    Protocols used: Cough-Adult-[place]    "

## 2025-04-14 ENCOUNTER — HOSPITAL ENCOUNTER (OUTPATIENT)
Dept: CT IMAGING | Facility: HOSPITAL | Age: 75
Discharge: HOME | End: 2025-04-14
Attending: ORTHOPAEDIC SURGERY
Payer: MEDICARE

## 2025-04-14 ENCOUNTER — TELEPHONE (OUTPATIENT)
Age: 75
End: 2025-04-14

## 2025-04-14 DIAGNOSIS — Z96.642 HISTORY OF TOTAL LEFT HIP ARTHROPLASTY: ICD-10-CM

## 2025-04-14 DIAGNOSIS — T84.021A FAILURE OF LEFT TOTAL HIP ARTHROPLASTY WITH DISLOCATION OF HIP, INITIAL ENCOUNTER (HCC): ICD-10-CM

## 2025-04-14 PROCEDURE — 73700 CT LOWER EXTREMITY W/O DYE: CPT

## 2025-04-14 NOTE — TELEPHONE ENCOUNTER
Caller: Patient     Doctor: Dr. Stanley     Reason for call: Patient states that her Hematologist has not heard from Dr Stanley yet. It was discussed at her office visit that he would speak to Dr Carranza to discuss whether she thought it was advisable to proceed with a surgery. The patient has a CT scheduled for today at 2:00 but is not going to have it done if she isn't going to have the sx.  Please advise ASAP so she can decide about the CT scan    Call back#: [phone]

## 2025-04-14 NOTE — TELEPHONE ENCOUNTER
S/w pt and advised that she is to have the CT scan done per the provider.  Pt verbalized understanding and will have it done this afternoon as scheduled.

## 2025-04-14 NOTE — TELEPHONE ENCOUNTER
Patient called, stating she saw an orthopedic surgeon on 4/8 and they advised the patient that they would be reaching out to Dr. Carranza in advise if she should be going through the hip revision surgery they have suggested. She is unsure if she should go through with it without hearing back from Dr. Carranza and if she thinks she would be okay to do it. She would like a call back to discuss

## 2025-04-17 DIAGNOSIS — F32.A ANXIETY AND DEPRESSION: ICD-10-CM

## 2025-04-17 DIAGNOSIS — F41.9 ANXIETY AND DEPRESSION: ICD-10-CM

## 2025-04-17 RX ORDER — ONDANSETRON 8 MG/1
16 TABLET, ORALLY DISINTEGRATING ORAL ONCE
Status: CANCELLED
Start: 2025-04-24 | End: 2025-04-24

## 2025-04-17 RX ORDER — DEXAMETHASONE 4 MG/1
10 TABLET ORAL ONCE
Status: CANCELLED
Start: 2025-04-25

## 2025-04-17 RX ORDER — DEXAMETHASONE 4 MG/1
10 TABLET ORAL ONCE
Status: CANCELLED
Start: 2025-04-23

## 2025-04-17 RX ORDER — ONDANSETRON 8 MG/1
16 TABLET, ORALLY DISINTEGRATING ORAL ONCE
Status: CANCELLED
Start: 2025-04-21 | End: 2025-04-21

## 2025-04-17 RX ORDER — ONDANSETRON 8 MG/1
16 TABLET, ORALLY DISINTEGRATING ORAL ONCE
Status: CANCELLED
Start: 2025-04-25 | End: 2025-04-25

## 2025-04-17 RX ORDER — ONDANSETRON 8 MG/1
16 TABLET, ORALLY DISINTEGRATING ORAL ONCE
Status: CANCELLED
Start: 2025-04-23 | End: 2025-04-23

## 2025-04-17 RX ORDER — ONDANSETRON 8 MG/1
16 TABLET, ORALLY DISINTEGRATING ORAL ONCE
Status: CANCELLED
Start: 2025-04-22 | End: 2025-04-22

## 2025-04-17 RX ORDER — SODIUM CHLORIDE 9 MG/ML
20 INJECTION, SOLUTION INTRAVENOUS ONCE
Status: CANCELLED | OUTPATIENT
Start: 2025-04-24

## 2025-04-17 RX ORDER — DEXAMETHASONE 4 MG/1
10 TABLET ORAL ONCE
Status: CANCELLED
Start: 2025-04-21

## 2025-04-17 RX ORDER — SODIUM CHLORIDE 9 MG/ML
20 INJECTION, SOLUTION INTRAVENOUS ONCE
Status: CANCELLED | OUTPATIENT
Start: 2025-04-22

## 2025-04-17 RX ORDER — DEXAMETHASONE 4 MG/1
10 TABLET ORAL ONCE
Status: CANCELLED
Start: 2025-04-22

## 2025-04-17 RX ORDER — SODIUM CHLORIDE 9 MG/ML
20 INJECTION, SOLUTION INTRAVENOUS ONCE
Status: CANCELLED | OUTPATIENT
Start: 2025-04-21

## 2025-04-17 RX ORDER — SODIUM CHLORIDE 9 MG/ML
20 INJECTION, SOLUTION INTRAVENOUS ONCE
Status: CANCELLED | OUTPATIENT
Start: 2025-04-25

## 2025-04-17 RX ORDER — SODIUM CHLORIDE 9 MG/ML
20 INJECTION, SOLUTION INTRAVENOUS ONCE
Status: CANCELLED | OUTPATIENT
Start: 2025-04-23

## 2025-04-17 RX ORDER — DEXAMETHASONE 4 MG/1
10 TABLET ORAL ONCE
Status: CANCELLED
Start: 2025-04-24

## 2025-04-18 ENCOUNTER — APPOINTMENT (OUTPATIENT)
Dept: LAB | Facility: HOSPITAL | Age: 75
End: 2025-04-18
Attending: INTERNAL MEDICINE
Payer: MEDICARE

## 2025-04-18 ENCOUNTER — TELEPHONE (OUTPATIENT)
Dept: OBGYN CLINIC | Facility: CLINIC | Age: 75
End: 2025-04-18

## 2025-04-18 ENCOUNTER — NURSE TRIAGE (OUTPATIENT)
Age: 75
End: 2025-04-18

## 2025-04-18 DIAGNOSIS — D46.Z MDS (MYELODYSPLASTIC SYNDROME), HIGH GRADE (HCC): ICD-10-CM

## 2025-04-18 LAB
ALBUMIN SERPL BCG-MCNC: 3.9 G/DL (ref 3.5–5)
ALP SERPL-CCNC: 67 U/L (ref 34–104)
ALT SERPL W P-5'-P-CCNC: 17 U/L (ref 7–52)
ANION GAP SERPL CALCULATED.3IONS-SCNC: 8 MMOL/L (ref 4–13)
AST SERPL W P-5'-P-CCNC: 15 U/L (ref 13–39)
BASOPHILS # BLD AUTO: 0.01 THOUSANDS/ÂΜL (ref 0–0.1)
BASOPHILS NFR BLD AUTO: 0 % (ref 0–1)
BILIRUB SERPL-MCNC: 0.38 MG/DL (ref 0.2–1)
BUN SERPL-MCNC: 25 MG/DL (ref 5–25)
CALCIUM SERPL-MCNC: 9.4 MG/DL (ref 8.4–10.2)
CHLORIDE SERPL-SCNC: 104 MMOL/L (ref 96–108)
CO2 SERPL-SCNC: 28 MMOL/L (ref 21–32)
CREAT SERPL-MCNC: 0.78 MG/DL (ref 0.6–1.3)
EOSINOPHIL # BLD AUTO: 0.05 THOUSAND/ÂΜL (ref 0–0.61)
EOSINOPHIL NFR BLD AUTO: 2 % (ref 0–6)
ERYTHROCYTE [DISTWIDTH] IN BLOOD BY AUTOMATED COUNT: 19.8 % (ref 11.6–15.1)
GFR SERPL CREATININE-BSD FRML MDRD: 75 ML/MIN/1.73SQ M
GLUCOSE SERPL-MCNC: 92 MG/DL (ref 65–140)
HCT VFR BLD AUTO: 29.8 % (ref 34.8–46.1)
HGB BLD-MCNC: 9.4 G/DL (ref 11.5–15.4)
IMM GRANULOCYTES # BLD AUTO: 0.02 THOUSAND/UL (ref 0–0.2)
IMM GRANULOCYTES NFR BLD AUTO: 1 % (ref 0–2)
LYMPHOCYTES # BLD AUTO: 1.15 THOUSANDS/ÂΜL (ref 0.6–4.47)
LYMPHOCYTES NFR BLD AUTO: 36 % (ref 14–44)
MCH RBC QN AUTO: 33.3 PG (ref 26.8–34.3)
MCHC RBC AUTO-ENTMCNC: 31.5 G/DL (ref 31.4–37.4)
MCV RBC AUTO: 106 FL (ref 82–98)
MONOCYTES # BLD AUTO: 0.14 THOUSAND/ÂΜL (ref 0.17–1.22)
MONOCYTES NFR BLD AUTO: 4 % (ref 4–12)
NEUTROPHILS # BLD AUTO: 1.87 THOUSANDS/ÂΜL (ref 1.85–7.62)
NEUTS SEG NFR BLD AUTO: 57 % (ref 43–75)
NRBC BLD AUTO-RTO: 0 /100 WBCS
PLATELET # BLD AUTO: 390 THOUSANDS/UL (ref 149–390)
PMV BLD AUTO: 10.1 FL (ref 8.9–12.7)
POTASSIUM SERPL-SCNC: 4.3 MMOL/L (ref 3.5–5.3)
PROT SERPL-MCNC: 7 G/DL (ref 6.4–8.4)
RBC # BLD AUTO: 2.82 MILLION/UL (ref 3.81–5.12)
SODIUM SERPL-SCNC: 140 MMOL/L (ref 135–147)
WBC # BLD AUTO: 3.24 THOUSAND/UL (ref 4.31–10.16)

## 2025-04-18 PROCEDURE — 85025 COMPLETE CBC W/AUTO DIFF WBC: CPT

## 2025-04-18 PROCEDURE — 80053 COMPREHEN METABOLIC PANEL: CPT

## 2025-04-18 PROCEDURE — 36415 COLL VENOUS BLD VENIPUNCTURE: CPT

## 2025-04-18 RX ORDER — CITALOPRAM HYDROBROMIDE 20 MG/1
20 TABLET ORAL DAILY
Qty: 90 TABLET | Refills: 1 | Status: SHIPPED | OUTPATIENT
Start: 2025-04-18

## 2025-04-18 NOTE — TELEPHONE ENCOUNTER
Patient is requesting call from office regarding  vitamins that were sent to the pharmacy that she wasn't aware she was getting. Patient states that she only met with the doctor once for a consultation and would like to speak with someone.     Please call patient [phone]

## 2025-04-18 NOTE — TELEPHONE ENCOUNTER
L/m for pt to return call.  Pt not yet scheduled for joint surgery.  This will all be explained once she decides to move forward with her surgery.  Provided my direct call back number for any questions.

## 2025-04-18 NOTE — TELEPHONE ENCOUNTER
"Received a call from the patients daughter Denzel reporting the patient has increased bilateral ankle swelling up to the pre tibial area. She reports it \"pretty bad\". Denies SOB, weight gain or chest pain. No history of CHF and is not on any diuretics. She had no Lower ext edema at her last visit with Dr. Conrad 3/18/25    Denzel reports that the patient has Leukemia and Dr. Conrad is aware of this. She would like a return call to discuss.       Answer Assessment - Initial Assessment Questions  1. ONSET: \"When did the swelling start?\" (e.g., minutes, hours, days)      Unsure   2. LOCATION: \"What part of the leg is swollen?\"  \"Are both legs swollen or just one leg?\"      Ankles to pre shin area in both legs   3. SEVERITY: \"How bad is the swelling?\" (e.g., localized; mild, moderate, severe)      Moderate  4. REDNESS: \"Does the swelling look red or infected?\"      Denies   7. CAUSE: \"What do you think is causing the leg swelling?\"      Unsure   8. MEDICAL HISTORY: \"Do you have a history of blood clots (e.g., DVT), cancer, heart failure, kidney disease, or liver failure?\"      Cancer - leukemia   10. OTHER SYMPTOMS: \"Do you have any other symptoms?\" (e.g., chest pain, difficulty breathing)        Denies SOB, Chest pain, weight gain    Protocols used: Leg Swelling and Edema-Adult-[place]    "

## 2025-04-19 NOTE — PROGRESS NOTES
Name: Pretty Aguila      : 1950      MRN: [id number]  Encounter Provider: Geno Carranza MD  Encounter Date: 2025   Encounter department: [facility]  :  Assessment & Plan      MDS (myelodysplastic syndrome), high grade (HCC)           Treatment     Vidaza 75mg/m2 days 1-5      C1  1/3/2025  C2   2025     C3 delayed due to neutropenia/ ANC 1170      C3   3/24/2025 to started Venetoclax 100 mg daily x 7 days            Prophylaxis      Acyclovir  Voriconazole -has not started due to copay-social work aware   Consider adding levaquin-will hold for now       Malignant neoplasm of lower-outer quadrant of left breast of female, estrogen receptor positive (HCC)       1.  Stage IIA (pT2 pN0(sn) cM0) left breast invasive ductal carcinoma.  Grade 2.  ER positive (%), MN positive (%), HER2 negative (score 1+) by IHC.  Diagnosed 2023.  2.  BRCA negative   3.  Adjuvant anastrozole OncoDx score 9       Breast Cancer        Pretty Aguila is 74-year-old postmenopausal female who initially noted a mass in the lower outer quadrant of her left breast.  She underwent diagnostic mammogram and ultrasound which demonstrated a unifocal lesion at the 4 o'clock position 2 cm from the nipple.  This was biopsied and shown to be invasive ductal carcinoma, grade 2, ER 90%, MN 90%, HER2/shannan negative (1+).  She underwent an MRI which demonstrated unifocal mass measuring approximately 4.5 cm with questionable slight skin involvement and no adenopathy.  Her axillary ultrasound demonstrated no adenopathy. She underwent left mastectomy with SLN biopsy.  Final pathology was consistent with an invasive carcinoma, negative margins, 0/1 lymph node, grade 2.  She underwent immediate left breast reconstruction with submuscular tissue expander with acellular dermal matrix on 2023.  Her final stage is IIA (pT2 pN0 cM0) left breast invasive ductal carcinoma, grade 2, ER  "positive, ND positive HER2 negative.       Patient with favorable risk breast cancer.  Oncotype DX recurrence score  9 so due to size of tumor to assess candidacy of chemotherapy.  We discussed adjuvant endocrine therapy with anastrozole if Oncotype DX recurrence score is low.  She will continue to follow with breast surgery.  She will start treatment with anastrozole after her Oncotype resulted and follow-up in 3 months.     We reviewed the side effects of aromatase inhibitors including, but not limited to hot flashes, vaginal dryness, mood swings, weight gain, difficulty sleeping, decreased sexual interest, arthralgias/myalgias and worsening of osteopenia/osteoporosis.  She will obtain a baseline DEXA scan.  I recommended she take calcium and vitamin D on a regular basis.        Continue Anastrozole  Annual R Mammogram; done 9/19/24 \"No evidence of malignancy\".  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 IPSS-R score of 8.5, very high risk. Complicated with progressive pancytopenia, most recent hemoglobin 8.0, PLT 64, and WBC 2.3 with .  Above diagnosis, for medically fit patients, preferred treatment would be bridging therapy (Azacytidine) vs reduced intensity conditioning followed by Allogenic Stem Cell Transplantation. If patient is deemed not ASCT candidate, HMA based regimen, preferably subcutaneous Azacytidine is to be considered given improved OS compared to other HMA e.g. decitabine. Will avoid adding Venetoclax given P53 deletion.      Referral to [facility], Dr. Lacey for ASCT evaluation, patient is " willing to travel to [facility] if needed  Continue SC Azacytidine, may consider dose reduction for C2 75 mg/m2 IV or subcutaneously daily for 7 days, repeat cycles every 4 weeks which would be indicated either as definitive therapy if non ASCT candidate or as bridging therapy prior to conditioning and transplant. Benefits/side effects reviewed and consent is obtained.   Weekly CBC w diff & CMP  Hb transfusion threshold of <7.5  Follow up on 3/4/25 weeks or sooner if needed   Continue prophylactic antifungal/antibacterial/antiviral meds + PRN anti emetic   Ambulatory referral to pulmonology given CAMPOS/SOB           3/12/2025     Since last visit was seen at [facility] with the following:        Recommend Vidaza daily x 5 days and Venetoclax 400mg daily for 7 days to increase chance of achiving response/CR and to decrease anticipated myelosuppression.     Venetoclax, a BCL2 inhibitor, has shown promise in AML when combined with HMA, translating into a significant shift in the management of older AML patients unfit for chemotherapy.   In the phase 3 VIALE-A trial, a combination of venetoclax with azacitidine improved survival from 9.6 months in the azacitidine-only group to 14.7 months.   The CR rates were encouraging for the CC45-vifbulr subgroup--about 55% compared with 0% in the azacitidine group. However, in patients achieving CR, FJ75-myfxhqa patients (13% vs approximately 42% in the cohort) had comparatively fewer MRD-negative remissions. Consequently, achievement of CR alone was not associated with improved survival, as only 8% of those who achieved CR in the NE99-gwlktub subgroup survived >24 months compared with 34% in the cohort.   In a follow-up pooled analysis of poor- and intermediate-risk cytogenetics in patients with AML from VIALE-A and the preceding single-arm phase 1b study of HMA/venetoclax, the median survival for [id number]-[id number] (with poor-risk cytogenetics) patients on venetoclax + azacitidine and azacitidine  only was 5.17 months (CI 2.17-6.83) and 4.9 months (CI 2.14-9.30), respectively.          However, this patient has had issues with leucopenia/neutropenia, prolonged admissions, most recently for sepsis with no evidence pneumonia but did have diarrhea     Has been on IV Rocephin since 28th Feb and will complete 3/14/2025     Her ANC 3/10/2025 was 660 with WBC 2.77     Her prophylactic agents were stopped in house     Will restart Acyclovir 400 mg bid and will start Voriconazole 200 mg bid rather than diflucan     Thus based on the DDI with this agent and her neutropenia, will start Venetoclax 100 mg daily for 7 days to start-if tolerated may increase to 10-14 days with vidaza 75 mg/m2 days 1-5     Will have CBC repeated on 3/14/2025      May have to hold vidaza if ANC isn't approaching 1000      Hgb stable at 9.0; was 5.8 on admission     Patient is feeling improved, less SOB        3/26/2025     Doing much better; seen in ER 15-16 March for SOB      CTA negative for PE     Was seen by cardiology-metoprolol was stopped and since then doing much better      On midodrine when BP goes too low     Labs 3/20/2025 with Na 142 k 3.9 Cr 0.65 Ca 8.6 ALT/AST 29/21 TB 0.46  Mg 2.0 WBC 3.2 Hgb 8.9  plts 3311 ANC 1170      Will continue with Vidaza days 1-5      Tolerating venetoclax 100 mg daily for 7 days      Follow labs weekly     4/21/2025    Labs 4/18/2025 Na 140 K 4.3 Cr 0.78 Ca 9.4 ALT/AST 17/15 TB 0.38   WBC 3.24 Hgb 9.4  plts 390 ANC 1870          Pancytopenia (HCC)  Due to MDS/AML with TP53 mutation - see above.     Anemia, unspecified type  Due to MDS/AML with TP53 mutation - see above.     Platelets decreased (HCC)  Due to MDS/AML with TP53 mutation - see above  Pancytopenia (HCC)           Anemia/Leukocytosis      Labs from 1/17/2024 with WBC 15.1 Hgb 11.4 plts 228, no diff done     Has no evidence bleeding, destruction RBCs; concern with elevated WBC as well     Will do heme workup repeat CBC  with diff, retic, iron panel/B12/folate, SPEP although MCV normal.       WBC may be related to underlying fibromyalgia      Anemia may be AOCD but will rule out     11/8/2024     Recent ER visit 11/1/2024 due to increased fatigue/SOB/CAMPOS     Found to be COVID positive without symptoms     Labs 10/26/2024 and 11/1/2024 as follows     WBC 3.98 Hgb 9.8 plts 109  ANC 1820      WBC 3.9 Hgb 9.5  plts 96 ANC 1890      Today repeated WBC 4.52 Hgb 10  plts 99 ANC 1840     Other labs done 10/26/2024      Had SPEP no M spike     FLC not done     Total iron 92 ferritin 119 % sat 27 folate 18 B12 733 zinc 68      Flow cytometry pending     May all be COVID related although anemic past visit        Sepsis (HCC) (Resolved: 3/4/2025)  Assessment:  Met sepsis criteria on admission (tachycardia, leukopenia, with suspected source of infection)  Elevated procalcitonin and lactic acid.  [place] UA  Negative flu/COVID.  Chest x-ray with no acute findings.  In the ED, received a total of 4 L of fluid.  IV cefepime and IV Flagyl.  Blood culture positive x 1 for Streptococcus.  3/2/2025- patient remains afebrile.  Her diarrhea has stopped.  Calcitonin is trending down.  ID is following. IV vancomycin given diarrhea possibility of translocation.Was treated with vancomycin  Tranisitoned to IV rocephin on 3/2/2025   Will need PICC line for IV rocephin through 3/14   Repeat blood cultures negative on 3/1/2025  No longer meeting SIRS criteria of tachycardia. Leukocyte count was uptrending.  [place] was likely in the setting of malignancy and a component of dehydration  Lactic acid and procal downtrended   PICC line placed on right arm on 3/4/2025. Infectious disease sent final scripts.No complications  PT recommended level III       Summary/Recommendations     C3 Vidaza 3/24 -3/28/2025 delayed due to neutropenia   Venetoclax 100 mg daily days 1-7-     Will not increase dose of Venetoclax but may increase to 10-14 days depending  on counts      Plan to start Voriconazole if can get medication/lower copay      Breathing better since stopping metoprolol     Seeing cardiology/will adjust meds     BP stable 126/76         Follow counts weekly      Follow up late April 2025        No follow-ups on file.    History of Present Illness   No chief complaint on file.    Pretty Aguila is a 74 y.o. female with medical hx remarkable of DM, HTN, HLD, CAD s/p CABG, Psoriatic/Rheumatoid Arthritis, fibromyalgia, Hiatal Hernia.      In 9/2023 had left breast mass biopsy revealed invasive ductal carcinoma, G 2, ER 90%, IL 90%, HER2 1+, with suspected skin involvement on the MRI.      Patient underwent left mastectomy with SLNB on 11/20/23, pathology consistent with stage IIA (pT2 pN0 cM0) invasive carcinoma, G2, ER %, IL %, HER2 1+, tumor size 36 mm, negative margins, total one SLN was negative, no LVI identified.      Genetic testing was negative. Oncotype DX recurrence score was 9. Initiated adjuvant anastrozole on 12/09/2023.      11/1/24 ER visit with fatigue & SOB, tested COVID positive      12/07 - 12/12/24 hospital admission due to progressive CAMPOS and near syncope event, during which had progressive pancytopenia. Folate, B12, EBV, HIV, and hepatitis labs all unremarkable.      12/11/24 Bone Marrow Biopsy: normo-cellular 20-30%, with increased blasts 15-20%, Multilineage dysplasia, fibrosis 2-3 of 3, with complex karyotype including TP53 deletion and mutation, 5q deletion and monosomy 7; findings consistent with high grade myeloid neoplasm, at least MDS/AML with mutated TP53 (Int. Consensus Classification) / MDS with Bi-allelic TP53 inactivation (WHO Classification). The focus that approaches 20% blasts by IHC also raises the possibility of classifying it as overt AML with mutated TP53. The combination of TP53 mutation and deletion, as well as the complex karyotype, is associated with a very poor prognosis, and as such, may best be classified  and treated as AML with mutated TP53 - as clinically indicated. There is no evidence of metastatic carcinoma.      12/22/24 ER visit with left hip pain, diagnosed with L hip dislocation successfully reduced in the ER.        Ref 12/11/24 12/12/24 12/19/24 12/22/24    WBC 4.31 - 10.16 Thousand/uL 3.49 (L) 3.13 (L) 3.04 (L) 2.90 (L)   Hemoglobin 11.5 - 15.4 g/dL 8.6 (L) 8.0 (L) 8.6 (L) 8.4 (L)   Platelet Count 149 - 390 Thousands/uL 64 (L) 60 (L) 66 (L) 64 (L)   Monocytes % 4 - 12 % 5   1 (L) 1 (L)   Atypical Lymphocytes % <=0 %     4 (H)     Absolute Neutrophils 1.85 - 7.62 Thousand/uL 1.21 (L)   1.76 (L) 1.71 (L)         Feb 2025      She completed C1-D1-5 of azacitidine on 1/24/25.  CTA Chest was neg for PE.  Labs (1/27/25) show roughly stable cell counts with Hb 8.8 > 8.0, WBC 2.6 > 2.3 (lymph 69, neut 25), , and Plt 58 > 64.  CMP shows SCr 0.86 and all other aspects are wnl.  .     SOB even with standing.  Fatigue has worsened.  Feels symptoms consistent with asthenia. Denied palpitaions.  Has decreased appetite and decreased taste.           3/12/2025     Feeling much improved since admission-had diarrhea, noted strep salivarius which may have been contaminant vs GI source but was treated  Less SOB.  Has PICC completing Ceftriaxone for total 14 days-will complete 3/14/2025.     Labs 3/10/2025 with Na 140 K 3.9 Cr 0.68 ALT/AST 39//34 TB 0.57 WBC 2.8 Hgb 9.0 plts 263       3/26/2025     Seen in ER 3/15/2025 due to hypotension/dyspnea-workup negative no change in CBC  CTA negative for PE     Oncology History   Cancer Staging   Malignant neoplasm of lower-outer quadrant of left breast of female, estrogen receptor positive (HCC)  Staging form: Breast, AJCC 8th Edition  - Pathologic stage from 11/20/2023: Stage IA (pT2, pN0(sn), cM0, G2, ER+, WY+, HER2-) - Signed by Adrien Gabriel MD on 12/6/2023  Stage prefix: Initial diagnosis  Method of lymph node assessment: [place] lymph node  biopsy  Multigene prognostic tests performed: None  Histologic grading system: 3 grade system  Oncology History   Malignant neoplasm of lower-outer quadrant of left breast of female, estrogen receptor positive (HCC)   9/19/2023 Biopsy    Left breast ultrasound-guided biopsy  4 o'clock, 2 cm from nipple (VERENICE)  Invasive breast carcinoma of no special type (ductal NST)  Grade 2  ER 90, TX 90, HER2 1+\  Lymphovascular invasion not definitively identified    [place]. Malignancy appears unifocal; however, oval circumscribed mass in the upper outer left breast is not accounted for. Bilateral breast MRI is recommended. Biopsy-proven carcinoma measured 3.1 cm on US. Left axilla US negative. Right breast clear.     9/28/2023 Observation    Bilateral breast MRI: Unifocal carcinoma in the lower outer left breast with possible skin involvement. There are no suspicious enhancing masses or suspicious areas of non-mass enhancement in right breast. There is no axillary or internal mammary adenopathy.     10/17/2023 Genetic Testing    A total of 47 genes were evaluated, including: HUYEN, BRCA1, BRCA2, CDH1, CHEK2, PALB2, PTEN, STK11, TP53  Negative result. No pathogenic sequence variants identified  [employer]     11/20/2023 Surgery    Left VERENICE  directed mastectomy with SLN biopsy   Invasive carcinoma of no special type (ductal)  Grade 2  3.6 cm   Margins negative  0/1 Lymph node  Anatomic Stage IIA  Prognostic Stage IA    Immediate reconstruction with tissue expander and ADM (Dr. Jimenez)     11/20/2023 -  Cancer Staged    Staging form: Breast, AJCC 8th Edition  - Pathologic stage from 11/20/2023: Stage IA (pT2, pN0(sn), cM0, G2, ER+, TX+, HER2-) - Signed by Adrien Gabriel MD on 12/6/2023  Stage prefix: Initial diagnosis  Method of lymph node assessment: [place] lymph node biopsy  Multigene prognostic tests performed: None  Histologic grading system: 3 grade system       MDS (myelodysplastic syndrome), high grade (HCC)   12/30/2024  Initial Diagnosis    MDS (myelodysplastic syndrome), high grade (HCC)     1/20/2025 -  Chemotherapy    azaCITIDine (VIDAZA), 75 mg/m2 = 137.5 mg (100 % of original dose 75 mg/m2), 4 of 7 cycles  Dose modification: 75 mg/m2 (original dose 75 mg/m2, Cycle 0, Reason: Anticipated Tolerance)  Administration: 137.5 mg (1/20/2025), 137.5 mg (1/21/2025), 137.5 mg (2/17/2025), 137.5 mg (1/22/2025), 137.5 mg (1/23/2025), 137.5 mg (1/24/2025), 137.5 mg (2/18/2025), 137.5 mg (2/19/2025), 137.5 mg (2/20/2025), 137.5 mg (2/21/2025), 137.5 mg (3/24/2025), 137.5 mg (3/25/2025), 137.5 mg (3/26/2025), 137.5 mg (3/27/2025), 137.5 mg (3/28/2025)        Pertinent Medical History        Review of Systems        Objective   There were no vitals taken for this visit.    Pain Screening:     ECOG   1  Physical Exam    Labs: I have reviewed the following labs:  Lab Results   Component Value Date/Time    WBC 3.24 (L) 04/18/2025 01:03 PM    RBC 2.82 (L) 04/18/2025 01:03 PM    Hemoglobin 9.4 (L) 04/18/2025 01:03 PM    Hematocrit 29.8 (L) 04/18/2025 01:03 PM     (H) 04/18/2025 01:03 PM    MCH 33.3 04/18/2025 01:03 PM    RDW 19.8 (H) 04/18/2025 01:03 PM    Platelets 390 04/18/2025 01:03 PM    Segmented % 57 04/18/2025 01:03 PM    Lymphocytes % 36 04/18/2025 01:03 PM    Monocytes % 4 04/18/2025 01:03 PM    Eosinophils Relative 2 04/18/2025 01:03 PM    Basophils Relative 0 04/18/2025 01:03 PM    Immature Grans % 1 04/18/2025 01:03 PM    Absolute Neutrophils 1.87 04/18/2025 01:03 PM     Lab Results   Component Value Date/Time    Potassium 4.3 04/18/2025 01:03 PM    Chloride 104 04/18/2025 01:03 PM    CO2 28 04/18/2025 01:03 PM    BUN 25 04/18/2025 01:03 PM    Creatinine 0.78 04/18/2025 01:03 PM    Glucose, Fasting 88 12/31/2024 11:15 AM    Calcium 9.4 04/18/2025 01:03 PM    Corrected Calcium 9.3 03/15/2025 04:11 PM    AST 15 04/18/2025 01:03 PM    ALT 17 04/18/2025 01:03 PM    Alkaline Phosphatase 67 04/18/2025 01:03 PM    Total Protein 7.0  04/18/2025 01:03 PM    Albumin 3.9 04/18/2025 01:03 PM    Total Bilirubin 0.38 04/18/2025 01:03 PM    eGFR 75 04/18/2025 01:03 PM

## 2025-04-21 ENCOUNTER — HOSPITAL ENCOUNTER (OUTPATIENT)
Dept: INFUSION CENTER | Facility: CLINIC | Age: 75
Discharge: HOME | End: 2025-04-21
Payer: MEDICARE

## 2025-04-21 ENCOUNTER — OFFICE VISIT (OUTPATIENT)
Dept: HEMATOLOGY ONCOLOGY | Facility: CLINIC | Age: 75
End: 2025-04-21
Payer: MEDICARE

## 2025-04-21 VITALS
OXYGEN SATURATION: 99 % | TEMPERATURE: 97.2 F | RESPIRATION RATE: 18 BRPM | DIASTOLIC BLOOD PRESSURE: 72 MMHG | SYSTOLIC BLOOD PRESSURE: 103 MMHG | HEIGHT: 64 IN | WEIGHT: 181.5 LBS | BODY MASS INDEX: 30.99 KG/M2 | HEART RATE: 81 BPM

## 2025-04-21 VITALS
HEART RATE: 81 BPM | WEIGHT: 181.44 LBS | TEMPERATURE: 97.2 F | DIASTOLIC BLOOD PRESSURE: 72 MMHG | BODY MASS INDEX: 30.98 KG/M2 | HEIGHT: 64 IN | RESPIRATION RATE: 17 BRPM | OXYGEN SATURATION: 99 % | SYSTOLIC BLOOD PRESSURE: 103 MMHG

## 2025-04-21 DIAGNOSIS — R60.9 EDEMA, UNSPECIFIED TYPE: ICD-10-CM

## 2025-04-21 DIAGNOSIS — D46.Z MDS (MYELODYSPLASTIC SYNDROME), HIGH GRADE (HCC): Primary | ICD-10-CM

## 2025-04-21 DIAGNOSIS — T84.021A FAILURE OF LEFT TOTAL HIP ARTHROPLASTY WITH DISLOCATION OF HIP, INITIAL ENCOUNTER (HCC): ICD-10-CM

## 2025-04-21 DIAGNOSIS — D46.Z MDS (MYELODYSPLASTIC SYNDROME), HIGH GRADE (HCC): ICD-10-CM

## 2025-04-21 DIAGNOSIS — Z96.642 HISTORY OF TOTAL LEFT HIP ARTHROPLASTY: ICD-10-CM

## 2025-04-21 PROCEDURE — 96401 CHEMO ANTI-NEOPL SQ/IM: CPT

## 2025-04-21 PROCEDURE — 99215 OFFICE O/P EST HI 40 MIN: CPT | Performed by: INTERNAL MEDICINE

## 2025-04-21 RX ORDER — ONDANSETRON 8 MG/1
16 TABLET, ORALLY DISINTEGRATING ORAL ONCE
Status: COMPLETED | OUTPATIENT
Start: 2025-04-21 | End: 2025-04-21

## 2025-04-21 RX ORDER — SODIUM CHLORIDE 9 MG/ML
20 INJECTION, SOLUTION INTRAVENOUS ONCE
Status: DISCONTINUED | OUTPATIENT
Start: 2025-04-21 | End: 2025-04-24 | Stop reason: HOSPADM

## 2025-04-21 RX ADMIN — DEXAMETHASONE 10 MG: 2 TABLET ORAL at 10:27

## 2025-04-21 RX ADMIN — AZACITIDINE 137.5 MG: 100 INJECTION, POWDER, LYOPHILIZED, FOR SOLUTION INTRAVENOUS; SUBCUTANEOUS at 10:53

## 2025-04-21 RX ADMIN — ONDANSETRON 16 MG: 8 TABLET, ORALLY DISINTEGRATING ORAL at 10:27

## 2025-04-21 NOTE — ASSESSMENT & PLAN NOTE
Last BM biopsy on December on 2024.  Remains stable disease.  Plan:  Continue azacitidine and venetoclex.   Follow up in 4 weeks.   Orders:    Ambulatory referral to Hematology / Oncology    VAS VENOUS DUPLEX -LOWER LIMB UNILATERAL; Future

## 2025-04-21 NOTE — PROGRESS NOTES
"Name: Pretty Aguila      : 1950      MRN: [id number]  Encounter Provider: Geno Carranza MD  Encounter Date: 2025   Encounter department: [facility]  :  Assessment & Plan  MDS (myelodysplastic syndrome), high grade (HCC)  Last BM biopsy on .  Remains stable disease.  Plan:  Continue azacitidine and venetoclex.   Follow up in 4 weeks.   Orders:    Ambulatory referral to Hematology / Oncology    VAS VENOUS DUPLEX -LOWER LIMB UNILATERAL; Future    Edema, unspecified type  Bilateral lower extremity edema, left lower extremity slightly more enlarged than right, non tender.  Unlikely to be a DVT however, is still in the differential   Plan:  We will obtain bilateral venous doppler   Orders:    VAS VENOUS DUPLEX -LOWER LIMB UNILATERAL; Future      Return in about 4 weeks (around 2025) for Office Visit, Imaging - See orders.    History of Present Illness   Chief Complaint   Patient presents with    Follow-up     Interval history:   Pretty Aguila is a carlos a 75 year old female with history of breat cancer stage II-b ER/DC positive, HER-2 Neg. S/p mastectomy on anastrozole. Recently diagnosed with AML on cycle 4/5 of azacitidine and last two cycles venetoclex added. Presents with follow up and complaints of feeling \"winded\" as well as lower extremity edema specially at the end of the day. We have reviewed blood work together and conversed in regards of repeating bone marrow biopsy once treatment is finished. She is agreeable.     Oncology History   Cancer Staging   Malignant neoplasm of lower-outer quadrant of left breast of female, estrogen receptor positive (HCC)  Staging form: Breast, AJCC 8th Edition  - Pathologic stage from 2023: Stage IA (pT2, pN0(sn), cM0, G2, ER+, DC+, HER2-) - Signed by Adrien Gabriel MD on 2023  Stage prefix: Initial diagnosis  Method of lymph node assessment: [place] lymph node biopsy  Multigene prognostic tests " performed: None  Histologic grading system: 3 grade system  Oncology History   Malignant neoplasm of lower-outer quadrant of left breast of female, estrogen receptor positive (HCC)   9/19/2023 Biopsy    Left breast ultrasound-guided biopsy  4 o'clock, 2 cm from nipple (VERENICE)  Invasive breast carcinoma of no special type (ductal NST)  Grade 2  ER 90, VT 90, HER2 1+\  Lymphovascular invasion not definitively identified    Concordant. Malignancy appears unifocal; however, oval circumscribed mass in the upper outer left breast is not accounted for. Bilateral breast MRI is recommended. Biopsy-proven carcinoma measured 3.1 cm on US. Left axilla US negative. Right breast clear.     9/28/2023 Observation    Bilateral breast MRI: Unifocal carcinoma in the lower outer left breast with possible skin involvement. There are no suspicious enhancing masses or suspicious areas of non-mass enhancement in right breast. There is no axillary or internal mammary adenopathy.     10/17/2023 Genetic Testing    A total of 47 genes were evaluated, including: HUYEN, BRCA1, BRCA2, CDH1, CHEK2, PALB2, PTEN, STK11, TP53  Negative result. No pathogenic sequence variants identified  [facility]     11/20/2023 Surgery    Left VERENICE  directed mastectomy with SLN biopsy   Invasive carcinoma of no special type (ductal)  Grade 2  3.6 cm   Margins negative  0/1 Lymph node  Anatomic Stage IIA  Prognostic Stage IA    Immediate reconstruction with tissue expander and ADM (Dr. Jimenez)     11/20/2023 -  Cancer Staged    Staging form: Breast, AJCC 8th Edition  - Pathologic stage from 11/20/2023: Stage IA (pT2, pN0(sn), cM0, G2, ER+, VT+, HER2-) - Signed by Adrien Gabriel MD on 12/6/2023  Stage prefix: Initial diagnosis  Method of lymph node assessment: [place] lymph node biopsy  Multigene prognostic tests performed: None  Histologic grading system: 3 grade system       MDS (myelodysplastic syndrome), high grade (HCC)   12/30/2024 Initial Diagnosis    MDS  "(myelodysplastic syndrome), high grade (HCC)     1/20/2025 -  Chemotherapy    azaCITIDine (VIDAZA), 75 mg/m2 = 137.5 mg (100 % of original dose 75 mg/m2), 5 of 7 cycles  Dose modification: 75 mg/m2 (original dose 75 mg/m2, Cycle 0, Reason: Anticipated Tolerance)  Administration: 137.5 mg (1/20/2025), 137.5 mg (1/21/2025), 137.5 mg (2/17/2025), 137.5 mg (1/22/2025), 137.5 mg (1/23/2025), 137.5 mg (1/24/2025), 137.5 mg (2/18/2025), 137.5 mg (2/19/2025), 137.5 mg (2/20/2025), 137.5 mg (2/21/2025), 137.5 mg (3/24/2025), 137.5 mg (3/25/2025), 137.5 mg (3/26/2025), 137.5 mg (3/27/2025), 137.5 mg (3/28/2025), 137.5 mg (4/21/2025)             Review of Systems   Constitutional:  Positive for fatigue. Negative for chills and fever.   HENT:  Negative for ear pain and sore throat.    Eyes:  Negative for pain and visual disturbance.   Respiratory:  Positive for shortness of breath. Negative for cough.    Cardiovascular:  Negative for chest pain and palpitations.   Gastrointestinal:  Negative for abdominal pain and vomiting.   Genitourinary:  Negative for dysuria and hematuria.   Musculoskeletal:  Negative for arthralgias and back pain.   Skin:  Negative for color change and rash.   Neurological:  Negative for seizures and syncope.   All other systems reviewed and are negative.          Objective   /72 (BP Location: Left arm, Patient Position: Sitting, Cuff Size: Adult)   Pulse 81   Temp (!) 97.2 °F (36.2 °C) (Temporal)   Resp 17   Ht 5' 4\" (1.626 m)   Wt 82.3 kg (181 lb 7 oz)   SpO2 99%   BMI 31.14 kg/m²     Pain Screening:  Pain Score:  (c/o fatigue, overall not feeling well)  ECOG     Physical Exam  HENT:      Head: Normocephalic.      Nose: Nose normal.      Mouth/Throat:      Mouth: Mucous membranes are moist.   Eyes:      Pupils: Pupils are equal, round, and reactive to light.   Cardiovascular:      Rate and Rhythm: Normal rate.   Pulmonary:      Effort: Pulmonary effort is normal.   Abdominal:      " Palpations: Abdomen is soft.   Musculoskeletal:         General: Swelling present.      Cervical back: Neck supple.      Right lower le+ Edema present.      Left lower le+ Edema present.      Comments: Bilateral pitting edema, left lower extremity slightly more enlarged than right. Non tender   Skin:     General: Skin is warm.   Neurological:      Mental Status: She is alert and oriented to person, place, and time. Mental status is at baseline.   Psychiatric:         Mood and Affect: Mood normal.         Labs: I have reviewed the following labs:  Lab Results   Component Value Date/Time    WBC 3.24 (L) 2025 01:03 PM    RBC 2.82 (L) 2025 01:03 PM    Hemoglobin 9.4 (L) 2025 01:03 PM    Hematocrit 29.8 (L) 2025 01:03 PM     (H) 2025 01:03 PM    MCH 33.3 2025 01:03 PM    RDW 19.8 (H) 2025 01:03 PM    Platelets 390 2025 01:03 PM    Segmented % 57 2025 01:03 PM    Lymphocytes % 36 2025 01:03 PM    Monocytes % 4 2025 01:03 PM    Eosinophils Relative 2 2025 01:03 PM    Basophils Relative 0 2025 01:03 PM    Immature Grans % 1 2025 01:03 PM    Absolute Neutrophils 1.87 2025 01:03 PM     Lab Results   Component Value Date/Time    Potassium 4.3 2025 01:03 PM    Chloride 104 2025 01:03 PM    CO2 28 2025 01:03 PM    BUN 25 2025 01:03 PM    Creatinine 0.78 2025 01:03 PM    Glucose, Fasting 88 2024 11:15 AM    Calcium 9.4 2025 01:03 PM    Corrected Calcium 9.3 03/15/2025 04:11 PM    AST 15 2025 01:03 PM    ALT 17 2025 01:03 PM    Alkaline Phosphatase 67 2025 01:03 PM    Total Protein 7.0 2025 01:03 PM    Albumin 3.9 2025 01:03 PM    Total Bilirubin 0.38 2025 01:03 PM    eGFR 75 2025 01:03 PM

## 2025-04-21 NOTE — PROGRESS NOTES
Patient here for D1C4 of Vidaza, offers no complaints. Labs reviewed and are in parameters for treatment. 2 injections given, one in each arm. Next appointment confirmed for tomorrow at 12:30, has calendar printout.

## 2025-04-22 ENCOUNTER — HOSPITAL ENCOUNTER (OUTPATIENT)
Dept: NON INVASIVE DIAGNOSTICS | Facility: CLINIC | Age: 75
Discharge: HOME | End: 2025-04-22
Attending: INTERNAL MEDICINE
Payer: MEDICARE

## 2025-04-22 ENCOUNTER — TELEPHONE (OUTPATIENT)
Dept: HEMATOLOGY ONCOLOGY | Facility: CLINIC | Age: 75
End: 2025-04-22

## 2025-04-22 ENCOUNTER — TELEPHONE (OUTPATIENT)
Age: 75
End: 2025-04-22

## 2025-04-22 ENCOUNTER — HOSPITAL ENCOUNTER (OUTPATIENT)
Dept: INFUSION CENTER | Facility: CLINIC | Age: 75
Discharge: HOME | End: 2025-04-22
Payer: MEDICARE

## 2025-04-22 ENCOUNTER — TELEPHONE (OUTPATIENT)
Dept: OBGYN CLINIC | Facility: HOSPITAL | Age: 75
End: 2025-04-22

## 2025-04-22 VITALS
WEIGHT: 179.5 LBS | SYSTOLIC BLOOD PRESSURE: 92 MMHG | HEIGHT: 64 IN | RESPIRATION RATE: 18 BRPM | OXYGEN SATURATION: 98 % | DIASTOLIC BLOOD PRESSURE: 52 MMHG | TEMPERATURE: 97.5 F | BODY MASS INDEX: 30.64 KG/M2 | HEART RATE: 90 BPM

## 2025-04-22 DIAGNOSIS — D46.Z MDS (MYELODYSPLASTIC SYNDROME), HIGH GRADE (HCC): ICD-10-CM

## 2025-04-22 DIAGNOSIS — R60.9 EDEMA, UNSPECIFIED TYPE: ICD-10-CM

## 2025-04-22 DIAGNOSIS — D46.Z MDS (MYELODYSPLASTIC SYNDROME), HIGH GRADE (HCC): Primary | ICD-10-CM

## 2025-04-22 PROCEDURE — 93970 EXTREMITY STUDY: CPT | Performed by: SURGERY

## 2025-04-22 PROCEDURE — 93970 EXTREMITY STUDY: CPT

## 2025-04-22 PROCEDURE — 96401 CHEMO ANTI-NEOPL SQ/IM: CPT

## 2025-04-22 RX ORDER — SODIUM CHLORIDE 9 MG/ML
20 INJECTION, SOLUTION INTRAVENOUS ONCE
Status: DISCONTINUED | OUTPATIENT
Start: 2025-04-22 | End: 2025-04-25 | Stop reason: HOSPADM

## 2025-04-22 RX ORDER — ONDANSETRON 8 MG/1
16 TABLET, ORALLY DISINTEGRATING ORAL ONCE
Status: COMPLETED | OUTPATIENT
Start: 2025-04-22 | End: 2025-04-22

## 2025-04-22 RX ADMIN — ONDANSETRON 16 MG: 8 TABLET, ORALLY DISINTEGRATING ORAL at 12:50

## 2025-04-22 RX ADMIN — DEXAMETHASONE 10 MG: 2 TABLET ORAL at 12:50

## 2025-04-22 RX ADMIN — AZACITIDINE 137.5 MG: 100 INJECTION, POWDER, LYOPHILIZED, FOR SOLUTION INTRAVENOUS; SUBCUTANEOUS at 13:42

## 2025-04-22 NOTE — TELEPHONE ENCOUNTER
Caller: Pretty    Doctor: Dr. Stanley    Reason for call: Patient stated Dr. Stanley was suppose to contact her Hematologist Dr. Geno Carranza to see if she was able to have surgery.  Patient saw Dr. Carranza the other day and she explained she had not heard from our office.    Please advise.     Call back#: [phone] (Home) can leave VM if she does not answer

## 2025-04-22 NOTE — TELEPHONE ENCOUNTER
L/m for pt to return call.  Explained that a clearance form has been faxed to her hematologist and will be returned once signed.  Requested for pt to call back to discuss a surgery date.  Provided my direct call back number.

## 2025-04-22 NOTE — PROGRESS NOTES
Pt resting with no complaints. Vidaza given in b/l abd without issue. Pt declined AVS. Reviewed that next appt is tomorrow at 1030.

## 2025-04-22 NOTE — TELEPHONE ENCOUNTER
Called and spoke to Pretty. Her duplex study she had done today was negative for any clots. Recommended she continue to elevate her legs as much as possible and wear compression stockings to help with the swelling. She will follow up with her cardiologist to see if he wants to start her on a diuretic. Pretty appreciated the call.

## 2025-04-22 NOTE — TELEPHONE ENCOUNTER
Elly arce from [facility] to get fax number to send Dr. Carranza medical clearance form.  I provided [employer] fax number [phone]

## 2025-04-23 ENCOUNTER — TELEPHONE (OUTPATIENT)
Dept: HEMATOLOGY ONCOLOGY | Facility: MEDICAL CENTER | Age: 75
End: 2025-04-23

## 2025-04-23 ENCOUNTER — HOSPITAL ENCOUNTER (OUTPATIENT)
Dept: INFUSION CENTER | Facility: CLINIC | Age: 75
Discharge: HOME | End: 2025-04-23
Attending: INTERNAL MEDICINE
Payer: MEDICARE

## 2025-04-23 VITALS
DIASTOLIC BLOOD PRESSURE: 71 MMHG | HEIGHT: 64 IN | BODY MASS INDEX: 30.73 KG/M2 | TEMPERATURE: 97.5 F | HEART RATE: 76 BPM | SYSTOLIC BLOOD PRESSURE: 113 MMHG | WEIGHT: 180 LBS | OXYGEN SATURATION: 98 %

## 2025-04-23 DIAGNOSIS — D46.Z MDS (MYELODYSPLASTIC SYNDROME), HIGH GRADE (HCC): Primary | ICD-10-CM

## 2025-04-23 PROCEDURE — 96401 CHEMO ANTI-NEOPL SQ/IM: CPT

## 2025-04-23 RX ORDER — ONDANSETRON 8 MG/1
16 TABLET, ORALLY DISINTEGRATING ORAL ONCE
Status: COMPLETED | OUTPATIENT
Start: 2025-04-23 | End: 2025-04-23

## 2025-04-23 RX ORDER — SODIUM CHLORIDE 9 MG/ML
20 INJECTION, SOLUTION INTRAVENOUS ONCE
Status: DISCONTINUED | OUTPATIENT
Start: 2025-04-23 | End: 2025-04-26 | Stop reason: HOSPADM

## 2025-04-23 RX ADMIN — DEXAMETHASONE 10 MG: 2 TABLET ORAL at 10:38

## 2025-04-23 RX ADMIN — AZACITIDINE 137.5 MG: 100 INJECTION, POWDER, LYOPHILIZED, FOR SOLUTION INTRAVENOUS; SUBCUTANEOUS at 11:28

## 2025-04-23 RX ADMIN — ONDANSETRON 16 MG: 8 TABLET, ORALLY DISINTEGRATING ORAL at 10:39

## 2025-04-23 NOTE — TELEPHONE ENCOUNTER
Voicemail left for patient that her appointment with Dr. Carranza on 5/19/25 is now at 1:20pm and it will be in the office, not at the infusion center.  Please call [phone] if you have any questions.

## 2025-04-23 NOTE — PROGRESS NOTES
Patient arrived for Vadaza injection today feeling well with no complaints. Labs from 4/18 within parameters to treat today. 2 injections given, one in each upper arm. Next appointment confirmed for 4/24 @ 1200 [facility]fusion. Pt declined AVS

## 2025-04-24 ENCOUNTER — TELEPHONE (OUTPATIENT)
Age: 75
End: 2025-04-24

## 2025-04-24 ENCOUNTER — HOSPITAL ENCOUNTER (OUTPATIENT)
Dept: INFUSION CENTER | Facility: CLINIC | Age: 75
Discharge: HOME | End: 2025-04-24
Payer: MEDICARE

## 2025-04-24 VITALS
BODY MASS INDEX: 30.64 KG/M2 | TEMPERATURE: 97.3 F | HEIGHT: 64 IN | SYSTOLIC BLOOD PRESSURE: 115 MMHG | OXYGEN SATURATION: 98 % | RESPIRATION RATE: 16 BRPM | HEART RATE: 81 BPM | WEIGHT: 179.5 LBS | DIASTOLIC BLOOD PRESSURE: 72 MMHG

## 2025-04-24 DIAGNOSIS — D46.Z MDS (MYELODYSPLASTIC SYNDROME), HIGH GRADE (HCC): Primary | ICD-10-CM

## 2025-04-24 PROCEDURE — 96401 CHEMO ANTI-NEOPL SQ/IM: CPT

## 2025-04-24 RX ORDER — ONDANSETRON 8 MG/1
16 TABLET, ORALLY DISINTEGRATING ORAL ONCE
Status: COMPLETED | OUTPATIENT
Start: 2025-04-24 | End: 2025-04-24

## 2025-04-24 RX ORDER — SODIUM CHLORIDE 9 MG/ML
20 INJECTION, SOLUTION INTRAVENOUS ONCE
Status: DISCONTINUED | OUTPATIENT
Start: 2025-04-24 | End: 2025-04-27 | Stop reason: HOSPADM

## 2025-04-24 RX ADMIN — DEXAMETHASONE 10 MG: 2 TABLET ORAL at 12:24

## 2025-04-24 RX ADMIN — ONDANSETRON 16 MG: 8 TABLET, ORALLY DISINTEGRATING ORAL at 12:23

## 2025-04-24 RX ADMIN — AZACITIDINE 137.5 MG: 100 INJECTION, POWDER, LYOPHILIZED, FOR SOLUTION INTRAVENOUS; SUBCUTANEOUS at 13:05

## 2025-04-24 NOTE — PROGRESS NOTES
Patient tolerated Vidaza injections today without complications. Patient confirmed appointment time of 1100 for Day 5 of treatment tomorrow. Print out declined.

## 2025-04-24 NOTE — TELEPHONE ENCOUNTER
LVM to inform pt that we have to change appt. I offered May 5th in the fast pass spot.     If pt calls back please change the appt. If the spot on May 5th is taken, first open is fine.

## 2025-04-25 ENCOUNTER — HOSPITAL ENCOUNTER (OUTPATIENT)
Dept: INFUSION CENTER | Facility: CLINIC | Age: 75
End: 2025-04-25
Attending: INTERNAL MEDICINE
Payer: MEDICARE

## 2025-04-25 VITALS
TEMPERATURE: 98 F | DIASTOLIC BLOOD PRESSURE: 78 MMHG | HEIGHT: 64 IN | SYSTOLIC BLOOD PRESSURE: 128 MMHG | BODY MASS INDEX: 30.39 KG/M2 | HEART RATE: 69 BPM | WEIGHT: 178 LBS | OXYGEN SATURATION: 99 %

## 2025-04-25 DIAGNOSIS — D46.Z MDS (MYELODYSPLASTIC SYNDROME), HIGH GRADE (HCC): Primary | ICD-10-CM

## 2025-04-25 PROCEDURE — 96401 CHEMO ANTI-NEOPL SQ/IM: CPT

## 2025-04-25 RX ORDER — ONDANSETRON 8 MG/1
16 TABLET, ORALLY DISINTEGRATING ORAL ONCE
Status: COMPLETED | OUTPATIENT
Start: 2025-04-25 | End: 2025-04-25

## 2025-04-25 RX ORDER — SODIUM CHLORIDE 9 MG/ML
20 INJECTION, SOLUTION INTRAVENOUS ONCE
Status: DISCONTINUED | OUTPATIENT
Start: 2025-04-25 | End: 2025-04-28 | Stop reason: HOSPADM

## 2025-04-25 RX ADMIN — ONDANSETRON 16 MG: 8 TABLET, ORALLY DISINTEGRATING ORAL at 11:16

## 2025-04-25 RX ADMIN — DEXAMETHASONE 10 MG: 2 TABLET ORAL at 11:16

## 2025-04-25 RX ADMIN — AZACITIDINE 137.5 MG: 100 INJECTION, POWDER, LYOPHILIZED, FOR SOLUTION INTRAVENOUS; SUBCUTANEOUS at 11:43

## 2025-04-25 NOTE — PLAN OF CARE
Problem: DISCHARGE PLANNING  Goal: Discharge to home or other facility with appropriate resources  Description: INTERVENTIONS:- Identify barriers to discharge w/patient and caregiver- Arrange for needed discharge resources and transportation as appropriate- Identify discharge learning needs (meds, wound care, etc.)- Arrange for interpretive services to assist at discharge as needed- Refer to Case Management Department for coordinating discharge planning if the patient needs post-hospital services based on physician/advanced practitioner order or complex needs related to functional status, cognitive ability, or social support system  Outcome: Completed     Problem: Knowledge Deficit  Goal: Patient/family/caregiver demonstrates understanding of disease process, treatment plan, medications, and discharge instructions  Description: Complete learning assessment and assess knowledge base.Interventions:- Provide teaching at level of understanding- Provide teaching via preferred learning methods  Outcome: Completed

## 2025-04-25 NOTE — PROGRESS NOTES
Pt to clinic for vidaza injections. Pt tolerated two injections, one in each side of abdomen. Next appointment May 19 at 12:30

## 2025-04-29 ENCOUNTER — NURSE TRIAGE (OUTPATIENT)
Age: 75
End: 2025-04-29

## 2025-04-29 DIAGNOSIS — D46.Z MDS (MYELODYSPLASTIC SYNDROME), HIGH GRADE (HCC): Primary | ICD-10-CM

## 2025-04-29 NOTE — TELEPHONE ENCOUNTER
"Answer Assessment - Initial Assessment Questions  1. RESPIRATORY STATUS: \"Describe your breathing?\" (e.g., wheezing, shortness of breath, unable to speak, severe coughing)       Short of breath with walking.  Stops when resting  2. ONSET: \"When did this breathing problem begin?\"       Had this prior when she was diagnosed with leukemia.  Feels anxious.  3. PATTERN \"Does the difficult breathing come and go, or has it been constant since it started?\"       Has shortness of breath when walking.  Feels fatigued and anxious.  Can't rest. Heart feels that her heart is not beating right. No chest pains.   4. SEVERITY: \"How bad is your breathing?\" (e.g., mild, moderate, severe)       She feels it is severe if she does not sit down.   5. RECURRENT SYMPTOM: \"Have you had difficulty breathing before?\" If Yes, ask: \"When was the last time?\" and \"What happened that time?\"       When she was dx with leukemia.   6. CARDIAC HISTORY: \"Do you have any history of heart disease?\" (e.g., heart attack, angina, bypass surgery, angioplasty)       Yes, had open heart surgery.   7. LUNG HISTORY: \"Do you have any history of lung disease?\"  (e.g., pulmonary embolus, asthma, emphysema)      no  8. CAUSE: \"What do you think is causing the breathing problem?\"       She is worried about her leukemia recurring.   9. OTHER SYMPTOMS: \"Do you have any other symptoms?\" (e.g., chest pain, cough, dizziness, fever, runny nose)      Heart palpitations, a few days ago.  NOT NOW  10. O2 SATURATION MONITOR:  \"Do you use an oxygen saturation monitor (pulse oximeter) at home?\" If Yes, ask: \"What is your reading (oxygen level) today?\" \"What is your usual oxygen saturation reading?\" (e.g., 95%)        NO    12. TRAVEL: \"Have you traveled out of the country in the last month?\" (e.g., travel history, exposures)        NO    Protocols used: Breathing Difficulty-Adult-[place]    "

## 2025-04-29 NOTE — TELEPHONE ENCOUNTER
Called and spoke to Pretty. She states that she got her last treatment on 4/25 and has been feeling more short of breath with exertion since. She does feel a little better today than yesterday. Did have palpitations a couple days ago. Denies lightheadedness, dizziness, nausea, vomiting, or dehydration. Pretty does not feel like she has to go to the ED, but would like to get labs repeated today. Lab orders placed. I will follow up on the results and let her know if there are any concerns. If her symptoms should worsen (more short of breath, dizzy, lightheaded, etc.) she should proceed to the ED. Pretty voiced understanding.

## 2025-04-29 NOTE — TELEPHONE ENCOUNTER
FOLLOW UP: Pt has new shortness of breath with activity.  Feels like she felt when first dx with Leukemia.     REASON FOR CONVERSATION: No chief complaint on file.    SYMPTOMS: anxious, sob with ambulation, has had heart palpations.    OTHER: asking for anxiety medications also.     DISPOSITION: No disposition on file.Would like a c/b.

## 2025-04-30 ENCOUNTER — APPOINTMENT (OUTPATIENT)
Dept: LAB | Facility: CLINIC | Age: 75
End: 2025-04-30
Attending: INTERNAL MEDICINE
Payer: MEDICARE

## 2025-04-30 DIAGNOSIS — D46.Z MDS (MYELODYSPLASTIC SYNDROME), HIGH GRADE (HCC): ICD-10-CM

## 2025-04-30 DIAGNOSIS — F41.9 ANXIETY: ICD-10-CM

## 2025-04-30 LAB
ALBUMIN SERPL BCG-MCNC: 3.8 G/DL (ref 3.5–5)
ALP SERPL-CCNC: 65 U/L (ref 34–104)
ALT SERPL W P-5'-P-CCNC: 15 U/L (ref 7–52)
ANION GAP SERPL CALCULATED.3IONS-SCNC: 7 MMOL/L (ref 4–13)
AST SERPL W P-5'-P-CCNC: 13 U/L (ref 13–39)
BASOPHILS # BLD AUTO: 0.02 THOUSANDS/ÂΜL (ref 0–0.1)
BASOPHILS NFR BLD AUTO: 0 % (ref 0–1)
BILIRUB SERPL-MCNC: 0.45 MG/DL (ref 0.2–1)
BUN SERPL-MCNC: 38 MG/DL (ref 5–25)
CALCIUM SERPL-MCNC: 9 MG/DL (ref 8.4–10.2)
CHLORIDE SERPL-SCNC: 104 MMOL/L (ref 96–108)
CO2 SERPL-SCNC: 28 MMOL/L (ref 21–32)
CREAT SERPL-MCNC: 1.01 MG/DL (ref 0.6–1.3)
EOSINOPHIL # BLD AUTO: 0.06 THOUSAND/ÂΜL (ref 0–0.61)
EOSINOPHIL NFR BLD AUTO: 1 % (ref 0–6)
ERYTHROCYTE [DISTWIDTH] IN BLOOD BY AUTOMATED COUNT: 18.6 % (ref 11.6–15.1)
GFR SERPL CREATININE-BSD FRML MDRD: 55 ML/MIN/1.73SQ M
GLUCOSE P FAST SERPL-MCNC: 109 MG/DL (ref 65–99)
HCT VFR BLD AUTO: 35.5 % (ref 34.8–46.1)
HGB BLD-MCNC: 11.6 G/DL (ref 11.5–15.4)
IMM GRANULOCYTES # BLD AUTO: 0.09 THOUSAND/UL (ref 0–0.2)
IMM GRANULOCYTES NFR BLD AUTO: 2 % (ref 0–2)
LYMPHOCYTES # BLD AUTO: 1.38 THOUSANDS/ÂΜL (ref 0.6–4.47)
LYMPHOCYTES NFR BLD AUTO: 25 % (ref 14–44)
MCH RBC QN AUTO: 34.2 PG (ref 26.8–34.3)
MCHC RBC AUTO-ENTMCNC: 32.7 G/DL (ref 31.4–37.4)
MCV RBC AUTO: 105 FL (ref 82–98)
MONOCYTES # BLD AUTO: 0.37 THOUSAND/ÂΜL (ref 0.17–1.22)
MONOCYTES NFR BLD AUTO: 7 % (ref 4–12)
NEUTROPHILS # BLD AUTO: 3.71 THOUSANDS/ÂΜL (ref 1.85–7.62)
NEUTS SEG NFR BLD AUTO: 65 % (ref 43–75)
NRBC BLD AUTO-RTO: 1 /100 WBCS
PLATELET # BLD AUTO: 273 THOUSANDS/UL (ref 149–390)
PMV BLD AUTO: 10.9 FL (ref 8.9–12.7)
POTASSIUM SERPL-SCNC: 3.6 MMOL/L (ref 3.5–5.3)
PROT SERPL-MCNC: 6.9 G/DL (ref 6.4–8.4)
RBC # BLD AUTO: 3.39 MILLION/UL (ref 3.81–5.12)
SODIUM SERPL-SCNC: 139 MMOL/L (ref 135–147)
WBC # BLD AUTO: 5.63 THOUSAND/UL (ref 4.31–10.16)

## 2025-04-30 PROCEDURE — 36415 COLL VENOUS BLD VENIPUNCTURE: CPT

## 2025-04-30 PROCEDURE — 85025 COMPLETE CBC W/AUTO DIFF WBC: CPT

## 2025-04-30 PROCEDURE — 80053 COMPREHEN METABOLIC PANEL: CPT

## 2025-04-30 RX ORDER — LORAZEPAM 0.5 MG/1
0.5 TABLET ORAL 2 TIMES DAILY
Qty: 60 TABLET | Refills: 0 | Status: SHIPPED | OUTPATIENT
Start: 2025-04-30

## 2025-04-30 NOTE — TELEPHONE ENCOUNTER
Called and spoke to Pretty. Her labs from today look good. Her counts are all improved and would not explain her increased shortness of breath. It is probably just a side effect from her chemotherapy, but I can order an EKG since she was complaining of some heart palpitations prior. Pretty does state that she feels a little better today.    Pretty had also mentioned feeling anxious so I asked if she was taking her prescribed ativan. Pretty states that she is out and needs a refill. I will send a message to her PCP to get that filled for her. Pretty voiced understanding and appreciated the call.

## 2025-05-01 ENCOUNTER — TELEPHONE (OUTPATIENT)
Dept: OBGYN CLINIC | Facility: CLINIC | Age: 75
End: 2025-05-01

## 2025-05-01 NOTE — TELEPHONE ENCOUNTER
Called and spoke with pt regarding her apt on 5/5 with .  I advised that  does not treat prior HERVE pain and we will need to r/s her with a joint specialist.  She then stated she needs a revision surgery and I advised he does not do those either.  She has previously seen by Dr. Stanley and was supposed to schedule surgery.  She stated she wants a second opinion. Offered an apt with Dr. Rosas on 5/8 and she accepted.  Pt wanted to keep her apt with Dr. Stanley for 5/23.  Pt is aware of location and suite number.

## 2025-05-08 ENCOUNTER — OFFICE VISIT (OUTPATIENT)
Dept: OBGYN CLINIC | Facility: CLINIC | Age: 75
End: 2025-05-08

## 2025-05-08 VITALS — WEIGHT: 178 LBS | BODY MASS INDEX: 30.39 KG/M2 | HEIGHT: 64 IN

## 2025-05-08 DIAGNOSIS — T84.021A FAILURE OF LEFT TOTAL HIP ARTHROPLASTY WITH DISLOCATION OF HIP, INITIAL ENCOUNTER (HCC): Primary | ICD-10-CM

## 2025-05-08 PROCEDURE — 99024 POSTOP FOLLOW-UP VISIT: CPT | Performed by: STUDENT IN AN ORGANIZED HEALTH CARE EDUCATION/TRAINING PROGRAM

## 2025-05-08 NOTE — PROGRESS NOTES
Date: 25  Pretty Aguila   MRN# [id number]  : 1950      Chief Complaint: Left Hip Pain      The patient verbalized understanding of exam findings and treatment plan. We engaged in the shared decision-making process and treatment options were discussed at length with the patient. Surgical and conservative management discussed today along with risks and benefits. Patient was agreeable with the plan and all questions were answered to satisfaction.     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 They have completely exhausted conservative measures including medications, activity modification and therapy with minimal relief. Based on their history and physical examination, I have recommended revision total hip replacement, one vs two components. While no guarantees were made, I believe that surgical intervention provides the best chance at providing relief. I recommend:     Left cup only revision total hip arthroplasty  - Thorough discussion was had with the patient regarding the etiology of her hip instability.  It was explained that, given her persistent hip instability requiring close reduction, she would likely benefit from revision surgery which would address positioning of the cup as well as the possible bearing surface.  This would be be done through an anterior approach.  All questions were answered to the patient's satisfaction.  - Patient would like to think about this and will hold on scheduling for now  - She may call or return to clinic when she would like to schedule her surgery   - risks and benefits were discussed with the pt. All questions and concerns addressed  - Will obtain ESR, CRP prior to surgery to r/o infection  - May be WBAT   - Tylenol and NSAIDs for pain control   - f/u prn     REVISION TOTAL HIP ARTHROPLASTY INDICATIONS AND RISKS    Complex  revision surgery risks, benefits, and alternatives were discussed with the patient regarding operative intervention. Risks include but are not limited to pain, bleeding, scarring, infection, damage to nerves, arteries, veins, failure of procedure, possible loosening, migration, or hardware failure, possible painful or prominent hardware, joint stiffness, need for further procedures, nonunion, malunion, dislocation, loss of limb, heart attack, stroke and death. Patient voiced understanding and wishes to proceed with operative interventions.        The patients current BMI is Body mass index is 30.55 kg/m². .Patient was counseled about the importance of weight loss prior to surgery, IF BMI is >35 prior to surgery my recommendation is that the patient considers nutritional consultation and further weight loss prior to surgical management. These recommendations were discussed with the patient as increased BMI particularly >40 increases the risk of infection.    IF the patient is a smoker, I discussed the importance of quitting smoking to decrease risk of infection postoperatively and promote good wound healing.     The procedure has been explained and all presented questions have been answered at the present time. The patient may call or come with any other concerns or questions. The patient also understands the post-operative rehabilitation process and the need for their cooperation and participation, and that their results may be compromised by their lack of compliance. The patient would like to proceed.  The patient is encouraged to seek additional opinions if desired.              Subjective:     Hip Pain  75 y.o. female presents to the office, referred by PCP,, for evaluation of left hip pain. This is evaluated as a personal injury. The pain began several years ago. She states the hip has always been unstable since her surgery. She describes several dislocations. The pain is located diffuse and is made worse with  "walking and sleeping on it. Patient rates their pain as 7/10.  She describes the symptoms as aching and sharp. Symptoms improve with rest. The symptoms are worse with activity. The hip has given out or felt unstable. No other orthopedic complaints or concerns.     Prior treatment:  NSAIDs Yes    Physical Therapy Yes   Cortisone Injections Not Asked     External Records Reviewed:   lab reports, office notes, operative reports, radiology reports, and x-ray reports    Orthopedic PMH  Previously seen on 4/8/25 by Dr. Stanley for left dennis revision discussion    Orthopedic Surgical History  2014 - left total hip arthroplasty w/ Dr. Long    Estimated body mass index is 30.55 kg/m² as calculated from the following:    Height as of this encounter: 5' 4\" (1.626 m).    Weight as of this encounter: 80.7 kg (178 lb).    Lab Results   Component Value Date    HGBA1C 6.1 (H) 02/28/2025    HGBA1C 5.9 (H) 10/26/2024    HGBA1C 5.7 03/27/2024   .   Lab Results   Component Value Date    EGFR 55 04/30/2025    EGFR 75 04/18/2025    EGFR 88 03/20/2025    CREATININE 1.01 04/30/2025    CREATININE 0.78 04/18/2025    CREATININE 0.65 03/20/2025        Allergy:  Allergies   Allergen Reactions    Contrast Dye [Iodinated Contrast Media] Anaphylaxis    Iodine - Food Allergy Anaphylaxis     Reaction Date: 07Jan2008;     Povidone Iodine Anaphylaxis    Shellfish-Derived Products - Food Allergy Anaphylaxis     Medications:  all current active meds have been reviewed  Past Medical History:  Past Medical History:   Diagnosis Date    Allergic     Anxiety     Arthritis     Breast cancer (HCC) 09/2023    CAD (coronary artery disease)     Coronary artery disease 2017111    Depression     Diabetes mellitus (HCC) 5/1/24    Mother-Diabetic    Dyslipidemia     Hiatal hernia     Hyperlipidemia     Hypertension     Obesity     Obesity (BMI 30-39.9)     Psoriatic arthritis (HCC)     Rheumatoid arthritis (HCC)     Scoliosis     SOB (shortness of breath)      Past " Surgical History:  Past Surgical History:   Procedure Laterality Date    BARIATRIC SURGERY  2014    BILE DUCT EXPLORATION      replacement per [facility]    BREAST BIOPSY  9/23    CARPAL TUNNEL RELEASE Bilateral 2002    CORONARY ARTERY BYPASS GRAFT  2017    FOOT SURGERY Right     bone surgery to straighten toe.    HIP SURGERY Left 2015    IR BIOPSY BONE MARROW  12/11/2024    IR DRAINAGE TUBE PLACEMENT  12/19/2023    JOINT REPLACEMENT      LYMPH NODE BIOPSY Left 11/20/2023    Procedure: LEFT LYMPHATIC MAPPING WITH BLUE AND RADIOACTIVE DYES, SENTINEL LYMPH NODE BIOPSY;  Surgeon: Adrien Gabriel MD;  Location: [facility];  Service: Surgical Oncology    LYMPH NODE DISSECTION Left 11/20/2023    Procedure: POSSIBLE AXILLARY DISSECTION;  Surgeon: Adrien Gabriel MD;  Location: [facility];  Service: Surgical Oncology    RI BREAST REDUCTION Bilateral 01/16/2024    Procedure: RIGHT BREAST REDUCTION AND LEFT BREAST EXPANDER EXCHANGE FOR PERMANENT IMPLANT. REVISION OF RECONSTRUCTED LEFT BREAST.;  Surgeon: Molina Jimenez MD;  Location:  MAIN OR;  Service: Plastics    RI SUCTION ASSISTED LIPECTOMY TRUNK Bilateral 01/16/2024    Procedure: LIPOSUCTION BREAST;  Surgeon: Molina Jimenez MD;  Location: [facility];  Service: Plastics    RI TISSUE EXPANDER PLACEMENT BREAST RECONSTRUCTION Left 11/20/2023    Procedure: IMMEDIATE LEFT BREAST RECONSTRUCTION WITH TISSUE EXPANDER AND ADM;  Surgeon: Molina Jimenez MD;  Location: [facility];  Service: Plastics    REPLACEMENT TOTAL KNEE Right 04/2017    SIMPLE MASTECTOMY Left 11/20/2023    Procedure: LEFT BREAST VERENICE  DIRECTED MASTECTOMY;  Surgeon: Adrien Gabriel MD;  Location:  MAIN OR;  Service: Surgical Oncology    SLEEVE GASTROPLASTY      TONSILLECTOMY      TOTAL HIP ARTHROPLASTY Right 2017    US GUIDED BREAST BIOPSY LEFT COMPLETE Left 09/19/2023     Family History:  Family History   Problem Relation Age of Onset    Hypertension Mother     Diabetes Mother     Heart disease Father         CABG x5     "Arthritis Father     No Known Problems Sister     No Known Problems Sister     No Known Problems Daughter     No Known Problems Daughter     No Known Problems Son     Breast cancer Neg Hx      Social History:  Social History     Substance and Sexual Activity   Alcohol Use Yes    Alcohol/week: 2.0 standard drinks of alcohol    Types: 2 Glasses of wine per week    Comment: social      Social History     Substance and Sexual Activity   Drug Use No     Social History     Tobacco Use   Smoking Status Former    Current packs/day: 0.00    Average packs/day: 0.5 packs/day for 7.0 years (3.5 ttl pk-yrs)    Types: Cigarettes    Start date: 2010    Quit date:     Years since quittin.3   Smokeless Tobacco Never   Tobacco Comments    Have already quit smoking in 2017           Review of Systems:  General- denies fever/chills  HEENT- denies hearing loss or sore throat  Eyes- denies eye pain or visual disturbances, denies red eyes  Respiratory- denies cough or SOB  Cardio- denies chest pain or palpitations  GI- denies abdominal pain  Endocrine- denies urinary frequency  Urinary- denies pain with urination  Musculoskeletal- Negative except noted above  Skin- denies rashes or wounds  Neurological- denies dizziness or headache  Psychiatric- denies anxiety or difficulty concentrating      Objective:   BP Readings from Last 1 Encounters:   25 128/78      Wt Readings from Last 1 Encounters:   25 80.7 kg (178 lb)      Pulse Readings from Last 1 Encounters:   25 69        BMI: Estimated body mass index is 30.55 kg/m² as calculated from the following:    Height as of this encounter: 5' 4\" (1.626 m).    Weight as of this encounter: 80.7 kg (178 lb).      Physical Exam  Ht 5' 4\" (1.626 m)   Wt 80.7 kg (178 lb)   BMI 30.55 kg/m²   General/Constitutional: No apparent distress: well-nourished and well developed.  Eyes: normal ocular motion  Cardio: RRR, Normal S1S2, No m/r/g.   Lymphatic: No appreciable " lymphadenopathy  Respiratory: Non-labored breathing, CTA b/l no w/c/r  Vascular: No edema, swelling or tenderness, except as noted in detailed exam. Extremities well perfused. No LE edema  Integumentary: No impressive skin lesions present, except as noted in detailed exam.  Neuro: No ataxia or tremors noted  Psych: Normal mood and affect, oriented to person, place and time. Appropriate affect.  Musculoskeletal: Normal, except as noted in detailed exam and in HPI.    Gait and Station:   antalgic    Left Hip Exam    Inspection: Well healed posterolateral incision    Range of Motion: full with pain at end range    negative  log roll   negative Trendelenburg sign  negative  Stinchfield  negative  ROCIO  positive FADDIR    Motor: 5/5 Q/HS/TA/GS/EHL/FHL    Vascular: Toes WWP with BCR    SILT DP/SP/Starr/Saph/Tib      Images:    I personally reviewed relevant images in the PACS system and my interpretation is as follows:    X-rays of the left hip reveals HERVE hardware in expected position without signs of failure or loosening.       Scribe Attestation      I,:  Teo Mazariegos PA-C am acting as a scribe while in the presence of the attending physician.:       I,:  Darius Andino MD personally performed the services described in this documentation    as scribed in my presence.:               Darius Andino MD  Adult Reconstruction Specialist   [facility]

## 2025-05-10 NOTE — PROGRESS NOTES
Medical Oncology: Outpatient Progress Note:                        Name: Pretty Aguila      : 1950      MRN: [id number]         Encounter Provider: Geno Carranza MD  Encounter Date: 2025        Encounter department: [facility] [place]  Assessment & Plan  MDS (myelodysplastic syndrome), high grade (HCC)  Patient is a 75-year-old female, with an established history of high-grade myelodysplastic syndrome (Currently on combination HMA-therapy); who presents today for interval follow-up. As above, patient is currently on combination-HMA therapy with high-grade myelodysplastic syndrome. Single-agent Azacitidine 75 mg/m2 IV Daily, on Days 1-5 was initiated on 2025 (Cycle 1). She has since completed four-cycles of the above-mentioned, most recently administered on May 19th, 2025 (Cycle 4 Day 1). with delay due to neutropenia. Venetoclax was also added with initiation of Cycle 3, and was administered, as follows: Venetoclax 100 mg Daily, Days 1-7. On evaluation this morning, patient is clinically well, overall. She is tolerating combination HMA-therapy appropriately, with minimal treatment-related toxicities. Repeat hematologic-parameters show stable leukopenia without neutropenia (WBC: 3.36 ANC: 2,090), stable macrocytic anemia (Hemoglobin: 10.9 MCV: 108), and normal platelet count (Platelet Count: 311). Creatinine is stable, and appears near-baseline (Creatinine: 0.97 eGFR: 58 Baseline Creatinine: 0.67 to 1.13). Will proceed to Cycle 5 Day 1 of treatment, as planned. Remaining recommendations, as outlined below. Patient expressed understanding and agreement.    Summary of Recommendations:  Prescribed Clotrimazole 1% Cream BID, for tinea pedis involving the left foot.  Referral placed to Palliative Care, at patient request, for coordination for medical-marijuana.  Initiated Cycle 5 of combination HMA-therapy, on May 19th, 2025:  Azacitidine 75 mg/m2 IV Daily,  on Days 1-5.  Venetoclax 100 mg Daily, on Days 1-7.  Note: Plan to increase to Venetoclax 100 mg Daily, on Days 1-10.  Continue antimicrobial-prophylaxis, as follows:  Continue Acyclovir 400 mg BID.  Start Bactrim DS Tablet Monday, Wednesday, and Friday.  Obtain repeat Bone Marrow Biopsy in June 2025, prior to Cycle 6.  Transfuse leukoreduced, and irradiated blood-products, as follows:  Transfuse at Hemoglobin less than 7.0 gm/dL.  Transfuse at Platelet Count less than 11 k/mm3.  Recommend close interval follow-up in approximately 1-month.  Note: Recommend holding-off of any interventions via Orthopedic Surgery, pending repeat Bone Marrow Biopsy.       Malignant neoplasm of lower-outer quadrant of left breast of female, estrogen receptor positive (HCC)  Additionally, patient has an established history of early-stage hormone-positive HER-2 1+ left breast cancer, with low-risk Oncotype Dx Recurrence Score: 9 (Status-post left breast mastectomy, with sentinel lymph node biopsy, followed by left breast reconstruction with submuscular tissue-expander and acellular dermal matrix, on November 20th, 2023); Currently on adjuvant endocrine-therapy with Anastrozole); who presents today for interval follow-up. On evaluation today, patient is clinically well. She continues to tolerate Anastrozole with minimal treatment-related toxicities. Will continue the above-mentioned accordingly.    Summary of Recommendations:  Continue adjuvant endocrine-therapy with Anastrozole 1 mg Daily.  For endocrine-therapy, associated bone-loss:  Recommend repeat DEXA-scan in December 2025.  Annual screening mammography of the right breast (Due September 2025).  Recommend close interval follow-up, as above.       History of Present Illness   Chief Complaint: Follow-up.  Interval Events: Pretty Aguila is a 75-year-old female, with an established history of high-grade myelodysplastic syndrome (Currently on combination HMA-therapy); who presents today  for interval follow-up. On evaluation today, patient has multiple concerns; some of which have been ongoing for weeks. Patient endorses bilateral lower extremity swelling, with slight asymmetry (Left greater than right). Prior Doppler Ultrasound of the Lower Extremities on April 22nd, 2025, was unremarkable for deep vein thrombosis. Patient also had a CT of the Left Lower Extremity, of which was negative for dislocation. Patient discussed potential hip replacement with Orthopedic Surgery. We discussed consideration after repeat Bone Marrow Biopsy. Overall, lower extremity swelling appears to improve with leg-elevation alone. She cannot tolerate compression-stockings. Additionally, patient reports increased appetite, status-post discontinuation of weight-loss medication. Her weight remains largely stable in the 183-188-pound range. Lastly, patient requested a topical agent for interval development of a pruritic-rash involving the left foot. It appears to be tinea pedis, without involvement of the toe nails. Will address, as per above. Discussed the plan, in great detail, with patient and her daughter. Both parties expressed understanding and agreement.    Oncology History   Cancer Staging   Malignant neoplasm of lower-outer quadrant of left breast of female, estrogen receptor positive (HCC)  Staging form: Breast, AJCC 8th Edition  - Pathologic stage from 11/20/2023: Stage IA (pT2, pN0(sn), cM0, G2, ER+, OR+, HER2-) - Signed by Adrien Gabriel MD on 12/6/2023  Stage prefix: Initial diagnosis  Method of lymph node assessment: [place] lymph node biopsy  Multigene prognostic tests performed: None  Histologic grading system: 3 grade system  Oncology History   Malignant neoplasm of lower-outer quadrant of left breast of female, estrogen receptor positive (HCC)   9/19/2023 Biopsy    Left breast ultrasound-guided biopsy  4 o'clock, 2 cm from nipple (VERENICE)  Invasive breast carcinoma of no special type (ductal NST)  Grade 2  ER  90, HI 90, HER2 1+\  Lymphovascular invasion not definitively identified    Concordant. Malignancy appears unifocal; however, oval circumscribed mass in the upper outer left breast is not accounted for. Bilateral breast MRI is recommended. Biopsy-proven carcinoma measured 3.1 cm on US. Left axilla US negative. Right breast clear.     9/28/2023 Observation    Bilateral breast MRI: Unifocal carcinoma in the lower outer left breast with possible skin involvement. There are no suspicious enhancing masses or suspicious areas of non-mass enhancement in right breast. There is no axillary or internal mammary adenopathy.     10/17/2023 Genetic Testing    A total of 47 genes were evaluated, including: HUYEN, BRCA1, BRCA2, CDH1, CHEK2, PALB2, PTEN, STK11, TP53  Negative result. No pathogenic sequence variants identified  [employer]     11/20/2023 Surgery    Left VERENICE  directed mastectomy with SLN biopsy   Invasive carcinoma of no special type (ductal)  Grade 2  3.6 cm   Margins negative  0/1 Lymph node  Anatomic Stage IIA  Prognostic Stage IA    Immediate reconstruction with tissue expander and ADM (Dr. Jimenez)     11/20/2023 -  Cancer Staged    Staging form: Breast, AJCC 8th Edition  - Pathologic stage from 11/20/2023: Stage IA (pT2, pN0(sn), cM0, G2, ER+, HI+, HER2-) - Signed by Adrien Gabriel MD on 12/6/2023  Stage prefix: Initial diagnosis  Method of lymph node assessment: Loop lymph node biopsy  Multigene prognostic tests performed: None  Histologic grading system: 3 grade system       MDS (myelodysplastic syndrome), high grade (HCC)   12/30/2024 Initial Diagnosis    MDS (myelodysplastic syndrome), high grade (HCC)     1/20/2025 -  Chemotherapy    azaCITIDine (VIDAZA), 75 mg/m2 = 137.5 mg (100 % of original dose 75 mg/m2), 5 of 7 cycles  Dose modification: 75 mg/m2 (original dose 75 mg/m2, Cycle 0, Reason: Anticipated Tolerance)  Administration: 137.5 mg (1/20/2025), 137.5 mg (1/21/2025), 137.5 mg (2/17/2025), 137.5 mg  (1/22/2025), 137.5 mg (1/23/2025), 137.5 mg (1/24/2025), 137.5 mg (2/18/2025), 137.5 mg (2/19/2025), 137.5 mg (2/20/2025), 137.5 mg (2/21/2025), 137.5 mg (3/24/2025), 137.5 mg (3/25/2025), 137.5 mg (3/26/2025), 137.5 mg (3/27/2025), 137.5 mg (3/28/2025), 137.5 mg (4/21/2025), 137.5 mg (4/22/2025), 137.5 mg (4/23/2025), 137.5 mg (4/24/2025), 137.5 mg (4/25/2025)        Review of Systems  Review of Systems:  All systems reviewed and negative except otherwise listed in the History of Present Illness.      Objective   Vitals:    05/19/25 1330   BP: 108/73   Pulse: 77   Resp: 18   Temp: 97.7 °F (36.5 °C)   SpO2: 97%     ECOG   1-Points.    Physical Exam:  General: Alert, and oriented; Pleasant, and conversational; Well-Appearing Female  HEENT: Atraumatic, and normocephalic; PERRLA; EOMI; Moist mucosal membranes  Neck: Trachea midline; No neck masses, thyromegaly, or cervical lymphadenopathy  Cardiovascular: Regular rate and rhythm; No murmurs, rubs, or gallops appreciated; No edema  Respiratory: Clear to auscultation bilaterally; Adequate aeration; No supplemental oxygen  Abdomen: Soft, non-tender; Non-distended; No organomegaly appreciated; Bowel sounds present  Extremities: No obvious deformities; No peripheral edema; Moves all; Dorsal Aspect of Left Foot Appears Erythematous Without Obvious Rash; Trace to 1+ Peripheral Edema   Neurologic: Appropriately alert, and oriented to Person, Place, and Time; No focal neurologic deficits; Ambulates with Right-Handed Cane    Labs: I have reviewed the following labs:  Lab Results   Component Value Date/Time    WBC 5.63 04/30/2025 11:03 AM    RBC 3.39 (L) 04/30/2025 11:03 AM    Hemoglobin 11.6 04/30/2025 11:03 AM    Hematocrit 35.5 04/30/2025 11:03 AM     (H) 04/30/2025 11:03 AM    MCH 34.2 04/30/2025 11:03 AM    RDW 18.6 (H) 04/30/2025 11:03 AM    Platelets 273 04/30/2025 11:03 AM    Segmented % 65 04/30/2025 11:03 AM    Lymphocytes % 25 04/30/2025 11:03 AM    Monocytes %  7 04/30/2025 11:03 AM    Eosinophils Relative 1 04/30/2025 11:03 AM    Basophils Relative 0 04/30/2025 11:03 AM    Immature Grans % 2 04/30/2025 11:03 AM    Absolute Neutrophils 3.71 04/30/2025 11:03 AM     Lab Results   Component Value Date/Time    Potassium 3.6 04/30/2025 11:03 AM    Chloride 104 04/30/2025 11:03 AM    CO2 28 04/30/2025 11:03 AM    BUN 38 (H) 04/30/2025 11:03 AM    Creatinine 1.01 04/30/2025 11:03 AM    Glucose, Fasting 109 (H) 04/30/2025 11:03 AM    Calcium 9.0 04/30/2025 11:03 AM    Corrected Calcium 9.3 03/15/2025 04:11 PM    AST 13 04/30/2025 11:03 AM    ALT 15 04/30/2025 11:03 AM    Alkaline Phosphatase 65 04/30/2025 11:03 AM    Total Protein 6.9 04/30/2025 11:03 AM    Albumin 3.8 04/30/2025 11:03 AM    Total Bilirubin 0.45 04/30/2025 11:03 AM    eGFR 55 04/30/2025 11:03 AM     Patient was seen and discussed with attending physician, Geno Carranza M.D., Ph.D.    Chiqui Pablo D.O.  Hematology-Oncology Fellow (PGY-5)

## 2025-05-15 RX ORDER — SODIUM CHLORIDE 9 MG/ML
20 INJECTION, SOLUTION INTRAVENOUS ONCE
Status: CANCELLED | OUTPATIENT
Start: 2025-05-23

## 2025-05-15 RX ORDER — ONDANSETRON 8 MG/1
16 TABLET, ORALLY DISINTEGRATING ORAL ONCE
Status: CANCELLED
Start: 2025-05-19 | End: 2025-05-19

## 2025-05-15 RX ORDER — SODIUM CHLORIDE 9 MG/ML
20 INJECTION, SOLUTION INTRAVENOUS ONCE
Status: CANCELLED | OUTPATIENT
Start: 2025-05-21

## 2025-05-15 RX ORDER — DEXAMETHASONE 4 MG/1
10 TABLET ORAL ONCE
Status: CANCELLED
Start: 2025-05-20

## 2025-05-15 RX ORDER — DEXAMETHASONE 4 MG/1
10 TABLET ORAL ONCE
Status: CANCELLED
Start: 2025-05-21

## 2025-05-15 RX ORDER — SODIUM CHLORIDE 9 MG/ML
20 INJECTION, SOLUTION INTRAVENOUS ONCE
Status: CANCELLED | OUTPATIENT
Start: 2025-05-22

## 2025-05-15 RX ORDER — SODIUM CHLORIDE 9 MG/ML
20 INJECTION, SOLUTION INTRAVENOUS ONCE
Status: CANCELLED | OUTPATIENT
Start: 2025-05-20

## 2025-05-15 RX ORDER — DEXAMETHASONE 4 MG/1
10 TABLET ORAL ONCE
Status: CANCELLED
Start: 2025-05-19

## 2025-05-15 RX ORDER — ONDANSETRON 8 MG/1
16 TABLET, ORALLY DISINTEGRATING ORAL ONCE
Status: CANCELLED
Start: 2025-05-23 | End: 2025-05-23

## 2025-05-15 RX ORDER — ONDANSETRON 8 MG/1
16 TABLET, ORALLY DISINTEGRATING ORAL ONCE
Status: CANCELLED
Start: 2025-05-21 | End: 2025-05-21

## 2025-05-15 RX ORDER — SODIUM CHLORIDE 9 MG/ML
20 INJECTION, SOLUTION INTRAVENOUS ONCE
Status: CANCELLED | OUTPATIENT
Start: 2025-05-19

## 2025-05-15 RX ORDER — ONDANSETRON 8 MG/1
16 TABLET, ORALLY DISINTEGRATING ORAL ONCE
Status: CANCELLED
Start: 2025-05-22 | End: 2025-05-22

## 2025-05-15 RX ORDER — DEXAMETHASONE 4 MG/1
10 TABLET ORAL ONCE
Status: CANCELLED
Start: 2025-05-22

## 2025-05-15 RX ORDER — ONDANSETRON 8 MG/1
16 TABLET, ORALLY DISINTEGRATING ORAL ONCE
Status: CANCELLED
Start: 2025-05-20 | End: 2025-05-20

## 2025-05-15 RX ORDER — DEXAMETHASONE 4 MG/1
10 TABLET ORAL ONCE
Status: CANCELLED
Start: 2025-05-23

## 2025-05-17 ENCOUNTER — APPOINTMENT (OUTPATIENT)
Dept: LAB | Facility: CLINIC | Age: 75
End: 2025-05-17
Payer: MEDICARE

## 2025-05-17 DIAGNOSIS — D46.Z MDS (MYELODYSPLASTIC SYNDROME), HIGH GRADE (HCC): ICD-10-CM

## 2025-05-17 LAB
ALBUMIN SERPL BCG-MCNC: 3.7 G/DL (ref 3.5–5)
ALP SERPL-CCNC: 66 U/L (ref 34–104)
ALT SERPL W P-5'-P-CCNC: 22 U/L (ref 7–52)
ANION GAP SERPL CALCULATED.3IONS-SCNC: 6 MMOL/L (ref 4–13)
AST SERPL W P-5'-P-CCNC: 19 U/L (ref 13–39)
BASOPHILS # BLD AUTO: 0.02 THOUSANDS/ÂΜL (ref 0–0.1)
BASOPHILS NFR BLD AUTO: 1 % (ref 0–1)
BILIRUB SERPL-MCNC: 0.37 MG/DL (ref 0.2–1)
BUN SERPL-MCNC: 26 MG/DL (ref 5–25)
CALCIUM SERPL-MCNC: 9.1 MG/DL (ref 8.4–10.2)
CHLORIDE SERPL-SCNC: 106 MMOL/L (ref 96–108)
CO2 SERPL-SCNC: 28 MMOL/L (ref 21–32)
CREAT SERPL-MCNC: 0.97 MG/DL (ref 0.6–1.3)
EOSINOPHIL # BLD AUTO: 0.08 THOUSAND/ÂΜL (ref 0–0.61)
EOSINOPHIL NFR BLD AUTO: 2 % (ref 0–6)
ERYTHROCYTE [DISTWIDTH] IN BLOOD BY AUTOMATED COUNT: 16.7 % (ref 11.6–15.1)
GFR SERPL CREATININE-BSD FRML MDRD: 58 ML/MIN/1.73SQ M
GLUCOSE P FAST SERPL-MCNC: 132 MG/DL (ref 65–99)
HCT VFR BLD AUTO: 34.4 % (ref 34.8–46.1)
HGB BLD-MCNC: 10.9 G/DL (ref 11.5–15.4)
IMM GRANULOCYTES # BLD AUTO: 0.02 THOUSAND/UL (ref 0–0.2)
IMM GRANULOCYTES NFR BLD AUTO: 1 % (ref 0–2)
LYMPHOCYTES # BLD AUTO: 0.98 THOUSANDS/ÂΜL (ref 0.6–4.47)
LYMPHOCYTES NFR BLD AUTO: 29 % (ref 14–44)
MCH RBC QN AUTO: 34.2 PG (ref 26.8–34.3)
MCHC RBC AUTO-ENTMCNC: 31.7 G/DL (ref 31.4–37.4)
MCV RBC AUTO: 108 FL (ref 82–98)
MONOCYTES # BLD AUTO: 0.17 THOUSAND/ÂΜL (ref 0.17–1.22)
MONOCYTES NFR BLD AUTO: 5 % (ref 4–12)
NEUTROPHILS # BLD AUTO: 2.09 THOUSANDS/ÂΜL (ref 1.85–7.62)
NEUTS SEG NFR BLD AUTO: 62 % (ref 43–75)
NRBC BLD AUTO-RTO: 0 /100 WBCS
PLATELET # BLD AUTO: 311 THOUSANDS/UL (ref 149–390)
PMV BLD AUTO: 10.7 FL (ref 8.9–12.7)
POTASSIUM SERPL-SCNC: 4.5 MMOL/L (ref 3.5–5.3)
PROT SERPL-MCNC: 6.9 G/DL (ref 6.4–8.4)
RBC # BLD AUTO: 3.19 MILLION/UL (ref 3.81–5.12)
SODIUM SERPL-SCNC: 140 MMOL/L (ref 135–147)
WBC # BLD AUTO: 3.36 THOUSAND/UL (ref 4.31–10.16)

## 2025-05-17 PROCEDURE — 85025 COMPLETE CBC W/AUTO DIFF WBC: CPT

## 2025-05-17 PROCEDURE — 36415 COLL VENOUS BLD VENIPUNCTURE: CPT

## 2025-05-17 PROCEDURE — 80053 COMPREHEN METABOLIC PANEL: CPT

## 2025-05-19 ENCOUNTER — HOSPITAL ENCOUNTER (OUTPATIENT)
Dept: INFUSION CENTER | Facility: CLINIC | Age: 75
Discharge: HOME | End: 2025-05-19
Attending: INTERNAL MEDICINE
Payer: MEDICARE

## 2025-05-19 ENCOUNTER — OFFICE VISIT (OUTPATIENT)
Dept: HEMATOLOGY ONCOLOGY | Facility: CLINIC | Age: 75
End: 2025-05-19
Payer: MEDICARE

## 2025-05-19 VITALS
DIASTOLIC BLOOD PRESSURE: 73 MMHG | SYSTOLIC BLOOD PRESSURE: 108 MMHG | RESPIRATION RATE: 18 BRPM | HEART RATE: 77 BPM | TEMPERATURE: 97.7 F | BODY MASS INDEX: 31.92 KG/M2 | WEIGHT: 187 LBS | OXYGEN SATURATION: 97 % | HEIGHT: 64 IN

## 2025-05-19 VITALS
SYSTOLIC BLOOD PRESSURE: 108 MMHG | HEIGHT: 64 IN | HEART RATE: 81 BPM | BODY MASS INDEX: 31.92 KG/M2 | TEMPERATURE: 97.7 F | WEIGHT: 187 LBS | RESPIRATION RATE: 16 BRPM | DIASTOLIC BLOOD PRESSURE: 73 MMHG

## 2025-05-19 DIAGNOSIS — Z17.0 MALIGNANT NEOPLASM OF LOWER-OUTER QUADRANT OF LEFT BREAST OF FEMALE, ESTROGEN RECEPTOR POSITIVE (HCC): ICD-10-CM

## 2025-05-19 DIAGNOSIS — D46.Z MDS (MYELODYSPLASTIC SYNDROME), HIGH GRADE (HCC): Primary | ICD-10-CM

## 2025-05-19 DIAGNOSIS — C50.512 MALIGNANT NEOPLASM OF LOWER-OUTER QUADRANT OF LEFT BREAST OF FEMALE, ESTROGEN RECEPTOR POSITIVE (HCC): ICD-10-CM

## 2025-05-19 DIAGNOSIS — B35.3 TINEA PEDIS OF LEFT FOOT: ICD-10-CM

## 2025-05-19 PROCEDURE — 99215 OFFICE O/P EST HI 40 MIN: CPT | Performed by: INTERNAL MEDICINE

## 2025-05-19 RX ORDER — SODIUM CHLORIDE 9 MG/ML
20 INJECTION, SOLUTION INTRAVENOUS ONCE
Status: DISCONTINUED | OUTPATIENT
Start: 2025-05-19 | End: 2025-05-22 | Stop reason: HOSPADM

## 2025-05-19 RX ORDER — CLOTRIMAZOLE 1 %
CREAM (GRAM) TOPICAL 2 TIMES DAILY
Qty: 28 G | Refills: 0 | Status: SHIPPED | OUTPATIENT
Start: 2025-05-19 | End: 2025-06-18

## 2025-05-19 RX ORDER — ONDANSETRON 8 MG/1
16 TABLET, ORALLY DISINTEGRATING ORAL ONCE
Status: COMPLETED | OUTPATIENT
Start: 2025-05-19 | End: 2025-05-19

## 2025-05-19 RX ORDER — SULFAMETHOXAZOLE AND TRIMETHOPRIM 800; 160 MG/1; MG/1
1 TABLET ORAL 3 TIMES WEEKLY
Qty: 12 TABLET | Refills: 0 | Status: SHIPPED | OUTPATIENT
Start: 2025-05-19 | End: 2025-06-18

## 2025-05-19 RX ADMIN — AZACITIDINE 137.5 MG: 100 INJECTION, POWDER, LYOPHILIZED, FOR SOLUTION INTRAVENOUS; SUBCUTANEOUS at 13:18

## 2025-05-19 RX ADMIN — DEXAMETHASONE 10 MG: 2 TABLET ORAL at 12:55

## 2025-05-19 RX ADMIN — ONDANSETRON 16 MG: 8 TABLET, ORALLY DISINTEGRATING ORAL at 12:55

## 2025-05-19 NOTE — ASSESSMENT & PLAN NOTE
Patient is a 75-year-old female, with an established history of high-grade myelodysplastic syndrome (Currently on combination HMA-therapy); who presents today for interval follow-up. As above, patient is currently on combination-HMA therapy with high-grade myelodysplastic syndrome. Single-agent Azacitidine 75 mg/m2 IV Daily, on Days 1-5 was initiated on January 3rd, 2025 (Cycle 1). She has since completed four-cycles of the above-mentioned, most recently administered on May 19th, 2025 (Cycle 4 Day 1). with delay due to neutropenia. Venetoclax was also added with initiation of Cycle 3, and was administered, as follows: Venetoclax 100 mg Daily, Days 1-7. On evaluation this morning, patient is clinically well, overall. She is tolerating combination HMA-therapy appropriately, with minimal treatment-related toxicities. Repeat hematologic-parameters show stable leukopenia without neutropenia (WBC: 3.36 ANC: 2,090), stable macrocytic anemia (Hemoglobin: 10.9 MCV: 108), and normal platelet count (Platelet Count: 311). Creatinine is stable, and appears near-baseline (Creatinine: 0.97 eGFR: 58 Baseline Creatinine: 0.67 to 1.13). Will proceed to Cycle 5 Day 1 of treatment, as planned. Remaining recommendations, as outlined below. Patient expressed understanding and agreement.    Summary of Recommendations:  Prescribed Clotrimazole 1% Cream BID, for tinea pedis involving the left foot.  Referral placed to Palliative Care, at patient request, for coordination for medical-marijuana.  Initiated Cycle 5 of combination HMA-therapy, on May 19th, 2025:  Azacitidine 75 mg/m2 IV Daily, on Days 1-5.  Venetoclax 100 mg Daily, on Days 1-7.  Note: Plan to increase to Venetoclax 100 mg Daily, on Days 1-10.  Continue antimicrobial-prophylaxis, as follows:  Continue Acyclovir 400 mg BID.  Start Bactrim DS Tablet Monday, Wednesday, and Friday.  Obtain repeat Bone Marrow Biopsy in June 2025, prior to Cycle 6.  Transfuse leukoreduced, and  irradiated blood-products, as follows:  Transfuse at Hemoglobin less than 7.0 gm/dL.  Transfuse at Platelet Count less than 11 k/mm3.  Recommend close interval follow-up in approximately 1-month.  Note: Recommend holding-off of any interventions via Orthopedic Surgery, pending repeat Bone Marrow Biopsy.

## 2025-05-19 NOTE — PROGRESS NOTES
Patient presents to the Infusion Center for the treatment of Vidaza. She stated that 2 days after completion of her previous treatment, she became SOB. Office was aware. Symptoms went away on its own. She offers no concerns at this time. Labs from 05/17/2025 reviewed and within parameters for treatment. Patient is resting comfortably in the chair, call bell within reach.

## 2025-05-19 NOTE — PLAN OF CARE
Problem: Potential for Falls  Goal: Patient will remain free of falls  Description: INTERVENTIONS:  - Educate patient/family on patient safety including physical limitations  - Instruct patient to call for assistance with activity   - Consider consulting OT/PT to assist with strengthening/mobility based on AM PAC & JH-HLM score  - Consult OT/PT to assist with strengthening/mobility   - Keep Call bell within reach  - Keep bed low and locked with side rails adjusted as appropriate  - Keep care items and personal belongings within reach  - Initiate and maintain comfort rounds  Outcome: Progressing     Problem: Knowledge Deficit  Goal: Patient/family/caregiver demonstrates understanding of disease process, treatment plan, medications, and discharge instructions  Description: Complete learning assessment and assess knowledge base.  Interventions:  - Provide teaching at level of understanding  - Provide teaching via preferred learning methods  Outcome: Progressing

## 2025-05-19 NOTE — ASSESSMENT & PLAN NOTE
Additionally, patient has an established history of early-stage hormone-positive HER-2 1+ left breast cancer, with low-risk Oncotype Dx Recurrence Score: 9 (Status-post left breast mastectomy, with sentinel lymph node biopsy, followed by left breast reconstruction with submuscular tissue-expander and acellular dermal matrix, on November 20th, 2023); Currently on adjuvant endocrine-therapy with Anastrozole); who presents today for interval follow-up. On evaluation today, patient is clinically well. She continues to tolerate Anastrozole with minimal treatment-related toxicities. Will continue the above-mentioned accordingly.    Summary of Recommendations:  Continue adjuvant endocrine-therapy with Anastrozole 1 mg Daily.  For endocrine-therapy, associated bone-loss:  Recommend repeat DEXA-scan in December 2025.  Annual screening mammography of the right breast (Due September 2025).  Recommend close interval follow-up, as above.

## 2025-05-19 NOTE — PATIENT INSTRUCTIONS
Administer Acyclovir (Anti-Viral Prevention Medicine) TWICE Daily.    Plan to increase Venetoclax to Days 1-10, beginning with Cycle 6.

## 2025-05-19 NOTE — PROGRESS NOTES
Patient tolerated treatment well with no adverse reactions. Injections given in b/l abdominal tissue without incident. Declined AVS. Confirmed next appointment at the [facility] for 05/20/2025 @ 0290. Patient ambulatory at discharge with her cane.

## 2025-05-20 ENCOUNTER — HOSPITAL ENCOUNTER (OUTPATIENT)
Dept: INFUSION CENTER | Facility: CLINIC | Age: 75
Discharge: HOME | End: 2025-05-20
Attending: INTERNAL MEDICINE
Payer: MEDICARE

## 2025-05-20 ENCOUNTER — TELEPHONE (OUTPATIENT)
Dept: HEMATOLOGY ONCOLOGY | Facility: CLINIC | Age: 75
End: 2025-05-20

## 2025-05-20 VITALS
BODY MASS INDEX: 32.95 KG/M2 | HEIGHT: 64 IN | DIASTOLIC BLOOD PRESSURE: 64 MMHG | WEIGHT: 193 LBS | HEART RATE: 82 BPM | SYSTOLIC BLOOD PRESSURE: 107 MMHG | OXYGEN SATURATION: 98 % | TEMPERATURE: 98.5 F | RESPIRATION RATE: 18 BRPM

## 2025-05-20 DIAGNOSIS — D46.Z MDS (MYELODYSPLASTIC SYNDROME), HIGH GRADE (HCC): ICD-10-CM

## 2025-05-20 DIAGNOSIS — Z96.642 HISTORY OF TOTAL LEFT HIP ARTHROPLASTY: ICD-10-CM

## 2025-05-20 DIAGNOSIS — T84.021A FAILURE OF LEFT TOTAL HIP ARTHROPLASTY WITH DISLOCATION OF HIP, INITIAL ENCOUNTER (HCC): ICD-10-CM

## 2025-05-20 DIAGNOSIS — D46.Z MDS (MYELODYSPLASTIC SYNDROME), HIGH GRADE (HCC): Primary | ICD-10-CM

## 2025-05-20 RX ORDER — ONDANSETRON 8 MG/1
16 TABLET, ORALLY DISINTEGRATING ORAL ONCE
Status: COMPLETED | OUTPATIENT
Start: 2025-05-20 | End: 2025-05-20

## 2025-05-20 RX ORDER — SODIUM CHLORIDE 9 MG/ML
20 INJECTION, SOLUTION INTRAVENOUS ONCE
Status: DISCONTINUED | OUTPATIENT
Start: 2025-05-20 | End: 2025-05-23 | Stop reason: HOSPADM

## 2025-05-20 RX ADMIN — DEXAMETHASONE 10 MG: 2 TABLET ORAL at 12:44

## 2025-05-20 RX ADMIN — AZACITIDINE 137.5 MG: 100 INJECTION, POWDER, LYOPHILIZED, FOR SOLUTION INTRAVENOUS; SUBCUTANEOUS at 13:24

## 2025-05-20 RX ADMIN — ONDANSETRON 16 MG: 8 TABLET, ORALLY DISINTEGRATING ORAL at 12:44

## 2025-05-20 NOTE — TELEPHONE ENCOUNTER
Called patient to schedule bone marrow biopsy and follow up with Dr. Carranza. There was no answer so left a detailed message with reason for call and call back number to hope line if she would like assistance to schedule the biopsy and to schedule the 4 week follow up with Dr. Carranza. I also included the number to IR incase patient wanted to schedule the biopsy herself.

## 2025-05-20 NOTE — PROGRESS NOTES
Patient to infusion center for day 2 treatment with Vidaza. Patient offers no complaints. VSS. No lab parameters on plan. Labs from 5/17/2025 reviewed.

## 2025-05-20 NOTE — PROGRESS NOTES
Patient tolerated injections in b/l arms without incident. Next appt confirmed for tomorrow at 1300, AVS declined.

## 2025-05-21 ENCOUNTER — HOSPITAL ENCOUNTER (OUTPATIENT)
Dept: INFUSION CENTER | Facility: CLINIC | Age: 75
Discharge: HOME | End: 2025-05-21
Attending: INTERNAL MEDICINE
Payer: MEDICARE

## 2025-05-21 VITALS
DIASTOLIC BLOOD PRESSURE: 93 MMHG | HEIGHT: 64 IN | WEIGHT: 191.5 LBS | OXYGEN SATURATION: 99 % | SYSTOLIC BLOOD PRESSURE: 133 MMHG | BODY MASS INDEX: 32.69 KG/M2 | HEART RATE: 77 BPM | TEMPERATURE: 97 F | RESPIRATION RATE: 18 BRPM

## 2025-05-21 DIAGNOSIS — D46.Z MDS (MYELODYSPLASTIC SYNDROME), HIGH GRADE (HCC): Primary | ICD-10-CM

## 2025-05-21 RX ORDER — FOLIC ACID 1 MG/1
1000 TABLET ORAL DAILY
Qty: 30 TABLET | Refills: 1 | OUTPATIENT
Start: 2025-05-21

## 2025-05-21 RX ORDER — ONDANSETRON 8 MG/1
16 TABLET, ORALLY DISINTEGRATING ORAL ONCE
Status: COMPLETED | OUTPATIENT
Start: 2025-05-21 | End: 2025-05-21

## 2025-05-21 RX ORDER — SODIUM CHLORIDE 9 MG/ML
20 INJECTION, SOLUTION INTRAVENOUS ONCE
Status: DISCONTINUED | OUTPATIENT
Start: 2025-05-21 | End: 2025-05-24 | Stop reason: HOSPADM

## 2025-05-21 RX ADMIN — DEXAMETHASONE 10 MG: 2 TABLET ORAL at 13:21

## 2025-05-21 RX ADMIN — AZACITIDINE 137.5 MG: 100 INJECTION, POWDER, LYOPHILIZED, FOR SOLUTION INTRAVENOUS; SUBCUTANEOUS at 13:53

## 2025-05-21 RX ADMIN — ONDANSETRON 16 MG: 8 TABLET, ORALLY DISINTEGRATING ORAL at 13:21

## 2025-05-21 NOTE — PROGRESS NOTES
Treatment tolerated without incident. Next appt confirmed with patient for tomorrow, 5/22 at 12pm at [place]. AVS declined.

## 2025-05-21 NOTE — PROGRESS NOTES
Patient to Infusion Center for Vidaza. No complaints offered at this time. Labs reviewed from 5/17.

## 2025-05-22 ENCOUNTER — HOSPITAL ENCOUNTER (OUTPATIENT)
Dept: INFUSION CENTER | Facility: CLINIC | Age: 75
Discharge: HOME | End: 2025-05-22
Payer: MEDICARE

## 2025-05-22 ENCOUNTER — TELEPHONE (OUTPATIENT)
Dept: RADIOLOGY | Facility: HOSPITAL | Age: 75
End: 2025-05-22

## 2025-05-22 VITALS
TEMPERATURE: 97 F | OXYGEN SATURATION: 98 % | HEIGHT: 64 IN | BODY MASS INDEX: 32.35 KG/M2 | WEIGHT: 189.5 LBS | RESPIRATION RATE: 18 BRPM | HEART RATE: 70 BPM | DIASTOLIC BLOOD PRESSURE: 88 MMHG | SYSTOLIC BLOOD PRESSURE: 132 MMHG

## 2025-05-22 DIAGNOSIS — D46.Z MDS (MYELODYSPLASTIC SYNDROME), HIGH GRADE (HCC): Primary | ICD-10-CM

## 2025-05-22 PROCEDURE — 96401 CHEMO ANTI-NEOPL SQ/IM: CPT

## 2025-05-22 RX ORDER — ONDANSETRON 8 MG/1
16 TABLET, ORALLY DISINTEGRATING ORAL ONCE
Status: COMPLETED | OUTPATIENT
Start: 2025-05-22 | End: 2025-05-22

## 2025-05-22 RX ORDER — SODIUM CHLORIDE 9 MG/ML
20 INJECTION, SOLUTION INTRAVENOUS ONCE
Status: DISCONTINUED | OUTPATIENT
Start: 2025-05-22 | End: 2025-05-25 | Stop reason: HOSPADM

## 2025-05-22 RX ADMIN — ONDANSETRON 16 MG: 8 TABLET, ORALLY DISINTEGRATING ORAL at 11:59

## 2025-05-22 RX ADMIN — DEXAMETHASONE 10 MG: 2 TABLET ORAL at 11:59

## 2025-05-22 RX ADMIN — AZACITIDINE 137.5 MG: 100 INJECTION, POWDER, LYOPHILIZED, FOR SOLUTION INTRAVENOUS; SUBCUTANEOUS at 12:41

## 2025-05-22 NOTE — TELEPHONE ENCOUNTER
Called patient to schedule  BMBX, offered sooner spot at another campus, pt declined, she prefers to go to the [facility]

## 2025-05-22 NOTE — PROGRESS NOTES
Patient to Infusion Center for Vidaza. No complaints offered at this time. Labs reviewed from 5/17. Injections administered into b/l arms- treatment tolerated without incident. Next appt confirmed with patient for tomorrow, 5/23 at 11:30 at [place]. AVS declined.

## 2025-05-23 ENCOUNTER — HOSPITAL ENCOUNTER (OUTPATIENT)
Dept: INFUSION CENTER | Facility: CLINIC | Age: 75
End: 2025-05-23
Attending: INTERNAL MEDICINE
Payer: MEDICARE

## 2025-05-23 ENCOUNTER — TELEPHONE (OUTPATIENT)
Dept: HEMATOLOGY ONCOLOGY | Facility: CLINIC | Age: 75
End: 2025-05-23

## 2025-05-23 VITALS
RESPIRATION RATE: 20 BRPM | TEMPERATURE: 97.3 F | HEIGHT: 64 IN | DIASTOLIC BLOOD PRESSURE: 91 MMHG | BODY MASS INDEX: 32.27 KG/M2 | SYSTOLIC BLOOD PRESSURE: 146 MMHG | HEART RATE: 72 BPM | WEIGHT: 189 LBS

## 2025-05-23 DIAGNOSIS — D46.Z MDS (MYELODYSPLASTIC SYNDROME), HIGH GRADE (HCC): Primary | ICD-10-CM

## 2025-05-23 PROCEDURE — 96401 CHEMO ANTI-NEOPL SQ/IM: CPT

## 2025-05-23 RX ORDER — ONDANSETRON 8 MG/1
16 TABLET, ORALLY DISINTEGRATING ORAL ONCE
Status: COMPLETED | OUTPATIENT
Start: 2025-05-23 | End: 2025-05-23

## 2025-05-23 RX ORDER — SODIUM CHLORIDE 9 MG/ML
20 INJECTION, SOLUTION INTRAVENOUS ONCE
Status: DISPENSED | OUTPATIENT
Start: 2025-05-23

## 2025-05-23 RX ADMIN — ONDANSETRON 16 MG: 8 TABLET, ORALLY DISINTEGRATING ORAL at 11:48

## 2025-05-23 RX ADMIN — DEXAMETHASONE 10 MG: 2 TABLET ORAL at 11:48

## 2025-05-23 RX ADMIN — AZACITIDINE 137.5 MG: 100 INJECTION, POWDER, LYOPHILIZED, FOR SOLUTION INTRAVENOUS; SUBCUTANEOUS at 12:17

## 2025-05-23 NOTE — PROGRESS NOTES
Patient here for day 5 vidaza, no changes reported, patient got dizzy with position change yesterday once, recovered within seconds.  Patient aware to keep us informed regarding any further occurrences.

## 2025-05-23 NOTE — PATIENT INSTRUCTIONS
May 2025      [place] Monday Tuesday Wednesday Thursday Friday Saturday                       1     2     3                4     5     6     7     8    NEW PATIENT   1:15 PM   (15 min.)   Darius Andino MD   [facility] 9     10                11     12     13     14     15     16     17    Laboratory Walk In   9:10 AM   (5 min.)   AN MOB LAB PSC CHAIR   [facility]            18     19    Oncology Treatment  12:30 PM   (140 min.)   AN INF CHAIR 2   [facility]    Office Visit   1:05 PM   (20 min.)   Geno Carranza MD   [facility] [place] 20    Oncology Treatment  12:30 PM   (140 min.)   AN INF CHAIR 8   [facility] 21    Oncology Treatment   1:00 PM   (140 min.)   AN INF CHAIR 4   [facility] 22    Oncology Treatment  12:00 PM   (140 min.)   AN INF CHAIR 2   [facility] 23    Oncology Treatment  11:30 AM   (140 min.)   AN INF CHAIR 2   [facility] 24        Cycle 5, Day 1 Cycle 5, Day 2 Cycle 5, Day 3 Cycle 5, Day 4 Cycle 5, Day 5    25     26     27     28    FOLLOW UP  11:15 AM   (30 min.)   Molina Jimenez MD   [facility] [place] 29     30     31                       Treatment Details         5/19/2025 - Cycle 5, Day 1      Chemotherapy: azaCITIDine (VIDAZA), ONCBCN PROVIDER COMMUNICATION5    5/20/2025 - Cycle 5, Day 2      Chemotherapy: azaCITIDine (VIDAZA), ONCBCN PROVIDER COMMUNICATION5    5/21/2025 - Cycle 5, Day 3      Chemotherapy: azaCITIDine (VIDAZA), ONCBCN PROVIDER COMMUNICATION5    5/22/2025 - Cycle 5, Day 4      Chemotherapy: azaCITIDine (VIDAZA), ONCBCN PROVIDER COMMUNICATION5    5/23/2025 - Cycle 5, Day 5      Chemotherapy: azaCITIDine (VIDAZA), ONCBCN PROVIDER  COMMUNICATION5        June 2025 Sunday Monday Tuesday Wednesday Thursday Friday Saturday   1     2     3     4     5     6     7                8     9    IR BIOPSY BONE MARROW SED     (75 min.)   AN CT 2   [facility] CAT Scan 10     11     12     13     14                15     16    Oncology Treatment  11:00 AM   (140 min.)   AN INF CHAIR 2   [facility] 17    Oncology Treatment   1:00 PM   (140 min.)   AN INF BED 1   [facility] 18    Oncology Treatment   1:00 PM   (140 min.)   AN INF CHAIR 2   [facility] 19    Oncology Treatment  11:00 AM   (140 min.)   AN INF BED 23   [facility] 20    Oncology Treatment  11:00 AM   (140 min.)   AN INF CHAIR 2   [facility] 21        Cycle 6, Day 1 Cycle 6, Day 2 Cycle 6, Day 3 Cycle 6, Day 4 Cycle 6, Day 5    22     23     24     25    Weight Management Office Visit   1:45 PM   (30 min.)   Dagmar Jorgensen MD   [facility] [place] 26     27     28                29     30                                                Treatment Details         6/16/2025 - Cycle 6, Day 1      Chemotherapy: azaCITIDine (VIDAZA), ONCBCN PROVIDER COMMUNICATION5    6/17/2025 - Cycle 6, Day 2      Chemotherapy: azaCITIDine (VIDAZA), ONCBCN PROVIDER COMMUNICATION5    6/18/2025 - Cycle 6, Day 3      Chemotherapy: azaCITIDine (VIDAZA), ONCBCN PROVIDER COMMUNICATION5    6/19/2025 - Cycle 6, Day 4      Chemotherapy: azaCITIDine (VIDAZA), ONCBCN PROVIDER COMMUNICATION5    6/20/2025 - Cycle 6, Day 5      Chemotherapy: azaCITIDine (VIDAZA), ONCBCN PROVIDER COMMUNICATION5

## 2025-05-28 ENCOUNTER — OFFICE VISIT (OUTPATIENT)
Dept: PLASTIC SURGERY | Facility: CLINIC | Age: 75
End: 2025-05-28
Payer: MEDICARE

## 2025-05-28 VITALS
DIASTOLIC BLOOD PRESSURE: 82 MMHG | TEMPERATURE: 97.6 F | WEIGHT: 190.13 LBS | HEIGHT: 65 IN | HEART RATE: 95 BPM | SYSTOLIC BLOOD PRESSURE: 130 MMHG | BODY MASS INDEX: 31.68 KG/M2

## 2025-05-28 DIAGNOSIS — D46.Z MDS (MYELODYSPLASTIC SYNDROME), HIGH GRADE (HCC): ICD-10-CM

## 2025-05-28 DIAGNOSIS — Z42.1 AFTERCARE POSTMASTECTOMY FOR BREAST RECONSTRUCTION: ICD-10-CM

## 2025-05-28 DIAGNOSIS — Z96.642 HISTORY OF TOTAL LEFT HIP ARTHROPLASTY: ICD-10-CM

## 2025-05-28 DIAGNOSIS — F32.A CHRONIC DEPRESSION: Primary | ICD-10-CM

## 2025-05-28 DIAGNOSIS — Z17.0 MALIGNANT NEOPLASM OF LOWER-OUTER QUADRANT OF LEFT BREAST OF FEMALE, ESTROGEN RECEPTOR POSITIVE (HCC): Primary | ICD-10-CM

## 2025-05-28 DIAGNOSIS — C50.512 MALIGNANT NEOPLASM OF LOWER-OUTER QUADRANT OF LEFT BREAST OF FEMALE, ESTROGEN RECEPTOR POSITIVE (HCC): Primary | ICD-10-CM

## 2025-05-28 DIAGNOSIS — T84.021A FAILURE OF LEFT TOTAL HIP ARTHROPLASTY WITH DISLOCATION OF HIP, INITIAL ENCOUNTER (HCC): ICD-10-CM

## 2025-05-28 PROCEDURE — 99213 OFFICE O/P EST LOW 20 MIN: CPT | Performed by: STUDENT IN AN ORGANIZED HEALTH CARE EDUCATION/TRAINING PROGRAM

## 2025-05-28 RX ORDER — VORICONAZOLE 200 MG/1
200 TABLET, FILM COATED ORAL 2 TIMES DAILY
COMMUNITY
Start: 2025-04-10

## 2025-05-28 RX ORDER — CEFTRIAXONE SODIUM 10 G/100ML
INJECTION, POWDER, FOR SOLUTION INTRAVENOUS
COMMUNITY
Start: 2025-03-04

## 2025-05-28 NOTE — PROGRESS NOTES
PRS Note    Annual checkup    Left breast reconstruction with right breast reduction for symmetry on 1/16/24, has been doing well from breast stand point.    Patient was diagnosed with leukemia end of Dec and has been receiving chemo. She has been coping well with treatments.    Breasts exam:    Left breast soft, no cap con  Right breast soft, no issues  Size is similar    Plan:  -F/U next year for annual checkup of recon  -Instructed to f/u sooner if any issues    -Spent 20 minutes in consultation with patient. Greater than 50% of the total time was spent obtaining history, evaluation, performing exam, discussion of management options including post-operative care, answering patient's questions and concerns, chart reviewing, and documentation    Molina Jimenez MD   [facility]   [address]   [place] [address]   Office: [phone]

## 2025-05-28 NOTE — H&P (VIEW-ONLY)
PRS Note    Annual checkup    Left breast reconstruction with right breast reduction for symmetry on 1/16/24, has been doing well from breast stand point.    Patient was diagnosed with leukemia end of Dec and has been receiving chemo. She has been coping well with treatments.    Breasts exam:    Left breast soft, no cap con  Right breast soft, no issues  Size is similar    Plan:  -F/U next year for annual checkup of recon  -Instructed to f/u sooner if any issues    -Spent 20 minutes in consultation with patient. Greater than 50% of the total time was spent obtaining history, evaluation, performing exam, discussion of management options including post-operative care, answering patient's questions and concerns, chart reviewing, and documentation    Molina Jimenez MD   [facility]   [address], [address]   [place] [address]   Office: [phone]

## 2025-05-29 RX ORDER — BUPROPION HYDROCHLORIDE 300 MG/1
TABLET ORAL
Qty: 90 TABLET | Refills: 3 | Status: SHIPPED | OUTPATIENT
Start: 2025-05-29

## 2025-05-30 RX ORDER — FOLIC ACID 1 MG/1
1000 TABLET ORAL DAILY
Qty: 30 TABLET | Refills: 1 | OUTPATIENT
Start: 2025-05-30

## 2025-06-02 DIAGNOSIS — F41.9 ANXIETY: ICD-10-CM

## 2025-06-02 RX ORDER — LORAZEPAM 0.5 MG/1
0.5 TABLET ORAL 2 TIMES DAILY
Qty: 60 TABLET | Refills: 0 | Status: SHIPPED | OUTPATIENT
Start: 2025-06-02

## 2025-06-02 NOTE — TELEPHONE ENCOUNTER
Patient is asking if Dr would refill script for 90 days.          Reason for call:   [x] Refill   [] Prior Auth  [] Other:     Office:   [x] PCP/Provider - Tim  [] Specialty/Provider -     Medication:   Lorapepam 0.5 mg, 1 bid, 60    Pharmacy:   [facility]    Local Pharmacy   Does the patient have enough for 3 days?   [] Yes   [x] No - Send as HP to POD    Mail Away Pharmacy   Does the patient have enough for 10 days?   [] Yes   [] No - Send as HP to POD

## 2025-06-02 NOTE — NURSING NOTE
[facility]  [address] [place]   [address]  INTERVENTIONAL RADIOLOGY [phone]    You are scheduled for a bone marrow biopsy    On 6/9/25 at 9:00 AM    Your arrival time is 8:00 AM.  Our Interventional Radiology nurse will notify you a few days before your procedure with the exact arrival time and location to arrive.    To prepare for your procedure:  Please arrange for someone to drive you home after the procedure and stay with you until the next morning if you are instructed to do so.  This is typically for patients receiving some type of sedative or anesthetic for the procedure.  DO NOT EAT OR DRINK ANYTHING after midnight on the evening before your procedure including candy & gum.  ONLY SIPS OF WATER with your medications are allowed on the morning of your procedure.  TAKE ALL OF YOUR REGULAR MEDICATIONS THE MORNING OF YOUR PROCEDURE with sips of water!  We may call you to stop some of your blood sugar, blood pressure and blood thinning medications depending on the procedure.  Please take all of these medications unless we instruct you to stop them.  If you have an allergy to x-ray dye, please contact Interventional Radiology for an x-ray dye preparation which usually consists of an oral steroid and Benadryl.  If you wear a Glucose Monitor, you may be asked to remove it for your procedure if we are using x-ray.  These devices need to be removed when we are imaging with x-ray near the device since the radiation can cause the unit to malfunction.  If possible and not too inconvenient, you may want to schedule your exam closer to day 14 of your 14 day device so your device is not wasted.    The day of your procedure:  Bring a list of the medications you take at home.  Bring medications you take for breathing problems (such as inhalers), medications for chest pain, or both.  Bring your insurance card and a form of photo ID.  Bring a case for your glasses or contacts.  Please leave all  valuables such as credit cards and jewelry at home.  Report to the registration desk in the main lobby at the [facility].  Ask to be directed to the After Procedure Unit on the 1st floor.  While your procedure is being performed your family may wait in the Waiting Room on the 1st floor.  Be prepared to stay overnight just in case. Sometimes procedures will indicate the need for further observation or treatment.   If you are scheduled for a follow-up visit with the Interventional Radiologist after your procedure, you will be called with a date and time.    Special Instructions (Medications to alter or stop taking before your procedure etc.):    Hold Metformin morning of procedure.

## 2025-06-03 DIAGNOSIS — D46.Z MDS (MYELODYSPLASTIC SYNDROME), HIGH GRADE (HCC): ICD-10-CM

## 2025-06-03 DIAGNOSIS — T84.021A FAILURE OF LEFT TOTAL HIP ARTHROPLASTY WITH DISLOCATION OF HIP, INITIAL ENCOUNTER (HCC): ICD-10-CM

## 2025-06-03 DIAGNOSIS — Z17.0 MALIGNANT NEOPLASM OF LOWER-OUTER QUADRANT OF LEFT BREAST OF FEMALE, ESTROGEN RECEPTOR POSITIVE (HCC): ICD-10-CM

## 2025-06-03 DIAGNOSIS — C50.512 MALIGNANT NEOPLASM OF LOWER-OUTER QUADRANT OF LEFT BREAST OF FEMALE, ESTROGEN RECEPTOR POSITIVE (HCC): ICD-10-CM

## 2025-06-03 DIAGNOSIS — I95.1 ORTHOSTATIC HYPOTENSION: ICD-10-CM

## 2025-06-03 DIAGNOSIS — Z96.642 HISTORY OF TOTAL LEFT HIP ARTHROPLASTY: ICD-10-CM

## 2025-06-03 RX ORDER — ANASTROZOLE 1 MG/1
1 TABLET ORAL DAILY
Qty: 90 TABLET | Refills: 4 | Status: SHIPPED | OUTPATIENT
Start: 2025-06-03

## 2025-06-03 RX ORDER — SULFAMETHOXAZOLE AND TRIMETHOPRIM 800; 160 MG/1; MG/1
1 TABLET ORAL 3 TIMES WEEKLY
Qty: 12 TABLET | Refills: 0 | Status: SHIPPED | OUTPATIENT
Start: 2025-06-04 | End: 2025-07-04

## 2025-06-03 RX ORDER — MIDODRINE HYDROCHLORIDE 5 MG/1
5 TABLET ORAL
Qty: 90 TABLET | Refills: 3 | Status: SHIPPED | OUTPATIENT
Start: 2025-06-03

## 2025-06-04 RX ORDER — FOLIC ACID 1 MG/1
1000 TABLET ORAL DAILY
Qty: 30 TABLET | Refills: 1 | Status: SHIPPED | OUTPATIENT
Start: 2025-06-04

## 2025-06-09 ENCOUNTER — HOSPITAL ENCOUNTER (OUTPATIENT)
Dept: CT IMAGING | Facility: HOSPITAL | Age: 75
Discharge: HOME | End: 2025-06-09
Attending: INTERNAL MEDICINE
Payer: MEDICARE

## 2025-06-09 VITALS
RESPIRATION RATE: 16 BRPM | DIASTOLIC BLOOD PRESSURE: 67 MMHG | BODY MASS INDEX: 31.65 KG/M2 | OXYGEN SATURATION: 99 % | TEMPERATURE: 97.3 F | WEIGHT: 190 LBS | HEART RATE: 86 BPM | SYSTOLIC BLOOD PRESSURE: 119 MMHG | HEIGHT: 65 IN

## 2025-06-09 DIAGNOSIS — D46.Z MDS (MYELODYSPLASTIC SYNDROME), HIGH GRADE (HCC): ICD-10-CM

## 2025-06-09 LAB
ERYTHROCYTE [DISTWIDTH] IN BLOOD BY AUTOMATED COUNT: 16 % (ref 11.6–15.1)
GLUCOSE SERPL-MCNC: 82 MG/DL (ref 65–140)
HCT VFR BLD AUTO: 32.1 % (ref 34.8–46.1)
HGB BLD-MCNC: 10.3 G/DL (ref 11.5–15.4)
MCH RBC QN AUTO: 34.1 PG (ref 26.8–34.3)
MCHC RBC AUTO-ENTMCNC: 32.1 G/DL (ref 31.4–37.4)
MCV RBC AUTO: 106 FL (ref 82–98)
NRBC BLD AUTO-RTO: 0 /100 WBCS
PLATELET # BLD AUTO: 193 THOUSANDS/UL (ref 149–390)
PMV BLD AUTO: 10.4 FL (ref 8.9–12.7)
RBC # BLD AUTO: 3.02 MILLION/UL (ref 3.81–5.12)
WBC # BLD AUTO: 2.77 THOUSAND/UL (ref 4.31–10.16)

## 2025-06-09 PROCEDURE — 88313 SPECIAL STAINS GROUP 2: CPT | Performed by: STUDENT IN AN ORGANIZED HEALTH CARE EDUCATION/TRAINING PROGRAM

## 2025-06-09 PROCEDURE — 88342 IMHCHEM/IMCYTCHM 1ST ANTB: CPT | Performed by: STUDENT IN AN ORGANIZED HEALTH CARE EDUCATION/TRAINING PROGRAM

## 2025-06-09 PROCEDURE — 85097 BONE MARROW INTERPRETATION: CPT | Performed by: STUDENT IN AN ORGANIZED HEALTH CARE EDUCATION/TRAINING PROGRAM

## 2025-06-09 PROCEDURE — 88185 FLOWCYTOMETRY/TC ADD-ON: CPT | Performed by: INTERNAL MEDICINE

## 2025-06-09 PROCEDURE — 88184 FLOWCYTOMETRY/ TC 1 MARKER: CPT | Performed by: INTERNAL MEDICINE

## 2025-06-09 PROCEDURE — 88264 CHROMOSOME ANALYSIS 20-25: CPT | Performed by: INTERNAL MEDICINE

## 2025-06-09 PROCEDURE — 88374 M/PHMTRC ALYS ISHQUANT/SEMIQ: CPT

## 2025-06-09 PROCEDURE — 88341 IMHCHEM/IMCYTCHM EA ADD ANTB: CPT | Performed by: STUDENT IN AN ORGANIZED HEALTH CARE EDUCATION/TRAINING PROGRAM

## 2025-06-09 PROCEDURE — 38221 DX BONE MARROW BIOPSIES: CPT

## 2025-06-09 PROCEDURE — 88311 DECALCIFY TISSUE: CPT | Performed by: STUDENT IN AN ORGANIZED HEALTH CARE EDUCATION/TRAINING PROGRAM

## 2025-06-09 PROCEDURE — 82948 REAGENT STRIP/BLOOD GLUCOSE: CPT

## 2025-06-09 PROCEDURE — C1830 POWER BONE MARROW BX NEEDLE: HCPCS

## 2025-06-09 PROCEDURE — 88237 TISSUE CULTURE BONE MARROW: CPT | Performed by: INTERNAL MEDICINE

## 2025-06-09 PROCEDURE — 88360 TUMOR IMMUNOHISTOCHEM/MANUAL: CPT | Performed by: STUDENT IN AN ORGANIZED HEALTH CARE EDUCATION/TRAINING PROGRAM

## 2025-06-09 PROCEDURE — 99152 MOD SED SAME PHYS/QHP 5/>YRS: CPT

## 2025-06-09 PROCEDURE — 88365 INSITU HYBRIDIZATION (FISH): CPT | Performed by: STUDENT IN AN ORGANIZED HEALTH CARE EDUCATION/TRAINING PROGRAM

## 2025-06-09 PROCEDURE — 88305 TISSUE EXAM BY PATHOLOGIST: CPT | Performed by: STUDENT IN AN ORGANIZED HEALTH CARE EDUCATION/TRAINING PROGRAM

## 2025-06-09 PROCEDURE — 88374 M/PHMTRC ALYS ISHQUANT/SEMIQ: CPT | Performed by: INTERNAL MEDICINE

## 2025-06-09 RX ORDER — MIDAZOLAM HYDROCHLORIDE 2 MG/2ML
INJECTION, SOLUTION INTRAMUSCULAR; INTRAVENOUS AS NEEDED
Status: COMPLETED | OUTPATIENT
Start: 2025-06-09 | End: 2025-06-09

## 2025-06-09 RX ORDER — FENTANYL CITRATE 50 UG/ML
INJECTION, SOLUTION INTRAMUSCULAR; INTRAVENOUS AS NEEDED
Status: COMPLETED | OUTPATIENT
Start: 2025-06-09 | End: 2025-06-09

## 2025-06-09 RX ADMIN — FENTANYL CITRATE 50 MCG: 50 INJECTION INTRAMUSCULAR; INTRAVENOUS at 09:14

## 2025-06-09 RX ADMIN — MIDAZOLAM 1 MG: 1 INJECTION INTRAMUSCULAR; INTRAVENOUS at 09:05

## 2025-06-09 RX ADMIN — FENTANYL CITRATE 50 MCG: 50 INJECTION INTRAMUSCULAR; INTRAVENOUS at 09:05

## 2025-06-09 RX ADMIN — MIDAZOLAM 1 MG: 1 INJECTION INTRAMUSCULAR; INTRAVENOUS at 09:14

## 2025-06-09 NOTE — SEDATION DOCUMENTATION
Pt tolerated bone marrow biopsy. Bandaid to site. Pt being transported to APU for 1hr recovery. Report given to receiving nurse

## 2025-06-09 NOTE — DISCHARGE INSTRUCTIONS
Bone Marrow Biopsy     WHAT YOU NEED TO KNOW:   A bone marrow biopsy is a procedure to remove a small amount of bone marrow from your bone. Bone marrow is the soft tissue inside your bone that helps to make blood cells. The sample is tested for disease or infection.    DISCHARGE INSTRUCTIONS:     1. Limit your activities day of biopsy as directed by your doctor.    2. Use medication as ordered.    3. Return to your normal diet.Small sips of flat soda will help with nausea.    4. Remove band-aid or dressing 24 hours after procedure.    Contact Interventional Radiology at [phone] ([place] PATIENTS: Contact Interventional Radiology at [phone]) ([place] PATIENTS: Contact Interventional Radiology at [phone]) if:    1. Difficulty breathing, nausea or vomiting.    2. Chills or fever above 101 F.    3. Pain at biopsy site not relieved by medication.    4. Develop any redness, swelling, heat, unusual drainage, heavy bruising or bleeding from biopsy site.     Procedural Sedation   WHAT YOU NEED TO KNOW:   Procedural sedation is medicine used during procedures to help you feel relaxed and calm. You will remember little to none of the procedure. After sedation you may feel tired, weak, or unsteady on your feet. You may also have trouble concentrating or short-term memory loss. These symptoms should go away in 24 hours or less.   DISCHARGE INSTRUCTIONS:     Call 911 or have someone else call for any of the following:   You have sudden trouble breathing.     You cannot be woken.      Contact Interventional Radiology at [phone]   ([place] PATIENTS: Contact Interventional Radiology at [phone]) ([place] PATIENTS: Contact Interventional Radiology at [phone]) if any of the following occur:     You have a severe headache or dizziness.     Your heart is beating faster than usual.    You have a fever or chills.     Your skin is itchy, swollen, or you have a rash.     You have nausea or are vomiting for more than  8 hours after the procedure.      You have questions or concerns about your condition or care.  Self-care:   Have someone stay with you for 24 hours. This person can drive you to errands and help you do things around the house. This person can also watch for problems.      Rest and do quiet activities for 24 hours. Do not exercise, ride a bike, or play sports. Stand up slowly to prevent dizziness and falls. Take short walks around the house with another person. Slowly return to your usual activities the next day.      Do not drive or use dangerous machines or tools for 24 hours. You may injure yourself or others. Examples include a lawnmower, saw, or drill. Do not return to work for 24 hours if you use dangerous machines or tools for work.      Do not make important decisions for 24 hours. For example, do not sign important papers or invest money.      Drink liquids as directed. Liquids help flush the sedation medicine out of your body. Ask how much liquid to drink each day and which liquids are best for you.      Eat small, frequent meals to prevent nausea and vomiting. Start with clear liquids such as juice or broth. If you do not vomit after clear liquids, you can eat your usual foods.      Do not drink alcohol or take medicines that make you drowsy. This includes medicines that help you sleep and anxiety medicines. Ask your healthcare provider if it is safe for you to take pain medicine.  Follow up with your healthcare provider as directed: Write down your questions so you remember to ask them during your visits.

## 2025-06-09 NOTE — INTERVAL H&P NOTE
Update: (This section must be completed if the H&P was completed greater than 24 hrs to procedure or admission)    H&P reviewed. After examining the patient, I find no changed to the H&P since it had been written.    Patient re-evaluated. Accept as history and physical.    Darius Wang MD/June 9, 2025/9:20 AM

## 2025-06-09 NOTE — BRIEF OP NOTE (RAD/CATH)
INTERVENTIONAL RADIOLOGY PROCEDURE NOTE    Date: 6/9/2025    Procedure:   Procedure Summary       Date: 06/09/25 [facility] CAT Scan    Anesthesia Start:  Anesthesia Stop:     Procedure: IR BIOPSY BONE MARROW Diagnosis:       MDS (myelodysplastic syndrome), high grade (HCC)      (AML - Restaging Marrow)    Scheduled Providers:  Responsible Provider:     Anesthesia Type: Not recorded ASA Status: Not recorded            Preoperative diagnosis:   1. MDS (myelodysplastic syndrome), high grade (HCC)         Postoperative diagnosis: Same.    Surgeon: Darius Wang MD     Assistant: None. No qualified resident was available.    Blood loss: minimal    Specimens: 6 cc bone marrow, 1 bone core     Findings: BM biopsy    Complications: None immediate.    Anesthesia: conscious sedation

## 2025-06-11 ENCOUNTER — TELEPHONE (OUTPATIENT)
Age: 75
End: 2025-06-11

## 2025-06-11 LAB — SCAN RESULT: NORMAL

## 2025-06-11 RX ORDER — ONDANSETRON 8 MG/1
16 TABLET, ORALLY DISINTEGRATING ORAL ONCE
Status: CANCELLED
Start: 2025-06-20 | End: 2025-06-20

## 2025-06-11 RX ORDER — DEXAMETHASONE 4 MG/1
10 TABLET ORAL ONCE
Status: CANCELLED
Start: 2025-06-19

## 2025-06-11 RX ORDER — DEXAMETHASONE 4 MG/1
10 TABLET ORAL ONCE
Status: CANCELLED
Start: 2025-06-20

## 2025-06-11 RX ORDER — DEXAMETHASONE 4 MG/1
10 TABLET ORAL ONCE
Status: CANCELLED
Start: 2025-06-16

## 2025-06-11 RX ORDER — ONDANSETRON 8 MG/1
16 TABLET, ORALLY DISINTEGRATING ORAL ONCE
Status: CANCELLED
Start: 2025-06-16 | End: 2025-06-16

## 2025-06-11 RX ORDER — SODIUM CHLORIDE 9 MG/ML
20 INJECTION, SOLUTION INTRAVENOUS ONCE
Status: CANCELLED | OUTPATIENT
Start: 2025-06-20

## 2025-06-11 RX ORDER — ONDANSETRON 8 MG/1
16 TABLET, ORALLY DISINTEGRATING ORAL ONCE
Status: CANCELLED
Start: 2025-06-19 | End: 2025-06-19

## 2025-06-11 RX ORDER — SODIUM CHLORIDE 9 MG/ML
20 INJECTION, SOLUTION INTRAVENOUS ONCE
Status: CANCELLED | OUTPATIENT
Start: 2025-06-18

## 2025-06-11 RX ORDER — SODIUM CHLORIDE 9 MG/ML
20 INJECTION, SOLUTION INTRAVENOUS ONCE
Status: CANCELLED | OUTPATIENT
Start: 2025-06-16

## 2025-06-11 RX ORDER — SODIUM CHLORIDE 9 MG/ML
20 INJECTION, SOLUTION INTRAVENOUS ONCE
Status: CANCELLED | OUTPATIENT
Start: 2025-06-17

## 2025-06-11 RX ORDER — DEXAMETHASONE 4 MG/1
10 TABLET ORAL ONCE
Status: CANCELLED
Start: 2025-06-18

## 2025-06-11 RX ORDER — ONDANSETRON 8 MG/1
16 TABLET, ORALLY DISINTEGRATING ORAL ONCE
Status: CANCELLED
Start: 2025-06-17 | End: 2025-06-17

## 2025-06-11 RX ORDER — ONDANSETRON 8 MG/1
16 TABLET, ORALLY DISINTEGRATING ORAL ONCE
Status: CANCELLED
Start: 2025-06-18 | End: 2025-06-18

## 2025-06-11 RX ORDER — DEXAMETHASONE 4 MG/1
10 TABLET ORAL ONCE
Status: CANCELLED
Start: 2025-06-17

## 2025-06-11 RX ORDER — SODIUM CHLORIDE 9 MG/ML
20 INJECTION, SOLUTION INTRAVENOUS ONCE
Status: CANCELLED | OUTPATIENT
Start: 2025-06-19

## 2025-06-11 NOTE — TELEPHONE ENCOUNTER
Provider: Dr. Carranza    Patient called in to review directions for Bactrim. I confirmed with her she is taking one tablet on Monday, Wednesday and Friday only- she has 12 pills, which will put her at 4 weeks with the current script.    Patient is inquiring if she will have to take the bactrim longer or will she be done with it, once she completes the 12 pills in the current script. I confirmed with her we would discuss with provider and return her call. She verbalized understanding and has no additional questions or concerns at this time.

## 2025-06-13 ENCOUNTER — TELEPHONE (OUTPATIENT)
Age: 75
End: 2025-06-13

## 2025-06-13 NOTE — TELEPHONE ENCOUNTER
Called and left voicemail for Pretty to remind her that she will need labs drawn prior to her treatment on Monday to make sure her counts are okay. Dr. Carranza would like her to continue with treatments while waiting on the bone marrow biopsy results. Call back number provided if she has any questions

## 2025-06-14 ENCOUNTER — APPOINTMENT (OUTPATIENT)
Dept: LAB | Facility: CLINIC | Age: 75
End: 2025-06-14
Attending: INTERNAL MEDICINE
Payer: MEDICARE

## 2025-06-14 DIAGNOSIS — D46.Z MDS (MYELODYSPLASTIC SYNDROME), HIGH GRADE (HCC): ICD-10-CM

## 2025-06-14 LAB
ALBUMIN SERPL BCG-MCNC: 3.9 G/DL (ref 3.5–5)
ALP SERPL-CCNC: 68 U/L (ref 34–104)
ALT SERPL W P-5'-P-CCNC: 22 U/L (ref 7–52)
ANION GAP SERPL CALCULATED.3IONS-SCNC: 5 MMOL/L (ref 4–13)
AST SERPL W P-5'-P-CCNC: 17 U/L (ref 13–39)
BASOPHILS # BLD AUTO: 0.02 THOUSANDS/ÂΜL (ref 0–0.1)
BASOPHILS NFR BLD AUTO: 1 % (ref 0–1)
BILIRUB SERPL-MCNC: 0.48 MG/DL (ref 0.2–1)
BUN SERPL-MCNC: 20 MG/DL (ref 5–25)
CALCIUM SERPL-MCNC: 8.8 MG/DL (ref 8.4–10.2)
CHLORIDE SERPL-SCNC: 108 MMOL/L (ref 96–108)
CO2 SERPL-SCNC: 28 MMOL/L (ref 21–32)
CREAT SERPL-MCNC: 0.91 MG/DL (ref 0.6–1.3)
EOSINOPHIL # BLD AUTO: 0.07 THOUSAND/ÂΜL (ref 0–0.61)
EOSINOPHIL NFR BLD AUTO: 3 % (ref 0–6)
ERYTHROCYTE [DISTWIDTH] IN BLOOD BY AUTOMATED COUNT: 15.7 % (ref 11.6–15.1)
GFR SERPL CREATININE-BSD FRML MDRD: 62 ML/MIN/1.73SQ M
GLUCOSE P FAST SERPL-MCNC: 85 MG/DL (ref 65–99)
HCT VFR BLD AUTO: 34.6 % (ref 34.8–46.1)
HGB BLD-MCNC: 11 G/DL (ref 11.5–15.4)
IMM GRANULOCYTES # BLD AUTO: 0.02 THOUSAND/UL (ref 0–0.2)
IMM GRANULOCYTES NFR BLD AUTO: 1 % (ref 0–2)
LYMPHOCYTES # BLD AUTO: 1.14 THOUSANDS/ÂΜL (ref 0.6–4.47)
LYMPHOCYTES NFR BLD AUTO: 42 % (ref 14–44)
MCH RBC QN AUTO: 34.5 PG (ref 26.8–34.3)
MCHC RBC AUTO-ENTMCNC: 31.8 G/DL (ref 31.4–37.4)
MCV RBC AUTO: 109 FL (ref 82–98)
MONOCYTES # BLD AUTO: 0.26 THOUSAND/ÂΜL (ref 0.17–1.22)
MONOCYTES NFR BLD AUTO: 10 % (ref 4–12)
NEUTROPHILS # BLD AUTO: 1.18 THOUSANDS/ÂΜL (ref 1.85–7.62)
NEUTS SEG NFR BLD AUTO: 43 % (ref 43–75)
NRBC BLD AUTO-RTO: 0 /100 WBCS
PLATELET # BLD AUTO: 246 THOUSANDS/UL (ref 149–390)
PMV BLD AUTO: 10.3 FL (ref 8.9–12.7)
POTASSIUM SERPL-SCNC: 4.2 MMOL/L (ref 3.5–5.3)
PROT SERPL-MCNC: 6.7 G/DL (ref 6.4–8.4)
RBC # BLD AUTO: 3.19 MILLION/UL (ref 3.81–5.12)
SODIUM SERPL-SCNC: 141 MMOL/L (ref 135–147)
WBC # BLD AUTO: 2.69 THOUSAND/UL (ref 4.31–10.16)

## 2025-06-14 PROCEDURE — 85025 COMPLETE CBC W/AUTO DIFF WBC: CPT

## 2025-06-14 PROCEDURE — 80053 COMPREHEN METABOLIC PANEL: CPT

## 2025-06-14 PROCEDURE — 36415 COLL VENOUS BLD VENIPUNCTURE: CPT

## 2025-06-16 ENCOUNTER — HOSPITAL ENCOUNTER (OUTPATIENT)
Dept: INFUSION CENTER | Facility: CLINIC | Age: 75
Discharge: HOME | End: 2025-06-16
Attending: INTERNAL MEDICINE
Payer: MEDICARE

## 2025-06-16 VITALS
HEIGHT: 65 IN | RESPIRATION RATE: 18 BRPM | OXYGEN SATURATION: 97 % | WEIGHT: 198 LBS | TEMPERATURE: 97.5 F | HEART RATE: 81 BPM | SYSTOLIC BLOOD PRESSURE: 129 MMHG | BODY MASS INDEX: 32.99 KG/M2 | DIASTOLIC BLOOD PRESSURE: 87 MMHG

## 2025-06-16 DIAGNOSIS — D46.Z MDS (MYELODYSPLASTIC SYNDROME), HIGH GRADE (HCC): Primary | ICD-10-CM

## 2025-06-16 LAB — MISCELLANEOUS LAB TEST RESULT: NORMAL

## 2025-06-16 PROCEDURE — 96401 CHEMO ANTI-NEOPL SQ/IM: CPT

## 2025-06-16 RX ORDER — ONDANSETRON 8 MG/1
16 TABLET, ORALLY DISINTEGRATING ORAL ONCE
Status: COMPLETED | OUTPATIENT
Start: 2025-06-16 | End: 2025-06-16

## 2025-06-16 RX ORDER — SODIUM CHLORIDE 9 MG/ML
20 INJECTION, SOLUTION INTRAVENOUS ONCE
Status: DISCONTINUED | OUTPATIENT
Start: 2025-06-16 | End: 2025-06-19 | Stop reason: HOSPADM

## 2025-06-16 RX ADMIN — AZACITIDINE 137.5 MG: 100 INJECTION, POWDER, LYOPHILIZED, FOR SOLUTION INTRAVENOUS; SUBCUTANEOUS at 11:45

## 2025-06-16 RX ADMIN — ONDANSETRON 16 MG: 8 TABLET, ORALLY DISINTEGRATING ORAL at 11:20

## 2025-06-16 RX ADMIN — DEXAMETHASONE 10 MG: 2 TABLET ORAL at 11:20

## 2025-06-16 NOTE — PROGRESS NOTES
Patient presents to the Infusion Center for the treatment of Vidaza. She offers no concerns at this time. Labs from 06/14/2025 reviewed and within parameters for treatment. Vital signs stable. Patient is resting comfortably in the chair, call bell within reach.

## 2025-06-17 ENCOUNTER — HOSPITAL ENCOUNTER (OUTPATIENT)
Dept: INFUSION CENTER | Facility: CLINIC | Age: 75
Discharge: HOME | End: 2025-06-17
Attending: INTERNAL MEDICINE
Payer: MEDICARE

## 2025-06-17 VITALS
TEMPERATURE: 98 F | HEIGHT: 65 IN | RESPIRATION RATE: 20 BRPM | HEART RATE: 81 BPM | DIASTOLIC BLOOD PRESSURE: 80 MMHG | WEIGHT: 197 LBS | SYSTOLIC BLOOD PRESSURE: 120 MMHG | OXYGEN SATURATION: 97 % | BODY MASS INDEX: 32.82 KG/M2

## 2025-06-17 DIAGNOSIS — D46.Z MDS (MYELODYSPLASTIC SYNDROME), HIGH GRADE (HCC): Primary | ICD-10-CM

## 2025-06-17 PROCEDURE — 96401 CHEMO ANTI-NEOPL SQ/IM: CPT

## 2025-06-17 RX ORDER — ONDANSETRON 8 MG/1
16 TABLET, ORALLY DISINTEGRATING ORAL ONCE
Status: COMPLETED | OUTPATIENT
Start: 2025-06-17 | End: 2025-06-17

## 2025-06-17 RX ORDER — SODIUM CHLORIDE 9 MG/ML
20 INJECTION, SOLUTION INTRAVENOUS ONCE
Status: DISCONTINUED | OUTPATIENT
Start: 2025-06-17 | End: 2025-06-20 | Stop reason: HOSPADM

## 2025-06-17 RX ADMIN — AZACITIDINE 137.5 MG: 100 INJECTION, POWDER, LYOPHILIZED, FOR SOLUTION INTRAVENOUS; SUBCUTANEOUS at 13:57

## 2025-06-17 RX ADMIN — ONDANSETRON 16 MG: 8 TABLET, ORALLY DISINTEGRATING ORAL at 13:11

## 2025-06-17 RX ADMIN — DEXAMETHASONE 10 MG: 2 TABLET ORAL at 13:11

## 2025-06-17 NOTE — PROGRESS NOTES
Pt here for D2 Vidaza. Pt reported that she's had two incidences of what sounds like vertigo, the first of which lasted just a couple of minutes and the second of which lasted about an hour. Pt reported feeling dizzy and felt like she needed to lay back. Pt was asymptomatic today at her appointment. Reached out to Vicky in Dr Carranza's office and awaiting a reply. Vitals stable. Callbell within reach.

## 2025-06-17 NOTE — PROGRESS NOTES
After pt left, Vicky in Dr Carranza's office questioned RN about pt's access to an at-home bp cuff and if she's taken her BP during these dizzy episodes. RNs scheduled to take care of her tomorrow will follow-up with pt and get back to Vicky tomorrow.

## 2025-06-18 ENCOUNTER — HOSPITAL ENCOUNTER (OUTPATIENT)
Dept: INFUSION CENTER | Facility: CLINIC | Age: 75
Discharge: HOME | End: 2025-06-18
Attending: INTERNAL MEDICINE
Payer: MEDICARE

## 2025-06-18 ENCOUNTER — TELEPHONE (OUTPATIENT)
Age: 75
End: 2025-06-18

## 2025-06-18 VITALS
HEART RATE: 80 BPM | OXYGEN SATURATION: 95 % | BODY MASS INDEX: 32.9 KG/M2 | RESPIRATION RATE: 18 BRPM | HEIGHT: 65 IN | DIASTOLIC BLOOD PRESSURE: 71 MMHG | TEMPERATURE: 97.6 F | WEIGHT: 197.5 LBS | SYSTOLIC BLOOD PRESSURE: 126 MMHG

## 2025-06-18 DIAGNOSIS — D46.Z MDS (MYELODYSPLASTIC SYNDROME), HIGH GRADE (HCC): Primary | ICD-10-CM

## 2025-06-18 LAB
MISCELLANEOUS LAB TEST RESULT: NORMAL
SCAN RESULT: NORMAL

## 2025-06-18 PROCEDURE — 96401 CHEMO ANTI-NEOPL SQ/IM: CPT

## 2025-06-18 RX ORDER — ONDANSETRON 8 MG/1
16 TABLET, ORALLY DISINTEGRATING ORAL ONCE
Status: COMPLETED | OUTPATIENT
Start: 2025-06-18 | End: 2025-06-18

## 2025-06-18 RX ORDER — SODIUM CHLORIDE 9 MG/ML
20 INJECTION, SOLUTION INTRAVENOUS ONCE
Status: DISCONTINUED | OUTPATIENT
Start: 2025-06-18 | End: 2025-06-21 | Stop reason: HOSPADM

## 2025-06-18 RX ADMIN — ONDANSETRON 16 MG: 8 TABLET, ORALLY DISINTEGRATING ORAL at 13:12

## 2025-06-18 RX ADMIN — AZACITIDINE 137.5 MG: 100 INJECTION, POWDER, LYOPHILIZED, FOR SOLUTION INTRAVENOUS; SUBCUTANEOUS at 13:32

## 2025-06-18 RX ADMIN — DEXAMETHASONE 10 MG: 2 TABLET ORAL at 13:12

## 2025-06-18 NOTE — TELEPHONE ENCOUNTER
Pt calling in requesting to know if Dr. Carranza would be able to see her either before or after one of her tx that she has scheduled for this week.

## 2025-06-18 NOTE — PROGRESS NOTES
Injections given in b/l arms without issue. Pt confirms next appointment on 6/19 @1100 AN. AVS declined.

## 2025-06-18 NOTE — PROGRESS NOTES
Pt arrives to infusion center for D3 Vidaza. Offers no complaints. Labs from 6/14 are within tx parameters. Pt sitting comfortably in chair, wheels locked, call bell within reach.     Pt states she has a BP cuff at home and will check her BP if she has another dizzy spell.

## 2025-06-19 ENCOUNTER — HOSPITAL ENCOUNTER (OUTPATIENT)
Dept: INFUSION CENTER | Facility: CLINIC | Age: 75
Discharge: HOME | End: 2025-06-19
Attending: INTERNAL MEDICINE
Payer: MEDICARE

## 2025-06-19 VITALS
WEIGHT: 196.5 LBS | OXYGEN SATURATION: 95 % | DIASTOLIC BLOOD PRESSURE: 76 MMHG | RESPIRATION RATE: 18 BRPM | TEMPERATURE: 97.7 F | SYSTOLIC BLOOD PRESSURE: 131 MMHG | HEIGHT: 65 IN | BODY MASS INDEX: 32.74 KG/M2

## 2025-06-19 DIAGNOSIS — D46.Z MDS (MYELODYSPLASTIC SYNDROME), HIGH GRADE (HCC): Primary | ICD-10-CM

## 2025-06-19 PROCEDURE — 96401 CHEMO ANTI-NEOPL SQ/IM: CPT

## 2025-06-19 RX ORDER — SODIUM CHLORIDE 9 MG/ML
20 INJECTION, SOLUTION INTRAVENOUS ONCE
Status: DISCONTINUED | OUTPATIENT
Start: 2025-06-19 | End: 2025-06-22 | Stop reason: HOSPADM

## 2025-06-19 RX ORDER — ONDANSETRON 8 MG/1
16 TABLET, ORALLY DISINTEGRATING ORAL ONCE
Status: COMPLETED | OUTPATIENT
Start: 2025-06-19 | End: 2025-06-19

## 2025-06-19 RX ADMIN — ONDANSETRON 16 MG: 8 TABLET, ORALLY DISINTEGRATING ORAL at 11:04

## 2025-06-19 RX ADMIN — DEXAMETHASONE 10 MG: 2 TABLET ORAL at 11:04

## 2025-06-19 RX ADMIN — AZACITIDINE 137.5 MG: 100 INJECTION, POWDER, LYOPHILIZED, FOR SOLUTION INTRAVENOUS; SUBCUTANEOUS at 11:35

## 2025-06-19 NOTE — PROGRESS NOTES
Pt arrives to infusion center for D4 Vidaza. Offers no complaints. Labs from 6/14 are within tx parameters. Pt sitting comfortably in chair, wheels locked, call bell within reach.

## 2025-06-20 ENCOUNTER — HOSPITAL ENCOUNTER (OUTPATIENT)
Dept: INFUSION CENTER | Facility: CLINIC | Age: 75
End: 2025-06-20
Attending: INTERNAL MEDICINE
Payer: MEDICARE

## 2025-06-20 VITALS
SYSTOLIC BLOOD PRESSURE: 147 MMHG | TEMPERATURE: 97.3 F | WEIGHT: 197 LBS | BODY MASS INDEX: 32.82 KG/M2 | OXYGEN SATURATION: 98 % | HEIGHT: 65 IN | HEART RATE: 71 BPM | DIASTOLIC BLOOD PRESSURE: 85 MMHG

## 2025-06-20 DIAGNOSIS — D46.Z MDS (MYELODYSPLASTIC SYNDROME), HIGH GRADE (HCC): Primary | ICD-10-CM

## 2025-06-20 PROCEDURE — 96401 CHEMO ANTI-NEOPL SQ/IM: CPT

## 2025-06-20 RX ORDER — SODIUM CHLORIDE 9 MG/ML
20 INJECTION, SOLUTION INTRAVENOUS ONCE
Status: DISCONTINUED | OUTPATIENT
Start: 2025-06-20 | End: 2025-06-23 | Stop reason: HOSPADM

## 2025-06-20 RX ORDER — ONDANSETRON 8 MG/1
16 TABLET, ORALLY DISINTEGRATING ORAL ONCE
Status: COMPLETED | OUTPATIENT
Start: 2025-06-20 | End: 2025-06-20

## 2025-06-20 RX ADMIN — ONDANSETRON 16 MG: 8 TABLET, ORALLY DISINTEGRATING ORAL at 11:15

## 2025-06-20 RX ADMIN — AZACITIDINE 137.5 MG: 100 INJECTION, POWDER, LYOPHILIZED, FOR SOLUTION INTRAVENOUS; SUBCUTANEOUS at 11:36

## 2025-06-20 RX ADMIN — DEXAMETHASONE 10 MG: 2 TABLET ORAL at 11:15

## 2025-06-20 NOTE — PROGRESS NOTES
Injections given in b/l abdomen without issue. Pt will follow-up with ordering provider. AVS declined.

## 2025-06-20 NOTE — PROGRESS NOTES
Pt arrives to infusion center for D5 Vidaza. Offers no complaints. Labs from 6/14 are within tx parameters. Pt sitting comfortably in chair, wheels locked, call bell within reach.

## 2025-06-22 DIAGNOSIS — E66.09 CLASS 1 OBESITY DUE TO EXCESS CALORIES WITH SERIOUS COMORBIDITY AND BODY MASS INDEX (BMI) OF 31.0 TO 31.9 IN ADULT: Primary | ICD-10-CM

## 2025-06-22 DIAGNOSIS — E66.811 CLASS 1 OBESITY DUE TO EXCESS CALORIES WITH SERIOUS COMORBIDITY AND BODY MASS INDEX (BMI) OF 31.0 TO 31.9 IN ADULT: Primary | ICD-10-CM

## 2025-06-22 RX ORDER — NALTREXONE HYDROCHLORIDE 50 MG/1
TABLET, FILM COATED ORAL
Qty: 30 TABLET | Refills: 3 | Status: SHIPPED | OUTPATIENT
Start: 2025-06-22

## 2025-06-22 NOTE — PROGRESS NOTES
Name: Pretty Aguila      : 1950      MRN: [id number]  Encounter Provider: Geno Carranza MD  Encounter Date: 2025   Encounter department: [facility] [place]  :  Assessment & Plan      MDS (myelodysplastic syndrome), high grade (HCC)        Treatment     Vidaza 75mg/m2 days 1-5      C1  1/3/2025  C2   2025     C3 delayed due to neutropenia/ ANC 1170      C3   3/24/2025 to started Venetoclax 100 mg daily x 7 days      C4 -2025      C5 -2025 with Venetoclax 100 mg daily x 7 days               Prophylaxis      Acyclovir  Voriconazole -has not started due to copay-social work aware   Bactrim DS M/W/F   Consider adding levaquin-will hold for now       Malignant neoplasm of lower-outer quadrant of left breast of female, estrogen receptor positive (HCC)       1.  Stage IIA (pT2 pN0(sn) cM0) left breast invasive ductal carcinoma.  Grade 2.  ER positive (%), CO positive (%), HER2 negative (score 1+) by IHC.  Diagnosed 2023.  2.  BRCA negative   3.  Adjuvant anastrozole OncoDx score 9             Pretty Aguila is 74-year-old postmenopausal female who initially noted a mass in the lower outer quadrant of her left breast.  She underwent diagnostic mammogram and ultrasound which demonstrated a unifocal lesion at the 4 o'clock position 2 cm from the nipple.  This was biopsied and shown to be invasive ductal carcinoma, grade 2, ER 90%, CO 90%, HER2/shannan negative (1+).  She underwent an MRI which demonstrated unifocal mass measuring approximately 4.5 cm with questionable slight skin involvement and no adenopathy.  Her axillary ultrasound demonstrated no adenopathy. She underwent left mastectomy with SLN biopsy.  Final pathology was consistent with an invasive carcinoma, negative margins, 0/1 lymph node, grade 2.  She underwent immediate left breast reconstruction with submuscular tissue expander with acellular dermal matrix on 2023.   "Her final stage is IIA (pT2 pN0 cM0) left breast invasive ductal carcinoma, grade 2, ER positive, MA positive HER2 negative.       Patient with favorable risk breast cancer.  Oncotype DX recurrence score  9 so due to size of tumor to assess candidacy of chemotherapy.  We discussed adjuvant endocrine therapy with anastrozole if Oncotype DX recurrence score is low.  She will continue to follow with breast surgery.  She will start treatment with anastrozole after her Oncotype resulted and follow-up in 3 months.     We reviewed the side effects of aromatase inhibitors including, but not limited to hot flashes, vaginal dryness, mood swings, weight gain, difficulty sleeping, decreased sexual interest, arthralgias/myalgias and worsening of osteopenia/osteoporosis.  She will obtain a baseline DEXA scan.  I recommended she take calcium and vitamin D on a regular basis.        Continue Anastrozole  Annual R Mammogram; done 9/19/24 \"No evidence of malignancy\".  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 IPSS-R score of 8.5, very high risk. Complicated with progressive pancytopenia, most recent hemoglobin 8.0, PLT 64, and WBC 2.3 with .  Above diagnosis, for medically fit patients, preferred treatment would be bridging therapy (Azacytidine) vs reduced intensity conditioning followed by Allogenic Stem Cell Transplantation. If patient is deemed not ASCT candidate, HMA based regimen, preferably subcutaneous Azacytidine is to be considered given improved OS compared to other HMA e.g. decitabine. Will avoid adding Venetoclax " given P53 deletion.      Was seen at [facility]/Dr Lacey         3/12/2025     Since last visit was seen at [facility] with the following:        Recommend Vidaza daily x 5 days and Venetoclax 400mg daily for 7 days to increase chance of achiving response/CR and to decrease anticipated myelosuppression.     Venetoclax, a BCL2 inhibitor, has shown promise in AML when combined with HMA, translating into a significant shift in the management of older AML patients unfit for chemotherapy.   In the phase 3 VIALE-A trial, a combination of venetoclax with azacitidine improved survival from 9.6 months in the azacitidine-only group to 14.7 months.   The CR rates were encouraging for the [id number] subgroup--about 55% compared with 0% in the azacitidine group. However, in patients achieving CR, [id number] patients (13% vs approximately 42% in the cohort) had comparatively fewer MRD-negative remissions. Consequently, achievement of CR alone was not associated with improved survival, as only 8% of those who achieved CR in the [id number] subgroup survived >24 months compared with 34% in the cohort.   In a follow-up pooled analysis of poor- and intermediate-risk cytogenetics in patients with AML from VIALE-A and the preceding single-arm phase 1b study of HMA/venetoclax, the median survival for [id number] (with poor-risk cytogenetics) patients on venetoclax + azacitidine and azacitidine only was 5.17 months (CI 2.17-6.83) and 4.9 months (CI 2.14-9.30), respectively.          However, this patient has had issues with leucopenia/neutropenia, prolonged admissions, most recently for sepsis with no evidence pneumonia but did have diarrhea     Has been on IV Rocephin since 28th Feb and will complete 3/14/2025     Her ANC 3/10/2025 was 660 with WBC 2.77     Her prophylactic agents were stopped in house     Will restart Acyclovir 400 mg bid and will start Voriconazole 200 mg bid rather than diflucan     Thus based on the DDI with this agent and  her neutropenia, will start Venetoclax 100 mg daily for 7 days to start-if tolerated may increase to 10-14 days with vidaza 75 mg/m2 days 1-5     Will have CBC repeated on 3/14/2025      May have to hold vidaza if ANC isn't approaching 1000      Hgb stable at 9.0; was 5.8 on admission     Patient is feeling improved, less SOB        3/26/2025     Doing much better; seen in ER 15-16 March for SOB      CTA negative for PE     Was seen by cardiology-metoprolol was stopped and since then doing much better      On midodrine when BP goes too low     Labs 3/20/2025 with Na 142 k 3.9 Cr 0.65 Ca 8.6 ALT/AST 29/21 TB 0.46  Mg 2.0 WBC 3.2 Hgb 8.9  plts 3311 ANC 1170      Will continue with Vidaza days 1-5      Tolerating venetoclax 100 mg daily for 7 days      Follow labs weekly      4/21/2025     Labs 4/18/2025 Na 140 K 4.3 Cr 0.78 Ca 9.4 ALT/AST 17/15 TB 0.38   WBC 3.24 Hgb 9.4  plts 390 ANC 1870      5/19/2025     Doing better     Labs stable 5/17/2025     Na 140 K 4.5 Cr 0.97 ALT/AST 22/19 Ca 9.1 TB 0.37      WBC 3.4 Hgb 10.9 plts 311  ANC 2090     Having increased left hip pain/dislocation issues      Also with bilateral LE edema-has had negative Dopplers     Weight stable 183 pounds     6/23/2025    Initiated Cycle 6 of combination HMA-therapy, [facility] 16th 2025:  Azacitidine 75 mg/m2 IV Daily, on Days 1-5.  Venetoclax 100 mg Daily, on Days 1-7.  Note: Plan to increase to Venetoclax 100 mg Daily, on Days 1-10.  Continue antimicrobial-prophylaxis, as follows:  Continue Acyclovir 400 mg BID.  Start Bactrim DS Tablet Monday, Wednesday, and Friday.  Obtain repeat Bone Marrow Biopsy in June 2025, prior to Cycle 6.which still has not been read     C6 Aza x 5 days 6/16/2025 with venetoclax             Labs 6/14/2025 Na 141 K 4.2 Cr 0.91 Ca 8.8 ALT?AST 22/17 TB 0.48 WBC 2.7 Hgb 11 plts 246 ANC 1180         Anemia/Leukocytosis      Labs from 1/17/2024 with WBC 15.1 Hgb 11.4 plts 228, no diff done     Has  no evidence bleeding, destruction RBCs; concern with elevated WBC as well     Will do heme workup repeat CBC with diff, retic, iron panel/B12/folate, SPEP although MCV normal.       WBC may be related to underlying fibromyalgia      Anemia may be AOCD but will rule out     11/8/2024     Recent ER visit 11/1/2024 due to increased fatigue/SOB/CAMPOS     Found to be COVID positive without symptoms     Labs 10/26/2024 and 11/1/2024 as follows     WBC 3.98 Hgb 9.8 plts 109  ANC 1820      WBC 3.9 Hgb 9.5  plts 96 ANC 1890      Today repeated WBC 4.52 Hgb 10  plts 99 ANC 1840     Other labs done 10/26/2024      Had SPEP no M spike     FLC not done     Total iron 92 ferritin 119 % sat 27 folate 18 B12 733 zinc 68      Flow cytometry pending     May all be COVID related although anemic past visit          Summary/Recommendations      C5 Vidaza 75 mg/m2 days 1-5 with 100 mg daily venetoclax x 7 days     Will increase to 10 days next cycle     Continue with Prophylactic meds-all were stopped last admission; Bactrim DS M/W/F as well as diflucan and acyclovir     Bone marrow prior to C6     May need hip intervention-will discuss with ortho     Palliative care for THC products/anxiety     Follow up 1 month       History of Present Illness {?Quick Links Encounters * My Last Note * Last Note in Specialty * Snapshot * Since Last Visit * History :82105}  No chief complaint on file.  Pretty Aguila is a 75-year-old female, with an established history of high-grade myelodysplastic syndrome (Currently on combination HMA-therapy); who presents today for interval follow-up. On evaluation today, patient has multiple concerns; some of which have been ongoing for weeks. Patient endorses bilateral lower extremity swelling, with slight asymmetry (Left greater than right). Prior Doppler Ultrasound of the Lower Extremities on April 22nd, 2025, was unremarkable for deep vein thrombosis. Patient also had a CT of the Left Lower  Extremity, of which was negative for dislocation. Patient discussed potential hip replacement with Orthopedic Surgery. We discussed consideration after repeat Bone Marrow Biopsy. Overall, lower extremity swelling appears to improve with leg-elevation alone. She cannot tolerate compression-stockings. Additionally, patient reports increased appetite, status-post discontinuation of weight-loss medication. Her weight remains largely stable in the 183-188-pound range. Lastly, patient requested a topical agent for interval development of a pruritic-rash involving the left foot. It appears to be tinea pedis, without involvement of the toe nails. Will address, as per above. Discussed the plan, in great detail, with patient and her daughter. Both parties expressed understanding and agreement.     Oncology History   Cancer Staging   Malignant neoplasm of lower-outer quadrant of left breast of female, estrogen receptor positive (HCC)  Staging form: Breast, AJCC 8th Edition  - Pathologic stage from 11/20/2023: Stage IA (pT2, pN0(sn), cM0, G2, ER+, SD+, HER2-) - Signed by Adrien Gabriel MD on 12/6/2023  Stage prefix: Initial diagnosis  Method of lymph node assessment: [place] lymph node biopsy  Multigene prognostic tests performed: None  Histologic grading system: 3 grade system  Oncology History   Malignant neoplasm of lower-outer quadrant of left breast of female, estrogen receptor positive (HCC)   9/19/2023 Biopsy    Left breast ultrasound-guided biopsy  4 o'clock, 2 cm from nipple (VERENICE)  Invasive breast carcinoma of no special type (ductal NST)  Grade 2  ER 90, SD 90, HER2 1+\  Lymphovascular invasion not definitively identified    Concordant. Malignancy appears unifocal; however, oval circumscribed mass in the upper outer left breast is not accounted for. Bilateral breast MRI is recommended. Biopsy-proven carcinoma measured 3.1 cm on US. Left axilla US negative. Right breast clear.     9/28/2023 Observation    Bilateral  breast MRI: Unifocal carcinoma in the lower outer left breast with possible skin involvement. There are no suspicious enhancing masses or suspicious areas of non-mass enhancement in right breast. There is no axillary or internal mammary adenopathy.     10/17/2023 Genetic Testing    A total of 47 genes were evaluated, including: HUYEN, BRCA1, BRCA2, CDH1, CHEK2, PALB2, PTEN, STK11, TP53  Negative result. No pathogenic sequence variants identified  [employer]     11/20/2023 Surgery    Left VERENICE  directed mastectomy with SLN biopsy   Invasive carcinoma of no special type (ductal)  Grade 2  3.6 cm   Margins negative  0/1 Lymph node  Anatomic Stage IIA  Prognostic Stage IA    Immediate reconstruction with tissue expander and ADM (Dr. Jimenez)     11/20/2023 -  Cancer Staged    Staging form: Breast, AJCC 8th Edition  - Pathologic stage from 11/20/2023: Stage IA (pT2, pN0(sn), cM0, G2, ER+, SC+, HER2-) - Signed by Adrien Gabriel MD on 12/6/2023  Stage prefix: Initial diagnosis  Method of lymph node assessment: [place] lymph node biopsy  Multigene prognostic tests performed: None  Histologic grading system: 3 grade system       MDS (myelodysplastic syndrome), high grade (HCC)   12/30/2024 Initial Diagnosis    MDS (myelodysplastic syndrome), high grade (HCC)     1/20/2025 -  Chemotherapy    azaCITIDine (VIDAZA), 75 mg/m2 = 137.5 mg (100 % of original dose 75 mg/m2), 7 of 7 cycles  Dose modification: 75 mg/m2 (original dose 75 mg/m2, Cycle 0, Reason: Anticipated Tolerance)  Administration: 137.5 mg (1/20/2025), 137.5 mg (1/21/2025), 137.5 mg (2/17/2025), 137.5 mg (1/22/2025), 137.5 mg (1/23/2025), 137.5 mg (1/24/2025), 137.5 mg (2/18/2025), 137.5 mg (2/19/2025), 137.5 mg (2/20/2025), 137.5 mg (2/21/2025), 137.5 mg (3/24/2025), 137.5 mg (3/25/2025), 137.5 mg (3/26/2025), 137.5 mg (3/27/2025), 137.5 mg (3/28/2025), 137.5 mg (4/21/2025), 137.5 mg (4/22/2025), 137.5 mg (4/23/2025), 137.5 mg (4/24/2025), 137.5 mg (4/25/2025), 137.5 mg  (5/19/2025), 137.5 mg (5/20/2025), 137.5 mg (5/21/2025), 137.5 mg (5/22/2025), 137.5 mg (5/23/2025), 137.5 mg (6/16/2025), 137.5 mg (6/17/2025), 137.5 mg (6/18/2025), 137.5 mg (6/19/2025), 137.5 mg (6/20/2025)        Pertinent Medical History   {There is no content from the last Pertinent Medical History section.}  06/21/25: ***     Review of Systems  {Select to insert medical history sections (Optional):13197}      Objective {?Quick Links Trend Vitals * Enter New Vitals * Results Review * Timeline (Adult) * Labs * Imaging * Cardiology * Procedures * Lung Cancer Screening * Surgical eConsent :06974}  There were no vitals taken for this visit.    Pain Screening:     ECOG   ***  Physical Exam    Labs: I have reviewed the following labs:  Lab Results   Component Value Date/Time    WBC 2.69 (L) 06/14/2025 12:00 PM    RBC 3.19 (L) 06/14/2025 12:00 PM    Hemoglobin 11.0 (L) 06/14/2025 12:00 PM    Hematocrit 34.6 (L) 06/14/2025 12:00 PM     (H) 06/14/2025 12:00 PM    MCH 34.5 (H) 06/14/2025 12:00 PM    RDW 15.7 (H) 06/14/2025 12:00 PM    Platelets 246 06/14/2025 12:00 PM    Segmented % 43 06/14/2025 12:00 PM    Lymphocytes % 42 06/14/2025 12:00 PM    Monocytes % 10 06/14/2025 12:00 PM    Eosinophils Relative 3 06/14/2025 12:00 PM    Basophils Relative 1 06/14/2025 12:00 PM    Immature Grans % 1 06/14/2025 12:00 PM    Absolute Neutrophils 1.18 (L) 06/14/2025 12:00 PM     Lab Results   Component Value Date/Time    Potassium 4.2 06/14/2025 12:00 PM    Chloride 108 06/14/2025 12:00 PM    CO2 28 06/14/2025 12:00 PM    BUN 20 06/14/2025 12:00 PM    Creatinine 0.91 06/14/2025 12:00 PM    Glucose, Fasting 85 06/14/2025 12:00 PM    Calcium 8.8 06/14/2025 12:00 PM    Corrected Calcium 9.3 03/15/2025 04:11 PM    AST 17 06/14/2025 12:00 PM    ALT 22 06/14/2025 12:00 PM    Alkaline Phosphatase 68 06/14/2025 12:00 PM    Total Protein 6.7 06/14/2025 12:00 PM    Albumin 3.9 06/14/2025 12:00 PM    Total Bilirubin 0.48 06/14/2025  12:00 PM    eGFR 62 06/14/2025 12:00 PM   {SL AMB ONCOLOGY LABS (Optional):13276}    {Radiology Results Review (Optional):12720}    {Administrative / Billing Section (Optional):35993}

## 2025-06-23 ENCOUNTER — TELEPHONE (OUTPATIENT)
Dept: HEMATOLOGY ONCOLOGY | Facility: CLINIC | Age: 75
End: 2025-06-23

## 2025-06-23 ENCOUNTER — OFFICE VISIT (OUTPATIENT)
Dept: HEMATOLOGY ONCOLOGY | Facility: CLINIC | Age: 75
End: 2025-06-23
Payer: MEDICARE

## 2025-06-23 VITALS
DIASTOLIC BLOOD PRESSURE: 84 MMHG | HEIGHT: 65 IN | WEIGHT: 192 LBS | BODY MASS INDEX: 31.99 KG/M2 | TEMPERATURE: 98.1 F | HEART RATE: 96 BPM | SYSTOLIC BLOOD PRESSURE: 132 MMHG | OXYGEN SATURATION: 98 % | RESPIRATION RATE: 18 BRPM

## 2025-06-23 DIAGNOSIS — Z17.0 MALIGNANT NEOPLASM OF LOWER-OUTER QUADRANT OF LEFT BREAST OF FEMALE, ESTROGEN RECEPTOR POSITIVE (HCC): ICD-10-CM

## 2025-06-23 DIAGNOSIS — D46.Z MDS (MYELODYSPLASTIC SYNDROME), HIGH GRADE (HCC): Primary | ICD-10-CM

## 2025-06-23 DIAGNOSIS — C50.512 MALIGNANT NEOPLASM OF LOWER-OUTER QUADRANT OF LEFT BREAST OF FEMALE, ESTROGEN RECEPTOR POSITIVE (HCC): ICD-10-CM

## 2025-06-23 PROCEDURE — 99215 OFFICE O/P EST HI 40 MIN: CPT | Performed by: INTERNAL MEDICINE

## 2025-06-23 NOTE — ASSESSMENT & PLAN NOTE
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 Of note, patient was initiated on single-agent Azacitidine 75 mg/m2 IV Daily, on Days 1-5, on January 3rd, 2025 (Cycle 1), for high-grade myelodysplastic syndrome/acute myeloid leukemia (MDS/AML). Venetoclax was subsequently added beginning with Cycle 3, as follows: Venetoclax 100 mg Daily, Days 1-7. Patient most recently completed Cycle 6 of combination HMA-therapy with Venetoclax 100 mg Daily on Days 1-10 (Cycle 6 Day 1 initiated on June 17th, 2025). In the interim, Bone Marrow Biopsy was performed on June 9th, 2025. Pathology shows persistent involvement by patient's myeloid neoplasm, with increased blasts (5-10%). Ancillary-testing detects abnormal karyotype, with deletion of 5q, and 7. Molecular next-generation sequencing detects known TP53 mutation (VAF: Less than 3%), and persistent KMT2A variant of unknown significance.     On evaluation this morning, patient is clinically well, overall. She is tolerating combination HMA-therapy appropriately, with minimal treatment-related toxicities (e.g. With the exception of fatigue). Most recent laboratory-parameters show stable leukopenia with mild neutropenia (WBC: 2.69 ANC: 1,180), and stable mild macrocytic anemia (Hemoglobin: 11.0 MCV: 109). Discussed potential options at this time. Given stable persistent disease with appropriate treatment tolerance, without progressive cytopenias, or transfusion-dependence; would favor continuation of combination HMA-therapy, as is. In the event of disease progression, could consider transition to single-agent Lenalidomide, given 5q-deletion. Discussed the above-mentioned, at length, with patient and her daughter; and both parties are in agreement. Will plan for close  interval follow-up in approximately 6-weeks, prior to initiation of Cycle 8.      Summary of Recommendations:  Proceed to Cycle 7 of combination HMA-therapy, as scheduled, on July 14th, 2025.  Azacitidine 75 mg/m2 IV Daily, on Days 1-5.  Venetoclax 100 mg Daily, on Days 1-10.  Note: Increased to Venetoclax on Days 1-10, beginning with Cycle 6. May need to reduce back to Days 1-7, if fatigue remains significant, and limiting activity-tolerance (e.g. Activities of daily living).  Continue antimicrobial-prophylaxis, as follows:  Continue Acyclovir 400 mg BID.  Continue Bactrim DS Tablet Monday, Wednesday, and Friday.  Transfuse leukoreduced, and irradiated blood-products, as follows:  Transfuse at Hemoglobin less than 7.0 gm/dL.  Transfuse at Platelet Count less than 11 k/mm3.  Recommend close interval follow-up in approximately 6-weeks, prior to Cycle 8.    Orders:    Infusion Calculated Appointment Request; Future    CBC and differential; Future    Comprehensive metabolic panel; Future    Infusion Calculated Appointment Request; Future    Infusion Calculated Appointment Request; Future    Infusion Calculated Appointment Request; Future    Infusion Calculated Appointment Request; Future    Infusion Calculated Appointment Request; Future    CBC and differential; Future    Comprehensive metabolic panel; Future    Infusion Calculated Appointment Request; Future    Infusion Calculated Appointment Request; Future    Infusion Calculated Appointment Request; Future    Infusion Calculated Appointment Request; Future    Infusion Calculated Appointment Request; Future    CBC and differential; Future    Comprehensive metabolic panel; Future    Infusion Calculated Appointment Request; Future    Infusion Calculated Appointment Request; Future    Infusion Calculated Appointment Request; Future    Infusion Calculated Appointment Request; Future    Infusion Calculated Appointment Request; Future    CBC and differential; Future     Comprehensive metabolic panel; Future    Infusion Calculated Appointment Request; Future    Infusion Calculated Appointment Request; Future    Infusion Calculated Appointment Request; Future    Infusion Calculated Appointment Request; Future    Infusion Calculated Appointment Request; Future    CBC and differential; Future    Comprehensive metabolic panel; Future    Infusion Calculated Appointment Request; Future    Infusion Calculated Appointment Request; Future    Infusion Calculated Appointment Request; Future    Infusion Calculated Appointment Request; Future    Infusion Calculated Appointment Request; Future    CBC and differential; Future    Comprehensive metabolic panel; Future    Infusion Calculated Appointment Request; Future    Infusion Calculated Appointment Request; Future    Infusion Calculated Appointment Request; Future    Infusion Calculated Appointment Request; Future    Infusion Calculated Appointment Request; Future    CBC and differential; Future    Comprehensive metabolic panel; Future    Infusion Calculated Appointment Request; Future    Infusion Calculated Appointment Request; Future    Infusion Calculated Appointment Request; Future    Infusion Calculated Appointment Request; Future    Infusion Calculated Appointment Request; Future    CBC and differential; Future    Comprehensive metabolic panel; Future    Infusion Calculated Appointment Request; Future    Infusion Calculated Appointment Request; Future    Infusion Calculated Appointment Request; Future    Infusion Calculated Appointment Request; Future

## 2025-06-23 NOTE — PROGRESS NOTES
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 Venetoclax was subsequently added beginning with Cycle 3, as follows: Venetoclax 100 mg Daily, Days 1-7. Patient most recently completed Cycle 6 of combination HMA-therapy with Venetoclax 100 mg Daily on Days 1-10 (Cycle 6 Day 1 initiated on 2025). In the interim, Bone Marrow Biopsy was performed on 2025. Pathology shows persistent involvement by patient's myeloid neoplasm, with increased blasts (5-10%). Ancillary-testing detects abnormal karyotype, with deletion of 5q, and 7. Molecular next-generation sequencing detects known TP53 mutation (VAF: Less than 3%), and persistent KMT2A variant of unknown significance.     On evaluation this morning, patient is clinically well, overall. She is tolerating combination HMA-therapy appropriately, with minimal treatment-related toxicities (e.g. With the exception of fatigue). Most recent laboratory-parameters show stable leukopenia with mild neutropenia (WBC: 2.69 ANC: 1,180), and stable mild macrocytic anemia (Hemoglobin: 11.0 MCV: 109). Discussed potential options at this time. Given stable persistent disease with appropriate treatment tolerance,  without progressive cytopenias, or transfusion-dependence; would favor continuation of combination HMA-therapy, as is. In the event of disease progression, could consider transition to single-agent Lenalidomide, given 5q-deletion. Discussed the above-mentioned, at length, with patient and her daughter; and both parties are in agreement. Will plan for close interval follow-up in approximately 6-weeks, prior to initiation of Cycle 8.      Summary of Recommendations:  Proceed to Cycle 7 of combination HMA-therapy, as scheduled, on July 14th, 2025.  Azacitidine 75 mg/m2 IV Daily, on Days 1-5.  Venetoclax 100 mg Daily, on Days 1-10.  Note: Increased to Venetoclax on Days 1-10, beginning with Cycle 6. May need to reduce back to Days 1-7, if fatigue remains significant, and limiting activity-tolerance (e.g. Activities of daily living).  Continue antimicrobial-prophylaxis, as follows:  Continue Acyclovir 400 mg BID.  Continue Bactrim DS Tablet Monday, Wednesday, and Friday.  Transfuse leukoreduced, and irradiated blood-products, as follows:  Transfuse at Hemoglobin less than 7.0 gm/dL.  Transfuse at Platelet Count less than 11 k/mm3.  Recommend close interval follow-up in approximately 6-weeks, prior to Cycle 8.    Orders:    Infusion Calculated Appointment Request; Future    CBC and differential; Future    Comprehensive metabolic panel; Future    Infusion Calculated Appointment Request; Future    Infusion Calculated Appointment Request; Future    Infusion Calculated Appointment Request; Future    Infusion Calculated Appointment Request; Future    Infusion Calculated Appointment Request; Future    CBC and differential; Future    Comprehensive metabolic panel; Future    Infusion Calculated Appointment Request; Future    Infusion Calculated Appointment Request; Future    Infusion Calculated Appointment Request; Future    Infusion Calculated Appointment Request; Future    Infusion Calculated Appointment Request; Future    CBC  and differential; Future    Comprehensive metabolic panel; Future    Infusion Calculated Appointment Request; Future    Infusion Calculated Appointment Request; Future    Infusion Calculated Appointment Request; Future    Infusion Calculated Appointment Request; Future    Infusion Calculated Appointment Request; Future    CBC and differential; Future    Comprehensive metabolic panel; Future    Infusion Calculated Appointment Request; Future    Infusion Calculated Appointment Request; Future    Infusion Calculated Appointment Request; Future    Infusion Calculated Appointment Request; Future    Infusion Calculated Appointment Request; Future    CBC and differential; Future    Comprehensive metabolic panel; Future    Infusion Calculated Appointment Request; Future    Infusion Calculated Appointment Request; Future    Infusion Calculated Appointment Request; Future    Infusion Calculated Appointment Request; Future    Infusion Calculated Appointment Request; Future    CBC and differential; Future    Comprehensive metabolic panel; Future    Infusion Calculated Appointment Request; Future    Infusion Calculated Appointment Request; Future    Infusion Calculated Appointment Request; Future    Infusion Calculated Appointment Request; Future    Infusion Calculated Appointment Request; Future    CBC and differential; Future    Comprehensive metabolic panel; Future    Infusion Calculated Appointment Request; Future    Infusion Calculated Appointment Request; Future    Infusion Calculated Appointment Request; Future    Infusion Calculated Appointment Request; Future    Infusion Calculated Appointment Request; Future    CBC and differential; Future    Comprehensive metabolic panel; Future    Infusion Calculated Appointment Request; Future    Infusion Calculated Appointment Request; Future    Infusion Calculated Appointment Request; Future    Infusion Calculated Appointment Request; Future    Malignant neoplasm of lower-outer  quadrant of left breast of female, estrogen receptor positive (HCC)  Additionally, patient has an established history of early-stage hormone-positive HER-2 1+ left breast cancer, with low-risk Oncotype Dx Recurrence Score: 9 (Status-post left breast mastectomy, with sentinel lymph node biopsy, followed by left breast reconstruction with submuscular tissue-expander and acellular dermal matrix, on November 20th, 2023); Currently on adjuvant endocrine-therapy with Anastrozole); who presents today for interval follow-up. On evaluation today, patient is clinically well. She continues to tolerate Anastrozole with minimal treatment-related toxicities. Will continue the above-mentioned accordingly.     Summary of Recommendations:  Continue adjuvant endocrine-therapy with Anastrozole 1 mg Daily.  For endocrine-therapy, associated bone-loss:  Recommend repeat DEXA-scan in December 2025.  Annual screening mammography of the right breast (Due September 2025).  Recommend close interval follow-up, as above.       History of Present Illness   Chief Complaint: Follow-up.  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 On evaluation this morning, patient is clinically well. She continues to tolerate combination HMA-therapy, with minimal treatment-related toxicities. This is with the exception of mild fatigue associated with Venetoclax. She qualifies that this is her first-cycle in which Venetoclax will be administered on Days 1-10, as opposed to on Days 1-7; thus, will need to observe for tolerance. Additionally, mild injection-site reactions noted (e.g. Tenderness) secondary to Azacitidine. Otherwise, patient is tolerating treatment very well. Respiratory-function has markedly improved. Patient  continues to tolerate oral-intake well. Weight remains stable. Patient has no further concerns or complaints at the present time.    Oncology History   Cancer Staging   Malignant neoplasm of lower-outer quadrant of left breast of female, estrogen receptor positive (HCC)  Staging form: Breast, AJCC 8th Edition  - Pathologic stage from 11/20/2023: Stage IA (pT2, pN0(sn), cM0, G2, ER+, VT+, HER2-) - Signed by Adrien Gabriel MD on 12/6/2023  Stage prefix: Initial diagnosis  Method of lymph node assessment: [place] lymph node biopsy  Multigene prognostic tests performed: None  Histologic grading system: 3 grade system  Oncology History   Malignant neoplasm of lower-outer quadrant of left breast of female, estrogen receptor positive (HCC)   9/19/2023 Biopsy    Left breast ultrasound-guided biopsy  4 o'clock, 2 cm from nipple (VERENICE)  Invasive breast carcinoma of no special type (ductal NST)  Grade 2  ER 90, VT 90, HER2 1+\  Lymphovascular invasion not definitively identified    Concordant. Malignancy appears unifocal; however, oval circumscribed mass in the upper outer left breast is not accounted for. Bilateral breast MRI is recommended. Biopsy-proven carcinoma measured 3.1 cm on US. Left axilla US negative. Right breast clear.     9/28/2023 Observation    Bilateral breast MRI: Unifocal carcinoma in the lower outer left breast with possible skin involvement. There are no suspicious enhancing masses or suspicious areas of non-mass enhancement in right breast. There is no axillary or internal mammary adenopathy.     10/17/2023 Genetic Testing    A total of 47 genes were evaluated, including: HUYEN, BRCA1, BRCA2, CDH1, CHEK2, PALB2, PTEN, STK11, TP53  Negative result. No pathogenic sequence variants identified  [facility]     11/20/2023 Surgery    Left VERENICE  directed mastectomy with SLN biopsy   Invasive carcinoma of no special type (ductal)  Grade 2  3.6 cm   Margins negative  0/1 Lymph node  Anatomic Stage IIA  Prognostic  Stage IA    Immediate reconstruction with tissue expander and ADM (Dr. Jimenez)     11/20/2023 -  Cancer Staged    Staging form: Breast, AJCC 8th Edition  - Pathologic stage from 11/20/2023: Stage IA (pT2, pN0(sn), cM0, G2, ER+, IN+, HER2-) - Signed by Adrien Gabriel MD on 12/6/2023  Stage prefix: Initial diagnosis  Method of lymph node assessment: [place] lymph node biopsy  Multigene prognostic tests performed: None  Histologic grading system: 3 grade system       MDS (myelodysplastic syndrome), high grade (HCC)   12/30/2024 Initial Diagnosis    MDS (myelodysplastic syndrome), high grade (HCC)     1/20/2025 -  Chemotherapy    azaCITIDine (VIDAZA), 75 mg/m2 = 137.5 mg (100 % of original dose 75 mg/m2), 7 of 15 cycles  Dose modification: 75 mg/m2 (original dose 75 mg/m2, Cycle 0, Reason: Anticipated Tolerance)  Administration: 137.5 mg (1/20/2025), 137.5 mg (1/21/2025), 137.5 mg (2/17/2025), 137.5 mg (1/22/2025), 137.5 mg (1/23/2025), 137.5 mg (1/24/2025), 137.5 mg (2/18/2025), 137.5 mg (2/19/2025), 137.5 mg (2/20/2025), 137.5 mg (2/21/2025), 137.5 mg (3/24/2025), 137.5 mg (3/25/2025), 137.5 mg (3/26/2025), 137.5 mg (3/27/2025), 137.5 mg (3/28/2025), 137.5 mg (4/21/2025), 137.5 mg (4/22/2025), 137.5 mg (4/23/2025), 137.5 mg (4/24/2025), 137.5 mg (4/25/2025), 137.5 mg (5/19/2025), 137.5 mg (5/20/2025), 137.5 mg (5/21/2025), 137.5 mg (5/22/2025), 137.5 mg (5/23/2025), 137.5 mg (6/16/2025), 137.5 mg (6/17/2025), 137.5 mg (6/18/2025), 137.5 mg (6/19/2025), 137.5 mg (6/20/2025)        Review of Systems  Review of Systems:  All systems reviewed and negative except otherwise listed in the History of Present Illness.      Objective   Vitals:    06/23/25 1051   BP: 132/84   Pulse: 96   Resp: 18   Temp: 98.1 °F (36.7 °C)   SpO2: 98%     ECOG   1-Points.    Physical Exam:  General: Alert, and oriented; Pleasant, and conversational; Well-Appearing Female  HEENT: Atraumatic, and normocephalic; PERRLA; EOMI; Moist mucosal  membranes  Neck: Trachea midline; No neck masses, thyromegaly, or cervical lymphadenopathy  Cardiovascular: Regular rate and rhythm; No murmurs, rubs, or gallops; No edema  Respiratory: Clear to auscultation bilaterally; Adequate aeration; No supplemental oxygen  Abdomen: Soft, non-tender; Non-distended; No organomegaly appreciated; Bowel sounds   Extremities: No obvious deformities; No peripheral edema; Moves all   Neurologic: Appropriately alert, and oriented to Person, Place, and Time; No focal neurologic deficits; Ambulates with Right-Handed Cane    Labs: I have reviewed the following labs:  Lab Results   Component Value Date/Time    WBC 2.69 (L) 06/14/2025 12:00 PM    RBC 3.19 (L) 06/14/2025 12:00 PM    Hemoglobin 11.0 (L) 06/14/2025 12:00 PM    Hematocrit 34.6 (L) 06/14/2025 12:00 PM     (H) 06/14/2025 12:00 PM    MCH 34.5 (H) 06/14/2025 12:00 PM    RDW 15.7 (H) 06/14/2025 12:00 PM    Platelets 246 06/14/2025 12:00 PM    Segmented % 43 06/14/2025 12:00 PM    Lymphocytes % 42 06/14/2025 12:00 PM    Monocytes % 10 06/14/2025 12:00 PM    Eosinophils Relative 3 06/14/2025 12:00 PM    Basophils Relative 1 06/14/2025 12:00 PM    Immature Grans % 1 06/14/2025 12:00 PM    Absolute Neutrophils 1.18 (L) 06/14/2025 12:00 PM     Lab Results   Component Value Date/Time    Potassium 4.2 06/14/2025 12:00 PM    Chloride 108 06/14/2025 12:00 PM    CO2 28 06/14/2025 12:00 PM    BUN 20 06/14/2025 12:00 PM    Creatinine 0.91 06/14/2025 12:00 PM    Glucose, Fasting 85 06/14/2025 12:00 PM    Calcium 8.8 06/14/2025 12:00 PM    Corrected Calcium 9.3 03/15/2025 04:11 PM    AST 17 06/14/2025 12:00 PM    ALT 22 06/14/2025 12:00 PM    Alkaline Phosphatase 68 06/14/2025 12:00 PM    Total Protein 6.7 06/14/2025 12:00 PM    Albumin 3.9 06/14/2025 12:00 PM    Total Bilirubin 0.48 06/14/2025 12:00 PM    eGFR 62 06/14/2025 12:00 PM     Patient was seen and discussed with attending physician, Geno Carranza M.D., Ph.D.    Chiqui Pablo,  MALACHI.  Hematology-Oncology Fellow (PGY-5)

## 2025-06-24 LAB
ANISOCYTOSIS BLD QL SMEAR: PRESENT
BLASTS ABSOLUTE COUNT: 0.06 THOUSAND/UL (ref 0–0)
BLASTS NFR BLD MANUAL: 2 %
EOSINOPHIL # BLD AUTO: 0.06 THOUSAND/UL (ref 0–0.61)
EOSINOPHIL NFR BLD MANUAL: 2 % (ref 0–6)
LYMPHOCYTES # BLD AUTO: 0.97 THOUSAND/UL (ref 0.6–4.47)
LYMPHOCYTES # BLD AUTO: 31 %
MACROCYTES BLD QL AUTO: PRESENT
MONOCYTES # BLD AUTO: 0.11 THOUSAND/UL (ref 0–1.22)
MONOCYTES NFR BLD AUTO: 4 % (ref 4–12)
NEUTS SEG # BLD: 1.58 THOUSAND/UL (ref 1.81–6.82)
NEUTS SEG NFR BLD AUTO: 57 %
OVALOCYTES BLD QL SMEAR: PRESENT
PATHOLOGY REVIEW: YES
PLATELET BLD QL SMEAR: ADEQUATE
POIKILOCYTOSIS BLD QL SMEAR: PRESENT
RBC MORPH BLD: PRESENT
TOTAL CELLS COUNTED SPEC: 100
VARIANT LYMPHS # BLD AUTO: 4 % (ref 0–0)

## 2025-06-26 DIAGNOSIS — Z17.0 MALIGNANT NEOPLASM OF LOWER-OUTER QUADRANT OF LEFT BREAST OF FEMALE, ESTROGEN RECEPTOR POSITIVE (HCC): ICD-10-CM

## 2025-06-26 DIAGNOSIS — C50.512 MALIGNANT NEOPLASM OF LOWER-OUTER QUADRANT OF LEFT BREAST OF FEMALE, ESTROGEN RECEPTOR POSITIVE (HCC): ICD-10-CM

## 2025-06-27 RX ORDER — ANASTROZOLE 1 MG/1
1 TABLET ORAL DAILY
Qty: 90 TABLET | Refills: 0 | Status: SHIPPED | OUTPATIENT
Start: 2025-06-27

## 2025-07-01 DIAGNOSIS — D46.Z MDS (MYELODYSPLASTIC SYNDROME), HIGH GRADE (HCC): ICD-10-CM

## 2025-07-01 DIAGNOSIS — Z96.642 HISTORY OF TOTAL LEFT HIP ARTHROPLASTY: ICD-10-CM

## 2025-07-01 DIAGNOSIS — T84.021A FAILURE OF LEFT TOTAL HIP ARTHROPLASTY WITH DISLOCATION OF HIP, INITIAL ENCOUNTER (HCC): ICD-10-CM

## 2025-07-01 RX ORDER — FOLIC ACID 1 MG/1
1000 TABLET ORAL DAILY
Qty: 30 TABLET | Refills: 5 | Status: SHIPPED | OUTPATIENT
Start: 2025-07-01

## 2025-07-10 RX ORDER — ONDANSETRON 8 MG/1
16 TABLET, ORALLY DISINTEGRATING ORAL ONCE
Status: CANCELLED | OUTPATIENT
Start: 2025-07-16 | End: 2025-07-16

## 2025-07-10 RX ORDER — ONDANSETRON 8 MG/1
16 TABLET, ORALLY DISINTEGRATING ORAL ONCE
Status: CANCELLED | OUTPATIENT
Start: 2025-07-18 | End: 2025-07-18

## 2025-07-10 RX ORDER — SODIUM CHLORIDE 9 MG/ML
20 INJECTION, SOLUTION INTRAVENOUS ONCE
Status: CANCELLED | OUTPATIENT
Start: 2025-07-15

## 2025-07-10 RX ORDER — DEXAMETHASONE 4 MG/1
10 TABLET ORAL ONCE
Status: CANCELLED | OUTPATIENT
Start: 2025-07-17

## 2025-07-10 RX ORDER — SODIUM CHLORIDE 9 MG/ML
20 INJECTION, SOLUTION INTRAVENOUS ONCE
Status: CANCELLED | OUTPATIENT
Start: 2025-07-17

## 2025-07-10 RX ORDER — SODIUM CHLORIDE 9 MG/ML
20 INJECTION, SOLUTION INTRAVENOUS ONCE
Status: CANCELLED | OUTPATIENT
Start: 2025-07-14

## 2025-07-10 RX ORDER — ONDANSETRON 8 MG/1
16 TABLET, ORALLY DISINTEGRATING ORAL ONCE
Status: CANCELLED | OUTPATIENT
Start: 2025-07-17 | End: 2025-07-17

## 2025-07-10 RX ORDER — DEXAMETHASONE 4 MG/1
10 TABLET ORAL ONCE
Status: CANCELLED | OUTPATIENT
Start: 2025-07-16

## 2025-07-10 RX ORDER — DEXAMETHASONE 4 MG/1
10 TABLET ORAL ONCE
Status: CANCELLED | OUTPATIENT
Start: 2025-07-14

## 2025-07-10 RX ORDER — SODIUM CHLORIDE 9 MG/ML
20 INJECTION, SOLUTION INTRAVENOUS ONCE
Status: CANCELLED | OUTPATIENT
Start: 2025-07-18

## 2025-07-10 RX ORDER — SODIUM CHLORIDE 9 MG/ML
20 INJECTION, SOLUTION INTRAVENOUS ONCE
Status: CANCELLED | OUTPATIENT
Start: 2025-07-16

## 2025-07-10 RX ORDER — ONDANSETRON 8 MG/1
16 TABLET, ORALLY DISINTEGRATING ORAL ONCE
Status: CANCELLED | OUTPATIENT
Start: 2025-07-14 | End: 2025-07-14

## 2025-07-10 RX ORDER — DEXAMETHASONE 4 MG/1
10 TABLET ORAL ONCE
Status: CANCELLED | OUTPATIENT
Start: 2025-07-18

## 2025-07-10 RX ORDER — ONDANSETRON 8 MG/1
16 TABLET, ORALLY DISINTEGRATING ORAL ONCE
Status: CANCELLED | OUTPATIENT
Start: 2025-07-15 | End: 2025-07-15

## 2025-07-10 RX ORDER — DEXAMETHASONE 4 MG/1
10 TABLET ORAL ONCE
Status: CANCELLED | OUTPATIENT
Start: 2025-07-15

## 2025-07-11 ENCOUNTER — TELEPHONE (OUTPATIENT)
Dept: INFUSION CENTER | Facility: CLINIC | Age: 75
End: 2025-07-11

## 2025-07-13 ENCOUNTER — APPOINTMENT (OUTPATIENT)
Dept: LAB | Facility: CLINIC | Age: 75
End: 2025-07-13
Payer: MEDICARE

## 2025-07-13 DIAGNOSIS — D46.Z MDS (MYELODYSPLASTIC SYNDROME), HIGH GRADE (HCC): ICD-10-CM

## 2025-07-13 LAB
ALBUMIN SERPL BCG-MCNC: 4 G/DL (ref 3.5–5)
ALP SERPL-CCNC: 67 U/L (ref 34–104)
ALT SERPL W P-5'-P-CCNC: 31 U/L (ref 7–52)
ANION GAP SERPL CALCULATED.3IONS-SCNC: 3 MMOL/L (ref 4–13)
AST SERPL W P-5'-P-CCNC: 22 U/L (ref 13–39)
BASOPHILS # BLD AUTO: 0.02 THOUSANDS/ÂΜL (ref 0–0.1)
BASOPHILS NFR BLD AUTO: 1 % (ref 0–1)
BILIRUB SERPL-MCNC: 0.4 MG/DL (ref 0.2–1)
BUN SERPL-MCNC: 24 MG/DL (ref 5–25)
CALCIUM SERPL-MCNC: 8.8 MG/DL (ref 8.4–10.2)
CHLORIDE SERPL-SCNC: 109 MMOL/L (ref 96–108)
CO2 SERPL-SCNC: 29 MMOL/L (ref 21–32)
CREAT SERPL-MCNC: 0.86 MG/DL (ref 0.6–1.3)
EOSINOPHIL # BLD AUTO: 0.04 THOUSAND/ÂΜL (ref 0–0.61)
EOSINOPHIL NFR BLD AUTO: 1 % (ref 0–6)
ERYTHROCYTE [DISTWIDTH] IN BLOOD BY AUTOMATED COUNT: 15.6 % (ref 11.6–15.1)
GFR SERPL CREATININE-BSD FRML MDRD: 67 ML/MIN/1.73SQ M
GLUCOSE SERPL-MCNC: 82 MG/DL (ref 65–140)
HCT VFR BLD AUTO: 33.6 % (ref 34.8–46.1)
HGB BLD-MCNC: 10.7 G/DL (ref 11.5–15.4)
IMM GRANULOCYTES # BLD AUTO: 0.02 THOUSAND/UL (ref 0–0.2)
IMM GRANULOCYTES NFR BLD AUTO: 1 % (ref 0–2)
LYMPHOCYTES # BLD AUTO: 0.96 THOUSANDS/ÂΜL (ref 0.6–4.47)
LYMPHOCYTES NFR BLD AUTO: 35 % (ref 14–44)
MCH RBC QN AUTO: 34 PG (ref 26.8–34.3)
MCHC RBC AUTO-ENTMCNC: 31.8 G/DL (ref 31.4–37.4)
MCV RBC AUTO: 107 FL (ref 82–98)
MONOCYTES # BLD AUTO: 0.18 THOUSAND/ÂΜL (ref 0.17–1.22)
MONOCYTES NFR BLD AUTO: 7 % (ref 4–12)
NEUTROPHILS # BLD AUTO: 1.56 THOUSANDS/ÂΜL (ref 1.85–7.62)
NEUTS SEG NFR BLD AUTO: 55 % (ref 43–75)
NRBC BLD AUTO-RTO: 0 /100 WBCS
PLATELET # BLD AUTO: 198 THOUSANDS/UL (ref 149–390)
PMV BLD AUTO: 11.3 FL (ref 8.9–12.7)
POTASSIUM SERPL-SCNC: 4.2 MMOL/L (ref 3.5–5.3)
PROT SERPL-MCNC: 6.5 G/DL (ref 6.4–8.4)
RBC # BLD AUTO: 3.15 MILLION/UL (ref 3.81–5.12)
SODIUM SERPL-SCNC: 141 MMOL/L (ref 135–147)
WBC # BLD AUTO: 2.78 THOUSAND/UL (ref 4.31–10.16)

## 2025-07-13 PROCEDURE — 36415 COLL VENOUS BLD VENIPUNCTURE: CPT

## 2025-07-13 PROCEDURE — 80053 COMPREHEN METABOLIC PANEL: CPT

## 2025-07-13 PROCEDURE — 85025 COMPLETE CBC W/AUTO DIFF WBC: CPT

## 2025-07-14 ENCOUNTER — HOSPITAL ENCOUNTER (OUTPATIENT)
Dept: INFUSION CENTER | Facility: CLINIC | Age: 75
Discharge: HOME | End: 2025-07-14
Attending: INTERNAL MEDICINE
Payer: MEDICARE

## 2025-07-14 VITALS
DIASTOLIC BLOOD PRESSURE: 72 MMHG | HEART RATE: 86 BPM | WEIGHT: 198.5 LBS | SYSTOLIC BLOOD PRESSURE: 121 MMHG | OXYGEN SATURATION: 95 % | TEMPERATURE: 97.4 F | BODY MASS INDEX: 33.07 KG/M2 | HEIGHT: 65 IN

## 2025-07-14 DIAGNOSIS — D46.Z MDS (MYELODYSPLASTIC SYNDROME), HIGH GRADE (HCC): Primary | ICD-10-CM

## 2025-07-14 PROCEDURE — 96401 CHEMO ANTI-NEOPL SQ/IM: CPT

## 2025-07-14 RX ORDER — SODIUM CHLORIDE 9 MG/ML
20 INJECTION, SOLUTION INTRAVENOUS ONCE
Status: DISCONTINUED | OUTPATIENT
Start: 2025-07-14 | End: 2025-07-17 | Stop reason: HOSPADM

## 2025-07-14 RX ORDER — ONDANSETRON 8 MG/1
16 TABLET, ORALLY DISINTEGRATING ORAL ONCE
Status: COMPLETED | OUTPATIENT
Start: 2025-07-14 | End: 2025-07-14

## 2025-07-14 RX ADMIN — ONDANSETRON 16 MG: 8 TABLET, ORALLY DISINTEGRATING ORAL at 13:43

## 2025-07-14 RX ADMIN — DEXAMETHASONE 10 MG: 4 TABLET ORAL at 13:43

## 2025-07-14 RX ADMIN — AZACITIDINE 137.5 MG: 100 INJECTION, POWDER, LYOPHILIZED, FOR SOLUTION INTRAVENOUS; SUBCUTANEOUS at 14:27

## 2025-07-14 NOTE — PROGRESS NOTES
Pt here for Vidaza D1C7 and offers no complaints on arrival, labs dated 7/13/25 reviewed. Pt given Vidaza one in R arm one in L arm as ordered without incident. Confirmed apt for tomorrow @ 1pm @ [facility]. Micaela FUNEZ.

## 2025-07-15 ENCOUNTER — HOSPITAL ENCOUNTER (OUTPATIENT)
Dept: INFUSION CENTER | Facility: CLINIC | Age: 75
Discharge: HOME | End: 2025-07-15
Attending: INTERNAL MEDICINE
Payer: MEDICARE

## 2025-07-15 VITALS
DIASTOLIC BLOOD PRESSURE: 87 MMHG | HEART RATE: 71 BPM | TEMPERATURE: 97.8 F | OXYGEN SATURATION: 98 % | HEIGHT: 65 IN | WEIGHT: 198.5 LBS | BODY MASS INDEX: 33.07 KG/M2 | SYSTOLIC BLOOD PRESSURE: 126 MMHG

## 2025-07-15 DIAGNOSIS — E66.811 CLASS 1 OBESITY DUE TO EXCESS CALORIES WITH SERIOUS COMORBIDITY AND BODY MASS INDEX (BMI) OF 31.0 TO 31.9 IN ADULT: ICD-10-CM

## 2025-07-15 DIAGNOSIS — F41.9 ANXIETY: ICD-10-CM

## 2025-07-15 DIAGNOSIS — D46.Z MDS (MYELODYSPLASTIC SYNDROME), HIGH GRADE (HCC): Primary | ICD-10-CM

## 2025-07-15 DIAGNOSIS — E66.09 CLASS 1 OBESITY DUE TO EXCESS CALORIES WITH SERIOUS COMORBIDITY AND BODY MASS INDEX (BMI) OF 31.0 TO 31.9 IN ADULT: ICD-10-CM

## 2025-07-15 DIAGNOSIS — F32.A CHRONIC DEPRESSION: ICD-10-CM

## 2025-07-15 PROCEDURE — 96401 CHEMO ANTI-NEOPL SQ/IM: CPT

## 2025-07-15 RX ORDER — ONDANSETRON 8 MG/1
16 TABLET, ORALLY DISINTEGRATING ORAL ONCE
Status: COMPLETED | OUTPATIENT
Start: 2025-07-15 | End: 2025-07-15

## 2025-07-15 RX ORDER — SODIUM CHLORIDE 9 MG/ML
20 INJECTION, SOLUTION INTRAVENOUS ONCE
Status: DISCONTINUED | OUTPATIENT
Start: 2025-07-15 | End: 2025-07-18 | Stop reason: HOSPADM

## 2025-07-15 RX ADMIN — AZACITIDINE 137.5 MG: 100 INJECTION, POWDER, LYOPHILIZED, FOR SOLUTION INTRAVENOUS; SUBCUTANEOUS at 14:05

## 2025-07-15 RX ADMIN — DEXAMETHASONE 10 MG: 4 TABLET ORAL at 13:26

## 2025-07-15 RX ADMIN — ONDANSETRON 16 MG: 8 TABLET, ORALLY DISINTEGRATING ORAL at 13:26

## 2025-07-16 ENCOUNTER — HOSPITAL ENCOUNTER (OUTPATIENT)
Dept: INFUSION CENTER | Facility: CLINIC | Age: 75
Discharge: HOME | End: 2025-07-16
Attending: INTERNAL MEDICINE
Payer: MEDICARE

## 2025-07-16 VITALS
WEIGHT: 198 LBS | DIASTOLIC BLOOD PRESSURE: 82 MMHG | RESPIRATION RATE: 18 BRPM | SYSTOLIC BLOOD PRESSURE: 128 MMHG | HEIGHT: 65 IN | TEMPERATURE: 97.2 F | OXYGEN SATURATION: 97 % | HEART RATE: 80 BPM | BODY MASS INDEX: 32.99 KG/M2

## 2025-07-16 DIAGNOSIS — D46.Z MDS (MYELODYSPLASTIC SYNDROME), HIGH GRADE (HCC): Primary | ICD-10-CM

## 2025-07-16 PROCEDURE — 96401 CHEMO ANTI-NEOPL SQ/IM: CPT

## 2025-07-16 RX ORDER — NALTREXONE HYDROCHLORIDE 50 MG/1
TABLET, FILM COATED ORAL
Qty: 30 TABLET | Refills: 0 | Status: SHIPPED | OUTPATIENT
Start: 2025-07-16

## 2025-07-16 RX ORDER — ONDANSETRON 8 MG/1
16 TABLET, ORALLY DISINTEGRATING ORAL ONCE
Status: COMPLETED | OUTPATIENT
Start: 2025-07-16 | End: 2025-07-16

## 2025-07-16 RX ORDER — SODIUM CHLORIDE 9 MG/ML
20 INJECTION, SOLUTION INTRAVENOUS ONCE
Status: DISCONTINUED | OUTPATIENT
Start: 2025-07-16 | End: 2025-07-19 | Stop reason: HOSPADM

## 2025-07-16 RX ADMIN — AZACITIDINE 137.5 MG: 100 INJECTION, POWDER, LYOPHILIZED, FOR SOLUTION INTRAVENOUS; SUBCUTANEOUS at 13:46

## 2025-07-16 RX ADMIN — DEXAMETHASONE 10 MG: 2 TABLET ORAL at 13:05

## 2025-07-16 RX ADMIN — ONDANSETRON 16 MG: 8 TABLET, ORALLY DISINTEGRATING ORAL at 13:06

## 2025-07-16 NOTE — PLAN OF CARE
Problem: Knowledge Deficit  Goal: Patient/family/caregiver demonstrates understanding of disease process, treatment plan, medications, and discharge instructions  Description: Complete learning assessment and assess knowledge base.  Interventions:  - Provide teaching at level of understanding  - Provide teaching via preferred learning methods  7/16/2025 1311 by Korin Black RN  Outcome: Progressing  7/16/2025 1311 by Korin Black RN  Outcome: Progressing

## 2025-07-16 NOTE — PROGRESS NOTES
Injections given in b/l abdomen with HENOK RN without issue. Pt confirms next appointment on 7/17 @1238 ELVIRA FUNEZ declined.    
Pt arrives to infusion center for D3 Vidaza. Offers no complaints. Labs from 7/13: reviewed. Pt sitting comfortably in chair, wheels locked, call bell within reach.     
toe numbness/mild impairment

## 2025-07-17 ENCOUNTER — HOSPITAL ENCOUNTER (OUTPATIENT)
Dept: INFUSION CENTER | Facility: CLINIC | Age: 75
Discharge: HOME | End: 2025-07-17
Attending: INTERNAL MEDICINE
Payer: MEDICARE

## 2025-07-17 VITALS
OXYGEN SATURATION: 94 % | HEART RATE: 69 BPM | RESPIRATION RATE: 18 BRPM | BODY MASS INDEX: 32.82 KG/M2 | TEMPERATURE: 97.5 F | DIASTOLIC BLOOD PRESSURE: 78 MMHG | HEIGHT: 65 IN | SYSTOLIC BLOOD PRESSURE: 140 MMHG | WEIGHT: 197 LBS

## 2025-07-17 DIAGNOSIS — D46.Z MDS (MYELODYSPLASTIC SYNDROME), HIGH GRADE (HCC): Primary | ICD-10-CM

## 2025-07-17 PROCEDURE — 96401 CHEMO ANTI-NEOPL SQ/IM: CPT

## 2025-07-17 RX ORDER — LORAZEPAM 0.5 MG/1
0.5 TABLET ORAL 2 TIMES DAILY
Qty: 60 TABLET | Refills: 0 | Status: SHIPPED | OUTPATIENT
Start: 2025-07-17

## 2025-07-17 RX ORDER — BUPROPION HYDROCHLORIDE 300 MG/1
300 TABLET ORAL EVERY MORNING
Qty: 90 TABLET | Refills: 3 | Status: SHIPPED | OUTPATIENT
Start: 2025-07-17

## 2025-07-17 RX ORDER — ONDANSETRON 8 MG/1
16 TABLET, ORALLY DISINTEGRATING ORAL ONCE
Status: COMPLETED | OUTPATIENT
Start: 2025-07-17 | End: 2025-07-17

## 2025-07-17 RX ORDER — SODIUM CHLORIDE 9 MG/ML
20 INJECTION, SOLUTION INTRAVENOUS ONCE
Status: DISCONTINUED | OUTPATIENT
Start: 2025-07-17 | End: 2025-07-20 | Stop reason: HOSPADM

## 2025-07-17 RX ADMIN — ONDANSETRON 16 MG: 8 TABLET, ORALLY DISINTEGRATING ORAL at 12:30

## 2025-07-17 RX ADMIN — DEXAMETHASONE 10 MG: 4 TABLET ORAL at 12:30

## 2025-07-17 RX ADMIN — AZACITIDINE 137.5 MG: 100 INJECTION, POWDER, LYOPHILIZED, FOR SOLUTION INTRAVENOUS; SUBCUTANEOUS at 13:07

## 2025-07-17 NOTE — PROGRESS NOTES
Pt arrives to infusion center for D4 Vidaza. Offers no complaints. Labs from 7/13 are within tx parameters. Pt sitting comfortably in chair, wheels locked, call bell within reach.

## 2025-07-17 NOTE — PROGRESS NOTES
Injections given b/l arms with BROOKE COOK. Pt tolerated tx without issue. Pt confirmed next appointment on 7/18 @1300 AN. AVS declined.

## 2025-07-18 ENCOUNTER — HOSPITAL ENCOUNTER (OUTPATIENT)
Dept: INFUSION CENTER | Facility: CLINIC | Age: 75
End: 2025-07-18
Attending: INTERNAL MEDICINE
Payer: MEDICARE

## 2025-07-18 VITALS
RESPIRATION RATE: 16 BRPM | TEMPERATURE: 97.5 F | HEART RATE: 76 BPM | HEIGHT: 65 IN | WEIGHT: 193 LBS | OXYGEN SATURATION: 98 % | SYSTOLIC BLOOD PRESSURE: 130 MMHG | BODY MASS INDEX: 32.15 KG/M2 | DIASTOLIC BLOOD PRESSURE: 81 MMHG

## 2025-07-18 DIAGNOSIS — D46.Z MDS (MYELODYSPLASTIC SYNDROME), HIGH GRADE (HCC): Primary | ICD-10-CM

## 2025-07-18 PROCEDURE — 96401 CHEMO ANTI-NEOPL SQ/IM: CPT

## 2025-07-18 RX ORDER — ONDANSETRON 8 MG/1
16 TABLET, ORALLY DISINTEGRATING ORAL ONCE
Status: COMPLETED | OUTPATIENT
Start: 2025-07-18 | End: 2025-07-18

## 2025-07-18 RX ORDER — SODIUM CHLORIDE 9 MG/ML
20 INJECTION, SOLUTION INTRAVENOUS ONCE
Status: DISCONTINUED | OUTPATIENT
Start: 2025-07-18 | End: 2025-07-21 | Stop reason: HOSPADM

## 2025-07-18 RX ADMIN — ONDANSETRON 16 MG: 8 TABLET, ORALLY DISINTEGRATING ORAL at 13:14

## 2025-07-18 RX ADMIN — DEXAMETHASONE 10 MG: 4 TABLET ORAL at 13:13

## 2025-07-18 RX ADMIN — AZACITIDINE 137.5 MG: 100 INJECTION, POWDER, LYOPHILIZED, FOR SOLUTION INTRAVENOUS; SUBCUTANEOUS at 13:46

## 2025-07-18 NOTE — PROGRESS NOTES
Vidaza admin. SQ in RlQ and  LlQ without incident.  AVS declined.  Next appt. confirmed for 8/11 @ 1869.

## 2025-07-28 ENCOUNTER — OFFICE VISIT (OUTPATIENT)
Dept: BARIATRICS | Facility: CLINIC | Age: 75
End: 2025-07-28
Payer: MEDICARE

## 2025-07-28 VITALS
HEART RATE: 92 BPM | DIASTOLIC BLOOD PRESSURE: 80 MMHG | BODY MASS INDEX: 34.01 KG/M2 | SYSTOLIC BLOOD PRESSURE: 122 MMHG | HEIGHT: 64 IN | WEIGHT: 199.2 LBS | OXYGEN SATURATION: 99 %

## 2025-07-28 DIAGNOSIS — J41.0 SIMPLE CHRONIC BRONCHITIS (HCC): ICD-10-CM

## 2025-07-28 DIAGNOSIS — E66.811 CLASS 1 OBESITY DUE TO EXCESS CALORIES WITH SERIOUS COMORBIDITY AND BODY MASS INDEX (BMI) OF 31.0 TO 31.9 IN ADULT: Primary | ICD-10-CM

## 2025-07-28 DIAGNOSIS — I25.10 CAD (CORONARY ARTERY DISEASE), NATIVE CORONARY ARTERY: ICD-10-CM

## 2025-07-28 DIAGNOSIS — R73.03 PRE-DIABETES: ICD-10-CM

## 2025-07-28 DIAGNOSIS — E66.09 CLASS 1 OBESITY DUE TO EXCESS CALORIES WITH SERIOUS COMORBIDITY AND BODY MASS INDEX (BMI) OF 31.0 TO 31.9 IN ADULT: Primary | ICD-10-CM

## 2025-07-28 PROCEDURE — 99215 OFFICE O/P EST HI 40 MIN: CPT | Performed by: INTERNAL MEDICINE

## 2025-07-28 PROCEDURE — G2211 COMPLEX E/M VISIT ADD ON: HCPCS | Performed by: INTERNAL MEDICINE

## 2025-07-28 RX ORDER — TOPIRAMATE 25 MG/1
TABLET, FILM COATED ORAL
Qty: 60 TABLET | Refills: 1 | Status: SHIPPED | OUTPATIENT
Start: 2025-07-28

## 2025-07-30 ENCOUNTER — TELEPHONE (OUTPATIENT)
Age: 75
End: 2025-07-30

## 2025-08-05 RX ORDER — SODIUM CHLORIDE 9 MG/ML
20 INJECTION, SOLUTION INTRAVENOUS ONCE
Status: CANCELLED | OUTPATIENT
Start: 2025-08-13

## 2025-08-05 RX ORDER — ONDANSETRON 8 MG/1
16 TABLET, ORALLY DISINTEGRATING ORAL ONCE
Status: CANCELLED | OUTPATIENT
Start: 2025-08-15 | End: 2025-08-15

## 2025-08-05 RX ORDER — DEXAMETHASONE 4 MG/1
10 TABLET ORAL ONCE
Status: CANCELLED | OUTPATIENT
Start: 2025-08-14

## 2025-08-05 RX ORDER — DEXAMETHASONE 4 MG/1
10 TABLET ORAL ONCE
Status: CANCELLED | OUTPATIENT
Start: 2025-08-12

## 2025-08-05 RX ORDER — ONDANSETRON 4 MG/1
16 TABLET, ORALLY DISINTEGRATING ORAL ONCE
Status: CANCELLED | OUTPATIENT
Start: 2025-08-11 | End: 2025-08-11

## 2025-08-05 RX ORDER — SODIUM CHLORIDE 9 MG/ML
20 INJECTION, SOLUTION INTRAVENOUS ONCE
Status: CANCELLED | OUTPATIENT
Start: 2025-08-12

## 2025-08-05 RX ORDER — ONDANSETRON 4 MG/1
16 TABLET, ORALLY DISINTEGRATING ORAL ONCE
Status: CANCELLED | OUTPATIENT
Start: 2025-08-12 | End: 2025-08-12

## 2025-08-05 RX ORDER — SODIUM CHLORIDE 9 MG/ML
20 INJECTION, SOLUTION INTRAVENOUS ONCE
Status: CANCELLED | OUTPATIENT
Start: 2025-08-15

## 2025-08-05 RX ORDER — ONDANSETRON 8 MG/1
16 TABLET, ORALLY DISINTEGRATING ORAL ONCE
Status: CANCELLED | OUTPATIENT
Start: 2025-08-14 | End: 2025-08-14

## 2025-08-05 RX ORDER — ONDANSETRON 4 MG/1
16 TABLET, ORALLY DISINTEGRATING ORAL ONCE
Status: CANCELLED | OUTPATIENT
Start: 2025-08-13 | End: 2025-08-13

## 2025-08-05 RX ORDER — DEXAMETHASONE 4 MG/1
10 TABLET ORAL ONCE
Status: CANCELLED | OUTPATIENT
Start: 2025-08-11

## 2025-08-05 RX ORDER — DEXAMETHASONE 4 MG/1
10 TABLET ORAL ONCE
Status: CANCELLED | OUTPATIENT
Start: 2025-08-13

## 2025-08-05 RX ORDER — DEXAMETHASONE 4 MG/1
10 TABLET ORAL ONCE
Status: CANCELLED | OUTPATIENT
Start: 2025-08-15

## 2025-08-05 RX ORDER — SODIUM CHLORIDE 9 MG/ML
20 INJECTION, SOLUTION INTRAVENOUS ONCE
Status: CANCELLED | OUTPATIENT
Start: 2025-08-14

## 2025-08-05 RX ORDER — SODIUM CHLORIDE 9 MG/ML
20 INJECTION, SOLUTION INTRAVENOUS ONCE
Status: CANCELLED | OUTPATIENT
Start: 2025-08-11

## 2025-08-07 ENCOUNTER — TELEPHONE (OUTPATIENT)
Age: 75
End: 2025-08-07

## 2025-08-08 ENCOUNTER — TELEPHONE (OUTPATIENT)
Dept: INFUSION CENTER | Facility: CLINIC | Age: 75
End: 2025-08-08

## 2025-08-10 ENCOUNTER — APPOINTMENT (OUTPATIENT)
Dept: LAB | Facility: CLINIC | Age: 75
End: 2025-08-10
Payer: MEDICARE

## 2025-08-11 ENCOUNTER — TELEPHONE (OUTPATIENT)
Dept: HEMATOLOGY ONCOLOGY | Facility: CLINIC | Age: 75
End: 2025-08-11

## 2025-08-11 ENCOUNTER — OFFICE VISIT (OUTPATIENT)
Dept: HEMATOLOGY ONCOLOGY | Facility: CLINIC | Age: 75
End: 2025-08-11
Payer: MEDICARE

## 2025-08-11 ENCOUNTER — HOSPITAL ENCOUNTER (OUTPATIENT)
Dept: INFUSION CENTER | Facility: CLINIC | Age: 75
Discharge: HOME | End: 2025-08-11
Attending: INTERNAL MEDICINE
Payer: MEDICARE

## 2025-08-12 ENCOUNTER — HOSPITAL ENCOUNTER (OUTPATIENT)
Dept: INFUSION CENTER | Facility: CLINIC | Age: 75
Discharge: HOME | End: 2025-08-12
Attending: INTERNAL MEDICINE
Payer: MEDICARE

## 2025-08-13 ENCOUNTER — HOSPITAL ENCOUNTER (OUTPATIENT)
Dept: INFUSION CENTER | Facility: CLINIC | Age: 75
Discharge: HOME | End: 2025-08-13
Attending: INTERNAL MEDICINE
Payer: MEDICARE

## 2025-08-14 ENCOUNTER — HOSPITAL ENCOUNTER (OUTPATIENT)
Dept: INFUSION CENTER | Facility: CLINIC | Age: 75
Discharge: HOME | End: 2025-08-14
Attending: INTERNAL MEDICINE
Payer: MEDICARE

## 2025-08-14 VITALS
TEMPERATURE: 98.1 F | WEIGHT: 196 LBS | RESPIRATION RATE: 16 BRPM | OXYGEN SATURATION: 96 % | HEIGHT: 64 IN | SYSTOLIC BLOOD PRESSURE: 134 MMHG | DIASTOLIC BLOOD PRESSURE: 75 MMHG | HEART RATE: 73 BPM | BODY MASS INDEX: 33.46 KG/M2

## 2025-08-14 DIAGNOSIS — D46.Z MDS (MYELODYSPLASTIC SYNDROME), HIGH GRADE (HCC): Primary | ICD-10-CM

## 2025-08-14 PROCEDURE — 96401 CHEMO ANTI-NEOPL SQ/IM: CPT

## 2025-08-14 RX ORDER — SODIUM CHLORIDE 9 MG/ML
20 INJECTION, SOLUTION INTRAVENOUS ONCE
Status: DISCONTINUED | OUTPATIENT
Start: 2025-08-14 | End: 2025-08-17 | Stop reason: HOSPADM

## 2025-08-14 RX ORDER — ONDANSETRON 8 MG/1
16 TABLET, ORALLY DISINTEGRATING ORAL ONCE
Status: COMPLETED | OUTPATIENT
Start: 2025-08-14 | End: 2025-08-14

## 2025-08-14 RX ADMIN — ONDANSETRON 16 MG: 8 TABLET, ORALLY DISINTEGRATING ORAL at 13:33

## 2025-08-14 RX ADMIN — AZACITIDINE 137.5 MG: 100 INJECTION, POWDER, LYOPHILIZED, FOR SOLUTION INTRAVENOUS; SUBCUTANEOUS at 13:52

## 2025-08-14 RX ADMIN — DEXAMETHASONE 10 MG: 2 TABLET ORAL at 13:32

## 2025-08-15 ENCOUNTER — HOSPITAL ENCOUNTER (OUTPATIENT)
Dept: INFUSION CENTER | Facility: CLINIC | Age: 75
End: 2025-08-15
Attending: INTERNAL MEDICINE
Payer: MEDICARE

## 2025-08-19 DIAGNOSIS — F32.A ANXIETY AND DEPRESSION: ICD-10-CM

## 2025-08-19 DIAGNOSIS — F41.9 ANXIETY AND DEPRESSION: ICD-10-CM

## 2025-08-20 RX ORDER — CITALOPRAM HYDROBROMIDE 20 MG/1
20 TABLET ORAL DAILY
Qty: 90 TABLET | Refills: 1 | Status: SHIPPED | OUTPATIENT
Start: 2025-08-20

## (undated) DEVICE — SYRINGE 1ML TB 27G X 3/8 SAFETY

## (undated) DEVICE — ELECTRODE NEEDLE MOD E-Z CLEAN 2.75IN 7CM -0013M

## (undated) DEVICE — 450 ML BOTTLE OF 0.05% CHLORHEXIDINE GLUCONATE IN 99.95% STERILE WATER FOR IRRIGATION, USP AND APPLICATOR.: Brand: IRRISEPT ANTIMICROBIAL WOUND LAVAGE

## (undated) DEVICE — SUT MONOCRYL 3-0 PS-2 18 IN Y497G

## (undated) DEVICE — SUT VICRYL 0 CT-1 27 IN J260H

## (undated) DEVICE — INTENDED FOR TISSUE SEPARATION, AND OTHER PROCEDURES THAT REQUIRE A SHARP SURGICAL BLADE TO PUNCTURE OR CUT.: Brand: BARD-PARKER ® CARBON RIB-BACK BLADES

## (undated) DEVICE — SUT VICRYL 3-0 SH 27 IN J416H

## (undated) DEVICE — ABDOMINAL PAD: Brand: DERMACEA

## (undated) DEVICE — SCD SEQUENTIAL COMPRESSION COMFORT SLEEVE MEDIUM KNEE LENGTH: Brand: KENDALL SCD

## (undated) DEVICE — SUT ETHILON 4-0 PS-2 18 IN 1667H

## (undated) DEVICE — TUBING LIPOSUCTION ASPIRATION 12FT STERILE

## (undated) DEVICE — MAGNETIC INSTRUMENT PAD 16" X 20"; LARGE; DISPOSABLE: Brand: CARDINAL HEALTH

## (undated) DEVICE — CHLORAPREP HI-LITE 26ML ORANGE

## (undated) DEVICE — PLUMEPEN PRO 10FT

## (undated) DEVICE — DECANTER: Brand: UNBRANDED

## (undated) DEVICE — DRAPE SHEET THREE QUARTER

## (undated) DEVICE — ANTIBACTERIAL UNDYED BRAIDED (POLYGLACTIN 910), SYNTHETIC ABSORBABLE SUTURE: Brand: COATED VICRYL

## (undated) DEVICE — TUBING SUCTION 5MM X 12 FT

## (undated) DEVICE — MAYO STAND COVER: Brand: CONVERTORS

## (undated) DEVICE — SYRINGE 10ML LL

## (undated) DEVICE — GLOVE SRG BIOGEL ECLIPSE 7.5

## (undated) DEVICE — GLOVE SRG BIOGEL 7

## (undated) DEVICE — BULB SYRINGE,IRRIGATION WITH PROTECTIVE CAP: Brand: DOVER

## (undated) DEVICE — SYRINGE 50ML LL

## (undated) DEVICE — ADHESIVE SKIN CLOSR DERMABOND PRINEO

## (undated) DEVICE — LIGACLIP MCA MULTIPLE CLIP APPLIERS, 20 SMALL CLIPS: Brand: LIGACLIP

## (undated) DEVICE — GLOVE INDICATOR PI UNDERGLOVE SZ 7.5 BLUE

## (undated) DEVICE — PROXIMATE SKIN STAPLERS (35 WIDE) CONTAINS 35 STAINLESS STEEL STAPLES (FIXED HEAD): Brand: PROXIMATE

## (undated) DEVICE — POV-IOD SOLUTION 4OZ BT

## (undated) DEVICE — BETHLEHEM UNIVERSAL MINOR GEN: Brand: CARDINAL HEALTH

## (undated) DEVICE — HYDROPHILIC WOUND DRESSING WITH ZINC PLUS VITAMINS A AND B6.: Brand: DERMAGRAN®-B

## (undated) DEVICE — SUT STRATAFIX SMTR PDS PLUS 1 CT-1 30CM SXPP1A435

## (undated) DEVICE — NEEDLE 25G X 1 1/2

## (undated) DEVICE — INTENDED FOR TISSUE SEPARATION, AND OTHER PROCEDURES THAT REQUIRE A SHARP SURGICAL BLADE TO PUNCTURE OR CUT.: Brand: BARD-PARKER SAFETY BLADES SIZE 10, STERILE

## (undated) DEVICE — ADHESIVE SKIN HIGH VISCOSITY EXOFIN 1ML

## (undated) DEVICE — SUT STRATAFIX SPIRAL MONOCRYL PLUS 3-0 PS-2 45CM SXMP1B107

## (undated) DEVICE — SKIN MARKER DUAL TIP WITH RULER CAP, FLEXIBLE RULER AND LABELS: Brand: DEVON

## (undated) DEVICE — GAUZE SPONGES,16 PLY: Brand: CURITY

## (undated) DEVICE — SMOOTH MODERATE HIGH PROFILE, 545CC  SMOOTH ROUND SILICONE
Type: IMPLANTABLE DEVICE | Site: BREAST | Status: NON-FUNCTIONAL
Brand: MENTOR MEMORYGEL BOOST BREAST IMPLANT

## (undated) DEVICE — SHEATH, GUIDE, SAVI SCOUT®: Brand: SAVI SCOUT®

## (undated) DEVICE — ELECTRODE BLADE MOD E-Z CLEAN  2.75IN 7CM -0012AM

## (undated) DEVICE — NEEDLE SPINAL18G X 3.5 IN QUINCKE

## (undated) DEVICE — TUBING ASPIRATION LIPOSUCTION SET 12FT

## (undated) DEVICE — SPONGE LAP 18 X 18 IN STRL RFD

## (undated) DEVICE — PACK UNIVERSAL DRAPES SUB-Q ICD

## (undated) DEVICE — PENCIL ELECTROSURG E-Z CLEAN -0035H

## (undated) DEVICE — DRAPE EQUIPMENT RF WAND

## (undated) DEVICE — DISPOSABLE OR TOWEL: Brand: CARDINAL HEALTH

## (undated) DEVICE — NEEDLE 18 G X 1 1/2

## (undated) DEVICE — SYRINGE 20ML LL

## (undated) DEVICE — MODERATE HIGH PROFILE BOOST, FOR USE WITH SMHB-545: Brand: MENTOR MEMORYGEL BOOST RESTERILIZABLE GEL SIZER

## (undated) DEVICE — SUT MONOCRYL 4-0 PS-2 18 IN Y496G

## (undated) DEVICE — ADHESIVE SKIN CLOSURE SYS EXOFIN FUSION 22CM

## (undated) DEVICE — MEDI-VAC YANKAUER SUCTION HANDLE W/STRAIGHT TIP & CONTROL VENT: Brand: CARDINAL HEALTH

## (undated) DEVICE — GLOVE SRG BIOGEL 6.5

## (undated) DEVICE — KERLIX BANDAGE ROLL: Brand: KERLIX

## (undated) DEVICE — PROVE COVER: Brand: UNBRANDED

## (undated) DEVICE — BETHLEHEM UNIVERSAL BREAST PK: Brand: CARDINAL HEALTH

## (undated) DEVICE — SMOKE EVACUATION TUBING WITH 8 IN INTEGRAL WAND AND SPONGE GUARD: Brand: BUFFALO FILTER

## (undated) DEVICE — TRAY FOLEY 16FR URIMETER SURESTEP

## (undated) DEVICE — DRESSING XEROFORM 5 X 9

## (undated) DEVICE — DRAPE SURGIKIT SADDLE BAG

## (undated) DEVICE — INTENDED FOR TISSUE SEPARATION, AND OTHER PROCEDURES THAT REQUIRE A SHARP SURGICAL BLADE TO PUNCTURE OR CUT.: Brand: BARD-PARKER SAFETY BLADES SIZE 15, STERILE

## (undated) DEVICE — MEDI-VAC YANK SUCT HNDL W/TPRD BULBOUS TIP: Brand: CARDINAL HEALTH